# Patient Record
Sex: MALE | Race: WHITE | NOT HISPANIC OR LATINO | URBAN - METROPOLITAN AREA
[De-identification: names, ages, dates, MRNs, and addresses within clinical notes are randomized per-mention and may not be internally consistent; named-entity substitution may affect disease eponyms.]

---

## 2017-10-15 ENCOUNTER — INPATIENT (INPATIENT)
Facility: HOSPITAL | Age: 77
LOS: 0 days | Discharge: ANOTHER HOSPITAL | DRG: 872 | End: 2017-10-16
Attending: INTERNAL MEDICINE | Admitting: INTERNAL MEDICINE
Payer: MEDICARE

## 2017-10-15 VITALS
HEART RATE: 122 BPM | RESPIRATION RATE: 22 BRPM | SYSTOLIC BLOOD PRESSURE: 179 MMHG | WEIGHT: 189.6 LBS | TEMPERATURE: 98 F | OXYGEN SATURATION: 97 % | HEIGHT: 68 IN | DIASTOLIC BLOOD PRESSURE: 80 MMHG

## 2017-10-15 DIAGNOSIS — Z29.9 ENCOUNTER FOR PROPHYLACTIC MEASURES, UNSPECIFIED: ICD-10-CM

## 2017-10-15 DIAGNOSIS — I82.409 ACUTE EMBOLISM AND THROMBOSIS OF UNSPECIFIED DEEP VEINS OF UNSPECIFIED LOWER EXTREMITY: ICD-10-CM

## 2017-10-15 DIAGNOSIS — A41.9 SEPSIS, UNSPECIFIED ORGANISM: ICD-10-CM

## 2017-10-15 DIAGNOSIS — E11.9 TYPE 2 DIABETES MELLITUS WITHOUT COMPLICATIONS: ICD-10-CM

## 2017-10-15 DIAGNOSIS — E83.42 HYPOMAGNESEMIA: ICD-10-CM

## 2017-10-15 DIAGNOSIS — I10 ESSENTIAL (PRIMARY) HYPERTENSION: ICD-10-CM

## 2017-10-15 DIAGNOSIS — T79.6XXA TRAUMATIC ISCHEMIA OF MUSCLE, INITIAL ENCOUNTER: ICD-10-CM

## 2017-10-15 DIAGNOSIS — E83.51 HYPOCALCEMIA: ICD-10-CM

## 2017-10-15 DIAGNOSIS — N17.9 ACUTE KIDNEY FAILURE, UNSPECIFIED: ICD-10-CM

## 2017-10-15 LAB
ALBUMIN SERPL ELPH-MCNC: 3.6 G/DL — SIGNIFICANT CHANGE UP (ref 3.5–5)
ALP SERPL-CCNC: 45 U/L — SIGNIFICANT CHANGE UP (ref 40–120)
ALT FLD-CCNC: 23 U/L DA — SIGNIFICANT CHANGE UP (ref 10–60)
ANION GAP SERPL CALC-SCNC: 21 MMOL/L — HIGH (ref 5–17)
APPEARANCE UR: CLEAR — SIGNIFICANT CHANGE UP
AST SERPL-CCNC: 38 U/L — SIGNIFICANT CHANGE UP (ref 10–40)
BASOPHILS # BLD AUTO: 0 K/UL — SIGNIFICANT CHANGE UP (ref 0–0.2)
BASOPHILS NFR BLD AUTO: 0.2 % — SIGNIFICANT CHANGE UP (ref 0–2)
BILIRUB SERPL-MCNC: 0.7 MG/DL — SIGNIFICANT CHANGE UP (ref 0.2–1.2)
BILIRUB UR-MCNC: NEGATIVE — SIGNIFICANT CHANGE UP
BUN SERPL-MCNC: 22 MG/DL — HIGH (ref 7–18)
CALCIUM SERPL-MCNC: 5.9 MG/DL — CRITICAL LOW (ref 8.4–10.5)
CHLORIDE SERPL-SCNC: 105 MMOL/L — SIGNIFICANT CHANGE UP (ref 96–108)
CK MB BLD-MCNC: 0.3 % — SIGNIFICANT CHANGE UP (ref 0–3.5)
CK MB CFR SERPL CALC: 8.9 NG/ML — HIGH (ref 0–3.6)
CK SERPL-CCNC: 2846 U/L — HIGH (ref 35–232)
CO2 SERPL-SCNC: 13 MMOL/L — LOW (ref 22–31)
COLOR SPEC: YELLOW — SIGNIFICANT CHANGE UP
CREAT SERPL-MCNC: 1.79 MG/DL — HIGH (ref 0.5–1.3)
DIFF PNL FLD: ABNORMAL
EOSINOPHIL # BLD AUTO: 0 K/UL — SIGNIFICANT CHANGE UP (ref 0–0.5)
EOSINOPHIL NFR BLD AUTO: 0.2 % — SIGNIFICANT CHANGE UP (ref 0–6)
GLUCOSE SERPL-MCNC: 162 MG/DL — HIGH (ref 70–99)
GLUCOSE UR QL: NEGATIVE — SIGNIFICANT CHANGE UP
HCT VFR BLD CALC: 35.8 % — LOW (ref 39–50)
HGB BLD-MCNC: 12.1 G/DL — LOW (ref 13–17)
KETONES UR-MCNC: ABNORMAL
LACTATE SERPL-SCNC: 2.5 MMOL/L — HIGH (ref 0.7–2)
LACTATE SERPL-SCNC: 7.5 MMOL/L — CRITICAL HIGH (ref 0.7–2)
LEUKOCYTE ESTERASE UR-ACNC: NEGATIVE — SIGNIFICANT CHANGE UP
LYMPHOCYTES # BLD AUTO: 1.3 K/UL — SIGNIFICANT CHANGE UP (ref 1–3.3)
LYMPHOCYTES # BLD AUTO: 12.4 % — LOW (ref 13–44)
MAGNESIUM SERPL-MCNC: 0.4 MG/DL — CRITICAL LOW (ref 1.6–2.6)
MCHC RBC-ENTMCNC: 31 PG — SIGNIFICANT CHANGE UP (ref 27–34)
MCHC RBC-ENTMCNC: 33.8 GM/DL — SIGNIFICANT CHANGE UP (ref 32–36)
MCV RBC AUTO: 91.7 FL — SIGNIFICANT CHANGE UP (ref 80–100)
MONOCYTES # BLD AUTO: 0.5 K/UL — SIGNIFICANT CHANGE UP (ref 0–0.9)
MONOCYTES NFR BLD AUTO: 4.5 % — SIGNIFICANT CHANGE UP (ref 2–14)
NEUTROPHILS # BLD AUTO: 8.7 K/UL — HIGH (ref 1.8–7.4)
NEUTROPHILS NFR BLD AUTO: 82.7 % — HIGH (ref 43–77)
NITRITE UR-MCNC: NEGATIVE — SIGNIFICANT CHANGE UP
PH UR: 5 — SIGNIFICANT CHANGE UP (ref 5–8)
PHOSPHATE SERPL-MCNC: 4.6 MG/DL — HIGH (ref 2.5–4.5)
PLATELET # BLD AUTO: 159 K/UL — SIGNIFICANT CHANGE UP (ref 150–400)
POTASSIUM SERPL-MCNC: 3.7 MMOL/L — SIGNIFICANT CHANGE UP (ref 3.5–5.3)
POTASSIUM SERPL-SCNC: 3.7 MMOL/L — SIGNIFICANT CHANGE UP (ref 3.5–5.3)
PROT SERPL-MCNC: 7.2 G/DL — SIGNIFICANT CHANGE UP (ref 6–8.3)
PROT UR-MCNC: 100
RBC # BLD: 3.9 M/UL — LOW (ref 4.2–5.8)
RBC # FLD: 13.2 % — SIGNIFICANT CHANGE UP (ref 10.3–14.5)
SODIUM SERPL-SCNC: 139 MMOL/L — SIGNIFICANT CHANGE UP (ref 135–145)
SP GR SPEC: 1.02 — SIGNIFICANT CHANGE UP (ref 1.01–1.02)
TROPONIN I SERPL-MCNC: 0.04 NG/ML — SIGNIFICANT CHANGE UP (ref 0–0.04)
UROBILINOGEN FLD QL: NEGATIVE — SIGNIFICANT CHANGE UP
WBC # BLD: 10.6 K/UL — HIGH (ref 3.8–10.5)
WBC # FLD AUTO: 10.6 K/UL — HIGH (ref 3.8–10.5)

## 2017-10-15 PROCEDURE — 76770 US EXAM ABDO BACK WALL COMP: CPT | Mod: 26

## 2017-10-15 PROCEDURE — 71010: CPT | Mod: 26

## 2017-10-15 PROCEDURE — 99291 CRITICAL CARE FIRST HOUR: CPT

## 2017-10-15 RX ORDER — ACETAMINOPHEN 500 MG
650 TABLET ORAL EVERY 6 HOURS
Qty: 0 | Refills: 0 | Status: DISCONTINUED | OUTPATIENT
Start: 2017-10-15 | End: 2017-10-16

## 2017-10-15 RX ORDER — APIXABAN 2.5 MG/1
2.5 TABLET, FILM COATED ORAL EVERY 12 HOURS
Qty: 0 | Refills: 0 | Status: DISCONTINUED | OUTPATIENT
Start: 2017-10-15 | End: 2017-10-16

## 2017-10-15 RX ORDER — LATANOPROST 0.05 MG/ML
1 SOLUTION/ DROPS OPHTHALMIC; TOPICAL AT BEDTIME
Qty: 0 | Refills: 0 | Status: DISCONTINUED | OUTPATIENT
Start: 2017-10-15 | End: 2017-10-16

## 2017-10-15 RX ORDER — PANTOPRAZOLE SODIUM 20 MG/1
40 TABLET, DELAYED RELEASE ORAL
Qty: 0 | Refills: 0 | Status: DISCONTINUED | OUTPATIENT
Start: 2017-10-15 | End: 2017-10-16

## 2017-10-15 RX ORDER — SODIUM CHLORIDE 9 MG/ML
1000 INJECTION, SOLUTION INTRAVENOUS
Qty: 0 | Refills: 0 | Status: DISCONTINUED | OUTPATIENT
Start: 2017-10-15 | End: 2017-10-16

## 2017-10-15 RX ORDER — LABETALOL HCL 100 MG
200 TABLET ORAL
Qty: 0 | Refills: 0 | Status: DISCONTINUED | OUTPATIENT
Start: 2017-10-15 | End: 2017-10-16

## 2017-10-15 RX ORDER — SODIUM CHLORIDE 9 MG/ML
1000 INJECTION INTRAMUSCULAR; INTRAVENOUS; SUBCUTANEOUS
Qty: 0 | Refills: 0 | Status: DISCONTINUED | OUTPATIENT
Start: 2017-10-15 | End: 2017-10-16

## 2017-10-15 RX ORDER — DEXTROSE 50 % IN WATER 50 %
25 SYRINGE (ML) INTRAVENOUS ONCE
Qty: 0 | Refills: 0 | Status: DISCONTINUED | OUTPATIENT
Start: 2017-10-15 | End: 2017-10-16

## 2017-10-15 RX ORDER — METFORMIN HYDROCHLORIDE 850 MG/1
0 TABLET ORAL
Qty: 180 | Refills: 0 | COMMUNITY

## 2017-10-15 RX ORDER — CALCITRIOL 0.5 UG/1
0.25 CAPSULE ORAL DAILY
Qty: 0 | Refills: 0 | Status: DISCONTINUED | OUTPATIENT
Start: 2017-10-15 | End: 2017-10-16

## 2017-10-15 RX ORDER — MAGNESIUM SULFATE 500 MG/ML
2 VIAL (ML) INJECTION EVERY 4 HOURS
Qty: 0 | Refills: 0 | Status: COMPLETED | OUTPATIENT
Start: 2017-10-15 | End: 2017-10-16

## 2017-10-15 RX ORDER — CALCIUM GLUCONATE 100 MG/ML
2 VIAL (ML) INTRAVENOUS ONCE
Qty: 0 | Refills: 0 | Status: COMPLETED | OUTPATIENT
Start: 2017-10-15 | End: 2017-10-15

## 2017-10-15 RX ORDER — GLUCAGON INJECTION, SOLUTION 0.5 MG/.1ML
1 INJECTION, SOLUTION SUBCUTANEOUS ONCE
Qty: 0 | Refills: 0 | Status: DISCONTINUED | OUTPATIENT
Start: 2017-10-15 | End: 2017-10-16

## 2017-10-15 RX ORDER — AZITHROMYCIN 500 MG/1
500 TABLET, FILM COATED ORAL EVERY 24 HOURS
Qty: 0 | Refills: 0 | Status: DISCONTINUED | OUTPATIENT
Start: 2017-10-16 | End: 2017-10-16

## 2017-10-15 RX ORDER — MAGNESIUM OXIDE 400 MG ORAL TABLET 241.3 MG
400 TABLET ORAL
Qty: 0 | Refills: 0 | Status: DISCONTINUED | OUTPATIENT
Start: 2017-10-15 | End: 2017-10-16

## 2017-10-15 RX ORDER — METRONIDAZOLE 500 MG
500 TABLET ORAL EVERY 8 HOURS
Qty: 0 | Refills: 0 | Status: DISCONTINUED | OUTPATIENT
Start: 2017-10-16 | End: 2017-10-16

## 2017-10-15 RX ORDER — AZITHROMYCIN 500 MG/1
TABLET, FILM COATED ORAL
Qty: 0 | Refills: 0 | Status: DISCONTINUED | OUTPATIENT
Start: 2017-10-15 | End: 2017-10-16

## 2017-10-15 RX ORDER — LABETALOL HCL 100 MG
0 TABLET ORAL
Qty: 180 | Refills: 0 | COMMUNITY

## 2017-10-15 RX ORDER — AZITHROMYCIN 500 MG/1
500 TABLET, FILM COATED ORAL ONCE
Qty: 0 | Refills: 0 | Status: COMPLETED | OUTPATIENT
Start: 2017-10-15 | End: 2017-10-15

## 2017-10-15 RX ORDER — PIPERACILLIN AND TAZOBACTAM 4; .5 G/20ML; G/20ML
3.38 INJECTION, POWDER, LYOPHILIZED, FOR SOLUTION INTRAVENOUS ONCE
Qty: 0 | Refills: 0 | Status: COMPLETED | OUTPATIENT
Start: 2017-10-15 | End: 2017-10-15

## 2017-10-15 RX ORDER — CALCIUM CARBONATE 500(1250)
1 TABLET ORAL DAILY
Qty: 0 | Refills: 0 | Status: DISCONTINUED | OUTPATIENT
Start: 2017-10-15 | End: 2017-10-16

## 2017-10-15 RX ORDER — CEFTRIAXONE 500 MG/1
1 INJECTION, POWDER, FOR SOLUTION INTRAMUSCULAR; INTRAVENOUS EVERY 24 HOURS
Qty: 0 | Refills: 0 | Status: DISCONTINUED | OUTPATIENT
Start: 2017-10-16 | End: 2017-10-16

## 2017-10-15 RX ORDER — DEXTROSE 50 % IN WATER 50 %
1 SYRINGE (ML) INTRAVENOUS ONCE
Qty: 0 | Refills: 0 | Status: DISCONTINUED | OUTPATIENT
Start: 2017-10-15 | End: 2017-10-16

## 2017-10-15 RX ORDER — METRONIDAZOLE 500 MG
TABLET ORAL
Qty: 0 | Refills: 0 | Status: DISCONTINUED | OUTPATIENT
Start: 2017-10-16 | End: 2017-10-16

## 2017-10-15 RX ORDER — CALCITRIOL 0.5 UG/1
0 CAPSULE ORAL
Qty: 90 | Refills: 0 | COMMUNITY

## 2017-10-15 RX ORDER — ATORVASTATIN CALCIUM 80 MG/1
40 TABLET, FILM COATED ORAL AT BEDTIME
Qty: 0 | Refills: 0 | Status: DISCONTINUED | OUTPATIENT
Start: 2017-10-15 | End: 2017-10-15

## 2017-10-15 RX ORDER — INSULIN LISPRO 100/ML
VIAL (ML) SUBCUTANEOUS
Qty: 0 | Refills: 0 | Status: DISCONTINUED | OUTPATIENT
Start: 2017-10-15 | End: 2017-10-16

## 2017-10-15 RX ORDER — GLIMEPIRIDE 1 MG
0 TABLET ORAL
Qty: 90 | Refills: 0 | COMMUNITY

## 2017-10-15 RX ORDER — DEXTROSE 50 % IN WATER 50 %
12.5 SYRINGE (ML) INTRAVENOUS ONCE
Qty: 0 | Refills: 0 | Status: DISCONTINUED | OUTPATIENT
Start: 2017-10-15 | End: 2017-10-16

## 2017-10-15 RX ORDER — METRONIDAZOLE 500 MG
500 TABLET ORAL ONCE
Qty: 0 | Refills: 0 | Status: COMPLETED | OUTPATIENT
Start: 2017-10-15 | End: 2017-10-16

## 2017-10-15 RX ORDER — OMEPRAZOLE 10 MG/1
0 CAPSULE, DELAYED RELEASE ORAL
Qty: 90 | Refills: 0 | COMMUNITY

## 2017-10-15 RX ORDER — SODIUM CHLORIDE 9 MG/ML
2600 INJECTION INTRAMUSCULAR; INTRAVENOUS; SUBCUTANEOUS ONCE
Qty: 0 | Refills: 0 | Status: COMPLETED | OUTPATIENT
Start: 2017-10-15 | End: 2017-10-15

## 2017-10-15 RX ORDER — FINASTERIDE 5 MG/1
5 TABLET, FILM COATED ORAL DAILY
Qty: 0 | Refills: 0 | Status: DISCONTINUED | OUTPATIENT
Start: 2017-10-15 | End: 2017-10-16

## 2017-10-15 RX ADMIN — Medication 400 GRAM(S): at 23:01

## 2017-10-15 RX ADMIN — SODIUM CHLORIDE 2600 MILLILITER(S): 9 INJECTION INTRAMUSCULAR; INTRAVENOUS; SUBCUTANEOUS at 19:10

## 2017-10-15 RX ADMIN — SODIUM CHLORIDE 100 MILLILITER(S): 9 INJECTION INTRAMUSCULAR; INTRAVENOUS; SUBCUTANEOUS at 23:55

## 2017-10-15 RX ADMIN — PIPERACILLIN AND TAZOBACTAM 200 GRAM(S): 4; .5 INJECTION, POWDER, LYOPHILIZED, FOR SOLUTION INTRAVENOUS at 19:11

## 2017-10-15 NOTE — H&P ADULT - PROBLEM SELECTOR PLAN 4
-QTc 473 in EKG, Patient was taking calcitriol, from previous record. Continue calcitriol, check intact PTH level  -Will replace calcium and recheck the level  -Telemetry monitoring -CK 2846, patient reports multiple fall while in [place](but no LOC) and may be taking(he does not know current list of the medications) statin, as per previous record and outside medication list. Normal troponin.  -Will give IV hydration and recheck CK level in AM.

## 2017-10-15 NOTE — ED ADULT TRIAGE NOTE - CHIEF COMPLAINT QUOTE
[place] dizziness as [place] EMS [place] and [place]o dizziness. patient stated he just returned from [place]. patient noted diaphoretic and shaking in triage.

## 2017-10-15 NOTE — H&P ADULT - PROBLEM SELECTOR PLAN 1
-qSOFA 1, patient has elevated lactate with leukocytosis + left shift. Multiple possible source of infection(PNA vs UTI vs Diarrhea).  -Will start Rocephin and zithro for possible PNA; LLL infiltrate  -Also concern for UTI, given h/o BPH and urinary retention, UA in ED looks contaminated; f/u urine culture  -Given travel history and recent diarrheal events, traveler's diarrhea and community acquired C.diff need to be ruled out. Contact isolation and send stool specimen. Starting Metronidazole empirically.  -IV hydration  -Trend lactate, f/u blood cultures -qSOFA 1, patient has elevated lactate with leukocytosis + left shift. Multiple possible source of infection(PNA vs UTI vs Diarrhea).  -Will start Rocephin and zithro for possible PNA; LLL infiltrate  -Also concern for UTI, given h/o BPH and urinary retention, UA in ED looks contaminated; f/u urine culture  -Given travel history and recent diarrheal events, traveler's diarrhea and community acquired C.diff need to be ruled out. Contact isolation and send stool specimen. Starting Metronidazole empirically.  -IV hydration  -Trend lactate, f/u blood cultures  -ID Dr. Francois

## 2017-10-15 NOTE — H&P ADULT - PROBLEM SELECTOR PLAN 6
-Patient is on Elquis 5mg BID for B/L DVT since 2015; continue Eliquis for now.  -Calf is not tender, will check doppler US given risk factors(travel + past history) -Past h/o chronic hypomagnesemia  -Will replace Magnesium and recheck the levels  -Monitor telemetry  -Starting PO magnesium

## 2017-10-15 NOTE — ED PROVIDER NOTE - MEDICAL DECISION MAKING DETAILS
fever to 101.8, hr to 120  no obvious sourse.  discussed with med. admitiing team, PE also on differential, however with creat of 1.79 will likely need vq scan.  pt is on anticoagultion.  repeat lactate is 2.5.  will admit for presumed sepsis with unidentified source

## 2017-10-15 NOTE — H&P ADULT - PROBLEM SELECTOR PLAN 9
-IMPROVE score 5, patient is fully anticoagulated with Eliquis, will continue -Check Hba1c, monitor accucheck  -Holding home med metformin as patient had diarrhea and MANUEL  -Humalog sliding scale and diabetic diet

## 2017-10-15 NOTE — H&P ADULT - PROBLEM SELECTOR PLAN 7
-Pt is hypertensive and taking Labetalol 300mg q12 hrs  -Will reduce dose to 200mg q 12hrs as patient is in septic condition, per Dr. Telles  -Monitor BP -Patient is on Elquis 5mg BID for B/L DVT since 2015; continue Eliquis for now.  -Calf is not tender, will check doppler US given risk factors(travel + past history)

## 2017-10-15 NOTE — H&P ADULT - NSHPPHYSICALEXAM_GEN_ALL_CORE
PHYSICAL EXAM:  GENERAL: NAD, anxious, having tremor worsening with purposeful movement  HEAD:  Atraumatic, Normocephalic  EYES: EOMI, PERRLA, conjunctiva and sclera clear  ENMT: Dry mucous membranes  NECK: Supple, No JVD, Normal thyroid  NERVOUS SYSTEM:  Alert & Oriented X3, Motor Strength 5/5 B/L upper and lower extremities. Having tremor worsening with purposeful movement  CHEST/LUNG: Clear to percussion bilaterally; No rales, rhonchi, wheezing, or rubs  HEART: Regular rate and rhythm; No murmurs, rubs, or gallops  ABDOMEN: Soft, Nondistended; Bowel sounds present. Suprapubic tenderness(+)  EXTREMITIES:  2+ Peripheral Pulses bilaterally, No clubbing, cyanosis. Mild pitting edema B/L ankles  LYMPH: No lymphadenopathy noted    T(C): 38.8 (10-15-17 @ 18:47), Max: 38.8 (10-15-17 @ 18:47)  HR: 122 (10-15-17 @ 18:37) (122 - 122)  BP: 179/80 (10-15-17 @ 18:37) (179/80 - 179/80)  RR: 22 (10-15-17 @ 18:37) (22 - 22)  SpO2: 97% (10-15-17 @ 18:37) (97% - 97%)  Wt(kg): --  I&O's Summary  SKIN: right lower leg abrasion, inferior to patella bone PHYSICAL EXAM:  GENERAL: NAD, anxious, having tremor worsening with purposeful movement  HEAD:  Atraumatic, Normocephalic  EYES: EOMI, PERRLA, conjunctiva and sclera clear  ENMT: Dry mucous membranes  NECK: Supple, No JVD, Normal thyroid  NERVOUS SYSTEM:  Alert & Oriented X3, Motor Strength 5/5 B/L upper and lower extremities. Having tremor worsening with purposeful movement  CHEST/LUNG: Clear to percussion bilaterally; No rales, rhonchi, wheezing, or rubs  HEART: Regular rate and rhythm; No murmurs, rubs, or gallops  ABDOMEN: Soft, Nondistended; Bowel sounds present. Suprapubic tenderness(+)  EXTREMITIES:  2+ Peripheral Pulses bilaterally, No clubbing, cyanosis. Mild pitting edema B/L ankles  LYMPH: No lymphadenopathy noted  SKIN: right lower leg abrasion, inferior to patella bone    T(C): 38.8 (10-15-17 @ 18:47), Max: 38.8 (10-15-17 @ 18:47)  HR: 122 (10-15-17 @ 18:37) (122 - 122)  BP: 179/80 (10-15-17 @ 18:37) (179/80 - 179/80)  RR: 22 (10-15-17 @ 18:37) (22 - 22)  SpO2: 97% (10-15-17 @ 18:37) (97% - 97%)  Wt(kg): --  I&O's Summary

## 2017-10-15 NOTE — H&P ADULT - HISTORY OF PRESENT ILLNESS
77 years old male from home, withPMHx of anemia, DM. DVT (on Xarelto and Eliquis), GERD, HLD, HTN, hypomagnesemia, and PNA 77 years old male from home lives alone, with PMH of HTN, DM, DVT (Dx in 6/2015 on Eliquis), GERD, HLD, HTN, hypomagnesemia, and PNA 77 years old male from home lives alone, with PMH of HTN, DM, DVT (Dx in 6/2015, on Eliquis), GERD, HLD, HTN, hypomagnesemia, and PNA came in to  ED c/o shaking hands and feeling ill started about 2 days ago. Patient is a relatively poor historian. He traveled [place]. He was vomiting and feeling nauseous for over the past 2 days, and had watery diarrhea about 3 times a day for the same period of time. Last bowel movement was yesterday, however that time the stool was normal. Also c/o 1-2 days of black to yellow intermittent thick mucus for the past couple of days without cough. He states that he has been nervous and has shaken since 2 days when he was in [place]-he believes his tremor is from anxiety-. When seen by me, he was also having difficulty urination which he believed from supine position, however denied having problem with urination in the past. Also reports frequent fall recently- 3 episodes over last month-, which he explains from "weak legs". Denies chest pain, SOB, fever, chills, abdominal pain, LOC/head trauma, dizziness, sick contact, or any other symptoms.     In ED, code sepsis was called since he had fever of 101.8 with tachycardia of 122. /50, RR 22, sat 97% RA. Labs significant for lactate of 7.5 with leukocytosis of 10.6K with left shift. BUN/Cr 22/1.79, calcium 5.9 with normal albumin. 2.6L of IV bolus and 1 dose of zosyn was given in ED, lactate trended down to 2.5. EKG sinus tachycardia at 111, with LAFB. Patient is admitted to the telemetry floor for sepsis from unknown cause, likely from UTI vs diarrhea(traveler's vs community-acquired C. diff), MANUEL, hypocalcemia, and frequent fall.        * Primary team please obtain full medication record from his pharmacy-[facility]-. 77 years old male from home lives alone, with PMH of HTN, DM, syncope, DVT (Dx in 6/2015, on Eliquis), BPH, GERD, HLD, HTN, hypomagnesemia, and PNA(2014) came in to  ED c/o shaking hands and feeling ill started about 2 days ago. Patient is a relatively poor historian. He traveled [place] to [place] today. He was vomiting and feeling nauseous for over the past 2 days, and had watery diarrhea about 3 times a day for the same period of time. Last bowel movement was yesterday, however that time the stool was normal. Also c/o 1-2 days of black to yellow intermittent thick mucus for the past couple of days without cough. He states that he has been nervous and has shaken since 2 days when he was in [place]-he believes his tremor is from anxiety-. When seen by me, he was also having difficulty urination which he believed from supine position, however denied having problem with urination in the past. Also reports frequent fall recently- 3 episodes over last month-, which he explains from "weak legs". Denies chest pain, SOB, fever, chills, abdominal pain, LOC/head trauma, dizziness, sick contact, or any other symptoms.     In ED, code sepsis was called since he had fever of 101.8 with tachycardia of 122. /50, RR 22, sat 97% RA. Labs significant for lactate of 7.5 with leukocytosis of 10.6K with left shift. BUN/Cr 22/1.79, calcium 5.9 with normal albumin. 2.6L of IV bolus and 1 dose of zosyn was given in ED, lactate trended down to 2.5. EKG sinus tachycardia at 111, with LAFB. CXR possible LLL infiltration, official report pending. Patient is admitted to the telemetry floor for sepsis from unknown cause, likely from PNA vs UTI vs diarrhea(traveler's vs community-acquired C. diff), MANUEL, hypocalcemia, and frequent fall.        * Primary team please obtain full medication record from his pharmacy-[facility]-. 77 years old male from home lives alone, with PMH of HTN, DM, syncope, DVT (Dx in 6/2015, on Eliquis), BPH, GERD, HLD, HTN, hypomagnesemia, and PNA(2014) came in to  ED c/o shaking hands and feeling ill started about 2 days ago. Patient is a relatively poor historian. He traveled [place]. He was vomiting and feeling nauseous for over the past 2 days, and had watery diarrhea about 3 times a day for the same period of time. Last bowel movement today while in ED, diarrhea. Also c/o 1-2 days of black to yellow intermittent thick mucus for the past couple of days without cough. He states that he has been nervous and has shaken since 2 days when he was in [place]-he believes his tremor is from anxiety-. When seen by me, he was also having difficulty urination which he believed from supine position, however denied having problem with urination in the past. Also reports frequent fall recently- 3 episodes over last month-, which he explains from "weak legs". Denies chest pain, SOB, fever, chills, abdominal pain, LOC/head trauma, dizziness, sick contact, or any other symptoms.     In ED, code sepsis was called since he had fever of 101.8 with tachycardia of 122. /50, RR 22, sat 97% RA. Labs significant for lactate of 7.5 with leukocytosis of 10.6K with left shift. BUN/Cr 22/1.79, calcium 5.9 with normal albumin. 2.6L of IV bolus and 1 dose of zosyn was given in ED, lactate trended down to 2.5. EKG sinus tachycardia at 111, with LAFB. CXR possible LLL infiltration, official report pending. Patient is admitted to the telemetry floor for sepsis from unknown cause, likely from PNA vs UTI vs diarrhea(traveler's vs community-acquired C. diff), MANUEL, hypocalcemia, and frequent fall.        * Primary team please obtain full medication record from his pharmacy-[facility]-. 77 years old male from home lives alone, with PMH of HTN, DM, syncope, DVT (Dx in 6/2015, on Eliquis), BPH, GERD, HLD, HTN, hypomagnesemia, and PNA(2014) came in to  ED c/o shaking hands and feeling ill started about 2 days ago. Patient is a relatively poor historian. He traveled [place]. He was vomiting and feeling nauseous for over the past 2 days, and had watery diarrhea about 3 times a day for the same period of time. Last bowel movement today while in ED, diarrhea. Also c/o 1-2 days of black to yellow intermittent thick mucus for the past couple of days without cough. He states that he has been nervous and has shaken since 2 days when he was in [place]-he believes his tremor is from anxiety-. When seen by me, he was also having difficulty urination which he believed from supine position, however denied having problem with urination in the past. Also reports frequent fall recently- more than 3 episodes over last month-, which he explains from "weak legs". Denies chest pain, SOB, fever, chills, abdominal pain, LOC/head trauma, dizziness, sick contact, or any other symptoms.     In ED, code sepsis was called since he had fever of 101.8 with tachycardia of 122. /50, RR 22, sat 97% RA. Labs significant for lactate of 7.5 with leukocytosis of 10.6K with left shift. BUN/Cr 22/1.79, calcium 5.9 with normal albumin. 2.6L of IV bolus and 1 dose of zosyn was given in ED, lactate trended down to 2.5. EKG sinus tachycardia at 111, with LAFB. CXR possible LLL infiltration, official report pending. Patient is admitted to the telemetry floor for sepsis from unknown cause, likely from PNA vs UTI vs diarrhea(traveler's vs community-acquired C. diff), MANUEL, hypocalcemia, and frequent fall.        * Primary team please obtain full medication record from his pharmacy-[facility]-. Med rec was done based on past medicine list and outside pharmacy record. 77 years old male from home lives alone, with PMH of HTN, DM, syncope, DVT (Dx in 6/2015, on Eliquis), BPH, GERD, HLD, HTN, hypomagnesemia, and PNA(2014) came in to  ED c/o shaking hands and feeling ill started about 2 days ago. Patient is a relatively poor historian. He traveled [place]. He was vomiting and feeling nauseous for over the past 2 days, and had watery diarrhea about 3 times a day for the same period of time. Last bowel movement today while in ED, diarrhea. Also c/o 1-2 days of black to yellow intermittent thick mucus for the past couple of days without cough. He states that he has been nervous and has shaken since 2 days when he was in [place]-he believes his tremor is from anxiety-. When seen by me, he was also having difficulty urination which he believed from supine position, however denied having problem with urination in the past. Also reports frequent fall recently- more than 3 episodes over last month-, which he explains from "weak legs". Denies chest pain, SOB, fever, chills, abdominal pain, LOC/head trauma, dizziness, sick contact, or any other symptoms.     In ED, code sepsis was called since he had fever of 101.8 with tachycardia of 122. /50, RR 22, sat 97% RA. Labs significant for lactate of 7.5 with leukocytosis of 10.6K with left shift. BUN/Cr 22/1.79, calcium 5.9 with normal albumin. 2.6L of IV bolus and 1 dose of zosyn was given in ED, lactate trended down to 2.5. EKG sinus tachycardia at 111, with LAFB. CXR possible LLL infiltration, official report pending. Patient is admitted to the telemetry floor for sepsis from unknown cause, likely from PNA vs UTI vs diarrhea(traveler's vs community-acquired C. diff), MANUEL, hypocalcemia, and frequent fall.        * Primary team please obtain full medication record from his pharmacy-[facility]- or Dr Groves's office. Med rec was done based on past medicine list and outside pharmacy record. 77 years old male from home lives alone, with PMH of HTN, DM, syncope, DVT (Dx in 6/2015, on Eliquis), BPH, GERD, HLD, HTN, hypomagnesemia, and PNA(2014) came in to  ED c/o shaking hands and feeling ill started about 2 days ago. Patient is a relatively poor historian. He traveled [place]. He was vomiting and feeling nauseous for over the past 2 days, and had watery diarrhea about 3 times a day for the same period of time. Last bowel movement today while in ED, diarrhea. Also c/o 1-2 days of black to yellow intermittent thick mucus for the past couple of days without cough. He states that he has been nervous and has shaken since 2 days when he was in [place]-he believes his tremor is from anxiety-. When seen by me, he was also having difficulty urination which he believed from supine position, however denied having problem with urination in the past. Also reports frequent fall recently- more than 3 episodes over last month-, which he explains from "weak legs". Denies chest pain, SOB, fever, chills, abdominal pain, LOC/head trauma, dizziness, sick contact, or any other symptoms.     In ED, code sepsis was called since he had fever of 101.8 with tachycardia of 122. /50, RR 22, sat 97% RA. Labs significant for lactate of 7.5 with leukocytosis of 10.6K with left shift. BUN/Cr 22/1.79, calcium 5.9 with normal albumin. 2.6L of IV bolus and 1 dose of zosyn was given in ED, lactate trended down to 2.5. EKG sinus tachycardia at 111, with LAFB. CXR possible LLL infiltration, official report pending. Patient is admitted to the telemetry floor for sepsis from unknown cause, likely from PNA vs UTI vs diarrhea(traveler's vs community-acquired C. diff vs other infectious diarrhea), MANUEL, hypocalcemia, and frequent fall.        * Primary team please obtain full medication record from his pharmacy-[facility]- or Dr Groves's office. Med rec was done based on past medicine list and outside pharmacy record. 77 years old male from home lives alone, with PMH of HTN, DM, syncope, DVT (Dx in 6/2015, on Eliquis), BPH, GERD, HLD, HTN, hypomagnesemia, and PNA(2014) came in to  ED c/o shaking hands and feeling ill started about 2 days ago. Patient is a relatively poor historian. He traveled [place]. He was vomiting and feeling nauseous for over the past 2 days, and had watery diarrhea about 3 times a day for the same period of time. Last bowel movement today while in ED, diarrhea. Also c/o 1-2 days of black to yellow intermittent thick mucus for the past couple of days without cough. He states that he has been nervous and has shaken since 2 days when he was in [place]-he believes his tremor is from anxiety-. When seen by me, he was also having difficulty urination which he believed from supine position, however denied having problem with urination in the past. Also reports frequent fall recently- more than 3 episodes over last month-, which he explains from "weak legs". Denies chest pain, SOB, fever, chills, abdominal pain, LOC/head trauma, dizziness, sick contact, or any other symptoms.     In ED, code sepsis was called since he had fever of 101.8 with tachycardia of 122. /50, RR 22, sat 97% RA. Labs significant for lactate of 7.5 with leukocytosis of 10.6K with left shift. BUN/Cr 22/1.79, calcium 5.9 with normal albumin. 2.6L of IV bolus and 1 dose of zosyn was given in ED, lactate trended down to 2.5. EKG sinus tachycardia at 111, with LAFB, no significant change from previous EKG from 2014. CXR possible LLL infiltration, official report pending. Patient is admitted to the telemetry floor for sepsis from unknown cause, likely from PNA vs UTI vs diarrhea(traveler's vs community-acquired C. diff vs other infectious diarrhea), MANUEL, hypocalcemia, and frequent fall.        * Primary team please obtain full medication record from his pharmacy-[facility]- or Dr Groves's office. Med rec was done based on past medicine list and outside pharmacy record.

## 2017-10-15 NOTE — H&P ADULT - NSHPLABSRESULTS_GEN_ALL_CORE
LABS:                        12.1   10.6  )-----------( 159      ( 15 Oct 2017 19:07 )             35.8     10-15    139  |  105  |  22<H>  ----------------------------<  162<H>  3.7   |  13<L>  |  1.79<H>    Ca    5.9<LL>      15 Oct 2017 19:07    TPro  7.2  /  Alb  3.6  /  TBili  0.7  /  DBili  x   /  AST  38  /  ALT  23  /  AlkPhos  45  10-15      Urinalysis Basic - ( 15 Oct 2017 19:07 )    Color: Yellow / Appearance: Clear / S.020 / pH: x  Gluc: x / Ketone: Moderate  / Bili: Negative / Urobili: Negative   Blood: x / Protein: 100 / Nitrite: Negative   Leuk Esterase: Negative / RBC: 5-10 /HPF / WBC 0-2 /HPF   Sq Epi: x / Non Sq Epi: Moderate / Bacteria: Moderate /HPF      CAPILLARY BLOOD GLUCOSE      POCT Blood Glucose.: 159 mg/dL (15 Oct 2017 18:43)    LIVER FUNCTIONS - ( 15 Oct 2017 19:07 )  Alb: 3.6 g/dL / Pro: 7.2 g/dL / ALK PHOS: 45 U/L / ALT: 23 U/L DA / AST: 38 U/L / GGT: x LABS:                        12.1   10.6  )-----------( 159      ( 15 Oct 2017 19:07 )             35.8     10-15    139  |  105  |  22<H>  ----------------------------<  162<H>  3.7   |  13<L>  |  1.79<H>    Ca    5.9<LL>      15 Oct 2017 19:07    TPro  7.2  /  Alb  3.6  /  TBili  0.7  /  DBili  x   /  AST  38  /  ALT  23  /  AlkPhos  45  10-15      Urinalysis Basic - ( 15 Oct 2017 19:07 )    Color: Yellow / Appearance: Clear / S.020 / pH: x  Gluc: x / Ketone: Moderate  / Bili: Negative / Urobili: Negative   Blood: x / Protein: 100 / Nitrite: Negative   Leuk Esterase: Negative / RBC: 5-10 /HPF / WBC 0-2 /HPF   Sq Epi: x / Non Sq Epi: Moderate / Bacteria: Moderate /HPF      CAPILLARY BLOOD GLUCOSE      POCT Blood Glucose.: 159 mg/dL (15 Oct 2017 18:43)    LIVER FUNCTIONS - ( 15 Oct 2017 19:07 )  Alb: 3.6 g/dL / Pro: 7.2 g/dL / ALK PHOS: 45 U/L / ALT: 23 U/L DA / AST: 38 U/L / GGT: x    < from: US Urinary Bladder (10.15.17 @ 23:42) >/Sono renal    IMPRESSION:  Bilateral cortical renal atrophy. No hydronephrosis or nephrolithiasis.  Sonographic evidence of urinary retention.  Intravesical extension of the prostate gland, suspicious for an enlarged   prostate although the prostate was not well visualized on the current   exam.    < end of copied text >

## 2017-10-15 NOTE — H&P ADULT - PROBLEM SELECTOR PLAN 3
-Pre-read from technician for renal sono; unremarkable  -BUN/Cr <20, likely post -Pre-read from technician for renal sono; unremarkable. Bladder sono; pre-void 290cc, post void urine 220cc. Pt refused catheterization.  -BUN/Cr <20, likely post renal +/- intrinsic renal from DM/HTN  -Will send urine lytes and follow up renal function  -Will check bladder and renal sonogram  -Avoid nephrotoxins -Pre-read from technician for renal sono; unremarkable. Bladder sono; pre-void 290cc, post void urine 220cc. Pt refused catheterization.  -BUN/Cr <20, FeNa 0.9%, likely mixed picture from prerenal + post renal +/- intrinsic renal from DM/HTN  -follow up renal function  -bladder and renal sonogram; no hydronephrosis, sonographic evidence of urinary retention  -Avoid nephrotoxins

## 2017-10-15 NOTE — ED ADULT NURSE NOTE - CHIEF COMPLAINT QUOTE
[place]. patient stated he just returned from [place]. patient noted diaphoretic and shaking in triage.

## 2017-10-15 NOTE — ED PROVIDER NOTE - PMH
Anemia    Diabetes    DVT (deep venous thrombosis)    GERD (gastroesophageal reflux disease)    Hypercholesterolemia    Hypertension    Hypomagnesemia    PNA (pneumonia)

## 2017-10-15 NOTE — H&P ADULT - ASSESSMENT
Patient is a 77y old  Male who presents with a chief complaint of Tremor (15 Oct 2017 22:07). Patient is admitted to the telemetry floor for sepsis from unknown cause, likely from UTI vs diarrhea(traveler's vs community-acquired C. diff), MANUEL, hypocalcemia, and frequent fall.         : Patient is a 77y old  Male who presents with a chief complaint of Tremor (15 Oct 2017 22:07). Patient is admitted to the telemetry floor for sepsis from unknown cause, likely from PNA vs UTI vs diarrhea(traveler's vs community-acquired C. diff), MANUEL, hypocalcemia, and frequent fall.         : Patient is a 77y old  Male who presents with a chief complaint of Tremor (15 Oct 2017 22:07). Patient is admitted to the telemetry floor for sepsis from unknown cause, likely from PNA vs UTI vs diarrhea(traveler's vs community-acquired C. diff vs other infectious diarrhea), MANUEL, hypocalcemia, and frequent fall.         :

## 2017-10-15 NOTE — H&P ADULT - NSHPSOCIALHISTORY_GEN_ALL_CORE
Ex-heavy smoker( >2PPD x 30 yrs), quit 25 years ago  No EtOH use, no illicit drug use reported  Not sexually active  Reports having no family

## 2017-10-15 NOTE — H&P ADULT - PROBLEM SELECTOR PLAN 2
-Sinus tachycardia, past h/o DVT, Recent long distance travel history; Wells score 3.  -However patient is   -Patient is already fully anticoagulated; will continue Eliquis but renally dosed -Sinus tachycardia, past h/o DVT, Recent long distance travel history; Wells score 3.  -However patient is not hemodynamically compromised, not having chest pain or SOB.  -Patient is already fully anticoagulated; will continue Eliquis but renally dosed  -May need VQ scan for further diagnostic evaluation; CT angio could not be done due to MANUEL. -Sinus tachycardia, past h/o DVT, Recent long distance travel history; Wells score 3.  -However patient is not hemodynamically compromised, not having chest pain or SOB.  -Patient is already fully anticoagulated; will continue Eliquis but renally dosed  -May need VQ scan for further diagnostic evaluation; CT angio could not be done due to MANUEL.  -Pulmonary Dr. Banks was consulted.

## 2017-10-15 NOTE — H&P ADULT - PROBLEM SELECTOR PLAN 8
-Check Hba1c, monitor accucheck  -Holding home med metformin as patient had diarrhea and MANUEL  -Humalog sliding scale and diabetic diet -Pt is hypertensive and taking Labetalol 300mg q12 hrs  -Will reduce dose to 200mg q 12hrs as patient is in septic condition, per Dr. Tellse  -Monitor BP

## 2017-10-15 NOTE — H&P ADULT - PROBLEM SELECTOR PLAN 5
-Past h/o chronic hypomagnesemia  -Will replace Magnesium and recheck the levels  -Monitor telemetry  -Starting PO magnesium -QTc 473 in EKG, Patient was taking calcitriol, from previous record. Continue calcitriol, check intact PTH level, and ionized calcium in AM, r/o hypoparathyroidism given elevated Phosphorous.  -Will replace calcium and recheck the level  -Telemetry monitoring

## 2017-10-15 NOTE — ED PROVIDER NOTE - PROGRESS NOTE DETAILS
got a call from lab regarding MG. level..  pt is admitted and in ultrasound.  spoke to the MAR who will inform team to replete the MG 21:00 pt had a slip and fall in bathroom.  no Loc, + head trauma.  head and C-spine CT ordered.  medicine team made aware.

## 2017-10-15 NOTE — ED PROVIDER NOTE - OBJECTIVE STATEMENT
78 y/o M pt with PMHx of anemia, DM. DVT (on Xarelto and Eliquis), GERD, HLD, HTN, hypomagnesemia, and PNA presents to ED c/o weakness, fever, and "not feeling well" x today. Pt reports sudden onset of symptoms after he returned from trip to [place] today. Per pt, he felt well while in [place] and on the airplane and [place] subway [place]. Pt denies dysuria, cough, nausea, vomiting, diarrhea, or any other complaints. 76 y/o M pt with PMHx of anemia, DM. DVT (on Xarelto and Eliquis), GERD, HLD, HTN, hypomagnesemia, and PNA presents to ED c/o "not feeling well" x today; pt afebrile and shaking in ED. Pt reports sudden onset of symptoms after he returned from trip to [place] today. Per pt, he felt well while in [place] and on the airplane and [place] subway [place]. Pt denies dysuria, cough, nausea, vomiting, diarrhea, or any other complaints.

## 2017-10-16 ENCOUNTER — INPATIENT (INPATIENT)
Facility: HOSPITAL | Age: 77
LOS: 9 days | Discharge: NURSING, REHAB OR LONG-TERM CARE | DRG: 86 | End: 2017-10-26
Attending: HOSPITALIST | Admitting: NEUROLOGICAL SURGERY
Payer: MEDICARE

## 2017-10-16 VITALS
TEMPERATURE: 99 F | HEART RATE: 105 BPM | DIASTOLIC BLOOD PRESSURE: 87 MMHG | RESPIRATION RATE: 18 BRPM | OXYGEN SATURATION: 96 % | SYSTOLIC BLOOD PRESSURE: 145 MMHG

## 2017-10-16 VITALS
SYSTOLIC BLOOD PRESSURE: 170 MMHG | HEART RATE: 77 BPM | OXYGEN SATURATION: 98 % | RESPIRATION RATE: 17 BRPM | DIASTOLIC BLOOD PRESSURE: 70 MMHG | TEMPERATURE: 99 F

## 2017-10-16 DIAGNOSIS — M62.82 RHABDOMYOLYSIS: ICD-10-CM

## 2017-10-16 DIAGNOSIS — E87.2 ACIDOSIS: ICD-10-CM

## 2017-10-16 DIAGNOSIS — N17.9 ACUTE KIDNEY FAILURE, UNSPECIFIED: ICD-10-CM

## 2017-10-16 LAB
24R-OH-CALCIDIOL SERPL-MCNC: 14 NG/ML — LOW (ref 30–80)
ALBUMIN SERPL ELPH-MCNC: 3 G/DL — LOW (ref 3.5–5)
ALP SERPL-CCNC: 36 U/L — LOW (ref 40–120)
ALT FLD-CCNC: 23 U/L DA — SIGNIFICANT CHANGE UP (ref 10–60)
ANION GAP SERPL CALC-SCNC: 10 MMOL/L — SIGNIFICANT CHANGE UP (ref 5–17)
ANION GAP SERPL CALC-SCNC: 12 MMOL/L — SIGNIFICANT CHANGE UP (ref 5–17)
ANION GAP SERPL CALC-SCNC: 9 MMOL/L — SIGNIFICANT CHANGE UP (ref 5–17)
APTT BLD: 32.2 SEC — SIGNIFICANT CHANGE UP (ref 27.5–37.4)
AST SERPL-CCNC: 47 U/L — HIGH (ref 10–40)
BASOPHILS # BLD AUTO: 0 K/UL — SIGNIFICANT CHANGE UP (ref 0–0.2)
BASOPHILS NFR BLD AUTO: 0.7 % — SIGNIFICANT CHANGE UP (ref 0–2)
BILIRUB DIRECT SERPL-MCNC: 0.1 MG/DL — SIGNIFICANT CHANGE UP (ref 0–0.2)
BILIRUB INDIRECT FLD-MCNC: 0.5 MG/DL — SIGNIFICANT CHANGE UP (ref 0.2–1)
BILIRUB SERPL-MCNC: 0.6 MG/DL — SIGNIFICANT CHANGE UP (ref 0.2–1.2)
BUN SERPL-MCNC: 17 MG/DL — SIGNIFICANT CHANGE UP (ref 7–18)
BUN SERPL-MCNC: 18 MG/DL — SIGNIFICANT CHANGE UP (ref 7–18)
BUN SERPL-MCNC: 19 MG/DL — HIGH (ref 7–18)
CA-I BLD-SCNC: 0.83 MMOL/L — LOW (ref 1.12–1.3)
CALCIUM SERPL-MCNC: 5.5 MG/DL — CRITICAL LOW (ref 8.4–10.5)
CALCIUM SERPL-MCNC: 5.9 MG/DL — CRITICAL LOW (ref 8.4–10.5)
CALCIUM SERPL-MCNC: 6.1 MG/DL — CRITICAL LOW (ref 8.4–10.5)
CALCIUM SERPL-MCNC: 6.1 MG/DL — CRITICAL LOW (ref 8.4–10.5)
CHLORIDE SERPL-SCNC: 107 MMOL/L — SIGNIFICANT CHANGE UP (ref 96–108)
CHLORIDE SERPL-SCNC: 108 MMOL/L — SIGNIFICANT CHANGE UP (ref 96–108)
CHLORIDE SERPL-SCNC: 108 MMOL/L — SIGNIFICANT CHANGE UP (ref 96–108)
CHOLEST SERPL-MCNC: 126 MG/DL — SIGNIFICANT CHANGE UP (ref 10–199)
CK MB BLD-MCNC: 0.4 % — SIGNIFICANT CHANGE UP (ref 0–3.5)
CK MB BLD-MCNC: 0.4 % — SIGNIFICANT CHANGE UP (ref 0–3.5)
CK MB CFR SERPL CALC: 14 NG/ML — HIGH (ref 0–3.6)
CK MB CFR SERPL CALC: 16.5 NG/ML — HIGH (ref 0–3.6)
CK SERPL-CCNC: 3259 U/L — HIGH (ref 35–232)
CK SERPL-CCNC: 3680 U/L — HIGH (ref 35–232)
CO2 SERPL-SCNC: 21 MMOL/L — LOW (ref 22–31)
CO2 SERPL-SCNC: 21 MMOL/L — LOW (ref 22–31)
CO2 SERPL-SCNC: 23 MMOL/L — SIGNIFICANT CHANGE UP (ref 22–31)
CREAT ?TM UR-MCNC: 145 MG/DL — SIGNIFICANT CHANGE UP
CREAT SERPL-MCNC: 1.23 MG/DL — SIGNIFICANT CHANGE UP (ref 0.5–1.3)
CREAT SERPL-MCNC: 1.25 MG/DL — SIGNIFICANT CHANGE UP (ref 0.5–1.3)
CREAT SERPL-MCNC: 1.35 MG/DL — HIGH (ref 0.5–1.3)
CULTURE RESULTS: NO GROWTH — SIGNIFICANT CHANGE UP
EOSINOPHIL # BLD AUTO: 0.1 K/UL — SIGNIFICANT CHANGE UP (ref 0–0.5)
EOSINOPHIL NFR BLD AUTO: 1.2 % — SIGNIFICANT CHANGE UP (ref 0–6)
GLUCOSE BLDC GLUCOMTR-MCNC: 125 MG/DL — HIGH (ref 70–99)
GLUCOSE BLDC GLUCOMTR-MCNC: 139 MG/DL — HIGH (ref 70–99)
GLUCOSE BLDC GLUCOMTR-MCNC: 177 MG/DL — HIGH (ref 70–99)
GLUCOSE BLDC GLUCOMTR-MCNC: 89 MG/DL — SIGNIFICANT CHANGE UP (ref 70–99)
GLUCOSE SERPL-MCNC: 153 MG/DL — HIGH (ref 70–99)
GLUCOSE SERPL-MCNC: 178 MG/DL — HIGH (ref 70–99)
GLUCOSE SERPL-MCNC: 93 MG/DL — SIGNIFICANT CHANGE UP (ref 70–99)
HBA1C BLD-MCNC: 5.9 % — HIGH (ref 4–5.6)
HCT VFR BLD CALC: 28.5 % — LOW (ref 39–50)
HCT VFR BLD CALC: 29.3 % — LOW (ref 39–50)
HDLC SERPL-MCNC: 42 MG/DL — SIGNIFICANT CHANGE UP (ref 40–125)
HGB BLD-MCNC: 10.1 G/DL — LOW (ref 13–17)
HGB BLD-MCNC: 9.7 G/DL — LOW (ref 13–17)
INR BLD: 1.45 RATIO — HIGH (ref 0.88–1.16)
LACTATE SERPL-SCNC: 0.9 MMOL/L — SIGNIFICANT CHANGE UP (ref 0.7–2)
LEGIONELLA AG UR QL: NEGATIVE — SIGNIFICANT CHANGE UP
LIPID PNL WITH DIRECT LDL SERPL: 62 MG/DL — SIGNIFICANT CHANGE UP
LYMPHOCYTES # BLD AUTO: 1.2 K/UL — SIGNIFICANT CHANGE UP (ref 1–3.3)
LYMPHOCYTES # BLD AUTO: 18.2 % — SIGNIFICANT CHANGE UP (ref 13–44)
MAGNESIUM SERPL-MCNC: 1.5 MG/DL — LOW (ref 1.6–2.6)
MAGNESIUM SERPL-MCNC: 1.7 MG/DL — SIGNIFICANT CHANGE UP (ref 1.6–2.6)
MAGNESIUM SERPL-MCNC: 1.8 MG/DL — SIGNIFICANT CHANGE UP (ref 1.6–2.6)
MCHC RBC-ENTMCNC: 32 PG — SIGNIFICANT CHANGE UP (ref 27–34)
MCHC RBC-ENTMCNC: 32.3 PG — SIGNIFICANT CHANGE UP (ref 27–34)
MCHC RBC-ENTMCNC: 34 GM/DL — SIGNIFICANT CHANGE UP (ref 32–36)
MCHC RBC-ENTMCNC: 34.4 GM/DL — SIGNIFICANT CHANGE UP (ref 32–36)
MCV RBC AUTO: 93.8 FL — SIGNIFICANT CHANGE UP (ref 80–100)
MCV RBC AUTO: 94.1 FL — SIGNIFICANT CHANGE UP (ref 80–100)
MONOCYTES # BLD AUTO: 0.3 K/UL — SIGNIFICANT CHANGE UP (ref 0–0.9)
MONOCYTES NFR BLD AUTO: 5.3 % — SIGNIFICANT CHANGE UP (ref 2–14)
NEUTROPHILS # BLD AUTO: 4.8 K/UL — SIGNIFICANT CHANGE UP (ref 1.8–7.4)
NEUTROPHILS NFR BLD AUTO: 74.5 % — SIGNIFICANT CHANGE UP (ref 43–77)
OSMOLALITY UR: 656 MOS/KG — SIGNIFICANT CHANGE UP (ref 50–1200)
PHOSPHATE SERPL-MCNC: 2.4 MG/DL — LOW (ref 2.5–4.5)
PHOSPHATE SERPL-MCNC: 2.7 MG/DL — SIGNIFICANT CHANGE UP (ref 2.5–4.5)
PHOSPHATE SERPL-MCNC: 3.5 MG/DL — SIGNIFICANT CHANGE UP (ref 2.5–4.5)
PLATELET # BLD AUTO: 116 K/UL — LOW (ref 150–400)
PLATELET # BLD AUTO: 123 K/UL — LOW (ref 150–400)
POTASSIUM SERPL-MCNC: 3.4 MMOL/L — LOW (ref 3.5–5.3)
POTASSIUM SERPL-MCNC: 3.6 MMOL/L — SIGNIFICANT CHANGE UP (ref 3.5–5.3)
POTASSIUM SERPL-MCNC: 3.7 MMOL/L — SIGNIFICANT CHANGE UP (ref 3.5–5.3)
POTASSIUM SERPL-SCNC: 3.4 MMOL/L — LOW (ref 3.5–5.3)
POTASSIUM SERPL-SCNC: 3.6 MMOL/L — SIGNIFICANT CHANGE UP (ref 3.5–5.3)
POTASSIUM SERPL-SCNC: 3.7 MMOL/L — SIGNIFICANT CHANGE UP (ref 3.5–5.3)
PROT ?TM UR-MCNC: 54 MG/DL — HIGH (ref 0–12)
PROT SERPL-MCNC: 6 G/DL — SIGNIFICANT CHANGE UP (ref 6–8.3)
PROTHROM AB SERPL-ACNC: 15.9 SEC — HIGH (ref 9.8–12.7)
PTH-INTACT FLD-MCNC: 47 PG/ML — SIGNIFICANT CHANGE UP (ref 15–65)
RAPID RVP RESULT: SIGNIFICANT CHANGE UP
RBC # BLD: 3.03 M/UL — LOW (ref 4.2–5.8)
RBC # BLD: 3.13 M/UL — LOW (ref 4.2–5.8)
RBC # FLD: 13.4 % — SIGNIFICANT CHANGE UP (ref 10.3–14.5)
RBC # FLD: 13.6 % — SIGNIFICANT CHANGE UP (ref 10.3–14.5)
SODIUM SERPL-SCNC: 138 MMOL/L — SIGNIFICANT CHANGE UP (ref 135–145)
SODIUM SERPL-SCNC: 140 MMOL/L — SIGNIFICANT CHANGE UP (ref 135–145)
SODIUM SERPL-SCNC: 141 MMOL/L — SIGNIFICANT CHANGE UP (ref 135–145)
SODIUM UR-SCNC: 97 MMOL/L — SIGNIFICANT CHANGE UP (ref 40–220)
SPECIMEN SOURCE: SIGNIFICANT CHANGE UP
TOTAL CHOLESTEROL/HDL RATIO MEASUREMENT: 3 RATIO — LOW (ref 3.4–9.6)
TRIGL SERPL-MCNC: 110 MG/DL — SIGNIFICANT CHANGE UP (ref 10–149)
TROPONIN I SERPL-MCNC: 0.08 NG/ML — HIGH (ref 0–0.04)
TROPONIN I SERPL-MCNC: 0.1 NG/ML — HIGH (ref 0–0.04)
TSH SERPL-MCNC: 0.64 UU/ML — SIGNIFICANT CHANGE UP (ref 0.34–4.82)
VIT B12 SERPL-MCNC: 121 PG/ML — LOW (ref 243–894)
WBC # BLD: 6.4 K/UL — SIGNIFICANT CHANGE UP (ref 3.8–10.5)
WBC # BLD: 7.5 K/UL — SIGNIFICANT CHANGE UP (ref 3.8–10.5)
WBC # FLD AUTO: 6.4 K/UL — SIGNIFICANT CHANGE UP (ref 3.8–10.5)
WBC # FLD AUTO: 7.5 K/UL — SIGNIFICANT CHANGE UP (ref 3.8–10.5)

## 2017-10-16 PROCEDURE — 81001 URINALYSIS AUTO W/SCOPE: CPT

## 2017-10-16 PROCEDURE — 83970 ASSAY OF PARATHORMONE: CPT

## 2017-10-16 PROCEDURE — 84156 ASSAY OF PROTEIN URINE: CPT

## 2017-10-16 PROCEDURE — 84484 ASSAY OF TROPONIN QUANT: CPT

## 2017-10-16 PROCEDURE — 96375 TX/PRO/DX INJ NEW DRUG ADDON: CPT

## 2017-10-16 PROCEDURE — 80076 HEPATIC FUNCTION PANEL: CPT

## 2017-10-16 PROCEDURE — 99291 CRITICAL CARE FIRST HOUR: CPT | Mod: 25

## 2017-10-16 PROCEDURE — 82330 ASSAY OF CALCIUM: CPT

## 2017-10-16 PROCEDURE — 83935 ASSAY OF URINE OSMOLALITY: CPT

## 2017-10-16 PROCEDURE — 84100 ASSAY OF PHOSPHORUS: CPT

## 2017-10-16 PROCEDURE — 82306 VITAMIN D 25 HYDROXY: CPT

## 2017-10-16 PROCEDURE — 80048 BASIC METABOLIC PNL TOTAL CA: CPT

## 2017-10-16 PROCEDURE — 93970 EXTREMITY STUDY: CPT

## 2017-10-16 PROCEDURE — 82550 ASSAY OF CK (CPK): CPT

## 2017-10-16 PROCEDURE — 70450 CT HEAD/BRAIN W/O DYE: CPT

## 2017-10-16 PROCEDURE — 99223 1ST HOSP IP/OBS HIGH 75: CPT | Mod: GC

## 2017-10-16 PROCEDURE — 87086 URINE CULTURE/COLONY COUNT: CPT

## 2017-10-16 PROCEDURE — 80053 COMPREHEN METABOLIC PANEL: CPT

## 2017-10-16 PROCEDURE — 85610 PROTHROMBIN TIME: CPT

## 2017-10-16 PROCEDURE — 82310 ASSAY OF CALCIUM: CPT

## 2017-10-16 PROCEDURE — 87581 M.PNEUMON DNA AMP PROBE: CPT

## 2017-10-16 PROCEDURE — 87486 CHLMYD PNEUM DNA AMP PROBE: CPT

## 2017-10-16 PROCEDURE — 83605 ASSAY OF LACTIC ACID: CPT

## 2017-10-16 PROCEDURE — 87798 DETECT AGENT NOS DNA AMP: CPT

## 2017-10-16 PROCEDURE — 87633 RESP VIRUS 12-25 TARGETS: CPT

## 2017-10-16 PROCEDURE — 82570 ASSAY OF URINE CREATININE: CPT

## 2017-10-16 PROCEDURE — 80061 LIPID PANEL: CPT

## 2017-10-16 PROCEDURE — 70450 CT HEAD/BRAIN W/O DYE: CPT | Mod: 26

## 2017-10-16 PROCEDURE — 84443 ASSAY THYROID STIM HORMONE: CPT

## 2017-10-16 PROCEDURE — 85027 COMPLETE CBC AUTOMATED: CPT

## 2017-10-16 PROCEDURE — 87040 BLOOD CULTURE FOR BACTERIA: CPT

## 2017-10-16 PROCEDURE — 82962 GLUCOSE BLOOD TEST: CPT

## 2017-10-16 PROCEDURE — 83036 HEMOGLOBIN GLYCOSYLATED A1C: CPT

## 2017-10-16 PROCEDURE — 71045 X-RAY EXAM CHEST 1 VIEW: CPT

## 2017-10-16 PROCEDURE — 83735 ASSAY OF MAGNESIUM: CPT

## 2017-10-16 PROCEDURE — 87899 AGENT NOS ASSAY W/OPTIC: CPT

## 2017-10-16 PROCEDURE — 76857 US EXAM PELVIC LIMITED: CPT

## 2017-10-16 PROCEDURE — 85730 THROMBOPLASTIN TIME PARTIAL: CPT

## 2017-10-16 PROCEDURE — 36415 COLL VENOUS BLD VENIPUNCTURE: CPT

## 2017-10-16 PROCEDURE — 93010 ELECTROCARDIOGRAM REPORT: CPT

## 2017-10-16 PROCEDURE — 83874 ASSAY OF MYOGLOBIN: CPT

## 2017-10-16 PROCEDURE — 93306 TTE W/DOPPLER COMPLETE: CPT

## 2017-10-16 PROCEDURE — 86900 BLOOD TYPING SEROLOGIC ABO: CPT

## 2017-10-16 PROCEDURE — 86901 BLOOD TYPING SEROLOGIC RH(D): CPT

## 2017-10-16 PROCEDURE — 82607 VITAMIN B-12: CPT

## 2017-10-16 PROCEDURE — 87449 NOS EACH ORGANISM AG IA: CPT

## 2017-10-16 PROCEDURE — 96374 THER/PROPH/DIAG INJ IV PUSH: CPT

## 2017-10-16 PROCEDURE — 93005 ELECTROCARDIOGRAM TRACING: CPT

## 2017-10-16 PROCEDURE — 76775 US EXAM ABDO BACK WALL LIM: CPT

## 2017-10-16 PROCEDURE — 93970 EXTREMITY STUDY: CPT | Mod: 26

## 2017-10-16 PROCEDURE — 82652 VIT D 1 25-DIHYDROXY: CPT

## 2017-10-16 PROCEDURE — 82553 CREATINE MB FRACTION: CPT

## 2017-10-16 PROCEDURE — 86850 RBC ANTIBODY SCREEN: CPT

## 2017-10-16 PROCEDURE — 84300 ASSAY OF URINE SODIUM: CPT

## 2017-10-16 RX ORDER — INSULIN LISPRO 100/ML
5 VIAL (ML) SUBCUTANEOUS
Qty: 0 | Refills: 0 | COMMUNITY
Start: 2017-10-16

## 2017-10-16 RX ORDER — INSULIN LISPRO 100/ML
5 VIAL (ML) SUBCUTANEOUS
Qty: 0 | Refills: 0 | DISCHARGE
Start: 2017-10-16

## 2017-10-16 RX ORDER — CALCIUM CARBONATE 500(1250)
1 TABLET ORAL THREE TIMES A DAY
Qty: 0 | Refills: 0 | Status: DISCONTINUED | OUTPATIENT
Start: 2017-10-16 | End: 2017-10-16

## 2017-10-16 RX ORDER — CALCIUM GLUCONATE 100 MG/ML
1 VIAL (ML) INTRAVENOUS ONCE
Qty: 0 | Refills: 0 | Status: DISCONTINUED | OUTPATIENT
Start: 2017-10-16 | End: 2017-10-16

## 2017-10-16 RX ORDER — INSULIN LISPRO 100/ML
0 VIAL (ML) SUBCUTANEOUS
Qty: 0 | Refills: 0 | COMMUNITY
Start: 2017-10-16

## 2017-10-16 RX ORDER — CEFTRIAXONE 500 MG/1
0 INJECTION, POWDER, FOR SOLUTION INTRAMUSCULAR; INTRAVENOUS
Qty: 0 | Refills: 0 | COMMUNITY
Start: 2017-10-16

## 2017-10-16 RX ORDER — CALCITRIOL 0.5 UG/1
1 CAPSULE ORAL
Qty: 0 | Refills: 0 | COMMUNITY
Start: 2017-10-16

## 2017-10-16 RX ORDER — CALCIUM GLUCONATE 100 MG/ML
1 VIAL (ML) INTRAVENOUS ONCE
Qty: 0 | Refills: 0 | Status: COMPLETED | OUTPATIENT
Start: 2017-10-16 | End: 2017-10-16

## 2017-10-16 RX ORDER — MAGNESIUM SULFATE 500 MG/ML
2 VIAL (ML) INJECTION ONCE
Qty: 0 | Refills: 0 | Status: DISCONTINUED | OUTPATIENT
Start: 2017-10-16 | End: 2017-10-16

## 2017-10-16 RX ORDER — INFLUENZA VIRUS VACCINE 15; 15; 15; 15 UG/.5ML; UG/.5ML; UG/.5ML; UG/.5ML
0.5 SUSPENSION INTRAMUSCULAR ONCE
Qty: 0 | Refills: 0 | Status: DISCONTINUED | OUTPATIENT
Start: 2017-10-16 | End: 2017-10-16

## 2017-10-16 RX ORDER — ERGOCALCIFEROL 1.25 MG/1
50000 CAPSULE ORAL
Qty: 0 | Refills: 0 | Status: DISCONTINUED | OUTPATIENT
Start: 2017-10-16 | End: 2017-10-16

## 2017-10-16 RX ORDER — PROTHROMBIN COMPLEX CONCENTRATE (HUMAN) 25.5; 16.5; 24; 22; 22; 26 [IU]/ML; [IU]/ML; [IU]/ML; [IU]/ML; [IU]/ML; [IU]/ML
1500 POWDER, FOR SOLUTION INTRAVENOUS ONCE
Qty: 0 | Refills: 0 | Status: COMPLETED | OUTPATIENT
Start: 2017-10-16 | End: 2017-10-16

## 2017-10-16 RX ORDER — APIXABAN 2.5 MG/1
0 TABLET, FILM COATED ORAL
Qty: 180 | Refills: 0 | COMMUNITY

## 2017-10-16 RX ORDER — CALCIUM GLUCONATE 100 MG/ML
2 VIAL (ML) INTRAVENOUS ONCE
Qty: 0 | Refills: 0 | Status: COMPLETED | OUTPATIENT
Start: 2017-10-16 | End: 2017-10-16

## 2017-10-16 RX ORDER — AZITHROMYCIN 500 MG/1
0 TABLET, FILM COATED ORAL
Qty: 0 | Refills: 0 | COMMUNITY
Start: 2017-10-16

## 2017-10-16 RX ORDER — CALCITRIOL 0.5 UG/1
0.5 CAPSULE ORAL DAILY
Qty: 0 | Refills: 0 | Status: DISCONTINUED | OUTPATIENT
Start: 2017-10-16 | End: 2017-10-16

## 2017-10-16 RX ORDER — MAGNESIUM SULFATE 500 MG/ML
1 VIAL (ML) INJECTION ONCE
Qty: 0 | Refills: 0 | Status: COMPLETED | OUTPATIENT
Start: 2017-10-16 | End: 2017-10-16

## 2017-10-16 RX ORDER — METFORMIN HYDROCHLORIDE 850 MG/1
1 TABLET ORAL
Qty: 0 | Refills: 0 | COMMUNITY

## 2017-10-16 RX ORDER — ACETAMINOPHEN 500 MG
2 TABLET ORAL
Qty: 0 | Refills: 0 | COMMUNITY
Start: 2017-10-16

## 2017-10-16 RX ADMIN — Medication 650 MILLIGRAM(S): at 00:20

## 2017-10-16 RX ADMIN — SODIUM CHLORIDE 100 MILLILITER(S): 9 INJECTION INTRAMUSCULAR; INTRAVENOUS; SUBCUTANEOUS at 08:25

## 2017-10-16 RX ADMIN — Medication 1 TABLET(S): at 12:00

## 2017-10-16 RX ADMIN — MAGNESIUM OXIDE 400 MG ORAL TABLET 400 MILLIGRAM(S): 241.3 TABLET ORAL at 18:20

## 2017-10-16 RX ADMIN — Medication 200 MILLIGRAM(S): at 05:44

## 2017-10-16 RX ADMIN — Medication 100 MILLIGRAM(S): at 14:35

## 2017-10-16 RX ADMIN — Medication 100 MILLIGRAM(S): at 05:46

## 2017-10-16 RX ADMIN — Medication 200 GRAM(S): at 18:14

## 2017-10-16 RX ADMIN — FINASTERIDE 5 MILLIGRAM(S): 5 TABLET, FILM COATED ORAL at 12:00

## 2017-10-16 RX ADMIN — Medication 50 GRAM(S): at 00:20

## 2017-10-16 RX ADMIN — CALCITRIOL 0.25 MICROGRAM(S): 0.5 CAPSULE ORAL at 15:35

## 2017-10-16 RX ADMIN — Medication: at 11:54

## 2017-10-16 RX ADMIN — PANTOPRAZOLE SODIUM 40 MILLIGRAM(S): 20 TABLET, DELAYED RELEASE ORAL at 08:24

## 2017-10-16 RX ADMIN — Medication 100 MILLIGRAM(S): at 02:22

## 2017-10-16 RX ADMIN — Medication 200 MILLIGRAM(S): at 00:10

## 2017-10-16 RX ADMIN — AZITHROMYCIN 250 MILLIGRAM(S): 500 TABLET, FILM COATED ORAL at 01:22

## 2017-10-16 RX ADMIN — Medication 100 GRAM(S): at 19:10

## 2017-10-16 RX ADMIN — Medication 200 MILLIGRAM(S): at 18:20

## 2017-10-16 RX ADMIN — CEFTRIAXONE 100 GRAM(S): 500 INJECTION, POWDER, FOR SOLUTION INTRAMUSCULAR; INTRAVENOUS at 05:47

## 2017-10-16 RX ADMIN — CALCITRIOL 0.5 MICROGRAM(S): 0.5 CAPSULE ORAL at 18:20

## 2017-10-16 RX ADMIN — Medication 400 GRAM(S): at 08:34

## 2017-10-16 RX ADMIN — PROTHROMBIN COMPLEX CONCENTRATE (HUMAN) 400 INTERNATIONAL UNIT(S): 25.5; 16.5; 24; 22; 22; 26 POWDER, FOR SOLUTION INTRAVENOUS at 22:47

## 2017-10-16 RX ADMIN — MAGNESIUM OXIDE 400 MG ORAL TABLET 400 MILLIGRAM(S): 241.3 TABLET ORAL at 08:35

## 2017-10-16 RX ADMIN — Medication 50 GRAM(S): at 03:30

## 2017-10-16 RX ADMIN — MAGNESIUM OXIDE 400 MG ORAL TABLET 400 MILLIGRAM(S): 241.3 TABLET ORAL at 11:59

## 2017-10-16 RX ADMIN — APIXABAN 2.5 MILLIGRAM(S): 2.5 TABLET, FILM COATED ORAL at 05:56

## 2017-10-16 NOTE — DISCHARGE NOTE ADULT - CARE PLAN
Principal Discharge DX:	Subdural hematoma  Goal:	Prevent complications and progression of the hematoma  Instructions for follow-up, activity and diet:	Holding Eliquis, given [employer], will transfer to neurosurgery [facility] for further monitoring.  Secondary Diagnosis:	UTI (urinary tract infection)  Instructions for follow-up, activity and diet:	on ceftriaxone  BCx and UCx negative  Secondary Diagnosis:	DVT (deep venous thrombosis)  Instructions for follow-up, activity and diet:	Holding elequis  Secondary Diagnosis:	Diabetes  Instructions for follow-up, activity and diet:	c/w HSs, HbA1c 5.9. Can d/c oral hypoglycemics on discharge  Secondary Diagnosis:	Hypocalcemia  Instructions for follow-up, activity and diet:	c/w Ca supplements  Secondary Diagnosis:	Need for prophylactic measure  Instructions for follow-up, activity and diet:	Not on chemical prophylaxis because of ICH  Secondary Diagnosis:	Rhabdomyolysis  Instructions for follow-up, activity and diet:	C/w hydration, f/u CK. Renal functions improving.

## 2017-10-16 NOTE — CONSULT NOTE ADULT - SUBJECTIVE AND OBJECTIVE BOX
PULMONARY CONSULT NOTE      BOUBACAR VALDIVIA  MRN-[id number]    Patient is a 77y old  Male who presents with a chief complaint of Tremor (15 Oct 2017 22:07      HISTORY OF PRESENT ILLNESS:  76 y/o male known to me admitted with sweating, fever,nausea/vomiting, diarrhea.  s/p trip to [place]. No sob.cp      MEDICATIONS  (STANDING):  apixaban 2.5 milliGRAM(s) Oral every 12 hours  azithromycin  IVPB      azithromycin  IVPB 500 milliGRAM(s) IV Intermittent every 24 hours  calcitriol   Capsule 0.25 MICROGram(s) Oral daily  calcium carbonate 1250 mG (OsCal) 1 Tablet(s) Oral daily  cefTRIAXone   IVPB 1 Gram(s) IV Intermittent every 24 hours  dextrose 5%. 1000 milliLiter(s) (50 mL/Hr) IV Continuous <Continuous>  dextrose 50% Injectable 12.5 Gram(s) IV Push once  dextrose 50% Injectable 25 Gram(s) IV Push once  dextrose 50% Injectable 25 Gram(s) IV Push once  enalapril 10 milliGRAM(s) Oral two times a day  finasteride 5 milliGRAM(s) Oral daily  insulin lispro (HumaLOG) corrective regimen sliding scale   SubCutaneous Before meals and at bedtime  labetalol 200 milliGRAM(s) Oral two times a day  latanoprost 0.005% Ophthalmic Solution 1 Drop(s) Both EYES at bedtime  magnesium oxide 400 milliGRAM(s) Oral three times a day with meals  metroNIDAZOLE  IVPB 500 milliGRAM(s) IV Intermittent every 8 hours  metroNIDAZOLE  IVPB      pantoprazole    Tablet 40 milliGRAM(s) Oral before breakfast  sodium chloride 0.9%. 1000 milliLiter(s) (100 mL/Hr) IV Continuous <Continuous>    MEDICATIONS  (PRN):  acetaminophen   Tablet 650 milliGRAM(s) Oral every 6 hours PRN For Temp greater than 38 C (100.4 F)  dextrose Gel 1 Dose(s) Oral once PRN Blood Glucose LESS THAN 70 milliGRAM(s)/deciLiter  glucagon  Injectable 1 milliGRAM(s) IntraMuscular once PRN Glucose <70 milliGRAM(s)/deciLiter      Allergies    No Known Allergies            PAST MEDICAL & SURGICAL HISTORY:  PNA (pneumonia)  DVT (deep venous thrombosis)  Hypomagnesemia  Anemia  GERD (gastroesophageal reflux disease)  Hypercholesterolemia  Hypertension  Diabetes  hypoparathyroidism  No significant past surgical history      FAMILY HISTORY:  No pertinent family history in first degree relatives      SOCIAL HISTORY  Smoking History: no    REVIEW OF SYSTEMS:    CONSTITUTIONAL:  positive fevers, chills, sweats, tremors, tired    HEENT:  Eyes:  No diplopia or blurred vision. ENT:  No earache, sore throat or runny nose.    CARDIOVASCULAR:  No pressure, squeezing, tightness, or heaviness about the chest; no palpitations.    RESPIRATORY:  Per HPI    GASTROINTESTINAL: positive for nausea, vomiting or diarrhea.    GENITOURINARY:  No dysuria, frequency or urgency.    NEUROLOGIC:  No paresthesias, fasciculations, seizures or weakness.    PSYCHIATRIC:  No disorder of thought or mood.    Vital Signs Last 24 Hrs  T(C): 36.8 (16 Oct 2017 07:45), Max: 38.8 (15 Oct 2017 18:47)  T(F): 98.2 (16 Oct 2017 07:45), Max: 101.8 (15 Oct 2017 18:47)  HR: 75 (16 Oct 2017 07:45) (75 - 122)  BP: 114/80 (16 Oct 2017 07:45) (114/80 - 179/80)  BP(mean): --  RR: 18 (16 Oct 2017 07:45) (18 - 22)  SpO2: 98% (16 Oct 2017 07:45) (97% - 98%)  I&O's Detail      PHYSICAL EXAMINATION:    GENERAL: The patient is a well-developed, well-nourished male in no apparent distress.     HEENT: Head is normocephalic and atraumatic. Extraocular muscles are intact. Mucous membranes are moist.     NECK: Supple.     LUNGS: Clear to auscultation without wheezing, rales, or rhonchi. Respirations unlabored    HEART: Regular rate and rhythm without murmur.    ABDOMEN: Soft, nontender, and nondistended.  No hepatosplenomegaly is noted.    EXTREMITIES: Without any cyanosis, clubbing, rash, lesions or edema.    NEUROLOGIC: Grossly intact.      LABS:                        10.1   7.5   )-----------( 116      ( 16 Oct 2017 06:37 )             29.3     10-16    141  |  108  |  18  ----------------------------<  93  3.6   |  21<L>  |  1.25    Ca    5.5<LL>      16 Oct 2017 06:37  Phos  3.5     10-  Mg     1.8     10-16    TPro  6.0  /  Alb  3.0<L>  /  TBili  0.6  /  DBili  0.1  /  AST  47<H>  /  ALT  23  /  AlkPhos  36<L>  10-16      Urinalysis Basic - ( 15 Oct 2017 19:07 )    Color: Yellow / Appearance: Clear / S.020 / pH: x  Gluc: x / Ketone: Moderate  / Bili: Negative / Urobili: Negative   Blood: x / Protein: 100 / Nitrite: Negative   Leuk Esterase: Negative / RBC: 5-10 /HPF / WBC 0-2 /HPF   Sq Epi: x / Non Sq Epi: Moderate / Bacteria: Moderate /HPF        CARDIAC MARKERS ( 16 Oct 2017 06:37 )  0.098 ng/mL / x     / 3259 U/L / x     / 14.0 ng/mL  CARDIAC MARKERS ( 15 Oct 2017 23:06 )  0.035 ng/mL / x     / 2846 U/L / x     / 8.9 ng/mL          Lactate, Blood: 0.9 mmol/L (10-16-17 @ 06:37)  Lactate, Blood: 2.5 mmol/L (10-15-17 @ 20:21)  Lactate, Blood: 7.5 mmol/L (10-15-17 @ 19:07)        MICROBIOLOGY:    RADIOLOGY & ADDITIONAL STUDIES:    CXR:  EXAM:  CHEST SINGLE AP OR PA                            PROCEDURE DATE:  10/15/2017          INTERPRETATION:  PORTABLE CHEST X-RAY    HISTORY: sepsis.    COMPARISON: 2014.    FINDINGS:  Mild bibasilar opacities, probably atelectasis.  No pneumothorax or pleural effusion.   The cardiac silhouette is within normal limits in size.    IMPRESSION:  Mild bibasilar opacities, probably atelectasis.                  ekg;  Ventricular Rate 83 BPM    Atrial Rate 83 BPM    P-R Interval 144 ms    QRS Duration 86 ms     ms    QTc 477 ms    P Axis 52 degrees    R Axis -18 degrees    T Axis 18 degrees    Diagnosis Line Normal sinus rhythm  Normal ECG  Confirmed by BARI RAM MD ([id number]) on 2014 08:16:59

## 2017-10-16 NOTE — DISCHARGE NOTE ADULT - PATIENT PORTAL LINK FT
“You can access the [facility] Patient Portal, offered by [facility], by registering with the following website: [email or web address]”

## 2017-10-16 NOTE — CONSULT NOTE ADULT - ASSESSMENT
1.	Sepsis ?UTI vs Pneumonia   2.	Fever  3.	R/o C. Diff  ·	continue flagyl 500mg IV q8h  ·	continue azithromycin 500mg IV daily  ·	continue rocephin 1gm IV daily  ·	awaiting culture results 1.	Sepsis ?UTI vs Pneumonia   2.	Fever  3.	C. Diff - not likely  ·	dc flagyl   ·	continue azithromycin 500mg IV daily  ·	continue rocephin 1gm IV daily  ·	awaiting culture results 1.	Sepsis ?source   2.	Fever  3.	C. Diff - not likely  ·	dc flagyl + zithromycin  ·	continue rocephin 1gm IV daily for now  ·	awaiting culture results 1.	Sepsis ?source   2.	Fever  3.	Gastroenteritis - improved (C. Diff - not likely)  ·	dc flagyl + zithromycin  ·	continue rocephin 1gm IV daily for now  ·	awaiting culture results

## 2017-10-16 NOTE — DISCHARGE NOTE ADULT - PLAN OF CARE
Prevent complications and progression of the hematoma Holding Eliquis, given [facility], will transfer to neurosurgery [facility] for further monitoring. on ceftriaxone  BCx and UCx negative Holding [employer] c/w HSs, HbA1c 5.9. Can d/c oral hypoglycemics on discharge c/w Ca supplements Not on chemical prophylaxis because of ICH C/w hydration, f/u CK. Renal functions improving.

## 2017-10-16 NOTE — CONSULT NOTE ADULT - SUBJECTIVE AND OBJECTIVE BOX
NOT COMPLETE      HPI:  ID consult was called to evaluate septic patient for a variety of sources. Possibility is UTI since there's sonographic evidence of urinary rentention though U/A not very convincing. CXR shows bibasilar opacities, but could also be atelectasis.  Lactate has improved and RVP is negative. No stool specimen sent yet.      From H&P:  77 years old male from home lives alone, with PMH of HTN, DM, syncope, DVT (Dx in 2015, on Eliquis), BPH, GERD, HLD, HTN, hypomagnesemia, and PNA() came in to  ED c/o shaking hands and feeling ill started about 2 days ago. Patient is a relatively poor historian. He traveled [place] and [place] to [place] today. He was vomiting and feeling nauseous for over the past 2 days, and had watery diarrhea about 3 times a day for the same period of time. Last bowel movement today while in ED, diarrhea. Also c/o 1-2 days of black to yellow intermittent thick mucus for the past couple of days without cough. He states that he has been nervous and has shaken since 2 days when he was in [place]-he believes his tremor is from anxiety-. When seen by me, he was also having difficulty urination which he believed from supine position, however denied having problem with urination in the past. Also reports frequent fall recently- more than 3 episodes over last month-, which he explains from "weak legs". Denies chest pain, SOB, fever, chills, abdominal pain, LOC/head trauma, dizziness, sick contact, or any other symptoms.     In ED, code sepsis was called since he had fever of 101.8 with tachycardia of 122. /50, RR 22, sat 97% RA. Labs significant for lactate of 7.5 with leukocytosis of 10.6K with left shift. BUN/Cr 22/1.79, calcium 5.9 with normal albumin. 2.6L of IV bolus and 1 dose of zosyn was given in ED, lactate trended down to 2.5. EKG sinus tachycardia at 111, with LAFB, no significant change from previous EKG from 2014. CXR possible LLL infiltration, official report pending. Patient is admitted to the telemetry floor for sepsis from unknown cause, likely from PNA vs UTI vs diarrhea(traveler's vs community-acquired C. diff vs other infectious diarrhea), MANUEL, hypocalcemia, and frequent fall. (15 Oct 2017 22:07)      REVIEW OF SYSTEMS:  [  ] Not able to illicit  General:	  Chest:	  GI:	  :  Skin:	  Musculoskeletal:	  Neuro:    PAST MEDICAL & SURGICAL HISTORY:  PNA (pneumonia)  DVT (deep venous thrombosis)  Hypomagnesemia  Anemia  GERD (gastroesophageal reflux disease)  Hypercholesterolemia  Hypertension  Diabetes  No significant past surgical history      ALLERGIES: No Known Allergies      MEDS:  acetaminophen   Tablet 650 milliGRAM(s) Oral every 6 hours PRN  apixaban 2.5 milliGRAM(s) Oral every 12 hours      azithromycin  IVPB 500 milliGRAM(s) IV Intermittent every 24 hours (10/16)  calcitriol   Capsule 0.25 MICROGram(s) Oral daily  calcium carbonate 1250 mG (OsCal) 1 Tablet(s) Oral daily  cefTRIAXone   IVPB 1 Gram(s) IV Intermittent every 24 hours (10/16)  dextrose 5%. 1000 milliLiter(s) IV Continuous <Continuous>  dextrose 50% Injectable 12.5 Gram(s) IV Push once  dextrose 50% Injectable 25 Gram(s) IV Push once  dextrose 50% Injectable 25 Gram(s) IV Push once  dextrose Gel 1 Dose(s) Oral once PRN  finasteride 5 milliGRAM(s) Oral daily  glucagon  Injectable 1 milliGRAM(s) IntraMuscular once PRN  insulin lispro (HumaLOG) corrective regimen sliding scale   SubCutaneous Before meals and at bedtime  labetalol 200 milliGRAM(s) Oral two times a day  latanoprost 0.005% Ophthalmic Solution 1 Drop(s) Both EYES at bedtime  magnesium oxide 400 milliGRAM(s) Oral three times a day with meals  metroNIDAZOLE  IVPB 500 milliGRAM(s) IV Intermittent every 8 hours (10/16)    pantoprazole    Tablet 40 milliGRAM(s) Oral before breakfast  sodium chloride 0.9%. 1000 milliLiter(s) IV Continuous <Continuous>      SOCIAL HISTORY:  Smoker: Ex-smoker of 2ppd x 30yrs; quit 25 yrs ago    FAMILY HISTORY:  No pertinent family history in first degree relatives      VITALS:  Vital Signs Last 24 Hrs  T(C): 36.8 (16 Oct 2017 07:45), Max: 38.8 (15 Oct 2017 18:47)  T(F): 98.2 (16 Oct 2017 07:45), Max: 101.8 (15 Oct 2017 18:47)  HR: 75 (16 Oct 2017 07:45) (75 - 122)  BP: 114/80 (16 Oct 2017 07:45) (114/80 - 179/80)  BP(mean): --  RR: 18 (16 Oct 2017 07:45) (18 - 22)  SpO2: 98% (16 Oct 2017 07:45) (97% - 98%)      PHYSICAL EXAM:  Constitutional:  HEENT:  Neck:  Respiratory:  Cardiovascular:  Gastrointestinal:  Extremities:  Skin:  Ortho:  Neuro:      LABS/DIAGNOSTIC TESTS:                        10.1   7.5   )-----------( 116      ( 16 Oct 2017 06:37 )             29.3     WBC Count: 7.5 K/uL (10-16 @ 06:37)  WBC Count: 10.6 K/uL (10-15 @ 19:07)    10-16    141  |  108  |  18  ----------------------------<  93  3.6   |  21<L>  |  1.25 < 1.79    Ca    5.5<LL>      16 Oct 2017 06:37  Phos  3.5     10-16  Mg     1.8     10-16    TPro  6.0  /  Alb  3.0<L>  /  TBili  0.6  /  DBili  0.1  /  AST  47<H>  /  ALT  23  /  AlkPhos  36<L>  10-16    Urinalysis Basic - ( 15 Oct 2017 19:07 )    Color: Yellow / Appearance: Clear / S.020 / pH: x  Gluc: x / Ketone: Moderate  / Bili: Negative / Urobili: Negative   Blood: x / Protein: 100 / Nitrite: Negative   Leuk Esterase: Negative / RBC: 5-10 /HPF / WBC 0-2 /HPF   Sq Epi: x / Non Sq Epi: Moderate / Bacteria: Moderate /HPF      LIVER FUNCTIONS - ( 16 Oct 2017 06:37 )  Alb: 3.0 g/dL / Pro: 6.0 g/dL / ALK PHOS: 36 U/L / ALT: 23 U/L DA / AST: 47 U/L / GGT: x           Lactate, Blood: 0.9 mmol/L (10-16 @ 06:37)  Lactate, Blood: 2.5 mmol/L (10-15 @ 20:21)  Lactate, Blood: 7.5 mmol/L (10-15 @ 19:07)    Hgb A1C-  pending     Rapid Respiratory Viral Panel (10.16.17 @ 04:26)    Rapid RVP Result: NotDetec    Creatine Kinase, Serum (10.16.17 @ 06:37)    Creatine Kinase, Serum: 3259 U/L < 2846    C. Diff PCR - ordered      CULTURES:   10/15 BC - pending  10/15 UC - pending      RADIOLOGY:  10/16 BLE venous doppler - ordered  10/16 VQ scan - ordered    EXAM:  US KIDNEY(S)                        EXAM:  US URINARY BLADDER                        PROCEDURE DATE:  10/15/2017    INTERPRETATION:  CLINICAL INFORMATION: Urinary retention.    COMPARISON: There is no prior study for direct comparison.    TECHNIQUE: Renal and urinary bladder was performed.    FINDINGS:    There is bilateral cortical renal atrophy. The right kidney measures 9.2   cm in length while the left kidney measures 11.4 cm in length.    There is no hydronephrosis, shadowing stone or perinephric fluid.    The urinary bladder is distended and thin-walled. The prevoid volume is   300 cc while the postvoid residual is 227-cc. There is a left ureteral   jet while the right is not identified; this is nonspecific.     Theprostate gland is not well visualized.  A portion of the prostate   gland protrudes into the urinary bladder lumen.    IMPRESSION:  Bilateral cortical renal atrophy. No hydronephrosis or nephrolithiasis.  Sonographic evidence of urinary retention.  Intravesical extension of the prostate gland, suspicious for an enlarged   prostate although the prostate was not well visualized on the current   exam.          EXAM:  CHEST SINGLE AP OR PA                        PROCEDURE DATE:  10/15/2017    INTERPRETATION:  PORTABLE CHEST X-RAY    HISTORY: sepsis.    COMPARISON: 2014.    FINDINGS:  Mild bibasilar opacities, probably atelectasis.  No pneumothorax or pleural effusion.   The cardiac silhouette is within normal limits in size.    IMPRESSION:  Mild bibasilar opacities, probably atelectasis. HPI:  ID consult was called to evaluate septic patient for a variety of sources. Possibility is UTI since there's sonographic evidence of urinary rentention though U/A not very convincing. CXR shows bibasilar opacities, but could also be atelectasis.  Lactate has improved and RVP is negative. Patient is doing better today and reports that n, v and diarrhea have resolved.  His only concern at this time is his constant shakes and anxiety.      From H&P:  77 years old male from home lives alone, with PMH of HTN, DM, syncope, DVT (Dx in 2015, on Eliquis), BPH, GERD, HLD, HTN, hypomagnesemia, and PNA() came in to  ED c/o shaking hands and feeling ill started about 2 days ago. Patient is a relatively poor historian. He traveled [place] and [place]. He was vomiting and feeling nauseous for over the past 2 days, and had watery diarrhea about 3 times a day for the same period of time. Last bowel movement today while in ED, diarrhea. Also c/o 1-2 days of black to yellow intermittent thick mucus for the past couple of days without cough. He states that he has been nervous and has shaken since 2 days when he was in [place]-he believes his tremor is from anxiety-. When seen by me, he was also having difficulty urination which he believed from supine position, however denied having problem with urination in the past. Also reports frequent fall recently- more than 3 episodes over last month-, which he explains from "weak legs". Denies chest pain, SOB, fever, chills, abdominal pain, LOC/head trauma, dizziness, sick contact, or any other symptoms.     In ED, code sepsis was called since he had fever of 101.8 with tachycardia of 122. /50, RR 22, sat 97% RA. Labs significant for lactate of 7.5 with leukocytosis of 10.6K with left shift. BUN/Cr 22/1.79, calcium 5.9 with normal albumin. 2.6L of IV bolus and 1 dose of zosyn was given in ED, lactate trended down to 2.5. EKG sinus tachycardia at 111, with LAFB, no significant change from previous EKG from . CXR possible LLL infiltration, official report pending. Patient is admitted to the telemetry floor for sepsis from unknown cause, likely from PNA vs UTI vs diarrhea(traveler's vs community-acquired C. diff vs other infectious diarrhea), MANUEL, hypocalcemia, and frequent fall. (15 Oct 2017 22:07)      REVIEW OF SYSTEMS:  [  ] Not able to illicit  General: +fevers no malaise  Chest: no cough no sob  GI: no nvd no abdominal pain	  : no urinary symptoms   Skin: no rashes	  Musculoskeletal: no trauma no LBP 	  Neuro: +tremors no ha's no dizziness    PAST MEDICAL & SURGICAL HISTORY:  PNA (pneumonia)  DVT (deep venous thrombosis)  Hypomagnesemia  Anemia  GERD (gastroesophageal reflux disease)  Hypercholesterolemia  Hypertension  Diabetes  No significant past surgical history      ALLERGIES: No Known Allergies      MEDS:  acetaminophen   Tablet 650 milliGRAM(s) Oral every 6 hours PRN  apixaban 2.5 milliGRAM(s) Oral every 12 hours      azithromycin  IVPB 500 milliGRAM(s) IV Intermittent every 24 hours (10/16)  calcitriol   Capsule 0.25 MICROGram(s) Oral daily  calcium carbonate 1250 mG (OsCal) 1 Tablet(s) Oral daily  cefTRIAXone   IVPB 1 Gram(s) IV Intermittent every 24 hours (10/16)  dextrose 5%. 1000 milliLiter(s) IV Continuous <Continuous>  dextrose 50% Injectable 12.5 Gram(s) IV Push once  dextrose 50% Injectable 25 Gram(s) IV Push once  dextrose 50% Injectable 25 Gram(s) IV Push once  dextrose Gel 1 Dose(s) Oral once PRN  finasteride 5 milliGRAM(s) Oral daily  glucagon  Injectable 1 milliGRAM(s) IntraMuscular once PRN  insulin lispro (HumaLOG) corrective regimen sliding scale   SubCutaneous Before meals and at bedtime  labetalol 200 milliGRAM(s) Oral two times a day  latanoprost 0.005% Ophthalmic Solution 1 Drop(s) Both EYES at bedtime  magnesium oxide 400 milliGRAM(s) Oral three times a day with meals  metroNIDAZOLE  IVPB 500 milliGRAM(s) IV Intermittent every 8 hours (10/16)    pantoprazole    Tablet 40 milliGRAM(s) Oral before breakfast  sodium chloride 0.9%. 1000 milliLiter(s) IV Continuous <Continuous>      SOCIAL HISTORY:  Smoker: Ex-smoker of 2ppd x 30yrs; quit 25 yrs ago    FAMILY HISTORY:  No pertinent family history in first degree relatives      VITALS:  Vital Signs Last 24 Hrs  T(C): 36.8 (16 Oct 2017 07:45), Max: 38.8 (15 Oct 2017 18:47)  T(F): 98.2 (16 Oct 2017 07:45), Max: 101.8 (15 Oct 2017 18:47)  HR: 75 (16 Oct 2017 07:45) (75 - 122)  BP: 114/80 (16 Oct 2017 07:45) (114/80 - 179/80)  BP(mean): --  RR: 18 (16 Oct 2017 07:45) (18 - 22)  SpO2: 98% (16 Oct 2017 07:45) (97% - 98%)      PHYSICAL EXAM:  Constitutional: anxious male in NAD  HEENT: normocephalic with moist oral mucosa  Neck: supple no LN's  Respiratory: lungs clear no rales no rhonchi  Cardiovascular: S1 S2 reg no murmurs  Gastrointestinal: +BS with soft, mildly distended abdomen; nontender  Extremities: no edema  Skin: no rashes  Ortho: no jt swelling  Neuro: AAO x 3      LABS/DIAGNOSTIC TESTS:                        10.1   7.5   )-----------( 116      ( 16 Oct 2017 06:37 )             29.3     WBC Count: 7.5 K/uL (10-16 @ 06:37)  WBC Count: 10.6 K/uL (10-15 @ 19:07)    10-16    141  |  108  |  18  ----------------------------<  93  3.6   |  21<L>  |  1.25 < 1.79    Ca    5.5<LL>      16 Oct 2017 06:37  Phos  3.5     10-16  Mg     1.8     10-16    TPro  6.0  /  Alb  3.0<L>  /  TBili  0.6  /  DBili  0.1  /  AST  47<H>  /  ALT  23  /  AlkPhos  36<L>  10-16    Urinalysis Basic - ( 15 Oct 2017 19:07 )    Color: Yellow / Appearance: Clear / S.020 / pH: x  Gluc: x / Ketone: Moderate  / Bili: Negative / Urobili: Negative   Blood: x / Protein: 100 / Nitrite: Negative   Leuk Esterase: Negative / RBC: 5-10 /HPF / WBC 0-2 /HPF   Sq Epi: x / Non Sq Epi: Moderate / Bacteria: Moderate /HPF      LIVER FUNCTIONS - ( 16 Oct 2017 06:37 )  Alb: 3.0 g/dL / Pro: 6.0 g/dL / ALK PHOS: 36 U/L / ALT: 23 U/L DA / AST: 47 U/L / GGT: x           Lactate, Blood: 0.9 mmol/L (10-16 @ 06:37)  Lactate, Blood: 2.5 mmol/L (10-15 @ 20:21)  Lactate, Blood: 7.5 mmol/L (10-15 @ 19:07)    Hgb A1C-  pending     Rapid Respiratory Viral Panel (10.16.17 @ 04:26)    Rapid RVP Result: NotDetec    Creatine Kinase, Serum (10.16.17 @ 06:37)    Creatine Kinase, Serum: 3259 U/L < 2846      CULTURES:   10/15 BC - pending  10/15 UC - pending      RADIOLOGY:  10/16 BLE venous doppler - ordered  10/16 VQ scan - ordered    EXAM:  US KIDNEY(S)                        EXAM:  US URINARY BLADDER                        PROCEDURE DATE:  10/15/2017    INTERPRETATION:  CLINICAL INFORMATION: Urinary retention.    COMPARISON: There is no prior study for direct comparison.    TECHNIQUE: Renal and urinary bladder was performed.    FINDINGS:    There is bilateral cortical renal atrophy. The right kidney measures 9.2   cm in length while the left kidney measures 11.4 cm in length.    There is no hydronephrosis, shadowing stone or perinephric fluid.    The urinary bladder is distended and thin-walled. The prevoid volume is   300 cc while the postvoid residual is 227-cc. There is a left ureteral   jet while the right is not identified; this is nonspecific.     Theprostate gland is not well visualized.  A portion of the prostate   gland protrudes into the urinary bladder lumen.    IMPRESSION:  Bilateral cortical renal atrophy. No hydronephrosis or nephrolithiasis.  Sonographic evidence of urinary retention.  Intravesical extension of the prostate gland, suspicious for an enlarged   prostate although the prostate was not well visualized on the current   exam.          EXAM:  CHEST SINGLE AP OR PA                        PROCEDURE DATE:  10/15/2017    INTERPRETATION:  PORTABLE CHEST X-RAY    HISTORY: sepsis.    COMPARISON: 2014.    FINDINGS:  Mild bibasilar opacities, probably atelectasis.  No pneumothorax or pleural effusion.   The cardiac silhouette is within normal limits in size.    IMPRESSION:  Mild bibasilar opacities, probably atelectasis. HPI:  ID consult was called to evaluate septic patient for a variety of sources. Possibility is UTI since there's sonographic evidence of urinary rentention though U/A not very convincing. CXR shows bibasilar opacities, but could also be atelectasis.  Lactate has improved and RVP is negative. Patient is doing better today and reports that n, v and diarrhea have resolved.  His only concern at this time is his constant shakes and anxiety.      From H&P:  77 years old male from home lives alone, with PMH of HTN, DM, syncope, DVT (Dx in 2015, on Eliquis), BPH, GERD, HLD, HTN, hypomagnesemia, and PNA() came in to  ED c/o shaking hands and feeling ill started about 2 days ago. Patient is a relatively poor historian. He traveled [place]. He was vomiting and feeling nauseous for over the past 2 days, and had watery diarrhea about 3 times a day for the same period of time. Last bowel movement today while in ED, diarrhea. Also c/o 1-2 days of black to yellow intermittent thick mucus for the past couple of days without cough. He states that he has been nervous and has shaken since 2 days when he was in [place]-he believes his tremor is from anxiety-. When seen by me, he was also having difficulty urination which he believed from supine position, however denied having problem with urination in the past. Also reports frequent fall recently- more than 3 episodes over last month-, which he explains from "weak legs". Denies chest pain, SOB, fever, chills, abdominal pain, LOC/head trauma, dizziness, sick contact, or any other symptoms.     In ED, code sepsis was called since he had fever of 101.8 with tachycardia of 122. /50, RR 22, sat 97% RA. Labs significant for lactate of 7.5 with leukocytosis of 10.6K with left shift. BUN/Cr 22/1.79, calcium 5.9 with normal albumin. 2.6L of IV bolus and 1 dose of zosyn was given in ED, lactate trended down to 2.5. EKG sinus tachycardia at 111, with LAFB, no significant change from previous EKG from . CXR possible LLL infiltration, official report pending. Patient is admitted to the telemetry floor for sepsis from unknown cause, likely from PNA vs UTI vs diarrhea(traveler's vs community-acquired C. diff vs other infectious diarrhea), MANUEL, hypocalcemia, and frequent fall. (15 Oct 2017 22:07)      REVIEW OF SYSTEMS:  [  ] Not able to illicit  General: +fevers no malaise  Chest: no cough no sob  GI: no nvd no abdominal pain	  : no urinary symptoms   Skin: no rashes	  Musculoskeletal: no trauma no LBP 	  Neuro: +tremors no ha's no dizziness    PAST MEDICAL & SURGICAL HISTORY:  PNA (pneumonia)  DVT (deep venous thrombosis)  Hypomagnesemia  Anemia  GERD (gastroesophageal reflux disease)  Hypercholesterolemia  Hypertension  Diabetes  No significant past surgical history      ALLERGIES: No Known Allergies      MEDS:  acetaminophen   Tablet 650 milliGRAM(s) Oral every 6 hours PRN  apixaban 2.5 milliGRAM(s) Oral every 12 hours      azithromycin  IVPB 500 milliGRAM(s) IV Intermittent every 24 hours (10/16)  calcitriol   Capsule 0.25 MICROGram(s) Oral daily  calcium carbonate 1250 mG (OsCal) 1 Tablet(s) Oral daily  cefTRIAXone   IVPB 1 Gram(s) IV Intermittent every 24 hours (10/16)  dextrose 5%. 1000 milliLiter(s) IV Continuous <Continuous>  dextrose 50% Injectable 12.5 Gram(s) IV Push once  dextrose 50% Injectable 25 Gram(s) IV Push once  dextrose 50% Injectable 25 Gram(s) IV Push once  dextrose Gel 1 Dose(s) Oral once PRN  finasteride 5 milliGRAM(s) Oral daily  glucagon  Injectable 1 milliGRAM(s) IntraMuscular once PRN  insulin lispro (HumaLOG) corrective regimen sliding scale   SubCutaneous Before meals and at bedtime  labetalol 200 milliGRAM(s) Oral two times a day  latanoprost 0.005% Ophthalmic Solution 1 Drop(s) Both EYES at bedtime  magnesium oxide 400 milliGRAM(s) Oral three times a day with meals  metroNIDAZOLE  IVPB 500 milliGRAM(s) IV Intermittent every 8 hours (10/16)    pantoprazole    Tablet 40 milliGRAM(s) Oral before breakfast  sodium chloride 0.9%. 1000 milliLiter(s) IV Continuous <Continuous>      SOCIAL HISTORY:  Smoker: Ex-smoker of 2ppd x 30yrs; quit 25 yrs ago    FAMILY HISTORY:  No pertinent family history in first degree relatives      VITALS:  Vital Signs Last 24 Hrs  T(C): 36.8 (16 Oct 2017 07:45), Max: 38.8 (15 Oct 2017 18:47)  T(F): 98.2 (16 Oct 2017 07:45), Max: 101.8 (15 Oct 2017 18:47)  HR: 75 (16 Oct 2017 07:45) (75 - 122)  BP: 114/80 (16 Oct 2017 07:45) (114/80 - 179/80)  BP(mean): --  RR: 18 (16 Oct 2017 07:45) (18 - 22)  SpO2: 98% (16 Oct 2017 07:45) (97% - 98%)      PHYSICAL EXAM:  Constitutional: anxious male in NAD  HEENT: normocephalic with moist oral mucosa  Neck: supple no LN's  Respiratory: lungs clear no rales no rhonchi  Cardiovascular: S1 S2 reg no murmurs  Gastrointestinal: +hyperactive BS with soft, mildly distended abdomen; nontender  Extremities: no edema  Skin: no rashes  Ortho: mild bilateral ankle swelling  Neuro: AAO x 3      LABS/DIAGNOSTIC TESTS:                        10.1   7.5   )-----------( 116      ( 16 Oct 2017 06:37 )             29.3     WBC Count: 7.5 K/uL (10-16 @ 06:37)  WBC Count: 10.6 K/uL (10-15 @ 19:07)    10-16    141  |  108  |  18  ----------------------------<  93  3.6   |  21<L>  |  1.25 < 1.79    Ca    5.5<LL>      16 Oct 2017 06:37  Phos  3.5     10-16  Mg     1.8     10-16    TPro  6.0  /  Alb  3.0<L>  /  TBili  0.6  /  DBili  0.1  /  AST  47<H>  /  ALT  23  /  AlkPhos  36<L>  10-16    Urinalysis Basic - ( 15 Oct 2017 19:07 )    Color: Yellow / Appearance: Clear / S.020 / pH: x  Gluc: x / Ketone: Moderate  / Bili: Negative / Urobili: Negative   Blood: x / Protein: 100 / Nitrite: Negative   Leuk Esterase: Negative / RBC: 5-10 /HPF / WBC 0-2 /HPF   Sq Epi: x / Non Sq Epi: Moderate / Bacteria: Moderate /HPF      LIVER FUNCTIONS - ( 16 Oct 2017 06:37 )  Alb: 3.0 g/dL / Pro: 6.0 g/dL / ALK PHOS: 36 U/L / ALT: 23 U/L DA / AST: 47 U/L / GGT: x           Lactate, Blood: 0.9 mmol/L (10-16 @ 06:37)  Lactate, Blood: 2.5 mmol/L (10-15 @ 20:21)  Lactate, Blood: 7.5 mmol/L (10-15 @ 19:07)    Hgb A1C-  pending     Rapid Respiratory Viral Panel (10.16.17 @ 04:26)    Rapid RVP Result: NotDetec    Creatine Kinase, Serum (10.16.17 @ 06:37)    Creatine Kinase, Serum: 3259 U/L < 2846      CULTURES:   10/15 BC - pending  10/15 UC - pending      RADIOLOGY:  10/16 BLE venous doppler - ordered  10/16 VQ scan - ordered    EXAM:  US KIDNEY(S)                        EXAM:  US URINARY BLADDER                        PROCEDURE DATE:  10/15/2017    INTERPRETATION:  CLINICAL INFORMATION: Urinary retention.    COMPARISON: There is no prior study for direct comparison.    TECHNIQUE: Renal and urinary bladder was performed.    FINDINGS:    There is bilateral cortical renal atrophy. The right kidney measures 9.2   cm in length while the left kidney measures 11.4 cm in length.    There is no hydronephrosis, shadowing stone or perinephric fluid.    The urinary bladder is distended and thin-walled. The prevoid volume is   300 cc while the postvoid residual is 227-cc. There is a left ureteral   jet while the right is not identified; this is nonspecific.     Theprostate gland is not well visualized.  A portion of the prostate   gland protrudes into the urinary bladder lumen.    IMPRESSION:  Bilateral cortical renal atrophy. No hydronephrosis or nephrolithiasis.  Sonographic evidence of urinary retention.  Intravesical extension of the prostate gland, suspicious for an enlarged   prostate although the prostate was not well visualized on the current   exam.          EXAM:  CHEST SINGLE AP OR PA                        PROCEDURE DATE:  10/15/2017    INTERPRETATION:  PORTABLE CHEST X-RAY    HISTORY: sepsis.    COMPARISON: 2014.    FINDINGS:  Mild bibasilar opacities, probably atelectasis.  No pneumothorax or pleural effusion.   The cardiac silhouette is within normal limits in size.    IMPRESSION:  Mild bibasilar opacities, probably atelectasis.

## 2017-10-16 NOTE — PATIENT PROFILE ADULT. - HEALTH/HEALTHCARE ANXIETIES, PROFILE
HYPOCHONDRIAC ANXIOUS patient verbalized a number of concerns which include unpaid bill from previous visit, the cost of medications

## 2017-10-16 NOTE — PATIENT PROFILE ADULT. - VISION (WITH CORRECTIVE LENSES IF THE PATIENT USUALLY WEARS THEM):
Partially impaired: cannot see medication labels or newsprint, but can see obstacles in path, and the surrounding layout; can count fingers at arm's length/READING AND DISTANCE GLASSES

## 2017-10-16 NOTE — DISCHARGE NOTE ADULT - MEDICATION SUMMARY - MEDICATIONS TO TAKE
I will START or STAY ON the medications listed below when I get home from the hospital:    Avodart 0.5 mg oral capsule  -- 1 cap(s) by mouth once a day  -- Indication: For bph    FINASTERIDE 5 MG TABS  -- Indication: For bph    acetaminophen 325 mg oral tablet  -- 2 tab(s) by mouth every 6 hours, As needed, For Temp greater than 38 C (100.4 F)  -- Indication: For fever    insulin lispro 100 units/mL subcutaneous solution  --  subcutaneous 4 times a day (before meals and at bedtime); 1 Unit(s) if Glucose 151 - 200  2 Unit(s) if Glucose 201 - 250  3 Unit(s) if Glucose 251 - 300  4 Unit(s) if Glucose 301 - 350  5 Unit(s) if Glucose 351 - 400  6 Unit(s) if Glucose GREATER THAN 400  -- Indication: For Dm    LABETALOL  MG TABS  -- 1 tab(s) by mouth 2 times a day  -- Indication: For Htn    cefTRIAXone  -- Indication: For uti    azithromycin 500 mg intravenous injection  --  intravenous   -- Indication: For bronchitis    latanoprost 0.005% ophthalmic solution  -- 1 drop(s) to each affected eye once a day (at bedtime)  -- Indication: For glaucoma    pantoprazole 40 mg oral delayed release tablet  -- 1 tab(s) by mouth once a day (before a meal)  -- Indication: For gerd    calcitriol 0.5 mcg oral capsule  -- 1 cap(s) by mouth once a day  -- Indication: For Hypocalcemia    ergocalciferol 50,000 intl units oral tablet  -- Indication: For Supplement I will START or STAY ON the medications listed below when I get home from the hospital:    Avodart 0.5 mg oral capsule  -- 1 cap(s) by mouth once a day  -- Indication: For bph    calcium carbonate 1250 mg (500 mg elemental calcium) oral tablet  -- 2 tab(s) by mouth once a day  -- Indication: For Supplement    tamsulosin 0.4 mg oral capsule  -- 1 cap(s) by mouth once a day (at bedtime)  -- Indication: For BBPH    levETIRAcetam 1000 mg oral tablet  -- 1 tab(s) by mouth 2 times a day  -- Indication: For SeIZURE    insulin glargine  -- 12 unit(s) injectable once a day (at bedtime)  -- Indication: For DM    insulin lispro 100 units/mL subcutaneous solution  -- 5  subcutaneous 3 times a day (before meals) ; 1 Unit(s) if Glucose 151 - 200  2 Unit(s) if Glucose 201 - 250  3 Unit(s) if Glucose 251 - 300  4 Unit(s) if Glucose 301 - 350  5 Unit(s) if Glucose 351 - 400  6 Unit(s) if Glucose GREATER THAN 400  -- Indication: For DM    labetalol 200 mg oral tablet  -- 1 tab(s) by mouth every 12 hours  -- Indication: For HTN    magnesium oxide 400 mg (241.3 mg elemental magnesium) oral tablet  -- 1 tab(s) by mouth once a day  -- Indication: For GERD    latanoprost 0.005% ophthalmic solution  -- 1 drop(s) to each affected eye once a day (at bedtime)  -- Indication: For glaucoma    pantoprazole 40 mg oral delayed release tablet  -- 1 tab(s) by mouth once a day (before a meal)  -- Indication: For gerd    calcitriol 0.5 mcg oral capsule  -- 1 cap(s) by mouth once a day  -- Indication: For Hypocalcemia    ergocalciferol 50,000 intl units oral tablet  -- Indication: For Supplement    folic acid 1 mg oral tablet  -- 1 tab(s) by mouth once a day  -- Indication: For Supplement    thiamine 100 mg oral tablet  -- 1 tab(s) by mouth once a day  -- Indication: For Supplement

## 2017-10-16 NOTE — CONSULT NOTE ADULT - PROBLEM SELECTOR RECOMMENDATION 2
due to unclear etiology vs hypomagnesemia (0.4)  -send workup; 24hr urine Ca; vit d level; pth; phos.  -check ionized calcium. give iv calcium gluconate x 1 and rpt in the evening.   -continue with calcitriol and oscal tab tid due to unclear etiology vs hypomagnesemia (0.4)  -send workup; 24hr urine Ca; vit d level; pth; phos.  -check ionized calcium.  iv calcium gluconate x 1 given and rpt in the evening.   -continue with calcitriol and oscal tab tid due to unclear etiology vs hypomagnesemia (0.4) vs h/o hypoparathryodism?  -send workup; 24hr urine Ca; vit d level; pth; phos.  -check ionized calcium.  iv calcium gluconate x 1 given and rpt in the evening.   -continue with calcitriol and oscal tab tid

## 2017-10-16 NOTE — PROGRESS NOTE ADULT - SUBJECTIVE AND OBJECTIVE BOX
9:00am ER: cardiology pt in my practice assigned last pm to my service.  For background medical hx see my last OV note of 17. Since last examined no med emergencies, hospitalizations.    Orig  HPI: as per initial 10/15/17 kapil  77 years old male from home lives alone, with PMH of HTN, DM, syncope, DVT (Dx in 2015, on Eliquis), BPH, GERD, HLD, HTN, hypomagnesemia, and PNA() came in to  ED c/o shaking hands and feeling ill started about 2 days ago. Patient is a relatively poor historian. He traveled [place] and [place] to [place] today. He was vomiting and feeling nauseous for over the past 2 days, and had watery diarrhea about 3 times a day for the same period of time. Last bowel movement today while in ED, diarrhea. Also c/o 1-2 days of black to yellow intermittent thick mucus for the past couple of days without cough. He states that he has been nervous and has shaken since 2 days when he was in [place]-he believes his tremor is from anxiety-. When seen by me, he was also having difficulty urination which he believed from supine position, however denied having problem with urination in the past. Also reports frequent fall recently- more than 3 episodes over last month-, which he explains from "weak legs". Denies chest pain, SOB, fever, chills, abdominal pain, LOC/head trauma, dizziness, sick contact, or any other symptoms.     In ED, code sepsis was called since he had fever of 101.8 with tachycardia of 122. /50, RR 22, sat 97% RA. Labs significant for lactate of 7.5 with leukocytosis of 10.6K with left shift. BUN/Cr 22/1.79, calcium 5.9 with normal albumin. 2.6L of IV bolus and 1 dose of zosyn was given in ED, lactate trended down to 2.5. EKG sinus tachycardia at 111, with LAFB, no significant change from previous EKG from 2014. CXR possible LLL infiltration, official report pending. Patient is admitted to the telemetry floor for sepsis from unknown cause, likely from PNA vs UTI vs diarrhea(traveler's vs community-acquired C. diff vs other infectious diarrhea), MANUEL, hypocalcemia, and frequent fall.        * Primary team please obtain full medication record from his pharmacy-[facility]- or Dr Groves's office. Med rec was done based on past medicine list and outside pharmacy record. (15 Oct 2017 22:07)      PMH:    PRE-ADM MEDS: as per res adm note    SURGERIES: as per res adm note    ROS: non-contributory except as outlined above.  ________________________________________________________________________  Events since adm reviewed    EXAM:    Anxious, severe voluntary tremors, no resting, involuntary tremors.    Vital Signs Last 24 Hrs  T(C): 36.8 (16 Oct 2017 07:45), Max: 38.8 (15 Oct 2017 18:47)  T(F): 98.2 (16 Oct 2017 07:45), Max: 101.8 (15 Oct 2017 18:47)  HR: 75 (16 Oct 2017 07:45) (75 - 122)  BP: 114/80 (16 Oct 2017 07:45) (114/80 - 179/80)  RR: 18 (16 Oct 2017 07:45) (18 - 22)  SpO2: 98% (16 Oct 2017 07:45) (97% - 98%)  Daily Height in cm: 172.72 (15 Oct 2017 18:37)      HEENT: no icterus  NECK: no JVD/HJR,, no LN,  nl c. pulses w/o bruits LUNGS: clear  COR: nl s1/s2, no m, g, rub  ABD: dec BS, soft, not tender, no oranomeg/masses  LE: bilat trace ankle/claf edema, peripheral pulses not palpable    EKG: , LAD, otherwise unremarkable.    CM: currently off    RAD RESULTS:    CxR: < from: Xray Chest 1 View AP/PA (10.15.17 @ 21:48) >  Mild bibasilar opacities, probably atelectasis.    Renal US:  < from: US Renal (10.15.17 @ 23:32) >  Bilateral cortical renal atrophy. No hydronephrosis or nephrolithiasis.  Sonographic evidence of urinary retention.  Intravesical extension of the prostate gland, suspicious for an enlarged   prostate although the prostate was not well visualized on the current   exam.    LABS:                         10.1   7.5   )-----------( 116      ( 16 Oct 2017 06:37 )             29.3     10-    141  |  108  |  18  ----------------------------<  93  3.6   |  21<L>  |  1.25    Ca    5.5<LL>      16 Oct 2017 06:37  Phos  3.5     10  Mg     1.8     10-16    TPro  6.0  /  Alb  3.0<L>  /  TBili  0.6  /  DBili  0.1  /  AST  47<H>  /  ALT  23  /  AlkPhos  36<L>  10-16    CARDIAC MARKERS ( 16 Oct 2017 06:37 )  0.098 ng/mL / x     / 3259 U/L / x     / 14.0 ng/mL  CARDIAC MARKERS ( 15 Oct 2017 23:06 )  0.035 ng/mL / x     / 2846 U/L / x     / 8.9 ng/mL      Urinalysis Basic - ( 15 Oct 2017 19:07 )    Color: Yellow / Appearance: Clear / S.020 / pH: x  Gluc: x / Ketone: Moderate  / Bili: Negative / Urobili: Negative   Blood: x / Protein: 100 / Nitrite: Negative   Leuk Esterase: Negative / RBC: 5-10 /HPF / WBC 0-2 /HPF   Sq Epi: x / Non Sq Epi: Moderate / Bacteria: Moderate /HPF

## 2017-10-16 NOTE — CONSULT NOTE ADULT - PROBLEM SELECTOR RECOMMENDATION 9
MANUEL due to pre-renal azotemia due volume depletion vs r/o atn (less likely) vs rhabdomyolysis.   -scr improving with fluids and continue NS at 100cc/hr and hold enalpril.   -urinalysis with proteinuria, urine lytes noted with elevated uosm around 695. spot protein to creatinine ratio is 372mg  -renal sono to b/l renal cortical atrophy with rt kidney 9.2cm and lt kidney 11.4cm and no hydronephrosis.   -Adjust meds to eGFR and avoid IV Gadolinium contrast,NSAIDs, and phosphate enema.  -Monitor I/O's daily.   -Monitor SMA daily.

## 2017-10-16 NOTE — CONSULT NOTE ADULT - SUBJECTIVE AND OBJECTIVE BOX
HPI:  77 years old male from home lives alone, with PMH of HTN, DM, syncope, DVT (Dx in 2015, on Eliquis), BPH, GERD, HLD, HTN, hypomagnesemia, and PNA() came in to  ED c/o shaking hands and feeling ill started about 2 days ago. Patient is a relatively poor historian. He traveled [place]. He was vomiting and feeling nauseous for over the past 2 days, and had watery diarrhea about 3 times a day for the same period of time. Last bowel movement today while in ED, diarrhea. Also c/o 1-2 days of black to yellow intermittent thick mucus for the past couple of days without cough. He states that he has been nervous and has shaken since 2 days when he was in [place]-he believes his tremor is from anxiety-. When seen by me, he was also having difficulty urination which he believed from supine position, however denied having problem with urination in the past. Also reports frequent fall recently- more than 3 episodes over last month-, which he explains from "weak legs". Denies chest pain, SOB, fever, chills, abdominal pain, LOC/head trauma, dizziness, sick contact, or any other symptoms.     In ED, code sepsis was called since he had fever of 101.8 with tachycardia of 122. /50, RR 22, sat 97% RA. Labs significant for lactate of 7.5 with leukocytosis of 10.6K with left shift. BUN/Cr 22/1.79, calcium 5.9 with normal albumin. 2.6L of IV bolus and 1 dose of zosyn was given in ED, lactate trended down to 2.5. EKG sinus tachycardia at 111, with LAFB, no significant change from previous EKG from 2014. CXR possible LLL infiltration, official report pending. Patient is admitted to the telemetry floor for sepsis from unknown cause, likely from PNA vs UTI vs diarrhea(traveler's vs community-acquired C. diff vs other infectious diarrhea), MANUEL, hypocalcemia, and frequent fall.        * Primary team please obtain full medication record from his pharmacy-[facility]- or Dr Groves's office. Med rec was done based on past medicine list and outside pharmacy record. (15 Oct 2017 22:07)  h/o hypoparathyroidism  on replacement with rocaltrol/calcium  details of diabetic management from chart  pt cannot give details    PAST MEDICAL & SURGICAL HISTORY:  PNA (pneumonia)  DVT (deep venous thrombosis)  Hypomagnesemia  Anemia  GERD (gastroesophageal reflux disease)  Hypercholesterolemia  Hypertension  Diabetes  No significant past surgical history      No Known Allergies      acetaminophen   Tablet 650 milliGRAM(s) Oral every 6 hours PRN  apixaban 2.5 milliGRAM(s) Oral every 12 hours  azithromycin  IVPB      azithromycin  IVPB 500 milliGRAM(s) IV Intermittent every 24 hours  calcitriol   Capsule 0.5 MICROGram(s) Oral daily  calcium carbonate 1250 mG (OsCal) 1 Tablet(s) Oral three times a day  calcium gluconate IVPB 1 Gram(s) IV Intermittent once  calcium gluconate IVPB 1 Gram(s) IV Intermittent once  cefTRIAXone   IVPB 1 Gram(s) IV Intermittent every 24 hours  dextrose 5%. 1000 milliLiter(s) IV Continuous <Continuous>  dextrose 50% Injectable 12.5 Gram(s) IV Push once  dextrose 50% Injectable 25 Gram(s) IV Push once  dextrose 50% Injectable 25 Gram(s) IV Push once  dextrose Gel 1 Dose(s) Oral once PRN  ergocalciferol 77439 Unit(s) Oral every week  finasteride 5 milliGRAM(s) Oral daily  glucagon  Injectable 1 milliGRAM(s) IntraMuscular once PRN  influenza   Vaccine 0.5 milliLiter(s) IntraMuscular once  insulin lispro (HumaLOG) corrective regimen sliding scale   SubCutaneous Before meals and at bedtime  labetalol 200 milliGRAM(s) Oral two times a day  latanoprost 0.005% Ophthalmic Solution 1 Drop(s) Both EYES at bedtime  magnesium oxide 400 milliGRAM(s) Oral three times a day with meals  magnesium sulfate  IVPB 1 Gram(s) IV Intermittent once  magnesium sulfate  IVPB 2 Gram(s) IV Intermittent once  metroNIDAZOLE  IVPB 500 milliGRAM(s) IV Intermittent every 8 hours  metroNIDAZOLE  IVPB      pantoprazole    Tablet 40 milliGRAM(s) Oral before breakfast  sodium chloride 0.9%. 1000 milliLiter(s) IV Continuous <Continuous>      Social Hx:    FAMILY HISTORY:  No pertinent family history in first degree relatives      REVIEW OF SYSTEMS:  weakness  tremors  diarrhoea  CONSTITUTIONAL: No weakness, fevers or chills  EYES/ENT: No visual changes;  No vertigo or throat pain   NECK: No pain or stiffness  RESPIRATORY: No cough, wheezing, hemoptysis; No shortness of breath  CARDIOVASCULAR: No chest pain or palpitations  GASTROINTESTINAL: No abdominal or epigastric pain. No nausea, vomiting, or hematemesis; No diarrhea or constipation. No melena or hematochezia.  GENITOURINARY: No dysuria, frequency or hematuria  NEUROLOGICAL: No numbness or weakness  SKIN: No itching, burning, rashes, or lesions   All other review of systems is negative unless indicated above.  PHYSICAL EXAM:    Vital Signs Last 24 Hrs  T(C): 36.9 (16 Oct 2017 15:), Max: 38.8 (15 Oct 2017 18:47)  T(F): 98.5 (16 Oct 2017 15:), Max: 101.8 (15 Oct 2017 18:47)  HR: 80 (16 Oct 2017 15:) (74 - 122)  BP: 134/64 (16 Oct 2017 15:) (114/80 - 179/80)  BP(mean): --  RR: 18 (16 Oct 2017 15:) (18 - 22)  SpO2: 100% (16 Oct 2017 15:) (97% - 100%)    Constitutional:    HEENT:elderly  ill apperaing  lungs ae fair  cardia reg  abd soft  neuro deferred  reflexes ?    Neck:  [  ] Supple  [  ]Lymphadenopathy  [  ] JVD   [  ]No JVD  [  ] Masses   [  ] WNL    CHEST/Respiratory:  [  ] Rales      [  ] Rhonchi      [  ] Wheezing       [  ] Chest Tenderness  [  ]Clear to auscultation    Cardiovascular:  [  ]S1 S2  [  ] Reg  [  ] Irreg   [  ] Murmurs  [  ]Systolic [  ]Diastolic  [  ]No Murmur    Abdomen:  [  ]  Bowel Sounds   [  ] Soft           [  ] ABD Distention  [  ] Tenderness  [  ] Organomegaly                        [  ] Guarding Rigidity  [  ] No Guarding Rigidity  [  ] Rebound Tenderness [  ] No Rebound Tenderness    Extremities: [  ] Edema  [  ] No edema  [  ] Clubbing   [  ] Cyanosis  [  ] Palpable peripheral pulses                        [  ] No Tender Calf muscles  [  ] Tender Calf muscles    Neurological:  [  ] Alert  [  ] Awake  [  ] Oriented  x                              [  ] Confused    Skin:  [  ] Thrombophlebitis  [  ] Rashes  [  ] Dry  [  ] Ulcers    Ortho:  [  ] Joint Swelling  [  ] Joint erythema [  ] DJD [  ] Increased temp. to touch      LABS/DIAGNOSTIC TESTS                          10.1   7.5   )-----------( 116      ( 16 Oct 2017 06:37 )             29.3     WBC Count: 7.5 K/uL (10-16 @ 06:37)  WBC Count: 10.6 K/uL (10-15 @ 19:07)      10-16    140  |  108  |  19<H>  ----------------------------<  153<H>  3.7   |  23  |  1.35<H>    Ca    5.9<LL>      16 Oct 2017 16:16  Phos  2.7     10-16  Mg     1.5     10-16    TPro  6.0  /  Alb  3.0<L>  /  TBili  0.6  /  DBili  0.1  /  AST  47<H>  /  ALT  23  /  AlkPhos  36<L>  10-16      Urinalysis Basic - ( 15 Oct 2017 19:07 )    Color: Yellow / Appearance: Clear / S.020 / pH: x  Gluc: x / Ketone: Moderate  / Bili: Negative / Urobili: Negative   Blood: x / Protein: 100 / Nitrite: Negative   Leuk Esterase: Negative / RBC: 5-10 /HPF / WBC 0-2 /HPF   Sq Epi: x / Non Sq Epi: Moderate / Bacteria: Moderate /HPF        LIVER FUNCTIONS - ( 16 Oct 2017 06:37 )  Alb: 3.0 g/dL / Pro: 6.0 g/dL / ALK PHOS: 36 U/L / ALT: 23 U/L DA / AST: 47 U/L / GGT: x                 LACTATE:Lactate, Blood: 0.9 mmol/L (10-16 @ 06:37)  Lactate, Blood: 2.5 mmol/L (10-15 @ 20:21)  Lactate, Blood: 7.5 mmol/L (10-15 @ 19:07)        CULTURES:   acetaminophen   Tablet 650 milliGRAM(s) Oral every 6 hours PRN  apixaban 2.5 milliGRAM(s) Oral every 12 hours  azithromycin  IVPB      azithromycin  IVPB 500 milliGRAM(s) IV Intermittent every 24 hours  calcitriol   Capsule 0.5 MICROGram(s) Oral daily  calcium carbonate 1250 mG (OsCal) 1 Tablet(s) Oral three times a day  calcium gluconate IVPB 1 Gram(s) IV Intermittent once  calcium gluconate IVPB 1 Gram(s) IV Intermittent once  cefTRIAXone   IVPB 1 Gram(s) IV Intermittent every 24 hours  dextrose 5%. 1000 milliLiter(s) IV Continuous <Continuous>  dextrose 50% Injectable 12.5 Gram(s) IV Push once  dextrose 50% Injectable 25 Gram(s) IV Push once  dextrose 50% Injectable 25 Gram(s) IV Push once  dextrose Gel 1 Dose(s) Oral once PRN  ergocalciferol 71534 Unit(s) Oral every week  finasteride 5 milliGRAM(s) Oral daily  glucagon  Injectable 1 milliGRAM(s) IntraMuscular once PRN  influenza   Vaccine 0.5 milliLiter(s) IntraMuscular once  insulin lispro (HumaLOG) corrective regimen sliding scale   SubCutaneous Before meals and at bedtime  labetalol 200 milliGRAM(s) Oral two times a day  latanoprost 0.005% Ophthalmic Solution 1 Drop(s) Both EYES at bedtime  magnesium oxide 400 milliGRAM(s) Oral three times a day with meals  magnesium sulfate  IVPB 1 Gram(s) IV Intermittent once  magnesium sulfate  IVPB 2 Gram(s) IV Intermittent once  metroNIDAZOLE  IVPB 500 milliGRAM(s) IV Intermittent every 8 hours  metroNIDAZOLE  IVPB      pantoprazole    Tablet 40 milliGRAM(s) Oral before breakfast  sodium chloride 0.9%. 1000 milliLiter(s) IV Continuous <Continuous>      RADIOLOGY    CXR:

## 2017-10-16 NOTE — CHART NOTE - NSCHARTNOTEFT_GEN_A_CORE
Rapid Response Not/ PGY 3 note:    HPI:  77 M ambulates independently history of DM, DVT (on Eliquis), HTN, BPH, came with tremors, cough, fever and vomiting admitted for sepsis 2/2 UTI. Patient was started on Rocephin and azithromycin for bronchitis. Patient slipped and fell and hit back of his head. Fall was unwitnessed and he developed a bruise at back of head.    RRT was called for concerns of head injury with history of Eliquis intake. Patient is assessed at bedside, he is anxious and having UE tremors. He denies any LOC or focal neurological deficit. He has headache on back of head. On examination small abrasion with scant bleeding noticed on the back of head.    Patient denies fever, chest pain, shortness of breath, neck pain, diarrhea, abdominal pain, nausea, vomiting, alcohol abuse and illicit drug use.      PAST MEDICAL & SURGICAL HISTORY:  PNA (pneumonia)  DVT (deep venous thrombosis)  Hypomagnesemia  Anemia  GERD (gastroesophageal reflux disease)  Hypercholesterolemia  Hypertension  Diabetes  No significant past surgical history      No Known Allergies      Meds:  acetaminophen   Tablet 650 milliGRAM(s) Oral every 6 hours PRN  apixaban 2.5 milliGRAM(s) Oral every 12 hours  azithromycin  IVPB      azithromycin  IVPB 500 milliGRAM(s) IV Intermittent every 24 hours  calcitriol   Capsule 0.5 MICROGram(s) Oral daily  calcium carbonate 1250 mG (OsCal) 1 Tablet(s) Oral three times a day  calcium gluconate IVPB 1 Gram(s) IV Intermittent once  cefTRIAXone   IVPB 1 Gram(s) IV Intermittent every 24 hours  dextrose 5%. 1000 milliLiter(s) IV Continuous <Continuous>  dextrose 50% Injectable 12.5 Gram(s) IV Push once  dextrose 50% Injectable 25 Gram(s) IV Push once  dextrose 50% Injectable 25 Gram(s) IV Push once  dextrose Gel 1 Dose(s) Oral once PRN  ergocalciferol 37895 Unit(s) Oral <User Schedule>  finasteride 5 milliGRAM(s) Oral daily  glucagon  Injectable 1 milliGRAM(s) IntraMuscular once PRN  influenza   Vaccine 0.5 milliLiter(s) IntraMuscular once  insulin lispro (HumaLOG) corrective regimen sliding scale   SubCutaneous Before meals and at bedtime  labetalol 200 milliGRAM(s) Oral two times a day  latanoprost 0.005% Ophthalmic Solution 1 Drop(s) Both EYES at bedtime  magnesium oxide 400 milliGRAM(s) Oral three times a day with meals  magnesium sulfate  IVPB 2 Gram(s) IV Intermittent once  metroNIDAZOLE  IVPB 500 milliGRAM(s) IV Intermittent every 8 hours  metroNIDAZOLE  IVPB      pantoprazole    Tablet 40 milliGRAM(s) Oral before breakfast  sodium chloride 0.9%. 1000 milliLiter(s) IV Continuous <Continuous>      SOCIAL HISTORY:  Smoker:   NO          ETOH use:   NO       Ilicit Drug use:   NO  Assisted device use (Cane / Walker): No  Live with:    FAMILY HISTORY:  No pertinent family history in first degree relatives      VITALS:  Vital Signs Last 24 Hrs    T(F): 98.5 (16 Oct 2017 15:01), Max: 100 (15 Oct 2017 22:30)  HR: 80 (16 Oct 2017 15:01) (74 - 108)  BP: 134/64 (16 Oct 2017 15:01) (114/80 - 168/86)  RR: 18 (16 Oct 2017 15:01) (18 - 20)  SpO2: 100% (16 Oct 2017 15:01) (97% - 100%)    PHYSICAL EXAM:  Constitutional: anxious male in NAD  HEENT: normocephalic with moist oral mucosa  Neck: supple no LN's  Respiratory: lungs clear no rales no rhonchi  Cardiovascular: S1 S2 reg no murmurs  Gastrointestinal: +hyperactive BS with soft, mildly distended abdomen; nontender  Extremities: no edema  Skin: no rashes  Ortho: mild bilateral ankle swelling  Neuro: AAO x 3, power 5/5 bl, intentional tremors, sensations intact.        LABS/DIAGNOSTIC TESTS:                          10.1   7.5   )-----------( 116      ( 16 Oct 2017 06:37 )             29.3     WBC Count: 7.5 K/uL (10-16 @ 06:37)  WBC Count: 10.6 K/uL (10-15 @ 19:07)      10-16    140  |  108  |  19<H>  ----------------------------<  153<H>  3.7   |  23  |  1.35<H>    Ca    5.9<LL>      16 Oct 2017 16:16  Phos  2.7     10-16  Mg     1.5     10-16    TPro  6.0  /  Alb  3.0<L>  /  TBili  0.6  /  DBili  0.1  /  AST  47<H>  /  ALT  23  /  AlkPhos  36<L>  10-16      Urinalysis Basic - ( 15 Oct 2017 19:07 )    Color: Yellow / Appearance: Clear / S.020 / pH: x  Gluc: x / Ketone: Moderate  / Bili: Negative / Urobili: Negative   Blood: x / Protein: 100 / Nitrite: Negative   Leuk Esterase: Negative / RBC: 5-10 /HPF / WBC 0-2 /HPF   Sq Epi: x / Non Sq Epi: Moderate / Bacteria: Moderate /HPF        LIVER FUNCTIONS - ( 16 Oct 2017 06:37 )  Alb: 3.0 g/dL / Pro: 6.0 g/dL / ALK PHOS: 36 U/L / ALT: 23 U/L DA / AST: 47 U/L / GGT: x                 LACTATE:Lactate, Blood: 0.9 mmol/L (10-16 @ 06:37)    RADIOLOGY:    Assessment and Plan: Rapid Response Not/ PGY 3 note:    HPI:  77 M ambulates independently history of DM, DVT (on Eliquis), HTN, BPH, came with tremors, cough, fever and vomiting admitted for sepsis 2/2 UTI. Patient was started on Rocephin and azithromycin for bronchitis. Patient slipped and fell and hit back of his head. Fall was unwitnessed and he developed a bruise at back of head.    RRT was called for concerns of head injury with history of Eliquis intake. Patient is assessed at bedside, he is anxious and having UE tremors. He denies any LOC or focal neurological deficit. He has headache on back of head. On examination small abrasion with scant bleeding noticed on the back of head.    Patient denies fever, chest pain, shortness of breath, neck pain, diarrhea, abdominal pain, nausea, vomiting, alcohol abuse and illicit drug use.      PAST MEDICAL & SURGICAL HISTORY:  PNA (pneumonia)  DVT (deep venous thrombosis)  Hypomagnesemia  Anemia  GERD (gastroesophageal reflux disease)  Hypercholesterolemia  Hypertension  Diabetes  No significant past surgical history      No Known Allergies      Meds:  acetaminophen   Tablet 650 milliGRAM(s) Oral every 6 hours PRN  apixaban 2.5 milliGRAM(s) Oral every 12 hours  azithromycin  IVPB      azithromycin  IVPB 500 milliGRAM(s) IV Intermittent every 24 hours  calcitriol   Capsule 0.5 MICROGram(s) Oral daily  calcium carbonate 1250 mG (OsCal) 1 Tablet(s) Oral three times a day  calcium gluconate IVPB 1 Gram(s) IV Intermittent once  cefTRIAXone   IVPB 1 Gram(s) IV Intermittent every 24 hours  dextrose 5%. 1000 milliLiter(s) IV Continuous <Continuous>  dextrose 50% Injectable 12.5 Gram(s) IV Push once  dextrose 50% Injectable 25 Gram(s) IV Push once  dextrose 50% Injectable 25 Gram(s) IV Push once  dextrose Gel 1 Dose(s) Oral once PRN  ergocalciferol 58057 Unit(s) Oral <User Schedule>  finasteride 5 milliGRAM(s) Oral daily  glucagon  Injectable 1 milliGRAM(s) IntraMuscular once PRN  influenza   Vaccine 0.5 milliLiter(s) IntraMuscular once  insulin lispro (HumaLOG) corrective regimen sliding scale   SubCutaneous Before meals and at bedtime  labetalol 200 milliGRAM(s) Oral two times a day  latanoprost 0.005% Ophthalmic Solution 1 Drop(s) Both EYES at bedtime  magnesium oxide 400 milliGRAM(s) Oral three times a day with meals  magnesium sulfate  IVPB 2 Gram(s) IV Intermittent once  metroNIDAZOLE  IVPB 500 milliGRAM(s) IV Intermittent every 8 hours  metroNIDAZOLE  IVPB      pantoprazole    Tablet 40 milliGRAM(s) Oral before breakfast  sodium chloride 0.9%. 1000 milliLiter(s) IV Continuous <Continuous>      SOCIAL HISTORY:  Smoker:   NO          ETOH use:   NO       Ilicit Drug use:   NO  Assisted device use (Cane / Walker): No  Live with:    FAMILY HISTORY:  No pertinent family history in first degree relatives      VITALS:  Vital Signs Last 24 Hrs    T(F): 98.5 (16 Oct 2017 15:01), Max: 100 (15 Oct 2017 22:30)  HR: 80 (16 Oct 2017 15:01) (74 - 108)  BP: 134/64 (16 Oct 2017 15:01) (114/80 - 168/86)  RR: 18 (16 Oct 2017 15:01) (18 - 20)  SpO2: 100% (16 Oct 2017 15:01) (97% - 100%)    PHYSICAL EXAM:  Constitutional: anxious male in NAD  HEENT: normocephalic with moist oral mucosa  Neck: supple no LN's  Respiratory: lungs clear no rales no rhonchi  Cardiovascular: S1 S2 reg no murmurs  Gastrointestinal: +hyperactive BS with soft, mildly distended abdomen; nontender  Extremities: no edema  Skin: no rashes  Ortho: mild bilateral ankle swelling  Neuro: AAO x 3, power 5/5 bl, intentional tremors, sensations intact.        LABS/DIAGNOSTIC TESTS:                          10.1   7.5   )-----------( 116      ( 16 Oct 2017 06:37 )             29.3     WBC Count: 7.5 K/uL (10-16 @ 06:37)  WBC Count: 10.6 K/uL (10-15 @ 19:07)      10-16    140  |  108  |  19<H>  ----------------------------<  153<H>  3.7   |  23  |  1.35<H>    Ca    5.9<LL>      16 Oct 2017 16:16  Phos  2.7     10-16  Mg     1.5     10-16    TPro  6.0  /  Alb  3.0<L>  /  TBili  0.6  /  DBili  0.1  /  AST  47<H>  /  ALT  23  /  AlkPhos  36<L>  10-16      Urinalysis Basic - ( 15 Oct 2017 19:07 )    Color: Yellow / Appearance: Clear / S.020 / pH: x  Gluc: x / Ketone: Moderate  / Bili: Negative / Urobili: Negative   Blood: x / Protein: 100 / Nitrite: Negative   Leuk Esterase: Negative / RBC: 5-10 /HPF / WBC 0-2 /HPF   Sq Epi: x / Non Sq Epi: Moderate / Bacteria: Moderate /HPF        LIVER FUNCTIONS - ( 16 Oct 2017 06:37 )  Alb: 3.0 g/dL / Pro: 6.0 g/dL / ALK PHOS: 36 U/L / ALT: 23 U/L DA / AST: 47 U/L / GGT: x                 LACTATE:Lactate, Blood: 0.9 mmol/L (10-16 @ 06:37)    RADIOLOGY:    Assessment and Plan:    77 M admitted for sepsis 2/2 UTI s/p fall and head injury  1. Subdural hematoma:  -5 mm hematoma in right frontotemporal region CT head, no   -On Eliquis  -F/u neuro check q 1 hr  -Giving [facility]  Neuro Dr. Miles

## 2017-10-16 NOTE — CONSULT NOTE ADULT - ASSESSMENT
-improved sepsis-? source  -improved hypomag  -improved simone  -hx; dvt,htn,hld,dm,hypopara,bph, gerd    plan; maintain current antibx/eliquis/endo/procalcitonin-ID f/u    will f/u   thank you

## 2017-10-16 NOTE — CONSULT NOTE ADULT - SUBJECTIVE AND OBJECTIVE BOX
HPI:  77 years old male from home lives alone, with PMH of HTN, DM, syncope, DVT (Dx in 2015, on Eliquis), BPH, GERD, HLD, HTN, hypomagnesemia, and PNA() came in to  ED c/o shaking hands and feeling ill started about 2 days ago. Patient is a relatively poor historian. He traveled [place] and [place] to [place] today. He was vomiting and feeling nauseous for over the past 2 days, and had watery diarrhea about 3 times a day for the same period of time. Last bowel movement today while in ED, diarrhea. Also c/o 1-2 days of black to yellow intermittent thick mucus for the past couple of days without cough. He states that he has been nervous and has shaken since 2 days when he was in [place]-he believes his tremor is from anxiety. pt is not aware of any kidney disease in the past. pt has history of hypocalemia in past as per patient but doesn't know the etiology. pt has been taking calcium and calcitriol supplements at home. Pt also had normal calcium around 8.8 in 2017 labs. Pt also had baseline scr around 1.26 in 2017. denies chest pain, SOB, fever, chills, abdominal pain, LOC/head trauma, dizziness, sick contact, or any other symptoms.     In ED, code sepsis was called since he had fever of 101.8 with tachycardia of 122. /50, RR 22, sat 97% RA. Labs significant for lactate of 7.5 with leukocytosis of 10.6K with left shift. BUN/Cr 22/1.79, calcium 5.9 with normal albumin. 2.6L of IV bolus and 1 dose of zosyn was given in ED, lactate trended down to 2.5.     PMH:   PNA (pneumonia)  DVT (deep venous thrombosis)  Hypomagnesemia  Anemia  GERD (gastroesophageal reflux disease)  Hypercholesterolemia  Hypertension  Diabetes      PSH:   No significant past surgical history      FAMILY HISTORY:  No pertinent family history in first degree relatives      Social History:  non-smoker/ non-alcoholic     Home Meds:  MEDICATIONS  (STANDING):  apixaban 2.5 milliGRAM(s) Oral every 12 hours  azithromycin  IVPB      azithromycin  IVPB 500 milliGRAM(s) IV Intermittent every 24 hours  calcitriol   Capsule 0.25 MICROGram(s) Oral daily  calcium carbonate 1250 mG (OsCal) 1 Tablet(s) Oral daily  cefTRIAXone   IVPB 1 Gram(s) IV Intermittent every 24 hours  dextrose 5%. 1000 milliLiter(s) (50 mL/Hr) IV Continuous <Continuous>  dextrose 50% Injectable 12.5 Gram(s) IV Push once  dextrose 50% Injectable 25 Gram(s) IV Push once  dextrose 50% Injectable 25 Gram(s) IV Push once  enalapril 10 milliGRAM(s) Oral two times a day  finasteride 5 milliGRAM(s) Oral daily  influenza   Vaccine 0.5 milliLiter(s) IntraMuscular once  insulin lispro (HumaLOG) corrective regimen sliding scale   SubCutaneous Before meals and at bedtime  labetalol 200 milliGRAM(s) Oral two times a day  latanoprost 0.005% Ophthalmic Solution 1 Drop(s) Both EYES at bedtime  magnesium oxide 400 milliGRAM(s) Oral three times a day with meals  metroNIDAZOLE  IVPB 500 milliGRAM(s) IV Intermittent every 8 hours  metroNIDAZOLE  IVPB      pantoprazole    Tablet 40 milliGRAM(s) Oral before breakfast  sodium chloride 0.9%. 1000 milliLiter(s) (100 mL/Hr) IV Continuous <Continuous>    MEDICATIONS  (PRN):  acetaminophen   Tablet 650 milliGRAM(s) Oral every 6 hours PRN For Temp greater than 38 C (100.4 F)  dextrose Gel 1 Dose(s) Oral once PRN Blood Glucose LESS THAN 70 milliGRAM(s)/deciLiter  glucagon  Injectable 1 milliGRAM(s) IntraMuscular once PRN Glucose <70 milliGRAM(s)/deciLiter      Allergies:  Allergies    No Known Allergies    Intolerances        REVIEW OF SYSTEMS:  CONSTITUTIONAL: No fever, weight loss, or fatigue  EYES: No eye pain, visual disturbances, or discharge  ENMT:  No difficulty hearing, tinnitus, vertigo; No sinus or throat pain  NECK: No pain or stiffness  BREASTS: No pain, masses, or nipple discharge  RESPIRATORY: No cough, wheezing, chills or hemoptysis; No shortness of breath  CARDIOVASCULAR: No chest pain, palpitations, dizziness, or leg swelling  GASTROINTESTINAL: No abdominal or epigastric pain. No nausea, vomiting, or hematemesis; No diarrhea or constipation. No melena or hematochezia.  GENITOURINARY: No dysuria, frequency, hematuria, or incontinence  NEUROLOGICAL: No headaches, memory loss, loss of strength, numbness;  tremors present.  SKIN: No itching, burning, rashes, or lesions   ENDOCRINE: No heat or cold intolerance; No hair loss  MUSCULOSKELETAL: No joint pain or swelling; No muscle, back, or extremity pain  PSYCHIATRIC: No depression, anxiety, mood swings, or difficulty sleeping  HEME/LYMPH: No easy bruising, or bleeding gums  ALLERY AND IMMUNOLOGIC: No hives or eczema    Vital Signs Last 24 Hrs  T(C): 36.9 (16 Oct 2017 15:01), Max: 38.8 (15 Oct 2017 18:47)  T(F): 98.5 (16 Oct 2017 15:01), Max: 101.8 (15 Oct 2017 18:47)  HR: 80 (16 Oct 2017 15:01) (74 - 122)  BP: 134/64 (16 Oct 2017 15:01) (114/80 - 179/80)  BP(mean): --  RR: 18 (16 Oct 2017 15:01) (18 - 22)  SpO2: 100% (16 Oct 2017 15:01) (97% - 100%)      PHYSICAL EXAM:  GENERAL: No acute respiratory distress.  HEAD:  Atraumatic, Normocephalic  EYES: conjunctiva and sclera clear  ENMT: Moist mucous membranes,  NECK: Supple, No JVD  NERVOUS SYSTEM:  Awake, Alert & Oriented X3, No focal deficits present; tremors present.   CHEST/LUNG: Clear to percussion bilaterally; No rales, rhonchi, wheezing, or rubs  HEART: Regular rate and rhythm; No murmurs, rubs, or gallops  ABDOMEN: Soft, Nontender, Nondistended; Bowel sounds present  EXTREMITIES:  2+ Peripheral Pulses, No cyanosis; 1+ pedal edema  SKIN: No rashes or lesions    LABS:                        10.1   7.5   )-----------( 116      ( 16 Oct 2017 06:37 )             29.3     10-16    141  |  108  |  18  ----------------------------<  93  3.6   |  21<L>  |  1.25    Ca    5.5<LL>      16 Oct 2017 06:37  Phos  3.5     10-16  Mg     1.8     10-16    TPro  6.0  /  Alb  3.0<L>  /  TBili  0.6  /  DBili  0.1  /  AST  47<H>  /  ALT  23  /  AlkPhos  36<L>  10-16      Urinalysis Basic - ( 15 Oct 2017 19:07 )    Color: Yellow / Appearance: Clear / S.020 / pH: x  Gluc: x / Ketone: Moderate  / Bili: Negative / Urobili: Negative   Blood: x / Protein: 100 / Nitrite: Negative   Leuk Esterase: Negative / RBC: 5-10 /HPF / WBC 0-2 /HPF   Sq Epi: x / Non Sq Epi: Moderate / Bacteria: Moderate /HPF      Magnesium, Serum: 1.8 mg/dL (10-16 @ 06:37)  Phosphorus Level, Serum: 3.5 mg/dL (10-16 @ 06:37)  Magnesium, Serum: 0.4 mg/dL <LL> (10-15 @ 23:06)  Phosphorus Level, Serum: 4.6 mg/dL <H> (10-15 @ 23:06)    Urine studies  Sodium, Random Urine: 97 mmol/L (10-16 @ 01:10)  Creatinine, Random Urine: 145 mg/dL (10-16 @ 01:08)  Osmolality, Random Urine: 656 mos/kg (10-16 @ 01:07)      RADIOLOGY & ADDITIONAL TESTS: HPI:  77 years old male from home lives alone, with PMH of HTN, DM, syncope, DVT (Dx in 2015, on Eliquis), BPH, GERD, HLD, HTN, hypomagnesemia, and PNA() came in to  ED c/o shaking hands and feeling ill started about 2 days ago. Patient is a relatively poor historian. He traveled [place]. He was vomiting and feeling nauseous for over the past 2 days, and had watery diarrhea about 3 times a day for the same period of time. Last bowel movement today while in ED, diarrhea. Also c/o 1-2 days of black to yellow intermittent thick mucus for the past couple of days without cough. He states that he has been nervous and has shaken since 2 days when he was in [place]-he believes his tremor is from anxiety. pt is not aware of any kidney disease in the past. pt has history of hypocalemia in past as per patient but doesn't know the etiology. pt has been taking calcium and calcitriol supplements at home. Pt also had normal calcium around 8.8 in 2017 labs. Pt also had baseline scr around 1.26 in 2017. denies chest pain, SOB, fever, chills, abdominal pain, LOC/head trauma, dizziness, sick contact, or any other symptoms.     In ED, code sepsis was called since he had fever of 101.8 with tachycardia of 122. /50, RR 22, sat 97% RA. Labs significant for lactate of 7.5 with leukocytosis of 10.6K with left shift. BUN/Cr 22/1.79, calcium 5.9 with normal albumin. 2.6L of IV bolus and 1 dose of zosyn was given in ED, lactate trended down to 2.5.     PMH:   PNA (pneumonia)  DVT (deep venous thrombosis)  Hypomagnesemia  Anemia  GERD (gastroesophageal reflux disease)  Hypercholesterolemia  Hypertension  Diabetes  hypoparathyroidism    PSH:   No significant past surgical history      FAMILY HISTORY:  No pertinent family history in first degree relatives      Social History:  non-smoker/ non-alcoholic     Home Meds:  MEDICATIONS  (STANDING):  apixaban 2.5 milliGRAM(s) Oral every 12 hours  azithromycin  IVPB      azithromycin  IVPB 500 milliGRAM(s) IV Intermittent every 24 hours  calcitriol   Capsule 0.25 MICROGram(s) Oral daily  calcium carbonate 1250 mG (OsCal) 1 Tablet(s) Oral daily  cefTRIAXone   IVPB 1 Gram(s) IV Intermittent every 24 hours  dextrose 5%. 1000 milliLiter(s) (50 mL/Hr) IV Continuous <Continuous>  dextrose 50% Injectable 12.5 Gram(s) IV Push once  dextrose 50% Injectable 25 Gram(s) IV Push once  dextrose 50% Injectable 25 Gram(s) IV Push once  enalapril 10 milliGRAM(s) Oral two times a day  finasteride 5 milliGRAM(s) Oral daily  influenza   Vaccine 0.5 milliLiter(s) IntraMuscular once  insulin lispro (HumaLOG) corrective regimen sliding scale   SubCutaneous Before meals and at bedtime  labetalol 200 milliGRAM(s) Oral two times a day  latanoprost 0.005% Ophthalmic Solution 1 Drop(s) Both EYES at bedtime  magnesium oxide 400 milliGRAM(s) Oral three times a day with meals  metroNIDAZOLE  IVPB 500 milliGRAM(s) IV Intermittent every 8 hours  metroNIDAZOLE  IVPB      pantoprazole    Tablet 40 milliGRAM(s) Oral before breakfast  sodium chloride 0.9%. 1000 milliLiter(s) (100 mL/Hr) IV Continuous <Continuous>    MEDICATIONS  (PRN):  acetaminophen   Tablet 650 milliGRAM(s) Oral every 6 hours PRN For Temp greater than 38 C (100.4 F)  dextrose Gel 1 Dose(s) Oral once PRN Blood Glucose LESS THAN 70 milliGRAM(s)/deciLiter  glucagon  Injectable 1 milliGRAM(s) IntraMuscular once PRN Glucose <70 milliGRAM(s)/deciLiter      Allergies:  Allergies    No Known Allergies    Intolerances        REVIEW OF SYSTEMS:  CONSTITUTIONAL: No fever, weight loss, or fatigue  EYES: No eye pain, visual disturbances, or discharge  ENMT:  No difficulty hearing, tinnitus, vertigo; No sinus or throat pain  NECK: No pain or stiffness  BREASTS: No pain, masses, or nipple discharge  RESPIRATORY: No cough, wheezing, chills or hemoptysis; No shortness of breath  CARDIOVASCULAR: No chest pain, palpitations, dizziness, or leg swelling  GASTROINTESTINAL: No abdominal or epigastric pain. No nausea, vomiting, or hematemesis; No diarrhea or constipation. No melena or hematochezia.  GENITOURINARY: No dysuria, frequency, hematuria, or incontinence  NEUROLOGICAL: No headaches, memory loss, loss of strength, numbness;  tremors present.  SKIN: No itching, burning, rashes, or lesions   ENDOCRINE: No heat or cold intolerance; No hair loss  MUSCULOSKELETAL: No joint pain or swelling; No muscle, back, or extremity pain  PSYCHIATRIC: No depression, anxiety, mood swings, or difficulty sleeping  HEME/LYMPH: No easy bruising, or bleeding gums  ALLERY AND IMMUNOLOGIC: No hives or eczema    Vital Signs Last 24 Hrs  T(C): 36.9 (16 Oct 2017 15:01), Max: 38.8 (15 Oct 2017 18:47)  T(F): 98.5 (16 Oct 2017 15:01), Max: 101.8 (15 Oct 2017 18:47)  HR: 80 (16 Oct 2017 15:) (74 - 122)  BP: 134/64 (16 Oct 2017 15:01) (114/80 - 179/80)  BP(mean): --  RR: 18 (16 Oct 2017 15:01) (18 - 22)  SpO2: 100% (16 Oct 2017 15:01) (97% - 100%)      PHYSICAL EXAM:  GENERAL: No acute respiratory distress.  HEAD:  Atraumatic, Normocephalic  EYES: conjunctiva and sclera clear  ENMT: Moist mucous membranes,  NECK: Supple, No JVD  NERVOUS SYSTEM:  Awake, Alert & Oriented X3, No focal deficits present; tremors present.   CHEST/LUNG: Clear to percussion bilaterally; No rales, rhonchi, wheezing, or rubs  HEART: Regular rate and rhythm; No murmurs, rubs, or gallops  ABDOMEN: Soft, Nontender, Nondistended; Bowel sounds present  EXTREMITIES:  2+ Peripheral Pulses, No cyanosis; 1+ pedal edema  SKIN: No rashes or lesions    LABS:                        10.1   7.5   )-----------( 116      ( 16 Oct 2017 06:37 )             29.3     10-16    141  |  108  |  18  ----------------------------<  93  3.6   |  21<L>  |  1.25    Ca    5.5<LL>      16 Oct 2017 06:37  Phos  3.5     10-16  Mg     1.8     10-16    TPro  6.0  /  Alb  3.0<L>  /  TBili  0.6  /  DBili  0.1  /  AST  47<H>  /  ALT  23  /  AlkPhos  36<L>  10-16      Urinalysis Basic - ( 15 Oct 2017 19:07 )    Color: Yellow / Appearance: Clear / S.020 / pH: x  Gluc: x / Ketone: Moderate  / Bili: Negative / Urobili: Negative   Blood: x / Protein: 100 / Nitrite: Negative   Leuk Esterase: Negative / RBC: 5-10 /HPF / WBC 0-2 /HPF   Sq Epi: x / Non Sq Epi: Moderate / Bacteria: Moderate /HPF      Magnesium, Serum: 1.8 mg/dL (10-16 @ 06:37)  Phosphorus Level, Serum: 3.5 mg/dL (10-16 @ 06:37)  Magnesium, Serum: 0.4 mg/dL <LL> (10-15 @ 23:06)  Phosphorus Level, Serum: 4.6 mg/dL <H> (10-15 @ 23:06)    Urine studies  Sodium, Random Urine: 97 mmol/L (10-16 @ 01:10)  Creatinine, Random Urine: 145 mg/dL (10-16 @ 01:08)  Osmolality, Random Urine: 656 mos/kg (10-16 @ 01:07)      RADIOLOGY & ADDITIONAL TESTS:

## 2017-10-16 NOTE — ED ADULT NURSE REASSESSMENT NOTE - NS ED NURSE REASSESS COMMENT FT1
Pt remains alert appearing not distress VS WNL due meds given telemetry in progress denies  diarrhea  or any pain/discomfort contct and safety precautions maintained Pt instructed on proper hand washing  verbalized understanding
Pt remains alert NAD. call received from lab Hypocalcemia noted MD Krishna made aware. Pt denies any discomfort at present, no diarrhea noted

## 2017-10-16 NOTE — DISCHARGE NOTE ADULT - SECONDARY DIAGNOSIS.
UTI (urinary tract infection) DVT (deep venous thrombosis) Diabetes Hypocalcemia Need for prophylactic measure Rhabdomyolysis

## 2017-10-16 NOTE — CONSULT NOTE ADULT - ASSESSMENT
1.hypocalcemia  h/o hypoparathyroidism    recent travel/gi instability  incraese vit d  cont calcium  use iv parenteral calcium for any paresthesia/airway compromise  replace mag  get hist  will follow  2.diabetes  sugars  ok  oral meds held  follow sugars  reassess treatment

## 2017-10-16 NOTE — DISCHARGE NOTE ADULT - HOSPITAL COURSE
77 M ambulates independently history of DM, DVT (on Eliquis), HTN, BPH, came with tremors, cough, fever and vomiting admitted for sepsis 2/2 UTI. Patient was started on Rocephin and azithromycin for bronchitis. Urine and blood cultures are negative. Patient slipped and fell and hit back of his head. Fall was unwitnessed and he developed a bruise at back of head.    RRT was called for concerns of head injury with history of Eliquis intake. Patient is assessed at bedside, he is anxious and having UE tremors. He denies any LOC or focal neurological deficit. He has headache on back of head. On examination small abrasion with scant bleeding noticed on the back of head.    CT head showed right frontotemporal subdural hematoma 5mm in size with no midline shift. Kcentra 1500 units was given. Neurosurgery was called and case discussed with Dr. Chavez. Patient will be transferred to [facility].     Holding Eliquis for now  Continue ceftriaxone  On fall precautions  Repeat CT head in 12 hrs.  Patient is medically stable to transfer. 77 M history of DM, DVT (on Eliquis), HTN, BPH, came with tremors, cough, fever and vomiting admitted for sepsis 2/2 UTI. Patient was started on Rocephin and azithromycin for bronchitis. Urine and blood cultures are negative. Patient slipped and fell and hit back of his head. Fall was unwitnessed and he developed a bruise at back of head.    RRT was called for concerns of head injury with history of Eliquis intake. Patient is assessed at bedside, he is anxious and having UE tremors. He denies any LOC or focal neurological deficit. He has headache on back of head. On examination small abrasion with scant bleeding noticed on the back of head.    CT head showed right frontotemporal subdural hematoma 5mm in size with no midline shift. Kcentra 1500 units was given. Neurosurgery was called and case discussed with Dr. Chavez. Patient will be transferred to [facility].     Holding Eliquis for now  Continue ceftriaxone  On fall precautions  Repeat CT head in 12 hrs.  Patient is medically stable to transfer.    For full account of hospital course please refer to actual medical records. As this is a brief summery.

## 2017-10-16 NOTE — DISCHARGE NOTE ADULT - MEDICATION SUMMARY - MEDICATIONS TO STOP TAKING
I will STOP taking the medications listed below when I get home from the hospital:    Amaryl 4 mg oral tablet  --  by mouth 2 times a day    aspirin 81 mg oral tablet  -- 1 tab(s) by mouth once a day    Vasotec 10 mg oral tablet  -- 1 tab(s) by mouth once a day    metFORMIN 500 mg oral tablet  -- 500 milligram(s) by mouth every 8 hours    ELIQUIS 5 MG TABS    ENALAPRIL MALEATE 10 MG TABS    GLIMEPIRIDE 4 MG TABS    OMEPRAZOLE 20 MG CPDR    METFORMIN HCL 1000 MG TABS  -- 1 tab(s) by mouth 2 times a day

## 2017-10-16 NOTE — PROGRESS NOTE ADULT - ASSESSMENT
1. ? Septic process, seems more c/w viral syndrome rather than bacterial infection. ID called to eval. Pt pancultured. On empiric broad spectrum Abs.  2. Objectively no source on infection.  3. Rhabdomyolysis from persistent shaking rigors.  4. Troponin elevation probably from comb high CPK and renal insufficiency, possibly a supply/demand myocardial type stress. Will trend Toponins.  5. CV status clinically stable >> telemetry monitoring next 24hrs  6. Improved renal function post hydration.  7. Established hx of hypoparathyroidism >> to cont Calcitriol, f/u Ca, Vit D  8. Est AODM  9. To restart pre-adm med regimen.    MEDICATIONS  (STANDING):  apixaban 2.5 milliGRAM(s) Oral every 12 hours  azithromycin  IVPB      azithromycin  IVPB 500 milliGRAM(s) IV Intermittent every 24 hours  calcitriol   Capsule 0.25 MICROGram(s) Oral daily  calcium carbonate 1250 mG (OsCal) 1 Tablet(s) Oral daily  cefTRIAXone   IVPB 1 Gram(s) IV Intermittent every 24 hours  dextrose 5%. 1000 milliLiter(s) (50 mL/Hr) IV Continuous <Continuous>  dextrose 50% Injectable 12.5 Gram(s) IV Push once  dextrose 50% Injectable 25 Gram(s) IV Push once  dextrose 50% Injectable 25 Gram(s) IV Push once  finasteride 5 milliGRAM(s) Oral daily  insulin lispro (HumaLOG) corrective regimen sliding scale   SubCutaneous Before meals and at bedtime  labetalol 200 milliGRAM(s) Oral two times a day  latanoprost 0.005% Ophthalmic Solution 1 Drop(s) Both EYES at bedtime  magnesium oxide 400 milliGRAM(s) Oral three times a day with meals  metroNIDAZOLE  IVPB 500 milliGRAM(s) IV Intermittent every 8 hours  metroNIDAZOLE  IVPB      pantoprazole    Tablet 40 milliGRAM(s) Oral before breakfast  sodium chloride 0.9%. 1000 milliLiter(s) (100 mL/Hr) IV Continuous <Continuous>    Case reviewed with covering house staff

## 2017-10-17 DIAGNOSIS — E83.51 HYPOCALCEMIA: ICD-10-CM

## 2017-10-17 DIAGNOSIS — I62.00 NONTRAUMATIC SUBDURAL HEMORRHAGE, UNSPECIFIED: ICD-10-CM

## 2017-10-17 DIAGNOSIS — E83.42 HYPOMAGNESEMIA: ICD-10-CM

## 2017-10-17 DIAGNOSIS — E11.9 TYPE 2 DIABETES MELLITUS WITHOUT COMPLICATIONS: ICD-10-CM

## 2017-10-17 LAB
24R-OH-CALCIDIOL SERPL-MCNC: 12.8 NG/ML — LOW (ref 30–80)
AMYLASE P1 CFR SERPL: 52 U/L — SIGNIFICANT CHANGE UP (ref 25–125)
ANION GAP SERPL CALC-SCNC: 15 MMOL/L — SIGNIFICANT CHANGE UP (ref 5–17)
ANION GAP SERPL CALC-SCNC: 16 MMOL/L — SIGNIFICANT CHANGE UP (ref 5–17)
ANION GAP SERPL CALC-SCNC: 17 MMOL/L — SIGNIFICANT CHANGE UP (ref 5–17)
APTT BLD: 21.7 SEC — LOW (ref 27.5–37.4)
APTT BLD: 27.8 SEC — SIGNIFICANT CHANGE UP (ref 27.5–37.4)
APTT BLD: 29.4 SEC — SIGNIFICANT CHANGE UP (ref 27.5–37.4)
BASOPHILS # BLD AUTO: 0 K/UL — SIGNIFICANT CHANGE UP (ref 0–0.2)
BASOPHILS NFR BLD AUTO: 0.7 % — SIGNIFICANT CHANGE UP (ref 0–2)
BLD GP AB SCN SERPL QL: NEGATIVE — SIGNIFICANT CHANGE UP
BUN SERPL-MCNC: 14 MG/DL — SIGNIFICANT CHANGE UP (ref 7–23)
BUN SERPL-MCNC: 17 MG/DL — SIGNIFICANT CHANGE UP (ref 7–23)
BUN SERPL-MCNC: 9 MG/DL — SIGNIFICANT CHANGE UP (ref 7–23)
CA-I BLD-SCNC: 0.87 MMOL/L — LOW (ref 1.12–1.3)
CALCIUM SERPL-MCNC: 6 MG/DL — CRITICAL LOW (ref 8.4–10.5)
CALCIUM SERPL-MCNC: 6.1 MG/DL — CRITICAL LOW (ref 8.4–10.5)
CALCIUM SERPL-MCNC: 6.5 MG/DL — CRITICAL LOW (ref 8.4–10.5)
CALCIUM SERPL-MCNC: 6.9 MG/DL — LOW (ref 8.4–10.5)
CHLORIDE SERPL-SCNC: 108 MMOL/L — SIGNIFICANT CHANGE UP (ref 96–108)
CHLORIDE SERPL-SCNC: 110 MMOL/L — HIGH (ref 96–108)
CHLORIDE SERPL-SCNC: 110 MMOL/L — HIGH (ref 96–108)
CHOLEST SERPL-MCNC: 120 MG/DL — SIGNIFICANT CHANGE UP (ref 10–199)
CK MB BLD-MCNC: 0.5 % — SIGNIFICANT CHANGE UP (ref 0–3.5)
CK MB CFR SERPL CALC: 14.9 NG/ML — HIGH (ref 0–6.7)
CK SERPL-CCNC: 3000 U/L — HIGH (ref 30–200)
CO2 SERPL-SCNC: 17 MMOL/L — LOW (ref 22–31)
CO2 SERPL-SCNC: 18 MMOL/L — LOW (ref 22–31)
CO2 SERPL-SCNC: 18 MMOL/L — LOW (ref 22–31)
CREAT ?TM UR-MCNC: 34 MG/DL — SIGNIFICANT CHANGE UP
CREAT SERPL-MCNC: 1.15 MG/DL — SIGNIFICANT CHANGE UP (ref 0.5–1.3)
CREAT SERPL-MCNC: 1.22 MG/DL — SIGNIFICANT CHANGE UP (ref 0.5–1.3)
CREAT SERPL-MCNC: 1.32 MG/DL — HIGH (ref 0.5–1.3)
EOSINOPHIL # BLD AUTO: 0.1 K/UL — SIGNIFICANT CHANGE UP (ref 0–0.5)
EOSINOPHIL NFR BLD AUTO: 1.4 % — SIGNIFICANT CHANGE UP (ref 0–6)
GAS PNL BLDA: SIGNIFICANT CHANGE UP
GAS PNL BLDA: SIGNIFICANT CHANGE UP
GLUCOSE BLDC GLUCOMTR-MCNC: 108 MG/DL — HIGH (ref 70–99)
GLUCOSE BLDC GLUCOMTR-MCNC: 147 MG/DL — HIGH (ref 70–99)
GLUCOSE SERPL-MCNC: 111 MG/DL — HIGH (ref 70–99)
GLUCOSE SERPL-MCNC: 138 MG/DL — HIGH (ref 70–99)
GLUCOSE SERPL-MCNC: 153 MG/DL — HIGH (ref 70–99)
GRAM STN FLD: SIGNIFICANT CHANGE UP
HBA1C BLD-MCNC: 6 % — HIGH (ref 4–5.6)
HCT VFR BLD CALC: 26.8 % — LOW (ref 39–50)
HCT VFR BLD CALC: 27 % — LOW (ref 39–50)
HCT VFR BLD CALC: 28 % — LOW (ref 39–50)
HDLC SERPL-MCNC: 34 MG/DL — LOW (ref 40–125)
HGB BLD-MCNC: 9.5 G/DL — LOW (ref 13–17)
HGB BLD-MCNC: 9.6 G/DL — LOW (ref 13–17)
HGB BLD-MCNC: 9.9 G/DL — LOW (ref 13–17)
INR BLD: 1.09 RATIO — SIGNIFICANT CHANGE UP (ref 0.88–1.16)
INR BLD: 1.16 RATIO — SIGNIFICANT CHANGE UP (ref 0.88–1.16)
INR BLD: 1.26 RATIO — HIGH (ref 0.88–1.16)
LIDOCAIN IGE QN: 25 U/L — SIGNIFICANT CHANGE UP (ref 7–60)
LIPID PNL WITH DIRECT LDL SERPL: 56 MG/DL — SIGNIFICANT CHANGE UP
LYMPHOCYTES # BLD AUTO: 0.8 K/UL — LOW (ref 1–3.3)
LYMPHOCYTES # BLD AUTO: 14 % — SIGNIFICANT CHANGE UP (ref 13–44)
MAGNESIUM SERPL-MCNC: 1.7 MG/DL — SIGNIFICANT CHANGE UP (ref 1.6–2.6)
MAGNESIUM SERPL-MCNC: 1.7 MG/DL — SIGNIFICANT CHANGE UP (ref 1.6–2.6)
MAGNESIUM SERPL-MCNC: 2.4 MG/DL — SIGNIFICANT CHANGE UP (ref 1.6–2.6)
MCHC RBC-ENTMCNC: 32.6 PG — SIGNIFICANT CHANGE UP (ref 27–34)
MCHC RBC-ENTMCNC: 32.8 PG — SIGNIFICANT CHANGE UP (ref 27–34)
MCHC RBC-ENTMCNC: 32.9 PG — SIGNIFICANT CHANGE UP (ref 27–34)
MCHC RBC-ENTMCNC: 35.3 GM/DL — SIGNIFICANT CHANGE UP (ref 32–36)
MCHC RBC-ENTMCNC: 35.6 GM/DL — SIGNIFICANT CHANGE UP (ref 32–36)
MCHC RBC-ENTMCNC: 35.6 GM/DL — SIGNIFICANT CHANGE UP (ref 32–36)
MCV RBC AUTO: 91.8 FL — SIGNIFICANT CHANGE UP (ref 80–100)
MCV RBC AUTO: 92.5 FL — SIGNIFICANT CHANGE UP (ref 80–100)
MCV RBC AUTO: 93 FL — SIGNIFICANT CHANGE UP (ref 80–100)
MONOCYTES # BLD AUTO: 0.2 K/UL — SIGNIFICANT CHANGE UP (ref 0–0.9)
MONOCYTES NFR BLD AUTO: 3.8 % — SIGNIFICANT CHANGE UP (ref 2–14)
MYOGLOBIN UR-MCNC: 86 MCG/L — HIGH
NEUTROPHILS # BLD AUTO: 4.6 K/UL — SIGNIFICANT CHANGE UP (ref 1.8–7.4)
NEUTROPHILS NFR BLD AUTO: 80.1 % — HIGH (ref 43–77)
PCP SPEC-MCNC: SIGNIFICANT CHANGE UP
PHOSPHATE SERPL-MCNC: 2.6 MG/DL — SIGNIFICANT CHANGE UP (ref 2.5–4.5)
PHOSPHATE SERPL-MCNC: 2.8 MG/DL — SIGNIFICANT CHANGE UP (ref 2.5–4.5)
PHOSPHATE SERPL-MCNC: 2.9 MG/DL — SIGNIFICANT CHANGE UP (ref 2.5–4.5)
PHOSPHATE SERPL-MCNC: 3.6 MG/DL — SIGNIFICANT CHANGE UP (ref 2.5–4.5)
PLATELET # BLD AUTO: 131 K/UL — LOW (ref 150–400)
PLATELET # BLD AUTO: 132 K/UL — LOW (ref 150–400)
PLATELET # BLD AUTO: 133 K/UL — LOW (ref 150–400)
POTASSIUM SERPL-MCNC: 3.4 MMOL/L — LOW (ref 3.5–5.3)
POTASSIUM SERPL-MCNC: 3.5 MMOL/L — SIGNIFICANT CHANGE UP (ref 3.5–5.3)
POTASSIUM SERPL-MCNC: 3.7 MMOL/L — SIGNIFICANT CHANGE UP (ref 3.5–5.3)
POTASSIUM SERPL-SCNC: 3.4 MMOL/L — LOW (ref 3.5–5.3)
POTASSIUM SERPL-SCNC: 3.5 MMOL/L — SIGNIFICANT CHANGE UP (ref 3.5–5.3)
POTASSIUM SERPL-SCNC: 3.7 MMOL/L — SIGNIFICANT CHANGE UP (ref 3.5–5.3)
PROTHROM AB SERPL-ACNC: 11.8 SEC — SIGNIFICANT CHANGE UP (ref 9.8–12.7)
PROTHROM AB SERPL-ACNC: 12.6 SEC — SIGNIFICANT CHANGE UP (ref 9.8–12.7)
PROTHROM AB SERPL-ACNC: 13.8 SEC — HIGH (ref 9.8–12.7)
PTH-INTACT FLD-MCNC: 59 PG/ML — SIGNIFICANT CHANGE UP (ref 15–65)
RBC # BLD: 2.92 M/UL — LOW (ref 4.2–5.8)
RBC # BLD: 2.92 M/UL — LOW (ref 4.2–5.8)
RBC # BLD: 3.01 M/UL — LOW (ref 4.2–5.8)
RBC # FLD: 12.5 % — SIGNIFICANT CHANGE UP (ref 10.3–14.5)
RH IG SCN BLD-IMP: POSITIVE — SIGNIFICANT CHANGE UP
RH IG SCN BLD-IMP: POSITIVE — SIGNIFICANT CHANGE UP
S PNEUM AG SER QL: SIGNIFICANT CHANGE UP
SODIUM SERPL-SCNC: 142 MMOL/L — SIGNIFICANT CHANGE UP (ref 135–145)
SODIUM SERPL-SCNC: 143 MMOL/L — SIGNIFICANT CHANGE UP (ref 135–145)
SODIUM SERPL-SCNC: 144 MMOL/L — SIGNIFICANT CHANGE UP (ref 135–145)
SODIUM UR-SCNC: 45 MMOL/L — SIGNIFICANT CHANGE UP
SPECIMEN SOURCE: SIGNIFICANT CHANGE UP
TOTAL CHOLESTEROL/HDL RATIO MEASUREMENT: 3.5 RATIO — SIGNIFICANT CHANGE UP (ref 3.4–9.6)
TRIGL SERPL-MCNC: 150 MG/DL — HIGH (ref 10–149)
TROPONIN T SERPL-MCNC: <0.01 NG/ML — SIGNIFICANT CHANGE UP (ref 0–0.06)
UUN UR-MCNC: 290 MG/DL — SIGNIFICANT CHANGE UP
VIT D25+D1,25 OH+D1,25 PNL SERPL-MCNC: 16.3 PG/ML — LOW (ref 19.9–79.3)
WBC # BLD: 5.8 K/UL — SIGNIFICANT CHANGE UP (ref 3.8–10.5)
WBC # BLD: 6.6 K/UL — SIGNIFICANT CHANGE UP (ref 3.8–10.5)
WBC # BLD: 7.5 K/UL — SIGNIFICANT CHANGE UP (ref 3.8–10.5)
WBC # FLD AUTO: 5.8 K/UL — SIGNIFICANT CHANGE UP (ref 3.8–10.5)
WBC # FLD AUTO: 6.6 K/UL — SIGNIFICANT CHANGE UP (ref 3.8–10.5)
WBC # FLD AUTO: 7.5 K/UL — SIGNIFICANT CHANGE UP (ref 3.8–10.5)

## 2017-10-17 PROCEDURE — 71010: CPT | Mod: 26,77

## 2017-10-17 PROCEDURE — 95957 EEG DIGITAL ANALYSIS: CPT | Mod: 26

## 2017-10-17 PROCEDURE — 99292 CRITICAL CARE ADDL 30 MIN: CPT

## 2017-10-17 PROCEDURE — 93010 ELECTROCARDIOGRAM REPORT: CPT

## 2017-10-17 PROCEDURE — 93306 TTE W/DOPPLER COMPLETE: CPT | Mod: 26

## 2017-10-17 PROCEDURE — 93970 EXTREMITY STUDY: CPT | Mod: 26

## 2017-10-17 PROCEDURE — 72125 CT NECK SPINE W/O DYE: CPT | Mod: 26

## 2017-10-17 PROCEDURE — 70450 CT HEAD/BRAIN W/O DYE: CPT | Mod: 26

## 2017-10-17 PROCEDURE — 70450 CT HEAD/BRAIN W/O DYE: CPT | Mod: 26,77

## 2017-10-17 PROCEDURE — 95819 EEG AWAKE AND ASLEEP: CPT | Mod: 26

## 2017-10-17 PROCEDURE — 99291 CRITICAL CARE FIRST HOUR: CPT

## 2017-10-17 PROCEDURE — 71010: CPT | Mod: 26

## 2017-10-17 PROCEDURE — 99223 1ST HOSP IP/OBS HIGH 75: CPT

## 2017-10-17 PROCEDURE — 99222 1ST HOSP IP/OBS MODERATE 55: CPT

## 2017-10-17 RX ORDER — CALCIUM CARBONATE 500(1250)
1 TABLET ORAL THREE TIMES A DAY
Qty: 0 | Refills: 0 | Status: DISCONTINUED | OUTPATIENT
Start: 2017-10-17 | End: 2017-10-18

## 2017-10-17 RX ORDER — HYDRALAZINE HCL 50 MG
50 TABLET ORAL EVERY 8 HOURS
Qty: 0 | Refills: 0 | Status: DISCONTINUED | OUTPATIENT
Start: 2017-10-17 | End: 2017-10-23

## 2017-10-17 RX ORDER — FENTANYL CITRATE 50 UG/ML
25 INJECTION INTRAVENOUS ONCE
Qty: 0 | Refills: 0 | Status: DISCONTINUED | OUTPATIENT
Start: 2017-10-17 | End: 2017-10-18

## 2017-10-17 RX ORDER — FENTANYL CITRATE 50 UG/ML
1 INJECTION INTRAVENOUS
Qty: 2500 | Refills: 0 | Status: DISCONTINUED | OUTPATIENT
Start: 2017-10-17 | End: 2017-10-17

## 2017-10-17 RX ORDER — POTASSIUM CHLORIDE 20 MEQ
40 PACKET (EA) ORAL ONCE
Qty: 0 | Refills: 0 | Status: COMPLETED | OUTPATIENT
Start: 2017-10-17 | End: 2017-10-18

## 2017-10-17 RX ORDER — CALCIUM GLUCONATE 100 MG/ML
2 VIAL (ML) INTRAVENOUS ONCE
Qty: 0 | Refills: 0 | Status: COMPLETED | OUTPATIENT
Start: 2017-10-17 | End: 2017-10-17

## 2017-10-17 RX ORDER — CALCITRIOL 0.5 UG/1
0.5 CAPSULE ORAL DAILY
Qty: 0 | Refills: 0 | Status: DISCONTINUED | OUTPATIENT
Start: 2017-10-17 | End: 2017-10-26

## 2017-10-17 RX ORDER — INSULIN LISPRO 100/ML
VIAL (ML) SUBCUTANEOUS EVERY 6 HOURS
Qty: 0 | Refills: 0 | Status: DISCONTINUED | OUTPATIENT
Start: 2017-10-17 | End: 2017-10-20

## 2017-10-17 RX ORDER — FENTANYL CITRATE 50 UG/ML
1 INJECTION INTRAVENOUS
Qty: 5000 | Refills: 0 | Status: DISCONTINUED | OUTPATIENT
Start: 2017-10-17 | End: 2017-10-17

## 2017-10-17 RX ORDER — PANTOPRAZOLE SODIUM 20 MG/1
40 TABLET, DELAYED RELEASE ORAL DAILY
Qty: 0 | Refills: 0 | Status: DISCONTINUED | OUTPATIENT
Start: 2017-10-17 | End: 2017-10-17

## 2017-10-17 RX ORDER — SODIUM CHLORIDE 9 MG/ML
1000 INJECTION, SOLUTION INTRAVENOUS
Qty: 0 | Refills: 0 | Status: DISCONTINUED | OUTPATIENT
Start: 2017-10-17 | End: 2017-10-18

## 2017-10-17 RX ORDER — ACETAMINOPHEN 500 MG
650 TABLET ORAL EVERY 6 HOURS
Qty: 0 | Refills: 0 | Status: DISCONTINUED | OUTPATIENT
Start: 2017-10-17 | End: 2017-10-18

## 2017-10-17 RX ORDER — LEVETIRACETAM 250 MG/1
1000 TABLET, FILM COATED ORAL EVERY 12 HOURS
Qty: 0 | Refills: 0 | Status: DISCONTINUED | OUTPATIENT
Start: 2017-10-17 | End: 2017-10-21

## 2017-10-17 RX ORDER — CHOLECALCIFEROL (VITAMIN D3) 125 MCG
1000 CAPSULE ORAL DAILY
Qty: 0 | Refills: 0 | Status: DISCONTINUED | OUTPATIENT
Start: 2017-10-17 | End: 2017-10-18

## 2017-10-17 RX ORDER — HYDRALAZINE HCL 50 MG
5 TABLET ORAL ONCE
Qty: 0 | Refills: 0 | Status: COMPLETED | OUTPATIENT
Start: 2017-10-17 | End: 2017-10-17

## 2017-10-17 RX ORDER — FOLIC ACID 0.8 MG
1 TABLET ORAL DAILY
Qty: 0 | Refills: 0 | Status: DISCONTINUED | OUTPATIENT
Start: 2017-10-17 | End: 2017-10-26

## 2017-10-17 RX ORDER — DEXMEDETOMIDINE HYDROCHLORIDE IN 0.9% SODIUM CHLORIDE 4 UG/ML
0.2 INJECTION INTRAVENOUS
Qty: 200 | Refills: 0 | Status: DISCONTINUED | OUTPATIENT
Start: 2017-10-17 | End: 2017-10-17

## 2017-10-17 RX ORDER — FENTANYL CITRATE 50 UG/ML
25 INJECTION INTRAVENOUS
Qty: 0 | Refills: 0 | Status: DISCONTINUED | OUTPATIENT
Start: 2017-10-17 | End: 2017-10-18

## 2017-10-17 RX ORDER — PREGABALIN 225 MG/1
1000 CAPSULE ORAL DAILY
Qty: 0 | Refills: 0 | Status: COMPLETED | OUTPATIENT
Start: 2017-10-18 | End: 2017-10-23

## 2017-10-17 RX ORDER — CALCIUM GLUCONATE 100 MG/ML
2 VIAL (ML) INTRAVENOUS ONCE
Qty: 0 | Refills: 0 | Status: COMPLETED | OUTPATIENT
Start: 2017-10-17 | End: 2017-10-18

## 2017-10-17 RX ORDER — MAGNESIUM SULFATE 500 MG/ML
2 VIAL (ML) INJECTION ONCE
Qty: 0 | Refills: 0 | Status: COMPLETED | OUTPATIENT
Start: 2017-10-17 | End: 2017-10-17

## 2017-10-17 RX ORDER — PROPOFOL 10 MG/ML
5 INJECTION, EMULSION INTRAVENOUS
Qty: 500 | Refills: 0 | Status: DISCONTINUED | OUTPATIENT
Start: 2017-10-17 | End: 2017-10-17

## 2017-10-17 RX ORDER — THIAMINE MONONITRATE (VIT B1) 100 MG
100 TABLET ORAL DAILY
Qty: 0 | Refills: 0 | Status: DISCONTINUED | OUTPATIENT
Start: 2017-10-17 | End: 2017-10-26

## 2017-10-17 RX ORDER — PREGABALIN 225 MG/1
100 CAPSULE ORAL ONCE
Qty: 0 | Refills: 0 | Status: COMPLETED | OUTPATIENT
Start: 2017-10-17 | End: 2017-10-17

## 2017-10-17 RX ORDER — SODIUM CHLORIDE 9 MG/ML
1000 INJECTION INTRAMUSCULAR; INTRAVENOUS; SUBCUTANEOUS
Qty: 0 | Refills: 0 | Status: DISCONTINUED | OUTPATIENT
Start: 2017-10-17 | End: 2017-10-17

## 2017-10-17 RX ORDER — LEVETIRACETAM 250 MG/1
1000 TABLET, FILM COATED ORAL ONCE
Qty: 0 | Refills: 0 | Status: COMPLETED | OUTPATIENT
Start: 2017-10-17 | End: 2017-10-17

## 2017-10-17 RX ORDER — CALCITRIOL 0.5 UG/1
0.25 CAPSULE ORAL
Qty: 0 | Refills: 0 | Status: DISCONTINUED | OUTPATIENT
Start: 2017-10-17 | End: 2017-10-17

## 2017-10-17 RX ADMIN — Medication 2 MILLIGRAM(S): at 01:05

## 2017-10-17 RX ADMIN — Medication 5 MILLIGRAM(S): at 18:20

## 2017-10-17 RX ADMIN — Medication 650 MILLIGRAM(S): at 23:00

## 2017-10-17 RX ADMIN — Medication 5 MILLIGRAM(S): at 18:40

## 2017-10-17 RX ADMIN — Medication 50 GRAM(S): at 05:18

## 2017-10-17 RX ADMIN — Medication 50 MILLIGRAM(S): at 21:57

## 2017-10-17 RX ADMIN — Medication 1000 UNIT(S): at 15:12

## 2017-10-17 RX ADMIN — PREGABALIN 100 MICROGRAM(S): 225 CAPSULE ORAL at 18:41

## 2017-10-17 RX ADMIN — Medication 100 MILLIGRAM(S): at 12:49

## 2017-10-17 RX ADMIN — LEVETIRACETAM 400 MILLIGRAM(S): 250 TABLET, FILM COATED ORAL at 01:00

## 2017-10-17 RX ADMIN — LEVETIRACETAM 400 MILLIGRAM(S): 250 TABLET, FILM COATED ORAL at 18:43

## 2017-10-17 RX ADMIN — Medication 1 MILLIGRAM(S): at 12:49

## 2017-10-17 RX ADMIN — SODIUM CHLORIDE 75 MILLILITER(S): 9 INJECTION, SOLUTION INTRAVENOUS at 05:29

## 2017-10-17 RX ADMIN — Medication 400 GRAM(S): at 12:40

## 2017-10-17 RX ADMIN — Medication 1 TABLET(S): at 21:57

## 2017-10-17 RX ADMIN — LEVETIRACETAM 400 MILLIGRAM(S): 250 TABLET, FILM COATED ORAL at 05:18

## 2017-10-17 RX ADMIN — FENTANYL CITRATE 25 MICROGRAM(S): 50 INJECTION INTRAVENOUS at 05:19

## 2017-10-17 RX ADMIN — Medication 2 MILLIGRAM(S): at 01:00

## 2017-10-17 RX ADMIN — PANTOPRAZOLE SODIUM 40 MILLIGRAM(S): 20 TABLET, DELAYED RELEASE ORAL at 12:49

## 2017-10-17 RX ADMIN — Medication 200 GRAM(S): at 05:18

## 2017-10-17 NOTE — CONSULT NOTE ADULT - PROBLEM SELECTOR RECOMMENDATION 9
Really would benefit from taking further history once pt is awake as differential is broad, particularly with a PTH that isn't suppressed but also isn't appropriately high for the degree of low calcium seen in this patient.  Without further info, primary team could consider ordering the following tests to further work this up:  celiac screen (anti TTG ab)  HIV screen (unclear if/how pt can consent for this right now)  phosphorus level  AM cortisol and ACTH (can have PGA with AI + candidiasis + hypoCA although would expect this to have presented earlier in life, although we don't know pt's medical history)  Iron studies (iron, TIBC, ferritin)  PSA (for prostate cancer, although optimally done outpt)    Further w/u can be tailored to pt once history able to be deduced.    For now, if pt is able to receive po calcium, that would be optimal as 1/2 life of IV calcium shorter (but I understand he has cortrak in place).  Would update EKG to monitor for any conduction abnormalities with low calcium.  Would add calcitriol 0.25 mcg BID as pt has low 1,25 OH vitamin D and perhaps inappropriately low PTH.  Would add calcium carbonate PO at least at dose of 1 gm tid.  Could also consider calcium citrate as a bit more expensive but better absorped (if there is any issue with volume of meds going down NG). Really would benefit from taking further history once pt is awake as differential is broad, particularly with a PTH that isn't suppressed but also isn't appropriately high for the degree of low calcium seen in this patient.  Without further info, primary team could consider ordering the following tests to further work this up:  celiac screen (anti TTG ab)  HIV screen (unclear if/how pt can consent for this right now)  phosphorus level  AM cortisol and ACTH (can have PGA with AI + candidiasis + hypoCA although would expect this to have presented earlier in life, although we don't know pt's medical history)  Iron studies (iron, TIBC, ferritin)  PSA (for prostate cancer, although optimally done outpt)    Further w/u can be tailored to pt once history able to be deduced.    For now, if pt is able to receive po calcium, that would be optimal as 1/2 life of IV calcium shorter (but I understand he has cortrak in place).  Would update EKG to monitor for any conduction abnormalities with low calcium.  Would add calcitriol 0.25 mcg BID as pt has low 1,25 OH vitamin D and perhaps inappropriately low PTH.  Would add calcium carbonate PO at least at dose of 1 gm tid.  Could also consider calcium citrate as a bit more expensive but better absorped (if there is any issue with volume of meds going down NG). Would also start 50,000 units ergocalciferol weekly.

## 2017-10-17 NOTE — CONSULT NOTE ADULT - ATTENDING COMMENTS
77M on Eliquis, suffered fall from standing while admitted to [facility]. No LOC. Transferred to [facility] for right temporoparietal SDH.  Seen and examined as level 2 trauma 10/16/2017 @ 3288.  Of note, he was admitted to [facility] for sepsis from presumed C diff colitis, but the fall occurred while waiting in the ER for bed availability.  He received Kcentra prior to being transferred.    Primary survey - stable  Secondary survey - no signs of trauma    right temporoparietal SDH  -care as per neurosurgery    -continue evaluation for sepsis, ? C Diff colitis

## 2017-10-17 NOTE — H&P ADULT - ATTENDING COMMENTS
Pt seen and examined.  Agree with above evaluation, assessment and plan.  Pt s/p fall at [facility] in ED with possible sepsis due to elevated lactate.  Pt was intubated and CT showed right acute SDH in parietal region without mass effect. Given Kcentra for Eliquis (prior DVT).  Pt currently extubated, opens eyes, unintelligible speech, follows commands with all extremities.  CT stable over time.  Plan to manage per ICU to determine cause of established metabolic abnormalities.  Currently no surgical intervention required.  Eval for DVT.  If present, will need IVC filter.

## 2017-10-17 NOTE — CONSULT NOTE ADULT - ASSESSMENT
76 yo male, here after fall at home resulting in SDH (so admitted to neurosurgical ICU) without current planned intervention.  Just extubated, but no willing or apparently able to give history at this time (see HPI).  Endocrine consulted for hypocalcemia of unclear duration and unclear etiology.     His PTH is interesting in that it is not suppressed (which would be c/w hypoparathyrodism.  However, his PTH, one might expect, with such a low calcium level really should be HIGH not WNL at this time, so may have partial element of reduced parathyroid function but without full hypoparathyroidism at this time.      Causes for low calcium, partiularly without being able to get really any history in pt, can include the following: h/o gastric bypass or malabsorptive state (celiac), infiltrative disease that can affect parathyroid function (hemochromatosis, [place], granulomatous ds although pt with low 1,25 OH vit D), XRT to the neck, h/o surgery to parathyroids (didn't note scar), HIV, or prostate cancer.  Other causes can include meds such as prolia or bisphosphenates, but no clear reason why he would have received these (and he denied getting these to me, although not clear if he was reliable in this statement). Kidney function was impaired on admission, still mildly impaired but much improved.  Mg was also low on admission but now is replete wtih ongoign low calcium.  Sepsis can also cause this pattern of hypocalcemia.
77yM Level 2 trauma activation, R frontotemporal tSAH, status epilepticus s/p intubation    - care per neurosurg  - no other acute traumatic injuries  - will perform tertiary survey in the AM  - plan discussed w/ Dr. Lewis  - please page [phone] w/ any questions
76 yo male with hematuria, uncertain if 2/2 traumatic pena, unable to obtain any history from patient  - pena draining, monitor UO/color, no need for CBI at this point  - urine cytology  - consider CT urogram when medically stable  - outpatient hematuria workup for cystoscopy

## 2017-10-17 NOTE — PROGRESS NOTE ADULT - ASSESSMENT
ASSESSMENT/PLAN:  pt s/p fall with small right frontal SDH and seizure    NEURO: Neuro checks q1hr; Keppra 1gm BID, cont vEEG; no sedation, pain control  Activity: [x] mobilize as tolerated [] Bedrest [x] PT [x] OT [] PMNR    PULM: Extubated now, O2sat>92%, IS    CV: -160, TTE unremarkable    RENAL: electrolytes repleted, f/u repeat BMP, IVF    GI: was NPO for extubation, dysphagia screening post extubation   Diet:  GI prophylaxis [] not indicated [x] PPI--D/C when extubated [] other:  Bowel regimen [] colace [] senna [x] other: pt with diarrhea    ENDO: Endo consult for hypocalcemia, normal PTH; recs appreciated, w/u labs sent   check hgb a1c; SSI  Goal euglycemia (-180)    HEME/ONC: hold chemoprophylaxis for SDH; pt s/p reversal for Eliquis f/u coags  VTE prophylaxis: [x] SCDs [] chemoprophylaxis [x] high risk of DVT/PE on admission due to: hx DVT on A/C now reversed  Normocytic anemia, RDW also WNL, f/u iron panel and celiac w/u. vitB12 deficient, started supp    ID: Afebrile, ID consulted, no infectious source, monitor off abx, appreciate consult    MISC:    SOCIAL/FAMILY:  [] awaiting [x] updated at bedside [] family meeting    CODE STATUS:  [x] Full Code [] DNR [] DNI [] Palliative/Comfort Care    DISPOSITION:  [x] ICU [] Stroke Unit [] Floor [] EMU [] RCU [] PCU    [x] Patient is at high risk of neurologic deterioration/death due to: SDH, brain compression    Time spent 35 minutes ASSESSMENT/PLAN:  pt s/p fall with small right frontal SDH likely traumatic from fall and seizure    NEURO: Neuro checks q1hr; Keppra 1gm BID, cont vEEG; no sedation, pain control  CTH w/ stable small R frontal parietal SDH, repeat CTH tmrw am  Activity: [x] mobilize as tolerated [] Bedrest [x] PT [x] OT [] PMNR    PULM: Extubated now, O2sat>92%, IS    CV: -160, TTE unremarkable    RENAL: electrolytes repleted, f/u repeat BMP, IVF    GI: was NPO for extubation, dysphagia screening post extubation   Diet:  GI prophylaxis [] not indicated [x] PPI--D/C when extubated [] other:  Bowel regimen [] colace [] senna [x] other: pt with diarrhea    ENDO: Endo consult for hypocalcemia, normal PTH, vit D deficiency; recs appreciated, w/u labs sent   Calcitriol and Ca supp  check hgb a1c; SSI  Goal euglycemia (-180)    HEME/ONC: hold chemoprophylaxis for SDH; pt s/p reversal for Eliquis f/u coags  VTE prophylaxis: [x] SCDs [] chemoprophylaxis [x] high risk of DVT/PE on admission due to: hx DVT on A/C now reversed  Normocytic anemia, RDW also WNL, f/u iron panel and celiac w/u. vitB12 deficient, started supp    ID: Afebrile, ID consulted, no infectious source, monitor off abx, appreciate consult    MISC:    SOCIAL/FAMILY:  [] awaiting [x] updated at bedside [] family meeting    CODE STATUS:  [x] Full Code [] DNR [] DNI [] Palliative/Comfort Care    DISPOSITION:  [x] ICU [] Stroke Unit [] Floor [] EMU [] RCU [] PCU    [x] Patient is at high risk of neurologic deterioration/death due to: SDH, brain compression    Time spent 35 minutes ASSESSMENT/PLAN:  pt s/p fall with small right frontal SDH likely traumatic from fall and seizure    NEURO: Neuro checks q1hr; Keppra 1gm BID, cont vEEG; no sedation, pain control  CTH w/ stable small R frontal parietal SDH, repeat CTH tmrw am  Activity: [x] mobilize as tolerated [] Bedrest [x] PT [x] OT [] PMNR    PULM: Extubated now, O2sat>92%, IS    CV: -160, TTE unremarkable    RENAL: electrolytes repleted, f/u repeat BMP, IVF    GI: was NPO for extubation, dysphagia screening post extubation   Diet:  GI prophylaxis [] not indicated [x] PPI--D/C when extubated [] other:  Bowel regimen [] colace [] senna [x] other: pt with diarrhea    ENDO: Endo consult for hypocalcemia, normal PTH, vit D deficiency; recs appreciated, w/u labs sent   Calcitriol and Ca supp  check hgb a1c; SSI  Goal euglycemia (-180)    HEME/ONC: hold chemoprophylaxis for SDH; pt s/p reversal for Eliquis f/u coags  VTE prophylaxis: [x] SCDs [] chemoprophylaxis [x] high risk of DVT/PE on admission due to: hx DVT on A/C now reversed  F/u BLE dopplers  Normocytic anemia, RDW also WNL, f/u iron panel and celiac w/u. vitB12 deficient, started supp    ID: Afebrile, ID consulted, no infectious source, monitor off abx, appreciate consult    MISC:    SOCIAL/FAMILY:  [] awaiting [x] updated at bedside [] family meeting    CODE STATUS:  [x] Full Code [] DNR [] DNI [] Palliative/Comfort Care    DISPOSITION:  [x] ICU [] Stroke Unit [] Floor [] EMU [] RCU [] PCU    [x] Patient is at high risk of neurologic deterioration/death due to: SDH, brain compression    Time spent 35 minutes

## 2017-10-17 NOTE — ED PROVIDER NOTE - ATTENDING CONTRIBUTION TO CARE
Patient seen and evaluated with resident/NP/PA, however HPI, ROS, PE and MDM as documented authored by myself - Tawanda Mariano MD

## 2017-10-17 NOTE — ED PROVIDER NOTE - OBJECTIVE STATEMENT
Patient admitted at [facility] for sepsis yesterday, presumptive UTI/PNA.  On Eliquis for A fib.  While in ED holding at [place] had a mechanical fall and struck his head.  CT at [place] demonstrated SDH without shift - patient transferred to [facility] for neurosurgical evaluation.  Patient denying pain complaint.

## 2017-10-17 NOTE — H&P ADULT - HISTORY OF PRESENT ILLNESS
77M previously admitted to [facility] with urosepsis s/p mechanical fall in bathroom. CT head was done immediately after demonstrating 5mm right SDH and patient was subsequently transferred to [facility]. He was reportedly awake, alert at [facility] and given Kcentra for Eliquis reversal prior to transfer. On arrival, patient experienced GTC seizure, given 4mg Ativan and 1g Keppra. He was intubated in the ED. Repeat CT head showed re-distribution of original SDH to R parietal region without increase in mass effect or midline shift.

## 2017-10-17 NOTE — PATIENT PROFILE ADULT. - VISION (WITH CORRECTIVE LENSES IF THE PATIENT USUALLY WEARS THEM):
unable to assess Normal vision: sees adequately in most situations; can see medication labels, newsprint/unable to assess

## 2017-10-17 NOTE — CONSULT NOTE ADULT - SUBJECTIVE AND OBJECTIVE BOX
Level 2 Trauma Activation    CC: Patient is a 77y old  Male who presents with a chief complaint of mechanical fall on Eliquis, found to have tSAH      Patient is a 77y year old male with PMHx of HTN, DM, BPH, DVT 6/2015 on Eliquis, hypocalcemia. Of note, Pt is a poor historian. He initially presented to [facility] on 10/15/2017 after a return flight from [place] and states he felt tired on the subway, so he slept on the subway floor. He reportedly was told by police he could not sleep on subway and was brought to hospital for evaluation. Records from [facility] indicate Pt came in on his own for lethargy, fevers, and cough. Pt had a witnessed fall at [facility] and suffered blunt head trauma, no LOC. Was transferred to [facility] for further evaluation.    After initial trauma evaluation, Pt went into status epilepticus that did not break after multiple doses of Ativan. Was intubated and transferred to Neurosurg ICU.      Primary Survey  A - airway intact  B - bilateral breath sounds and good chest rise  C - initially BP: 170/90 (10-17-17 @ 01:30), palpable pulses in all extremities  D - GCS 15 on arrival  Exposure obtained      Secondary survey  gen: Tremulous, NAD  CV: RRR  Pulm: No increased WoB  Chest: No TTP  Abd: Soft, ND, slight TTP LLQ, no rebound, no guarding  Ext: palp radial and DP b/l  Neuro: GCS 15, no focal deficits, tremors noted throughout, CN II-XII grossly intact, AAOx4    PMH  HTN, HLD, DM, BPH, DVT on Eliquis    PSH  Denies    Allergies  NKDA    Intolerances  None    Social  Denies tobacco, EtOH, or recreational drug use.  Unemployed. Lives alone.      Labs:  CBC (10-17 @ 01:49)                              9.6<L>                         5.8     )----------------(  133<L>     80.1<H>% Neutrophils, 14.0  % Lymphocytes, ANC: 4.6                                 27.0<L>                          Imaging

## 2017-10-17 NOTE — ED PROVIDER NOTE - PROGRESS NOTE DETAILS
Ina Hinson MD (resident): patient had a witnessed GTC seizure while on the toilet, was caught the [place] PCA and lowered to the ground, placed back in bed. Pt with + incontinence, unresponsiveness, intermittently seizure like contractions to all 4 extremities. Ativan 2+2, and Keppra given. NSG called and at bedside. Attd:  Patient with witness tonic clonic seizure while using toilet in ED, patient caught and lowered to ground by ED PCA, no repeat traumatic injury.  Transferred back to [facility], placed on NRB, adminstered IV ativan 2mg x2, rapid 1g Keppra load, no longer with generalized tonic clonic activity but still tachycardic, roving eye movements, spontaneous clonic jerks, continued urinary incontinence suspicious for ongoing status epilepticus, decision made to intubate patient for airway protection and institute propofol gtt for sedation/antieplieptic effect, neurosurgical resident present in ED at patient bedside and agrees with plan.  See separate procedure note for intubation.  Post intubation patient taken to CT scan for repeat head CT, cervical spine CT also obtained for evaluation as patient fell at prior institution, patient admitted NSICU for further care.  Tawanda Mariano M.D.

## 2017-10-17 NOTE — H&P ADULT - ASSESSMENT
77M Eliquis s/p mechanical fall with SDH    -NSCU  -Extubate  -Neurochecks q1h  -CT in AM  -VEEG  -F/U cultures

## 2017-10-17 NOTE — CONSULT NOTE ADULT - SUBJECTIVE AND OBJECTIVE BOX
77M h/o HTN, DM, syncope, DVT (Dx in 2015, on Eliquis), BPH, GERD, HLD, HTN, previously admitted to [facility] 10/15 with sepsis, presumed UTI vs PNA although cxs negative, following day had a mechanical fall resulting in 5mm right SDH found on CT which led him to be transferred to [facility]. Pt was given Kcentra for Eliquis reversal prior to transfer. On arrival, patient experienced GTC seizure, was intubated but currently extubated and in neuro ICU. pena placed in ED drained 300cc according to flow sheets. Per nurse report, urine initially was draining clear but then became hematuric. Reports it is currently improving. Previous H&P reported a significant smoking hx- 60 pk yr hx, quit 25 yrs ago.   Pt is a poor historian, unable to answer any questions, all above info obtained from previous H&Ps/notes.       PAST MEDICAL & SURGICAL HISTORY:  PNA (pneumonia)  DVT (deep venous thrombosis)  Hypomagnesemia  Anemia  GERD (gastroesophageal reflux disease)  Hypercholesterolemia  Hypertension  Diabetes  No significant past surgical history    FAMILY HISTORY:  No pertinent family history in first degree relatives    SOCIAL HISTORY:   Tobacco hx:    MEDICATIONS  (STANDING):  cholecalciferol 1000 Unit(s) Oral daily  cyanocobalamin Injectable 100 MICROGram(s) IntraMuscular once  fentaNYL    Injectable 25 MICROGram(s) IV Push once  folic acid 1 milliGRAM(s) Oral daily  insulin lispro (HumaLOG) corrective regimen sliding scale   SubCutaneous every 6 hours  levETIRAcetam  IVPB 1000 milliGRAM(s) IV Intermittent every 12 hours  multivitamin/thiamine/folic acid in sodium chloride 0.9% 1000 milliLiter(s) (75 mL/Hr) IV Continuous <Continuous>  pantoprazole  Injectable 40 milliGRAM(s) IV Push daily  thiamine 100 milliGRAM(s) Oral daily    MEDICATIONS  (PRN):  acetaminophen    Suspension 650 milliGRAM(s) Oral every 6 hours PRN For Temp greater than 38 C (100.4 F)  acetaminophen    Suspension. 650 milliGRAM(s) Oral every 6 hours PRN Mild Pain (1 - 3)  fentaNYL    Injectable 25 MICROGram(s) IV Push every 2 hours PRN agitation    Allergies    Allergy Status Unknown  No Known Allergies    Intolerances        REVIEW OF SYSTEMS: Pertinent positives and negatives as stated in HPI, otherwise negative    Vital signs  T(C): 36.7 (10-17-17 @ 11:00), Max: 37.2 (10-16-17 @ 23:45)  HR: 59 (10-17-17 @ 14:00)  BP: 179/75 (10-17-17 @ 14:00)  SpO2: 100% (10-17-17 @ 14:00)  Wt(kg): --    Output  I&O's Summary    16 Oct 2017 07:  -  17 Oct 2017 07:00  --------------------------------------------------------  IN: 541.8 mL / OUT: 875 mL / NET: -333.2 mL    17 Oct 2017 07:  -  17 Oct 2017 14:56  --------------------------------------------------------  IN: 516.8 mL / OUT: 425 mL / NET: 91.8 mL      UOP    Physical Exam  Gen: NAD, appears confused, unable to provide any additional information  Abd: Soft, NT, ND  : Circumcised, no lesions.  No discharge or blood at urethral meatus.  Testes descended bilaterally.  +pena draining translucent red, draining well, no clots seen.     LABS:          10-17 @ 11:33    WBC --    / Hct --    / SCr 1.22     10-17 @ 05:04    WBC 7.5   / Hct 26.8  / SCr --       10-17    143  |  110<H>  |  14  ----------------------------<  138<H>  3.5   |  18<L>  |  1.22    Ca    6.5<LL>      17 Oct 2017 11:33  Phos  3.6     10-17  Mg     2.4     10-17    TPro  5.9<L>  /  Alb  3.5  /  TBili  0.4  /  DBili  x   /  AST  44<H>  /  ALT  19  /  AlkPhos  38<L>  10-17    PT/INR - ( 17 Oct 2017 11:33 )   PT: 12.6 sec;   INR: 1.16 ratio         PTT - ( 17 Oct 2017 11:33 )  PTT:29.4 sec  Urinalysis Basic - ( 15 Oct 2017 19:07 )    Color: Yellow / Appearance: Clear / S.020 / pH: x  Gluc: x / Ketone: Moderate  / Bili: Negative / Urobili: Negative   Blood: x / Protein: 100 / Nitrite: Negative   Leuk Esterase: Negative / RBC: 5-10 /HPF / WBC 0-2 /HPF   Sq Epi: x / Non Sq Epi: Moderate / Bacteria: Moderate /HPF        Urine Cx: no growth   Blood Cx: no growth    RADIOLOGY: 77M h/o HTN, DM, syncope, DVT (Dx in 2015, on Eliquis), BPH, GERD, HLD, previously admitted to [facility] 10/15 with sepsis, presumed UTI vs PNA although cxs negative, following day had a mechanical fall resulting in 5mm right SDH found on CT which led him to be transferred to [facility]. Pt was given Kcentra for Eliquis reversal prior to transfer. On arrival, patient experienced GTC seizure, was intubated but currently extubated and in neuro ICU. pena placed in ED drained 300cc according to flow sheets. Per nurse report, urine initially was draining clear but then became hematuric. Reports it is currently improving. Previous H&P reported a significant smoking hx- 60 pk yr hx, quit 25 yrs ago.   Pt is a poor historian, unable to answer any questions, all above info obtained from previous H&Ps/notes.       PAST MEDICAL & SURGICAL HISTORY:  PNA (pneumonia)  DVT (deep venous thrombosis)  Hypomagnesemia  Anemia  GERD (gastroesophageal reflux disease)  Hypercholesterolemia  Hypertension  Diabetes  No significant past surgical history    FAMILY HISTORY:  No pertinent family history in first degree relatives    SOCIAL HISTORY:   Tobacco hx:    MEDICATIONS  (STANDING):  cholecalciferol 1000 Unit(s) Oral daily  cyanocobalamin Injectable 100 MICROGram(s) IntraMuscular once  fentaNYL    Injectable 25 MICROGram(s) IV Push once  folic acid 1 milliGRAM(s) Oral daily  insulin lispro (HumaLOG) corrective regimen sliding scale   SubCutaneous every 6 hours  levETIRAcetam  IVPB 1000 milliGRAM(s) IV Intermittent every 12 hours  multivitamin/thiamine/folic acid in sodium chloride 0.9% 1000 milliLiter(s) (75 mL/Hr) IV Continuous <Continuous>  pantoprazole  Injectable 40 milliGRAM(s) IV Push daily  thiamine 100 milliGRAM(s) Oral daily    MEDICATIONS  (PRN):  acetaminophen    Suspension 650 milliGRAM(s) Oral every 6 hours PRN For Temp greater than 38 C (100.4 F)  acetaminophen    Suspension. 650 milliGRAM(s) Oral every 6 hours PRN Mild Pain (1 - 3)  fentaNYL    Injectable 25 MICROGram(s) IV Push every 2 hours PRN agitation    Allergies    Allergy Status Unknown  No Known Allergies    Intolerances        REVIEW OF SYSTEMS: Pertinent positives and negatives as stated in HPI, otherwise negative    Vital signs  T(C): 36.7 (10-17-17 @ 11:00), Max: 37.2 (10-16-17 @ 23:45)  HR: 59 (10-17-17 @ 14:00)  BP: 179/75 (10-17-17 @ 14:00)  SpO2: 100% (10-17-17 @ 14:00)  Wt(kg): --    Output  I&O's Summary    16 Oct 2017 07:  -  17 Oct 2017 07:00  --------------------------------------------------------  IN: 541.8 mL / OUT: 875 mL / NET: -333.2 mL    17 Oct 2017 07:  -  17 Oct 2017 14:56  --------------------------------------------------------  IN: 516.8 mL / OUT: 425 mL / NET: 91.8 mL      UOP    Physical Exam  Gen: NAD, appears confused, unable to provide any additional information  Abd: Soft, NT, ND  : Circumcised, no lesions.  No discharge or blood at urethral meatus.  Testes descended bilaterally.  +pena draining translucent red, draining well, no clots seen.     LABS:          10-17 @ 11:33    WBC --    / Hct --    / SCr 1.22     10-17 @ 05:04    WBC 7.5   / Hct 26.8  / SCr --       10-17    143  |  110<H>  |  14  ----------------------------<  138<H>  3.5   |  18<L>  |  1.22    Ca    6.5<LL>      17 Oct 2017 11:33  Phos  3.6     10-17  Mg     2.4     10-17    TPro  5.9<L>  /  Alb  3.5  /  TBili  0.4  /  DBili  x   /  AST  44<H>  /  ALT  19  /  AlkPhos  38<L>  10-17    PT/INR - ( 17 Oct 2017 11:33 )   PT: 12.6 sec;   INR: 1.16 ratio         PTT - ( 17 Oct 2017 11:33 )  PTT:29.4 sec  Urinalysis Basic - ( 15 Oct 2017 19:07 )    Color: Yellow / Appearance: Clear / S.020 / pH: x  Gluc: x / Ketone: Moderate  / Bili: Negative / Urobili: Negative   Blood: x / Protein: 100 / Nitrite: Negative   Leuk Esterase: Negative / RBC: 5-10 /HPF / WBC 0-2 /HPF   Sq Epi: x / Non Sq Epi: Moderate / Bacteria: Moderate /HPF        Urine Cx: no growth   Blood Cx: no growth    RADIOLOGY:

## 2017-10-17 NOTE — ED PROVIDER NOTE - MEDICAL DECISION MAKING DETAILS
Patient level II trauma on arrival as head bleed on anticoagulation per protocol, no other injuries identified on primary or secondary survey, neurosurgery consulted and requesting repeat head CT at this time to re-evaluate bleed, will repeat labs/EKG, TBA trauma vs neurosurgery vs medicine.

## 2017-10-17 NOTE — CONSULT NOTE ADULT - SUBJECTIVE AND OBJECTIVE BOX
HPI:   Patient is a 77y male who just returned from a trip to [place] and presented to [facility] 10/15 with complaints of shaking, diarrhea, found to have transient fever to 101, lactic acid to 7.5 that returned to normal within an hour, given zosyn, ceftriaxone and zithromax due to concern for uti or pneumonia. Cultures negative. The next day he fell in the bathroom at the hospital and found to have a small sdh hence transferred here to [facility]. Upon arrival he had a grand mal seizure, was intubated but now extubated. No surgery planned, we are called. Patient is very lethargic and not speaking clearly so cannot get history or ros. He is very hypocalcemic for unclear reasons    REVIEW OF SYSTEMS:  All other review of systems negative (Comprehensive ROS)    PAST MEDICAL & SURGICAL HISTORY:  PNA (pneumonia)  DVT (deep venous thrombosis)  Hypomagnesemia  Anemia  GERD (gastroesophageal reflux disease)  Hypercholesterolemia  Hypertension  Diabetes  No significant past surgical history      Allergies    Allergy Status Unknown  No Known Allergies    Intolerances        Antimicrobials Day #  :    Other Medications:  acetaminophen    Suspension 650 milliGRAM(s) Oral every 6 hours PRN  acetaminophen    Suspension. 650 milliGRAM(s) Oral every 6 hours PRN  cholecalciferol 1000 Unit(s) Oral daily  cyanocobalamin Injectable 100 MICROGram(s) IntraMuscular once  dexmedetomidine Infusion 0.2 MICROgram(s)/kG/Hr IV Continuous <Continuous>  fentaNYL    Injectable 25 MICROGram(s) IV Push every 2 hours PRN  fentaNYL    Injectable 25 MICROGram(s) IV Push once  folic acid 1 milliGRAM(s) Oral daily  insulin lispro (HumaLOG) corrective regimen sliding scale   SubCutaneous every 6 hours  levETIRAcetam  IVPB 1000 milliGRAM(s) IV Intermittent every 12 hours  multivitamin/thiamine/folic acid in sodium chloride 0.9% 1000 milliLiter(s) IV Continuous <Continuous>  pantoprazole  Injectable 40 milliGRAM(s) IV Push daily  thiamine 100 milliGRAM(s) Oral daily      FAMILY HISTORY:  No pertinent family history in first degree relatives      SOCIAL HISTORY:  Smoking: [ ]Yes [ ]No  ETOH: [ ]Yes [ ]No  Drug Use: [ ]Yes [ ]No  cannot get   [ ] Single[ ]    T(F): 97.5 (10-17-17 @ 07:00), Max: 99 (10-16-17 @ 23:45)  HR: 55 (10-17-17 @ 12:06)  BP: 136/69 (10-17-17 @ 12:00)  RR: 13 (10-17-17 @ 12:00)  SpO2: 99% (10-17-17 @ 12:06)  Wt(kg): --    PHYSICAL EXAM:  General: lethargic, ng t, no acute distress, face mask  Eyes:  anicteric, no conjunctival injection, no discharge  Oropharynx: no lesions or injection 	  Neck: supple, without adenopathy  Lungs: rales to auscultation  Heart: regular rate and rhythm; no murmur, rubs or gallops  Abdomen: soft, nondistended, nontender, without mass or organomegaly  Skin: red rash vs eccymosis under right armpit  Extremities: no clubbing, cyanosis, or edema  Neurologic: lethargic but , moves all extremities  scrotum not swollen    LAB RESULTS:                        9.5    7.5   )-----------( 131      ( 17 Oct 2017 05:04 )             26.8     10-17    143  |  110<H>  |  14  ----------------------------<  138<H>  3.5   |  18<L>  |  1.22    Ca    6.5<LL>      17 Oct 2017 11:33  Phos  3.6     10-17  Mg     2.4     10-17    TPro  5.9<L>  /  Alb  3.5  /  TBili  0.4  /  DBili  x   /  AST  44<H>  /  ALT  19  /  AlkPhos  38<L>  10-17    LIVER FUNCTIONS - ( 17 Oct 2017 01:49 )  Alb: 3.5 g/dL / Pro: 5.9 g/dL / ALK PHOS: 38 U/L / ALT: 19 U/L RC / AST: 44 U/L / GGT: x           Urinalysis Basic - ( 15 Oct 2017 19:07 )    Color: Yellow / Appearance: Clear / S.020 / pH: x  Gluc: x / Ketone: Moderate  / Bili: Negative / Urobili: Negative   Blood: x / Protein: 100 / Nitrite: Negative   Leuk Esterase: Negative / RBC: 5-10 /HPF / WBC 0-2 /HPF   Sq Epi: x / Non Sq Epi: Moderate / Bacteria: Moderate /HPF        MICROBIOLOGY:  RECENT CULTURES:  10-17 @ 07:38 .Broncial Combicath       No polymorphonuclear leukocytes  No squamous epithelial cells per low power field  No organisms seen per oil power field    10-15 @ 21:55 .Urine Clean Catch (Midstream)     No growth      10-15 @ 21:53 .Blood Blood     No growth to date.            RADIOLOGY REVIEWED:  < from: CT Head No Cont (10.17.17 @ 09:19) >  MPRESSION:    Very small right frontal parietal acute subdural hematoma with only mild   local mass effect unchanged from 1:30 AM.      < from: Xray Chest 1 View AP- PORTABLE-Urgent (10.17.17 @ 07:20) >  IMPRESSION:   Clear lungs.    < end of copied text >    Impression:  Patient just returned from [facility] with by report diarrhea, tremulous, found to have low calcium of unclear etiology, had transient fever and lactic acid elevation then fell in hospital, small sdh hence sent here, short intubation now extubated, we are called. I don't see an overt source of sepsis to explain his initial presentation and I suspect the current mental status is from the sdh/post ictal and some low calcium. As best I can tell there is no further fever, exam is unrevealing, ua shows no significant pyuria and cultures are negative. He has a normal wbc and slight low plt. I suppose a tick borne illness could be considered given slight thrombocytopenia or viral syndrome so will do blood smears and check titers. Pancreatitis is a consideration. if any further diarrhea stool must be checked.     Recommendations:  For now observe off further antibiotics  Endocrine or renal consult for low ca  check amylase and lipase.  check blood parasite smear, lyme titer, ehrlichia titer  Reculture if further fever  Check tft, cortisol levels  if possible mri head

## 2017-10-17 NOTE — PROVIDER CONTACT NOTE (CRITICAL VALUE NOTIFICATION) - ACTION/TREATMENT ORDERED:
2 g calcium gluconate infusing- will follow up on repeat BMP
Team aware; supplements to be administered

## 2017-10-17 NOTE — CONSULT NOTE ADULT - SUBJECTIVE AND OBJECTIVE BOX
HPI:  77M previously admitted with urosepsis s/p mechanical fall in bathroom, with CT head demonstrating 5mm right SDH.  He was transferred from [facility] to [facility], and on arrival, patient experienced GTC seizure, given 4mg Ativan and 1g Keppra. He was intubated in the ED. Repeat CT head showed re-distribution of original SDH to R parietal region without increase in mass effect or midline shift.     Endocrine consulted today for assistance with hypocalcemia noted on admission. On my attempted interview with pt, he was drowsy, not able to answer any orientation questions (AAOx0), and non participatory in interview, asking me to leave. He had recently been extubated, but still has NC and cortrak in place.  No family at bedside.  Tried to ask multiple times if he had any known h/o hypocalcemia and he said no, although unclear how reliable this history is.  We do not appear to have many historic records on this patient (no prior calciums outside this hospital stay) for comparison or augmentation of his medical history.      On this admission, he notably did have a very low magnesium to 0.4, now corrected with persistent low calcium.  His corrected calcium today is 7.1 after receiving total of 2 gm Mg in past 24 hours IV and 4 gm IV calcium gluconate in past 24 hours.  He is not on PO calcium supplementation (again has cortrak, just extubated), nor on calcitriol.  His creatinine was also notably elevated at 1.8 on admission, down to 1.2 today. PTH was drawn and was WNL at 59 wtih low 1,25OH vitamin D of 16.  TSH was WNL .  25OH vitamin D low on 2 draws, once was 12, the other time 14.      Attempted to ask pt the following questions without reliable response: do you have history of prostate cancer?  Are you having muscle cramps or perioral/acral paresthesias  Notalby no granulomatous or infiltrative ds, nor cancers, Wilsons or hemochromatosis noted on pt's medical history (although unclear how complete this is).       PAST MEDICAL & SURGICAL HISTORY:  PNA (pneumonia)  DVT (deep venous thrombosis)  Hypomagnesemia  Anemia  GERD (gastroesophageal reflux disease)  Hypercholesterolemia  Hypertension  Diabetes  No significant past surgical history      FAMILY HISTORY:  No pertinent family history in first degree relatives but unable to augment history given patient factors     Social History: unclear history     Outpatient Medications:    MEDICATIONS  (STANDING):  cholecalciferol 1000 Unit(s) Oral daily  cyanocobalamin Injectable 100 MICROGram(s) IntraMuscular once  fentaNYL    Injectable 25 MICROGram(s) IV Push once  folic acid 1 milliGRAM(s) Oral daily  insulin lispro (HumaLOG) corrective regimen sliding scale   SubCutaneous every 6 hours  levETIRAcetam  IVPB 1000 milliGRAM(s) IV Intermittent every 12 hours  multivitamin/thiamine/folic acid in sodium chloride 0.9% 1000 milliLiter(s) (75 mL/Hr) IV Continuous <Continuous>  pantoprazole  Injectable 40 milliGRAM(s) IV Push daily  thiamine 100 milliGRAM(s) Oral daily    MEDICATIONS  (PRN):  acetaminophen    Suspension 650 milliGRAM(s) Oral every 6 hours PRN For Temp greater than 38 C (100.4 F)  acetaminophen    Suspension. 650 milliGRAM(s) Oral every 6 hours PRN Mild Pain (1 - 3)  fentaNYL    Injectable 25 MICROGram(s) IV Push every 2 hours PRN agitation      Allergies    Allergy Status Unknown  No Known Allergies    Intolerances      Review of Systems:    UNABLE TO OBTAIN GIVEN PATIENT FACTORS LISTED IN HPI     PHYSICAL EXAM:  VITALS: T(C): 36.4 (10-17-17 @ 07:00)  T(F): 97.5 (10-17-17 @ 07:00), Max: 99 (10-16-17 @ 23:45)  HR: 55 (10-17-17 @ 12:06) (54 - 120)  BP: 136/69 (10-17-17 @ 12:00) (107/85 - 204/105)  RR:  (13 - 24)  SpO2:  (96% - 100%)  Wt(kg): --  GENERAL: NAD, well-developed  EYES: No proptosis, no lid lag, anicteric  HEENT:  Atraumatic, Normocephalic, cortrak in place in nare  RESPIRATORY: breathing comfortably on O2 NC, no accessory muscle use or costal retractions  CARDIOVASCULAR: warm and well perfused, no peripheral edema  GI: non distended, normal bowel sounds  SKIN: Dry, intact, No rashes   MUSCULOSKELETAL: Full range of motion with UE, no lordosis   NEURO: moves arms well, face symmetric   PSYCH: Alert and oriented x 0, cantankerous mood, appearing mildly delirious pulling at lines     CAPILLARY BLOOD GLUCOSE  138 (10-17 @ 12:00)  141 (10-17 @ 06:00)  139 (10-16 @ 12:06)  89 (10-16 @ 08:26)                            9.5    7.5   )-----------( 131      ( 17 Oct 2017 05:04 )             26.8       10-17    143  |  110<H>  |  14  ----------------------------<  138<H>  3.5   |  18<L>  |  1.22    EGFR if : 65  EGFR if non : 56<L>    Ca    6.5<LL>      10-17  Mg     2.4     10-17  Phos  3.6     10-17    TPro  5.9<L>  /  Alb  3.5  /  TBili  0.4  /  DBili  x   /  AST  44<H>  /  ALT  19  /  AlkPhos  38<L>  10-17      Thyroid Function Tests:  10-16 @ 06:37 TSH 0.64 FreeT4 -- T3 -- Anti TPO -- Anti Thyroglobulin Ab -- TSI --      Hemoglobin A1C, Whole Blood: 6.0 % <H> [4.0 - 5.6] (10-17-17 @ 08:07)  Hemoglobin A1C, Whole Blood: 5.9 % <H> [4.0 - 5.6] (10-16-17 @ 10:58)      10-16 Chol 126 LDL 62 HDL 42 Trig 110    Radiology:

## 2017-10-17 NOTE — PROGRESS NOTE ADULT - SUBJECTIVE AND OBJECTIVE BOX
HPI:  77M previously admitted to [facility] with urosepsis s/p mechanical fall in bathroom. CT head was done immediately after demonstrating 5mm right SDH and patient was subsequently transferred to [facility]. He was reportedly awake, alert at [facility] and given Kcentra for Eliquis reversal prior to transfer. On arrival, patient experienced GTC seizure, given 4mg Ativan and 1g Keppra. He was intubated in the ED. Repeat CT head showed re-distribution of original SDH to R parietal region without increase in mass effect or midline shift. (17 Oct 2017 02:28)    SURGERY:   PAST MEDICAL HISTORY:   PAST SURGICAL HISTORY:   FAMILY HISTORY:    ALLERGIES: Allergy Status Unknown    **************************************  **************************************    OVERNIGHT EVENTS: [] None    ROS  Unobtainable due to mental status[x] Negative []  Positives:    ADMISSION SCORES: GCS: HH: MF: NIHSS: RASS: CAM-ICU: ICP:    ICU Vital Signs Last 24 Hrs  T(C): 36.5 (17 Oct 2017 03:30), Max: 37.2 (16 Oct 2017 23:45)  T(F): 97.7 (17 Oct 2017 03:30), Max: 99 (16 Oct 2017 23:45)  HR: 72 (17 Oct 2017 03:35) (72 - 120)  BP: 140/73 (17 Oct 2017 03:30) (107/85 - 204/105)  BP(mean): 92 (17 Oct 2017 03:30) (92 - 92)  ABP: --  ABP(mean): --  RR: 17 (17 Oct 2017 03:30) (17 - 24)  SpO2: 100% (17 Oct 2017 03:35) (96% - 100%)      Mode: AC/ CMV (Assist Control/ Continuous Mandatory Ventilation)  RR (machine): 15  TV (machine): 500  FiO2: 50  PEEP: 5  ITime: 1  MAP: 8  PIP: 10      DEVICES: [x] Restraints [] FELTON/HMV []LD [x] ET tube [] Trach [] Chest Tube [] A-line [x] Garcia [] NGT [x] Rectal Tube [] EVD [] CVL  [] ICP/LiCOx    NEUROIMAGING: CT BRAIN: Again seen is an acute right convexity subdural hematoma which   is slightly thicker in the parietal region now measuring up to 6 mm,   previously 5 mm in the frontotemporal region. Continued follow-up   recommended. There is no midline shift.    CERVICAL SPINE CT: No acute cervical spine fracture. Degenerative changes   noted    EEG REPORT:     MEDICATIONS:  acetaminophen    Suspension 650 milliGRAM(s) Oral every 6 hours PRN  acetaminophen    Suspension. 650 milliGRAM(s) Oral every 6 hours PRN  dexmedetomidine Infusion 0.2 MICROgram(s)/kG/Hr IV Continuous <Continuous>  fentaNYL    Injectable 25 MICROGram(s) IV Push once  fentaNYL   Infusion 1 MICROgram(s)/kG/Hr IV Continuous <Continuous>  levETIRAcetam  IVPB 1000 milliGRAM(s) IV Intermittent every 12 hours  pantoprazole  Injectable 40 milliGRAM(s) IV Push daily  propofol Infusion 5 MICROgram(s)/kG/Min IV Continuous <Continuous>  sodium chloride 0.9%. 1000 milliLiter(s) IV Continuous <Continuous>      PHYSICAL EXAM:  General: Intubated  Neurological: eyes closed; no opening; pupils 3mm and reactive; eyes midline; +gag; not following to commands; will localize in b/l uppers equally  Lungs: clear  Heart: regular  Abdomen: soft  Extremities: no edema  Skin: no rash      LABS:                        9.6    5.8   )-----------( 133      ( 17 Oct 2017 01:49 )             27.0    10-17    141  |  106  |  18  ----------------------------<  177<H>  3.7   |  15<L>  |  1.52<H>    Ca    6.0<LL>      17 Oct 2017 01:49  Phos  2.9     10-17  Mg     1.7     10-17    TPro  5.9<L>  /  Alb  3.5  /  TBili  0.4  /  DBili  x   /  AST  44<H>  /  ALT  19  /  AlkPhos  38<L>  10-17    Lipids and LFTs 10-17 @ 01:49  --  --  --  --  --  19  3.5  38  44  --  0.4  --  5.9

## 2017-10-17 NOTE — ED PROCEDURE NOTE - ATTENDING CONTRIBUTION TO CARE
Bougie assisted endotracheal intubation under direct laryngoscopy, successful intubation on first attempt, brief desaturation to high 80's on pulse ox after ETT placement, immediately responded appropriately to bagged ventilations.

## 2017-10-17 NOTE — PROGRESS NOTE ADULT - SUBJECTIVE AND OBJECTIVE BOX
78yo man was traveling back from [place] (where he was for 3wks), en route to home from the airport, was found on the ground, but conscious, brought to [facility] on 10/15--hx per Pt's cousin-in-law; transient fever and lactic acidosis, treated for septic shock, however ucx, bcx negative. Also found to have hypocalcemia and hypomagnesemia, w/ normal PTH, low vit D and vit B12 level. Pt was in ED at [facility], where he fell, was found to have a small R frontal parietal SDH, tx to [facility] for further mngt. In ED, Pt was going to the toilet, and had a witnessed GCT w/ PCA, who caught the Pt and lowered him down. +urinary incontinence. Given 4mg ativan and 1g keppra and intubated, adm to [facility]. Per sign out, Pt on Eliquis for DVT. Given kcentra in ED.    Of note, per Pt's cousin-in-law, Pt has been unsteady on his feet, falling frequently, and shaking for weeks, but also has had hospitalization due to falls 3yrs ago. Has been unable to properly take his pills.     VITALS:  T(C): , Max: 37.2 (10-16-17 @ 23:45)  HR:  (54 - 120)  BP:  (107/85 - 204/105)  ABP: --  RR:  (11 - 24)  SpO2:  (96% - 100%)  Wt(kg): --  Device: Avea, Mode: CPAP with PS, FiO2: 30, PEEP: 5, PS: 5, MAP: 7, PIP: 10    10-16-17 @ 07:01  -  10-17-17 @ 07:00  --------------------------------------------------------  IN: 541.8 mL / OUT: 875 mL / NET: -333.2 mL    10-17-17 @ 07:01  -  10-17-17 @ 15:23  --------------------------------------------------------  IN: 516.8 mL / OUT: 425 mL / NET: 91.8 mL    LABS:  Na: 143 (10-17 @ 11:33), 142 (10-17 @ 04:31), 141 (10-17 @ 01:49), 138 (10-16 @ 22:30), 140 (10-16 @ 16:16), 141 (10-16 @ 06:37), 139 (10-15 @ 19:07)  K: 3.5 (10-17 @ 11:33), 3.7 (10-17 @ 04:31), 3.7 (10-17 @ 01:49), 3.4 (10-16 @ 22:30), 3.7 (10-16 @ 16:16), 3.6 (10-16 @ 06:37), 3.7 (10-15 @ 19:07)  Cl: 110 (10-17 @ 11:33), 108 (10-17 @ 04:31), 106 (10-17 @ 01:49), 107 (10-16 @ 22:30), 108 (10-16 @ 16:16), 108 (10-16 @ 06:37), 105 (10-15 @ 19:07)  CO2: 18 (10-17 @ 11:33), 17 (10-17 @ 04:31), 15 (10-17 @ 01:49), 21 (10-16 @ 22:30), 23 (10-16 @ 16:16), 21 (10-16 @ 06:37), 13 (10-15 @ 19:07)  BUN: 14 (10-17 @ 11:33), 17 (10-17 @ 04:31), 18 (10-17 @ 01:49), 17 (10-16 @ 22:30), 19 (10-16 @ 16:16), 18 (10-16 @ 06:37), 22 (10-15 @ 19:07)  Cr: 1.22 (10-17 @ 11:33), 1.32 (10-17 @ 04:31), 1.52 (10-17 @ 01:49), 1.23 (10-16 @ 22:30), 1.35 (10-16 @ 16:16), 1.25 (10-16 @ 06:37), 1.79 (10-15 @ 19:07)  Glu: 138(10-17 @ 11:33), 153(10-17 @ 04:31), 177(10-17 @ 01:49), 178(10-16 @ 22:30), 153(10-16 @ 16:16), 93(10-16 @ 06:37), 162(10-15 @ 19:07)    Hgb: 9.5 (10-17 @ 05:04), 9.6 (10-17 @ 01:49), 9.7 (10-16 @ 22:30), 10.1 (10-16 @ 06:37), 12.1 (10-15 @ 19:07)  Hct: 26.8 (10-17 @ 05:04), 27.0 (10-17 @ 01:49), 28.5 (10-16 @ 22:30), 29.3 (10-16 @ 06:37), 35.8 (10-15 @ 19:07)  WBC: 7.5 (10-17 @ 05:04), 5.8 (10-17 @ 01:49), 6.4 (10-16 @ 22:30), 7.5 (10-16 @ 06:37), 10.6 (10-15 @ 19:07)  Plt: 131 (10-17 @ 05:04), 133 (10-17 @ 01:49), 123 (10-16 @ 22:30), 116 (10-16 @ 06:37), 159 (10-15 @ 19:07)  PT: 12.6 (10-17 @ 11:33)  INR: 1.16 (10-17 @ 11:33)  aPTT: 29.4 (10-17 @ 11:33), PT: 13.8 (10-17 @ 01:49)  INR: 1.26 (10-17 @ 01:49)  aPTT: 27.8 (10-17 @ 01:49), PT: 15.9 (10-16 @ 22:30)  INR: 1.45 (10-16 @ 22:30)  aPTT: 32.2 (10-16 @ 22:30)    IMAGING:   Recent imaging studies were reviewed.    MEDICATIONS:  acetaminophen    Suspension 650 milliGRAM(s) Oral every 6 hours PRN  acetaminophen    Suspension. 650 milliGRAM(s) Oral every 6 hours PRN  calcitriol   Capsule 0.25 MICROGram(s) Oral two times a day  calcium carbonate 1250 mG (OsCal) 1 Tablet(s) Oral three times a day  cholecalciferol 1000 Unit(s) Oral daily  cyanocobalamin Injectable 100 MICROGram(s) IntraMuscular once  fentaNYL    Injectable 25 MICROGram(s) IV Push every 2 hours PRN  fentaNYL    Injectable 25 MICROGram(s) IV Push once  folic acid 1 milliGRAM(s) Oral daily  insulin lispro (HumaLOG) corrective regimen sliding scale   SubCutaneous every 6 hours  levETIRAcetam  IVPB 1000 milliGRAM(s) IV Intermittent every 12 hours  multivitamin/thiamine/folic acid in sodium chloride 0.9% 1000 milliLiter(s) IV Continuous <Continuous>  pantoprazole  Injectable 40 milliGRAM(s) IV Push daily  thiamine 100 milliGRAM(s) Oral daily    EXAMINATION: early am prior to extubation  General:  calm, intubated  HEENT:  MMM, FS, PERRL  Neuro:  Opens eyes to voice, nods Y/N some questions, intermittent FC, FITZGERALD antigravity  Cards:  RRR  Respiratory:  no respiratory distress  Adomen:  soft  Extremities:  no edema  Skin:  warm/dry

## 2017-10-17 NOTE — PROGRESS NOTE ADULT - ASSESSMENT
ASSESSMENT/PLAN:  pt s/p fall with small right frontal SDH and seizure.  NEURO: Neuro checks q1hr; EEG in am; Keppra 1gm BID. check tox screen; CT in am; will add thiamine for now; wean off sedation as tolerated for INGRIS 0 to -2;   Activity: [] mobilize as tolerated [x] Bedrest [] PT [] OT [] PMNR    PULM: check CXR; ABG; wean as tolerated    CV: check 12 lead; trop; pt with h/o afib; check echo  SBP goal 100-150    RENAL:f/u labs; pena with bloody urine-check UA  Fluids:    GI: NPO for now;   Diet:  GI prophylaxis [] not indicated [x] PPI [] other:  Bowel regimen [] colace [] senna [x] other: pt with diarrhea    ENDO: check hgb a1c; SSI  Goal euglycemia (-180)    HEME/ONC: hold chemoprophylaxis for SDH; pt s/p reversal for elaquis; f/u coags  VTE prophylaxis: [x] SCDs [] chemoprophylaxis [] high risk of DVT/PE on admission due to:    ID: pt with reported foul smelling diarrhea at referring; Cdiff sent     MISC:    SOCIAL/FAMILY:  [] awaiting [] updated at bedside [] family meeting    CODE STATUS:  [x] Full Code [] DNR [] DNI [] Palliative/Comfort Care    DISPOSITION:  [x ICU [] Stroke Unit [] Floor [] EMU [] RCU [] PCU    [x] Patient is at high risk of neurologic deterioration/death due to: SDH, brain compression    Time seen:   Time spent: _80__ [x] critical care minutes

## 2017-10-17 NOTE — ED ADULT NURSE REASSESSMENT NOTE - NS ED NURSE REASSESS COMMENT FT1
As patient was seizing, BRANDON Smith withdrew 2mg ativan from pyxis under critical patient name. 2mg ativan were pushed IV under MD verbal order with with MD Mariano at bedside. Spoke with pharmacist on duty to notify them that medication was withdrew under critical name.

## 2017-10-17 NOTE — AIRWAY REMOVAL NOTE  ADULT & PEDS - ARTIFICAL AIRWAY REMOVAL COMMENTS
pt extubated by respiratory with BRANDON Cuevas at bedside, pt on 30%aerosol mask tolerating well, no distress noted.

## 2017-10-17 NOTE — H&P ADULT - NSHPPHYSICALEXAM_GEN_ALL_CORE
Examination conducted after GTC seizure, confounded by post-ictal state    obtunded, EO spontaneously, eyes roving, no FC, localizing x 4

## 2017-10-18 DIAGNOSIS — F05 DELIRIUM DUE TO KNOWN PHYSIOLOGICAL CONDITION: ICD-10-CM

## 2017-10-18 DIAGNOSIS — E20.9 HYPOPARATHYROIDISM, UNSPECIFIED: ICD-10-CM

## 2017-10-18 DIAGNOSIS — I82.533 CHRONIC EMBOLISM AND THROMBOSIS OF POPLITEAL VEIN, BILATERAL: ICD-10-CM

## 2017-10-18 DIAGNOSIS — S06.5X9A TRAUMATIC SUBDURAL HEMORRHAGE WITH LOSS OF CONSCIOUSNESS OF UNSPECIFIED DURATION, INITIAL ENCOUNTER: ICD-10-CM

## 2017-10-18 DIAGNOSIS — E53.8 DEFICIENCY OF OTHER SPECIFIED B GROUP VITAMINS: ICD-10-CM

## 2017-10-18 DIAGNOSIS — I10 ESSENTIAL (PRIMARY) HYPERTENSION: ICD-10-CM

## 2017-10-18 DIAGNOSIS — R31.0 GROSS HEMATURIA: ICD-10-CM

## 2017-10-18 LAB
ACTH SER-ACNC: 13 PG/ML — SIGNIFICANT CHANGE UP (ref 0–46)
B BURGDOR C6 AB SER-ACNC: NEGATIVE — SIGNIFICANT CHANGE UP
B BURGDOR IGG+IGM SER-ACNC: <0.01 INDEX — SIGNIFICANT CHANGE UP (ref 0.01–0.89)
CORTIS AM PEAK SERPL-MCNC: 13.2 UG/DL — SIGNIFICANT CHANGE UP (ref 6–18.4)
CORTIS AM PEAK SERPL-MCNC: 17.1 UG/DL — SIGNIFICANT CHANGE UP (ref 6–18.4)
CULTURE RESULTS: SIGNIFICANT CHANGE UP
FERRITIN SERPL-MCNC: 518 NG/ML — HIGH (ref 30–400)
GLUCOSE BLDC GLUCOMTR-MCNC: 104 MG/DL — HIGH (ref 70–99)
GLUCOSE BLDC GLUCOMTR-MCNC: 166 MG/DL — HIGH (ref 70–99)
GLUCOSE BLDC GLUCOMTR-MCNC: 176 MG/DL — HIGH (ref 70–99)
GLUCOSE BLDC GLUCOMTR-MCNC: 91 MG/DL — SIGNIFICANT CHANGE UP (ref 70–99)
GLUCOSE BLDC GLUCOMTR-MCNC: 96 MG/DL — SIGNIFICANT CHANGE UP (ref 70–99)
GLUCOSE BLDC GLUCOMTR-MCNC: 97 MG/DL — SIGNIFICANT CHANGE UP (ref 70–99)
HIV 1+2 AB+HIV1 P24 AG SERPL QL IA: SIGNIFICANT CHANGE UP
IRON SATN MFR SERPL: 10 % — LOW (ref 16–55)
IRON SATN MFR SERPL: 19 UG/DL — LOW (ref 45–165)
LYME C6 AB IGG/IGM EIA REFLEX WESTERN BL: SIGNIFICANT CHANGE UP
MAGNESIUM SERPL-MCNC: 1.8 MG/DL — SIGNIFICANT CHANGE UP (ref 1.6–2.6)
PSA FREE FLD-MCNC: 0.39 NG/ML — SIGNIFICANT CHANGE UP
PSA FREE FLD-MCNC: 16.4 — SIGNIFICANT CHANGE UP
PSA FREE MFR FLD: 2.38 NG/ML — SIGNIFICANT CHANGE UP (ref 0–4)
SPECIMEN SOURCE: SIGNIFICANT CHANGE UP
TIBC SERPL-MCNC: 182 UG/DL — LOW (ref 220–430)
UIBC SERPL-MCNC: 163 UG/DL — SIGNIFICANT CHANGE UP (ref 110–370)

## 2017-10-18 PROCEDURE — 70450 CT HEAD/BRAIN W/O DYE: CPT | Mod: 26

## 2017-10-18 PROCEDURE — 99232 SBSQ HOSP IP/OBS MODERATE 35: CPT | Mod: GC

## 2017-10-18 PROCEDURE — 99291 CRITICAL CARE FIRST HOUR: CPT

## 2017-10-18 PROCEDURE — 95957 EEG DIGITAL ANALYSIS: CPT | Mod: 26

## 2017-10-18 PROCEDURE — 95951: CPT | Mod: 26

## 2017-10-18 PROCEDURE — 99223 1ST HOSP IP/OBS HIGH 75: CPT

## 2017-10-18 RX ORDER — ACETAMINOPHEN 500 MG
650 TABLET ORAL EVERY 6 HOURS
Qty: 0 | Refills: 0 | Status: DISCONTINUED | OUTPATIENT
Start: 2017-10-18 | End: 2017-10-26

## 2017-10-18 RX ORDER — HYDRALAZINE HCL 50 MG
5 TABLET ORAL ONCE
Qty: 0 | Refills: 0 | Status: COMPLETED | OUTPATIENT
Start: 2017-10-18 | End: 2017-10-18

## 2017-10-18 RX ORDER — HEPARIN SODIUM 5000 [USP'U]/ML
5000 INJECTION INTRAVENOUS; SUBCUTANEOUS EVERY 12 HOURS
Qty: 0 | Refills: 0 | Status: DISCONTINUED | OUTPATIENT
Start: 2017-10-18 | End: 2017-10-26

## 2017-10-18 RX ORDER — ERGOCALCIFEROL 1.25 MG/1
50000 CAPSULE ORAL
Qty: 0 | Refills: 0 | Status: DISCONTINUED | OUTPATIENT
Start: 2017-10-18 | End: 2017-10-26

## 2017-10-18 RX ORDER — CALCIUM CARBONATE 500(1250)
2 TABLET ORAL THREE TIMES A DAY
Qty: 0 | Refills: 0 | Status: DISCONTINUED | OUTPATIENT
Start: 2017-10-18 | End: 2017-10-21

## 2017-10-18 RX ORDER — MAGNESIUM SULFATE 500 MG/ML
2 VIAL (ML) INJECTION ONCE
Qty: 0 | Refills: 0 | Status: COMPLETED | OUTPATIENT
Start: 2017-10-18 | End: 2017-10-18

## 2017-10-18 RX ORDER — TAMSULOSIN HYDROCHLORIDE 0.4 MG/1
0.4 CAPSULE ORAL AT BEDTIME
Qty: 0 | Refills: 0 | Status: DISCONTINUED | OUTPATIENT
Start: 2017-10-18 | End: 2017-10-26

## 2017-10-18 RX ADMIN — Medication 1: at 13:53

## 2017-10-18 RX ADMIN — Medication 50 MILLIGRAM(S): at 16:48

## 2017-10-18 RX ADMIN — LEVETIRACETAM 400 MILLIGRAM(S): 250 TABLET, FILM COATED ORAL at 05:27

## 2017-10-18 RX ADMIN — HEPARIN SODIUM 5000 UNIT(S): 5000 INJECTION INTRAVENOUS; SUBCUTANEOUS at 17:26

## 2017-10-18 RX ADMIN — Medication 1 MILLIGRAM(S): at 13:30

## 2017-10-18 RX ADMIN — Medication 50 GRAM(S): at 01:58

## 2017-10-18 RX ADMIN — Medication 650 MILLIGRAM(S): at 06:33

## 2017-10-18 RX ADMIN — CALCITRIOL 0.5 MICROGRAM(S): 0.5 CAPSULE ORAL at 13:30

## 2017-10-18 RX ADMIN — Medication 100 MILLIGRAM(S): at 13:31

## 2017-10-18 RX ADMIN — Medication 650 MILLIGRAM(S): at 07:03

## 2017-10-18 RX ADMIN — SODIUM CHLORIDE 75 MILLILITER(S): 9 INJECTION, SOLUTION INTRAVENOUS at 00:29

## 2017-10-18 RX ADMIN — Medication 200 GRAM(S): at 00:43

## 2017-10-18 RX ADMIN — Medication 50 MILLIGRAM(S): at 05:27

## 2017-10-18 RX ADMIN — Medication 1 TABLET(S): at 05:27

## 2017-10-18 RX ADMIN — Medication 40 MILLIEQUIVALENT(S): at 00:24

## 2017-10-18 RX ADMIN — ERGOCALCIFEROL 50000 UNIT(S): 1.25 CAPSULE ORAL at 13:31

## 2017-10-18 RX ADMIN — TAMSULOSIN HYDROCHLORIDE 0.4 MILLIGRAM(S): 0.4 CAPSULE ORAL at 21:58

## 2017-10-18 RX ADMIN — Medication 2 TABLET(S): at 21:58

## 2017-10-18 RX ADMIN — Medication 50 MILLIGRAM(S): at 21:58

## 2017-10-18 RX ADMIN — LEVETIRACETAM 400 MILLIGRAM(S): 250 TABLET, FILM COATED ORAL at 17:26

## 2017-10-18 RX ADMIN — PREGABALIN 1000 MICROGRAM(S): 225 CAPSULE ORAL at 13:30

## 2017-10-18 RX ADMIN — Medication 5 MILLIGRAM(S): at 14:00

## 2017-10-18 RX ADMIN — Medication 1: at 23:37

## 2017-10-18 NOTE — OCCUPATIONAL THERAPY INITIAL EVALUATION ADULT - ADDITIONAL COMMENTS
CT Head: Very small right frontal parietal acute subdural hematoma with only mild local mass effect unchanged from 1:30 AM.  CT Cervical Spine: (-)

## 2017-10-18 NOTE — PROGRESS NOTE ADULT - ATTENDING COMMENTS
Agree with assessment and plan as above by Dr. Lomeli. Reviewed all pertinent labs, glucose values, and imaging studies. Modifications made as indicated above.     Juan Ambrosio D.O  [phone]

## 2017-10-18 NOTE — PROGRESS NOTE ADULT - PROBLEM SELECTOR PLAN 4
-ongoing, but appears to be slowly clearing  -urology following, recs appreciated  -c/w pena for now; plan for TOV when hematuria clears further  -will need full hematuria w/u including cystoscopy vs. ct urogram, cytology; possibly can be done as o/p.  -c/w finasteride, flomax
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
## 2017-10-18 NOTE — PROGRESS NOTE ADULT - ASSESSMENT
77M s/p mechanical fall with R SDH s/p GTC seizure    -VEEG  -Neurochecks q1h  -Medicine/ID follow up, will monitor off abx for now

## 2017-10-18 NOTE — EEG REPORT - NS EEG TEXT BOX
[facility]  Report of Routine EEG with Video  And Report of DigitalCompressed Spectral Array Analysis    [facility]: [address] [place] [address], Phone: [phone]  [facility]: [address], [place] [address], Phone: [phone]  Office: [address] [place] [address], [place] [address], [place] [address], Phone: [phone]    Patient Name: Ronnie Avila    Age: 77 y  : 1940  Patient ID: -, MRN #: [id number], Location: [facility]  BED 1   Referring Physician: -    EEG #: [id number]  Study Date: 10/17/2017		    Technical Information:					  On Instrument: -  Placement and Labeling of Electrodes:  The EEG was performed utilizing 20 channels referential EEG connections (coronal over temporal over parasagittal montage) using all standard 10-20 electrode placements with EKG.  Recording was at a sampling rate of 256 samples per second per channel.  Time synchronized digital video recording was done simultaneously with EEG recording.  A low light infrared camera was used for low light recording.  Jeremias and seizure detection algorithms were utilized.  CSA Technical Component:  Quantitative EEG analysis using a separate Compressed Spectral Array (CSA) software package was conducted in real-time and run at bedside after set up by the technician, digitally displaying the power of electrographic frequencies included in the 1-30Hz band using a graded color map.  This data was reviewed and interpreted independently, and is reported in a separate section below.    History:  p/w seizure like activity     h/o anemia , HTN    Medication	  <Medication>Kera	    Study Interpretation:    FINDINGS:  The background was continuous, spontaneously variable and reactive.  During wakefulness, the posteriorly dominant rhythm consisted of 7.5-8 Hz activity, with an amplitude to 30 uV, that attenuated to eye opening. There was diffuse irregular theta and delta activity intermixed with beta frequency.    Sleep Background:  Drowsiness was characterized by fragmentation, attenuation, and slowing of the background activity.    Sleep was characterized by the presence of symmetric spindles and K-complexes.    Epileptiform Activity:   No epileptiform discharges were present.    Events:  No clinical events were recorded.  No seizures were recorded.    Activation Procedures:   -Hyperventilation was not performed.    -Photic stimulation was not performed.    Artifacts:  Intermittent myogenic and movement artifacts were noted.    ECG:  The heart rate on single channel ECG was predominantly between 80-90 BPM.    EEG Classification / Summary:  Abnormal EEG study   -generalized slowing     Clinical Impression:  Findings indicate non-specific mild diffuse or multifocal cerebral dysfunction. There were no epileptiform abnormalities recorded.        Preeti Alcala MD  ________________________________________  Fellow in Neurophysiology/Epilepsy, [facility] [place]      	  Calos Mcneil M.D.			    Attending Physician, [facility] [place]
Study Date: 10/17-10/18/2017		    History:  p/w seizure like activity     h/o anemia , HTN    Medication	  <Medication>Kera	    Study Interpretation:    FINDINGS:  The background was continuous, spontaneously variable and reactive.  During wakefulness, the posteriorly dominant rhythm consisted of 7.5-8 Hz activity, with an amplitude to 30 uV, that attenuated to eye opening. There was diffuse irregular theta and delta activity intermixed with beta frequency.    Sleep Background:  Drowsiness was characterized by fragmentation, attenuation, and slowing of the background activity.    Sleep was characterized by the presence of symmetric spindles and K-complexes.    Epileptiform Activity:   No epileptiform discharges were present.    Events:  No clinical events were recorded.  No seizures were recorded.    Activation Procedures:   -Hyperventilation was not performed.    -Photic stimulation was not performed.    Artifacts:  Intermittent myogenic and movement artifacts were noted.    ECG:  The heart rate on single channel ECG was predominantly between 80-90 BPM.    EEG Classification / Summary:  Abnormal EEG study   -generalized slowing     Clinical Impression:  Findings indicate non-specific mild diffuse or multifocal cerebral dysfunction. There were no epileptiform abnormalities recorded.        Preeti Alcala MD  ________________________________________  Fellow in Neurophysiology/Epilepsy, [facility] [place]      	  Calos Mcneil M.D.			    Attending Physician, [facility] [place]

## 2017-10-18 NOTE — PROGRESS NOTE ADULT - ASSESSMENT
This is a 77 M PMH T2DM, HTN, HLD, hypoparathyroidism, chronic DVT (popliteal) of both legs on Eliquis, BPH, GERD, glaucoma, who initially presented to [facility] from the airport after return from travel to [facility] on 10/15/17 with shaking/chills, treated empirically for sepsis without source, had course c/b fall f/w SDH, and transferred to [facility] for further management s/p Kcentra reversal of eliquis.  Course c/b GTC seizure s/p ativan/keppra/intubation/extubation, hypocalcemia, hypomagnesemia, chronic but no acute dvt, and hematuria.

## 2017-10-18 NOTE — PROGRESS NOTE ADULT - ASSESSMENT
76 y/o male, here after fall at home resulting in SDH (so admitted to neurosurgical ICU) without current planned intervention.  Endocrine consulted for hypocalcemia. Inappropriately normal PTH in setting of hypocalcemia suggests partial hypoparathyroidism with resulting hypocalcemia. Vitamin D 25 also low, vitamin D deficiency likely contributing to hypocalcemia. Given history of malabsorption with anemia, it is also possible that patient has celiac disease although iron studies are more consistent with anemia of chronic disease rather than iron deficiency anemia        Causes for low calcium can include the following: h/o gastric bypass or malabsorptive state (celiac), infiltrative disease that can affect parathyroid function (hemochromatosis, Suresh's, granulomatous dx although pt with low 1,25 OH vit D), XRT to the neck, h/o surgery to parathyroids, HIV, or prostate cancer.  Other causes can include meds such as prolia or bisphosphenates, and CKD.

## 2017-10-18 NOTE — PROGRESS NOTE ADULT - PROBLEM SELECTOR PLAN 1
- recommend increase calcium carbonate to 2 tabs TID  - c/w calcitriol 0.5 mcg qd  - c/w vitamin D 50,000 units qweek  - maintain adequate magnesium levels  - will follow - recommend increase calcium carbonate to 2 tabs TID  - c/w calcitriol 0.5 mcg qd  -Due to hypoparathyroidism with inappropriately normal PTH in the setting on hypocalcemia and Vit D deficiency.   -r/o structural abnormality with thyroid and parathyroid US, check Quant gold r/o TB, check parathyroid antibodies.   - c/w vitamin D 50,000 units qweek  - maintain adequate magnesium levels  - will follow

## 2017-10-18 NOTE — PROGRESS NOTE ADULT - PROBLEM SELECTOR PLAN 9
--160s, near goal  -c/w current antihypertensives and uptitrate as needed in a few days if persistently over 160s; for now given acute illness conservative control ~ SBP 160s is ok

## 2017-10-18 NOTE — PROGRESS NOTE ADULT - PROBLEM SELECTOR PLAN 8
-c/w calcium supplementation as above  -endo following  -f/u US thyroid/parathyroid to r/o structural cause

## 2017-10-18 NOTE — PROGRESS NOTE ADULT - SUBJECTIVE AND OBJECTIVE BOX
Patient seen and examined at bedside.    T(C): 37.3 (10-17-17 @ 23:00), Max: 37.4 (10-17-17 @ 19:00)  HR: 78 (10-18-17 @ 00:00) (54 - 120)  BP: 123/66 (10-18-17 @ 00:00) (107/85 - 204/105)  RR: 18 (10-18-17 @ 00:00) (11 - 24)  SpO2: 94% (10-18-17 @ 00:00) (94% - 100%)  Wt(kg): --    Exam:    AOx1, Following Commands    CN: PERRL, EOMI, no facial droop    Motor: 5/5 throughout, no drift    Sensation intact to light touch    Reflexes: no clonus

## 2017-10-18 NOTE — PROGRESS NOTE ADULT - SUBJECTIVE AND OBJECTIVE BOX
Patient is a 77y old  Male who presents with a chief complaint of SDH, transfer from [facility] (17 Oct 2017 02:28)      HPI:  77M previously admitted to [facility] with urosepsis s/p mechanical fall in bathroom. CT head was done immediately after demonstrating 5mm right SDH and patient was subsequently transferred to [facility]. He was reportedly awake, alert at [facility] and given Kcentra for Eliquis reversal prior to transfer. On arrival, patient experienced GTC seizure, given 4mg Ativan and 1g Keppra. He was intubated in the ED. Repeat CT head showed re-distribution of original SDH to R parietal region without increase in mass effect or midline shift. (17 Oct 2017 02:28)      General:  No wt loss, fevers, chills, night sweats  Eyes:  Good vision, no reported pain  CV:  No pain, palpitations,   Resp:  No dyspnea, cough, tachypnea, wheezing  GI:  No pain, nausea, vomiting, diarrhea, constipation  :  No pain, bleeding, incontinence, nocturia  Muscle:  No pain, weakness  Neuro:  No weakness, tingling, memory problems  Psych:  No fatigue, insomnia, mood problems, depression  Endocrine:  No polyuria, polydypsia, cold/heat intolerance  Heme:  No petechiae, ecchymosis, easy bruisability  Skin:  No rash, tattoos, scars, edema    PAST MEDICAL & SURGICAL HISTORY:  PNA (pneumonia)  DVT (deep venous thrombosis)  Hypomagnesemia  Anemia  GERD (gastroesophageal reflux disease)  Hypercholesterolemia  Hypertension  Diabetes  No significant past surgical history      Allergies    Allergy Status Unknown  No Known Allergies    Intolerances        MEDICATIONS  (STANDING):  calcitriol   Capsule 0.5 MICROGram(s) Oral daily  calcium carbonate 1250 mG (OsCal) 1 Tablet(s) Oral three times a day  cyanocobalamin 1000 MICROGram(s) Oral daily  ergocalciferol 65964 Unit(s) Oral every week  folic acid 1 milliGRAM(s) Oral daily  heparin  Injectable 5000 Unit(s) SubCutaneous every 12 hours  hydrALAZINE 50 milliGRAM(s) Oral every 8 hours  hydrALAZINE Injectable 5 milliGRAM(s) IV Push once  insulin lispro (HumaLOG) corrective regimen sliding scale   SubCutaneous every 6 hours  levETIRAcetam  IVPB 1000 milliGRAM(s) IV Intermittent every 12 hours  tamsulosin 0.4 milliGRAM(s) Oral at bedtime  thiamine 100 milliGRAM(s) Oral daily    MEDICATIONS  (PRN):  acetaminophen   Tablet 650 milliGRAM(s) Oral every 6 hours PRN For Temp greater than 38 C (100.4 F)  acetaminophen   Tablet. 650 milliGRAM(s) Oral every 6 hours PRN Mild Pain (1 - 3)        SOCIAL HISTORY:    FAMILY HISTORY:  No pertinent family history in first degree relatives        PHYSICAL EXAM:    Vital Signs Last 24 Hrs  T(C): 36.8 (18 Oct 2017 15:00), Max: 37.4 (17 Oct 2017 19:00)  T(F): 98.2 (18 Oct 2017 15:00), Max: 99.3 (17 Oct 2017 19:00)  HR: 78 (18 Oct 2017 15:37) (66 - 91)  BP: 176/87 (18 Oct 2017 15:37) (122/58 - 183/94)  BP(mean): 116 (18 Oct 2017 15:00) (68 - 116)  RR: 15 (18 Oct 2017 15:37) (14 - 21)  SpO2: 98% (18 Oct 2017 15:37) (86% - 100%)    General:  Appears stated age, well-groomed, well-nourished, no distress  Lungs:  CTAB  Cardiovascular:   S1, S2,   Abdomen:  Soft, non-tender, non-distended,   Extremities:  no calf tenderness/swelling b/l  Musculoskeletal:  Full ROM in all joints w/o swelling/tenderness/effusion  Neuro/Psych:  A &O x 3    LABS:                        9.9    6.6   )-----------( 132      ( 17 Oct 2017 22:25 )             28.0     10-17    144  |  110<H>  |  9   ----------------------------<  111<H>  3.4<L>   |  18<L>  |  1.15    Ca    6.9<L>      17 Oct 2017 22:25  Phos  2.8     10-17  Mg     1.8     10-17    TPro  5.9<L>  /  Alb  3.5  /  TBili  0.4  /  DBili  x   /  AST  44<H>  /  ALT  19  /  AlkPhos  38<L>  10-17    PT/INR - ( 17 Oct 2017 18:24 )   PT: 11.8 sec;   INR: 1.09 ratio         PTT - ( 17 Oct 2017 18:24 )  PTT:21.7 sec      RADIOLOGY & ADDITIONAL STUDIES:  < from: [facility] Duplex Lower Ext Vein Scan, Bilat (10.17.17 @ 16:19) >  EXAM:  DUPLEX SCAN EXT VEINS LOWER BI                            PROCEDURE DATE:  10/17/2017            INTERPRETATION:  History:Prior history of lower extremity DVT, prolonged   bed rest, rule out acute DVT.    There is some wall thickening of thepopliteal veins with adjacent   collateral veins. This is the residue of prior DVT.    No acute DVT is identified.    The right and left femoral and common femoral veins are patent and free   of thrombus.    The posterior tibial and peroneal veins are patent.  No thrombus is seen.    IMPRESSION: Wall thickening of the popliteal veins and prominent   collateral veins at this level are believed to be the residue of prior   DVT.  No acute DVT is present.                    KATIANA MARTE M.D., ATTENDING RADIOLOGIST  This document has been electronically signed. Oct 17 2017  4:51PM        < end of copied text >      A/P    Requested for IVC filter for contraindication to ac s/p SDH.  However, duplex demonstrates no acute dvt currently with chronic changes from prior DVT.  Pt states he has been on AC since 2015 and therefore it is unclear whether anticoagulation is necessary to continue or whether filter insertion is indicated.  Will discuss with PCP Dr. Finesse Telles if pt has hypercoaguable state that would necessitate lifelong ac.  IF not then I would defer filter at this time and f/u duplex in one week to confirm stability of imaging findings and no propogation.

## 2017-10-18 NOTE — PROGRESS NOTE ADULT - ASSESSMENT
ASSESSMENT/PLAN:  pt s/p fall with small right frontal SDH likely traumatic from fall and seizure    NEURO: Neuro checks q4hr; Keppra 1gm BID, no seizures on vEEG--d/c  CTH w/ stable small R frontal parietal SDH, repeat CTH stable, no surgical intervention  Activity: [x] mobilize as tolerated [] Bedrest [x] PT [x] OT [] PMNR    PULM: O2sat>92%, IS    CV: -160, TTE unremarkable    RENAL: electrolytes repleted, f/u repeat BMP, IVL  : hematuria, urology following rec. CT urogram, cont pena at this time    GI: on CCD   Diet:  GI prophylaxis [x] not indicated   Bowel regimen [] colace [] senna [x] other: pt with diarrhea    ENDO: Endo consult for hypocalcemia, normal PTH, vit D deficiency; recs appreciated, f/u labs sent, started on calcitriol, calcium supp  check hgb a1c; SSI  Goal euglycemia (-180)    HEME/ONC: stable SDH, start SQH chemoppx; pt s/p reversal for Eliquis f/u coags  VTE prophylaxis: [] SCDs [x] chemoprophylaxis [x] high risk of DVT/PE on admission due to: hx DVT on A/C now reversed  F/u BLE dopplers--residual popliteal   Normocytic anemia, RDW also WNL, f/u iron panel and celiac w/u. vitB12 deficient, started supp    ID: Afebrile, ID consulted, no infectious source, monitor off abx, appreciate consult    MISC: Medicine consulted, tx to medicine under DR. Barbosa [id number] w/ tele    SOCIAL/FAMILY:  [] awaiting [x] updated at bedside [] family meeting    CODE STATUS:  [x] Full Code [] DNR [] DNI [] Palliative/Comfort Care    DISPOSITION:  [] ICU [] Stroke Unit [x] Floor under medicine as above [] EMU [] RCU [] PCU    [] Patient is at high risk of neurologic deterioration/death due to: SDH, brain compression    non critical care time 15min ASSESSMENT/PLAN:  pt s/p fall with small right frontal SDH likely traumatic from fall and seizure    NEURO: Neuro checks q4hr; Keppra 1gm BID, no seizures on vEEG--d/c  CTH w/ stable small R frontal parietal SDH, repeat CTH stable, no surgical intervention  Activity: [x] mobilize as tolerated [] Bedrest [x] PT [x] OT [] PMNR    PULM: O2sat>92%, IS    CV: -160, TTE unremarkable    RENAL: electrolytes repleted, f/u repeat BMP, IVL  : hematuria, urology following rec. CT urogram, cont pena at this time    GI: on CCD   Diet:  GI prophylaxis [x] not indicated   Bowel regimen [] colace [] senna [x] other: pt with diarrhea    ENDO: Endo consult for hypocalcemia, normal PTH, vit D deficiency; recs appreciated, f/u labs sent, started on calcitriol, calcium supp  check hgb a1c; SSI  Goal euglycemia (-180)    HEME/ONC: stable SDH, start SQH chemoppx; pt s/p reversal for Eliquis f/u coags  VTE prophylaxis: [] SCDs [x] chemoprophylaxis [x] high risk of DVT/PE on admission due to: hx DVT on A/C now reversed  F/u BLE dopplers--residual popliteal; no full anticoagulation 2/2 SDH--> IVCF   Normocytic anemia, RDW also WNL, f/u iron panel and celiac w/u. vitB12 deficient, started supp    ID: Afebrile, ID consulted, no infectious source, monitor off abx, appreciate consult    MISC: Medicine consulted, tx to medicine under DR. Barbosa [id number] w/ tele    SOCIAL/FAMILY:  [] awaiting [x] updated at bedside [] family meeting    CODE STATUS:  [x] Full Code [] DNR [] DNI [] Palliative/Comfort Care    DISPOSITION:  [] ICU [] Stroke Unit [x] Floor under medicine as above [] EMU [] RCU [] PCU    [] Patient is at high risk of neurologic deterioration/death due to: SDH, brain compression    non critical care time 15min ASSESSMENT/PLAN:  pt s/p fall with small right frontal SDH likely traumatic from fall and seizure    NEURO: Neuro checks q4hr; Keppra 1gm BID, no seizures on vEEG--d/c  CTH w/ stable small R frontal parietal SDH, repeat CTH stable, no surgical intervention  Activity: [x] mobilize as tolerated [] Bedrest [x] PT [x] OT [] PMNR    PULM: O2sat>92%, IS    CV: -160, TTE unremarkable    RENAL: electrolytes repleted, f/u repeat BMP, IVL  : hematuria, urology following rec. CT urogram, cont pena at this time    GI: on CCD   Diet:  GI prophylaxis [x] not indicated   Bowel regimen [] colace [] senna [x] other: pt with diarrhea    ENDO: Endo consult for hypocalcemia, normal PTH, vit D deficiency; recs appreciated, f/u labs sent, started on calcitriol, calcium supp  check hgb a1c; SSI  Goal euglycemia (-180)    HEME/ONC: stable SDH, start SQH chemoppx; pt s/p reversal for Eliquis f/u coags  VTE prophylaxis: [] SCDs [x] chemoprophylaxis [x] high risk of DVT/PE on admission due to: hx DVT on A/C now reversed  F/u BLE dopplers--residual popliteal; no full anticoagulation 2/2 SDH--> IVCF   Normocytic anemia, RDW also WNL, f/u iron panel and celiac w/u. vitB12 deficient, started supp    ID: Afebrile, ID consulted, no infectious source, monitor off abx, appreciate consult    MISC: Medicine consulted, tx to medicine under DR. Barbosa [id number] w/ tele    SOCIAL/FAMILY:  [] awaiting [x] updated at bedside [] family meeting    CODE STATUS:  [x] Full Code [] DNR [] DNI [] Palliative/Comfort Care    DISPOSITION:  [] ICU [] Stroke Unit [x] Floor under medicine as above [] EMU [] RCU [] PCU    [x] Patient is at high risk of neurologic deterioration/death due to: SDH, brain compression   critical care time 35min

## 2017-10-18 NOTE — PROGRESS NOTE ADULT - PROBLEM SELECTOR PLAN 6
-Pt has delirium with intermittent paranoia  -Will place on 1:1 constant obs  -AMS possibly 2/2 SDH vs hospital delirium vs. vitamin deficiency  -Pt initially treated at OSH for presumed sepsis without source; currently afebrile and hemodynamically stable without localizing source on exam or hx; continue monitoring OFF abx with ID following.  Lyme serologies neg. -Pt has delirium with intermittent paranoia  -Will place on 1:1 constant obs  -AMS possibly 2/2 SDH vs hospital delirium vs. vitamin deficiency  -Pt initially treated at OSH for presumed sepsis without source; currently afebrile and hemodynamically stable without localizing source on exam or hx; continue monitoring OFF abx with ID following.  Lyme serologies neg, HIV negative, BCX, UCX, blood parasites, bronch cx, legionella all nondiagnostic to date.

## 2017-10-18 NOTE — PROGRESS NOTE ADULT - SUBJECTIVE AND OBJECTIVE BOX
Chief Complaint: f/u hypocalcemia    History:  Patient still with persistent hypocalcemia, on calcitriol 0.5 mcg and calcium carbonate 1 tab TID. Patient is now AAO x 2-3 at bedside.    He states that one year ago, he was told by his physician that he has low calcium levels, but was not told how long. He states he was told to take calcium supplements, but has not been adherent to therapy. Furthermore, he denies previous history of neck surgery (thyroidectomy, parathyroidectomy) or kidney disease. States his diet is not the best. Has had intermittent diarrhea but denies history of celiac disease. Denies history of treatment for osteoporosis with Prolia or bisphosphonates.    He reports intermittent perioral numbness and tingling in the last few days, but none currently.    MEDICATIONS  (STANDING):  calcitriol   Capsule 0.5 MICROGram(s) Oral daily  calcium carbonate 1250 mG (OsCal) 1 Tablet(s) Oral three times a day  cyanocobalamin 1000 MICROGram(s) Oral daily  ergocalciferol 46632 Unit(s) Oral every week  folic acid 1 milliGRAM(s) Oral daily  heparin  Injectable 5000 Unit(s) SubCutaneous every 12 hours  hydrALAZINE 50 milliGRAM(s) Oral every 8 hours  hydrALAZINE Injectable 5 milliGRAM(s) IV Push once  insulin lispro (HumaLOG) corrective regimen sliding scale   SubCutaneous every 6 hours  levETIRAcetam  IVPB 1000 milliGRAM(s) IV Intermittent every 12 hours  tamsulosin 0.4 milliGRAM(s) Oral at bedtime  thiamine 100 milliGRAM(s) Oral daily    MEDICATIONS  (PRN):  acetaminophen   Tablet 650 milliGRAM(s) Oral every 6 hours PRN For Temp greater than 38 C (100.4 F)  acetaminophen   Tablet. 650 milliGRAM(s) Oral every 6 hours PRN Mild Pain (1 - 3)      Allergies    Allergy Status Unknown  No Known Allergies      Review of Systems:  Constitutional: No fever  HEENT: No pain  Cardiovascular: No chest pain, palpitations  Respiratory: No SOB, no cough  GI: No nausea, vomiting, abdominal pain      ALL OTHER SYSTEMS REVIEWED AND NEGATIVE      PHYSICAL EXAM:  VITALS: T(C): 36.9 (10-18-17 @ 11:00)  T(F): 98.5 (10-18-17 @ 11:00), Max: 99.3 (10-17-17 @ 19:00)  HR: 78 (10-18-17 @ 15:37) (66 - 91)  BP: 176/87 (10-18-17 @ 15:37) (122/58 - 181/80)  RR:  (14 - 21)  SpO2:  (86% - 100%)  Wt(kg): --  GENERAL: NAD, well-developed  EYES: No proptosis, anicteric  HEENT:  Atraumatic, Normocephalic  RESPIRATORY: Clear to auscultation bilaterally; No rales, rhonchi, wheezing, or rubs  CARDIOVASCULAR: Regular rate and rhythm; No murmurs; no peripheral edema  GI: Soft, nontender, non distended        CAPILLARY BLOOD GLUCOSE  166 (10-18 @ 13:00)  91 (10-18 @ 05:30)  104 (10-18 @ 00:00)  108 (10-17 @ 18:00)  138 (10-17 @ 12:00)  141 (10-17 @ 06:00)  139 (10-16 @ 12:06)  89 (10-16 @ 08:26)      10-17    144  |  110<H>  |  9   ----------------------------<  111<H>  3.4<L>   |  18<L>  |  1.15    EGFR if : 70  EGFR if non : 60    Ca    6.9<L>      10-17  Mg     1.8     10-17  Phos  2.8     10-17    TPro  5.9<L>  /  Alb  3.5  /  TBili  0.4  /  DBili  x   /  AST  44<H>  /  ALT  19  /  AlkPhos  38<L>  10-17          Thyroid Function Tests:  10-16 @ 06:37 TSH 0.64 FreeT4 -- T3 -- Anti TPO -- Anti Thyroglobulin Ab -- TSI --      Hemoglobin A1C, Whole Blood: 6.0 % <H> [4.0 - 5.6] (10-17-17 @ 08:07)  Hemoglobin A1C, Whole Blood: 5.9 % <H> [4.0 - 5.6] (10-16-17 @ 10:58)

## 2017-10-18 NOTE — PROGRESS NOTE ADULT - SUBJECTIVE AND OBJECTIVE BOX
78yo man was traveling back from [place] (where he was for 3wks), en route to home from the airport, was found on the ground, but conscious, brought to [facility] on 10/15--hx per Pt's cousin-in-law; transient fever and lactic acidosis, treated for septic shock, however ucx, bcx negative. Also found to have hypocalcemia and hypomagnesemia, w/ normal PTH, low vit D and vit B12 level. Pt was in ED at [facility], where he fell, was found to have a small R frontal parietal SDH, tx to [facility] for further mngt. In ED, Pt was going to the toilet, and had a witnessed GCT w/ PCA, who caught the Pt and lowered him down. +urinary incontinence. Given 4mg ativan and 1g keppra and intubated, adm to [facility]. Per sign out, Pt on Eliquis for DVT. Given kcentra in ED.    Of note, per Pt's cousin-in-law, Pt has been unsteady on his feet, falling frequently, and shaking for weeks, but also has had hospitalization due to falls 3yrs ago. Has been unable to properly take his pills.     Overnight Events:     ROS: negative [] unable to obtain as patient is comatose/intubated/aphasic []   VITALS:   T(C): 37.1 (10-18-17 @ 07:00), Max: 37.4 (10-17-17 @ 19:00)  HR: 74 (10-18-17 @ 09:00) (54 - 91)  BP: 155/74 (10-18-17 @ 09:00) (122/58 - 181/80)  RR: 14 (10-18-17 @ 09:00) (11 - 21)  SpO2: 98% (10-18-17 @ 09:00) (86% - 100%)    10-17-17 @ 07:01  -  10-18-17 @ 07:00  --------------------------------------------------------  IN: 2519.3 mL / OUT: 2085 mL / NET: 434.3 mL    10-18-17 @ 07:01  -  10-18-17 @ 09:32  --------------------------------------------------------  IN: 275 mL / OUT: 75 mL / NET: 200 mL      LABS:  ABG - ( 17 Oct 2017 11:30 )  pH: 7.36  /  pCO2: 33    /  pO2: 140   / HCO3: 18    / Base Excess: -6.4  /  SaO2: 100                                     9.9    6.6   )-----------( 132      ( 17 Oct 2017 22:25 )             28.0     10-17    144  |  110<H>  |  9   ----------------------------<  111<H>  3.4<L>   |  18<L>  |  1.15    Ca    6.9<L>      17 Oct 2017 22:25  Phos  2.8     10-17  Mg     1.8     10-17    TPro  5.9<L>  /  Alb  3.5  /  TBili  0.4  /  DBili  x   /  AST  44<H>  /  ALT  19  /  AlkPhos  38<L>  10-17    PT/INR - ( 17 Oct 2017 18:24 )   PT: 11.8 sec;   INR: 1.09 ratio      PTT - ( 17 Oct 2017 18:24 )  PTT:21.7 sec    MEDICATIONS  (STANDING):  calcitriol   Capsule 0.5 MICROGram(s) Oral daily  calcium carbonate 1250 mG (OsCal) 1 Tablet(s) Oral three times a day  cyanocobalamin 1000 MICROGram(s) Oral daily  ergocalciferol 16729 Unit(s) Oral every week  folic acid 1 milliGRAM(s) Oral daily  heparin  Injectable 5000 Unit(s) SubCutaneous every 12 hours  hydrALAZINE 50 milliGRAM(s) Oral every 8 hours  insulin lispro (HumaLOG) corrective regimen sliding scale   SubCutaneous every 6 hours  levETIRAcetam  IVPB 1000 milliGRAM(s) IV Intermittent every 12 hours  tamsulosin 0.4 milliGRAM(s) Oral at bedtime  thiamine 100 milliGRAM(s) Oral daily      [All pertinent recent Imaging/Reports reviewed]    EXAMINATION:   General:  calm, alert  HEENT:  MMM, FS, PERRL, EOMI  Neuro:  alert, oriented to self, healthcare setting--but not name of the hospital, and year/month--not date; BUE 5/5, no drift, slight dysmetria on RUE; BLE 5/5   Cards:  RRR  Respiratory:  no respiratory distress  Adomen:  soft  Extremities:  no edema  Skin:  warm/dry

## 2017-10-18 NOTE — PROGRESS NOTE ADULT - ASSESSMENT
76 yo male s/p fall with SDH, transfer from OSH. Noted to have gross hematuria after catheter placement. Now improving.    Patient with significant former smoking history. Also with known BPH, with urologist. Differential includes BPH, traumatic pena, stones, bladder/renal tumor.    --continue pena, monitor color  --continue flomax, finasteride  --would obtain urine cytology to assess for malignant cells  --plan for TOV once urine color improves, possibly tomorrow  --will eventually need full hematuria workup (cystoscopy, CT urogram, cytology). Likely may be done as outpatient if hematuria resolves. If persistent, would consider CT urogram while in house.

## 2017-10-18 NOTE — PROGRESS NOTE ADULT - SUBJECTIVE AND OBJECTIVE BOX
TERTIARY TRAUMA SURVEY  ------------------------------------------------------------------------------------------    Date of TTS: 10/18/17  Time: 7am  Admit Date: 10/17/17  Trauma Activation: 2  Admit GCS:  15    HPI:  77M previously admitted to [facility] with urosepsis s/p mechanical fall in bathroom. CT head was done immediately after demonstrating 5mm right SDH and patient was subsequently transferred to [facility]. He was reportedly awake, alert at [facility] and given Kcentra for Eliquis reversal prior to transfer. On arrival, patient experienced GTC seizure, given 4mg Ativan and 1g Keppra. He was intubated in the ED. Repeat CT head showed re-distribution of original SDH to R parietal region without increase in mass effect or midline shift. (17 Oct 2017 02:28)      INTERVAL EVENTS: Patient extubated, more alert, recovering in NSCU    PAST MEDICAL & SURGICAL HISTORY:  PNA (pneumonia)  DVT (deep venous thrombosis)  Hypomagnesemia  Anemia  GERD (gastroesophageal reflux disease)  Hypercholesterolemia  Hypertension  Diabetes  No significant past surgical history      FAMILY HISTORY:  No pertinent family history in first degree relatives    SOCIAL HISTORY:   Denies etoh, tobacco, other drug use    ALLERGIES:  No Known Allergies      CURRENT MEDICATIONS:   MEDICATIONS (STANDING): calcitriol   Capsule 0.5 MICROGram(s) Oral daily  calcium carbonate 1250 mG (OsCal) 1 Tablet(s) Oral three times a day  cholecalciferol 1000 Unit(s) Oral daily  cyanocobalamin 1000 MICROGram(s) Oral daily  fentaNYL    Injectable 25 MICROGram(s) IV Push once  folic acid 1 milliGRAM(s) Oral daily  hydrALAZINE 50 milliGRAM(s) Oral every 8 hours  insulin lispro (HumaLOG) corrective regimen sliding scale   SubCutaneous every 6 hours  levETIRAcetam  IVPB 1000 milliGRAM(s) IV Intermittent every 12 hours  multivitamin/thiamine/folic acid in sodium chloride 0.9% 1000 milliLiter(s) IV Continuous <Continuous>  thiamine 100 milliGRAM(s) Oral daily    MEDICATIONS (PRN):acetaminophen   Tablet 650 milliGRAM(s) Oral every 6 hours PRN For Temp greater than 38 C (100.4 F)  acetaminophen   Tablet. 650 milliGRAM(s) Oral every 6 hours PRN Mild Pain (1 - 3)  fentaNYL    Injectable 25 MICROGram(s) IV Push every 2 hours PRN agitation    ------------------------------------------------------------------------------------------    VITAL SIGNS  T(C): 37 (10-18-17 @ 03:00), Max: 37.4 (10-17-17 @ 19:00)  HR: 74 (10-18-17 @ 06:00) (54 - 91)  BP: 144/72 (10-18-17 @ 06:00) (115/62 - 181/80)  BP(mean): 88 (10-18-17 @ 03:00) (68 - 108)  RR: 16 (10-18-17 @ 06:00) (11 - 21)  SpO2: 96% (10-18-17 @ 06:00) (86% - 100%)  Wt(kg): --  CAPILLARY BLOOD GLUCOSE  91 (18 Oct 2017 05:30)  104 (18 Oct 2017 00:00)  108 (17 Oct 2017 18:00)  138 (17 Oct 2017 12:00)      POCT Blood Glucose.: 91 mg/dL (18 Oct 2017 05:31)  POCT Blood Glucose.: 104 mg/dL (18 Oct 2017 00:26)  POCT Blood Glucose.: 108 mg/dL (17 Oct 2017 18:14)      INS/OUTS:    10-17 @ 07:01  -  10-18 @ 07:00  --------------------------------------------------------  IN:    dexmedetomidine Infusion: 66.8 mL    IV PiggyBack: 350 mL    multivitamin/thiamine/folic acid in sodium chloride 0.9%: 1800 mL    Oral Fluid: 240 mL  Total IN: 2456.8 mL    OUT:    Indwelling Catheter - Urethral: 2085 mL  Total OUT: 2085 mL    Total NET: 371.8 mL        Drug Dosing Weight  Height (cm): 170.18 (17 Oct 2017 03:30)  Weight (kg): 89.1 (17 Oct 2017 03:30)  BMI (kg/m2): 30.8 (17 Oct 2017 03:30)  BSA (m2): 2.01 (17 Oct 2017 03:30)    PHYSICAL EXAM:   General: NAD, Sitting in bed comfortably  HEENT: NC/AT, EEG leads in place, EOMI  Neck: Soft, supple, no C collar  Cardio: Regular pulse  Resp: Good effort, no respiratory distress  Thorax: No chest wall tenderness  GI/Abd: Soft, NT/ND, no rebound/guarding, no masses palpated  Vascular: Extremities warm, brisk cap refill, B/l radial pulses palpable, no palpable abdominal pulsatile mass  Skin: Intact, no breakdown  Musculoskeletal: All 4 extremities moving spontaneously, no limitations  ------------------------------------------------------------------------------------------    LABS  CBC (10-17 @ 22:25)                              9.9<L>                         6.6     )----------------(  132<L>     --    % Neutrophils, --    % Lymphocytes, ANC: --                                  28.0<L>  CBC (10-17 @ 05:04)                              9.5<L>                         7.5     )----------------(  131<L>     --    % Neutrophils, --    % Lymphocytes, ANC: --                                  26.8<L>    BMP (10-17 @ 22:25)             144     |  110<H>  |  9     		Ca++ --      Ca 6.9<L>             ---------------------------------( 111<H>		Mg 1.8                3.4<L>  |  18<L>   |  1.15  			Ph 2.8     BMP (10-17 @ 11:33)             143     |  110<H>  |  14    		Ca++ --      Ca 6.5<LL>             ---------------------------------( 138<H>		Mg 2.4                3.5     |  18<L>   |  1.22  			Ph 3.6       LFTs (10-17 @ 01:49)      TPro 5.9<L> / Alb 3.5 / TBili 0.4 / DBili -- / AST 44<H> / ALT 19 / AlkPhos 38<L>    Coags (10-17 @ 18:24)  aPTT 21.7<L> / INR 1.09 / PT 11.8  Coags (10-17 @ 11:33)  aPTT 29.4 / INR 1.16 / PT 12.6    Cardiac Markers (10-17 @ 04:31)     Trop: <0.01 -- / CKMB: -- / CK: 3000    ABG (10-17 @ 11:30)     7.36 / 33 / 140<H> / 18<L> / -6.4<L> / 100<H>%     Lactate:    ABG (10-17 @ 04:22)     7.35 / 34 / 203<H> / 18<L> / -6.3<L> / 100<H>%     Lactate:          MICROBIOLOGY  Urinalysis (10-15 @ 19:07):     Color: Yellow / Appearance: Clear / S.020 / pH: 5.0 / Gluc: Negative / Ketones: Moderate<!> / Bili: Negative / Urobili: Negative / Protein :100<!> / Nitrites: Negative / Leuk.Est: Negative / RBC: 5-10<!> / WBC: 0-2 / Sq Epi:  / Non Sq Epi: Moderate<!> / Bacteria Moderate<!>       -> .Broncial Combicath Culture (10-17 @ 07:38)       No polymorphonuclear leukocytes  No squamous epithelial cells per low power field  No organisms seen per oil power field    NG  NG    -> .Urine Clean Catch (Midstream) Culture (10-15 @ 21:55)     NG    NG    No growth    -> .Blood Blood Culture (10-15 @ 21:53)     NG    NG    No growth to date.      ------------------------------------------------------------------------------------------    RADIOLOGICAL FINDINGS REVIEW:   CXR: Mild atelectasis  Pelvis Films: X  C-Spine Films: X  T/L/S Spine Films: X  Extremity Films: X  Head CT: 6mm R SDH, unchanged over 24 hours  C-Spine CT: No acute fracture  Neck CT: X  Chest CT: X  ABD/Pelvis CT: X  Other: X    List Injuries Identified to Date:    Subdural hematoma      List Operative and Interventional Radiological Procedures:  None      Consults (Date):   [X] Neurosurgery   [] Orthopedic Surgery  [] Spine Surgery  [] Plastic Surgery  [] ENT  [X] Urology  [] PM&R  [] Social Work  [X] ID

## 2017-10-18 NOTE — PROGRESS NOTE ADULT - PROBLEM SELECTOR PLAN 1
-Interval CTH stable without mass effect/shift  -Neursx recs appreciated, no plan for acute surgical intervention at this time  -c/w Keppra, neuro checks q4  -VEEG without seizures.  -Pt has cautiously been resumed on ppx hsq for dvt ppx.

## 2017-10-18 NOTE — PROGRESS NOTE ADULT - ATTENDING COMMENTS
Pt seen this PM, alert, and awake, conversant.  He does not know what has happened and has poor memory of the last two days.  He is O to date and self, but not hospital.  He is FITZGERALD well with mild left drift.  CT today showed stable right parietoocipital subdural bleed.  Pt had duplex with residual old DVT.  Pt for transfer to medicine to work up endocrine abnormalities.  Cannot be anticoagulated at this time and will ask for vascular to weigh in on need for AC.  Possible IVC filter.

## 2017-10-18 NOTE — PROGRESS NOTE ADULT - SUBJECTIVE AND OBJECTIVE BOX
Subjective    Patient seen and examined. Remains in NSICU, now extubated. Awake and conversive. Denies ever having gross hematuria. With significant pack year history ~60 years. Denies  family malignancy history.     Notes he has BPH. Sees Dr. Longoria in [place]. On finasteride and avodart. Notes good urinary symptoms on this regimen    Hematuria improving.     Objective    Vital signs  T(F): , Max: 99.3 (10-17-17 @ 19:00)  HR: 80 (10-18-17 @ 11:00)  BP: 147/94 (10-18-17 @ 11:00)  SpO2: 100% (10-18-17 @ 11:00)  Wt(kg): --    Output     10-17 @ 07:01  -  10-18 @ 07:00  --------------------------------------------------------  IN: 2519.3 mL / OUT: 2085 mL / NET: 434.3 mL    10-18 @ 07:01  -  10-18 @ 15:10  --------------------------------------------------------  IN: 337.5 mL / OUT: 750 mL / NET: -412.5 mL        General: NAD  Abdomen: soft/non-tender/non-distended  : pena in place, light red, no clots, draining well    Labs      10-17 @ 22:25    WBC 6.6   / Hct 28.0  / SCr 1.15     10-17 @ 11:33    WBC --    / Hct --    / SCr 1.22

## 2017-10-18 NOTE — OCCUPATIONAL THERAPY INITIAL EVALUATION ADULT - PLANNED THERAPY INTERVENTIONS, OT EVAL
bed mobility training/strengthening/balance training/cognitive, visual perceptual/ADL retraining/transfer training

## 2017-10-18 NOTE — OCCUPATIONAL THERAPY INITIAL EVALUATION ADULT - LIVES WITH, PROFILE
As per pt, pt lives in private home with no steps to enter, 1st floor set up available. Pt has tub in bathroom. Pt states he was I in ambulation and ADLs prior to admission/alone

## 2017-10-18 NOTE — CHART NOTE - NSCHARTNOTEFT_GEN_A_CORE
[place] note    76yo man was traveling back from [place] (where he was for 3wks), en route to home from the airport, was found on the ground, but conscious, brought to [facility] on 10/15--transient fever and lactic acidosis, treated for septic shock, however ucx, bcx negative. Also found to have hypocalcemia and hypomagnesemia, w/ normal PTH, low vit D and vit B12 level. \    In ED, pt fell, was found to have a small R frontal parietal SDH c/b GCT seizure witnessed by PCA with urinary incontinence. Given 4mg ativan and 1g keppra and intubated, adm to NSCU. Repeat CT head showed re-distribution of original SDH to R parietal region without increase in mass effect or midline shift.       Neuro; Stable SDH-no surgical intervention. Neuro checks q4h, keppta 1g BID  Heme: Hx of DVT but on doppler imaging per IR-pt with no acute DVT. Hold off on IVC filter for now.  ID: monitor off abx.   Urology: hematuria-urology following. TOV once hematuria improves with outpatient follow up.   Endo: persistent hypocalcemia-endo following likely 2/2 hypoparathyroidism on ca supplements.

## 2017-10-18 NOTE — PROGRESS NOTE ADULT - ASSESSMENT
A/P 77M transferred from OSH for SDH, developed status epilepticus in ED, intubated in ED for airway protection, now extubated and recovering in NSCU   - Tertiary reveals no additional injuries  - Continued care per Neurosurgery  Please reconsult ACS if needed    Gabriella Molina PGY2

## 2017-10-18 NOTE — PROGRESS NOTE ADULT - SUBJECTIVE AND OBJECTIVE BOX
Hospitalist Medicine Accept Note    CC: ams, shaking, Subdural hematoma    HPI: This is a 77 M PMH T2DM, HTN, HLD, hypoparathyroidism, chronic DVT (popliteal) of both legs on Eliquis, BPH, GERD, glaucoma, who initially presented to [facility] from the airport after return from travel to [facility] on 10/15/17.  [place].  Tm ~102 with tachycardia; tx for presumed sepsis with unidentified source. Course c/b mechanical fall in bathroom at OSH; CTH revealed SDH, and he was transferred over to [facility] for further management; pt had eliquis reversal with Kcentra prior to transfer and  interval CTH revealed redistribution of hematoma without mass effect/shift; no surgical intervention was recommended by neurosx.  On arrival pt had GTC seizure s/p ativan, Keppra, intubated for airway protection. Pt has had multiple medical issues and course complications with multiple specialists following.  Pt was extubated Hospitalist Medicine Accept Note    CC: ams, shaking, Subdural hematoma    HPI: This is a 77 M PMH T2DM, HTN, HLD, hypoparathyroidism, chronic DVT (popliteal) of both legs on Eliquis, BPH, GERD, glaucoma, who initially presented to [facility] from the airport after return from travel to [facility] on 10/15/17.  [place].  Tm ~102 with tachycardia; tx for presumed sepsis with unidentified source. Course c/b mechanical fall in bathroom at OSH; CTH revealed SDH, and he was transferred over to [facility] for further management; pt had eliquis reversal with Kcentra prior to transfer and  interval CTH revealed redistribution of hematoma without mass effect/shift; no surgical intervention was recommended by neurosx.  On arrival pt had GTC seizure s/p ativan, Keppra, intubated for airway protection. Pt has had multiple medical issues and course complications with multiple specialists following.  Pt was extubated 10/18.  Regarding sepsis, ID was following and did not identify a source; they have been monitoring off abx and pt has been hemodynamically stable and afebrile >24 hours. Endocrine has been following for hypocalcemia; thought 2/2 hypoPTH/vit D def, managed with calcitriol/calcium carbonate/vit d supplementation. Urology has followed for hematuria; plan is to do TOV once hematuria resolves and then pursue hematuria w/u including cystoscopy/cytology as outpatient; pt has been on flomax/finasteride. Regarding DVT, pt was initially arranged for IR placement of IVC filter; however, interval dopplers of LE performed without acute DVT (only chronic); filter is on hold until indication for lifelong a/c is further clarified.  Currently, pt is resting comfortably in bed.  He expresses his wish to leave the hospital, feels nothing is wrong with him.  Denies CP/SOB/f/c, denies n/v/d, cough/dysuria/rash, denies HA/dizziness/palps/back pain.      REVIEW OF SYSTEMS:  CONSTITUTIONAL: No weakness. No fevers. No chills. No weight loss. Good appetite.  EYES: No visual changes. No eye pain.  ENT: No hearing difficulty. No vertigo. No dysphagia. No Sinusitis/rhinorrhea.  NECK: No pain. No stiffness/rigidity.  CARDIAC: No chest pain. No palpitations.  RESPIRATORY: No cough. No SOB. No hemoptysis.  GASTROINTESTINAL: No abdominal pain. No nausea. No vomiting. No hematemesis. No diarrhea. No constipation. No melena. No hematochezia.  GENITOURINARY: No dysuria. No frequency. No hesitancy. No hematuria.  NEUROLOGICAL: No numbness. No focal weakness. No incontinence. No headache.  BACK: No back pain.  EXTREMITIES: No lower extremity edema. Full ROM.  SKIN: No rashes. No itching. No other lesions.  PSYCHIATRIC: No depression. No anxiety. No SI/HI.  ALLERGIC: No lip swelling. No hives.  All other review of systems is negative unless indicated above.    Home Medications:  acetaminophen 325 mg oral tablet: 2 tab(s) orally every 6 hours, As needed, For Temp greater than 38 C (100.4 F) (16 Oct 2017 22:23)  Avodart 0.5 mg oral capsule: 1 cap(s) orally once a day (11 Jul 2014 16:21)  azithromycin 500 mg intravenous injection:  intravenous  (16 Oct 2017 22:23)  calcitriol 0.5 mcg oral capsule: 1 cap(s) orally once a day (16 Oct 2017 22:23)  cefTRIAXone:  (16 Oct 2017 22:23)  ergocalciferol 50,000 intl units oral tablet:  (16 Oct 2017 22:23)  FINASTERIDE 5 MG TABS:  (15 Oct 2017 22:03)  insulin lispro 100 units/mL subcutaneous solution:  subcutaneous 4 times a day (before meals and at bedtime); 1 Unit(s) if Glucose 151 - 200  2 Unit(s) if Glucose 201 - 250  3 Unit(s) if Glucose 251 - 300  4 Unit(s) if Glucose 301 - 350  5 Unit(s) if Glucose 351 - 400  6 Unit(s) if Glucose GREATER THAN 400 (16 Oct 2017 22:23)  LABETALOL  MG TABS: 1 tab(s) orally 2 times a day (15 Oct 2017 22:27)  latanoprost 0.005% ophthalmic solution: 1 drop(s) to each affected eye once a day (at bedtime) (15 Jul 2014 10:24)  pantoprazole 40 mg oral delayed release tablet: 1 tab(s) orally once a day (before a meal) (15 Jul 2014 10:24)      MEDICATIONS  (STANDING):  calcitriol   Capsule 0.5 MICROGram(s) Oral daily  calcium carbonate 1250 mG (OsCal) 2 Tablet(s) Oral three times a day  cyanocobalamin 1000 MICROGram(s) Oral daily  ergocalciferol 64011 Unit(s) Oral every week  folic acid 1 milliGRAM(s) Oral daily  heparin  Injectable 5000 Unit(s) SubCutaneous every 12 hours  hydrALAZINE 50 milliGRAM(s) Oral every 8 hours  insulin lispro (HumaLOG) corrective regimen sliding scale   SubCutaneous every 6 hours  levETIRAcetam  IVPB 1000 milliGRAM(s) IV Intermittent every 12 hours  tamsulosin 0.4 milliGRAM(s) Oral at bedtime  thiamine 100 milliGRAM(s) Oral daily    MEDICATIONS  (PRN):  acetaminophen   Tablet 650 milliGRAM(s) Oral every 6 hours PRN For Temp greater than 38 C (100.4 F)  acetaminophen   Tablet. 650 milliGRAM(s) Oral every 6 hours PRN Mild Pain (1 - 3)    Allergies    Allergy Status Unknown  No Known Allergies    Intolerances    PAST MEDICAL & SURGICAL HISTORY:  PNA (pneumonia)  DVT (deep venous thrombosis)  Hypomagnesemia  Anemia  GERD (gastroesophageal reflux disease)  Hypercholesterolemia  Hypertension  Diabetes  No significant past surgical history    Social History:    Marital Status:  (   )    (x   ) Single    (   )    (  )   Occupation: works for [employer], [employer] further  Lives with: (x  ) alone  (  ) children   (  ) spouse   (  ) parents  (  ) other  Substance Use (street drugs): ( x ) never used  (  ) other:  Tobacco Usage:  (   ) never smoked   ( x  ) former smoker   (   ) current smoker  ( ~30    ) pack year  (        ) last cigarette date  Alcohol Usage: denies    FAMILY HISTORY:  No pertinent family history in first degree relatives    Vital Signs Last 24 Hrs  T(C): 37.2 (18 Oct 2017 18:57), Max: 37.3 (17 Oct 2017 23:00)  T(F): 98.9 (18 Oct 2017 18:57), Max: 99.2 (17 Oct 2017 23:00)  HR: 81 (18 Oct 2017 18:57) (67 - 91)  BP: 170/84 (18 Oct 2017 18:57) (122/58 - 183/94)  BP(mean): 116 (18 Oct 2017 15:00) (68 - 116)  RR: 19 (18 Oct 2017 18:57) (14 - 21)  SpO2: 96% (18 Oct 2017 18:57) (86% - 100%)    PHYSICAL EXAM:  GENERAL: NAD, well-groomed, well-developed  HEAD:  Atraumatic, Normocephalic  EYES: EOMI, PERRLA, conjunctiva and sclera clear  ENMT: No tonsillar erythema, exudates, or enlargement; Moist mucous membranes, Good dentition, No lesions  NECK: Supple, No JVD, Normal thyroid  CHEST/LUNG: Clear to percussion bilaterally but some dec BS at bases; No rales, rhonchi, wheezing, or rubs  HEART: Regular rate and rhythm; No murmurs, rubs, or gallops, trace edema at ankles  ABDOMEN: Soft, Nontender, Nondistended; Bowel sounds present  EXTREMITIES:  2+ Peripheral Pulses, No clubbing, cyanosis.    LYMPH: No lymphadenopathy noted  SKIN: No rashes or lesions +excoriations at R armpit/leg  NERVOUS SYSTEM:  Alert & Oriented X3, Good concentration but does demonstrate some paranoid thinking (thinks we stole his passport, feels everything is ok and wonders why we are keeping him in the hospital; Motor Strength 5/5 B/L upper and lower extremities.     Labs: personally reviewed   10-17    144  |  110<H>  |  9   ----------------------------<  111<H>  3.4<L>   |  18<L>  |  1.15  10-17    143  |  110<H>  |  14  ----------------------------<  138<H>  3.5   |  18<L>  |  1.22  10-17    142  |  108  |  17  ----------------------------<  153<H>  3.7   |  17<L>  |  1.32<H>    Ca    6.9<L>      17 Oct 2017 22:25  Ca    6.5<LL>      17 Oct 2017 11:33  Ca    6.0<LL>      17 Oct 2017 04:31  Phos  2.8     10-17  Mg     1.8     10-17    TPro  5.9<L>  /  Alb  3.5  /  TBili  0.4  /  DBili  x   /  AST  44<H>  /  ALT  19  /  AlkPhos  38<L>  10-17  TPro  6.0  /  Alb  3.0<L>  /  TBili  0.6  /  DBili  0.1  /  AST  47<H>  /  ALT  23  /  AlkPhos  36<L>  10-16      PT/INR - ( 17 Oct 2017 18:24 )   PT: 11.8 sec;   INR: 1.09 ratio         PTT - ( 17 Oct 2017 18:24 )  PTT:21.7 sec                ABG - ( 17 Oct 2017 11:30 )  pH: 7.36  /  pCO2: 33    /  pO2: 140   / HCO3: 18    / Base Excess: -6.4  /  SaO2: 100                                     9.9    6.6   )-----------( 132      ( 17 Oct 2017 22:25 )             28.0                         9.5    7.5   )-----------( 131      ( 17 Oct 2017 05:04 )             26.8                         9.6    5.8   )-----------( 133      ( 17 Oct 2017 01:49 )             27.0     CAPILLARY BLOOD GLUCOSE  97 (18 Oct 2017 18:00)  166 (18 Oct 2017 13:00)  91 (18 Oct 2017 05:30)  104 (18 Oct 2017 00:00)      POCT Blood Glucose.: 96 mg/dL (18 Oct 2017 17:23)  POCT Blood Glucose.: 97 mg/dL (18 Oct 2017 17:23)  POCT Blood Glucose.: 166 mg/dL (18 Oct 2017 13:40)  POCT Blood Glucose.: 91 mg/dL (18 Oct 2017 05:31)  POCT Blood Glucose.: 104 mg/dL (18 Oct 2017 00:26)    Imaging personally reviewed     CTH 10/18: IMPRESSION: Unchanged thin right-sided parieto-occipital and temporal   convexity subdural hematoma with minimal localized mass effect.    Duplex LE 10/17: IMPRESSION: Wall thickening of the popliteal veins and prominent   collateral veins at this level are believed to be the residue of prior   DVT.  No acute DVT is present.      EKG: personally reviewed, SWAPNA SHAW Hospitalist Medicine Accept Note    CC: ams, shaking, Subdural hematoma    HPI: This is a 77 M PMH T2DM, HTN, HLD, hypoparathyroidism, chronic DVT (popliteal) of both legs on Eliquis, BPH, GERD, glaucoma, who initially presented to [facility] from the airport after return from travel to [facility] on 10/15/17.  [place].  Tm ~102 with tachycardia; tx for presumed sepsis with unidentified source. Course c/b mechanical fall in bathroom at OSH; CTH revealed SDH, and he was transferred over to [facility] for further management; pt had eliquis reversal with Kcentra prior to transfer and  interval CTH revealed redistribution of hematoma without mass effect/shift; no surgical intervention was recommended by neurosx.  On arrival pt had GTC seizure s/p ativan, Keppra, intubated for airway protection. Pt has had multiple medical issues and course complications with multiple specialists following.  Pt was extubated 10/18.  Regarding sepsis, ID was following and did not identify a source; they have been monitoring off abx and pt has been hemodynamically stable and afebrile >24 hours. Endocrine has been following for hypocalcemia; thought 2/2 hypoPTH/vit D def, managed with calcitriol/calcium carbonate/vit d supplementation. Urology has followed for hematuria; plan is to do TOV once hematuria resolves and then pursue hematuria w/u including cystoscopy/cytology as outpatient; pt has been on flomax/finasteride. Regarding DVT, pt was initially arranged for IR placement of IVC filter; however, interval dopplers of LE performed without acute DVT (only chronic); filter is on hold until indication for lifelong a/c is further clarified.  Currently, pt is resting comfortably in bed.  He expresses his wish to leave the hospital, feels nothing is wrong with him.  Denies CP/SOB/f/c, denies n/v/d, cough/dysuria/rash, denies HA/dizziness/palps/back pain.      REVIEW OF SYSTEMS:  CONSTITUTIONAL: No weakness. No fevers. No chills. No weight loss. Good appetite.  EYES: No visual changes. No eye pain.  ENT: No hearing difficulty. No vertigo. No dysphagia. No Sinusitis/rhinorrhea.  NECK: No pain. No stiffness/rigidity.  CARDIAC: No chest pain. No palpitations.  RESPIRATORY: No cough. No SOB. No hemoptysis.  GASTROINTESTINAL: No abdominal pain. No nausea. No vomiting. No hematemesis. No diarrhea. No constipation. No melena. No hematochezia.  GENITOURINARY: No dysuria. No frequency. No hesitancy. No hematuria.  NEUROLOGICAL: No numbness. No focal weakness. No incontinence. No headache.  BACK: No back pain.  EXTREMITIES: No lower extremity edema. Full ROM.  SKIN: No rashes. No itching. No other lesions.  PSYCHIATRIC: No depression. No anxiety. No SI/HI.  ALLERGIC: No lip swelling. No hives.  All other review of systems is negative unless indicated above.    Home Medications:  acetaminophen 325 mg oral tablet: 2 tab(s) orally every 6 hours, As needed, For Temp greater than 38 C (100.4 F) (16 Oct 2017 22:23)  Avodart 0.5 mg oral capsule: 1 cap(s) orally once a day (11 Jul 2014 16:21)  azithromycin 500 mg intravenous injection:  intravenous  (16 Oct 2017 22:23)  calcitriol 0.5 mcg oral capsule: 1 cap(s) orally once a day (16 Oct 2017 22:23)  cefTRIAXone:  (16 Oct 2017 22:23)  ergocalciferol 50,000 intl units oral tablet:  (16 Oct 2017 22:23)  FINASTERIDE 5 MG TABS:  (15 Oct 2017 22:03)  insulin lispro 100 units/mL subcutaneous solution:  subcutaneous 4 times a day (before meals and at bedtime); 1 Unit(s) if Glucose 151 - 200  2 Unit(s) if Glucose 201 - 250  3 Unit(s) if Glucose 251 - 300  4 Unit(s) if Glucose 301 - 350  5 Unit(s) if Glucose 351 - 400  6 Unit(s) if Glucose GREATER THAN 400 (16 Oct 2017 22:23)  LABETALOL  MG TABS: 1 tab(s) orally 2 times a day (15 Oct 2017 22:27)  latanoprost 0.005% ophthalmic solution: 1 drop(s) to each affected eye once a day (at bedtime) (15 Jul 2014 10:24)  pantoprazole 40 mg oral delayed release tablet: 1 tab(s) orally once a day (before a meal) (15 Jul 2014 10:24)      MEDICATIONS  (STANDING):  calcitriol   Capsule 0.5 MICROGram(s) Oral daily  calcium carbonate 1250 mG (OsCal) 2 Tablet(s) Oral three times a day  cyanocobalamin 1000 MICROGram(s) Oral daily  ergocalciferol 07210 Unit(s) Oral every week  folic acid 1 milliGRAM(s) Oral daily  heparin  Injectable 5000 Unit(s) SubCutaneous every 12 hours  hydrALAZINE 50 milliGRAM(s) Oral every 8 hours  insulin lispro (HumaLOG) corrective regimen sliding scale   SubCutaneous every 6 hours  levETIRAcetam  IVPB 1000 milliGRAM(s) IV Intermittent every 12 hours  tamsulosin 0.4 milliGRAM(s) Oral at bedtime  thiamine 100 milliGRAM(s) Oral daily    MEDICATIONS  (PRN):  acetaminophen   Tablet 650 milliGRAM(s) Oral every 6 hours PRN For Temp greater than 38 C (100.4 F)  acetaminophen   Tablet. 650 milliGRAM(s) Oral every 6 hours PRN Mild Pain (1 - 3)    Allergies    Allergy Status Unknown  No Known Allergies    Intolerances    PAST MEDICAL & SURGICAL HISTORY:  PNA (pneumonia)  DVT (deep venous thrombosis)  Hypomagnesemia  Anemia  GERD (gastroesophageal reflux disease)  Hypercholesterolemia  Hypertension  Diabetes  No significant past surgical history    Social History:    Marital Status:  (   )    (x   ) Single    (   )    (  )   Occupation: works for [employer], [employer] further  Lives with: (x  ) alone  (  ) children   (  ) spouse   (  ) parents  (  ) other  Substance Use (street drugs): ( x ) never used  (  ) other:  Tobacco Usage:  (   ) never smoked   ( x  ) former smoker   (   ) current smoker  ( ~30    ) pack year  (        ) last cigarette date  Alcohol Usage: denies    FAMILY HISTORY:  No pertinent family history in first degree relatives    Vital Signs Last 24 Hrs  T(C): 37.2 (18 Oct 2017 18:57), Max: 37.3 (17 Oct 2017 23:00)  T(F): 98.9 (18 Oct 2017 18:57), Max: 99.2 (17 Oct 2017 23:00)  HR: 81 (18 Oct 2017 18:57) (67 - 91)  BP: 170/84 (18 Oct 2017 18:57) (122/58 - 183/94)  BP(mean): 116 (18 Oct 2017 15:00) (68 - 116)  RR: 19 (18 Oct 2017 18:57) (14 - 21)  SpO2: 96% (18 Oct 2017 18:57) (86% - 100%)    Telemetry: personally reviewed pvcs, apcs, otherwise NSR    PHYSICAL EXAM:  GENERAL: NAD, well-groomed, well-developed  HEAD:  Atraumatic, Normocephalic  EYES: EOMI, PERRLA, conjunctiva and sclera clear  ENMT: No tonsillar erythema, exudates, or enlargement; Moist mucous membranes, Good dentition, No lesions  NECK: Supple, No JVD, Normal thyroid  CHEST/LUNG: Clear to percussion bilaterally but some dec BS at bases; No rales, rhonchi, wheezing, or rubs  HEART: Regular rate and rhythm; No murmurs, rubs, or gallops, trace edema at ankles  ABDOMEN: Soft, Nontender, Nondistended; Bowel sounds present  EXTREMITIES:  2+ Peripheral Pulses, No clubbing, cyanosis.    LYMPH: No lymphadenopathy noted  SKIN: No rashes or lesions +excoriations at R armpit/leg  NERVOUS SYSTEM:  Alert & Oriented X3, Good concentration but does demonstrate some paranoid thinking (thinks we stole his passport, feels everything is ok and wonders why we are keeping him in the hospital; Motor Strength 5/5 B/L upper and lower extremities.     Labs: personally reviewed   10-17    144  |  110<H>  |  9   ----------------------------<  111<H>  3.4<L>   |  18<L>  |  1.15  10-17    143  |  110<H>  |  14  ----------------------------<  138<H>  3.5   |  18<L>  |  1.22  10-17    142  |  108  |  17  ----------------------------<  153<H>  3.7   |  17<L>  |  1.32<H>    Ca    6.9<L>      17 Oct 2017 22:25  Ca    6.5<LL>      17 Oct 2017 11:33  Ca    6.0<LL>      17 Oct 2017 04:31  Phos  2.8     10-17  Mg     1.8     10-17    TPro  5.9<L>  /  Alb  3.5  /  TBili  0.4  /  DBili  x   /  AST  44<H>  /  ALT  19  /  AlkPhos  38<L>  10-17  TPro  6.0  /  Alb  3.0<L>  /  TBili  0.6  /  DBili  0.1  /  AST  47<H>  /  ALT  23  /  AlkPhos  36<L>  10-16      PT/INR - ( 17 Oct 2017 18:24 )   PT: 11.8 sec;   INR: 1.09 ratio         PTT - ( 17 Oct 2017 18:24 )  PTT:21.7 sec                ABG - ( 17 Oct 2017 11:30 )  pH: 7.36  /  pCO2: 33    /  pO2: 140   / HCO3: 18    / Base Excess: -6.4  /  SaO2: 100                                     9.9    6.6   )-----------( 132      ( 17 Oct 2017 22:25 )             28.0                         9.5    7.5   )-----------( 131      ( 17 Oct 2017 05:04 )             26.8                         9.6    5.8   )-----------( 133      ( 17 Oct 2017 01:49 )             27.0     CAPILLARY BLOOD GLUCOSE  97 (18 Oct 2017 18:00)  166 (18 Oct 2017 13:00)  91 (18 Oct 2017 05:30)  104 (18 Oct 2017 00:00)      POCT Blood Glucose.: 96 mg/dL (18 Oct 2017 17:23)  POCT Blood Glucose.: 97 mg/dL (18 Oct 2017 17:23)  POCT Blood Glucose.: 166 mg/dL (18 Oct 2017 13:40)  POCT Blood Glucose.: 91 mg/dL (18 Oct 2017 05:31)  POCT Blood Glucose.: 104 mg/dL (18 Oct 2017 00:26)    Imaging personally reviewed     CTH 10/18: IMPRESSION: Unchanged thin right-sided parieto-occipital and temporal   convexity subdural hematoma with minimal localized mass effect.    Duplex LE 10/17: IMPRESSION: Wall thickening of the popliteal veins and prominent   collateral veins at this level are believed to be the residue of prior   DVT.  No acute DVT is present.      EKG: personally reviewed, NSR LAD Hospitalist Medicine Accept Note    CC: ams, shaking, Subdural hematoma    HPI: This is a 77 M PMH T2DM, HTN, HLD, hypoparathyroidism, chronic DVT (popliteal) of both legs on Eliquis, BPH, GERD, glaucoma, who initially presented to [facility] from the airport after return from travel to [facility] on 10/15/17.  [place].  Tm ~102 with tachycardia; tx for presumed sepsis with unidentified source. Course c/b mechanical fall in bathroom at OSH; CTH revealed SDH, and he was transferred over to [facility] for further management; pt had eliquis reversal with Kcentra prior to transfer and  interval CTH revealed redistribution of hematoma without mass effect/shift; no surgical intervention was recommended by neurosx.  On arrival pt had GTC seizure s/p ativan, Keppra, intubated for airway protection. Pt has had multiple medical issues and course complications with multiple specialists following.  Pt was extubated 10/18.  Regarding sepsis, ID was following and did not identify a source; they have been monitoring off abx and pt has been hemodynamically stable and afebrile >24 hours. Endocrine has been following for hypocalcemia; thought 2/2 hypoPTH/vit D def, managed with calcitriol/calcium carbonate/vit d supplementation. Urology has followed for hematuria; plan is to do TOV once hematuria resolves and then pursue hematuria w/u including cystoscopy/cytology as outpatient; pt has been on flomax/finasteride. Regarding DVT, pt was initially arranged for IR placement of IVC filter; however, interval dopplers of LE performed without acute DVT (only chronic); filter is on hold until indication for lifelong a/c is further clarified.  Currently, pt is resting comfortably in bed.  He expresses his wish to leave the hospital, feels nothing is wrong with him.  Denies CP/SOB/f/c, denies n/v/d, cough/dysuria/rash, denies HA/dizziness/palps/back pain.      REVIEW OF SYSTEMS:  CONSTITUTIONAL: No weakness. No fevers. No chills. No weight loss. Good appetite.  EYES: No visual changes. No eye pain.  ENT: No hearing difficulty. No vertigo. No dysphagia. No Sinusitis/rhinorrhea.  NECK: No pain. No stiffness/rigidity.  CARDIAC: No chest pain. No palpitations.  RESPIRATORY: No cough. No SOB. No hemoptysis.  GASTROINTESTINAL: No abdominal pain. No nausea. No vomiting. No hematemesis. No diarrhea. No constipation. No melena. No hematochezia.  GENITOURINARY: No dysuria. No frequency. No hesitancy. No hematuria.  NEUROLOGICAL: No numbness. No focal weakness. No incontinence. No headache.  BACK: No back pain.  EXTREMITIES: No lower extremity edema. Full ROM.  SKIN: No rashes. No itching. No other lesions.  PSYCHIATRIC: No depression. No anxiety. No SI/HI.  ALLERGIC: No lip swelling. No hives.  All other review of systems is negative unless indicated above.    Home Medications:  acetaminophen 325 mg oral tablet: 2 tab(s) orally every 6 hours, As needed, For Temp greater than 38 C (100.4 F) (16 Oct 2017 22:23)  Avodart 0.5 mg oral capsule: 1 cap(s) orally once a day (11 Jul 2014 16:21)  azithromycin 500 mg intravenous injection:  intravenous  (16 Oct 2017 22:23)  calcitriol 0.5 mcg oral capsule: 1 cap(s) orally once a day (16 Oct 2017 22:23)  cefTRIAXone:  (16 Oct 2017 22:23)  ergocalciferol 50,000 intl units oral tablet:  (16 Oct 2017 22:23)  FINASTERIDE 5 MG TABS:  (15 Oct 2017 22:03)  insulin lispro 100 units/mL subcutaneous solution:  subcutaneous 4 times a day (before meals and at bedtime); 1 Unit(s) if Glucose 151 - 200  2 Unit(s) if Glucose 201 - 250  3 Unit(s) if Glucose 251 - 300  4 Unit(s) if Glucose 301 - 350  5 Unit(s) if Glucose 351 - 400  6 Unit(s) if Glucose GREATER THAN 400 (16 Oct 2017 22:23)  LABETALOL  MG TABS: 1 tab(s) orally 2 times a day (15 Oct 2017 22:27)  latanoprost 0.005% ophthalmic solution: 1 drop(s) to each affected eye once a day (at bedtime) (15 Jul 2014 10:24)  pantoprazole 40 mg oral delayed release tablet: 1 tab(s) orally once a day (before a meal) (15 Jul 2014 10:24)      MEDICATIONS  (STANDING):  calcitriol   Capsule 0.5 MICROGram(s) Oral daily  calcium carbonate 1250 mG (OsCal) 2 Tablet(s) Oral three times a day  cyanocobalamin 1000 MICROGram(s) Oral daily  ergocalciferol 04585 Unit(s) Oral every week  folic acid 1 milliGRAM(s) Oral daily  heparin  Injectable 5000 Unit(s) SubCutaneous every 12 hours  hydrALAZINE 50 milliGRAM(s) Oral every 8 hours  insulin lispro (HumaLOG) corrective regimen sliding scale   SubCutaneous every 6 hours  levETIRAcetam  IVPB 1000 milliGRAM(s) IV Intermittent every 12 hours  tamsulosin 0.4 milliGRAM(s) Oral at bedtime  thiamine 100 milliGRAM(s) Oral daily    MEDICATIONS  (PRN):  acetaminophen   Tablet 650 milliGRAM(s) Oral every 6 hours PRN For Temp greater than 38 C (100.4 F)  acetaminophen   Tablet. 650 milliGRAM(s) Oral every 6 hours PRN Mild Pain (1 - 3)    Allergies    Allergy Status Unknown  No Known Allergies    Intolerances    PAST MEDICAL & SURGICAL HISTORY:  PNA (pneumonia)  DVT (deep venous thrombosis)  Hypomagnesemia  Anemia  GERD (gastroesophageal reflux disease)  Hypercholesterolemia  Hypertension  Diabetes  No significant past surgical history    Social History:    Marital Status:  (   )    (x   ) Single    (   )    (  )   Occupation: works for [employer], [employer] further  Lives with: (x  ) alone  (  ) children   (  ) spouse   (  ) parents  (  ) other  Substance Use (street drugs): ( x ) never used  (  ) other:  Tobacco Usage:  (   ) never smoked   ( x  ) former smoker   (   ) current smoker  ( ~30    ) pack year  (        ) last cigarette date  Alcohol Usage: denies    FAMILY HISTORY:  No pertinent family history in first degree relatives    Vital Signs Last 24 Hrs  T(C): 37.2 (18 Oct 2017 18:57), Max: 37.3 (17 Oct 2017 23:00)  T(F): 98.9 (18 Oct 2017 18:57), Max: 99.2 (17 Oct 2017 23:00)  HR: 81 (18 Oct 2017 18:57) (67 - 91)  BP: 170/84 (18 Oct 2017 18:57) (122/58 - 183/94)  BP(mean): 116 (18 Oct 2017 15:00) (68 - 116)  RR: 19 (18 Oct 2017 18:57) (14 - 21)  SpO2: 96% (18 Oct 2017 18:57) (86% - 100%)    Telemetry: personally reviewed pvcs, apcs, otherwise NSR    PHYSICAL EXAM:  GENERAL: NAD, well-groomed, well-developed  HEAD:  Atraumatic, Normocephalic  EYES: EOMI, PERRLA, conjunctiva and sclera clear  ENMT: No tonsillar erythema, exudates, or enlargement; Moist mucous membranes, Good dentition, No lesions  NECK: Supple, No JVD, Normal thyroid  CHEST/LUNG: Clear to percussion bilaterally but some dec BS at bases; No rales, rhonchi, wheezing, or rubs  HEART: Regular rate and rhythm; No murmurs, rubs, or gallops, trace edema at ankles  ABDOMEN: Soft, Nontender, Nondistended; Bowel sounds present  EXTREMITIES:  2+ Peripheral Pulses, No clubbing, cyanosis.    LYMPH: No lymphadenopathy noted  SKIN: No rashes or lesions +excoriations at R armpit/leg  NERVOUS SYSTEM:  Alert & Oriented X3, Good concentration but does demonstrate some paranoid thinking (thinks we stole his passport, feels everything is ok and wonders why we are keeping him in the hospital; Motor Strength 5/5 B/L upper and lower extremities.     Labs: personally reviewed   10-17    144  |  110<H>  |  9   ----------------------------<  111<H>  3.4<L>   |  18<L>  |  1.15  10-17    143  |  110<H>  |  14  ----------------------------<  138<H>  3.5   |  18<L>  |  1.22  10-17    142  |  108  |  17  ----------------------------<  153<H>  3.7   |  17<L>  |  1.32<H>    Ca    6.9<L>      17 Oct 2017 22:25  Ca    6.5<LL>      17 Oct 2017 11:33  Ca    6.0<LL>      17 Oct 2017 04:31  Phos  2.8     10-17  Mg     1.8     10-17    TPro  5.9<L>  /  Alb  3.5  /  TBili  0.4  /  DBili  x   /  AST  44<H>  /  ALT  19  /  AlkPhos  38<L>  10-17  TPro  6.0  /  Alb  3.0<L>  /  TBili  0.6  /  DBili  0.1  /  AST  47<H>  /  ALT  23  /  AlkPhos  36<L>  10-16      PT/INR - ( 17 Oct 2017 18:24 )   PT: 11.8 sec;   INR: 1.09 ratio         PTT - ( 17 Oct 2017 18:24 )  PTT:21.7 sec                ABG - ( 17 Oct 2017 11:30 )  pH: 7.36  /  pCO2: 33    /  pO2: 140   / HCO3: 18    / Base Excess: -6.4  /  SaO2: 100                                     9.9    6.6   )-----------( 132      ( 17 Oct 2017 22:25 )             28.0                         9.5    7.5   )-----------( 131      ( 17 Oct 2017 05:04 )             26.8                         9.6    5.8   )-----------( 133      ( 17 Oct 2017 01:49 )             27.0     CAPILLARY BLOOD GLUCOSE  97 (18 Oct 2017 18:00)  166 (18 Oct 2017 13:00)  91 (18 Oct 2017 05:30)  104 (18 Oct 2017 00:00)      POCT Blood Glucose.: 96 mg/dL (18 Oct 2017 17:23)  POCT Blood Glucose.: 97 mg/dL (18 Oct 2017 17:23)  POCT Blood Glucose.: 166 mg/dL (18 Oct 2017 13:40)  POCT Blood Glucose.: 91 mg/dL (18 Oct 2017 05:31)  POCT Blood Glucose.: 104 mg/dL (18 Oct 2017 00:26)      Culture - Bronchial (collected 17 Oct 2017 07:38)  Source: .Broncial Combicath  Gram Stain (17 Oct 2017 11:22):    No polymorphonuclear leukocytes    No squamous epithelial cells per low power field    No organisms seen per oil power field  Preliminary Report (18 Oct 2017 08:51):    No growth    Culture - Blood (collected 17 Oct 2017 07:32)  Source: .Blood Blood  Preliminary Report (18 Oct 2017 08:01):    No growth to date.    Culture - Blood (collected 17 Oct 2017 07:32)  Source: .Blood Blood  Preliminary Report (18 Oct 2017 08:01):    No growth to date.    Culture - Urine (collected 15 Oct 2017 21:55)  Source: .Urine Clean Catch (Midstream)  Final Report (16 Oct 2017 21:57):    No growth    Culture - Blood (collected 15 Oct 2017 21:53)  Source: .Blood Blood  Preliminary Report (16 Oct 2017 22:01):    No growth to date.    Culture - Blood (collected 15 Oct 2017 21:53)  Source: .Blood Blood  Preliminary Report (16 Oct 2017 22:01):    No growth to date.          Imaging personally reviewed     CTH 10/18: IMPRESSION: Unchanged thin right-sided parieto-occipital and temporal   convexity subdural hematoma with minimal localized mass effect.    Duplex LE 10/17: IMPRESSION: Wall thickening of the popliteal veins and prominent   collateral veins at this level are believed to be the residue of prior   DVT.  No acute DVT is present.      EKG: personally reviewed, NSR LAD

## 2017-10-18 NOTE — PROGRESS NOTE ADULT - PROBLEM SELECTOR PLAN 5
-previous plan for IR IVC filter on hold pending conversation with o/p PCP (Elfego) regarding risk factors for hypercoaguability/lifelong a/c.  -interval LE dopplers negative for acute DVT only showed old clot with collateralization  -IF pt does not have risk factor for recurrent clot, then IVC likely not indicated as per IR  -Hold full dose a/c given SDH

## 2017-10-18 NOTE — OCCUPATIONAL THERAPY INITIAL EVALUATION ADULT - PERTINENT HX OF CURRENT PROBLEM, REHAB EVAL
78 yo M s/p mechanical fall in bathroom. CT head was done immediately after demonstrating 5mm R SDH and pt was subsequently transferred to [facility]. He was reportedly awake, alert at [facility] and given Kcentra for Eliquis reversal prior to transfer. On arrival, pt experienced GTC seizure, given 4mg Ativan and 1g Keppra. He was intubated in the ED. Repeat CT head showed re-distribution of original SDH to R parietal region without increase in mass effect or midline shift.  See below
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
## 2017-10-19 DIAGNOSIS — T45.515A ADVERSE EFFECT OF ANTICOAGULANTS, INITIAL ENCOUNTER: ICD-10-CM

## 2017-10-19 DIAGNOSIS — Z86.718 PERSONAL HISTORY OF OTHER VENOUS THROMBOSIS AND EMBOLISM: ICD-10-CM

## 2017-10-19 DIAGNOSIS — R25.1 TREMOR, UNSPECIFIED: ICD-10-CM

## 2017-10-19 DIAGNOSIS — Z79.01 LONG TERM (CURRENT) USE OF ANTICOAGULANTS: ICD-10-CM
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
## 2017-10-19 DIAGNOSIS — Z87.891 PERSONAL HISTORY OF NICOTINE DEPENDENCE: ICD-10-CM

## 2017-10-19 DIAGNOSIS — I10 ESSENTIAL (PRIMARY) HYPERTENSION: ICD-10-CM

## 2017-10-19 DIAGNOSIS — E11.9 TYPE 2 DIABETES MELLITUS WITHOUT COMPLICATIONS: ICD-10-CM

## 2017-10-19 DIAGNOSIS — T79.6XXA TRAUMATIC ISCHEMIA OF MUSCLE, INITIAL ENCOUNTER: ICD-10-CM

## 2017-10-19 DIAGNOSIS — F41.9 ANXIETY DISORDER, UNSPECIFIED: ICD-10-CM

## 2017-10-19 DIAGNOSIS — D68.32 HEMORRHAGIC DISORDER DUE TO EXTRINSIC CIRCULATING ANTICOAGULANTS: ICD-10-CM

## 2017-10-19 DIAGNOSIS — J40 BRONCHITIS, NOT SPECIFIED AS ACUTE OR CHRONIC: ICD-10-CM

## 2017-10-19 DIAGNOSIS — E83.51 HYPOCALCEMIA: ICD-10-CM

## 2017-10-19 DIAGNOSIS — Z79.84 LONG TERM (CURRENT) USE OF ORAL HYPOGLYCEMIC DRUGS: ICD-10-CM

## 2017-10-19 DIAGNOSIS — E83.42 HYPOMAGNESEMIA: ICD-10-CM

## 2017-10-19 DIAGNOSIS — N40.0 BENIGN PROSTATIC HYPERPLASIA WITHOUT LOWER URINARY TRACT SYMPTOMS: ICD-10-CM

## 2017-10-19 DIAGNOSIS — S06.5X0A TRAUMATIC SUBDURAL HEMORRHAGE WITHOUT LOSS OF CONSCIOUSNESS, INITIAL ENCOUNTER: ICD-10-CM

## 2017-10-19 DIAGNOSIS — K52.9 NONINFECTIVE GASTROENTERITIS AND COLITIS, UNSPECIFIED: ICD-10-CM

## 2017-10-19 DIAGNOSIS — N17.9 ACUTE KIDNEY FAILURE, UNSPECIFIED: ICD-10-CM

## 2017-10-19 DIAGNOSIS — E87.2 ACIDOSIS: ICD-10-CM

## 2017-10-19 DIAGNOSIS — A41.9 SEPSIS, UNSPECIFIED ORGANISM: ICD-10-CM

## 2017-10-19 DIAGNOSIS — N39.0 URINARY TRACT INFECTION, SITE NOT SPECIFIED: ICD-10-CM

## 2017-10-19 DIAGNOSIS — K21.9 GASTRO-ESOPHAGEAL REFLUX DISEASE WITHOUT ESOPHAGITIS: ICD-10-CM

## 2017-10-19 DIAGNOSIS — E78.5 HYPERLIPIDEMIA, UNSPECIFIED: ICD-10-CM

## 2017-10-19 DIAGNOSIS — Z79.82 LONG TERM (CURRENT) USE OF ASPIRIN: ICD-10-CM

## 2017-10-19 LAB
ALBUMIN SERPL ELPH-MCNC: 3.8 G/DL — SIGNIFICANT CHANGE UP (ref 3.3–5)
ALP SERPL-CCNC: 46 U/L — SIGNIFICANT CHANGE UP (ref 40–120)
ALT FLD-CCNC: 15 U/L RC — SIGNIFICANT CHANGE UP (ref 10–45)
ANION GAP SERPL CALC-SCNC: 17 MMOL/L — SIGNIFICANT CHANGE UP (ref 5–17)
AST SERPL-CCNC: 21 U/L — SIGNIFICANT CHANGE UP (ref 10–40)
BASOPHILS # BLD AUTO: 0 K/UL — SIGNIFICANT CHANGE UP (ref 0–0.2)
BASOPHILS NFR BLD AUTO: 0.4 % — SIGNIFICANT CHANGE UP (ref 0–2)
BILIRUB SERPL-MCNC: 0.5 MG/DL — SIGNIFICANT CHANGE UP (ref 0.2–1.2)
BUN SERPL-MCNC: 7 MG/DL — SIGNIFICANT CHANGE UP (ref 7–23)
CA-I BLD-SCNC: 1.11 MMOL/L — LOW (ref 1.12–1.3)
CALCIUM SERPL-MCNC: 8.1 MG/DL — LOW (ref 8.4–10.5)
CALCIUM SERPL-MCNC: 8.2 MG/DL — LOW (ref 8.4–10.5)
CHLORIDE SERPL-SCNC: 104 MMOL/L — SIGNIFICANT CHANGE UP (ref 96–108)
CO2 SERPL-SCNC: 20 MMOL/L — LOW (ref 22–31)
CREAT SERPL-MCNC: 1.01 MG/DL — SIGNIFICANT CHANGE UP (ref 0.5–1.3)
CULTURE RESULTS: SIGNIFICANT CHANGE UP
EOSINOPHIL # BLD AUTO: 0.1 K/UL — SIGNIFICANT CHANGE UP (ref 0–0.5)
EOSINOPHIL NFR BLD AUTO: 1.1 % — SIGNIFICANT CHANGE UP (ref 0–6)
GLUCOSE BLDC GLUCOMTR-MCNC: 134 MG/DL — HIGH (ref 70–99)
GLUCOSE BLDC GLUCOMTR-MCNC: 159 MG/DL — HIGH (ref 70–99)
GLUCOSE BLDC GLUCOMTR-MCNC: 227 MG/DL — HIGH (ref 70–99)
GLUCOSE BLDC GLUCOMTR-MCNC: 312 MG/DL — HIGH (ref 70–99)
GLUCOSE SERPL-MCNC: 224 MG/DL — HIGH (ref 70–99)
HCT VFR BLD CALC: 31.3 % — LOW (ref 39–50)
HGB BLD-MCNC: 11.2 G/DL — LOW (ref 13–17)
LYMPHOCYTES # BLD AUTO: 1.2 K/UL — SIGNIFICANT CHANGE UP (ref 1–3.3)
LYMPHOCYTES # BLD AUTO: 19.6 % — SIGNIFICANT CHANGE UP (ref 13–44)
MAGNESIUM SERPL-MCNC: 1.6 MG/DL — SIGNIFICANT CHANGE UP (ref 1.6–2.6)
MCHC RBC-ENTMCNC: 32.6 PG — SIGNIFICANT CHANGE UP (ref 27–34)
MCHC RBC-ENTMCNC: 35.8 GM/DL — SIGNIFICANT CHANGE UP (ref 32–36)
MCV RBC AUTO: 91.1 FL — SIGNIFICANT CHANGE UP (ref 80–100)
MONOCYTES # BLD AUTO: 0.4 K/UL — SIGNIFICANT CHANGE UP (ref 0–0.9)
MONOCYTES NFR BLD AUTO: 6.9 % — SIGNIFICANT CHANGE UP (ref 2–14)
NEUTROPHILS # BLD AUTO: 4.6 K/UL — SIGNIFICANT CHANGE UP (ref 1.8–7.4)
NEUTROPHILS NFR BLD AUTO: 71.9 % — SIGNIFICANT CHANGE UP (ref 43–77)
PHOSPHATE SERPL-MCNC: 1.9 MG/DL — LOW (ref 2.5–4.5)
PLATELET # BLD AUTO: 153 K/UL — SIGNIFICANT CHANGE UP (ref 150–400)
POTASSIUM SERPL-MCNC: 3.2 MMOL/L — LOW (ref 3.5–5.3)
POTASSIUM SERPL-SCNC: 3.2 MMOL/L — LOW (ref 3.5–5.3)
PROT SERPL-MCNC: 6.4 G/DL — SIGNIFICANT CHANGE UP (ref 6–8.3)
RBC # BLD: 3.43 M/UL — LOW (ref 4.2–5.8)
RBC # FLD: 12.3 % — SIGNIFICANT CHANGE UP (ref 10.3–14.5)
SODIUM SERPL-SCNC: 141 MMOL/L — SIGNIFICANT CHANGE UP (ref 135–145)
SPECIMEN SOURCE: SIGNIFICANT CHANGE UP
WBC # BLD: 6.4 K/UL — SIGNIFICANT CHANGE UP (ref 3.8–10.5)
WBC # FLD AUTO: 6.4 K/UL — SIGNIFICANT CHANGE UP (ref 3.8–10.5)

## 2017-10-19 PROCEDURE — 99232 SBSQ HOSP IP/OBS MODERATE 35: CPT | Mod: GC

## 2017-10-19 PROCEDURE — 70450 CT HEAD/BRAIN W/O DYE: CPT | Mod: 26

## 2017-10-19 PROCEDURE — 99233 SBSQ HOSP IP/OBS HIGH 50: CPT | Mod: GC

## 2017-10-19 RX ORDER — MAGNESIUM SULFATE 500 MG/ML
2 VIAL (ML) INJECTION ONCE
Qty: 0 | Refills: 0 | Status: COMPLETED | OUTPATIENT
Start: 2017-10-19 | End: 2017-10-19

## 2017-10-19 RX ORDER — SODIUM,POTASSIUM PHOSPHATES 278-250MG
2 POWDER IN PACKET (EA) ORAL ONCE
Qty: 0 | Refills: 0 | Status: COMPLETED | OUTPATIENT
Start: 2017-10-19 | End: 2017-10-19

## 2017-10-19 RX ORDER — POTASSIUM CHLORIDE 20 MEQ
40 PACKET (EA) ORAL ONCE
Qty: 0 | Refills: 0 | Status: COMPLETED | OUTPATIENT
Start: 2017-10-19 | End: 2017-10-19

## 2017-10-19 RX ORDER — MAGNESIUM OXIDE 400 MG ORAL TABLET 241.3 MG
400 TABLET ORAL DAILY
Qty: 0 | Refills: 0 | Status: DISCONTINUED | OUTPATIENT
Start: 2017-10-19 | End: 2017-10-26

## 2017-10-19 RX ORDER — LABETALOL HCL 100 MG
100 TABLET ORAL
Qty: 0 | Refills: 0 | Status: DISCONTINUED | OUTPATIENT
Start: 2017-10-19 | End: 2017-10-22

## 2017-10-19 RX ADMIN — LEVETIRACETAM 400 MILLIGRAM(S): 250 TABLET, FILM COATED ORAL at 17:32

## 2017-10-19 RX ADMIN — Medication 100 MILLIGRAM(S): at 17:32

## 2017-10-19 RX ADMIN — Medication 2 TABLET(S): at 13:19

## 2017-10-19 RX ADMIN — PREGABALIN 1000 MICROGRAM(S): 225 CAPSULE ORAL at 13:19

## 2017-10-19 RX ADMIN — Medication 2: at 13:19

## 2017-10-19 RX ADMIN — Medication 2 TABLET(S): at 19:38

## 2017-10-19 RX ADMIN — Medication 50 GRAM(S): at 14:52

## 2017-10-19 RX ADMIN — Medication 50 MILLIGRAM(S): at 21:38

## 2017-10-19 RX ADMIN — TAMSULOSIN HYDROCHLORIDE 0.4 MILLIGRAM(S): 0.4 CAPSULE ORAL at 21:39

## 2017-10-19 RX ADMIN — Medication 2 TABLET(S): at 06:29

## 2017-10-19 RX ADMIN — Medication 100 MILLIGRAM(S): at 10:12

## 2017-10-19 RX ADMIN — Medication 2 TABLET(S): at 21:38

## 2017-10-19 RX ADMIN — CALCITRIOL 0.5 MICROGRAM(S): 0.5 CAPSULE ORAL at 13:18

## 2017-10-19 RX ADMIN — Medication 40 MILLIEQUIVALENT(S): at 14:51

## 2017-10-19 RX ADMIN — Medication 50 MILLIGRAM(S): at 06:29

## 2017-10-19 RX ADMIN — Medication 50 MILLIGRAM(S): at 13:19

## 2017-10-19 RX ADMIN — Medication 1: at 18:33

## 2017-10-19 RX ADMIN — HEPARIN SODIUM 5000 UNIT(S): 5000 INJECTION INTRAVENOUS; SUBCUTANEOUS at 06:29

## 2017-10-19 RX ADMIN — Medication 100 MILLIGRAM(S): at 13:24

## 2017-10-19 RX ADMIN — Medication 1 MILLIGRAM(S): at 13:18

## 2017-10-19 RX ADMIN — LEVETIRACETAM 400 MILLIGRAM(S): 250 TABLET, FILM COATED ORAL at 06:29

## 2017-10-19 RX ADMIN — HEPARIN SODIUM 5000 UNIT(S): 5000 INJECTION INTRAVENOUS; SUBCUTANEOUS at 17:31

## 2017-10-19 NOTE — PROGRESS NOTE ADULT - ATTENDING COMMENTS
Agree with assessment and plan as above by Dr. Lomeli. Reviewed all pertinent labs, glucose values, and imaging studies. Modifications made as indicated above.     Juan Ambrosio D.O  [phone]

## 2017-10-19 NOTE — PROGRESS NOTE ADULT - PROBLEM SELECTOR PLAN 4
-ongoing, but appears to be slowly clearing  -urology following, recs appreciated  -c/w pena for now; plan for TOV when hematuria clears further  -will need full hematuria w/u including cystoscopy vs. ct urogram, cytology; possibly can be done as o/p.  -c/w finasteride, flomax -ongoing, but appears to be slowly clearing  -urology following, recs appreciated  -c/w pena for now; plan for TOV when hematuria clears further  -will need full hematuria w/u including cystoscopy vs. ct urogram, cytology; possibly can be done as o/p.  -c/w finasteride, flomax  -Send urine cytology.

## 2017-10-19 NOTE — PROGRESS NOTE ADULT - PROBLEM SELECTOR PLAN 9
- hydralazine 50 tid and now 100 mg labetalol added b/c systolic > 180 this morning and goal systolic 160 - hydralazine 50mg tid and now 100 mg labetalol twice daily added b/c systolic > 180 this morning and goal systolic <160

## 2017-10-19 NOTE — PHYSICAL THERAPY INITIAL EVALUATION ADULT - ACTIVE RANGE OF MOTION EXAMINATION, REHAB EVAL
bilateral upper extremity Active ROM was WFL (within functional limits)/bilateral  lower extremity Active ROM was WFL (within functional limits)/mild dysmetria in UE

## 2017-10-19 NOTE — PROGRESS NOTE ADULT - PROBLEM SELECTOR PLAN 5
-f/u with PCP (Elfego) regarding risk factors for hypercoaguability/lifelong a/c.  -interval LE dopplers negative for acute DVT only showed old clot with collateralization  -IF pt does not have risk factor for recurrent clot, then IVC likely not indicated as per IR  -Hold full dose a/c given SDH -f/u with PCP (Elfego) regarding risk factors for hypercoagulability/lifelong a/c.  -interval LE dopplers negative for acute DVT only showed old clot with collateralization  -IF pt does not have risk factor for recurrent clot, then IVC likely not indicated as per IR  -Hold full dose a/c given SDH

## 2017-10-19 NOTE — PHYSICAL THERAPY INITIAL EVALUATION ADULT - IMPAIRED TRANSFERS: SIT/STAND, REHAB EVAL
impaired sensory feedback/cognition/impaired coordination/decreased flexibility/decreased strength/impaired balance

## 2017-10-19 NOTE — PHYSICAL THERAPY INITIAL EVALUATION ADULT - TRANSFER SAFETY CONCERNS NOTED: SIT/STAND, REHAB EVAL
decreased weight-shifting ability/decreased balance during turns/decreased proprioception/decreased safety awareness/losing balance

## 2017-10-19 NOTE — PROGRESS NOTE ADULT - SUBJECTIVE AND OBJECTIVE BOX
Chief Complaint: f/u hypocalcemia    History:  Patient reports history of hypoparathyroidism. States he takes calcitriol at home, but sometimes misses doses. Denies perioral numbness, tingling, paresthesias. Calcium today 8.1.    MEDICATIONS  (STANDING):  calcitriol   Capsule 0.5 MICROGram(s) Oral daily  calcium carbonate 1250 mG (OsCal) 2 Tablet(s) Oral three times a day  cyanocobalamin 1000 MICROGram(s) Oral daily  ergocalciferol 71212 Unit(s) Oral every week  folic acid 1 milliGRAM(s) Oral daily  heparin  Injectable 5000 Unit(s) SubCutaneous every 12 hours  hydrALAZINE 50 milliGRAM(s) Oral every 8 hours  insulin lispro (HumaLOG) corrective regimen sliding scale   SubCutaneous every 6 hours  labetalol 100 milliGRAM(s) Oral two times a day  levETIRAcetam  IVPB 1000 milliGRAM(s) IV Intermittent every 12 hours  magnesium sulfate  IVPB 2 Gram(s) IV Intermittent once  potassium chloride    Tablet ER 40 milliEquivalent(s) Oral once  tamsulosin 0.4 milliGRAM(s) Oral at bedtime  thiamine 100 milliGRAM(s) Oral daily    MEDICATIONS  (PRN):  acetaminophen   Tablet 650 milliGRAM(s) Oral every 6 hours PRN For Temp greater than 38 C (100.4 F)  acetaminophen   Tablet. 650 milliGRAM(s) Oral every 6 hours PRN Mild Pain (1 - 3)      Allergies    Allergy Status Unknown  No Known Allergies        Review of Systems:  Constitutional: No fever  Cardiovascular: No chest pain, palpitations  Respiratory: No SOB, no cough  GI: No nausea, vomiting, abdominal pain  Endocrine: no polyuria, polydipsia    ALL OTHER SYSTEMS REVIEWED AND NEGATIVE      PHYSICAL EXAM:  VITALS: T(C): 37.2 (10-19-17 @ 12:01)  T(F): 98.9 (10-19-17 @ 12:01), Max: 98.9 (10-18-17 @ 18:57)  HR: 90 (10-19-17 @ 12:01) (78 - 102)  BP: 161/87 (10-19-17 @ 12:01) (161/87 - 185/73)  RR:  (15 - 19)  SpO2:  (95% - 100%)  Wt(kg): --  GENERAL: NAD, well-developed  EYES: No proptosis, anicteric  HEENT:  Atraumatic, Normocephalic  RESPIRATORY: Clear to auscultation bilaterally; No rales, rhonchi, wheezing, or rubs  CARDIOVASCULAR: Regular rate and rhythm; No murmurs; no peripheral edema  GI: Soft, nontender, non distended, normal bowel sounds    POCT Blood Glucose.: 227 mg/dL (10-19-17 @ 12:49)  POCT Blood Glucose.: 134 mg/dL (10-19-17 @ 06:50)  POCT Blood Glucose.: 176 mg/dL (10-18-17 @ 23:28)  POCT Blood Glucose.: 96 mg/dL (10-18-17 @ 17:23)  POCT Blood Glucose.: 97 mg/dL (10-18-17 @ 17:23)  POCT Blood Glucose.: 166 mg/dL (10-18-17 @ 13:40)  POCT Blood Glucose.: 91 mg/dL (10-18-17 @ 05:31)  POCT Blood Glucose.: 104 mg/dL (10-18-17 @ 00:26)  POCT Blood Glucose.: 108 mg/dL (10-17-17 @ 18:14)  POCT Blood Glucose.: 147 mg/dL (10-17-17 @ 06:10)  POCT Blood Glucose.: 125 mg/dL (10-16-17 @ 20:06)  POCT Blood Glucose.: 139 mg/dL (10-16-17 @ 16:49)      10-19    141  |  104  |  7   ----------------------------<  224<H>  3.2<L>   |  20<L>  |  1.01    EGFR if : 82  EGFR if non : 70    Ca    8.1<L>      10-19  Mg     1.6     10-19  Phos  1.9     10-19    TPro  6.4  /  Alb  3.8  /  TBili  0.5  /  DBili  x   /  AST  21  /  ALT  15  /  AlkPhos  46  10-19          Thyroid Function Tests:  10-16 @ 06:37 TSH 0.64 FreeT4 -- T3 -- Anti TPO -- Anti Thyroglobulin Ab -- TSI --      Hemoglobin A1C, Whole Blood: 6.0 % <H> [4.0 - 5.6] (10-17-17 @ 08:07)  Hemoglobin A1C, Whole Blood: 5.9 % <H> [4.0 - 5.6] (10-16-17 @ 10:58)

## 2017-10-19 NOTE — PHYSICAL THERAPY INITIAL EVALUATION ADULT - ADDITIONAL COMMENTS
(continuation of H&P) On arrival, patient experienced GTC seizure, given 4mg Ativan and 1g Keppra. He was intubated in the ED. Repeat CT head showed re-distribution of original SDH to R parietal region without increase in mass effect or midline shift. (continuation of H&P) On arrival, patient experienced GTC seizure, given 4mg Ativan and 1g Keppra. He was intubated in the ED. Repeat CT head showed re-distribution of original SDH to R parietal region without increase in mass effect or midline shift.   Pt lives alone in an apt with no steps to enter.

## 2017-10-19 NOTE — PROGRESS NOTE ADULT - SUBJECTIVE AND OBJECTIVE BOX
Visit Summary: Patient seen and evaluated at bedside.    Overnight Events: none    Exam:  T(C): 37.1 (10-19-17 @ 07:01), Max: 37.2 (10-18-17 @ 18:57)  HR: 78 (10-19-17 @ 07:01) (69 - 102)  BP: 185/73 (10-19-17 @ 07:01) (147/94 - 185/73)  RR: 18 (10-19-17 @ 07:01) (14 - 21)  SpO2: 95% (10-19-17 @ 07:01) (95% - 100%)  Wt(kg): --    AOx3, FC, PERRL, EOMI, V1-3 intact, no facial, palate jv symmetric, tongue midline, shrug 5/5  5/5 throughout, no drift  SILT  No clonus or babinski                            9.9    6.6   )-----------( 132      ( 17 Oct 2017 22:25 )             28.0     10-17    144  |  110<H>  |  9   ----------------------------<  111<H>  3.4<L>   |  18<L>  |  1.15    Ca    6.9<L>      17 Oct 2017 22:25  Phos  2.8     10-17  Mg     1.8     10-17    PT/INR - ( 17 Oct 2017 18:24 )   PT: 11.8 sec;   INR: 1.09 ratio         PTT - ( 17 Oct 2017 18:24 )  PTT:21.7 sec

## 2017-10-19 NOTE — PROGRESS NOTE ADULT - PROBLEM SELECTOR PLAN 1
- hypocalcemia is 2/2 known hypoparathyroidism, however this history could not be obtained on admission due to patient's mental status  - c/w calcium carbonate to 2 tabs TID  - recommend check serum calcium BID  - c/w calcitriol 0.5 mcg qd  - c/w vitamin D 50,000 units qweek  - maintain adequate magnesium levels  - will follow

## 2017-10-19 NOTE — PHYSICAL THERAPY INITIAL EVALUATION ADULT - PERTINENT HX OF CURRENT PROBLEM, REHAB EVAL
77M previously admitted to [facility] with urosepsis s/p mechanical fall in bathroom. CT head was done immediately after demonstrating 5mm right SDH and patient was subsequently transferred to [facility]. 77M previously admitted to [facility] with urosepsis s/p mechanical fall in bathroom. CT head was done immediately after demonstrating 5mm right SDH and patient was subsequently transferred to [facility] for management..

## 2017-10-19 NOTE — PROGRESS NOTE ADULT - PROBLEM SELECTOR PLAN 1
-Interval CTH stable without mass effect/shift  -Neursx recs appreciated, no plan for acute surgical intervention at this time  -c/w Keppra, neuro checks q4  -Pt has cautiously been resumed on ppx hsq for dvt ppx.

## 2017-10-19 NOTE — PROGRESS NOTE ADULT - PROBLEM SELECTOR PLAN 3
-repleted, trend, replete prn as this is possibly contributing to hypocal -repleted, trend, replete prn as this is possibly contributing to hypocalcemia.   -Will start magnesium 400mg daily.

## 2017-10-19 NOTE — PROGRESS NOTE ADULT - SUBJECTIVE AND OBJECTIVE BOX
Patient is a 77y old  Male who presents with a chief complaint of SDH, transfer from [facility] (17 Oct 2017 02:28)      SUBJECTIVE / OVERNIGHT EVENTS:    The patient is a 76 yo man with a PMH of T2DM, HTN, HLD, hypoparathyroidism, chronic DVT(popliteal) of both legs on Eliquis, BPH, GERD, glaucoma transferred to medicine service from NSICU after suffering from SDH from mechanical fall at [facility] c/b by GTC seizure. The patient returned from [facility] 10/8 and was [place] to [facility] where he was treated for sepsis with no infection source, febrile 102, lactic acidosis, cutlures negative. The patient reports after several days there he had a mechanical fall in the bathroom. CT head showed 5 mm right SDH and he was transferred to [facility] for neurosurgical evaluation. Prior to arrival he was given Kcentra for reversal of Eliquis. In the [facility] ED he had a GTC seizure, given 4 mg ativan, and 1 g keppra and was intubated. Repeat head CT showed redistribution of original SDH to R parietal region without increase in mass effect or midline shift. The patient was extubated on 10/18. While inpatient the patient has been hypocalcemic and had hypomagnesium. The hypocalcemia is from hypoparathyroidism although pt has normal PTH. Being treated with calcium carbonate, calcitriol, vitamin D. He was noted to have gross hematuria after cathether placement in the NSICU with urology following. Full hematuria workup to be done regarding CT urogram, on flomax, finesteride, with continued indwelling pena. Eliquis held given SDH, pt on HSQ now. Unclear etiology to chronic DVT, will f/u with outpatient PCP. The patient denies headache, CP, SoB, dizziness, back pain. He is stable, afebrile, HR 90, /87, RR 97 on RA. He went for CT today for f/u scan of SDH. Currently the patient is on a 1:1 observation for agitation and threatening to leave the hospital.     HPI:  77M previously admitted to [facility] with urosepsis s/p mechanical fall in bathroom. CT head was done immediately after demonstrating 5mm right SDH and patient was subsequently transferred to [facility]. He was reportedly awake, alert at [facility] and given Kcentra for Eliquis reversal prior to transfer. On arrival, patient experienced GTC seizure, given 4mg Ativan and 1g Keppra. He was intubated in the ED. Repeat CT head showed re-distribution of original SDH to R parietal region without increase in mass effect or midline shift. (17 Oct 2017 02:28)      MEDICATIONS  (STANDING):  calcitriol   Capsule 0.5 MICROGram(s) Oral daily  calcium carbonate 1250 mG (OsCal) 2 Tablet(s) Oral three times a day  cyanocobalamin 1000 MICROGram(s) Oral daily  ergocalciferol 75771 Unit(s) Oral every week  folic acid 1 milliGRAM(s) Oral daily  heparin  Injectable 5000 Unit(s) SubCutaneous every 12 hours  hydrALAZINE 50 milliGRAM(s) Oral every 8 hours  insulin lispro (HumaLOG) corrective regimen sliding scale   SubCutaneous every 6 hours  labetalol 100 milliGRAM(s) Oral two times a day  levETIRAcetam  IVPB 1000 milliGRAM(s) IV Intermittent every 12 hours  magnesium sulfate  IVPB 2 Gram(s) IV Intermittent once  potassium chloride    Tablet ER 40 milliEquivalent(s) Oral once  tamsulosin 0.4 milliGRAM(s) Oral at bedtime  thiamine 100 milliGRAM(s) Oral daily    MEDICATIONS  (PRN):  acetaminophen   Tablet 650 milliGRAM(s) Oral every 6 hours PRN For Temp greater than 38 C (100.4 F)  acetaminophen   Tablet. 650 milliGRAM(s) Oral every 6 hours PRN Mild Pain (1 - 3)      Vital Signs Last 24 Hrs  T(C): 37.2 (19 Oct 2017 12:01), Max: 37.2 (18 Oct 2017 18:57)  T(F): 98.9 (19 Oct 2017 12:01), Max: 98.9 (18 Oct 2017 18:57)  HR: 90 (19 Oct 2017 12:01) (78 - 102)  BP: 161/87 (19 Oct 2017 12:01) (161/87 - 185/73)  BP(mean): 116 (18 Oct 2017 15:00) (116 - 116)  RR: 18 (19 Oct 2017 12:01) (15 - 19)  SpO2: 97% (19 Oct 2017 12:01) (95% - 100%)  CAPILLARY BLOOD GLUCOSE  97 (18 Oct 2017 18:00)      POCT Blood Glucose.: 227 mg/dL (19 Oct 2017 12:49)  POCT Blood Glucose.: 134 mg/dL (19 Oct 2017 06:50)  POCT Blood Glucose.: 176 mg/dL (18 Oct 2017 23:28)  POCT Blood Glucose.: 96 mg/dL (18 Oct 2017 17:23)  POCT Blood Glucose.: 97 mg/dL (18 Oct 2017 17:23)    I&O's Summary    18 Oct 2017 07:01  -  19 Oct 2017 07:00  --------------------------------------------------------  IN: 977.5 mL / OUT: 2380 mL / NET: -1402.5 mL    19 Oct 2017 07:01  -  19 Oct 2017 14:36  --------------------------------------------------------  IN: 0 mL / OUT: 401 mL / NET: -401 mL        PHYSICAL EXAM:  GENERAL: NAD, well-developed  HEAD:  Atraumatic, Normocephalic  EYES: EOMI, PERRLA, conjunctiva and sclera clear  NECK: Supple, No JVD  CHEST/LUNG: Clear to auscultation bilaterally; No wheeze  HEART: Regular rate and rhythm; No murmurs, rubs, or gallops  ABDOMEN: Soft, Nontender, Nondistended; Bowel sounds present  EXTREMITIES:  2+ Peripheral Pulses, No clubbing, cyanosis, or edema  PSYCH: AAOx3  NEUROLOGY: non-focal  SKIN: No rashes or lesions    LABS:                        11.2   6.4   )-----------( 153      ( 19 Oct 2017 10:35 )             31.3     10-19    141  |  104  |  7   ----------------------------<  224<H>  3.2<L>   |  20<L>  |  1.01    Ca    8.1<L>      19 Oct 2017 10:35  Phos  1.9     10-19  Mg     1.6     10-19    TPro  6.4  /  Alb  3.8  /  TBili  0.5  /  DBili  x   /  AST  21  /  ALT  15  /  AlkPhos  46  10-19    PT/INR - ( 17 Oct 2017 18:24 )   PT: 11.8 sec;   INR: 1.09 ratio         PTT - ( 17 Oct 2017 18:24 )  PTT:21.7 sec          RADIOLOGY & ADDITIONAL TESTS:    Imaging Personally Reviewed:    Consultant(s) Notes Reviewed:      Care Discussed with Consultants/Other Providers: Patient is a 77y old  Male who presents with a chief complaint of SDH, transfer from [facility] (17 Oct 2017 02:28)      SUBJECTIVE / OVERNIGHT EVENTS:    The patient is a 78 yo man with a PMH of T2DM, HTN, HLD, hypoparathyroidism, chronic DVT(popliteal) of both legs on Eliquis, BPH, GERD, glaucoma transferred to medicine service from NSICU after suffering from SDH from mechanical fall at [facility] c/b by GTC seizure. The patient returned from [facility] 10/8 and was [place] to [facility] where he was treated for sepsis with no infection source, febrile 102, lactic acidosis, cutlures negative. The patient reports after several days there he had a mechanical fall in the bathroom. CT head showed 5 mm right SDH and he was transferred to [facility] for neurosurgical evaluation. Prior to arrival he was given Kcentra for reversal of Eliquis. In the [facility] ED he had a GTC seizure, given 4 mg ativan, and 1 g keppra and was intubated. Repeat head CT showed redistribution of original SDH to R parietal region without increase in mass effect or midline shift. The patient was extubated on 10/18. While inpatient the patient has been hypocalcemic and had hypomagnesium. The hypocalcemia is from hypoparathyroidism although pt has normal PTH. Being treated with calcium carbonate, calcitriol, vitamin D. He was noted to have gross hematuria after cathether placement in the NSICU with urology following. Full hematuria workup to be done regarding CT urogram, on flomax, finesteride, with continued indwelling pena. Eliquis held given SDH, pt on HSQ now. Unclear etiology to chronic DVT, will f/u with outpatient PCP. The patient denies headache, CP, SoB, dizziness, back pain. He is stable, afebrile, HR 90, /87, RR 97 on RA. He went for CT today for f/u scan of SDH. Currently the patient is on a 1:1 observation for agitation and threatening to leave the hospital.     HPI:  77M previously admitted to [facility] with urosepsis s/p mechanical fall in bathroom. CT head was done immediately after demonstrating 5mm right SDH and patient was subsequently transferred to [facility]. He was reportedly awake, alert at [facility] and given Kcentra for Eliquis reversal prior to transfer. On arrival, patient experienced GTC seizure, given 4mg Ativan and 1g Keppra. He was intubated in the ED. Repeat CT head showed re-distribution of original SDH to R parietal region without increase in mass effect or midline shift. (17 Oct 2017 02:28)      MEDICATIONS  (STANDING):  calcitriol   Capsule 0.5 MICROGram(s) Oral daily  calcium carbonate 1250 mG (OsCal) 2 Tablet(s) Oral three times a day  cyanocobalamin 1000 MICROGram(s) Oral daily  ergocalciferol 23746 Unit(s) Oral every week  folic acid 1 milliGRAM(s) Oral daily  heparin  Injectable 5000 Unit(s) SubCutaneous every 12 hours  hydrALAZINE 50 milliGRAM(s) Oral every 8 hours  insulin lispro (HumaLOG) corrective regimen sliding scale   SubCutaneous every 6 hours  labetalol 100 milliGRAM(s) Oral two times a day  levETIRAcetam  IVPB 1000 milliGRAM(s) IV Intermittent every 12 hours  magnesium sulfate  IVPB 2 Gram(s) IV Intermittent once  potassium chloride    Tablet ER 40 milliEquivalent(s) Oral once  tamsulosin 0.4 milliGRAM(s) Oral at bedtime  thiamine 100 milliGRAM(s) Oral daily    MEDICATIONS  (PRN):  acetaminophen   Tablet 650 milliGRAM(s) Oral every 6 hours PRN For Temp greater than 38 C (100.4 F)  acetaminophen   Tablet. 650 milliGRAM(s) Oral every 6 hours PRN Mild Pain (1 - 3)      Vital Signs Last 24 Hrs  T(C): 37.2 (19 Oct 2017 12:01), Max: 37.2 (18 Oct 2017 18:57)  T(F): 98.9 (19 Oct 2017 12:01), Max: 98.9 (18 Oct 2017 18:57)  HR: 90 (19 Oct 2017 12:01) (78 - 102)  BP: 161/87 (19 Oct 2017 12:01) (161/87 - 185/73)  BP(mean): 116 (18 Oct 2017 15:00) (116 - 116)  RR: 18 (19 Oct 2017 12:01) (15 - 19)  SpO2: 97% (19 Oct 2017 12:01) (95% - 100%)  CAPILLARY BLOOD GLUCOSE  97 (18 Oct 2017 18:00)      POCT Blood Glucose.: 227 mg/dL (19 Oct 2017 12:49)  POCT Blood Glucose.: 134 mg/dL (19 Oct 2017 06:50)  POCT Blood Glucose.: 176 mg/dL (18 Oct 2017 23:28)  POCT Blood Glucose.: 96 mg/dL (18 Oct 2017 17:23)  POCT Blood Glucose.: 97 mg/dL (18 Oct 2017 17:23)    I&O's Summary    18 Oct 2017 07:01  -  19 Oct 2017 07:00  --------------------------------------------------------  IN: 977.5 mL / OUT: 2380 mL / NET: -1402.5 mL    19 Oct 2017 07:01  -  19 Oct 2017 14:36  --------------------------------------------------------  IN: 0 mL / OUT: 401 mL / NET: -401 mL        PHYSICAL EXAM:  GENERAL: NAD, well-developed  HEAD:  Atraumatic, Normocephalic  EYES: EOMI, PERRLA, conjunctiva and sclera clear  NECK: Supple, No JVD  CHEST/LUNG: Clear to auscultation bilaterally; No wheeze  HEART: Regular rate and rhythm; No murmurs, rubs, or gallops  ABDOMEN: Soft, Nontender, Nondistended; Bowel sounds present  EXTREMITIES:  2+ Peripheral Pulses, No clubbing, cyanosis, or edema  PSYCH: AAOx3  NEUROLOGY: non-focal  SKIN: No rashes or lesions    LABS:                        11.2   6.4   )-----------( 153      ( 19 Oct 2017 10:35 )             31.3     10-19    141  |  104  |  7   ----------------------------<  224<H>  3.2<L>   |  20<L>  |  1.01    Ca    8.1<L>      19 Oct 2017 10:35  Phos  1.9     10-19  Mg     1.6     10-19    TPro  6.4  /  Alb  3.8  /  TBili  0.5  /  DBili  x   /  AST  21  /  ALT  15  /  AlkPhos  46  10-19    PT/INR - ( 17 Oct 2017 18:24 )   PT: 11.8 sec;   INR: 1.09 ratio         PTT - ( 17 Oct 2017 18:24 )  PTT:21.7 sec          RADIOLOGY & ADDITIONAL TESTS:    Imaging Personally Reviewed:    Consultant(s) Notes Reviewed:  Endocrinology, ELVIN.     Care Discussed with Consultants/Other Providers:

## 2017-10-19 NOTE — PROGRESS NOTE ADULT - ASSESSMENT
76 y/o male, here after fall at home resulting in SDH (so admitted to neurosurgical ICU) without current planned intervention.  Endocrine consulted for hypocalcemia. Additional history has been obtained and patient with known hypoparathyroidism.

## 2017-10-19 NOTE — PROGRESS NOTE ADULT - PROBLEM SELECTOR PLAN 6
-Pt has delirium with intermittent paranoia  -Will place on 1:1 constant obs  -AMS possibly 2/2 SDH vs hospital delirium vs. vitamin deficiency  -Pt initially treated at OSH for presumed sepsis without source; currently afebrile and hemodynamically stable without localizing source on exam or hx; continue monitoring OFF abx with ID following.  Lyme serologies neg, HIV negative, BCX, UCX, blood parasites, bronch cx, legionella all nondiagnostic to date.

## 2017-10-19 NOTE — PROVIDER CONTACT NOTE (OTHER) - ASSESSMENT
Pt AOx2. Pt tried to climb out of the bed twice without fall. pt kept trying to pull out pena catheter. Pena catheter intact. Intervention such as bed alarm, frequent checks on pt, hourly rounding made without success.

## 2017-10-19 NOTE — PROGRESS NOTE ADULT - ATTENDING COMMENTS
-Had long discussion with patient today regarding gravity of his condition (possibility of PE while not on AC, possibility of worsening SDH, seizure risk, etc) and the need for him to stay in the hospital. He initially wanted to leave today. He was able to understand and repeat back to me some of what I explained intermittently, but at times he was unable to remember or state what I had just told him. Unclear if he has full capacity at this time. He denies alcohol use. He is a little cynical, but trusts his PMD. Will reach out to his PMD to gather support and the patient said he would listen to his advice. Will also look into previous hypercoagulable work up for DVT in the past and also chronicity of DVT in order to guide decision for IVC filter placement.   -US of thyroid/parathyroid to evaluate for structural causes of hypoparathyroidism. -Will start magnesium 400mg daily standing in case hypomagnesemia contributing to hypocalcemia. -C/w calcium, calcitriol, and vitamin D supplementation. -Endo recs appreciated.   -PT kapil says rehab.   -Will send urine cytology for hematuria.   -Repeat CT head today stable.   -Will add labetolol 100mg twice daily for better BP control.

## 2017-10-19 NOTE — PROGRESS NOTE ADULT - PROBLEM SELECTOR PLAN 2
-Endo following, recs appreciated  -c/w calcitriol, calcium carbonate, and vit d supplementation -Endo following, recs appreciated  -c/w calcitriol, calcium carbonate, and vit d supplementation  -Will start magnesium 400mg daily.

## 2017-10-19 NOTE — PROGRESS NOTE ADULT - ASSESSMENT
The patient is a 78 yo man with a PMH of T2DM, HTN, HLD, hypoparathyroidism, chronic DVT of unknown etiology on Eliquis at home, BPH, GERD, glaucoma transferred from [facility] for SDH after mechanical fall during treatment for sepsis with unidentified source complicated by GTC seizure in [facility] ED intubated and admitted to NSICU, now afebrile, normotensive on medicine service with stable non-evolving SDH off eliquis requiring no neurosurgical intervention. Hypocalcemia and hypomagnesium 2/2 hypoparathyroidism of unknown origin on calicitriol/calcium carbonate/vit D followed by endocrine. Known history of DVT of unknown etiology, evaluated by IR, no placement of IVC filter as duplex shows chronic, not acute DVT and indication for lifelong a/c not yet clarified through PCP.

## 2017-10-19 NOTE — PROGRESS NOTE ADULT - SUBJECTIVE AND OBJECTIVE BOX
CC: f/u for transient fever    Patient reports he wants to leave    REVIEW OF SYSTEMS:  All other review of systems negative (Comprehensive ROS)    Antimicrobials Day #  :    Other Medications Reviewed    T(F): 98.9 (10-19-17 @ 12:01), Max: 98.9 (10-19-17 @ 12:01)  HR: 95 (10-19-17 @ 15:33)  BP: 155/70 (10-19-17 @ 15:33)  RR: 18 (10-19-17 @ 12:01)  SpO2: 97% (10-19-17 @ 15:33)  Wt(kg): --    PHYSICAL EXAM:  General: alert, no acute distress  Eyes:  anicteric, no conjunctival injection, no discharge  Oropharynx: no lesions or injection 	  Neck: supple, without adenopathy  Lungs: clear to auscultation  Heart: regular rate and rhythm; no murmur, rubs or gallops  Abdomen: soft, nondistended, nontender, without mass or organomegaly  Skin: excoriations on arms  Extremities: no clubbing, cyanosis, or edema  Neurologic: alert, a bit confused moves all extremities    LAB RESULTS:                        11.2   6.4   )-----------( 153      ( 19 Oct 2017 10:35 )             31.3     10-19    141  |  104  |  7   ----------------------------<  224<H>  3.2<L>   |  20<L>  |  1.01    Ca    8.1<L>      19 Oct 2017 10:35  Phos  1.9     10-19  Mg     1.6     10-19    TPro  6.4  /  Alb  3.8  /  TBili  0.5  /  DBili  x   /  AST  21  /  ALT  15  /  AlkPhos  46  10-19    LIVER FUNCTIONS - ( 19 Oct 2017 10:35 )  Alb: 3.8 g/dL / Pro: 6.4 g/dL / ALK PHOS: 46 U/L / ALT: 15 U/L RC / AST: 21 U/L / GGT: x             MICROBIOLOGY:  RECENT CULTURES:  10-18 @ 07:15 .Blood blood     No Blood Parasites observed by giemsa stain  One negative set of blood smears does not rule out  the possibility of a parasitic infection.  A minimum of 3  specimens should be collected, at least 12-24 hours apart,  over a 36 hour time period.      10-17 @ 07:38 .Broncial Combicath     Normal Respiratory Belkis present    No polymorphonuclear leukocytes  No squamous epithelial cells per low power field  No organisms seen per oil power field    10-17 @ 07:32 .Blood Blood     No growth to date.      10-15 @ 21:55 .Urine Clean Catch (Midstream)     No growth      10-15 @ 21:53 .Blood Blood     No growth to date.          RADIOLOGY REVIEWED:    < from: CT Head No Cont (10.19.17 @ 11:29) >  Impression: No significant change in small right temporoparietal   occipital subdural hematoma compared with 10/18/2017    < end of copied text >    < from: Xray Chest 1 View AP- PORTABLE-Urgent (10.17.17 @ 07:20) >  IMPRESSION:   Clear lungs.    < end of copied text >  Assessment:  Patient s/p recent trip to [place] and came from airport to [facility] for feeling poorly, reported shaking, hypocalcemic , transient fever and lactic acid that quickly normalized. He fell the next day and hit his head with a small sdh found so sent here, He had a seizure so was intubated briefly now extubated and remains a bit confused. No infection has been found and he has no further fever. He had urinary retention and some hematuria but urine culture is negative  Plan:  Monitor off antibiotics  await babesia pcr

## 2017-10-20 LAB
ALBUMIN SERPL ELPH-MCNC: 3.2 G/DL — LOW (ref 3.3–5)
ALP SERPL-CCNC: 41 U/L — SIGNIFICANT CHANGE UP (ref 40–120)
ALT FLD-CCNC: 13 U/L RC — SIGNIFICANT CHANGE UP (ref 10–45)
ANION GAP SERPL CALC-SCNC: 14 MMOL/L — SIGNIFICANT CHANGE UP (ref 5–17)
AST SERPL-CCNC: 13 U/L — SIGNIFICANT CHANGE UP (ref 10–40)
B MICROTI DNA BLD QL NAA+PROBE: NEGATIVE — SIGNIFICANT CHANGE UP
BABESIA DNA SPEC QL NAA+PROBE: NEGATIVE — SIGNIFICANT CHANGE UP
BABESIA DNA SPEC QL NAA+PROBE: NEGATIVE — SIGNIFICANT CHANGE UP
BILIRUB SERPL-MCNC: 0.3 MG/DL — SIGNIFICANT CHANGE UP (ref 0.2–1.2)
BUN SERPL-MCNC: 11 MG/DL — SIGNIFICANT CHANGE UP (ref 7–23)
CALCIUM SERPL-MCNC: 8.5 MG/DL — SIGNIFICANT CHANGE UP (ref 8.4–10.5)
CALCIUM UR-MCNC: 16 MG/DL — SIGNIFICANT CHANGE UP
CHLORIDE SERPL-SCNC: 106 MMOL/L — SIGNIFICANT CHANGE UP (ref 96–108)
CO2 SERPL-SCNC: 21 MMOL/L — LOW (ref 22–31)
CREAT SERPL-MCNC: 1.03 MG/DL — SIGNIFICANT CHANGE UP (ref 0.5–1.3)
CULTURE RESULTS: SIGNIFICANT CHANGE UP
CULTURE RESULTS: SIGNIFICANT CHANGE UP
GLUCOSE BLDC GLUCOMTR-MCNC: 143 MG/DL — HIGH (ref 70–99)
GLUCOSE BLDC GLUCOMTR-MCNC: 211 MG/DL — HIGH (ref 70–99)
GLUCOSE BLDC GLUCOMTR-MCNC: 211 MG/DL — HIGH (ref 70–99)
GLUCOSE BLDC GLUCOMTR-MCNC: 235 MG/DL — HIGH (ref 70–99)
GLUCOSE BLDC GLUCOMTR-MCNC: 262 MG/DL — HIGH (ref 70–99)
GLUCOSE BLDC GLUCOMTR-MCNC: 296 MG/DL — HIGH (ref 70–99)
GLUCOSE SERPL-MCNC: 136 MG/DL — HIGH (ref 70–99)
HCT VFR BLD CALC: 28.5 % — LOW (ref 39–50)
HGB BLD-MCNC: 10 G/DL — LOW (ref 13–17)
M TB TUBERC IFN-G BLD QL: 0.03 IU/ML — SIGNIFICANT CHANGE UP
M TB TUBERC IFN-G BLD QL: 0.45 IU/ML — SIGNIFICANT CHANGE UP
M TB TUBERC IFN-G BLD QL: POSITIVE
MAGNESIUM SERPL-MCNC: 1.6 MG/DL — SIGNIFICANT CHANGE UP (ref 1.6–2.6)
MAGNESIUM UR-MCNC: 16.5 MG/DL — SIGNIFICANT CHANGE UP
MCHC RBC-ENTMCNC: 32 PG — SIGNIFICANT CHANGE UP (ref 27–34)
MCHC RBC-ENTMCNC: 35 GM/DL — SIGNIFICANT CHANGE UP (ref 32–36)
MCV RBC AUTO: 91.4 FL — SIGNIFICANT CHANGE UP (ref 80–100)
MITOGEN IGNF BCKGRD COR BLD-ACNC: 1.31 IU/ML — SIGNIFICANT CHANGE UP
PLATELET # BLD AUTO: 151 K/UL — SIGNIFICANT CHANGE UP (ref 150–400)
POTASSIUM SERPL-MCNC: 3.5 MMOL/L — SIGNIFICANT CHANGE UP (ref 3.5–5.3)
POTASSIUM SERPL-SCNC: 3.5 MMOL/L — SIGNIFICANT CHANGE UP (ref 3.5–5.3)
PROT SERPL-MCNC: 6 G/DL — SIGNIFICANT CHANGE UP (ref 6–8.3)
RBC # BLD: 3.12 M/UL — LOW (ref 4.2–5.8)
RBC # FLD: 12.3 % — SIGNIFICANT CHANGE UP (ref 10.3–14.5)
SODIUM SERPL-SCNC: 141 MMOL/L — SIGNIFICANT CHANGE UP (ref 135–145)
SPECIMEN SOURCE: SIGNIFICANT CHANGE UP
SPECIMEN SOURCE: SIGNIFICANT CHANGE UP
WBC # BLD: 4.8 K/UL — SIGNIFICANT CHANGE UP (ref 3.8–10.5)
WBC # FLD AUTO: 4.8 K/UL — SIGNIFICANT CHANGE UP (ref 3.8–10.5)

## 2017-10-20 PROCEDURE — 99232 SBSQ HOSP IP/OBS MODERATE 35: CPT | Mod: GC

## 2017-10-20 PROCEDURE — 76536 US EXAM OF HEAD AND NECK: CPT | Mod: 26

## 2017-10-20 PROCEDURE — 99233 SBSQ HOSP IP/OBS HIGH 50: CPT | Mod: GC

## 2017-10-20 RX ORDER — INSULIN LISPRO 100/ML
2 VIAL (ML) SUBCUTANEOUS
Qty: 0 | Refills: 0 | Status: DISCONTINUED | OUTPATIENT
Start: 2017-10-20 | End: 2017-10-23

## 2017-10-20 RX ORDER — MAGNESIUM SULFATE 500 MG/ML
2 VIAL (ML) INJECTION ONCE
Qty: 0 | Refills: 0 | Status: COMPLETED | OUTPATIENT
Start: 2017-10-20 | End: 2017-10-20

## 2017-10-20 RX ORDER — POTASSIUM CHLORIDE 20 MEQ
20 PACKET (EA) ORAL
Qty: 0 | Refills: 0 | Status: COMPLETED | OUTPATIENT
Start: 2017-10-20 | End: 2017-10-20

## 2017-10-20 RX ADMIN — Medication 2 TABLET(S): at 13:57

## 2017-10-20 RX ADMIN — CALCITRIOL 0.5 MICROGRAM(S): 0.5 CAPSULE ORAL at 13:56

## 2017-10-20 RX ADMIN — PREGABALIN 1000 MICROGRAM(S): 225 CAPSULE ORAL at 13:59

## 2017-10-20 RX ADMIN — Medication 100 MILLIGRAM(S): at 06:22

## 2017-10-20 RX ADMIN — Medication 100 MILLIGRAM(S): at 18:01

## 2017-10-20 RX ADMIN — Medication 3: at 14:02

## 2017-10-20 RX ADMIN — LEVETIRACETAM 400 MILLIGRAM(S): 250 TABLET, FILM COATED ORAL at 06:23

## 2017-10-20 RX ADMIN — TAMSULOSIN HYDROCHLORIDE 0.4 MILLIGRAM(S): 0.4 CAPSULE ORAL at 21:23

## 2017-10-20 RX ADMIN — HEPARIN SODIUM 5000 UNIT(S): 5000 INJECTION INTRAVENOUS; SUBCUTANEOUS at 18:00

## 2017-10-20 RX ADMIN — Medication 20 MILLIEQUIVALENT(S): at 12:07

## 2017-10-20 RX ADMIN — Medication 50 GRAM(S): at 12:07

## 2017-10-20 RX ADMIN — Medication 2 TABLET(S): at 06:19

## 2017-10-20 RX ADMIN — Medication 50 MILLIGRAM(S): at 06:19

## 2017-10-20 RX ADMIN — Medication 2: at 17:59

## 2017-10-20 RX ADMIN — Medication 1 MILLIGRAM(S): at 13:57

## 2017-10-20 RX ADMIN — Medication 2 TABLET(S): at 21:23

## 2017-10-20 RX ADMIN — Medication 50 MILLIGRAM(S): at 21:23

## 2017-10-20 RX ADMIN — MAGNESIUM OXIDE 400 MG ORAL TABLET 400 MILLIGRAM(S): 241.3 TABLET ORAL at 12:06

## 2017-10-20 RX ADMIN — LEVETIRACETAM 400 MILLIGRAM(S): 250 TABLET, FILM COATED ORAL at 17:58

## 2017-10-20 RX ADMIN — Medication 50 MILLIGRAM(S): at 13:58

## 2017-10-20 RX ADMIN — Medication 100 MILLIGRAM(S): at 13:58

## 2017-10-20 RX ADMIN — Medication 2: at 00:51

## 2017-10-20 RX ADMIN — HEPARIN SODIUM 5000 UNIT(S): 5000 INJECTION INTRAVENOUS; SUBCUTANEOUS at 06:22

## 2017-10-20 RX ADMIN — Medication 20 MILLIEQUIVALENT(S): at 09:40

## 2017-10-20 NOTE — PROGRESS NOTE ADULT - ATTENDING COMMENTS
Agree with assessment and plan as above by Dr. Lomeli. Reviewed all pertinent labs, glucose values, and imaging studies. Modifications made as indicated above.     Juan Ambrosio D.O  [phone]

## 2017-10-20 NOTE — PROGRESS NOTE ADULT - ATTENDING COMMENTS
-Patient's mental status more clear today. Still with some blood in his urine. Will discuss with urology trial of void. -Urine cytology ordered for hematuria work up.   -PT recs rehab, will pursue.   -Discussed with patient's cardiologist (Dr. Telles) and his PMD (Dr. Banks) who explained that the patient had a sudden extensive bilateral unprovoked DVT in around [id number] and he has been on apixaban since then. They were planning lifelong AC due to the severity of unprovoked DVT and thought that it may recur. Dr. Banks agreed that an IVC filter should be placed. They didn't send any thrombophilia work up.   -Discussed with IR and they said that since there is no acute clot or old clot seen on US of LE's, there is no indication for an IVC filter.   -Will discuss with patient's hematologist (Dr. Blake Henson in [place]) to hear his opinion and see if patient had any positive thrombophilia work up.   -US of parathyroid shows some very small parathyroid nodules of unclear significance. Patient with known history of hypoparathyroidism per PMD, which hasn't been worked up extensively.

## 2017-10-20 NOTE — PROGRESS NOTE ADULT - PROBLEM SELECTOR PLAN 1
- hypocalcemia is 2/2 known hypoparathyroidism, however this history could not be obtained on admission due to patient's mental status  - c/w calcium carbonate to 2 tabs TID  - can check calcium qd - if calcium stable tomorrow, can decrease calcium carbonate to 2 tabs BID  - c/w calcitriol 0.5 mcg qd - check phosphorous with AM labs  - c/w vitamin D 50,000 units qweek  - maintain adequate magnesium levels  - will follow

## 2017-10-20 NOTE — PROGRESS NOTE ADULT - ASSESSMENT
78 yo male s/p fall with SDH, transfer from OSH. Noted to have gross hematuria after catheter placement. Now improving.    Patient with significant former smoking history. Also with known BPH, with urologist. Differential includes BPH, traumatic pena, stones, bladder/renal tumor.    Urine color significantly improved, hematuria resolving.    --trial of void, check post void residual. If patient unable to urinate, would replace catheter.   --Save urine, monitor color  --continue flomax, finasteride  --f/up urine cytology   --stable for outpatient follow up with his urologist as outpatient, should see in 2-4 weeks with plans for cystoscopy, CT urogram.  --alternatively may see Dr. Flaherty, [facility], [phone]  --will sign off, please call if hematuria acutely worsens or unable to urinate

## 2017-10-20 NOTE — PROGRESS NOTE ADULT - PROBLEM SELECTOR PLAN 6
- No episodes of agitation overnight, on enhanced supervision.  -Pt has had delirium with episodes of paranoia  -AMS possibly 2/2 SDH vs hospital delirium vs. vitamin deficiency  -Pt initially treated at OSH for presumed sepsis without source; currently afebrile and hemodynamically stable without localizing source on exam or hx; continue monitoring OFF abx with ID following.  Lyme serologies neg, HIV negative, BCX, UCX, blood parasites, bronch cx, legionella all nondiagnostic to date.

## 2017-10-20 NOTE — PROGRESS NOTE ADULT - PROBLEM SELECTOR PLAN 3
-repleted, trend, replete prn as this is possibly contributing to hypocalcemia.   -Will start magnesium 400mg daily. -repleted, trend, replete prn as this is possibly contributing to hypocalcemia.   -C/w magnesium 400mg daily.

## 2017-10-20 NOTE — PROGRESS NOTE ADULT - SUBJECTIVE AND OBJECTIVE BOX
CC: f/u for transient fever    Patient reports no complaints     REVIEW OF SYSTEMS:  All other review of systems negative (Comprehensive ROS)    Other Medications Reviewed    Vital Signs Last 24 Hrs  T(C): 36.5 (20 Oct 2017 04:50), Max: 36.9 (19 Oct 2017 20:38)  T(F): 97.7 (20 Oct 2017 04:50), Max: 98.4 (19 Oct 2017 20:38)  HR: 80 (20 Oct 2017 12:58) (72 - 83)  BP: 147/78 (20 Oct 2017 12:58) (147/78 - 169/74)  BP(mean): --  RR: 18 (20 Oct 2017 12:58) (18 - 19)  SpO2: 96% (20 Oct 2017 12:58) (95% - 96%)    PHYSICAL EXAM:  General: alert, no acute distress  Eyes:  anicteric, no conjunctival injection, no discharge  Oropharynx: no lesions or injection 	  Neck: supple, without adenopathy  Lungs: clear to auscultation  Heart: regular rate and rhythm; no murmur, rubs or gallops  Abdomen: soft, nondistended, nontender, without mass or organomegaly  Skin: excoriations on arms  Extremities: no clubbing, cyanosis, or edema  Neurologic: alert, a bit confused moves all extremities    LAB RESULTS:                        10.0   4.8   )-----------( 151      ( 20 Oct 2017 06:31 )             28.5                           11.2   6.4   )-----------( 153      ( 19 Oct 2017 10:35 )             31.3     10-19    141  |  104  |  7   ----------------------------<  224<H>  3.2<L>   |  20<L>  |  1.01    Ca    8.1<L>      19 Oct 2017 10:35  Phos  1.9     10-19  Mg     1.6     10-19    TPro  6.4  /  Alb  3.8  /  TBili  0.5  /  DBili  x   /  AST  21  /  ALT  15  /  AlkPhos  46  10-19    LIVER FUNCTIONS - ( 19 Oct 2017 10:35 )  Alb: 3.8 g/dL / Pro: 6.4 g/dL / ALK PHOS: 46 U/L / ALT: 15 U/L RC / AST: 21 U/L / GGT: x             MICROBIOLOGY:  RECENT CULTURES:  10-18 @ 07:15 .Blood blood     No Blood Parasites observed by giemsa stain  One negative set of blood smears does not rule out  the possibility of a parasitic infection.  A minimum of 3  specimens should be collected, at least 12-24 hours apart,  over a 36 hour time period.      10-17 @ 07:38 .Broncial Combicath     Normal Respiratory Belkis present    No polymorphonuclear leukocytes  No squamous epithelial cells per low power field  No organisms seen per oil power field    10-17 @ 07:32 .Blood Blood     No growth to date.      10-15 @ 21:55 .Urine Clean Catch (Midstream)     No growth      10-15 @ 21:53 .Blood Blood     No growth to date.          RADIOLOGY REVIEWED:    < from: CT Head No Cont (10.19.17 @ 11:29) >  Impression: No significant change in small right temporoparietal   occipital subdural hematoma compared with 10/18/2017    < end of copied text >    < from: Xray Chest 1 View AP- PORTABLE-Urgent (10.17.17 @ 07:20) >  IMPRESSION:   Clear lungs.    < end of copied text >  Assessment:  Patient s[place] recent trip to [place] and came from airport to [facility] for feeling poorly, reported shaking, hypocalcemic , transient fever and lactic acid that quickly normalized. He fell the next day and hit his head with a small sdh found so sent here, He had a seizure so was intubated briefly now extubated and remains a bit confused.   No infection has been found and he has no further fever.   He had urinary retention and some hematuria but urine culture is negative  Reviewed his temps no fevers  Reviewed bebsia PCR is negative   Reviewed blood cx is no growth   Lyme C6 is negative     Plan:  supportive care per medical service   monitor off antibiotics

## 2017-10-20 NOTE — PROGRESS NOTE ADULT - PROBLEM SELECTOR PLAN 10
-FSG mostly <200  -hold oral hypoglycemics, c/w ISS for now  -endo following, follow recs. -hold oral hypoglycemics, c/w ISS for now  -endo following, follow recs.

## 2017-10-20 NOTE — PROGRESS NOTE ADULT - SUBJECTIVE AND OBJECTIVE BOX
Patient is a 77y old  Male who presents with a chief complaint of SDH, transfer from [facility] (17 Oct 2017 02:28)      SUBJECTIVE / OVERNIGHT EVENTS:    The patient has no concerns overnight, resting comfortably, no headaches, no changes in vision, no back pain.    MEDICATIONS  (STANDING):  calcitriol   Capsule 0.5 MICROGram(s) Oral daily  calcium carbonate 1250 mG (OsCal) 2 Tablet(s) Oral three times a day  cyanocobalamin 1000 MICROGram(s) Oral daily  ergocalciferol 70966 Unit(s) Oral every week  folic acid 1 milliGRAM(s) Oral daily  heparin  Injectable 5000 Unit(s) SubCutaneous every 12 hours  hydrALAZINE 50 milliGRAM(s) Oral every 8 hours  insulin lispro (HumaLOG) corrective regimen sliding scale   SubCutaneous every 6 hours  labetalol 100 milliGRAM(s) Oral two times a day  levETIRAcetam  IVPB 1000 milliGRAM(s) IV Intermittent every 12 hours  magnesium oxide 400 milliGRAM(s) Oral daily  magnesium sulfate  IVPB 2 Gram(s) IV Intermittent once  potassium chloride    Tablet ER 20 milliEquivalent(s) Oral every 2 hours  tamsulosin 0.4 milliGRAM(s) Oral at bedtime  thiamine 100 milliGRAM(s) Oral daily    MEDICATIONS  (PRN):  acetaminophen   Tablet 650 milliGRAM(s) Oral every 6 hours PRN For Temp greater than 38 C (100.4 F)  acetaminophen   Tablet. 650 milliGRAM(s) Oral every 6 hours PRN Mild Pain (1 - 3)      Vital Signs Last 24 Hrs  T(C): 36.5 (20 Oct 2017 04:50), Max: 37.2 (19 Oct 2017 12:01)  T(F): 97.7 (20 Oct 2017 04:50), Max: 98.9 (19 Oct 2017 12:01)  HR: 74 (20 Oct 2017 04:50) (72 - 95)  BP: 169/74 (20 Oct 2017 04:50) (155/70 - 169/74)  BP(mean): --  RR: 18 (20 Oct 2017 04:50) (18 - 19)  SpO2: 95% (20 Oct 2017 04:50) (95% - 97%)  CAPILLARY BLOOD GLUCOSE      POCT Blood Glucose.: 143 mg/dL (20 Oct 2017 06:17)  POCT Blood Glucose.: 211 mg/dL (20 Oct 2017 00:22)  POCT Blood Glucose.: 312 mg/dL (19 Oct 2017 21:01)  POCT Blood Glucose.: 159 mg/dL (19 Oct 2017 17:50)  POCT Blood Glucose.: 227 mg/dL (19 Oct 2017 12:49)    I&O's Summary    19 Oct 2017 07:01  -  20 Oct 2017 07:00  --------------------------------------------------------  IN: 580 mL / OUT: 1476 mL / NET: -896 mL        PHYSICAL EXAM:  GENERAL: NAD, well-developed  HEAD:  Atraumatic, Normocephalic  EYES: EOMI, PERRLA, conjunctiva and sclera clear  NECK: Supple, No JVD  CHEST/LUNG: Clear to auscultation bilaterally; No wheeze  HEART: Regular rate and rhythm; No murmurs, rubs, or gallops  ABDOMEN: Soft, Nontender, Nondistended; Bowel sounds present  EXTREMITIES:  2+ Peripheral Pulses, No clubbing, cyanosis, or edema  PSYCH: AAOx3  NEUROLOGY: non-focal  SKIN: No rashes or lesions    LABS:                        10.0   4.8   )-----------( 151      ( 20 Oct 2017 06:31 )             28.5     10-20    141  |  106  |  11  ----------------------------<  136<H>  3.5   |  21<L>  |  1.03    Ca    8.5      20 Oct 2017 06:31  Phos  1.9     10-19  Mg     1.6     10-20    TPro  6.0  /  Alb  3.2<L>  /  TBili  0.3  /  DBili  x   /  AST  13  /  ALT  13  /  AlkPhos  41  10-20              RADIOLOGY & ADDITIONAL TESTS:        Imaging Personally Reviewed:    Consultant(s) Notes Reviewed:      Care Discussed with Consultants/Other Providers: Patient is a 77y old  Male who presents with a chief complaint of SDH, transfer from [facility] (17 Oct 2017 02:28)      SUBJECTIVE / OVERNIGHT EVENTS:    The patient has no concerns overnight, resting comfortably, no headaches, no changes in vision, no back pain.    MEDICATIONS  (STANDING):  calcitriol   Capsule 0.5 MICROGram(s) Oral daily  calcium carbonate 1250 mG (OsCal) 2 Tablet(s) Oral three times a day  cyanocobalamin 1000 MICROGram(s) Oral daily  ergocalciferol 71492 Unit(s) Oral every week  folic acid 1 milliGRAM(s) Oral daily  heparin  Injectable 5000 Unit(s) SubCutaneous every 12 hours  hydrALAZINE 50 milliGRAM(s) Oral every 8 hours  insulin lispro (HumaLOG) corrective regimen sliding scale   SubCutaneous every 6 hours  labetalol 100 milliGRAM(s) Oral two times a day  levETIRAcetam  IVPB 1000 milliGRAM(s) IV Intermittent every 12 hours  magnesium oxide 400 milliGRAM(s) Oral daily  magnesium sulfate  IVPB 2 Gram(s) IV Intermittent once  potassium chloride    Tablet ER 20 milliEquivalent(s) Oral every 2 hours  tamsulosin 0.4 milliGRAM(s) Oral at bedtime  thiamine 100 milliGRAM(s) Oral daily    MEDICATIONS  (PRN):  acetaminophen   Tablet 650 milliGRAM(s) Oral every 6 hours PRN For Temp greater than 38 C (100.4 F)  acetaminophen   Tablet. 650 milliGRAM(s) Oral every 6 hours PRN Mild Pain (1 - 3)      Vital Signs Last 24 Hrs  T(C): 36.5 (20 Oct 2017 04:50), Max: 37.2 (19 Oct 2017 12:01)  T(F): 97.7 (20 Oct 2017 04:50), Max: 98.9 (19 Oct 2017 12:01)  HR: 74 (20 Oct 2017 04:50) (72 - 95)  BP: 169/74 (20 Oct 2017 04:50) (155/70 - 169/74)  BP(mean): --  RR: 18 (20 Oct 2017 04:50) (18 - 19)  SpO2: 95% (20 Oct 2017 04:50) (95% - 97%)  CAPILLARY BLOOD GLUCOSE      POCT Blood Glucose.: 143 mg/dL (20 Oct 2017 06:17)  POCT Blood Glucose.: 211 mg/dL (20 Oct 2017 00:22)  POCT Blood Glucose.: 312 mg/dL (19 Oct 2017 21:01)  POCT Blood Glucose.: 159 mg/dL (19 Oct 2017 17:50)  POCT Blood Glucose.: 227 mg/dL (19 Oct 2017 12:49)    I&O's Summary    19 Oct 2017 07:01  -  20 Oct 2017 07:00  --------------------------------------------------------  IN: 580 mL / OUT: 1476 mL / NET: -896 mL        PHYSICAL EXAM:  GENERAL: NAD, well-developed  HEAD:  Atraumatic, Normocephalic  EYES: EOMI, PERRLA, conjunctiva and sclera clear  NECK: Supple, No JVD  CHEST/LUNG: Clear to auscultation bilaterally; No wheeze  HEART: Regular rate and rhythm; No murmurs, rubs, or gallops  ABDOMEN: Soft, Nontender, Nondistended; Bowel sounds present  EXTREMITIES:  2+ Peripheral Pulses, No clubbing, cyanosis, or edema  PSYCH: AAOx3  NEUROLOGY: non-focal  SKIN: No rashes or lesions    LABS:                        10.0   4.8   )-----------( 151      ( 20 Oct 2017 06:31 )             28.5     10-20    141  |  106  |  11  ----------------------------<  136<H>  3.5   |  21<L>  |  1.03    Ca    8.5      20 Oct 2017 06:31  Phos  1.9     10-19  Mg     1.6     10-20    TPro  6.0  /  Alb  3.2<L>  /  TBili  0.3  /  DBili  x   /  AST  13  /  ALT  13  /  AlkPhos  41  10-20              RADIOLOGY & ADDITIONAL TESTS:        Imaging Personally Reviewed:    Consultant(s) Notes Reviewed:  Endocrinology.     Care Discussed with Consultants/Other Providers:

## 2017-10-20 NOTE — PROGRESS NOTE ADULT - SUBJECTIVE AND OBJECTIVE BOX
Chief Complaint: f/u hypoparathyroidism, DM2    History:  Patient reports eating well. No abdominal pain, nausea, vomiting, diarrhea. Patient is concerned that he is hyperglycemic. He does not remember what does of calcitriol he takes at home.    MEDICATIONS  (STANDING):  calcitriol   Capsule 0.5 MICROGram(s) Oral daily  calcium carbonate 1250 mG (OsCal) 2 Tablet(s) Oral three times a day  cyanocobalamin 1000 MICROGram(s) Oral daily  ergocalciferol 21251 Unit(s) Oral every week  folic acid 1 milliGRAM(s) Oral daily  heparin  Injectable 5000 Unit(s) SubCutaneous every 12 hours  hydrALAZINE 50 milliGRAM(s) Oral every 8 hours  insulin lispro (HumaLOG) corrective regimen sliding scale   SubCutaneous every 6 hours  labetalol 100 milliGRAM(s) Oral two times a day  levETIRAcetam  IVPB 1000 milliGRAM(s) IV Intermittent every 12 hours  magnesium oxide 400 milliGRAM(s) Oral daily  tamsulosin 0.4 milliGRAM(s) Oral at bedtime  thiamine 100 milliGRAM(s) Oral daily    MEDICATIONS  (PRN):  acetaminophen   Tablet 650 milliGRAM(s) Oral every 6 hours PRN For Temp greater than 38 C (100.4 F)  acetaminophen   Tablet. 650 milliGRAM(s) Oral every 6 hours PRN Mild Pain (1 - 3)      Allergies    Allergy Status Unknown  No Known Allergies        Review of Systems:  Constitutional: No fever  HEENT: No pain  Cardiovascular: No chest pain, palpitations  Respiratory: No SOB, no cough  GI: No nausea, vomiting, abdominal pain      ALL OTHER SYSTEMS REVIEWED AND NEGATIVE      PHYSICAL EXAM:  VITALS: T(C): 36.5 (10-20-17 @ 04:50)  T(F): 97.7 (10-20-17 @ 04:50), Max: 98.4 (10-19-17 @ 20:38)  HR: 80 (10-20-17 @ 12:58) (72 - 83)  BP: 147/78 (10-20-17 @ 12:58) (147/78 - 169/74)  RR:  (18 - 19)  SpO2:  (95% - 96%)  Wt(kg): --  GENERAL: NAD, well-developed  EYES: No proptosis, anicteric  HEENT:  Atraumatic, Normocephalic  RESPIRATORY: Clear to auscultation bilaterally; No rales, rhonchi, wheezing, or rubs  CARDIOVASCULAR: Regular rate and rhythm; No murmurs; no peripheral edema  GI: Soft, nontender, non distended, normal bowel sounds    POCT Blood Glucose.: 296 mg/dL (10-20-17 @ 13:58)   POCT Blood Glucose.: 262 mg/dL (10-20-17 @ 12:13) H 3  POCT Blood Glucose.: 143 mg/dL (10-20-17 @ 06:17)  POCT Blood Glucose.: 211 mg/dL (10-20-17 @ 00:22) H 2  POCT Blood Glucose.: 312 mg/dL (10-19-17 @ 21:01)   POCT Blood Glucose.: 159 mg/dL (10-19-17 @ 17:50) H 1  POCT Blood Glucose.: 227 mg/dL (10-19-17 @ 12:49) H 2  POCT Blood Glucose.: 134 mg/dL (10-19-17 @ 06:50)  POCT Blood Glucose.: 176 mg/dL (10-18-17 @ 23:28)  POCT Blood Glucose.: 96 mg/dL (10-18-17 @ 17:23)  POCT Blood Glucose.: 97 mg/dL (10-18-17 @ 17:23)  POCT Blood Glucose.: 166 mg/dL (10-18-17 @ 13:40)  POCT Blood Glucose.: 91 mg/dL (10-18-17 @ 05:31)  POCT Blood Glucose.: 104 mg/dL (10-18-17 @ 00:26)  POCT Blood Glucose.: 108 mg/dL (10-17-17 @ 18:14)      10-20    141  |  106  |  11  ----------------------------<  136<H>  3.5   |  21<L>  |  1.03    EGFR if : 80  EGFR if non : 69    Ca    8.5      10-20  Mg     1.6     10-20  Phos  1.9     10-19    TPro  6.0  /  Alb  3.2<L>  /  TBili  0.3  /  DBili  x   /  AST  13  /  ALT  13  /  AlkPhos  41  10-20          Thyroid Function Tests:  10-16 @ 06:37 TSH 0.64 FreeT4 -- T3 -- Anti TPO -- Anti Thyroglobulin Ab -- TSI --      Hemoglobin A1C, Whole Blood: 6.0 % <H> [4.0 - 5.6] (10-17-17 @ 08:07)  Hemoglobin A1C, Whole Blood: 5.9 % <H> [4.0 - 5.6] (10-16-17 @ 10:58)

## 2017-10-20 NOTE — PROGRESS NOTE ADULT - ASSESSMENT
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 SDH stable on CT head, pt stable with no focal neural deficits, cleared by Neuro sx with no surgical intervention. No reported seizures overnight, VEEG after first showed no epilieptic activity. Pt also found to by hypocalcemic most likely 2/2 hypoparathyroidism now on calcium carbonate, calcitriol, vitamin D, level 8.5 this AM. Pt stable, afebrile, normotensive, A+Ox3 with no episodes of agitation on HSQ 5000 with eliquis held in setting of SDH.

## 2017-10-20 NOTE — PROGRESS NOTE ADULT - PROBLEM SELECTOR PLAN 2
- patient with post-prandial hyperglycemia, AM FS at goal  - recommend start humalog 3 units qac  - c/w low correction scale qac and qhs - patient with post-prandial hyperglycemia, AM FS at goal  - recommend start humalog 2 units qac  - c/w low correction scale qac and qhs

## 2017-10-20 NOTE — PROGRESS NOTE ADULT - PROBLEM SELECTOR PLAN 4
-ongoing, but appears to be slowly clearing  -urology following, recs appreciated  -c/w pena for now; plan for TOV when hematuria clears further  -will need full hematuria w/u including cystoscopy vs. ct urogram, cytology; possibly can be done as o/p.  -c/w finasteride, flomax  -Send urine cytology. -ongoing  -urology following, recs appreciated  -c/w Garcia for now; plan for TOV when hematuria clears further  -will need full hematuria w/u including cystoscopy vs. ct urogram, cytology; possibly can be done as o/p.  -c/w finasteride, flomax  -Send urine cytology.

## 2017-10-20 NOTE — PROVIDER CONTACT NOTE (OTHER) - ASSESSMENT
A&O x3, forgetful.  /85.  HR 74.  Temp 98.2.  RR 18.  98% O2 saturation on room air.  Patient was asleep and asymptomatic during time of event

## 2017-10-20 NOTE — PROVIDER CONTACT NOTE (OTHER) - ASSESSMENT
A&O x3, forgetful.  Patient voided in urinal: 125 mL.  Bladder scan post-void showed 58 mL.  Patient with no current complaints of discomfort/pain

## 2017-10-20 NOTE — PROGRESS NOTE ADULT - PROBLEM SELECTOR PLAN 5
-f/u with PCP (Elfego) regarding risk factors for hypercoagulability/lifelong a/c.  -interval LE dopplers negative for acute DVT only showed old clot with collateralization  -IF pt does not have risk factor for recurrent clot, then IVC likely not indicated as per IR  -Hold full dose a/c given SDH

## 2017-10-20 NOTE — PROGRESS NOTE ADULT - SUBJECTIVE AND OBJECTIVE BOX
Subjective    Patient seen and examined. Overall doing well. Hematuria significantly improved, now pink. Pena removed.     Objective    Vital signs  T(F): , Max: 98.4 (10-19-17 @ 20:38)  HR: 80 (10-20-17 @ 12:58)  BP: 147/78 (10-20-17 @ 12:58)  SpO2: 96% (10-20-17 @ 12:58)  Wt(kg): --    Output     10-19 @ 07:01  -  10-20 @ 07:00  --------------------------------------------------------  IN: 580 mL / OUT: 1476 mL / NET: -896 mL    10-20 @ 07:01  -  10-20 @ 16:42  --------------------------------------------------------  IN: 660 mL / OUT: 600 mL / NET: 60 mL        General: NAD  Abdomen: soft/non-tender/non-distended  : pena light pink     Labs      10-20 @ 06:31    WBC 4.8   / Hct 28.5  / SCr 1.03     10-19 @ 10:35    WBC 6.4   / Hct 31.3  / SCr 1.01

## 2017-10-20 NOTE — PROGRESS NOTE ADULT - PROBLEM SELECTOR PLAN 2
-Endo following, recs appreciated  -c/w calcitriol, calcium carbonate, and vit d supplementation, level 8.5 this AM  -Will start magnesium 400mg daily. -Endo following, recs appreciated  -c/w calcitriol, calcium carbonate, and vit d supplementation, level 8.5 this AM  -C/w magnesium 400mg daily.

## 2017-10-20 NOTE — PROGRESS NOTE ADULT - PROBLEM SELECTOR PLAN 9
- hydralazine 50mg tid and now 100 mg labetalol twice daily added b/c systolic > 180 this morning and goal systolic <160 - C/w hydralazine 50mg tid and 100 mg labetalol twice daily. goal systolic BP <160

## 2017-10-21 DIAGNOSIS — Z29.9 ENCOUNTER FOR PROPHYLACTIC MEASURES, UNSPECIFIED: ICD-10-CM

## 2017-10-21 LAB
ANION GAP SERPL CALC-SCNC: 12 MMOL/L — SIGNIFICANT CHANGE UP (ref 5–17)
BUN SERPL-MCNC: 16 MG/DL — SIGNIFICANT CHANGE UP (ref 7–23)
CALCIUM SERPL-MCNC: 8.7 MG/DL — SIGNIFICANT CHANGE UP (ref 8.4–10.5)
CHLORIDE SERPL-SCNC: 104 MMOL/L — SIGNIFICANT CHANGE UP (ref 96–108)
CO2 SERPL-SCNC: 22 MMOL/L — SIGNIFICANT CHANGE UP (ref 22–31)
CREAT SERPL-MCNC: 0.99 MG/DL — SIGNIFICANT CHANGE UP (ref 0.5–1.3)
GLUCOSE BLDC GLUCOMTR-MCNC: 188 MG/DL — HIGH (ref 70–99)
GLUCOSE BLDC GLUCOMTR-MCNC: 200 MG/DL — HIGH (ref 70–99)
GLUCOSE BLDC GLUCOMTR-MCNC: 212 MG/DL — HIGH (ref 70–99)
GLUCOSE BLDC GLUCOMTR-MCNC: 213 MG/DL — HIGH (ref 70–99)
GLUCOSE SERPL-MCNC: 195 MG/DL — HIGH (ref 70–99)
HCT VFR BLD CALC: 29.4 % — LOW (ref 39–50)
HGB BLD-MCNC: 10.6 G/DL — LOW (ref 13–17)
MCHC RBC-ENTMCNC: 32.9 PG — SIGNIFICANT CHANGE UP (ref 27–34)
MCHC RBC-ENTMCNC: 35.9 GM/DL — SIGNIFICANT CHANGE UP (ref 32–36)
MCV RBC AUTO: 91.6 FL — SIGNIFICANT CHANGE UP (ref 80–100)
PHOSPHATE SERPL-MCNC: 2 MG/DL — LOW (ref 2.5–4.5)
PLATELET # BLD AUTO: 164 K/UL — SIGNIFICANT CHANGE UP (ref 150–400)
POTASSIUM SERPL-MCNC: 4.7 MMOL/L — SIGNIFICANT CHANGE UP (ref 3.5–5.3)
POTASSIUM SERPL-SCNC: 4.7 MMOL/L — SIGNIFICANT CHANGE UP (ref 3.5–5.3)
RBC # BLD: 3.21 M/UL — LOW (ref 4.2–5.8)
RBC # FLD: 12.4 % — SIGNIFICANT CHANGE UP (ref 10.3–14.5)
SODIUM SERPL-SCNC: 138 MMOL/L — SIGNIFICANT CHANGE UP (ref 135–145)
WBC # BLD: 5.1 K/UL — SIGNIFICANT CHANGE UP (ref 3.8–10.5)
WBC # FLD AUTO: 5.1 K/UL — SIGNIFICANT CHANGE UP (ref 3.8–10.5)

## 2017-10-21 PROCEDURE — 99233 SBSQ HOSP IP/OBS HIGH 50: CPT | Mod: GC

## 2017-10-21 RX ORDER — GLUCAGON INJECTION, SOLUTION 0.5 MG/.1ML
1 INJECTION, SOLUTION SUBCUTANEOUS ONCE
Qty: 0 | Refills: 0 | Status: DISCONTINUED | OUTPATIENT
Start: 2017-10-21 | End: 2017-10-26

## 2017-10-21 RX ORDER — DEXTROSE 50 % IN WATER 50 %
1 SYRINGE (ML) INTRAVENOUS ONCE
Qty: 0 | Refills: 0 | Status: DISCONTINUED | OUTPATIENT
Start: 2017-10-21 | End: 2017-10-26

## 2017-10-21 RX ORDER — DEXTROSE 50 % IN WATER 50 %
25 SYRINGE (ML) INTRAVENOUS ONCE
Qty: 0 | Refills: 0 | Status: DISCONTINUED | OUTPATIENT
Start: 2017-10-21 | End: 2017-10-26

## 2017-10-21 RX ORDER — CALCIUM CARBONATE 500(1250)
2 TABLET ORAL
Qty: 0 | Refills: 0 | Status: DISCONTINUED | OUTPATIENT
Start: 2017-10-21 | End: 2017-10-25

## 2017-10-21 RX ORDER — LEVETIRACETAM 250 MG/1
1000 TABLET, FILM COATED ORAL
Qty: 0 | Refills: 0 | Status: DISCONTINUED | OUTPATIENT
Start: 2017-10-21 | End: 2017-10-26

## 2017-10-21 RX ORDER — INSULIN LISPRO 100/ML
VIAL (ML) SUBCUTANEOUS
Qty: 0 | Refills: 0 | Status: DISCONTINUED | OUTPATIENT
Start: 2017-10-21 | End: 2017-10-26

## 2017-10-21 RX ORDER — DEXTROSE 50 % IN WATER 50 %
12.5 SYRINGE (ML) INTRAVENOUS ONCE
Qty: 0 | Refills: 0 | Status: DISCONTINUED | OUTPATIENT
Start: 2017-10-21 | End: 2017-10-26

## 2017-10-21 RX ORDER — SODIUM CHLORIDE 9 MG/ML
1000 INJECTION, SOLUTION INTRAVENOUS
Qty: 0 | Refills: 0 | Status: DISCONTINUED | OUTPATIENT
Start: 2017-10-21 | End: 2017-10-26

## 2017-10-21 RX ADMIN — Medication 2 TABLET(S): at 05:34

## 2017-10-21 RX ADMIN — PREGABALIN 1000 MICROGRAM(S): 225 CAPSULE ORAL at 12:20

## 2017-10-21 RX ADMIN — LEVETIRACETAM 1000 MILLIGRAM(S): 250 TABLET, FILM COATED ORAL at 17:25

## 2017-10-21 RX ADMIN — HEPARIN SODIUM 5000 UNIT(S): 5000 INJECTION INTRAVENOUS; SUBCUTANEOUS at 05:34

## 2017-10-21 RX ADMIN — LEVETIRACETAM 400 MILLIGRAM(S): 250 TABLET, FILM COATED ORAL at 05:34

## 2017-10-21 RX ADMIN — Medication 100 MILLIGRAM(S): at 05:34

## 2017-10-21 RX ADMIN — Medication 2 UNIT(S): at 09:30

## 2017-10-21 RX ADMIN — Medication 100 MILLIGRAM(S): at 12:20

## 2017-10-21 RX ADMIN — CALCITRIOL 0.5 MICROGRAM(S): 0.5 CAPSULE ORAL at 12:20

## 2017-10-21 RX ADMIN — Medication 2 UNIT(S): at 13:08

## 2017-10-21 RX ADMIN — Medication 1 MILLIGRAM(S): at 12:20

## 2017-10-21 RX ADMIN — Medication 1: at 09:29

## 2017-10-21 RX ADMIN — Medication 50 MILLIGRAM(S): at 21:00

## 2017-10-21 RX ADMIN — Medication 63.75 MILLIMOLE(S): at 17:24

## 2017-10-21 RX ADMIN — HEPARIN SODIUM 5000 UNIT(S): 5000 INJECTION INTRAVENOUS; SUBCUTANEOUS at 17:25

## 2017-10-21 RX ADMIN — Medication 50 MILLIGRAM(S): at 13:09

## 2017-10-21 RX ADMIN — Medication 100 MILLIGRAM(S): at 17:25

## 2017-10-21 RX ADMIN — Medication 2 TABLET(S): at 17:26

## 2017-10-21 RX ADMIN — Medication 1: at 18:33

## 2017-10-21 RX ADMIN — MAGNESIUM OXIDE 400 MG ORAL TABLET 400 MILLIGRAM(S): 241.3 TABLET ORAL at 12:20

## 2017-10-21 RX ADMIN — Medication 50 MILLIGRAM(S): at 05:34

## 2017-10-21 RX ADMIN — Medication 2 UNIT(S): at 18:33

## 2017-10-21 RX ADMIN — TAMSULOSIN HYDROCHLORIDE 0.4 MILLIGRAM(S): 0.4 CAPSULE ORAL at 21:00

## 2017-10-21 RX ADMIN — Medication 2: at 13:08

## 2017-10-21 NOTE — PROGRESS NOTE ADULT - ASSESSMENT
The patient is a 78 yo M with PMH of T2DM, HTN, HLD, hypoparathyroidism, chronic bilateral DVT (popliteal, on Eliquis at home), BPH, GERD, transferred from NSICU after suffering SDH from mechanical fall at [facility] c/b GTC seizure in [facility] ED. SDH stable on CT head, pt stable with no focal neural deficits, evaluated by Neuro sx with no surgical intervention. No reported seizures overnight, Video EEG after first showed no epileptic activity. Pt also found to by hypocalcemic most likely 2/2 hypoparathyroidism now on calcium carbonate, calcitriol, vitamin D, level improving. Pt stable, afebrile, normotensive, A+Ox3 with no episodes of agitation.  On HSQ 5000 with eliquis held in setting of SDH.

## 2017-10-21 NOTE — PROGRESS NOTE ADULT - PROBLEM SELECTOR PLAN 5
- No episodes of agitation overnight  -Pt has had delirium with episodes of paranoia  -AMS possibly 2/2 SDH vs hospital delirium vs. vitamin deficiency  -Pt initially treated at OSH for presumed sepsis without source; currently afebrile and hemodynamically stable without localizing source on exam or hx; continue monitoring OFF abx with ID following.  Lyme serologies neg, HIV negative, BCX, UCX, blood parasites, bronch cx, legionella all nondiagnostic to date.

## 2017-10-21 NOTE — PROGRESS NOTE ADULT - PROBLEM SELECTOR PLAN 7
-c/w calcium supplementation as above  -endo following  -nonspecific 7 mm hypoechoic nodule (possible parathyroid nodule) on US

## 2017-10-21 NOTE — PROGRESS NOTE ADULT - PROBLEM SELECTOR PLAN 10
-HSQ        Abhishek Haider MD  [phone] / [phone]  Attending:  -HSQ        Abhishek Haider MD  [phone] / [phone]  Attending: Dr. Stiles

## 2017-10-21 NOTE — PROGRESS NOTE ADULT - PROBLEM SELECTOR PLAN 1
- CTH stable, no midline shift/mass effect, appreciate neuro sx recs  - off Eliquis, on HSX for DVT ppx  - continue keppra

## 2017-10-21 NOTE — PROGRESS NOTE ADULT - SUBJECTIVE AND OBJECTIVE BOX
Patient is a 77y old  Male who presents with a chief complaint of SDH, transfer from [facility] (17 Oct 2017 02:28)      SUBJECTIVE / OVERNIGHT EVENTS:  No acute events overnight, resting comfortably, no headaches, no changes in vision, no back pain.    MEDICATIONS  (STANDING):  calcitriol   Capsule 0.5 MICROGram(s) Oral daily  calcium carbonate 1250 mG (OsCal) 2 Tablet(s) Oral two times a day  cyanocobalamin 1000 MICROGram(s) Oral daily  dextrose 5%. 1000 milliLiter(s) (50 mL/Hr) IV Continuous <Continuous>  dextrose 50% Injectable 12.5 Gram(s) IV Push once  dextrose 50% Injectable 25 Gram(s) IV Push once  dextrose 50% Injectable 25 Gram(s) IV Push once  ergocalciferol 14792 Unit(s) Oral every week  folic acid 1 milliGRAM(s) Oral daily  heparin  Injectable 5000 Unit(s) SubCutaneous every 12 hours  hydrALAZINE 50 milliGRAM(s) Oral every 8 hours  insulin lispro (HumaLOG) corrective regimen sliding scale   SubCutaneous three times a day before meals  insulin lispro Injectable (HumaLOG) 2 Unit(s) SubCutaneous three times a day before meals  labetalol 100 milliGRAM(s) Oral two times a day  levETIRAcetam  IVPB 1000 milliGRAM(s) IV Intermittent every 12 hours  magnesium oxide 400 milliGRAM(s) Oral daily  tamsulosin 0.4 milliGRAM(s) Oral at bedtime  thiamine 100 milliGRAM(s) Oral daily    MEDICATIONS  (PRN):  acetaminophen   Tablet 650 milliGRAM(s) Oral every 6 hours PRN For Temp greater than 38 C (100.4 F)  acetaminophen   Tablet. 650 milliGRAM(s) Oral every 6 hours PRN Mild Pain (1 - 3)  dextrose Gel 1 Dose(s) Oral once PRN Blood Glucose LESS THAN 70 milliGRAM(s)/deciliter  glucagon  Injectable 1 milliGRAM(s) IntraMuscular once PRN Glucose LESS THAN 70 milligrams/deciliter        CAPILLARY BLOOD GLUCOSE  212 (21 Oct 2017 12:50)  188 (21 Oct 2017 08:59)      POCT Blood Glucose.: 212 mg/dL (21 Oct 2017 12:45)  POCT Blood Glucose.: 188 mg/dL (21 Oct 2017 08:58)  POCT Blood Glucose.: 235 mg/dL (20 Oct 2017 21:03)  POCT Blood Glucose.: 211 mg/dL (20 Oct 2017 17:47)    I&O's Summary    20 Oct 2017 07:01  -  21 Oct 2017 07:00  --------------------------------------------------------  IN: 1400 mL / OUT: 1500 mL / NET: -100 mL    21 Oct 2017 07:01  -  21 Oct 2017 14:28  --------------------------------------------------------  IN: 960 mL / OUT: 1350 mL / NET: -390 mL        PHYSICAL EXAM:  GENERAL: NAD, well-developed  HEAD:  Atraumatic, Normocephalic  EYES: EOMI, PERRLA, conjunctiva and sclera clear  NECK: Supple, No JVD  CHEST/LUNG: Clear to auscultation bilaterally; No wheeze  HEART: Regular rate and rhythm; No murmurs, rubs, or gallops  ABDOMEN: Soft, Nontender, Nondistended; Bowel sounds present  EXTREMITIES:  2+ Peripheral Pulses, No clubbing, cyanosis, or edema  PSYCH: AAOx3  NEUROLOGY: non-focal  SKIN: No rashes or lesions    LABS:                        10.6   5.1   )-----------( 164      ( 21 Oct 2017 05:10 )             29.4     10-21    138  |  104  |  16  ----------------------------<  195<H>  4.7   |  22  |  0.99    Ca    8.7      21 Oct 2017 05:10  Phos  2.0     10-21  Mg     1.6     10-20    TPro  6.0  /  Alb  3.2<L>  /  TBili  0.3  /  DBili  x   /  AST  13  /  ALT  13  /  AlkPhos  41  10-20              RADIOLOGY & ADDITIONAL TESTS:    Imaging Personally Reviewed:    10/20  US THYROID PARATHYROID                        Nonspecific 7 mm hypoechoic nodule noted inferior to the lower pole of   the right thyroid, possibly a parathyroid nodule. This is of unclear   clinical significance in the evaluation of hypoparathyroidism.      Consultant(s) Notes Reviewed:  Endocrinology    Care Discussed with Consultants/Other Providers:

## 2017-10-21 NOTE — PROGRESS NOTE ADULT - PROBLEM SELECTOR PLAN 4
-f/u with PCP (Elfego) regarding risk factors for hypercoagulability/lifelong a/c.  -interval LE dopplers negative for acute DVT only showed old clot with collateralization  -If pt does not have risk factor for recurrent clot, then IVC likely not indicated as per IR per prior notes  -Hold full dose a/c given SDH

## 2017-10-21 NOTE — PROGRESS NOTE ADULT - PROBLEM SELECTOR PLAN 2
-improved, Endo following, recs appreciated  -c/w calcitriol, calcium carbonate, and vit d supplementation  -C/w magnesium 400mg daily.

## 2017-10-22 ENCOUNTER — TRANSCRIPTION ENCOUNTER (OUTPATIENT)
Age: 77
End: 2017-10-22

## 2017-10-22 LAB
ANION GAP SERPL CALC-SCNC: 12 MMOL/L — SIGNIFICANT CHANGE UP (ref 5–17)
BUN SERPL-MCNC: 18 MG/DL — SIGNIFICANT CHANGE UP (ref 7–23)
CALCIUM SERPL-MCNC: 9.3 MG/DL — SIGNIFICANT CHANGE UP (ref 8.4–10.5)
CHLORIDE SERPL-SCNC: 101 MMOL/L — SIGNIFICANT CHANGE UP (ref 96–108)
CO2 SERPL-SCNC: 24 MMOL/L — SIGNIFICANT CHANGE UP (ref 22–31)
CREAT SERPL-MCNC: 1.06 MG/DL — SIGNIFICANT CHANGE UP (ref 0.5–1.3)
CULTURE RESULTS: SIGNIFICANT CHANGE UP
CULTURE RESULTS: SIGNIFICANT CHANGE UP
GLUCOSE BLDC GLUCOMTR-MCNC: 152 MG/DL — HIGH (ref 70–99)
GLUCOSE BLDC GLUCOMTR-MCNC: 163 MG/DL — HIGH (ref 70–99)
GLUCOSE BLDC GLUCOMTR-MCNC: 200 MG/DL — HIGH (ref 70–99)
GLUCOSE BLDC GLUCOMTR-MCNC: 232 MG/DL — HIGH (ref 70–99)
GLUCOSE BLDC GLUCOMTR-MCNC: 272 MG/DL — HIGH (ref 70–99)
GLUCOSE SERPL-MCNC: 201 MG/DL — HIGH (ref 70–99)
HCT VFR BLD CALC: 31.6 % — LOW (ref 39–50)
HGB BLD-MCNC: 10.7 G/DL — LOW (ref 13–17)
MAGNESIUM SERPL-MCNC: 1.3 MG/DL — LOW (ref 1.6–2.6)
MCHC RBC-ENTMCNC: 31.3 PG — SIGNIFICANT CHANGE UP (ref 27–34)
MCHC RBC-ENTMCNC: 33.9 GM/DL — SIGNIFICANT CHANGE UP (ref 32–36)
MCV RBC AUTO: 92.3 FL — SIGNIFICANT CHANGE UP (ref 80–100)
PHOSPHATE SERPL-MCNC: 3.2 MG/DL — SIGNIFICANT CHANGE UP (ref 2.5–4.5)
PLATELET # BLD AUTO: 186 K/UL — SIGNIFICANT CHANGE UP (ref 150–400)
POTASSIUM SERPL-MCNC: 4.5 MMOL/L — SIGNIFICANT CHANGE UP (ref 3.5–5.3)
POTASSIUM SERPL-SCNC: 4.5 MMOL/L — SIGNIFICANT CHANGE UP (ref 3.5–5.3)
RBC # BLD: 3.42 M/UL — LOW (ref 4.2–5.8)
RBC # FLD: 12.5 % — SIGNIFICANT CHANGE UP (ref 10.3–14.5)
SODIUM SERPL-SCNC: 137 MMOL/L — SIGNIFICANT CHANGE UP (ref 135–145)
SPECIMEN SOURCE: SIGNIFICANT CHANGE UP
SPECIMEN SOURCE: SIGNIFICANT CHANGE UP
WBC # BLD: 6.7 K/UL — SIGNIFICANT CHANGE UP (ref 3.8–10.5)
WBC # FLD AUTO: 6.7 K/UL — SIGNIFICANT CHANGE UP (ref 3.8–10.5)

## 2017-10-22 PROCEDURE — 99233 SBSQ HOSP IP/OBS HIGH 50: CPT | Mod: GC

## 2017-10-22 RX ORDER — MAGNESIUM SULFATE 500 MG/ML
2 VIAL (ML) INJECTION ONCE
Qty: 0 | Refills: 0 | Status: COMPLETED | OUTPATIENT
Start: 2017-10-22 | End: 2017-10-22

## 2017-10-22 RX ORDER — LABETALOL HCL 100 MG
200 TABLET ORAL EVERY 12 HOURS
Qty: 0 | Refills: 0 | Status: DISCONTINUED | OUTPATIENT
Start: 2017-10-22 | End: 2017-10-26

## 2017-10-22 RX ADMIN — Medication 2 UNIT(S): at 13:19

## 2017-10-22 RX ADMIN — HEPARIN SODIUM 5000 UNIT(S): 5000 INJECTION INTRAVENOUS; SUBCUTANEOUS at 05:02

## 2017-10-22 RX ADMIN — CALCITRIOL 0.5 MICROGRAM(S): 0.5 CAPSULE ORAL at 13:18

## 2017-10-22 RX ADMIN — Medication 2 UNIT(S): at 18:32

## 2017-10-22 RX ADMIN — Medication 2: at 13:19

## 2017-10-22 RX ADMIN — Medication 1: at 09:48

## 2017-10-22 RX ADMIN — HEPARIN SODIUM 5000 UNIT(S): 5000 INJECTION INTRAVENOUS; SUBCUTANEOUS at 18:30

## 2017-10-22 RX ADMIN — TAMSULOSIN HYDROCHLORIDE 0.4 MILLIGRAM(S): 0.4 CAPSULE ORAL at 21:35

## 2017-10-22 RX ADMIN — Medication 1: at 18:32

## 2017-10-22 RX ADMIN — Medication 2 TABLET(S): at 18:31

## 2017-10-22 RX ADMIN — Medication 200 MILLIGRAM(S): at 18:31

## 2017-10-22 RX ADMIN — Medication 100 MILLIGRAM(S): at 13:18

## 2017-10-22 RX ADMIN — Medication 2 UNIT(S): at 09:48

## 2017-10-22 RX ADMIN — Medication 50 MILLIGRAM(S): at 13:19

## 2017-10-22 RX ADMIN — PREGABALIN 1000 MICROGRAM(S): 225 CAPSULE ORAL at 13:18

## 2017-10-22 RX ADMIN — MAGNESIUM OXIDE 400 MG ORAL TABLET 400 MILLIGRAM(S): 241.3 TABLET ORAL at 13:18

## 2017-10-22 RX ADMIN — Medication 2 TABLET(S): at 05:02

## 2017-10-22 RX ADMIN — Medication 50 GRAM(S): at 09:48

## 2017-10-22 RX ADMIN — LEVETIRACETAM 1000 MILLIGRAM(S): 250 TABLET, FILM COATED ORAL at 05:02

## 2017-10-22 RX ADMIN — Medication 200 MILLIGRAM(S): at 05:02

## 2017-10-22 RX ADMIN — Medication 50 MILLIGRAM(S): at 05:02

## 2017-10-22 RX ADMIN — Medication 1 MILLIGRAM(S): at 13:18

## 2017-10-22 RX ADMIN — LEVETIRACETAM 1000 MILLIGRAM(S): 250 TABLET, FILM COATED ORAL at 18:30

## 2017-10-22 RX ADMIN — Medication 50 MILLIGRAM(S): at 21:35

## 2017-10-22 NOTE — PROGRESS NOTE ADULT - SUBJECTIVE AND OBJECTIVE BOX
Patient is a 77y old  Male who presents with a chief complaint of SDH, transfer from [facility] (17 Oct 2017 02:28)      SUBJECTIVE / OVERNIGHT EVENTS:    Patient reports no concerns overnight. Reported to be unsteady on his feet per nursing staff.     MEDICATIONS  (STANDING):  calcitriol   Capsule 0.5 MICROGram(s) Oral daily  calcium carbonate 1250 mG (OsCal) 2 Tablet(s) Oral two times a day  cyanocobalamin 1000 MICROGram(s) Oral daily  dextrose 5%. 1000 milliLiter(s) (50 mL/Hr) IV Continuous <Continuous>  dextrose 50% Injectable 12.5 Gram(s) IV Push once  dextrose 50% Injectable 25 Gram(s) IV Push once  dextrose 50% Injectable 25 Gram(s) IV Push once  ergocalciferol [id number] Unit(s) Oral every week  folic acid 1 milliGRAM(s) Oral daily  heparin  Injectable 5000 Unit(s) SubCutaneous every 12 hours  hydrALAZINE 50 milliGRAM(s) Oral every 8 hours  insulin lispro (HumaLOG) corrective regimen sliding scale   SubCutaneous three times a day before meals  insulin lispro Injectable (HumaLOG) 2 Unit(s) SubCutaneous three times a day before meals  labetalol 200 milliGRAM(s) Oral every 12 hours  levETIRAcetam 1000 milliGRAM(s) Oral two times a day  magnesium oxide 400 milliGRAM(s) Oral daily  tamsulosin 0.4 milliGRAM(s) Oral at bedtime  thiamine 100 milliGRAM(s) Oral daily    MEDICATIONS  (PRN):  acetaminophen   Tablet 650 milliGRAM(s) Oral every 6 hours PRN For Temp greater than 38 C (100.4 F)  acetaminophen   Tablet. 650 milliGRAM(s) Oral every 6 hours PRN Mild Pain (1 - 3)  dextrose Gel 1 Dose(s) Oral once PRN Blood Glucose LESS THAN 70 milliGRAM(s)/deciliter  glucagon  Injectable 1 milliGRAM(s) IntraMuscular once PRN Glucose LESS THAN 70 milligrams/deciliter      Vital Signs Last 24 Hrs  T(C): 36.6 (22 Oct 2017 04:31), Max: 36.6 (21 Oct 2017 12:50)  T(F): 97.8 (22 Oct 2017 04:31), Max: 97.8 (21 Oct 2017 12:50)  HR: 79 (22 Oct 2017 04:31) (79 - 86)  BP: 158/87 (22 Oct 2017 04:31) (158/87 - 179/88)  BP(mean): --  RR: 18 (22 Oct 2017 04:31) (18 - 18)  SpO2: 92% (22 Oct 2017 04:31) (92% - 98%)  CAPILLARY BLOOD GLUCOSE  212 (21 Oct 2017 12:50)      POCT Blood Glucose.: 200 mg/dL (22 Oct 2017 08:55)  POCT Blood Glucose.: 213 mg/dL (21 Oct 2017 21:58)  POCT Blood Glucose.: 200 mg/dL (21 Oct 2017 18:02)  POCT Blood Glucose.: 212 mg/dL (21 Oct 2017 12:45)    I&O's Summary    21 Oct 2017 07:01  -  22 Oct 2017 07:00  --------------------------------------------------------  IN: 1440 mL / OUT: 2950 mL / NET: -1510 mL    22 Oct 2017 07:01  -  22 Oct 2017 11:19  --------------------------------------------------------  IN: 290 mL / OUT: 500 mL / NET: -210 mL        PHYSICAL EXAM:  GENERAL: NAD, well-developed  HEAD:  Atraumatic, Normocephalic  EYES: EOMI, PERRLA, conjunctiva and sclera clear  NECK: Supple, No JVD  CHEST/LUNG: Clear to auscultation bilaterally; No wheeze  HEART: Regular rate and rhythm; No murmurs, rubs, or gallops  ABDOMEN: Soft, Nontender, Nondistended; Bowel sounds present  EXTREMITIES:  2+ Peripheral Pulses, No clubbing, cyanosis, or edema  PSYCH: AAOx3  NEUROLOGY: non-focal  SKIN: No rashes or lesions    LABS:                        10.7   6.7   )-----------( 186      ( 22 Oct 2017 06:29 )             31.6     10-22    137  |  101  |  18  ----------------------------<  201<H>  4.5   |  24  |  1.06    Ca    9.3      22 Oct 2017 06:29  Phos  3.2     10-22  Mg     1.3     10-22                RADIOLOGY & ADDITIONAL TESTS:    Imaging Personally Reviewed:    Consultant(s) Notes Reviewed:      Care Discussed with Consultants/Other Providers:

## 2017-10-22 NOTE — DISCHARGE NOTE ADULT - SECONDARY DIAGNOSIS.
Seizure Chronic deep vein thrombosis (DVT) of popliteal vein of both lower extremities Gross hematuria Diabetes Hypocalcemia Hypertension Hypotension

## 2017-10-22 NOTE — DISCHARGE NOTE ADULT - OTHER SIGNIFICANT FINDINGS
< from: CT Head No Cont (10.16.17 @ 21:27) >  IMPRESSION:    Thin isodense subdural collection along the right frontotemporal   convexity measuring 5 mm compatible with a small subdural hematoma.   Follow-up recommended    < end of copied text >    < from: CT Head No Cont (10.18.17 @ 07:30) >  FINDINGS: There is redemonstration of a thin right parieto-occipital and   temporal convexity subdural hematoma which appears grossly unchanged when   compared to the mostrecent prior CT examination. The greatest transverse   depth measures 6 mm at the parietal convexity. There is minimal mass   effect without midline shift or herniation. No new areas of intracranial   hemorrhage are noted. There is no hydrocephalus.    There is diffuse cerebral volume loss with prominence of the sulci,   fissures, and cisternal spaces which is normal for the patient's age.   There is mild periventricular white matter hypoattenuation statistically   compatible with microvascular changes given calcific atherosclerotic   disease of the intracranial arteries.    There is minimal scattered mucosal thickening throughout the paranasal   sinuses. The mastoid air cells are clear. The calvarium is intact. The   orbits appear unremarkable.    IMPRESSION: Unchanged thin right-sided parieto-occipital and temporal   convexity subdural hematoma with minimal localized mass effect.    < end of copied text >    < from: CT Head No Cont (10.19.17 @ 11:29) >  Comparison is made with the prior CT of 10/18/2017 demonstrates no   significant interval change.     A small right temporal- parietaloccipital subdural hematoma is   identified with only mild mass effect. Mild atrophy is identified. No   parenchymal hemorrhage is seen.      < end of copied text >    < from: CT Head No Cont (10.24.17 @ 17:08) >  Comparison is made with the prior CT of 10/19/2017.       No significant interval change is identified. There mali very small right   frontal parietal high density extra-axial collection with only local mass   effect consistent with a small subdural hematoma.      The ventricles and sulci are prominent consistent age appropriate   involutional changes. Small vessel white matter ischemic changes are   noted.     IMPRESSION: Age-appropriate involutional and ischemic gliotic changes.   Small right frontal parietal subdural hematoma without significant change   since 10/19/2017.      < end of copied text >  Clinical Impression:  Findings indicate non-specific mild diffuse or multifocal cerebral dysfunction. There were no epileptiform abnormalities recorded.

## 2017-10-22 NOTE — DISCHARGE NOTE ADULT - CARE PROVIDER_API CALL
Finesse Telles), Cardiovascular Disease; Internal Medicine  [address] [id number]  Phone: [phone]  Fax: [phone] Finesse Telles), Cardiovascular Disease; Internal Medicine  [address] [place] [address]  Phone: [phone]  Fax: [phone]    Lucho Chavez), Neurological Surgery  [address] [address]  Phone: [phone]  Fax: [phone]

## 2017-10-22 NOTE — DISCHARGE NOTE ADULT - VISION (WITH CORRECTIVE LENSES IF THE PATIENT USUALLY WEARS THEM):
unable to assess/Normal vision: sees adequately in most situations; can see medication labels, newsprint

## 2017-10-22 NOTE — DISCHARGE NOTE ADULT - ADDITIONAL INSTRUCTIONS
You will be going to rehabilitation before you are able to go home. When you are discharged from rehabilitation please follow up with Dr. Telles and Dr. Mariano within 1 week of discharge from rehabilitation. You will be going to rehabilitation before you are able to go home. When you are discharged from rehabilitation please follow up with Dr. Telles and Dr. Banks within 1 week of discharge from rehabilitation.

## 2017-10-22 NOTE — PROGRESS NOTE ADULT - ASSESSMENT
The patient is a 76 yo M with PMH of T2DM, HTN, HLD, hypoparathyroidism, chronic bilateral DVT (popliteal, on Eliquis at home), BPH, GERD, transferred from NSICU after suffering SDH from mechanical fall at [facility] c/b GTC seizure in [facility] ED. SDH stable on CT head, pt stable with no focal neural deficits, evaluated by Neuro sx with no surgical intervention. No reported seizures overnight, Video EEG after first showed no epileptic activity. Pt also found to by hypocalcemic most likely 2/2 hypoparathyroidism now on calcium carbonate, calcitriol, vitamin D, level improving. He passed TOV on Friday for hematuria, now reports no dysuria, clear urine with hematuria workup outpatient. Pt stable, afebrile, normotensive, A+Ox3 with no episodes of agitation.  On HSQ 5000 with eliquis held in setting of SDH, will f/u with Neuro sx. PT recommendation acute rehab.

## 2017-10-22 NOTE — DISCHARGE NOTE ADULT - HOSPITAL COURSE
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 The patient returned from [facility] 10/8 and [place] to [facility] where he was treated for sepsis with no infection source, febrile 102, lactic acidosis, cutlures negative. The patient reports after several days there he had a mechanical fall in the bathroom. CT head showed 5 mm right SDH and he was transferred to [facility] for neurosurgical evaluation. Prior to arrival he was given Kcentra for reversal of Eliquis. In the [facility] ED he had a GTC seizure, given 4 mg ativan, and 1 g keppra and was intubated. Repeat head CT showed redistribution of original SDH to R parietal region without increase in mass effect or midline shift. The patient was extubated on 10/18. While inpatient the patient has been hypocalcemic and had hypomagnesium. The hypocalcemia is from hypoparathyroidism although pt has normal PTH. Being treated with calcium carbonate, calcitriol, vitamin D. He was noted to have gross hematuria after cathether placement in the NSICU with urology following. Full hematuria workup to be done regarding CT urogram, on flomax, finesteride, with continued indwelling pena. Eliquis held given SDH, pt on HSQ now. The patient denies headache, CP, SoB, dizziness, back pain. While on the medicine service the patient was hemodynamically stable with no focal neuro deficits. He had repeat CT head scan 10/20 which showed no interval change in the SDH. He had no seizure events with his VEEG on 10/17 showing no epileptic wave activity. The ID service followed the patient and agreed there was no need for antibiotic coverage in the setting of no active infection. He was followed by the endocrine team which continued management with calcium carbonate, calcitriol, and vitamin D. The patient's calcium levels resolved to > 9.0 prior to discharge. He successfully passed a TOV and the urinary catheter was removed. His urine continued to be clear for the rest of his admission. After consulting with Dr. Telles that the patient had an unprovoked DVT and with neurosurgery the decision was made to *. 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 The patient returned from [facility] 10/8 and was [place] to [facility] where he was treated for sepsis with no infection source, febrile 102, lactic acidosis, cutlures negative. The patient reports after several days there he had a mechanical fall in the bathroom. CT head showed 5 mm right SDH and he was transferred to [facility] for neurosurgical evaluation. Prior to arrival he was given Kcentra for reversal of Eliquis. In the [facility] ED he had a GTC seizure, given 4 mg ativan, and 1 g keppra and was intubated. Repeat head CT showed redistribution of original SDH to R parietal region without increase in mass effect or midline shift. The patient was extubated on 10/18. While inpatient the patient has been hypocalcemic and had hypomagnesium. The hypocalcemia is from hypoparathyroidism although pt has normal PTH. Being treated with calcium carbonate, calcitriol, vitamin D. He was noted to have gross hematuria after cathether placement in the NSICU with urology following. Full hematuria workup to be done regarding CT urogram, on flomax, finesteride, with continued indwelling pena. Eliquis held given SDH, pt on HSQ now. The patient denies headache, CP, SoB, dizziness, back pain. While on the medicine service the patient was hemodynamically stable with no focal neuro deficits. He had repeat CT head scan 10/20 which showed no interval change in the SDH. He had no seizure events with his VEEG on 10/17 showing no epileptic wave activity. The ID service followed the patient and agreed there was no need for antibiotic coverage in the setting of no active infection. He was followed by the endocrine team which continued management with calcium carbonate, calcitriol, and vitamin D. The patient underwent a thyroid ultrasound showing a nonspecific 7 mm hypoechoic nodule inferior to the lower pole of the right thyroid, possibly a parathyroid nodule. The patient's calcium levels resolved to > 9.0 prior to discharge. He successfully passed a TOV and the urinary catheter was removed. His urine continued to be clear for the rest of his admission. After consulting with Dr. Telles that the patient had an unprovoked DVT and with neurosurgery the decision was made to *. 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 The patient returned from [facility] 10/8 and was [place] admission to [facility] where he was treated for sepsis with no infection source, febrile 102, lactic acidosis, cutlures negative. The patient reports after several days there he had a mechanical fall in the bathroom. CT head showed 5 mm right SDH and he was transferred to [facility] for neurosurgical evaluation. Prior to arrival he was given Kcentra for reversal of Eliquis. In the [facility] ED he had a GTC seizure, given 4 mg ativan, and 1 g keppra and was intubated. Repeat head CT showed redistribution of original SDH to R parietal region without increase in mass effect or midline shift. The patient was extubated on 10/18. While inpatient the patient has been hypocalcemic and had hypomagnesium. The hypocalcemia is from hypoparathyroidism although pt has normal PTH. Being treated with calcium carbonate, calcitriol, vitamin D. He was noted to have gross hematuria after cathether placement in the NSICU with urology following. Full hematuria workup to be done regarding CT urogram, on flomax, finesteride, with continued indwelling pena. Eliquis held given SDH, pt on HSQ now. The patient denies headache, CP, SoB, dizziness, back pain. While on the medicine service the patient was hemodynamically stable with no focal neuro deficits. He had repeat CT head scan 10/20 which showed no interval change in the SDH. He had no seizure events with his VEEG on 10/17 showing no epileptic wave activity. The ID service followed the patient and agreed there was no need for antibiotic coverage in the setting of no active infection. He was followed by the endocrine team which continued management with calcium carbonate, calcitriol, and vitamin D. The patient underwent a thyroid ultrasound showing a nonspecific 7 mm hypoechoic nodule inferior to the lower pole of the right thyroid, possibly a parathyroid nodule. The patient's calcium levels resolved to > 9.0 prior to discharge. He successfully passed a TOV and the urinary catheter was removed. His urine continued to be clear for the rest of his admission. After consulting with Dr. Telles that the patient had an unprovoked DVT and with neurosurgery the decision was made to hold eliquis until he was followed up neurosurgery 2 weeks after discharge. 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 The patient returned from [facility] 10/8 and was [place] admission to [facility] where he was treated for sepsis with no infection source, febrile 102, lactic acidosis, cutlures negative. The patient reports after several days there he had a mechanical fall in the bathroom. CT head showed 5 mm right SDH and he was transferred to [facility] for neurosurgical evaluation. Prior to arrival he was given Kcentra for reversal of Eliquis. In the [facility] ED he had a GTC seizure, given 4 mg ativan, and 1 g keppra and was intubated. Repeat head CT showed redistribution of original SDH to R parietal region without increase in mass effect or midline shift. The patient was extubated on 10/18. While inpatient the patient has been hypocalcemic and had hypomagnesium. The hypocalcemia is from hypoparathyroidism although pt has normal PTH. Being treated with calcium carbonate, calcitriol, vitamin D. He was noted to have gross hematuria after cathether placement in the NSICU with urology following. Full hematuria workup to be done regarding CT urogram, on flomax, finesteride, with continued indwelling pena. Eliquis held given SDH, pt on HSQ now. The patient denies headache, CP, SoB, dizziness, back pain. While on the medicine service the patient was hemodynamically stable with no focal neuro deficits. He had repeat CT head scan 10/20 which showed no interval change in the SDH. He had no seizure events with his VEEG on 10/17 showing no epileptic wave activity. The ID service followed the patient and agreed there was no need for antibiotic coverage in the setting of no active infection. He was followed by the endocrine team which continued management with calcium carbonate, calcitriol, and vitamin D. The patient underwent a thyroid ultrasound showing a nonspecific 7 mm hypoechoic nodule inferior to the lower pole of the right thyroid, possibly a parathyroid nodule. The patient's calcium levels resolved to > 9.0 prior to discharge. He successfully passed a TOV and the urinary catheter was removed. His urine continued to be clear for the rest of his admission. After consulting with Dr. Telles that the patient had an unprovoked DVT and with neurosurgery the decision was made to hold eliquis until he was followed up neurosurgery 2 weeks after discharge. He was cleared for discharge to acute rehab to be followed up with Dr. Lucho Chavez 2 weeks after discharge from the hospital here. 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 The patient returned from [facility] 10/8 and was [place] admission to [facility] where he was treated for sepsis with no infection source, febrile 102, lactic acidosis, cutlures negative. The patient reports after several days there he had a mechanical fall in the bathroom. CT head showed 5 mm right SDH and he was transferred to [facility] for neurosurgical evaluation. Prior to arrival he was given Kcentra for reversal of Eliquis. In the [facility] ED he had a GTC seizure, given 4 mg ativan, and 1 g keppra and was intubated. Repeat head CT showed redistribution of original SDH to R parietal region without increase in mass effect or midline shift. The patient was extubated on 10/18. While inpatient the patient has been hypocalcemic and had hypomagnesium. The hypocalcemia is from hypoparathyroidism although pt has normal PTH. Being treated with calcium carbonate, calcitriol, vitamin D. He was noted to have gross hematuria after cathether placement in the NSICU with urology following. Full hematuria workup to be done regarding CT urogram, on flomax, finesteride, with continued indwelling pena. Eliquis held given SDH, pt on HSQ now. The patient denies headache, CP, SoB, dizziness, back pain. While on the medicine service the patient was hemodynamically stable with no focal neuro deficits. He had repeat CT head scan 10/20 which showed no interval change in the SDH. He had no seizure events with his VEEG on 10/17 showing no epileptic wave activity. The ID service followed the patient and agreed there was no need for antibiotic coverage in the setting of no active infection. He was followed by the endocrine team which continued management with calcium carbonate, calcitriol, and vitamin D. The patient underwent a thyroid ultrasound showing a nonspecific 7 mm hypoechoic nodule inferior to the lower pole of the right thyroid, possibly a parathyroid nodule. The patient's calcium levels resolved to > 9.0 prior to discharge. He successfully passed a TOV and the urinary catheter was removed. His urine continued to be clear for the rest of his admission. After consulting with Dr. Telles that the patient had an unprovoked DVT and with neurosurgery the decision was made to hold eliquis until he was followed up neurosurgery 2 weeks after discharge. He was cleared for discharge to acute rehab to be followed up with Dr. Lucho Chavez 2 weeks after discharge from the hospital here.     Prior to discharge, it was also noted that the patient's BP was low and he had an MANUEL. The patient was discontinued on his hydralazine and bolused 1 L of Normal Saline. The patient will be transferred to an acute rehab facility where they will need to check his creatinine function. 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 The patient returned from [facility] 10/8 and was [place] admission to [facility] where he was treated for sepsis with no infection source, febrile 102, lactic acidosis, cutlures negative. The patient reports after several days there he had a mechanical fall in the bathroom. CT head showed 5 mm right SDH and he was transferred to [facility] for neurosurgical evaluation. Prior to arrival he was given Kcentra for reversal of Eliquis. In the [facility] ED he had a GTC seizure, given 4 mg ativan, and 1 g keppra and was intubated. Repeat head CT showed redistribution of original SDH to R parietal region without increase in mass effect or midline shift. The patient was extubated on 10/18. While inpatient the patient has been hypocalcemic and had hypomagnesium. The hypocalcemia is from hypoparathyroidism although pt has normal PTH. Being treated with calcium carbonate, calcitriol, vitamin D. He was noted to have gross hematuria after cathether placement in the NSICU with urology following. Full hematuria workup to be done regarding CT urogram, on flomax, finesteride, with continued indwelling pena. Eliquis held given SDH, pt on HSQ now. The patient denies headache, CP, SoB, dizziness, back pain. While on the medicine service the patient was hemodynamically stable with no focal neuro deficits. He had repeat CT head scan 10/20 which showed no interval change in the SDH. He had no seizure events with his VEEG on 10/17 showing no epileptic wave activity. The ID service followed the patient and agreed there was no need for antibiotic coverage in the setting of no active infection. He was followed by the endocrine team which continued management with calcium carbonate, calcitriol, and vitamin D. The patient underwent a thyroid ultrasound showing a nonspecific 7 mm hypoechoic nodule inferior to the lower pole of the right thyroid, possibly a parathyroid nodule. The patient's calcium levels resolved to > 9.0 prior to discharge. He successfully passed a TOV and the urinary catheter was removed. His urine continued to be clear for the rest of his admission. After consulting with Dr. Telles (Cardiologist) that the patient had an unprovoked DVT and with neurosurgery the decision was made to hold eliquis until he was followed up neurosurgery 2 weeks after discharge. IR also didn't agree to place an IVC filter because no active clots were found on US of LE's. He was cleared for discharge to acute rehab to be followed up with Dr. Lucho Chavez ([facility]) 2 weeks after discharge from the hospital here.     Prior to discharge, it was also noted that the patient's BP was low and he had an MANUEL. The patient was discontinued on his hydralazine and bolused 1 L of Normal Saline. The patient will be transferred to an acute rehab facility where they will need to check his creatinine function.

## 2017-10-22 NOTE — PROGRESS NOTE ADULT - PROBLEM SELECTOR PLAN 4
- per PCP, Dr. Telles had extensive bilteral DVT was treated as unprovoked and started of eliquis 5, latest imaging shows resolution of previous clot, no active clot.   -If pt does not have risk factor for recurrent clot, then IVC likely not indicated as per IR per prior notes  -Hold full dose a/c given SDH - per PCP, Dr. Telles had extensive bilteral DVT was treated as unprovoked and started of eliquis 5, latest imaging shows resolution of previous clot, no active clot.   -If pt does not have risk factor for recurrent clot, then IVC likely not indicated as per IR per prior notes  -Hold full dose a/c given SDH, f/u neurosurgery

## 2017-10-22 NOTE — DISCHARGE NOTE ADULT - MEDICATION SUMMARY - MEDICATIONS TO STOP TAKING
I will STOP taking the medications listed below when I get home from the hospital:    Eliquis 5 mg oral tablet  -- 1 tab(s) by mouth 2 times a day I will STOP taking the medications listed below when I get home from the hospital:    hydrALAZINE 25 mg oral tablet  -- 1 tab(s) by mouth every 8 hours    Eliquis 5 mg oral tablet  -- 1 tab(s) by mouth 2 times a day I will STOP taking the medications listed below when I get home from the hospital:    FINASTERIDE 5 MG TABS    hydrALAZINE 25 mg oral tablet  -- 1 tab(s) by mouth every 8 hours    Eliquis 5 mg oral tablet  -- 1 tab(s) by mouth 2 times a day

## 2017-10-22 NOTE — DISCHARGE NOTE ADULT - PLAN OF CARE
stable You suffered from a fall at [facility] which caused you to get a bleed around your brain called a subdural hematoma. You were brought to [facility] to be managed by the neurosurgery team. There was no neurosurgery intervention. Your eliquis was stopped because of the bleed. You had multiple CT scans while here in the hospital which showed no increase in your bleed. You had a si You had a seizure in the emergency room of [facility]. You were intubated to protect your airway and transferred to the NSICU. You were treated with keppra which you should continue to take when you are discharged from the hospital until follow up with neurology. You have a history of blood clots in your leg. Please stop taking Eliquis until you are able to see Dr. Peace Chavez the neurosurgeon in 2 weeks after you are discharged from rehab at which point you will have a repeat CT head scan and the decision to restart you on anticoagulation will be made. Please also follow up with Dr. Telles within 1 week of being discharged from the hospital You suffered from blood in your urine while in the hospital. It has cleared but it is still necessary that you follow up with the urology department when you are discharged from the rehabilitation facility within 2 weeks. When you were in the hospital your blood sugar was controlled between 100-250 with lantus 10 units at night and 4 units of humalog before meals. It is important to check your sugars before each meal and at bedtime. Please follow up with Dr. Telles within 1 week after you are discharged from rehabilitation. While you were in the hospital your calcium level was low because of your hypoparathyroidism. You were treated with calcium carbonate, calcitriol, and magnesium. Please continue to take these medications when you are discharged from rehabilitation. Please follow up with Dr. Telles within 1 week of discharge from rehabilitation to have your calcium level checked. Your blood pressure was elevated here in the hospital. We controlled it with 2 medications, hydralazine and labetalol. Please continue taking these medications when you are discharged from rehabilitation. Your blood pressure was elevated here in the hospital. Please continue taking labetalol when you are discharged from rehabilitation and follow up with Dr. Telles when you leave the rehabilitation hospital Your blood pressure was low while you were here in the hospital. We discontinued your hydralazine. Please continue taking labetalol when you are discharged from rehabilitation and follow up with Dr. Telles when you leave the rehabilitation hospital    Additionally, you had an elevated creatinine while you were admitted in the hospital. This is a sign of acute kidney injury. This can happen in the setting of hypotension. You were given 1 L of normal saline as a response. The physicians at the acute rehab facility will be checking your kidney function. Your blood pressure was low while you were here in the hospital. We discontinued your hydralazine. Please continue taking labetalol when you are discharged from rehabilitation and follow up with Dr. Telles when you leave the rehabilitation hospital    Additionally, you had an elevated creatinine while you were admitted in the hospital. This is a sign of acute kidney injury. This can happen in the setting of hypotension. You were given 1 L of normal saline as a response. The physicians at the acute rehab facility will be checking your kidney function. You were also advised to increase your fluid intake. When you were in the hospital your blood sugar was controlled between 100-250 with lantus 12 units at night and 5 units of humalog before meals. It is important to check your sugars before each meal and at bedtime. Please follow up with Dr. Telles within 1 week after you are discharged from rehabilitation.

## 2017-10-22 NOTE — PROGRESS NOTE ADULT - ATTENDING COMMENTS
Pt seen and examined by me, case discussed with resident team. Agree with above A/P. Patient to be re-evaluated by PT for dispo planning. Will f/u with neurosurgery re: AC.

## 2017-10-22 NOTE — DISCHARGE NOTE ADULT - CARE PLAN
Principal Discharge DX:	Subdural hematoma  Goal:	stable  Secondary Diagnosis:	Seizure  Secondary Diagnosis:	Chronic deep vein thrombosis (DVT) of popliteal vein of both lower extremities  Secondary Diagnosis:	Gross hematuria  Secondary Diagnosis:	Diabetes  Secondary Diagnosis:	Hypocalcemia  Secondary Diagnosis:	Hypertension Principal Discharge DX:	Subdural hematoma  Goal:	stable  Instructions for follow-up, activity and diet:	You suffered from a fall at [facility] which caused you to get a bleed around your brain called a subdural hematoma. You were brought to [facility] to be managed by the neurosurgery team. There was no neurosurgery intervention. Your eliquis was stopped because of the bleed. You had multiple CT scans while here in the hospital which showed no increase in your bleed.  Secondary Diagnosis:	Seizure  Instructions for follow-up, activity and diet:	You had a si  Secondary Diagnosis:	Chronic deep vein thrombosis (DVT) of popliteal vein of both lower extremities  Secondary Diagnosis:	Gross hematuria  Secondary Diagnosis:	Diabetes  Secondary Diagnosis:	Hypocalcemia  Secondary Diagnosis:	Hypertension Principal Discharge DX:	Subdural hematoma  Goal:	stable  Instructions for follow-up, activity and diet:	You suffered from a fall at [facility] which caused you to get a bleed around your brain called a subdural hematoma. You were brought to [facility] to be managed by the neurosurgery team. There was no neurosurgery intervention. Your eliquis was stopped because of the bleed. You had multiple CT scans while here in the hospital which showed no increase in your bleed.  Secondary Diagnosis:	Seizure  Instructions for follow-up, activity and diet:	You had a seizure in the emergency room of [facility]. You were intubated to protect your airway and transferred to the NSICU. You were treated with keppra which you should continue to take when you are discharged from the hospital until follow up with neurology.  Secondary Diagnosis:	Chronic deep vein thrombosis (DVT) of popliteal vein of both lower extremities  Instructions for follow-up, activity and diet:	You have a history of blood clots in your leg. Please stop taking Eliquis until you are able to see Dr. Peace Chavez the neurosurgeon in 2 weeks after you are discharged from rehab at which point you will have a repeat CT head scan and the decision to restart you on anticoagulation will be made. Please also follow up with Dr. Telles within 1 week of being discharged from the hospital  Secondary Diagnosis:	Gross hematuria  Instructions for follow-up, activity and diet:	You suffered from blood in your urine while in the hospital. It has cleared but it is still necessary that you follow up with the urology department when you are discharged from the rehabilitation facility within 2 weeks.  Secondary Diagnosis:	Diabetes  Instructions for follow-up, activity and diet:	When you were in the hospital your blood sugar was controlled between 100-250 with lantus 10 units at night and 4 units of humalog before meals. It is important to check your sugars before each meal and at bedtime. Please follow up with Dr. Telles within 1 week after you are discharged from rehabilitation.  Secondary Diagnosis:	Hypocalcemia  Instructions for follow-up, activity and diet:	While you were in the hospital your calcium level was low because of your hypoparathyroidism. You were treated with calcium carbonate, calcitriol, and magnesium. Please continue to take these medications when you are discharged from rehabilitation. Please follow up with Dr. Telles within 1 week of discharge from rehabilitation to have your calcium level checked.  Secondary Diagnosis:	Hypertension  Instructions for follow-up, activity and diet:	Your blood pressure was elevated here in the hospital. We controlled it with 2 medications, hydralazine and labetalol. Please continue taking these medications when you are discharged from rehabilitation. Principal Discharge DX:	Subdural hematoma  Goal:	stable  Instructions for follow-up, activity and diet:	You suffered from a fall at [facility] which caused you to get a bleed around your brain called a subdural hematoma. You were brought to [facility] to be managed by the neurosurgery team. There was no neurosurgery intervention. Your eliquis was stopped because of the bleed. You had multiple CT scans while here in the hospital which showed no increase in your bleed.  Secondary Diagnosis:	Seizure  Instructions for follow-up, activity and diet:	You had a seizure in the emergency room of [facility]. You were intubated to protect your airway and transferred to the NSICU. You were treated with keppra which you should continue to take when you are discharged from the hospital until follow up with neurology.  Secondary Diagnosis:	Chronic deep vein thrombosis (DVT) of popliteal vein of both lower extremities  Instructions for follow-up, activity and diet:	You have a history of blood clots in your leg. Please stop taking Eliquis until you are able to see Dr. Peace Chavez the neurosurgeon in 2 weeks after you are discharged from rehab at which point you will have a repeat CT head scan and the decision to restart you on anticoagulation will be made. Please also follow up with Dr. Telles within 1 week of being discharged from the hospital  Secondary Diagnosis:	Gross hematuria  Instructions for follow-up, activity and diet:	You suffered from blood in your urine while in the hospital. It has cleared but it is still necessary that you follow up with the urology department when you are discharged from the rehabilitation facility within 2 weeks.  Secondary Diagnosis:	Diabetes  Instructions for follow-up, activity and diet:	When you were in the hospital your blood sugar was controlled between 100-250 with lantus 10 units at night and 4 units of humalog before meals. It is important to check your sugars before each meal and at bedtime. Please follow up with Dr. Telles within 1 week after you are discharged from rehabilitation.  Secondary Diagnosis:	Hypocalcemia  Instructions for follow-up, activity and diet:	While you were in the hospital your calcium level was low because of your hypoparathyroidism. You were treated with calcium carbonate, calcitriol, and magnesium. Please continue to take these medications when you are discharged from rehabilitation. Please follow up with Dr. Telles within 1 week of discharge from rehabilitation to have your calcium level checked.  Secondary Diagnosis:	Hypertension  Instructions for follow-up, activity and diet:	Your blood pressure was elevated here in the hospital. Please continue taking labetalol when you are discharged from rehabilitation and follow up with Dr. Telles when you leave the rehabilitation hospital Principal Discharge DX:	Subdural hematoma  Goal:	stable  Instructions for follow-up, activity and diet:	You suffered from a fall at [facility] which caused you to get a bleed around your brain called a subdural hematoma. You were brought to [facility] to be managed by the neurosurgery team. There was no neurosurgery intervention. Your eliquis was stopped because of the bleed. You had multiple CT scans while here in the hospital which showed no increase in your bleed.  Secondary Diagnosis:	Seizure  Instructions for follow-up, activity and diet:	You had a seizure in the emergency room of [facility]. You were intubated to protect your airway and transferred to the NSICU. You were treated with keppra which you should continue to take when you are discharged from the hospital until follow up with neurology.  Secondary Diagnosis:	Chronic deep vein thrombosis (DVT) of popliteal vein of both lower extremities  Instructions for follow-up, activity and diet:	You have a history of blood clots in your leg. Please stop taking Eliquis until you are able to see Dr. Peace Chavez the neurosurgeon in 2 weeks after you are discharged from rehab at which point you will have a repeat CT head scan and the decision to restart you on anticoagulation will be made. Please also follow up with Dr. Telles within 1 week of being discharged from the hospital  Secondary Diagnosis:	Gross hematuria  Instructions for follow-up, activity and diet:	You suffered from blood in your urine while in the hospital. It has cleared but it is still necessary that you follow up with the urology department when you are discharged from the rehabilitation facility within 2 weeks.  Secondary Diagnosis:	Diabetes  Instructions for follow-up, activity and diet:	When you were in the hospital your blood sugar was controlled between 100-250 with lantus 10 units at night and 4 units of humalog before meals. It is important to check your sugars before each meal and at bedtime. Please follow up with Dr. Telles within 1 week after you are discharged from rehabilitation.  Secondary Diagnosis:	Hypocalcemia  Instructions for follow-up, activity and diet:	While you were in the hospital your calcium level was low because of your hypoparathyroidism. You were treated with calcium carbonate, calcitriol, and magnesium. Please continue to take these medications when you are discharged from rehabilitation. Please follow up with Dr. Telles within 1 week of discharge from rehabilitation to have your calcium level checked.  Secondary Diagnosis:	Hypotension  Instructions for follow-up, activity and diet:	Your blood pressure was low while you were here in the hospital. We discontinued your hydralazine. Please continue taking labetalol when you are discharged from rehabilitation and follow up with Dr. Telles when you leave the rehabilitation hospital    Additionally, you had an elevated creatinine while you were admitted in the hospital. This is a sign of acute kidney injury. This can happen in the setting of hypotension. You were given 1 L of normal saline as a response. The physicians at the acute rehab facility will be checking your kidney function. Principal Discharge DX:	Subdural hematoma  Goal:	stable  Instructions for follow-up, activity and diet:	You suffered from a fall at [facility] which caused you to get a bleed around your brain called a subdural hematoma. You were brought to [facility] to be managed by the neurosurgery team. There was no neurosurgery intervention. Your eliquis was stopped because of the bleed. You had multiple CT scans while here in the hospital which showed no increase in your bleed.  Secondary Diagnosis:	Seizure  Instructions for follow-up, activity and diet:	You had a seizure in the emergency room of [facility]. You were intubated to protect your airway and transferred to the NSICU. You were treated with keppra which you should continue to take when you are discharged from the hospital until follow up with neurology.  Secondary Diagnosis:	Chronic deep vein thrombosis (DVT) of popliteal vein of both lower extremities  Instructions for follow-up, activity and diet:	You have a history of blood clots in your leg. Please stop taking Eliquis until you are able to see Dr. Peace Chavez the neurosurgeon in 2 weeks after you are discharged from rehab at which point you will have a repeat CT head scan and the decision to restart you on anticoagulation will be made. Please also follow up with Dr. Telles within 1 week of being discharged from the hospital  Secondary Diagnosis:	Gross hematuria  Instructions for follow-up, activity and diet:	You suffered from blood in your urine while in the hospital. It has cleared but it is still necessary that you follow up with the urology department when you are discharged from the rehabilitation facility within 2 weeks.  Secondary Diagnosis:	Diabetes  Instructions for follow-up, activity and diet:	When you were in the hospital your blood sugar was controlled between 100-250 with lantus 12 units at night and 5 units of humalog before meals. It is important to check your sugars before each meal and at bedtime. Please follow up with Dr. Telles within 1 week after you are discharged from rehabilitation.  Secondary Diagnosis:	Hypocalcemia  Instructions for follow-up, activity and diet:	While you were in the hospital your calcium level was low because of your hypoparathyroidism. You were treated with calcium carbonate, calcitriol, and magnesium. Please continue to take these medications when you are discharged from rehabilitation. Please follow up with Dr. Telles within 1 week of discharge from rehabilitation to have your calcium level checked.  Secondary Diagnosis:	Hypotension  Instructions for follow-up, activity and diet:	Your blood pressure was low while you were here in the hospital. We discontinued your hydralazine. Please continue taking labetalol when you are discharged from rehabilitation and follow up with Dr. Telles when you leave the rehabilitation hospital    Additionally, you had an elevated creatinine while you were admitted in the hospital. This is a sign of acute kidney injury. This can happen in the setting of hypotension. You were given 1 L of normal saline as a response. The physicians at the acute rehab facility will be checking your kidney function. You were also advised to increase your fluid intake.

## 2017-10-22 NOTE — DISCHARGE NOTE ADULT - PATIENT PORTAL LINK FT
“You can access the Follow[employer] Patient Portal, offered by [facility], by registering with the following website: [email or web address]”

## 2017-10-22 NOTE — PROGRESS NOTE ADULT - PROBLEM SELECTOR PLAN 1
- CTH stable, no midline shift/mass effect, appreciate neuro sx recs  - off Eliquis, on HSX for DVT ppx, will f/u Neuro sx reccs  - continue keppra

## 2017-10-22 NOTE — DISCHARGE NOTE ADULT - MEDICATION SUMMARY - MEDICATIONS TO TAKE
I will START or STAY ON the medications listed below when I get home from the hospital:    Avodart 0.5 mg oral capsule  -- 1 cap(s) by mouth once a day  -- Indication: For Hypertension    FINASTERIDE 5 MG TABS  -- Indication: For BPH    calcium carbonate 1250 mg (500 mg elemental calcium) oral tablet  -- 2 tab(s) by mouth once a day  -- Indication: For Hypocalcemia    tamsulosin 0.4 mg oral capsule  -- 1 cap(s) by mouth once a day (at bedtime)  -- Indication: For BPH    levETIRAcetam 1000 mg oral tablet  -- 1 tab(s) by mouth 2 times a day  -- Indication: For Seizure    insulin glargine  -- 12 unit(s) injectable once a day (at bedtime)  -- Indication: For Diabetes    insulin lispro 100 units/mL subcutaneous solution  -- 5  subcutaneous 3 times a day (before meals) ; 1 Unit(s) if Glucose 151 - 200  2 Unit(s) if Glucose 201 - 250  3 Unit(s) if Glucose 251 - 300  4 Unit(s) if Glucose 301 - 350  5 Unit(s) if Glucose 351 - 400  6 Unit(s) if Glucose GREATER THAN 400  -- Indication: For Diabetes    labetalol 200 mg oral tablet  -- 1 tab(s) by mouth every 12 hours  -- Indication: For Hypertension    magnesium oxide 400 mg (241.3 mg elemental magnesium) oral tablet  -- 1 tab(s) by mouth once a day  -- Indication: For Hypocalcemia    latanoprost 0.005% ophthalmic solution  -- 1 drop(s) to each affected eye once a day (at bedtime)  -- Indication: For Glaucoma    pantoprazole 40 mg oral delayed release tablet  -- 1 tab(s) by mouth once a day (before a meal)  -- Indication: For Acid reflux    hydrALAZINE 25 mg oral tablet  -- 1 tab(s) by mouth every 8 hours  -- Indication: For Hypertension    folic acid 1 mg oral tablet  -- 1 tab(s) by mouth once a day  -- Indication: For Supplement    thiamine 100 mg oral tablet  -- 1 tab(s) by mouth once a day  -- Indication: For Supplement    calcitriol 0.5 mcg oral capsule  -- 1 cap(s) by mouth once a day  -- Indication: For Hypocalcemia    ergocalciferol 50,000 intl units oral tablet  -- Indication: For Hypocalcemia I will START or STAY ON the medications listed below when I get home from the hospital:    Avodart 0.5 mg oral capsule  -- 1 cap(s) by mouth once a day  -- Indication: For Hypertension    FINASTERIDE 5 MG TABS  -- Indication: For BPH    calcium carbonate 1250 mg (500 mg elemental calcium) oral tablet  -- 2 tab(s) by mouth once a day  -- Indication: For Hypocalcemia    tamsulosin 0.4 mg oral capsule  -- 1 cap(s) by mouth once a day (at bedtime)  -- Indication: For BPH    levETIRAcetam 1000 mg oral tablet  -- 1 tab(s) by mouth 2 times a day  -- Indication: For Seizure    insulin glargine  -- 12 unit(s) injectable once a day (at bedtime)  -- Indication: For Diabetes    insulin lispro 100 units/mL subcutaneous solution  -- 5  subcutaneous 3 times a day (before meals) ; 1 Unit(s) if Glucose 151 - 200  2 Unit(s) if Glucose 201 - 250  3 Unit(s) if Glucose 251 - 300  4 Unit(s) if Glucose 301 - 350  5 Unit(s) if Glucose 351 - 400  6 Unit(s) if Glucose GREATER THAN 400  -- Indication: For Diabetes    labetalol 200 mg oral tablet  -- 1 tab(s) by mouth every 12 hours  -- Indication: For Hypertension    magnesium oxide 400 mg (241.3 mg elemental magnesium) oral tablet  -- 1 tab(s) by mouth once a day  -- Indication: For Hypocalcemia    latanoprost 0.005% ophthalmic solution  -- 1 drop(s) to each affected eye once a day (at bedtime)  -- Indication: For Glaucoma    pantoprazole 40 mg oral delayed release tablet  -- 1 tab(s) by mouth once a day (before a meal)  -- Indication: For Acid reflux    calcitriol 0.5 mcg oral capsule  -- 1 cap(s) by mouth once a day  -- Indication: For Hypocalcemia    ergocalciferol 50,000 intl units oral tablet  -- Indication: For Hypocalcemia    folic acid 1 mg oral tablet  -- 1 tab(s) by mouth once a day  -- Indication: For Supplement    thiamine 100 mg oral tablet  -- 1 tab(s) by mouth once a day  -- Indication: For Supplement I will START or STAY ON the medications listed below when I get home from the hospital:    Avodart 0.5 mg oral capsule  -- 1 cap(s) by mouth once a day  -- Indication: For Hypertension    calcium carbonate 1250 mg (500 mg elemental calcium) oral tablet  -- 2 tab(s) by mouth once a day  -- Indication: For Hypocalcemia    tamsulosin 0.4 mg oral capsule  -- 1 cap(s) by mouth once a day (at bedtime)  -- Indication: For BPH    levETIRAcetam 1000 mg oral tablet  -- 1 tab(s) by mouth 2 times a day  -- Indication: For Seizure    insulin glargine  -- 12 unit(s) injectable once a day (at bedtime)  -- Indication: For Diabetes    insulin lispro 100 units/mL subcutaneous solution  -- 5  subcutaneous 3 times a day (before meals) ; 1 Unit(s) if Glucose 151 - 200  2 Unit(s) if Glucose 201 - 250  3 Unit(s) if Glucose 251 - 300  4 Unit(s) if Glucose 301 - 350  5 Unit(s) if Glucose 351 - 400  6 Unit(s) if Glucose GREATER THAN 400  -- Indication: For Diabetes    labetalol 200 mg oral tablet  -- 1 tab(s) by mouth every 12 hours  -- Indication: For Hypertension    magnesium oxide 400 mg (241.3 mg elemental magnesium) oral tablet  -- 1 tab(s) by mouth once a day  -- Indication: For Hypocalcemia    latanoprost 0.005% ophthalmic solution  -- 1 drop(s) to each affected eye once a day (at bedtime)  -- Indication: For Glaucoma    pantoprazole 40 mg oral delayed release tablet  -- 1 tab(s) by mouth once a day (before a meal)  -- Indication: For Acid reflux    calcitriol 0.5 mcg oral capsule  -- 1 cap(s) by mouth once a day  -- Indication: For Hypocalcemia    ergocalciferol 50,000 intl units oral tablet  -- Indication: For Hypocalcemia    folic acid 1 mg oral tablet  -- 1 tab(s) by mouth once a day  -- Indication: For Supplement    thiamine 100 mg oral tablet  -- 1 tab(s) by mouth once a day  -- Indication: For Supplement

## 2017-10-22 NOTE — PROGRESS NOTE ADULT - PROBLEM SELECTOR PLAN 3
-ongoing  -resolved, urology following, recs appreciated  -passed TOV, full w/u to be done in outpatient

## 2017-10-22 NOTE — PROGRESS NOTE ADULT - PROBLEM SELECTOR PROBLEM 10
Type 2 diabetes mellitus without complication, without long-term current use of insulin
Type 2 diabetes mellitus without complication, without long-term current use of insulin
Prophylactic measure
Prophylactic measure
Type 2 diabetes mellitus without complication, without long-term current use of insulin

## 2017-10-22 NOTE — DISCHARGE NOTE ADULT - CONDITIONS AT DISCHARGE
Patient PIVL removed. no  evidence of infiltration noted at discharge. Patient skin intact, vital signs stable, Patient with no complaints of SOB, or chest pain at discharge. Pt discharged as per MD orders in stable condition. Pt verbalized understanding discharge instructions. Pt provided with printed discharge instructions as well as medication list .

## 2017-10-23 LAB
ALBUMIN SERPL ELPH-MCNC: 3.7 G/DL — SIGNIFICANT CHANGE UP (ref 3.3–5)
ALP SERPL-CCNC: 50 U/L — SIGNIFICANT CHANGE UP (ref 40–120)
ALT FLD-CCNC: 61 U/L RC — HIGH (ref 10–45)
ANION GAP SERPL CALC-SCNC: 14 MMOL/L — SIGNIFICANT CHANGE UP (ref 5–17)
AST SERPL-CCNC: 58 U/L — HIGH (ref 10–40)
BILIRUB SERPL-MCNC: 0.2 MG/DL — SIGNIFICANT CHANGE UP (ref 0.2–1.2)
BUN SERPL-MCNC: 19 MG/DL — SIGNIFICANT CHANGE UP (ref 7–23)
CALCIUM SERPL-MCNC: 9.5 MG/DL — SIGNIFICANT CHANGE UP (ref 8.4–10.5)
CHLORIDE SERPL-SCNC: 98 MMOL/L — SIGNIFICANT CHANGE UP (ref 96–108)
CO2 SERPL-SCNC: 23 MMOL/L — SIGNIFICANT CHANGE UP (ref 22–31)
CREAT SERPL-MCNC: 1.21 MG/DL — SIGNIFICANT CHANGE UP (ref 0.5–1.3)
GLUCOSE BLDC GLUCOMTR-MCNC: 146 MG/DL — HIGH (ref 70–99)
GLUCOSE BLDC GLUCOMTR-MCNC: 208 MG/DL — HIGH (ref 70–99)
GLUCOSE BLDC GLUCOMTR-MCNC: 211 MG/DL — HIGH (ref 70–99)
GLUCOSE BLDC GLUCOMTR-MCNC: 240 MG/DL — HIGH (ref 70–99)
GLUCOSE SERPL-MCNC: 219 MG/DL — HIGH (ref 70–99)
HCT VFR BLD CALC: 31.3 % — LOW (ref 39–50)
HGB BLD-MCNC: 11 G/DL — LOW (ref 13–17)
HLA FOR CELIAC DISEASE PNL BLD/T: SIGNIFICANT CHANGE UP
HLA FOR CELIAC DISEASE PNL BLD/T: SIGNIFICANT CHANGE UP
MAGNESIUM SERPL-MCNC: 1.6 MG/DL — SIGNIFICANT CHANGE UP (ref 1.6–2.6)
MCHC RBC-ENTMCNC: 32.1 PG — SIGNIFICANT CHANGE UP (ref 27–34)
MCHC RBC-ENTMCNC: 35.1 GM/DL — SIGNIFICANT CHANGE UP (ref 32–36)
MCV RBC AUTO: 91.4 FL — SIGNIFICANT CHANGE UP (ref 80–100)
PLATELET # BLD AUTO: 190 K/UL — SIGNIFICANT CHANGE UP (ref 150–400)
POTASSIUM SERPL-MCNC: 4.8 MMOL/L — SIGNIFICANT CHANGE UP (ref 3.5–5.3)
POTASSIUM SERPL-SCNC: 4.8 MMOL/L — SIGNIFICANT CHANGE UP (ref 3.5–5.3)
PROT SERPL-MCNC: 6.5 G/DL — SIGNIFICANT CHANGE UP (ref 6–8.3)
RBC # BLD: 3.43 M/UL — LOW (ref 4.2–5.8)
RBC # FLD: 12.4 % — SIGNIFICANT CHANGE UP (ref 10.3–14.5)
SODIUM SERPL-SCNC: 135 MMOL/L — SIGNIFICANT CHANGE UP (ref 135–145)
T PALLIDUM AB TITR SER: NEGATIVE — SIGNIFICANT CHANGE UP
WBC # BLD: 5.7 K/UL — SIGNIFICANT CHANGE UP (ref 3.8–10.5)
WBC # FLD AUTO: 5.7 K/UL — SIGNIFICANT CHANGE UP (ref 3.8–10.5)

## 2017-10-23 PROCEDURE — 99233 SBSQ HOSP IP/OBS HIGH 50: CPT | Mod: GC

## 2017-10-23 PROCEDURE — 99232 SBSQ HOSP IP/OBS MODERATE 35: CPT | Mod: GC

## 2017-10-23 RX ORDER — INSULIN LISPRO 100/ML
VIAL (ML) SUBCUTANEOUS AT BEDTIME
Qty: 0 | Refills: 0 | Status: DISCONTINUED | OUTPATIENT
Start: 2017-10-23 | End: 2017-10-26

## 2017-10-23 RX ORDER — HYDRALAZINE HCL 50 MG
75 TABLET ORAL EVERY 8 HOURS
Qty: 0 | Refills: 0 | Status: DISCONTINUED | OUTPATIENT
Start: 2017-10-23 | End: 2017-10-24

## 2017-10-23 RX ORDER — INSULIN LISPRO 100/ML
3 VIAL (ML) SUBCUTANEOUS
Qty: 0 | Refills: 0 | Status: DISCONTINUED | OUTPATIENT
Start: 2017-10-23 | End: 2017-10-25

## 2017-10-23 RX ORDER — INSULIN GLARGINE 100 [IU]/ML
10 INJECTION, SOLUTION SUBCUTANEOUS AT BEDTIME
Qty: 0 | Refills: 0 | Status: DISCONTINUED | OUTPATIENT
Start: 2017-10-23 | End: 2017-10-25

## 2017-10-23 RX ORDER — MAGNESIUM SULFATE 500 MG/ML
2 VIAL (ML) INJECTION ONCE
Qty: 0 | Refills: 0 | Status: COMPLETED | OUTPATIENT
Start: 2017-10-23 | End: 2017-10-23

## 2017-10-23 RX ADMIN — PREGABALIN 1000 MICROGRAM(S): 225 CAPSULE ORAL at 13:36

## 2017-10-23 RX ADMIN — Medication 50 MILLIGRAM(S): at 05:17

## 2017-10-23 RX ADMIN — Medication 3 UNIT(S): at 18:21

## 2017-10-23 RX ADMIN — Medication 200 MILLIGRAM(S): at 17:20

## 2017-10-23 RX ADMIN — LEVETIRACETAM 1000 MILLIGRAM(S): 250 TABLET, FILM COATED ORAL at 05:17

## 2017-10-23 RX ADMIN — CALCITRIOL 0.5 MICROGRAM(S): 0.5 CAPSULE ORAL at 13:35

## 2017-10-23 RX ADMIN — INSULIN GLARGINE 10 UNIT(S): 100 INJECTION, SOLUTION SUBCUTANEOUS at 21:51

## 2017-10-23 RX ADMIN — Medication 2 TABLET(S): at 05:17

## 2017-10-23 RX ADMIN — Medication 1 MILLIGRAM(S): at 13:35

## 2017-10-23 RX ADMIN — Medication 75 MILLIGRAM(S): at 21:24

## 2017-10-23 RX ADMIN — Medication 50 MILLIGRAM(S): at 13:35

## 2017-10-23 RX ADMIN — Medication 2: at 09:52

## 2017-10-23 RX ADMIN — Medication 2 UNIT(S): at 13:36

## 2017-10-23 RX ADMIN — MAGNESIUM OXIDE 400 MG ORAL TABLET 400 MILLIGRAM(S): 241.3 TABLET ORAL at 13:35

## 2017-10-23 RX ADMIN — LEVETIRACETAM 1000 MILLIGRAM(S): 250 TABLET, FILM COATED ORAL at 17:20

## 2017-10-23 RX ADMIN — HEPARIN SODIUM 5000 UNIT(S): 5000 INJECTION INTRAVENOUS; SUBCUTANEOUS at 05:17

## 2017-10-23 RX ADMIN — HEPARIN SODIUM 5000 UNIT(S): 5000 INJECTION INTRAVENOUS; SUBCUTANEOUS at 17:21

## 2017-10-23 RX ADMIN — Medication 100 MILLIGRAM(S): at 13:35

## 2017-10-23 RX ADMIN — TAMSULOSIN HYDROCHLORIDE 0.4 MILLIGRAM(S): 0.4 CAPSULE ORAL at 21:17

## 2017-10-23 RX ADMIN — Medication 200 MILLIGRAM(S): at 05:17

## 2017-10-23 RX ADMIN — Medication 50 GRAM(S): at 09:48

## 2017-10-23 RX ADMIN — Medication 2 TABLET(S): at 17:20

## 2017-10-23 RX ADMIN — Medication 2 UNIT(S): at 09:52

## 2017-10-23 RX ADMIN — Medication 2: at 18:21

## 2017-10-23 RX ADMIN — Medication 2: at 13:36

## 2017-10-23 NOTE — PROGRESS NOTE ADULT - PROBLEM SELECTOR PLAN 6
- C/w hydralazine 50mg tid and 200 mg labetalol twice daily - Increase hydralazine to 75mg tid and c/w 200 mg labetalol twice daily

## 2017-10-23 NOTE — PROGRESS NOTE ADULT - SUBJECTIVE AND OBJECTIVE BOX
CC: f/u for concern of sepsis    Patient reports feels perfectly well    REVIEW OF SYSTEMS:  All other review of systems negative (Comprehensive ROS)    Antimicrobials Day #  :off    Other Medications Reviewed    T(F): 98.1 (10-23-17 @ 04:41), Max: 98.4 (10-22-17 @ 18:37)  HR: 80 (10-23-17 @ 04:51)  BP: 175/84 (10-23-17 @ 04:51)  RR: 18 (10-23-17 @ 04:41)  SpO2: 96% (10-23-17 @ 04:41)  Wt(kg): --    PHYSICAL EXAM:  General: alert, no acute distress  Eyes:  anicteric, no conjunctival injection, no discharge  Oropharynx: no lesions or injection 	  Neck: supple, without adenopathy  Lungs: clear to auscultation  Heart: regular rate and rhythm; no murmur, rubs or gallops  Abdomen: soft, nondistended, nontender, without mass or organomegaly  Skin: no lesions  Extremities: no clubbing, cyanosis, or edema  Neurologic: alert, moves all extremities    LAB RESULTS:                        11.0   5.7   )-----------( 190      ( 23 Oct 2017 06:10 )             31.3     10-23    135  |  98  |  19  ----------------------------<  219<H>  4.8   |  23  |  1.21    Ca    9.5      23 Oct 2017 06:11  Phos  3.2     10-22  Mg     1.6     10-23    TPro  6.5  /  Alb  3.7  /  TBili  0.2  /  DBili  x   /  AST  58<H>  /  ALT  61<H>  /  AlkPhos  50  10-23    LIVER FUNCTIONS - ( 23 Oct 2017 06:11 )  Alb: 3.7 g/dL / Pro: 6.5 g/dL / ALK PHOS: 50 U/L / ALT: 61 U/L RC / AST: 58 U/L / GGT: x             MICROBIOLOGY:  RECENT CULTURES:    Blood Parasites (10.18.17 @ 07:15)    Specimen Source: .Blood blood    Culture Results:   No Blood Parasites observed by giemsa stain  One negative set of blood smears does not rule out  the possibility of a parasitic infection.  A minimum of 3  specimens should be collected, at least 12-24 hours apart,  over a 36 hour time period.            Culture - Bronchial (10.17.17 @ 07:38)    Gram Stain:   No polymorphonuclear leukocytes  No squamous epithelial cells per low power field  No organisms seen per oil power field    Specimen Source: .Broncial Combicath    Culture Results:   Normal Respiratory Belkis present    < from: CT Head No Cont (10.18.17 @ 07:30) >  MPRESSION: Unchanged thin right-sided parieto-occipital and temporal   convexity subdural hematoma with minimal localiz    < end of copied text >      Assessment:  Patient admitted with s  Plan: CC: f/u for concern of sepsis    Patient reports feels perfectly well    REVIEW OF SYSTEMS:  All other review of systems negative (Comprehensive ROS)    Antimicrobials Day #  :off    Other Medications Reviewed    T(F): 98.1 (10-23-17 @ 04:41), Max: 98.4 (10-22-17 @ 18:37)  HR: 80 (10-23-17 @ 04:51)  BP: 175/84 (10-23-17 @ 04:51)  RR: 18 (10-23-17 @ 04:41)  SpO2: 96% (10-23-17 @ 04:41)  Wt(kg): --    PHYSICAL EXAM:  General: alert, no acute distress  Eyes:  anicteric, no conjunctival injection, no discharge  Oropharynx: no lesions or injection 	  Neck: supple, without adenopathy  Lungs: clear to auscultation  Heart: regular rate and rhythm; no murmur, rubs or gallops  Abdomen: soft, nondistended, nontender, without mass or organomegaly  Skin: no lesions  Extremities: no clubbing, cyanosis, or edema  Neurologic: alert, moves all extremities    LAB RESULTS:                        11.0   5.7   )-----------( 190      ( 23 Oct 2017 06:10 )             31.3     10-23    135  |  98  |  19  ----------------------------<  219<H>  4.8   |  23  |  1.21    Ca    9.5      23 Oct 2017 06:11  Phos  3.2     10-22  Mg     1.6     10-23    TPro  6.5  /  Alb  3.7  /  TBili  0.2  /  DBili  x   /  AST  58<H>  /  ALT  61<H>  /  AlkPhos  50  10-23    LIVER FUNCTIONS - ( 23 Oct 2017 06:11 )  Alb: 3.7 g/dL / Pro: 6.5 g/dL / ALK PHOS: 50 U/L / ALT: 61 U/L RC / AST: 58 U/L / GGT: x             MICROBIOLOGY:  RECENT CULTURES:    Blood Parasites (10.18.17 @ 07:15)    Specimen Source: .Blood blood    Culture Results:   No Blood Parasites observed by giemsa stain  One negative set of blood smears does not rule out  the possibility of a parasitic infection.  A minimum of 3  specimens should be collected, at least 12-24 hours apart,  over a 36 hour time period.    Treponema Pallidum Antibody Interpretation: Negative: A negative treponemal  antibody screen indicates no serologic evidence of  syphilis (early infection cannot be excluded) and no additional testing  is required.   A positive screen is followed by testing for RPR.  Positive RPR indicates serologic evidence of new, inadequately treated,  or persistent syphilis infection and titer will be performed.  A negative  RPR following a positive treponemal screen may indicate adequately  treated or resolved past syphilis infection.  A negative RPR will trigger  a specific treponemal  antibody test, TPPA (treponema pallidum passive  particle agglutination).   A positive TPPA confirms previous or current  syphilis infection.   A negative TPPA suggests that the original  treponemal antibody screen was a false positive, but clinical assessment  of the patient is recommended. (10.22.17 @ 08:38)            Culture - Bronchial (10.17.17 @ 07:38)    Gram Stain:   No polymorphonuclear leukocytes  No squamous epithelial cells per low power field  No organisms seen per oil power field    Specimen Source: .[facility]    Culture Results:   Normal Respiratory Belkis present    < from: CT Head No Cont (10.18.17 @ 07:30) >  MPRESSION: Unchanged thin right-sided parieto-occipital and temporal   convexity subdural hematoma with minimal localiz    < end of copied text >      Assessment:  Patient admitted with sdh after a fall at [facility] where he presented because he felt like he was shaking and his calcium was very low. No infection was found. I suspect patient had been noncompliant with meds for known hypocalcemia and he has an underlying primary neurologic disorder giving him unsteady gait and some dementia.   Plan:Monitor off antibiotics  call us if further input is needed

## 2017-10-23 NOTE — PROGRESS NOTE ADULT - ASSESSMENT
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 SDH stable on CT head, pt stable with no focal neural deficits, evaluated by Neuro sx with no surgical intervention. No reported seizures since admission, Video EEG after first showed no epileptic activity. Pt also found to by hypocalcemic most likely 2/2 hypoparathyroidism now on calcium carbonate, calcitriol, vitamin D, level now 9.5. He passed TOV on Friday for hematuria, now reports no dysuria, clear urine with hematuria workup outpatient. Pt stable, afebrile, normotensive, A+Ox3 with no episodes of agitation.  On HSQ 5000 with eliquis held in setting of SDH, per Neuro Sx should only resume AC until outpatient follow up for SDH 2 weeks after discharge.

## 2017-10-23 NOTE — PROGRESS NOTE ADULT - SUBJECTIVE AND OBJECTIVE BOX
Visit Summary: Patient seen and evaluated at bedside.    Overnight Events: none    Exam:  T(C): 36.7 (10-23-17 @ 04:41), Max: 36.9 (10-22-17 @ 18:37)  HR: 80 (10-23-17 @ 04:51) (79 - 87)  BP: 175/84 (10-23-17 @ 04:51) (157/85 - 175/84)  RR: 18 (10-23-17 @ 04:41) (18 - 18)  SpO2: 96% (10-23-17 @ 04:41) (95% - 97%)  Wt(kg): --    AOx1-2, FC, PERRL, EOMI, V1-3 intact, no facial, palate jv symmetric, tongue midline, shrug 5/5  5/5 throughout, no drift  SILT  No clonus or babinski                          11.0   5.7   )-----------( 190      ( 23 Oct 2017 06:10 )             31.3     10-23    135  |  98  |  19  ----------------------------<  219<H>  4.8   |  23  |  1.21    Ca    9.5      23 Oct 2017 06:11  Phos  3.2     10-22  Mg     1.6     10-23    TPro  6.5  /  Alb  3.7  /  TBili  0.2  /  DBili  x   /  AST  58<H>  /  ALT  61<H>  /  AlkPhos  50  10-23

## 2017-10-23 NOTE — PROGRESS NOTE ADULT - PROBLEM SELECTOR PLAN 2
- patient with AM and post prandial hyperglycemia  - recommend start lantus 10, increase humalog to 3 units qac  - c/w low correction scale qac, add bedtime sliding scale

## 2017-10-23 NOTE — PROGRESS NOTE ADULT - PROBLEM SELECTOR PLAN 4
- per PCP, Dr. Telles had extensive bilteral DVT was treated as unprovoked and started of eliquis 5, latest imaging shows resolution of previous clot, no active clot.   -If pt does not have risk factor for recurrent clot, then IVC likely not indicated as per IR per prior notes  -Hold full dose a/c given SDH per neurosurgery - per PCP, Dr. Telles had extensive bilteral DVT was treated as unprovoked and started of eliquis 5mg Q12H, latest imaging shows resolution of previous clot, no active clot.   -If pt does not have risk factor for recurrent clot, then IVC likely not indicated as per IR per prior notes  -IR unwilling to do IVC filter unless active clot identified.   -Hold full dose a/c given SDH per neurosurgery

## 2017-10-23 NOTE — PROVIDER CONTACT NOTE (OTHER) - ASSESSMENT
A&O x3, forgetful.  No s/s of hyperglycemia.  Would be getting 1 unit humalog according to bedtime sliding scale.  None ordered

## 2017-10-23 NOTE — PROGRESS NOTE ADULT - ATTENDING COMMENTS
Pt seen and examined. Agree with note as above, with addendum patient is not the best historian so unclear as to the underlying cause of his hypocalcemia.

## 2017-10-23 NOTE — PROGRESS NOTE ADULT - ATTENDING COMMENTS
-Pending OT eval for acute rehab placement. Patient in agreement.   -Still trying to get in touch with hematologist of patient -Dr. Faust to see any previous hypercoagulable work up. For now, IR declined doing IVC filter as no active clot in LE's to prevent from embolizing.   -Increased hydralazine to 75mg three times daily for still not well controlled HTN.   -Per ELVIN, repeat CT head prior to DC. Continue holding eliquis until outpatient follow up.   -RUE with minimal thrombophlebitis/infiltrate near IV site; will apply warm compresses and observe.

## 2017-10-23 NOTE — PROVIDER CONTACT NOTE (OTHER) - ASSESSMENT
A&O x4, forgetful. at times.  Patient had previous IVL in right lower forearm area removed on 10/21 d/t IVL leaking from sodium phosphate IVPB.  Area noted to be hard & red and patient c/o pain at site

## 2017-10-23 NOTE — PROGRESS NOTE ADULT - PROBLEM SELECTOR PLAN 1
- hypocalcemia is 2/2 known hypoparathyroidism, however this history could not be obtained on admission due to patient's mental status  - calcium stable - c/w calcium carbonate 2 tabs BID  - can check calcium qd  - c/w calcitriol 0.5 mcg qd  - c/w vitamin D 50,000 units qweek x 3 months  - maintain adequate magnesium levels  - will follow

## 2017-10-23 NOTE — PROGRESS NOTE ADULT - PROBLEM SELECTOR PLAN 1
- CTH stable, no midline shift/mass effect, will hold off eliquis until f/u with outpatient neuro sx  - off Eliquis, on HSX for DVT ppx,  -continue keppra

## 2017-10-23 NOTE — PROGRESS NOTE ADULT - PROBLEM SELECTOR PLAN 7
-hold oral hypoglycemics, c/w ISS for now  -endo following, follow recs. -hold oral hypoglycemics, c/w ISS for now  -Lantus added per endo and on lispro with meals.   -endo following, follow recs.

## 2017-10-23 NOTE — PROGRESS NOTE ADULT - ASSESSMENT
77M s/p mechanical fall with R SDH s/p GTC seizure  - Repeat CTH prior to discharge to ensure stability of SDH. Recommend holding off on anticoagulation until follow up with Dr. Chavez as an outpatient 2 weeks after discharge. Cont neurochecks q4hrs.

## 2017-10-23 NOTE — PROGRESS NOTE ADULT - SUBJECTIVE AND OBJECTIVE BOX
Patient is a 77y old  Male who presents with a chief complaint of SDH, transfer from [facility] (22 Oct 2017 19:46)      SUBJECTIVE / OVERNIGHT EVENTS:    No concerns overnight    MEDICATIONS  (STANDING):  calcitriol   Capsule 0.5 MICROGram(s) Oral daily  calcium carbonate 1250 mG (OsCal) 2 Tablet(s) Oral two times a day  dextrose 5%. 1000 milliLiter(s) (50 mL/Hr) IV Continuous <Continuous>  dextrose 50% Injectable 12.5 Gram(s) IV Push once  dextrose 50% Injectable 25 Gram(s) IV Push once  dextrose 50% Injectable 25 Gram(s) IV Push once  ergocalciferol 97251 Unit(s) Oral every week  folic acid 1 milliGRAM(s) Oral daily  heparin  Injectable 5000 Unit(s) SubCutaneous every 12 hours  hydrALAZINE 50 milliGRAM(s) Oral every 8 hours  insulin lispro (HumaLOG) corrective regimen sliding scale   SubCutaneous three times a day before meals  insulin lispro Injectable (HumaLOG) 2 Unit(s) SubCutaneous three times a day before meals  labetalol 200 milliGRAM(s) Oral every 12 hours  levETIRAcetam 1000 milliGRAM(s) Oral two times a day  magnesium oxide 400 milliGRAM(s) Oral daily  tamsulosin 0.4 milliGRAM(s) Oral at bedtime  thiamine 100 milliGRAM(s) Oral daily    MEDICATIONS  (PRN):  acetaminophen   Tablet 650 milliGRAM(s) Oral every 6 hours PRN For Temp greater than 38 C (100.4 F)  acetaminophen   Tablet. 650 milliGRAM(s) Oral every 6 hours PRN Mild Pain (1 - 3)  dextrose Gel 1 Dose(s) Oral once PRN Blood Glucose LESS THAN 70 milliGRAM(s)/deciliter  glucagon  Injectable 1 milliGRAM(s) IntraMuscular once PRN Glucose LESS THAN 70 milligrams/deciliter      Vital Signs Last 24 Hrs  T(C): 36.7 (23 Oct 2017 04:41), Max: 36.9 (22 Oct 2017 18:37)  T(F): 98.1 (23 Oct 2017 04:41), Max: 98.4 (22 Oct 2017 18:37)  HR: 80 (23 Oct 2017 04:51) (79 - 85)  BP: 175/84 (23 Oct 2017 04:51) (160/81 - 175/84)  BP(mean): --  RR: 18 (23 Oct 2017 04:41) (18 - 18)  SpO2: 96% (23 Oct 2017 04:41) (95% - 97%)  CAPILLARY BLOOD GLUCOSE      POCT Blood Glucose.: 208 mg/dL (23 Oct 2017 13:04)  POCT Blood Glucose.: 211 mg/dL (23 Oct 2017 09:46)  POCT Blood Glucose.: 272 mg/dL (22 Oct 2017 22:34)  POCT Blood Glucose.: 152 mg/dL (22 Oct 2017 21:00)  POCT Blood Glucose.: 163 mg/dL (22 Oct 2017 18:17)    I&O's Summary    22 Oct 2017 07:01  -  23 Oct 2017 07:00  --------------------------------------------------------  IN: 890 mL / OUT: 1750 mL / NET: -860 mL    23 Oct 2017 07:01  -  23 Oct 2017 13:56  --------------------------------------------------------  IN: 0 mL / OUT: 300 mL / NET: -300 mL        PHYSICAL EXAM:  GENERAL: NAD, well-developed  HEAD:  Atraumatic, Normocephalic  EYES: EOMI, PERRLA, conjunctiva and sclera clear  NECK: Supple, No JVD  CHEST/LUNG: Clear to auscultation bilaterally; No wheeze  HEART: Regular rate and rhythm; No murmurs, rubs, or gallops  ABDOMEN: Soft, Nontender, Nondistended; Bowel sounds present  EXTREMITIES:  2+ Peripheral Pulses, No clubbing, cyanosis, or edema  PSYCH: AAOx3  NEUROLOGY: non-focal  SKIN: No rashes or lesions    LABS:                        11.0   5.7   )-----------( 190      ( 23 Oct 2017 06:10 )             31.3     10-23    135  |  98  |  19  ----------------------------<  219<H>  4.8   |  23  |  1.21    Ca    9.5      23 Oct 2017 06:11  Phos  3.2     10-22  Mg     1.6     10-23    TPro  6.5  /  Alb  3.7  /  TBili  0.2  /  DBili  x   /  AST  58<H>  /  ALT  61<H>  /  AlkPhos  50  10-23              RADIOLOGY & ADDITIONAL TESTS:    Imaging Personally Reviewed:    Consultant(s) Notes Reviewed:      Care Discussed with Consultants/Other Providers: Patient is a 77y old  Male who presents with a chief complaint of SDH, transfer from [facility] (22 Oct 2017 19:46)      SUBJECTIVE / OVERNIGHT EVENTS:    No concerns overnight. Pt states had pain in right wrist to along mid forearm. Slept well, no headaches, changes in vision.     MEDICATIONS  (STANDING):  calcitriol   Capsule 0.5 MICROGram(s) Oral daily  calcium carbonate 1250 mG (OsCal) 2 Tablet(s) Oral two times a day  dextrose 5%. 1000 milliLiter(s) (50 mL/Hr) IV Continuous <Continuous>  dextrose 50% Injectable 12.5 Gram(s) IV Push once  dextrose 50% Injectable 25 Gram(s) IV Push once  dextrose 50% Injectable 25 Gram(s) IV Push once  ergocalciferol 24163 Unit(s) Oral every week  folic acid 1 milliGRAM(s) Oral daily  heparin  Injectable 5000 Unit(s) SubCutaneous every 12 hours  hydrALAZINE 50 milliGRAM(s) Oral every 8 hours  insulin lispro (HumaLOG) corrective regimen sliding scale   SubCutaneous three times a day before meals  insulin lispro Injectable (HumaLOG) 2 Unit(s) SubCutaneous three times a day before meals  labetalol 200 milliGRAM(s) Oral every 12 hours  levETIRAcetam 1000 milliGRAM(s) Oral two times a day  magnesium oxide 400 milliGRAM(s) Oral daily  tamsulosin 0.4 milliGRAM(s) Oral at bedtime  thiamine 100 milliGRAM(s) Oral daily    MEDICATIONS  (PRN):  acetaminophen   Tablet 650 milliGRAM(s) Oral every 6 hours PRN For Temp greater than 38 C (100.4 F)  acetaminophen   Tablet. 650 milliGRAM(s) Oral every 6 hours PRN Mild Pain (1 - 3)  dextrose Gel 1 Dose(s) Oral once PRN Blood Glucose LESS THAN 70 milliGRAM(s)/deciliter  glucagon  Injectable 1 milliGRAM(s) IntraMuscular once PRN Glucose LESS THAN 70 milligrams/deciliter      Vital Signs Last 24 Hrs  T(C): 36.7 (23 Oct 2017 04:41), Max: 36.9 (22 Oct 2017 18:37)  T(F): 98.1 (23 Oct 2017 04:41), Max: 98.4 (22 Oct 2017 18:37)  HR: 80 (23 Oct 2017 04:51) (79 - 85)  BP: 175/84 (23 Oct 2017 04:51) (160/81 - 175/84)  BP(mean): --  RR: 18 (23 Oct 2017 04:41) (18 - 18)  SpO2: 96% (23 Oct 2017 04:41) (95% - 97%)  CAPILLARY BLOOD GLUCOSE      POCT Blood Glucose.: 208 mg/dL (23 Oct 2017 13:04)  POCT Blood Glucose.: 211 mg/dL (23 Oct 2017 09:46)  POCT Blood Glucose.: 272 mg/dL (22 Oct 2017 22:34)  POCT Blood Glucose.: 152 mg/dL (22 Oct 2017 21:00)  POCT Blood Glucose.: 163 mg/dL (22 Oct 2017 18:17)    I&O's Summary    22 Oct 2017 07:01  -  23 Oct 2017 07:00  --------------------------------------------------------  IN: 890 mL / OUT: 1750 mL / NET: -860 mL    23 Oct 2017 07:01  -  23 Oct 2017 13:56  --------------------------------------------------------  IN: 0 mL / OUT: 300 mL / NET: -300 mL        PHYSICAL EXAM:  GENERAL: NAD, well-developed  HEAD:  Atraumatic, Normocephalic  EYES: EOMI, PERRLA, conjunctiva and sclera clear  NECK: Supple, No JVD  CHEST/LUNG: Clear to auscultation bilaterally; No wheeze  HEART: Regular rate and rhythm; No murmurs, rubs, or gallops  ABDOMEN: Soft, Nontender, Nondistended; Bowel sounds present  EXTREMITIES:  2+ Peripheral Pulses, No clubbing, cyanosis, or edema. No warm or swollen lower extremities.   PSYCH: AAOx3  NEUROLOGY: non-focal  SKIN: No rashes or lesions    LABS:                        11.0   5.7   )-----------( 190      ( 23 Oct 2017 06:10 )             31.3     10-23    135  |  98  |  19  ----------------------------<  219<H>  4.8   |  23  |  1.21    Ca    9.5      23 Oct 2017 06:11  Phos  3.2     10-22  Mg     1.6     10-23    TPro  6.5  /  Alb  3.7  /  TBili  0.2  /  DBili  x   /  AST  58<H>  /  ALT  61<H>  /  AlkPhos  50  10-23              RADIOLOGY & ADDITIONAL TESTS:    Imaging Personally Reviewed:    Consultant(s) Notes Reviewed:      Care Discussed with Consultants/Other Providers: Patient is a 77y old  Male who presents with a chief complaint of SDH, transfer from [facility] (22 Oct 2017 19:46)      SUBJECTIVE / OVERNIGHT EVENTS:    No concerns overnight. Pt states had pain in right wrist to along mid forearm. Slept well, no headaches, no changes in vision.     MEDICATIONS  (STANDING):  calcitriol   Capsule 0.5 MICROGram(s) Oral daily  calcium carbonate 1250 mG (OsCal) 2 Tablet(s) Oral two times a day  dextrose 5%. 1000 milliLiter(s) (50 mL/Hr) IV Continuous <Continuous>  dextrose 50% Injectable 12.5 Gram(s) IV Push once  dextrose 50% Injectable 25 Gram(s) IV Push once  dextrose 50% Injectable 25 Gram(s) IV Push once  ergocalciferol 18224 Unit(s) Oral every week  folic acid 1 milliGRAM(s) Oral daily  heparin  Injectable 5000 Unit(s) SubCutaneous every 12 hours  hydrALAZINE 50 milliGRAM(s) Oral every 8 hours  insulin lispro (HumaLOG) corrective regimen sliding scale   SubCutaneous three times a day before meals  insulin lispro Injectable (HumaLOG) 2 Unit(s) SubCutaneous three times a day before meals  labetalol 200 milliGRAM(s) Oral every 12 hours  levETIRAcetam 1000 milliGRAM(s) Oral two times a day  magnesium oxide 400 milliGRAM(s) Oral daily  tamsulosin 0.4 milliGRAM(s) Oral at bedtime  thiamine 100 milliGRAM(s) Oral daily    MEDICATIONS  (PRN):  acetaminophen   Tablet 650 milliGRAM(s) Oral every 6 hours PRN For Temp greater than 38 C (100.4 F)  acetaminophen   Tablet. 650 milliGRAM(s) Oral every 6 hours PRN Mild Pain (1 - 3)  dextrose Gel 1 Dose(s) Oral once PRN Blood Glucose LESS THAN 70 milliGRAM(s)/deciliter  glucagon  Injectable 1 milliGRAM(s) IntraMuscular once PRN Glucose LESS THAN 70 milligrams/deciliter      Vital Signs Last 24 Hrs  T(C): 36.7 (23 Oct 2017 04:41), Max: 36.9 (22 Oct 2017 18:37)  T(F): 98.1 (23 Oct 2017 04:41), Max: 98.4 (22 Oct 2017 18:37)  HR: 80 (23 Oct 2017 04:51) (79 - 85)  BP: 175/84 (23 Oct 2017 04:51) (160/81 - 175/84)  BP(mean): --  RR: 18 (23 Oct 2017 04:41) (18 - 18)  SpO2: 96% (23 Oct 2017 04:41) (95% - 97%)  CAPILLARY BLOOD GLUCOSE      POCT Blood Glucose.: 208 mg/dL (23 Oct 2017 13:04)  POCT Blood Glucose.: 211 mg/dL (23 Oct 2017 09:46)  POCT Blood Glucose.: 272 mg/dL (22 Oct 2017 22:34)  POCT Blood Glucose.: 152 mg/dL (22 Oct 2017 21:00)  POCT Blood Glucose.: 163 mg/dL (22 Oct 2017 18:17)    I&O's Summary    22 Oct 2017 07:01  -  23 Oct 2017 07:00  --------------------------------------------------------  IN: 890 mL / OUT: 1750 mL / NET: -860 mL    23 Oct 2017 07:01  -  23 Oct 2017 13:56  --------------------------------------------------------  IN: 0 mL / OUT: 300 mL / NET: -300 mL        PHYSICAL EXAM:  GENERAL: NAD, well-developed  HEAD:  Atraumatic, Normocephalic  EYES: EOMI, PERRLA, conjunctiva and sclera clear  NECK: Supple, No JVD  CHEST/LUNG: Clear to auscultation bilaterally; No wheeze  HEART: Regular rate and rhythm; No murmurs, rubs, or gallops  ABDOMEN: Soft, Nontender, Nondistended; Bowel sounds present  EXTREMITIES:  2+ Peripheral Pulses, No clubbing, cyanosis, or edema. No warm or swollen lower extremities.   PSYCH: AAOx3  NEUROLOGY: non-focal  SKIN: No rashes or lesions    LABS:                        11.0   5.7   )-----------( 190      ( 23 Oct 2017 06:10 )             31.3     10-23    135  |  98  |  19  ----------------------------<  219<H>  4.8   |  23  |  1.21    Ca    9.5      23 Oct 2017 06:11  Phos  3.2     10-22  Mg     1.6     10-23    TPro  6.5  /  Alb  3.7  /  TBili  0.2  /  DBili  x   /  AST  58<H>  /  ALT  61<H>  /  AlkPhos  50  10-23              RADIOLOGY & ADDITIONAL TESTS:    Imaging Personally Reviewed:    Consultant(s) Notes Reviewed:  ELVIN, endocrinology, ID    Care Discussed with Consultants/Other Providers:    Telemetry: Sinus 70-90's.

## 2017-10-23 NOTE — PROGRESS NOTE ADULT - SUBJECTIVE AND OBJECTIVE BOX
Chief Complaint: f/u hypoparathyroidism, DM2    History:  Patient reports eating well. No abdominal pain, nausea, vomiting, diarrhea. Calcium today 9.5. FS today > 200.    MEDICATIONS  (STANDING):  calcitriol   Capsule 0.5 MICROGram(s) Oral daily  calcium carbonate 1250 mG (OsCal) 2 Tablet(s) Oral two times a day  dextrose 5%. 1000 milliLiter(s) (50 mL/Hr) IV Continuous <Continuous>  dextrose 50% Injectable 12.5 Gram(s) IV Push once  dextrose 50% Injectable 25 Gram(s) IV Push once  dextrose 50% Injectable 25 Gram(s) IV Push once  ergocalciferol 95619 Unit(s) Oral every week  folic acid 1 milliGRAM(s) Oral daily  heparin  Injectable 5000 Unit(s) SubCutaneous every 12 hours  hydrALAZINE 50 milliGRAM(s) Oral every 8 hours  insulin lispro (HumaLOG) corrective regimen sliding scale   SubCutaneous three times a day before meals  insulin lispro Injectable (HumaLOG) 2 Unit(s) SubCutaneous three times a day before meals  labetalol 200 milliGRAM(s) Oral every 12 hours  levETIRAcetam 1000 milliGRAM(s) Oral two times a day  magnesium oxide 400 milliGRAM(s) Oral daily  tamsulosin 0.4 milliGRAM(s) Oral at bedtime  thiamine 100 milliGRAM(s) Oral daily    MEDICATIONS  (PRN):  acetaminophen   Tablet 650 milliGRAM(s) Oral every 6 hours PRN For Temp greater than 38 C (100.4 F)  acetaminophen   Tablet. 650 milliGRAM(s) Oral every 6 hours PRN Mild Pain (1 - 3)  dextrose Gel 1 Dose(s) Oral once PRN Blood Glucose LESS THAN 70 milliGRAM(s)/deciliter  glucagon  Injectable 1 milliGRAM(s) IntraMuscular once PRN Glucose LESS THAN 70 milligrams/deciliter      Allergies    Allergy Status Unknown  No Known Allergies        ALL OTHER SYSTEMS REVIEWED AND NEGATIVE      PHYSICAL EXAM:  VITALS: T(C): 36.7 (10-23-17 @ 04:41)  T(F): 98.1 (10-23-17 @ 04:41), Max: 98.4 (10-22-17 @ 18:37)  HR: 80 (10-23-17 @ 04:51) (79 - 85)  BP: 175/84 (10-23-17 @ 04:51) (160/81 - 175/84)  RR:  (18 - 18)  SpO2:  (95% - 97%)  Wt(kg): --  GENERAL: NAD, well-developed  EYES: No proptosis, anicteric  HEENT:  Atraumatic, moist mucous membranes  RESPIRATORY: Clear to auscultation bilaterally; No rales, rhonchi, wheezing, or rubs  CARDIOVASCULAR: Regular rate and rhythm; No murmurs; no peripheral edema  GI: Soft, nontender, non distended  MUSCULOSKELETAL: Full range of motion, normal strength      POCT Blood Glucose.: 208 mg/dL (10-23-17 @ 13:04) H 2, 2  POCT Blood Glucose.: 211 mg/dL (10-23-17 @ 09:46) H 2 , 2  POCT Blood Glucose.: 272 mg/dL (10-22-17 @ 22:34) H   POCT Blood Glucose.: 152 mg/dL (10-22-17 @ 21:00) H 2,1   POCT Blood Glucose.: 163 mg/dL (10-22-17 @ 18:17) H 2, 1  POCT Blood Glucose.: 232 mg/dL (10-22-17 @ 13:05) H 2, 1  POCT Blood Glucose.: 200 mg/dL (10-22-17 @ 08:55) H 2, 1  POCT Blood Glucose.: 213 mg/dL (10-21-17 @ 21:58)  POCT Blood Glucose.: 200 mg/dL (10-21-17 @ 18:02)  POCT Blood Glucose.: 212 mg/dL (10-21-17 @ 12:45)  POCT Blood Glucose.: 188 mg/dL (10-21-17 @ 08:58)  POCT Blood Glucose.: 235 mg/dL (10-20-17 @ 21:03)  POCT Blood Glucose.: 211 mg/dL (10-20-17 @ 17:47)  POCT Blood Glucose.: 296 mg/dL (10-20-17 @ 13:58)      10-23    135  |  98  |  19  ----------------------------<  219<H>  4.8   |  23  |  1.21    EGFR if : 66  EGFR if non : 57<L>    Ca    9.5      10-23  Mg     1.6     10-23  Phos  3.2     10-22    TPro  6.5  /  Alb  3.7  /  TBili  0.2  /  DBili  x   /  AST  58<H>  /  ALT  61<H>  /  AlkPhos  50  10-23          Thyroid Function Tests:  10-16 @ 06:37 TSH 0.64 FreeT4 -- T3 -- Anti TPO -- Anti Thyroglobulin Ab -- TSI --      Hemoglobin A1C, Whole Blood: 6.0 % <H> [4.0 - 5.6] (10-17-17 @ 08:07)  Hemoglobin A1C, Whole Blood: 5.9 % <H> [4.0 - 5.6] (10-16-17 @ 10:58) Chief Complaint: f/u hypoparathyroidism, DM2    History:  Patient reports eating well. No abdominal pain, nausea, vomiting, diarrhea. Calcium today 9.5. FS today > 200.    MEDICATIONS  (STANDING):  calcitriol   Capsule 0.5 MICROGram(s) Oral daily  calcium carbonate 1250 mG (OsCal) 2 Tablet(s) Oral two times a day  dextrose 5%. 1000 milliLiter(s) (50 mL/Hr) IV Continuous <Continuous>  dextrose 50% Injectable 12.5 Gram(s) IV Push once  dextrose 50% Injectable 25 Gram(s) IV Push once  dextrose 50% Injectable 25 Gram(s) IV Push once  ergocalciferol 18202 Unit(s) Oral every week  folic acid 1 milliGRAM(s) Oral daily  heparin  Injectable 5000 Unit(s) SubCutaneous every 12 hours  hydrALAZINE 50 milliGRAM(s) Oral every 8 hours  insulin lispro (HumaLOG) corrective regimen sliding scale   SubCutaneous three times a day before meals  insulin lispro Injectable (HumaLOG) 2 Unit(s) SubCutaneous three times a day before meals  labetalol 200 milliGRAM(s) Oral every 12 hours  levETIRAcetam 1000 milliGRAM(s) Oral two times a day  magnesium oxide 400 milliGRAM(s) Oral daily  tamsulosin 0.4 milliGRAM(s) Oral at bedtime  thiamine 100 milliGRAM(s) Oral daily    MEDICATIONS  (PRN):  acetaminophen   Tablet 650 milliGRAM(s) Oral every 6 hours PRN For Temp greater than 38 C (100.4 F)  acetaminophen   Tablet. 650 milliGRAM(s) Oral every 6 hours PRN Mild Pain (1 - 3)  dextrose Gel 1 Dose(s) Oral once PRN Blood Glucose LESS THAN 70 milliGRAM(s)/deciliter  glucagon  Injectable 1 milliGRAM(s) IntraMuscular once PRN Glucose LESS THAN 70 milligrams/deciliter      Allergies    Allergy Status Unknown  No Known Allergies        ALL OTHER SYSTEMS REVIEWED AND NEGATIVE      PHYSICAL EXAM:  VITALS: T(C): 36.7 (10-23-17 @ 04:41)  T(F): 98.1 (10-23-17 @ 04:41), Max: 98.4 (10-22-17 @ 18:37)  HR: 80 (10-23-17 @ 04:51) (79 - 85)  BP: 175/84 (10-23-17 @ 04:51) (160/81 - 175/84)  RR:  (18 - 18)  SpO2:  (95% - 97%)  Wt(kg): --  GENERAL: NAD, well-developed  EYES: No proptosis, anicteric  HEENT:  Atraumatic, moist mucous membranes  RESPIRATORY: Clear to auscultation bilaterally; No rales, rhonchi, wheezing, or rubs  CARDIOVASCULAR: Regular rate and rhythm; No murmurs; no peripheral edema  GI: Soft, nontender, non distended        POCT Blood Glucose.: 208 mg/dL (10-23-17 @ 13:04) H 2, 2  POCT Blood Glucose.: 211 mg/dL (10-23-17 @ 09:46) H 2 , 2  POCT Blood Glucose.: 272 mg/dL (10-22-17 @ 22:34) H   POCT Blood Glucose.: 152 mg/dL (10-22-17 @ 21:00) H 2,1   POCT Blood Glucose.: 163 mg/dL (10-22-17 @ 18:17) H 2, 1  POCT Blood Glucose.: 232 mg/dL (10-22-17 @ 13:05) H 2, 1  POCT Blood Glucose.: 200 mg/dL (10-22-17 @ 08:55) H 2, 1  POCT Blood Glucose.: 213 mg/dL (10-21-17 @ 21:58)  POCT Blood Glucose.: 200 mg/dL (10-21-17 @ 18:02)  POCT Blood Glucose.: 212 mg/dL (10-21-17 @ 12:45)  POCT Blood Glucose.: 188 mg/dL (10-21-17 @ 08:58)  POCT Blood Glucose.: 235 mg/dL (10-20-17 @ 21:03)  POCT Blood Glucose.: 211 mg/dL (10-20-17 @ 17:47)  POCT Blood Glucose.: 296 mg/dL (10-20-17 @ 13:58)      10-23    135  |  98  |  19  ----------------------------<  219<H>  4.8   |  23  |  1.21    EGFR if : 66  EGFR if non : 57<L>    Ca    9.5      10-23  Mg     1.6     10-23  Phos  3.2     10-22    TPro  6.5  /  Alb  3.7  /  TBili  0.2  /  DBili  x   /  AST  58<H>  /  ALT  61<H>  /  AlkPhos  50  10-23          Thyroid Function Tests:  10-16 @ 06:37 TSH 0.64 FreeT4 -- T3 -- Anti TPO -- Anti Thyroglobulin Ab -- TSI --      Hemoglobin A1C, Whole Blood: 6.0 % <H> [4.0 - 5.6] (10-17-17 @ 08:07)  Hemoglobin A1C, Whole Blood: 5.9 % <H> [4.0 - 5.6] (10-16-17 @ 10:58)

## 2017-10-23 NOTE — PROVIDER CONTACT NOTE (OTHER) - ASSESSMENT
A&O x3, forgetful.  Denies pain/discomfort.  Patient was asleep and asymptomatic during time of event A&O x3, forgetful.  Denies pain/discomfort.  Patient was asleep and asymptomatic during time of event.  175/84.  HR 80

## 2017-10-23 NOTE — PROVIDER CONTACT NOTE (OTHER) - REASON
Possible infiltration from previous IVL on right lower forearm -- area hard & red and painful for patient

## 2017-10-24 LAB
ALBUMIN SERPL ELPH-MCNC: 3.9 G/DL — SIGNIFICANT CHANGE UP (ref 3.3–5)
ALP SERPL-CCNC: 52 U/L — SIGNIFICANT CHANGE UP (ref 40–120)
ALT FLD-CCNC: 71 U/L RC — HIGH (ref 10–45)
ANION GAP SERPL CALC-SCNC: 16 MMOL/L — SIGNIFICANT CHANGE UP (ref 5–17)
AST SERPL-CCNC: 49 U/L — HIGH (ref 10–40)
BILIRUB SERPL-MCNC: 0.3 MG/DL — SIGNIFICANT CHANGE UP (ref 0.2–1.2)
BUN SERPL-MCNC: 20 MG/DL — SIGNIFICANT CHANGE UP (ref 7–23)
CALCIUM SERPL-MCNC: 9.7 MG/DL — SIGNIFICANT CHANGE UP (ref 8.4–10.5)
CHLORIDE SERPL-SCNC: 96 MMOL/L — SIGNIFICANT CHANGE UP (ref 96–108)
CO2 SERPL-SCNC: 22 MMOL/L — SIGNIFICANT CHANGE UP (ref 22–31)
CREAT SERPL-MCNC: 1.25 MG/DL — SIGNIFICANT CHANGE UP (ref 0.5–1.3)
GLUCOSE BLDC GLUCOMTR-MCNC: 167 MG/DL — HIGH (ref 70–99)
GLUCOSE BLDC GLUCOMTR-MCNC: 194 MG/DL — HIGH (ref 70–99)
GLUCOSE BLDC GLUCOMTR-MCNC: 209 MG/DL — HIGH (ref 70–99)
GLUCOSE BLDC GLUCOMTR-MCNC: 223 MG/DL — HIGH (ref 70–99)
GLUCOSE SERPL-MCNC: 196 MG/DL — HIGH (ref 70–99)
HCT VFR BLD CALC: 32.4 % — LOW (ref 39–50)
HGB BLD-MCNC: 11.7 G/DL — LOW (ref 13–17)
MCHC RBC-ENTMCNC: 33.2 PG — SIGNIFICANT CHANGE UP (ref 27–34)
MCHC RBC-ENTMCNC: 36.1 GM/DL — HIGH (ref 32–36)
MCV RBC AUTO: 91.8 FL — SIGNIFICANT CHANGE UP (ref 80–100)
PLATELET # BLD AUTO: 178 K/UL — SIGNIFICANT CHANGE UP (ref 150–400)
POTASSIUM SERPL-MCNC: 4.6 MMOL/L — SIGNIFICANT CHANGE UP (ref 3.5–5.3)
POTASSIUM SERPL-SCNC: 4.6 MMOL/L — SIGNIFICANT CHANGE UP (ref 3.5–5.3)
PROT SERPL-MCNC: 6.8 G/DL — SIGNIFICANT CHANGE UP (ref 6–8.3)
RBC # BLD: 3.52 M/UL — LOW (ref 4.2–5.8)
RBC # FLD: 12.9 % — SIGNIFICANT CHANGE UP (ref 10.3–14.5)
SODIUM SERPL-SCNC: 134 MMOL/L — LOW (ref 135–145)
WBC # BLD: 5.5 K/UL — SIGNIFICANT CHANGE UP (ref 3.8–10.5)
WBC # FLD AUTO: 5.5 K/UL — SIGNIFICANT CHANGE UP (ref 3.8–10.5)

## 2017-10-24 PROCEDURE — 99233 SBSQ HOSP IP/OBS HIGH 50: CPT | Mod: GC

## 2017-10-24 PROCEDURE — 70450 CT HEAD/BRAIN W/O DYE: CPT | Mod: 26

## 2017-10-24 RX ORDER — HYDRALAZINE HCL 50 MG
50 TABLET ORAL EVERY 8 HOURS
Qty: 0 | Refills: 0 | Status: DISCONTINUED | OUTPATIENT
Start: 2017-10-24 | End: 2017-10-25

## 2017-10-24 RX ADMIN — Medication 2 TABLET(S): at 18:28

## 2017-10-24 RX ADMIN — Medication 1: at 18:26

## 2017-10-24 RX ADMIN — Medication 2 TABLET(S): at 07:09

## 2017-10-24 RX ADMIN — MAGNESIUM OXIDE 400 MG ORAL TABLET 400 MILLIGRAM(S): 241.3 TABLET ORAL at 13:35

## 2017-10-24 RX ADMIN — HEPARIN SODIUM 5000 UNIT(S): 5000 INJECTION INTRAVENOUS; SUBCUTANEOUS at 07:09

## 2017-10-24 RX ADMIN — CALCITRIOL 0.5 MICROGRAM(S): 0.5 CAPSULE ORAL at 13:35

## 2017-10-24 RX ADMIN — Medication 3 UNIT(S): at 18:33

## 2017-10-24 RX ADMIN — Medication 100 MILLIGRAM(S): at 13:35

## 2017-10-24 RX ADMIN — Medication 650 MILLIGRAM(S): at 15:45

## 2017-10-24 RX ADMIN — HEPARIN SODIUM 5000 UNIT(S): 5000 INJECTION INTRAVENOUS; SUBCUTANEOUS at 18:27

## 2017-10-24 RX ADMIN — Medication 3 UNIT(S): at 13:36

## 2017-10-24 RX ADMIN — Medication 75 MILLIGRAM(S): at 13:36

## 2017-10-24 RX ADMIN — Medication 1 MILLIGRAM(S): at 13:35

## 2017-10-24 RX ADMIN — INSULIN GLARGINE 10 UNIT(S): 100 INJECTION, SOLUTION SUBCUTANEOUS at 21:38

## 2017-10-24 RX ADMIN — Medication 650 MILLIGRAM(S): at 15:00

## 2017-10-24 RX ADMIN — LEVETIRACETAM 1000 MILLIGRAM(S): 250 TABLET, FILM COATED ORAL at 18:27

## 2017-10-24 RX ADMIN — Medication 200 MILLIGRAM(S): at 07:09

## 2017-10-24 RX ADMIN — Medication 1: at 13:35

## 2017-10-24 RX ADMIN — LEVETIRACETAM 1000 MILLIGRAM(S): 250 TABLET, FILM COATED ORAL at 07:11

## 2017-10-24 RX ADMIN — Medication 200 MILLIGRAM(S): at 18:39

## 2017-10-24 RX ADMIN — Medication 2: at 09:29

## 2017-10-24 RX ADMIN — Medication 75 MILLIGRAM(S): at 07:10

## 2017-10-24 RX ADMIN — TAMSULOSIN HYDROCHLORIDE 0.4 MILLIGRAM(S): 0.4 CAPSULE ORAL at 21:31

## 2017-10-24 NOTE — PROGRESS NOTE ADULT - PROBLEM SELECTOR PLAN 1
- CTH stable 10/20, no midline shift/mass effect, will hold off eliquis until f/u with outpatient neuro sx  - off Eliquis, on HSX for DVT ppx,  -continue keppra

## 2017-10-24 NOTE — STUDENT SIGN OFF DOCUMENT - DOCUMENTS STUDENTS ARE SIGNED OFF ON
Assessment and Intervention/Input and Output/Vital Signs/Plan of Care
Assessment and Intervention/Plan of Care

## 2017-10-24 NOTE — PROGRESS NOTE ADULT - PROBLEM SELECTOR PLAN 5
-will reevaluate calcium supplementation as above  -endo following  -nonspecific 7 mm hypoechoic nodule (possible parathyroid nodule) on US

## 2017-10-24 NOTE — PROGRESS NOTE ADULT - PROBLEM SELECTOR PLAN 2
- on calcitriol, calcium carbonate, and vit d supplementation, level 9.7 may have to downtitrate to avoid overcorrection.  - on magnesium 400mg daily, level 1.6 yesterday, can uptitrate today  -

## 2017-10-24 NOTE — PROGRESS NOTE ADULT - PROBLEM SELECTOR PLAN 4
- per PCP, Dr. Telles had extensive bilteral DVT was treated as unprovoked and started of eliquis 5mg Q12H, latest imaging shows resolution of previous clot, no active clot.   -If pt does not have risk factor for recurrent clot, then IVC likely not indicated as per IR per prior notes  -IR unwilling to do IVC filter unless active clot identified.   -Hold full dose a/c given SDH per neurosurgery

## 2017-10-24 NOTE — PROGRESS NOTE ADULT - PROBLEM SELECTOR PLAN 7
-hold oral hypoglycemics, c/w ISS for now  -Lantus added per endo and on lispro with meals.   -endo following, follow recs.

## 2017-10-24 NOTE — PROGRESS NOTE ADULT - ATTENDING COMMENTS
-Saw and discussed patient on 10/24/17. -Saw and discussed patient on 10/24/17.  -By afternoon patient had headache, possibly due to BP now overly well controlled. Was ranging -160's. But then started 110's around time of headache. Will reduce hydralazine to 50mg Q8H again.   -CT head ordered in view of headaches and have to check SDH stability prior to DC. Tylenol as needed for HA.   -Continue holding AC for old resolved DVT's in view of recent SDH. Needs outpatient ELVIN follow up.   -DC planning to rehab.

## 2017-10-24 NOTE — PROGRESS NOTE ADULT - SUBJECTIVE AND OBJECTIVE BOX
Patient is a 77y old  Male who presents with a chief complaint of SDH, transfer from [facility] (22 Oct 2017 19:46)      SUBJECTIVE / OVERNIGHT EVENTS:    Pt reports no concerns overnight, no longer has right forearm pain, no headaches, changes in vision,  or dysuria.     MEDICATIONS  (STANDING):  calcitriol   Capsule 0.5 MICROGram(s) Oral daily  calcium carbonate 1250 mG (OsCal) 2 Tablet(s) Oral two times a day  dextrose 5%. 1000 milliLiter(s) (50 mL/Hr) IV Continuous <Continuous>  dextrose 50% Injectable 12.5 Gram(s) IV Push once  dextrose 50% Injectable 25 Gram(s) IV Push once  dextrose 50% Injectable 25 Gram(s) IV Push once  ergocalciferol 09347 Unit(s) Oral every week  folic acid 1 milliGRAM(s) Oral daily  heparin  Injectable 5000 Unit(s) SubCutaneous every 12 hours  hydrALAZINE 75 milliGRAM(s) Oral every 8 hours  insulin glargine Injectable (LANTUS) 10 Unit(s) SubCutaneous at bedtime  insulin lispro (HumaLOG) corrective regimen sliding scale   SubCutaneous at bedtime  insulin lispro (HumaLOG) corrective regimen sliding scale   SubCutaneous three times a day before meals  insulin lispro Injectable (HumaLOG) 3 Unit(s) SubCutaneous three times a day before meals  labetalol 200 milliGRAM(s) Oral every 12 hours  levETIRAcetam 1000 milliGRAM(s) Oral two times a day  magnesium oxide 400 milliGRAM(s) Oral daily  tamsulosin 0.4 milliGRAM(s) Oral at bedtime  thiamine 100 milliGRAM(s) Oral daily    MEDICATIONS  (PRN):  acetaminophen   Tablet 650 milliGRAM(s) Oral every 6 hours PRN For Temp greater than 38 C (100.4 F)  acetaminophen   Tablet. 650 milliGRAM(s) Oral every 6 hours PRN Mild Pain (1 - 3)  dextrose Gel 1 Dose(s) Oral once PRN Blood Glucose LESS THAN 70 milliGRAM(s)/deciliter  glucagon  Injectable 1 milliGRAM(s) IntraMuscular once PRN Glucose LESS THAN 70 milligrams/deciliter      Vital Signs Last 24 Hrs  T(C): 36.7 (24 Oct 2017 04:35), Max: 36.7 (23 Oct 2017 12:38)  T(F): 98.1 (24 Oct 2017 04:35), Max: 98.1 (24 Oct 2017 04:35)  HR: 98 (24 Oct 2017 04:35) (78 - 98)  BP: 135/79 (24 Oct 2017 04:35) (135/79 - 168/83)  BP(mean): --  RR: 18 (24 Oct 2017 04:35) (18 - 18)  SpO2: 96% (24 Oct 2017 04:35) (96% - 97%)  CAPILLARY BLOOD GLUCOSE      POCT Blood Glucose.: 146 mg/dL (23 Oct 2017 21:14)  POCT Blood Glucose.: 240 mg/dL (23 Oct 2017 18:07)  POCT Blood Glucose.: 208 mg/dL (23 Oct 2017 13:04)  POCT Blood Glucose.: 211 mg/dL (23 Oct 2017 09:46)    I&O's Summary    23 Oct 2017 07:01  -  24 Oct 2017 07:00  --------------------------------------------------------  IN: 640 mL / OUT: 1450 mL / NET: -810 mL        PHYSICAL EXAM:  GENERAL: NAD, well-developed  HEAD:  Atraumatic, Normocephalic  EYES: EOMI, PERRLA, conjunctiva and sclera clear  NECK: Supple, No JVD  CHEST/LUNG: Clear to auscultation bilaterally; No wheeze  HEART: Regular rate and rhythm; No murmurs, rubs, or gallops  ABDOMEN: Soft, Nontender, Nondistended; Bowel sounds present  EXTREMITIES:  2+ Peripheral Pulses, No clubbing, cyanosis, or edema  PSYCH: AAOx3  NEUROLOGY: non-focal  SKIN: No rashes or lesions    LABS:                        11.7   5.5   )-----------( 178      ( 24 Oct 2017 07:15 )             32.4     10-24    134<L>  |  96  |  20  ----------------------------<  196<H>  4.6   |  22  |  1.25    Ca    9.7      24 Oct 2017 07:15  Mg     1.6     10-23    TPro  6.8  /  Alb  3.9  /  TBili  0.3  /  DBili  x   /  AST  49<H>  /  ALT  71<H>  /  AlkPhos  52  10-24              RADIOLOGY & ADDITIONAL TESTS:    Imaging Personally Reviewed:    Consultant(s) Notes Reviewed:      Care Discussed with Consultants/Other Providers:

## 2017-10-24 NOTE — PROGRESS NOTE ADULT - ASSESSMENT
The patient is a 78 yo man with a PMH of T2DM, HTN, HLD, hypoparathyroidism, chronic bilateral DVT(popliteal, on Eliquis at home), BPH, GERD, transferred from [facility]ICU for SDH management from mechanical fall at [facility] c/b GTC seizure in [facility] ED found to have no epileptic wave form on VEEG, now stable CT head 10/20 with no interval changes with no Neuro sx intervention, corrected hypocalcemia tx with calcium carbonate, calcitriol, vitamin D most likely 2/2 hypoparathyroidism, resolved hematuria s/p passed TOV with no dysuria, clear urine. Pt is stable, afebrile, BP better controlled with uptitration hydralazine yesterday on HSQ 5000, eliquis held in setting of SDH per neuro sx, only resume AC with outpatient follow up for SDH 2 weeks after discharge. 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 Pt is stable, afebrile, BP better controlled with uptitration hydralazine yesterday on HSQ 5000, eliquis held in setting of SDH per neuro sx, only resume AC with outpatient follow up for SDH 2 weeks after discharge. During the patient's hospital course he never had any diarrhea symptoms and never tested positive for C-Diff colitis. After observing the patient be clinically stable with no seizure activity, neuro focal deficits, he was cleared for discharge home.

## 2017-10-24 NOTE — PROGRESS NOTE ADULT - PROBLEM SELECTOR PLAN 6
- BP systolic 138-165  - c/w hydralazine to 75mg tid and c/w 200 mg labetalol twice daily -Monitor BP closely.   - c/w hydralazine to 75mg tid and c/w 200 mg labetalol twice daily

## 2017-10-25 DIAGNOSIS — N17.9 ACUTE KIDNEY FAILURE, UNSPECIFIED: ICD-10-CM

## 2017-10-25 LAB
ANION GAP SERPL CALC-SCNC: 13 MMOL/L — SIGNIFICANT CHANGE UP (ref 5–17)
BUN SERPL-MCNC: 29 MG/DL — HIGH (ref 7–23)
CALCIUM SERPL-MCNC: 9.5 MG/DL — SIGNIFICANT CHANGE UP (ref 8.4–10.5)
CHLORIDE SERPL-SCNC: 96 MMOL/L — SIGNIFICANT CHANGE UP (ref 96–108)
CO2 SERPL-SCNC: 24 MMOL/L — SIGNIFICANT CHANGE UP (ref 22–31)
CREAT SERPL-MCNC: 1.5 MG/DL — HIGH (ref 0.5–1.3)
GLUCOSE BLDC GLUCOMTR-MCNC: 143 MG/DL — HIGH (ref 70–99)
GLUCOSE BLDC GLUCOMTR-MCNC: 198 MG/DL — HIGH (ref 70–99)
GLUCOSE BLDC GLUCOMTR-MCNC: 214 MG/DL — HIGH (ref 70–99)
GLUCOSE BLDC GLUCOMTR-MCNC: 265 MG/DL — HIGH (ref 70–99)
GLUCOSE SERPL-MCNC: 191 MG/DL — HIGH (ref 70–99)
HCT VFR BLD CALC: 32 % — LOW (ref 39–50)
HGB BLD-MCNC: 11.4 G/DL — LOW (ref 13–17)
MAGNESIUM SERPL-MCNC: 1.7 MG/DL — SIGNIFICANT CHANGE UP (ref 1.6–2.6)
MCHC RBC-ENTMCNC: 32.7 PG — SIGNIFICANT CHANGE UP (ref 27–34)
MCHC RBC-ENTMCNC: 35.5 GM/DL — SIGNIFICANT CHANGE UP (ref 32–36)
MCV RBC AUTO: 92.1 FL — SIGNIFICANT CHANGE UP (ref 80–100)
OSMOLALITY UR: 650 MOS/KG — SIGNIFICANT CHANGE UP (ref 300–900)
PHOSPHATE SERPL-MCNC: 3.9 MG/DL — SIGNIFICANT CHANGE UP (ref 2.5–4.5)
PLATELET # BLD AUTO: 172 K/UL — SIGNIFICANT CHANGE UP (ref 150–400)
POTASSIUM SERPL-MCNC: 5.1 MMOL/L — SIGNIFICANT CHANGE UP (ref 3.5–5.3)
POTASSIUM SERPL-SCNC: 5.1 MMOL/L — SIGNIFICANT CHANGE UP (ref 3.5–5.3)
RBC # BLD: 3.47 M/UL — LOW (ref 4.2–5.8)
RBC # FLD: 12.9 % — SIGNIFICANT CHANGE UP (ref 10.3–14.5)
SODIUM SERPL-SCNC: 133 MMOL/L — LOW (ref 135–145)
SODIUM UR-SCNC: 89 MMOL/L — SIGNIFICANT CHANGE UP
WBC # BLD: 5.5 K/UL — SIGNIFICANT CHANGE UP (ref 3.8–10.5)
WBC # FLD AUTO: 5.5 K/UL — SIGNIFICANT CHANGE UP (ref 3.8–10.5)

## 2017-10-25 PROCEDURE — 99232 SBSQ HOSP IP/OBS MODERATE 35: CPT | Mod: GC

## 2017-10-25 PROCEDURE — 99233 SBSQ HOSP IP/OBS HIGH 50: CPT | Mod: GC

## 2017-10-25 RX ORDER — LEVETIRACETAM 250 MG/1
1 TABLET, FILM COATED ORAL
Qty: 0 | Refills: 0 | DISCHARGE
Start: 2017-10-25

## 2017-10-25 RX ORDER — CALCIUM CARBONATE 500(1250)
2 TABLET ORAL DAILY
Qty: 0 | Refills: 0 | Status: DISCONTINUED | OUTPATIENT
Start: 2017-10-25 | End: 2017-10-26

## 2017-10-25 RX ORDER — HYDRALAZINE HCL 50 MG
25 TABLET ORAL EVERY 8 HOURS
Qty: 0 | Refills: 0 | Status: DISCONTINUED | OUTPATIENT
Start: 2017-10-25 | End: 2017-10-26

## 2017-10-25 RX ORDER — LABETALOL HCL 100 MG
1 TABLET ORAL
Qty: 0 | Refills: 0 | COMMUNITY

## 2017-10-25 RX ORDER — TAMSULOSIN HYDROCHLORIDE 0.4 MG/1
1 CAPSULE ORAL
Qty: 0 | Refills: 0 | DISCHARGE
Start: 2017-10-25

## 2017-10-25 RX ORDER — INSULIN GLARGINE 100 [IU]/ML
12 INJECTION, SOLUTION SUBCUTANEOUS
Qty: 0 | Refills: 0 | DISCHARGE
Start: 2017-10-25

## 2017-10-25 RX ORDER — CALCIUM CARBONATE 500(1250)
2 TABLET ORAL
Qty: 0 | Refills: 0 | COMMUNITY
Start: 2017-10-25

## 2017-10-25 RX ORDER — INSULIN LISPRO 100/ML
5 VIAL (ML) SUBCUTANEOUS
Qty: 0 | Refills: 0 | Status: DISCONTINUED | OUTPATIENT
Start: 2017-10-25 | End: 2017-10-26

## 2017-10-25 RX ORDER — MAGNESIUM OXIDE 400 MG ORAL TABLET 241.3 MG
1 TABLET ORAL
Qty: 0 | Refills: 0 | DISCHARGE
Start: 2017-10-25

## 2017-10-25 RX ORDER — INSULIN GLARGINE 100 [IU]/ML
10 INJECTION, SOLUTION SUBCUTANEOUS
Qty: 0 | Refills: 0 | COMMUNITY
Start: 2017-10-25

## 2017-10-25 RX ORDER — HYDRALAZINE HCL 50 MG
1 TABLET ORAL
Qty: 0 | Refills: 0 | COMMUNITY
Start: 2017-10-25

## 2017-10-25 RX ORDER — INSULIN GLARGINE 100 [IU]/ML
12 INJECTION, SOLUTION SUBCUTANEOUS AT BEDTIME
Qty: 0 | Refills: 0 | Status: DISCONTINUED | OUTPATIENT
Start: 2017-10-25 | End: 2017-10-26

## 2017-10-25 RX ORDER — LABETALOL HCL 100 MG
1 TABLET ORAL
Qty: 0 | Refills: 0 | DISCHARGE
Start: 2017-10-25

## 2017-10-25 RX ADMIN — LEVETIRACETAM 1000 MILLIGRAM(S): 250 TABLET, FILM COATED ORAL at 06:02

## 2017-10-25 RX ADMIN — LEVETIRACETAM 1000 MILLIGRAM(S): 250 TABLET, FILM COATED ORAL at 17:58

## 2017-10-25 RX ADMIN — Medication 100 MILLIGRAM(S): at 13:20

## 2017-10-25 RX ADMIN — Medication 25 MILLIGRAM(S): at 21:04

## 2017-10-25 RX ADMIN — Medication 2 TABLET(S): at 17:59

## 2017-10-25 RX ADMIN — Medication 3 UNIT(S): at 18:20

## 2017-10-25 RX ADMIN — Medication 200 MILLIGRAM(S): at 06:02

## 2017-10-25 RX ADMIN — Medication 25 MILLIGRAM(S): at 14:00

## 2017-10-25 RX ADMIN — CALCITRIOL 0.5 MICROGRAM(S): 0.5 CAPSULE ORAL at 13:20

## 2017-10-25 RX ADMIN — Medication 200 MILLIGRAM(S): at 17:58

## 2017-10-25 RX ADMIN — Medication 2 TABLET(S): at 06:02

## 2017-10-25 RX ADMIN — INSULIN GLARGINE 12 UNIT(S): 100 INJECTION, SOLUTION SUBCUTANEOUS at 22:15

## 2017-10-25 RX ADMIN — Medication 3 UNIT(S): at 09:15

## 2017-10-25 RX ADMIN — Medication 3: at 13:18

## 2017-10-25 RX ADMIN — MAGNESIUM OXIDE 400 MG ORAL TABLET 400 MILLIGRAM(S): 241.3 TABLET ORAL at 12:24

## 2017-10-25 RX ADMIN — TAMSULOSIN HYDROCHLORIDE 0.4 MILLIGRAM(S): 0.4 CAPSULE ORAL at 21:04

## 2017-10-25 RX ADMIN — HEPARIN SODIUM 5000 UNIT(S): 5000 INJECTION INTRAVENOUS; SUBCUTANEOUS at 17:58

## 2017-10-25 RX ADMIN — Medication 3 UNIT(S): at 13:18

## 2017-10-25 RX ADMIN — Medication 1 MILLIGRAM(S): at 13:20

## 2017-10-25 RX ADMIN — HEPARIN SODIUM 5000 UNIT(S): 5000 INJECTION INTRAVENOUS; SUBCUTANEOUS at 06:02

## 2017-10-25 RX ADMIN — ERGOCALCIFEROL 50000 UNIT(S): 1.25 CAPSULE ORAL at 12:24

## 2017-10-25 RX ADMIN — Medication 1: at 09:16

## 2017-10-25 NOTE — PROGRESS NOTE ADULT - PROBLEM SELECTOR PLAN 7
-hold oral hypoglycemics, c/w ISS for now  -Lantus added per endo and on lispro with meals.   -endo following, follow recs. -hold oral hypoglycemics, c/w ISS for now  -C/w Lantus and lispro with meals per endocrine.   -endo following, appreciate recs.

## 2017-10-25 NOTE — PROGRESS NOTE ADULT - PROBLEM SELECTOR PLAN 6
- BP systolic low yesterday, downtitrated hydralazine 75 to 50  - c/w hydralazine to 50mg tid and c/w 200 mg labetalol twice daily - BP systolic low yesterday, downtitrated hydralazine 75mg to 50mg Q8H. Will reduce hydralazine further to 25mg Q8H today.   - c/w 200 mg labetalol twice daily

## 2017-10-25 NOTE — PROGRESS NOTE ADULT - PROBLEM SELECTOR PLAN 2
- patient with AM and post prandial hyperglycemia  - recommend increase humalog to 5 units qac  - increase lantus to 12 units qhs  - c/w low correction scale qac, add bedtime sliding scale

## 2017-10-25 NOTE — PROGRESS NOTE ADULT - SUBJECTIVE AND OBJECTIVE BOX
Chief Complaint: f/u DM2 and hypoparathyroidism    History:  Patient reports eating well. No abdominal pain, nausea, vomiting, diarrhea. Calcium today 9.5.    MEDICATIONS  (STANDING):  calcitriol   Capsule 0.5 MICROGram(s) Oral daily  calcium carbonate 1250 mG (OsCal) 2 Tablet(s) Oral two times a day  dextrose 5%. 1000 milliLiter(s) (50 mL/Hr) IV Continuous <Continuous>  dextrose 50% Injectable 12.5 Gram(s) IV Push once  dextrose 50% Injectable 25 Gram(s) IV Push once  dextrose 50% Injectable 25 Gram(s) IV Push once  ergocalciferol 61957 Unit(s) Oral every week  folic acid 1 milliGRAM(s) Oral daily  heparin  Injectable 5000 Unit(s) SubCutaneous every 12 hours  hydrALAZINE 25 milliGRAM(s) Oral every 8 hours  insulin glargine Injectable (LANTUS) 10 Unit(s) SubCutaneous at bedtime  insulin lispro (HumaLOG) corrective regimen sliding scale   SubCutaneous at bedtime  insulin lispro (HumaLOG) corrective regimen sliding scale   SubCutaneous three times a day before meals  insulin lispro Injectable (HumaLOG) 3 Unit(s) SubCutaneous three times a day before meals  labetalol 200 milliGRAM(s) Oral every 12 hours  levETIRAcetam 1000 milliGRAM(s) Oral two times a day  magnesium oxide 400 milliGRAM(s) Oral daily  tamsulosin 0.4 milliGRAM(s) Oral at bedtime  thiamine 100 milliGRAM(s) Oral daily    MEDICATIONS  (PRN):  acetaminophen   Tablet 650 milliGRAM(s) Oral every 6 hours PRN For Temp greater than 38 C (100.4 F)  acetaminophen   Tablet. 650 milliGRAM(s) Oral every 6 hours PRN Mild Pain (1 - 3)  dextrose Gel 1 Dose(s) Oral once PRN Blood Glucose LESS THAN 70 milliGRAM(s)/deciliter  glucagon  Injectable 1 milliGRAM(s) IntraMuscular once PRN Glucose LESS THAN 70 milligrams/deciliter      Allergies    Allergy Status Unknown  No Known Allergies        Review of Systems:  Constitutional: No fever  Cardiovascular: No chest pain, palpitations  Respiratory: No SOB, no cough  GI: No nausea, vomiting, abdominal pain  Endocrine: no polyuria, polydipsia      PHYSICAL EXAM:  VITALS: T(C): 36.7 (10-25-17 @ 12:02)  T(F): 98 (10-25-17 @ 12:02), Max: 98.2 (10-25-17 @ 04:31)  HR: 87 (10-25-17 @ 12:02) (85 - 93)  BP: 106/65 (10-25-17 @ 12:02) (100/60 - 126/85)  RR:  (18 - 18)  SpO2:  (96% - 99%)  Wt(kg): --  GENERAL: NAD, well-developed  EYES: No proptosis, anicteric  HEENT:  Atraumatic, Normocephalic  RESPIRATORY: Clear to auscultation bilaterally; No rales, rhonchi, wheezing, or rubs  CARDIOVASCULAR: Regular rate and rhythm; No murmurs; no peripheral edema  GI: Soft, nontender, non distended, normal bowel sounds      POCT Blood Glucose.: 265 mg/dL (10-25-17 @ 12:46) H 3, 3  POCT Blood Glucose.: 198 mg/dL (10-25-17 @ 09:08) H 3, 1  POCT Blood Glucose.: 223 mg/dL (10-24-17 @ 21:08) L 10  POCT Blood Glucose.: 167 mg/dL (10-24-17 @ 17:38) H 3, 1  POCT Blood Glucose.: 194 mg/dL (10-24-17 @ 13:05) H 3, 1  POCT Blood Glucose.: 209 mg/dL (10-24-17 @ 08:44)  POCT Blood Glucose.: 146 mg/dL (10-23-17 @ 21:14) L 10  POCT Blood Glucose.: 240 mg/dL (10-23-17 @ 18:07)  POCT Blood Glucose.: 208 mg/dL (10-23-17 @ 13:04)  POCT Blood Glucose.: 211 mg/dL (10-23-17 @ 09:46)  POCT Blood Glucose.: 272 mg/dL (10-22-17 @ 22:34)  POCT Blood Glucose.: 152 mg/dL (10-22-17 @ 21:00)  POCT Blood Glucose.: 163 mg/dL (10-22-17 @ 18:17)      10-25    133<L>  |  96  |  29<H>  ----------------------------<  191<H>  5.1   |  24  |  1.50<H>    EGFR if : 51<L>  EGFR if non : 44<L>    Ca    9.5      10-25  Mg     1.7     10-25  Phos  3.9     10-25    TPro  6.8  /  Alb  3.9  /  TBili  0.3  /  DBili  x   /  AST  49<H>  /  ALT  71<H>  /  AlkPhos  52  10-24          Thyroid Function Tests:  10-16 @ 06:37 TSH 0.64 FreeT4 -- T3 -- Anti TPO -- Anti Thyroglobulin Ab -- TSI --      Hemoglobin A1C, Whole Blood: 6.0 % <H> [4.0 - 5.6] (10-17-17 @ 08:07)  Hemoglobin A1C, Whole Blood: 5.9 % <H> [4.0 - 5.6] (10-16-17 @ 10:58)

## 2017-10-25 NOTE — PROGRESS NOTE ADULT - ATTENDING COMMENTS
-Relative hypotension noted, but patient asymptomatic. CT head stable. Will reduce hydralazine further to 25mg Q8H.   -MANUEL noted. Likely pre-renal due to poor PO fluid intake. Advised patient to increase water intake. Will send urine studies.   -DC to acute rehab pending approval.

## 2017-10-25 NOTE — PROGRESS NOTE ADULT - ATTENDING COMMENTS
Agree with assessment and plan as above by Dr. Lomeli. Reviewed all pertinent labs, glucose values, and imaging studies. Modifications made as indicated above.     Juan Ambrosio D.O  [phone]

## 2017-10-25 NOTE — PROGRESS NOTE ADULT - PROBLEM SELECTOR PLAN 5
-will reevaluate calcium supplementation as above  -endo following  -nonspecific 7 mm hypoechoic nodule (possible parathyroid nodule) on US -will c/w calcium supplementation as above  -endo following  -nonspecific 7 mm hypoechoic nodule (possible parathyroid nodule) on US

## 2017-10-25 NOTE — PROGRESS NOTE ADULT - ASSESSMENT
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 Pt is stable, afebrile, BP better controlled with uptitration hydralazine yesterday on HSQ 5000, eliquis held in setting of SDH per neuro sx, only resume AC with outpatient follow up for SDH 2 weeks after discharge. During the patient's hospital course he never had any diarrhea symptoms and never tested positive for C-Diff colitis. After observing the patient be clinically stable with no seizure activity, neuro focal deficits, he was cleared for discharge to acute rehab.

## 2017-10-25 NOTE — PROGRESS NOTE ADULT - SUBJECTIVE AND OBJECTIVE BOX
Patient is a 77y old  Male who presents with a chief complaint of SDH, transfer from [facility] (22 Oct 2017 19:46)      SUBJECTIVE / OVERNIGHT EVENTS:    Pt reports no concerns overnight, no SOB, no CP, no headache.    MEDICATIONS  (STANDING):  calcitriol   Capsule 0.5 MICROGram(s) Oral daily  calcium carbonate 1250 mG (OsCal) 2 Tablet(s) Oral two times a day  dextrose 5%. 1000 milliLiter(s) (50 mL/Hr) IV Continuous <Continuous>  dextrose 50% Injectable 12.5 Gram(s) IV Push once  dextrose 50% Injectable 25 Gram(s) IV Push once  dextrose 50% Injectable 25 Gram(s) IV Push once  ergocalciferol 18013 Unit(s) Oral every week  folic acid 1 milliGRAM(s) Oral daily  heparin  Injectable 5000 Unit(s) SubCutaneous every 12 hours  hydrALAZINE 50 milliGRAM(s) Oral every 8 hours  insulin glargine Injectable (LANTUS) 10 Unit(s) SubCutaneous at bedtime  insulin lispro (HumaLOG) corrective regimen sliding scale   SubCutaneous at bedtime  insulin lispro (HumaLOG) corrective regimen sliding scale   SubCutaneous three times a day before meals  insulin lispro Injectable (HumaLOG) 3 Unit(s) SubCutaneous three times a day before meals  labetalol 200 milliGRAM(s) Oral every 12 hours  levETIRAcetam 1000 milliGRAM(s) Oral two times a day  magnesium oxide 400 milliGRAM(s) Oral daily  tamsulosin 0.4 milliGRAM(s) Oral at bedtime  thiamine 100 milliGRAM(s) Oral daily    MEDICATIONS  (PRN):  acetaminophen   Tablet 650 milliGRAM(s) Oral every 6 hours PRN For Temp greater than 38 C (100.4 F)  acetaminophen   Tablet. 650 milliGRAM(s) Oral every 6 hours PRN Mild Pain (1 - 3)  dextrose Gel 1 Dose(s) Oral once PRN Blood Glucose LESS THAN 70 milliGRAM(s)/deciliter  glucagon  Injectable 1 milliGRAM(s) IntraMuscular once PRN Glucose LESS THAN 70 milligrams/deciliter      Vital Signs Last 24 Hrs  T(C): 36.8 (25 Oct 2017 04:31), Max: 37.1 (24 Oct 2017 12:02)  T(F): 98.2 (25 Oct 2017 04:31), Max: 98.7 (24 Oct 2017 12:02)  HR: 89 (25 Oct 2017 04:31) (80 - 93)  BP: 112/69 (25 Oct 2017 04:31) (100/60 - 126/85)  BP(mean): --  RR: 18 (25 Oct 2017 04:31) (18 - 18)  SpO2: 98% (25 Oct 2017 04:31) (96% - 98%)  CAPILLARY BLOOD GLUCOSE      POCT Blood Glucose.: 223 mg/dL (24 Oct 2017 21:08)  POCT Blood Glucose.: 167 mg/dL (24 Oct 2017 17:38)  POCT Blood Glucose.: 194 mg/dL (24 Oct 2017 13:05)  POCT Blood Glucose.: 209 mg/dL (24 Oct 2017 08:44)    I&O's Summary    24 Oct 2017 07:01  -  25 Oct 2017 07:00  --------------------------------------------------------  IN: 660 mL / OUT: 350 mL / NET: 310 mL        PHYSICAL EXAM:  GENERAL: NAD, well-developed  HEAD:  Atraumatic, Normocephalic  EYES: EOMI, PERRLA, conjunctiva and sclera clear  NECK: Supple, No JVD  CHEST/LUNG: Clear to auscultation bilaterally; No wheeze  HEART: Regular rate and rhythm; No murmurs, rubs, or gallops  ABDOMEN: Soft, Nontender, Nondistended; Bowel sounds present  EXTREMITIES:  2+ Peripheral Pulses, No clubbing, cyanosis, or edema  PSYCH: AAOx3  NEUROLOGY: non-focal  SKIN: No rashes or lesions    LABS:                        11.4   5.5   )-----------( 172      ( 25 Oct 2017 06:14 )             32.0     10-25    133<L>  |  96  |  29<H>  ----------------------------<  191<H>  5.1   |  24  |  1.50<H>    Ca    9.5      25 Oct 2017 06:14  Phos  3.9     10-25  Mg     1.7     10-25    TPro  6.8  /  Alb  3.9  /  TBili  0.3  /  DBili  x   /  AST  49<H>  /  ALT  71<H>  /  AlkPhos  52  10-24              RADIOLOGY & ADDITIONAL TESTS:    Imaging Personally Reviewed:    Consultant(s) Notes Reviewed:      Care Discussed with Consultants/Other Providers: Patient is a 77y old  Male who presents with a chief complaint of SDH, transfer from [facility] (22 Oct 2017 19:46)      SUBJECTIVE / OVERNIGHT EVENTS:    Pt reports no concerns overnight, no SOB, no CP, no headache.    MEDICATIONS  (STANDING):  calcitriol   Capsule 0.5 MICROGram(s) Oral daily  calcium carbonate 1250 mG (OsCal) 2 Tablet(s) Oral two times a day  dextrose 5%. 1000 milliLiter(s) (50 mL/Hr) IV Continuous <Continuous>  dextrose 50% Injectable 12.5 Gram(s) IV Push once  dextrose 50% Injectable 25 Gram(s) IV Push once  dextrose 50% Injectable 25 Gram(s) IV Push once  ergocalciferol 68625 Unit(s) Oral every week  folic acid 1 milliGRAM(s) Oral daily  heparin  Injectable 5000 Unit(s) SubCutaneous every 12 hours  hydrALAZINE 50 milliGRAM(s) Oral every 8 hours  insulin glargine Injectable (LANTUS) 10 Unit(s) SubCutaneous at bedtime  insulin lispro (HumaLOG) corrective regimen sliding scale   SubCutaneous at bedtime  insulin lispro (HumaLOG) corrective regimen sliding scale   SubCutaneous three times a day before meals  insulin lispro Injectable (HumaLOG) 3 Unit(s) SubCutaneous three times a day before meals  labetalol 200 milliGRAM(s) Oral every 12 hours  levETIRAcetam 1000 milliGRAM(s) Oral two times a day  magnesium oxide 400 milliGRAM(s) Oral daily  tamsulosin 0.4 milliGRAM(s) Oral at bedtime  thiamine 100 milliGRAM(s) Oral daily    MEDICATIONS  (PRN):  acetaminophen   Tablet 650 milliGRAM(s) Oral every 6 hours PRN For Temp greater than 38 C (100.4 F)  acetaminophen   Tablet. 650 milliGRAM(s) Oral every 6 hours PRN Mild Pain (1 - 3)  dextrose Gel 1 Dose(s) Oral once PRN Blood Glucose LESS THAN 70 milliGRAM(s)/deciliter  glucagon  Injectable 1 milliGRAM(s) IntraMuscular once PRN Glucose LESS THAN 70 milligrams/deciliter      Vital Signs Last 24 Hrs  T(C): 36.8 (25 Oct 2017 04:31), Max: 37.1 (24 Oct 2017 12:02)  T(F): 98.2 (25 Oct 2017 04:31), Max: 98.7 (24 Oct 2017 12:02)  HR: 89 (25 Oct 2017 04:31) (80 - 93)  BP: 112/69 (25 Oct 2017 04:31) (100/60 - 126/85)  BP(mean): --  RR: 18 (25 Oct 2017 04:31) (18 - 18)  SpO2: 98% (25 Oct 2017 04:31) (96% - 98%)  CAPILLARY BLOOD GLUCOSE      POCT Blood Glucose.: 223 mg/dL (24 Oct 2017 21:08)  POCT Blood Glucose.: 167 mg/dL (24 Oct 2017 17:38)  POCT Blood Glucose.: 194 mg/dL (24 Oct 2017 13:05)  POCT Blood Glucose.: 209 mg/dL (24 Oct 2017 08:44)    I&O's Summary    24 Oct 2017 07:01  -  25 Oct 2017 07:00  --------------------------------------------------------  IN: 660 mL / OUT: 350 mL / NET: 310 mL        PHYSICAL EXAM:  GENERAL: NAD, well-developed  HEAD:  Atraumatic, Normocephalic  EYES: EOMI, PERRLA, conjunctiva and sclera clear  NECK: Supple, No JVD  CHEST/LUNG: Clear to auscultation bilaterally; No wheeze  HEART: Regular rate and rhythm; No murmurs, rubs, or gallops  ABDOMEN: Soft, Nontender, Nondistended; Bowel sounds present  EXTREMITIES:  2+ Peripheral Pulses, No clubbing, cyanosis, or edema  PSYCH: AAOx3  NEUROLOGY: non-focal  SKIN: No rashes or lesions    LABS:                        11.4   5.5   )-----------( 172      ( 25 Oct 2017 06:14 )             32.0     10-25    133<L>  |  96  |  29<H>  ----------------------------<  191<H>  5.1   |  24  |  1.50<H>    Ca    9.5      25 Oct 2017 06:14  Phos  3.9     10-25  Mg     1.7     10-25    TPro  6.8  /  Alb  3.9  /  TBili  0.3  /  DBili  x   /  AST  49<H>  /  ALT  71<H>  /  AlkPhos  52  10-24              RADIOLOGY & ADDITIONAL TESTS:    Imaging Personally Reviewed:    Consultant(s) Notes Reviewed:  Endocrinology.     Care Discussed with Consultants/Other Providers:

## 2017-10-25 NOTE — PROGRESS NOTE ADULT - PROBLEM SELECTOR PLAN 1
- CTH stable 10/20, no midline shift/mass effect, will hold off eliquis until f/u with outpatient neuro sx  - off Eliquis, on HSX for DVT ppx,  -continue keppra - CTH stable 10/24, no midline shift/mass effect, will hold off eliquis until f/u with outpatient neuro sx  - off Eliquis, on HSX for DVT ppx,  -continue keppra

## 2017-10-25 NOTE — PROGRESS NOTE ADULT - PROBLEM SELECTOR PLAN 2
- on calcitriol, calcium carbonate, and vit d supplementation, level 9.3 today  - on magnesium 400mg daily - on calcitriol, calcium carbonate, and vit d supplementation, level 9.5 today  - on magnesium 400mg daily

## 2017-10-25 NOTE — PROGRESS NOTE ADULT - PROBLEM SELECTOR PLAN 8
-HSQ for DVT PPx. -Creatinine up to 1.5 today. Likely pre-renal due to poor fluid intake.   -Advised patient to start with drinking at least 4- 8oz glasses of water per day. He said he only was having about 1-2 per day on average.   -Could also be due to ATN from recent relative hypotension from meds compared to when patient was running hypertensive.   -Send urine studies and monitor BMP closely.

## 2017-10-25 NOTE — PROGRESS NOTE ADULT - PROBLEM SELECTOR PLAN 1
- hypocalcemia is 2/2 known hypoparathyroidism, however this history could not be obtained on admission due to patient's mental status  - goal calcium 8-8.5 - decrease calcium carbonate 2 tabs qd  - can check calcium qd  - c/w calcitriol 0.5 mcg qd  - c/w vitamin D 50,000 units qweek x 3 months  - maintain adequate magnesium levels  - will follow

## 2017-10-25 NOTE — PROGRESS NOTE ADULT - ASSESSMENT
78 y/o male, here after fall at home resulting in SDH (so admitted to neurosurgical ICU). Endocrine consulted for hypocalcemia. Additional history has been obtained and patient with known hypoparathyroidism, now on calcium carbonate and calcitriol. Also on basal bolus insulin for hyperglycemia

## 2017-10-26 VITALS
RESPIRATION RATE: 18 BRPM | SYSTOLIC BLOOD PRESSURE: 118 MMHG | HEART RATE: 72 BPM | DIASTOLIC BLOOD PRESSURE: 76 MMHG | OXYGEN SATURATION: 97 % | TEMPERATURE: 97 F

## 2017-10-26 DIAGNOSIS — E11.65 TYPE 2 DIABETES MELLITUS WITH HYPERGLYCEMIA: ICD-10-CM

## 2017-10-26 LAB
ALBUMIN SERPL ELPH-MCNC: 3.5 G/DL — SIGNIFICANT CHANGE UP (ref 3.3–5)
ALP SERPL-CCNC: 50 U/L — SIGNIFICANT CHANGE UP (ref 40–120)
ALT FLD-CCNC: 45 U/L RC — SIGNIFICANT CHANGE UP (ref 10–45)
ANION GAP SERPL CALC-SCNC: 13 MMOL/L — SIGNIFICANT CHANGE UP (ref 5–17)
AST SERPL-CCNC: 23 U/L — SIGNIFICANT CHANGE UP (ref 10–40)
BILIRUB SERPL-MCNC: 0.2 MG/DL — SIGNIFICANT CHANGE UP (ref 0.2–1.2)
BUN SERPL-MCNC: 31 MG/DL — HIGH (ref 7–23)
CALCIUM SERPL-MCNC: 9.4 MG/DL — SIGNIFICANT CHANGE UP (ref 8.4–10.5)
CHLORIDE SERPL-SCNC: 98 MMOL/L — SIGNIFICANT CHANGE UP (ref 96–108)
CO2 SERPL-SCNC: 22 MMOL/L — SIGNIFICANT CHANGE UP (ref 22–31)
CREAT ?TM UR-MCNC: 79 MG/DL — SIGNIFICANT CHANGE UP
CREAT SERPL-MCNC: 1.56 MG/DL — HIGH (ref 0.5–1.3)
GLUCOSE BLDC GLUCOMTR-MCNC: 184 MG/DL — HIGH (ref 70–99)
GLUCOSE BLDC GLUCOMTR-MCNC: 241 MG/DL — HIGH (ref 70–99)
GLUCOSE SERPL-MCNC: 187 MG/DL — HIGH (ref 70–99)
HCT VFR BLD CALC: 31.6 % — LOW (ref 39–50)
HGB BLD-MCNC: 11.3 G/DL — LOW (ref 13–17)
MCHC RBC-ENTMCNC: 33 PG — SIGNIFICANT CHANGE UP (ref 27–34)
MCHC RBC-ENTMCNC: 35.8 GM/DL — SIGNIFICANT CHANGE UP (ref 32–36)
MCV RBC AUTO: 92.1 FL — SIGNIFICANT CHANGE UP (ref 80–100)
PLATELET # BLD AUTO: 163 K/UL — SIGNIFICANT CHANGE UP (ref 150–400)
POTASSIUM SERPL-MCNC: 4.8 MMOL/L — SIGNIFICANT CHANGE UP (ref 3.5–5.3)
POTASSIUM SERPL-SCNC: 4.8 MMOL/L — SIGNIFICANT CHANGE UP (ref 3.5–5.3)
PROT SERPL-MCNC: 6.6 G/DL — SIGNIFICANT CHANGE UP (ref 6–8.3)
RBC # BLD: 3.43 M/UL — LOW (ref 4.2–5.8)
RBC # FLD: 12.7 % — SIGNIFICANT CHANGE UP (ref 10.3–14.5)
SODIUM SERPL-SCNC: 133 MMOL/L — LOW (ref 135–145)
WBC # BLD: 5.7 K/UL — SIGNIFICANT CHANGE UP (ref 3.8–10.5)
WBC # FLD AUTO: 5.7 K/UL — SIGNIFICANT CHANGE UP (ref 3.8–10.5)

## 2017-10-26 PROCEDURE — 99239 HOSP IP/OBS DSCHRG MGMT >30: CPT

## 2017-10-26 PROCEDURE — 99232 SBSQ HOSP IP/OBS MODERATE 35: CPT

## 2017-10-26 RX ORDER — FOLIC ACID 0.8 MG
1 TABLET ORAL
Qty: 0 | Refills: 0 | COMMUNITY
Start: 2017-10-26

## 2017-10-26 RX ORDER — THIAMINE MONONITRATE (VIT B1) 100 MG
1 TABLET ORAL
Qty: 0 | Refills: 0 | COMMUNITY
Start: 2017-10-26

## 2017-10-26 RX ORDER — FINASTERIDE 5 MG/1
0 TABLET, FILM COATED ORAL
Qty: 90 | Refills: 0 | COMMUNITY

## 2017-10-26 RX ORDER — APIXABAN 2.5 MG/1
1 TABLET, FILM COATED ORAL
Qty: 0 | Refills: 0 | COMMUNITY

## 2017-10-26 RX ORDER — INSULIN LISPRO 100/ML
5 VIAL (ML) SUBCUTANEOUS
Qty: 0 | Refills: 0 | Status: DISCONTINUED | OUTPATIENT
Start: 2017-10-26 | End: 2017-10-26

## 2017-10-26 RX ORDER — INSULIN LISPRO 100/ML
7 VIAL (ML) SUBCUTANEOUS
Qty: 0 | Refills: 0 | Status: DISCONTINUED | OUTPATIENT
Start: 2017-10-26 | End: 2017-10-26

## 2017-10-26 RX ORDER — SODIUM CHLORIDE 9 MG/ML
500 INJECTION INTRAMUSCULAR; INTRAVENOUS; SUBCUTANEOUS ONCE
Qty: 0 | Refills: 0 | Status: COMPLETED | OUTPATIENT
Start: 2017-10-26 | End: 2017-10-26

## 2017-10-26 RX ORDER — CALCIUM CARBONATE 500(1250)
2 TABLET ORAL
Qty: 0 | Refills: 0 | DISCHARGE
Start: 2017-10-26

## 2017-10-26 RX ORDER — HYDRALAZINE HCL 50 MG
1 TABLET ORAL
Qty: 0 | Refills: 0 | COMMUNITY
Start: 2017-10-26

## 2017-10-26 RX ADMIN — SODIUM CHLORIDE 500 MILLILITER(S): 9 INJECTION INTRAMUSCULAR; INTRAVENOUS; SUBCUTANEOUS at 12:03

## 2017-10-26 RX ADMIN — LEVETIRACETAM 1000 MILLIGRAM(S): 250 TABLET, FILM COATED ORAL at 07:04

## 2017-10-26 RX ADMIN — Medication 2 TABLET(S): at 12:13

## 2017-10-26 RX ADMIN — Medication 5 UNIT(S): at 09:55

## 2017-10-26 RX ADMIN — Medication 25 MILLIGRAM(S): at 07:04

## 2017-10-26 RX ADMIN — Medication 1: at 09:58

## 2017-10-26 RX ADMIN — HEPARIN SODIUM 5000 UNIT(S): 5000 INJECTION INTRAVENOUS; SUBCUTANEOUS at 07:04

## 2017-10-26 RX ADMIN — Medication 200 MILLIGRAM(S): at 07:04

## 2017-10-26 RX ADMIN — SODIUM CHLORIDE 1000 MILLILITER(S): 9 INJECTION INTRAMUSCULAR; INTRAVENOUS; SUBCUTANEOUS at 13:15

## 2017-10-26 RX ADMIN — MAGNESIUM OXIDE 400 MG ORAL TABLET 400 MILLIGRAM(S): 241.3 TABLET ORAL at 12:14

## 2017-10-26 NOTE — PROGRESS NOTE ADULT - PROBLEM SELECTOR PROBLEM 5
Hypoparathyroidism, unspecified hypoparathyroidism type
Chronic deep vein thrombosis (DVT) of popliteal vein of both lower extremities
Delirium due to another medical condition
Delirium due to another medical condition
Hypoparathyroidism, unspecified hypoparathyroidism type
Chronic deep vein thrombosis (DVT) of popliteal vein of both lower extremities
Chronic deep vein thrombosis (DVT) of popliteal vein of both lower extremities

## 2017-10-26 NOTE — PROGRESS NOTE ADULT - PROBLEM SELECTOR PROBLEM 3
Hypomagnesemia
Gross hematuria
Type 2 diabetes mellitus without complication, without long-term current use of insulin
Gross hematuria
Hypomagnesemia
Hypomagnesemia
Gross hematuria

## 2017-10-26 NOTE — PROGRESS NOTE ADULT - PROBLEM SELECTOR PLAN 1
- CTH stable 10/24, no midline shift/mass effect, will hold off eliquis until f/u with outpatient neuro sx  - off Eliquis, on HSX for DVT ppx,  -continue keppra

## 2017-10-26 NOTE — PROGRESS NOTE ADULT - PROBLEM SELECTOR PLAN 2
- on calcitriol, calcium carbonate, and vit d supplementation, level 9.5 today  - on magnesium 400mg daily

## 2017-10-26 NOTE — PROGRESS NOTE ADULT - PROBLEM SELECTOR PLAN 1
Goal gluose 100-180  c/w Lantus 12 and Increase Humalog to 7 units at breakfast and dinner. c/w 5 units at lunch.  Plan to d/c to rehab on basal bolus.

## 2017-10-26 NOTE — PROGRESS NOTE ADULT - PROBLEM SELECTOR PROBLEM 7
Type 2 diabetes mellitus without complication, without long-term current use of insulin
B12 deficiency
Hypoparathyroidism, unspecified hypoparathyroidism type
Hypoparathyroidism, unspecified hypoparathyroidism type
Type 2 diabetes mellitus without complication, without long-term current use of insulin
B12 deficiency
B12 deficiency

## 2017-10-26 NOTE — PROGRESS NOTE ADULT - PROBLEM SELECTOR PROBLEM 2
Hypoparathyroidism, unspecified hypoparathyroidism type
Hypocalcemia
Hypomagnesemia
Type 2 diabetes mellitus without complication, without long-term current use of insulin
Hypocalcemia

## 2017-10-26 NOTE — PROGRESS NOTE ADULT - PROBLEM SELECTOR PROBLEM 1
Type 2 diabetes mellitus with hyperglycemia, unspecified long term insulin use status
Hypocalcemia
Hypoparathyroidism, unspecified hypoparathyroidism type
Subdural hematoma, post-traumatic

## 2017-10-26 NOTE — PROGRESS NOTE ADULT - PROVIDER SPECIALTY LIST ADULT
Endocrinology
Infectious Disease
Internal Medicine
Intervent Radiology
NSICU
Neurosurgery
Urology
Urology
Infectious Disease
Surgery
Internal Medicine
Endocrinology

## 2017-10-26 NOTE — PROGRESS NOTE ADULT - ASSESSMENT
The patient is a 78 yo man with a PMH of T2DM, HTN, HLD, hypoparathyroidism, chronic bilateral DVT(popliteal, on Eliquis at home), BPH, GERD, transferred from NSICU for SDH management from mechanical fall at [facility] c/b GTC seizure in [facility] ED found to have no epileptic wave form on VEEG, now stable CT head 10/20 with no interval changes with no Neuro sx intervention, corrected hypocalcemia tx with calcium carbonate, calcitriol, vitamin D most likely 2/2 hypoparathyroidism, resolved hematuria s/p passed TOV with no dysuria, clear urine. Pt is stable, afebrile, BP better controlled with uptitration hydralazine yesterday on HSQ 5000, eliquis held in setting of SDH per neuro sx, only resume AC with outpatient follow up for SDH 2 weeks after discharge. During the patient's hospital course he never had any diarrhea symptoms and never tested positive for C-Diff colitis. After observing the patient be clinically stable with no seizure activity, neuro focal deficits, he was cleared for discharge to acute rehab today

## 2017-10-26 NOTE — PROVIDER CONTACT NOTE (OTHER) - ACTION/TREATMENT ORDERED:
Bolus 1 liter NS.
MD notified and aware.  MD states he will one-time dose of 1 unit Humalog for bedtime and will endorse to primary team to order sliding scale for bedtime.  Continue to monitor patient
MD notified and aware.  No new orders at this time.  Continue to monitor
MD notified and aware.  No new orders at this time.  MD states he will endorse to primary team to possibly order ultrasound doppler for arm.  Continue to monitor patient
MD notified and aware.  No new orders made at this time.  Continue to monitor patient and maintain safety.
MD notified and aware.  No new orders made at this time.  Continue to monitor patient and maintain safety.  Maintain I+O
MD notified and aware.  No new orders made at this time.  MD states he will look into it.  Continue to monitor patient and maintain safety.
MD notified and aware.  No new orders made at this time.  MD states he will pass it on to primary team and will replete.  Continue to monitor patient
NP notified. Constant observation to be ordered. Will continue to monitor pt.
no interventions at this time

## 2017-10-26 NOTE — PROGRESS NOTE ADULT - PROBLEM SELECTOR PLAN 7
-hold oral hypoglycemics, c/w ISS for now  -C/w Lantus and lispro with meals per endocrine.   -endo following, appreciate recs.

## 2017-10-26 NOTE — PROVIDER CONTACT NOTE (OTHER) - BACKGROUND
Pt admitted s/p fall with subdural hematoma.
Patient admitted for s/p fall and SDH.  PMH of DVT, PNA, DM, GERD, HTN, anemia, hypomagnesemia
Pt admitted for subdural hematoma. Hx of fall, chronic DVT, DM2, HTN, and hypoparathyroidism.
pt s/p SDH on eliquis with traumatic pena insertion

## 2017-10-26 NOTE — PROGRESS NOTE ADULT - PROBLEM SELECTOR PROBLEM 9
Essential hypertension
Prophylactic measure
Type 2 diabetes mellitus without complication, without long-term current use of insulin
Type 2 diabetes mellitus without complication, without long-term current use of insulin
Essential hypertension
Essential hypertension
Prophylactic measure

## 2017-10-26 NOTE — PROGRESS NOTE ADULT - PROBLEM SELECTOR PROBLEM 6
Essential hypertension
Delirium due to another medical condition
B12 deficiency
B12 deficiency
Essential hypertension
Delirium due to another medical condition
Delirium due to another medical condition

## 2017-10-26 NOTE — PROGRESS NOTE ADULT - PROBLEM SELECTOR PLAN 6
- BP systolic low yesterday, downtitrated hydralazine 75mg to 50mg Q8H. Will reduce hydralazine further to 25mg Q8H today.   - c/w 200 mg labetalol twice daily

## 2017-10-26 NOTE — PROGRESS NOTE ADULT - PROBLEM SELECTOR PLAN 8
-Creatinine up to 1.56 today. Likely pre-renal due to poor fluid intake.   - received 1 L bolus, will continue to monitor in rehab  -Advised patient to start with drinking at least 4- 8oz glasses of water per day. He said he only was having about 1-2 per day on average.   - urine studies show prerenal/intrinsic picture.   -Could also be due to ATN from recent relative hypotension from meds compared to when patient was running hypertensive.

## 2017-10-26 NOTE — PROGRESS NOTE ADULT - SUBJECTIVE AND OBJECTIVE BOX
Chief Complaint: Evaluating this 78 y/o M for uncontrolled Type 2 DM w/ hyperglycemia and hypoparathyroidism      Interval History: Currently taking caclium and calcitriol, eating more with hyperglycemia. Denies chest pain, shortness of breath, nausea or vomiting.     MEDICATIONS  (STANDING):  calcitriol   Capsule 0.5 MICROGram(s) Oral daily  calcium carbonate 1250 mG (OsCal) 2 Tablet(s) Oral daily  dextrose 5%. 1000 milliLiter(s) (50 mL/Hr) IV Continuous <Continuous>  dextrose 50% Injectable 12.5 Gram(s) IV Push once  dextrose 50% Injectable 25 Gram(s) IV Push once  dextrose 50% Injectable 25 Gram(s) IV Push once  ergocalciferol 02506 Unit(s) Oral every week  folic acid 1 milliGRAM(s) Oral daily  heparin  Injectable 5000 Unit(s) SubCutaneous every 12 hours  insulin glargine Injectable (LANTUS) 12 Unit(s) SubCutaneous at bedtime  insulin lispro (HumaLOG) corrective regimen sliding scale   SubCutaneous at bedtime  insulin lispro (HumaLOG) corrective regimen sliding scale   SubCutaneous three times a day before meals  insulin lispro Injectable (HumaLOG) 5 Unit(s) SubCutaneous three times a day before meals  labetalol 200 milliGRAM(s) Oral every 12 hours  levETIRAcetam 1000 milliGRAM(s) Oral two times a day  magnesium oxide 400 milliGRAM(s) Oral daily  tamsulosin 0.4 milliGRAM(s) Oral at bedtime  thiamine 100 milliGRAM(s) Oral daily    MEDICATIONS  (PRN):  acetaminophen   Tablet 650 milliGRAM(s) Oral every 6 hours PRN For Temp greater than 38 C (100.4 F)  acetaminophen   Tablet. 650 milliGRAM(s) Oral every 6 hours PRN Mild Pain (1 - 3)  dextrose Gel 1 Dose(s) Oral once PRN Blood Glucose LESS THAN 70 milliGRAM(s)/deciliter  glucagon  Injectable 1 milliGRAM(s) IntraMuscular once PRN Glucose LESS THAN 70 milligrams/deciliter      Allergies    Allergy Status Unknown  No Known Allergies    Intolerances      Review of Systems:  Constitutional: No fever  Eyes: No blurry vision  Cardiovascular: No chest pain  Respiratory: No SOB  GI: No abdominal pain, No nausea, No vomiting  Endocrine: as noted in HPI    All other negative      PHYSICAL EXAM:  VITALS: T(C): 36.3 (10-26-17 @ 13:00)  T(F): 97.4 (10-26-17 @ 13:00), Max: 98 (10-26-17 @ 05:22)  HR: 72 (10-26-17 @ 13:00) (72 - 80)  BP: 118/76 (10-26-17 @ 13:00) (94/58 - 121/74)  RR:  (18 - 18)  SpO2:  (94% - 97%)  Wt(kg): --  GENERAL: NAD at this time  EYES: EOMI, No proptosis  HEENT:  Atraumatic, Normocephalic,   RESPIRATORY: Clear to auscultation bilaterally, full excursion, non labored  CARDIOVASCULAR: Regular rhythm; normal S1/S2, no peripheral edema  GI: Soft, nontender, non distended, normal bowel sounds  SKIN: Warm and dry  PSYCH: normal affect, normal mood      POCT Blood Glucose.: 241 mg/dL (10-26-17 @ 13:03)  POCT Blood Glucose.: 184 mg/dL (10-26-17 @ 09:37)  POCT Blood Glucose.: 214 mg/dL (10-25-17 @ 20:44)  POCT Blood Glucose.: 143 mg/dL (10-25-17 @ 17:53)  POCT Blood Glucose.: 265 mg/dL (10-25-17 @ 12:46)  POCT Blood Glucose.: 198 mg/dL (10-25-17 @ 09:08)  POCT Blood Glucose.: 223 mg/dL (10-24-17 @ 21:08)  POCT Blood Glucose.: 167 mg/dL (10-24-17 @ 17:38)  POCT Blood Glucose.: 194 mg/dL (10-24-17 @ 13:05)  POCT Blood Glucose.: 209 mg/dL (10-24-17 @ 08:44)  POCT Blood Glucose.: 146 mg/dL (10-23-17 @ 21:14)  POCT Blood Glucose.: 240 mg/dL (10-23-17 @ 18:07)        10-26    133<L>  |  98  |  31<H>  ----------------------------<  187<H>  4.8   |  22  |  1.56<H>    EGFR if : 48<L>  EGFR if non : 42<L>    Ca    9.4      10-26  Mg     1.7     10-25  Phos  3.9     10-25    TPro  6.6  /  Alb  3.5  /  TBili  0.2  /  DBili  x   /  AST  23  /  ALT  45  /  AlkPhos  50  10-26        Thyroid Function Tests:  10-16 @ 06:37 TSH 0.64 FreeT4 -- T3 -- Anti TPO -- Anti Thyroglobulin Ab -- TSI --      Hemoglobin A1C, Whole Blood: 6.0 % <H> [4.0 - 5.6] (10-17-17 @ 08:07)  Hemoglobin A1C, Whole Blood: 5.9 % <H> [4.0 - 5.6] (10-16-17 @ 10:58)

## 2017-10-26 NOTE — PROGRESS NOTE ADULT - ASSESSMENT
78 y/o M w/ uncontrolled Type 2 DM w/ hyperglycemia and hypocalcemia secondary to hypoparathyroidism a/w SDH

## 2017-10-26 NOTE — PROGRESS NOTE ADULT - PROBLEM SELECTOR PLAN 2
c/w calcitriol daily  Calcium carbonate decreased to 2 tabs daily given corrected calcium in upper limit of normal. Goal calcium with hypopara is 8-8.5. Monitor calcium as outpatient.

## 2017-10-26 NOTE — PROGRESS NOTE ADULT - PROBLEM SELECTOR PLAN 5
-will c/w calcium supplementation as above  -endo following  -nonspecific 7 mm hypoechoic nodule (possible parathyroid nodule) on US

## 2017-10-26 NOTE — PROGRESS NOTE ADULT - PROBLEM SELECTOR PLAN 4
- per PCP, Dr. Telles had extensive bilateral DVT was treated as unprovoked and was started of eliquis 5mg Q12H, latest imaging shows resolution of previous clot, no active clot.   -If pt does not have risk factor for recurrent clot, then IVC likely not indicated as per IR per prior notes  -IR unwilling to do IVC filter unless active clot identified.   -Hold full dose a/c given SDH per neurosurgery

## 2017-10-26 NOTE — PROGRESS NOTE ADULT - PROBLEM SELECTOR PROBLEM 4
Chronic deep vein thrombosis (DVT) of popliteal vein of both lower extremities
Gross hematuria
Chronic deep vein thrombosis (DVT) of popliteal vein of both lower extremities
Gross hematuria
Gross hematuria

## 2017-11-10 DIAGNOSIS — I62.00 NONTRAUMATIC SUBDURAL HEMORRHAGE, UNSPECIFIED: ICD-10-CM

## 2017-11-15 LAB
A PHAGOCYTOPH IGG TITR SER IF: SIGNIFICANT CHANGE UP
E CHAFFEENSIS IGM TITR SER IF: SIGNIFICANT CHANGE UP

## 2017-11-17 LAB — E CHAFFEENSIS IGG TITR SER: SIGNIFICANT CHANGE UP

## 2017-11-27 ENCOUNTER — APPOINTMENT (OUTPATIENT)
Dept: NEUROSURGERY | Facility: CLINIC | Age: 77
End: 2017-11-27

## 2017-12-14 ENCOUNTER — INPATIENT (INPATIENT)
Facility: HOSPITAL | Age: 77
LOS: 0 days | Discharge: HOME | DRG: 301 | End: 2017-12-15
Attending: INTERNAL MEDICINE | Admitting: INTERNAL MEDICINE
Payer: MEDICARE

## 2017-12-14 VITALS
TEMPERATURE: 97 F | DIASTOLIC BLOOD PRESSURE: 88 MMHG | OXYGEN SATURATION: 97 % | HEART RATE: 110 BPM | RESPIRATION RATE: 18 BRPM | SYSTOLIC BLOOD PRESSURE: 163 MMHG

## 2017-12-14 DIAGNOSIS — I82.409 ACUTE EMBOLISM AND THROMBOSIS OF UNSPECIFIED DEEP VEINS OF UNSPECIFIED LOWER EXTREMITY: ICD-10-CM

## 2017-12-14 LAB
ALBUMIN SERPL ELPH-MCNC: 3.1 G/DL — LOW (ref 3.5–5)
ALP SERPL-CCNC: 71 U/L — SIGNIFICANT CHANGE UP (ref 40–120)
ALT FLD-CCNC: 15 U/L DA — SIGNIFICANT CHANGE UP (ref 10–60)
ANION GAP SERPL CALC-SCNC: 10 MMOL/L — SIGNIFICANT CHANGE UP (ref 5–17)
AST SERPL-CCNC: 12 U/L — SIGNIFICANT CHANGE UP (ref 10–40)
BASOPHILS # BLD AUTO: 0 K/UL — SIGNIFICANT CHANGE UP (ref 0–0.2)
BASOPHILS NFR BLD AUTO: 0.5 % — SIGNIFICANT CHANGE UP (ref 0–2)
BILIRUB SERPL-MCNC: 0.2 MG/DL — SIGNIFICANT CHANGE UP (ref 0.2–1.2)
BUN SERPL-MCNC: 15 MG/DL — SIGNIFICANT CHANGE UP (ref 7–18)
CALCIUM SERPL-MCNC: 8.9 MG/DL — SIGNIFICANT CHANGE UP (ref 8.4–10.5)
CHLORIDE SERPL-SCNC: 107 MMOL/L — SIGNIFICANT CHANGE UP (ref 96–108)
CK MB BLD-MCNC: 2.7 % — SIGNIFICANT CHANGE UP (ref 0–3.5)
CK MB CFR SERPL CALC: 2 NG/ML — SIGNIFICANT CHANGE UP (ref 0–3.6)
CK SERPL-CCNC: 75 U/L — SIGNIFICANT CHANGE UP (ref 35–232)
CO2 SERPL-SCNC: 21 MMOL/L — LOW (ref 22–31)
CREAT SERPL-MCNC: 1.32 MG/DL — HIGH (ref 0.5–1.3)
EOSINOPHIL # BLD AUTO: 0 K/UL — SIGNIFICANT CHANGE UP (ref 0–0.5)
EOSINOPHIL NFR BLD AUTO: 0.4 % — SIGNIFICANT CHANGE UP (ref 0–6)
GLUCOSE SERPL-MCNC: 123 MG/DL — HIGH (ref 70–99)
HCT VFR BLD CALC: 33.4 % — LOW (ref 39–50)
HGB BLD-MCNC: 11.2 G/DL — LOW (ref 13–17)
LYMPHOCYTES # BLD AUTO: 1.2 K/UL — SIGNIFICANT CHANGE UP (ref 1–3.3)
LYMPHOCYTES # BLD AUTO: 15 % — SIGNIFICANT CHANGE UP (ref 13–44)
MCHC RBC-ENTMCNC: 30.8 PG — SIGNIFICANT CHANGE UP (ref 27–34)
MCHC RBC-ENTMCNC: 33.6 GM/DL — SIGNIFICANT CHANGE UP (ref 32–36)
MCV RBC AUTO: 91.8 FL — SIGNIFICANT CHANGE UP (ref 80–100)
MONOCYTES # BLD AUTO: 0.4 K/UL — SIGNIFICANT CHANGE UP (ref 0–0.9)
MONOCYTES NFR BLD AUTO: 5.1 % — SIGNIFICANT CHANGE UP (ref 2–14)
NEUTROPHILS # BLD AUTO: 6.6 K/UL — SIGNIFICANT CHANGE UP (ref 1.8–7.4)
NEUTROPHILS NFR BLD AUTO: 79 % — HIGH (ref 43–77)
NT-PROBNP SERPL-SCNC: 794 PG/ML — HIGH (ref 0–450)
PLATELET # BLD AUTO: 140 K/UL — LOW (ref 150–400)
POTASSIUM SERPL-MCNC: 4.1 MMOL/L — SIGNIFICANT CHANGE UP (ref 3.5–5.3)
POTASSIUM SERPL-SCNC: 4.1 MMOL/L — SIGNIFICANT CHANGE UP (ref 3.5–5.3)
PROT SERPL-MCNC: 7.2 G/DL — SIGNIFICANT CHANGE UP (ref 6–8.3)
RBC # BLD: 3.64 M/UL — LOW (ref 4.2–5.8)
RBC # FLD: 12 % — SIGNIFICANT CHANGE UP (ref 10.3–14.5)
SODIUM SERPL-SCNC: 138 MMOL/L — SIGNIFICANT CHANGE UP (ref 135–145)
TROPONIN I SERPL-MCNC: <0.015 NG/ML — SIGNIFICANT CHANGE UP (ref 0–0.04)
WBC # BLD: 8.3 K/UL — SIGNIFICANT CHANGE UP (ref 3.8–10.5)
WBC # FLD AUTO: 8.3 K/UL — SIGNIFICANT CHANGE UP (ref 3.8–10.5)

## 2017-12-14 PROCEDURE — 93970 EXTREMITY STUDY: CPT | Mod: 26

## 2017-12-14 PROCEDURE — 70450 CT HEAD/BRAIN W/O DYE: CPT | Mod: 26

## 2017-12-14 PROCEDURE — 71010: CPT | Mod: 26

## 2017-12-14 PROCEDURE — 99285 EMERGENCY DEPT VISIT HI MDM: CPT

## 2017-12-14 RX ORDER — SODIUM CHLORIDE 9 MG/ML
3 INJECTION INTRAMUSCULAR; INTRAVENOUS; SUBCUTANEOUS ONCE
Qty: 0 | Refills: 0 | Status: COMPLETED | OUTPATIENT
Start: 2017-12-14 | End: 2017-12-14

## 2017-12-14 RX ORDER — APIXABAN 2.5 MG/1
10 TABLET, FILM COATED ORAL EVERY 12 HOURS
Qty: 0 | Refills: 0 | Status: DISCONTINUED | OUTPATIENT
Start: 2017-12-14 | End: 2017-12-15

## 2017-12-14 RX ADMIN — SODIUM CHLORIDE 3 MILLILITER(S): 9 INJECTION INTRAMUSCULAR; INTRAVENOUS; SUBCUTANEOUS at 21:05

## 2017-12-14 RX ADMIN — APIXABAN 10 MILLIGRAM(S): 2.5 TABLET, FILM COATED ORAL at 23:01

## 2017-12-14 NOTE — ED PROVIDER NOTE - OBJECTIVE STATEMENT
78 y/o M pt w/ PMHx of DVT, Anemia, GERD, HLD, HTN, and DM was sent by Dr. Banks to r.o DVT today. Pt has history of chronic DVTs; pt was on Eliquis which was stopped due to subdural hematoma. Now concern for acute DVT that may need anticoagulation versus IVC filter. Pt denies chest pain, SOB, leg pain/swelling, or any other complaints. NKDA.

## 2017-12-14 NOTE — ED PROVIDER NOTE - NOTES
Discussed case, Dr. Miles recommended both Coumadin (due to ease of reversibility) and IVF filter (concern for hypercoagulability). D/w Dr. Banks, pt poorly compliant with meds, will restart Eliquis, pt to be admitted for inpatient consultation and further eval.

## 2017-12-14 NOTE — ED ADULT NURSE NOTE - ED STAT RN HANDOFF DETAILS
Endorsed care to SERA Lott RN. Patient AA&Ox3. Breathing on room air. On tele monitoring. In no distress.

## 2017-12-15 ENCOUNTER — TRANSCRIPTION ENCOUNTER (OUTPATIENT)
Age: 77
End: 2017-12-15

## 2017-12-15 VITALS
HEART RATE: 80 BPM | OXYGEN SATURATION: 98 % | SYSTOLIC BLOOD PRESSURE: 140 MMHG | TEMPERATURE: 98 F | DIASTOLIC BLOOD PRESSURE: 80 MMHG | RESPIRATION RATE: 18 BRPM

## 2017-12-15 DIAGNOSIS — I10 ESSENTIAL (PRIMARY) HYPERTENSION: ICD-10-CM

## 2017-12-15 DIAGNOSIS — N17.9 ACUTE KIDNEY FAILURE, UNSPECIFIED: ICD-10-CM

## 2017-12-15 DIAGNOSIS — I82.412 ACUTE EMBOLISM AND THROMBOSIS OF LEFT FEMORAL VEIN: ICD-10-CM

## 2017-12-15 DIAGNOSIS — I82.409 ACUTE EMBOLISM AND THROMBOSIS OF UNSPECIFIED DEEP VEINS OF UNSPECIFIED LOWER EXTREMITY: ICD-10-CM

## 2017-12-15 DIAGNOSIS — D64.9 ANEMIA, UNSPECIFIED: ICD-10-CM

## 2017-12-15 DIAGNOSIS — K21.9 GASTRO-ESOPHAGEAL REFLUX DISEASE WITHOUT ESOPHAGITIS: ICD-10-CM

## 2017-12-15 DIAGNOSIS — E78.00 PURE HYPERCHOLESTEROLEMIA, UNSPECIFIED: ICD-10-CM

## 2017-12-15 DIAGNOSIS — Z29.9 ENCOUNTER FOR PROPHYLACTIC MEASURES, UNSPECIFIED: ICD-10-CM

## 2017-12-15 DIAGNOSIS — E11.9 TYPE 2 DIABETES MELLITUS WITHOUT COMPLICATIONS: ICD-10-CM

## 2017-12-15 DIAGNOSIS — R56.9 UNSPECIFIED CONVULSIONS: ICD-10-CM

## 2017-12-15 LAB
24R-OH-CALCIDIOL SERPL-MCNC: 19.8 NG/ML — LOW (ref 30–80)
ANION GAP SERPL CALC-SCNC: 11 MMOL/L — SIGNIFICANT CHANGE UP (ref 5–17)
BUN SERPL-MCNC: 14 MG/DL — SIGNIFICANT CHANGE UP (ref 7–18)
CALCIUM SERPL-MCNC: 8.5 MG/DL — SIGNIFICANT CHANGE UP (ref 8.4–10.5)
CHLORIDE SERPL-SCNC: 103 MMOL/L — SIGNIFICANT CHANGE UP (ref 96–108)
CHOLEST SERPL-MCNC: 129 MG/DL — SIGNIFICANT CHANGE UP (ref 10–199)
CO2 SERPL-SCNC: 23 MMOL/L — SIGNIFICANT CHANGE UP (ref 22–31)
CREAT SERPL-MCNC: 1.41 MG/DL — HIGH (ref 0.5–1.3)
FERRITIN SERPL-MCNC: 356 NG/ML — SIGNIFICANT CHANGE UP (ref 30–400)
GLUCOSE SERPL-MCNC: 247 MG/DL — HIGH (ref 70–99)
HAPTOGLOB SERPL-MCNC: 380 MG/DL — HIGH (ref 34–200)
HBA1C BLD-MCNC: 7.6 % — HIGH (ref 4–5.6)
HCT VFR BLD CALC: 30.5 % — LOW (ref 39–50)
HDLC SERPL-MCNC: 34 MG/DL — LOW (ref 40–125)
HGB BLD-MCNC: 10.1 G/DL — LOW (ref 13–17)
INR BLD: 1.39 RATIO — HIGH (ref 0.88–1.16)
IRON SATN MFR SERPL: 13 % — LOW (ref 20–55)
IRON SATN MFR SERPL: 29 UG/DL — LOW (ref 65–170)
LDH SERPL L TO P-CCNC: 122 U/L — SIGNIFICANT CHANGE UP (ref 120–225)
LIPID PNL WITH DIRECT LDL SERPL: 61 MG/DL — SIGNIFICANT CHANGE UP
MAGNESIUM SERPL-MCNC: 0.5 MG/DL — CRITICAL LOW (ref 1.6–2.6)
MAGNESIUM SERPL-MCNC: 1.5 MG/DL — LOW (ref 1.6–2.6)
MCHC RBC-ENTMCNC: 29.6 PG — SIGNIFICANT CHANGE UP (ref 27–34)
MCHC RBC-ENTMCNC: 33 GM/DL — SIGNIFICANT CHANGE UP (ref 32–36)
MCV RBC AUTO: 89.7 FL — SIGNIFICANT CHANGE UP (ref 80–100)
PHOSPHATE SERPL-MCNC: 3.1 MG/DL — SIGNIFICANT CHANGE UP (ref 2.5–4.5)
PLATELET # BLD AUTO: 121 K/UL — LOW (ref 150–400)
POTASSIUM SERPL-MCNC: 3.7 MMOL/L — SIGNIFICANT CHANGE UP (ref 3.5–5.3)
POTASSIUM SERPL-SCNC: 3.7 MMOL/L — SIGNIFICANT CHANGE UP (ref 3.5–5.3)
PROTHROM AB SERPL-ACNC: 15.3 SEC — HIGH (ref 9.8–12.7)
RBC # BLD: 3.36 M/UL — LOW (ref 4.2–5.8)
RBC # BLD: 3.4 M/UL — LOW (ref 4.2–5.8)
RBC # FLD: 11.5 % — SIGNIFICANT CHANGE UP (ref 10.3–14.5)
RETICS #: 59.5 K/UL — SIGNIFICANT CHANGE UP (ref 25–125)
RETICS/RBC NFR: 1.8 % — SIGNIFICANT CHANGE UP (ref 0.5–2.5)
SODIUM SERPL-SCNC: 137 MMOL/L — SIGNIFICANT CHANGE UP (ref 135–145)
TIBC SERPL-MCNC: 225 UG/DL — LOW (ref 250–450)
TOTAL CHOLESTEROL/HDL RATIO MEASUREMENT: 3.8 RATIO — SIGNIFICANT CHANGE UP (ref 3.4–9.6)
TRIGL SERPL-MCNC: 170 MG/DL — HIGH (ref 10–149)
TSH SERPL-MCNC: 1.18 UU/ML — SIGNIFICANT CHANGE UP (ref 0.34–4.82)
UIBC SERPL-MCNC: 196 UG/DL — SIGNIFICANT CHANGE UP (ref 110–370)
VIT B12 SERPL-MCNC: 178 PG/ML — LOW (ref 243–894)
WBC # BLD: 6.4 K/UL — SIGNIFICANT CHANGE UP (ref 3.8–10.5)
WBC # FLD AUTO: 6.4 K/UL — SIGNIFICANT CHANGE UP (ref 3.8–10.5)

## 2017-12-15 PROCEDURE — 99223 1ST HOSP IP/OBS HIGH 75: CPT

## 2017-12-15 RX ORDER — LABETALOL HCL 100 MG
200 TABLET ORAL EVERY 12 HOURS
Qty: 0 | Refills: 0 | Status: DISCONTINUED | OUTPATIENT
Start: 2017-12-15 | End: 2017-12-15

## 2017-12-15 RX ORDER — TAMSULOSIN HYDROCHLORIDE 0.4 MG/1
0.4 CAPSULE ORAL AT BEDTIME
Qty: 0 | Refills: 0 | Status: DISCONTINUED | OUTPATIENT
Start: 2017-12-15 | End: 2017-12-15

## 2017-12-15 RX ORDER — ERGOCALCIFEROL 1.25 MG/1
0 CAPSULE ORAL
Qty: 0 | Refills: 0 | COMMUNITY

## 2017-12-15 RX ORDER — PANTOPRAZOLE SODIUM 20 MG/1
40 TABLET, DELAYED RELEASE ORAL
Qty: 0 | Refills: 0 | Status: DISCONTINUED | OUTPATIENT
Start: 2017-12-15 | End: 2017-12-15

## 2017-12-15 RX ORDER — THIAMINE MONONITRATE (VIT B1) 100 MG
100 TABLET ORAL DAILY
Qty: 0 | Refills: 0 | Status: DISCONTINUED | OUTPATIENT
Start: 2017-12-15 | End: 2017-12-15

## 2017-12-15 RX ORDER — MAGNESIUM SULFATE 500 MG/ML
2 VIAL (ML) INJECTION
Qty: 0 | Refills: 0 | Status: COMPLETED | OUTPATIENT
Start: 2017-12-15 | End: 2017-12-15

## 2017-12-15 RX ORDER — FOLIC ACID 0.8 MG
1 TABLET ORAL DAILY
Qty: 0 | Refills: 0 | Status: DISCONTINUED | OUTPATIENT
Start: 2017-12-15 | End: 2017-12-15

## 2017-12-15 RX ORDER — ERGOCALCIFEROL 1.25 MG/1
50000 CAPSULE ORAL
Qty: 0 | Refills: 0 | Status: DISCONTINUED | OUTPATIENT
Start: 2017-12-15 | End: 2017-12-15

## 2017-12-15 RX ORDER — FINASTERIDE 5 MG/1
5 TABLET, FILM COATED ORAL DAILY
Qty: 0 | Refills: 0 | Status: DISCONTINUED | OUTPATIENT
Start: 2017-12-15 | End: 2017-12-15

## 2017-12-15 RX ORDER — INSULIN LISPRO 100/ML
VIAL (ML) SUBCUTANEOUS
Qty: 0 | Refills: 0 | Status: DISCONTINUED | OUTPATIENT
Start: 2017-12-15 | End: 2017-12-15

## 2017-12-15 RX ORDER — SODIUM CHLORIDE 9 MG/ML
1000 INJECTION INTRAMUSCULAR; INTRAVENOUS; SUBCUTANEOUS
Qty: 0 | Refills: 0 | Status: DISCONTINUED | OUTPATIENT
Start: 2017-12-15 | End: 2017-12-15

## 2017-12-15 RX ORDER — LEVETIRACETAM 250 MG/1
1000 TABLET, FILM COATED ORAL
Qty: 0 | Refills: 0 | Status: DISCONTINUED | OUTPATIENT
Start: 2017-12-15 | End: 2017-12-15

## 2017-12-15 RX ORDER — MAGNESIUM OXIDE 400 MG ORAL TABLET 241.3 MG
1 TABLET ORAL
Qty: 28 | Refills: 0
Start: 2017-12-15 | End: 2017-12-28

## 2017-12-15 RX ORDER — CALCITRIOL 0.5 UG/1
0.5 CAPSULE ORAL DAILY
Qty: 0 | Refills: 0 | Status: DISCONTINUED | OUTPATIENT
Start: 2017-12-15 | End: 2017-12-15

## 2017-12-15 RX ORDER — APIXABAN 2.5 MG/1
2 TABLET, FILM COATED ORAL
Qty: 28 | Refills: 0
Start: 2017-12-15 | End: 2017-12-21

## 2017-12-15 RX ADMIN — Medication 4: at 11:51

## 2017-12-15 RX ADMIN — Medication 1 MILLIGRAM(S): at 11:38

## 2017-12-15 RX ADMIN — FINASTERIDE 5 MILLIGRAM(S): 5 TABLET, FILM COATED ORAL at 11:39

## 2017-12-15 RX ADMIN — LEVETIRACETAM 1000 MILLIGRAM(S): 250 TABLET, FILM COATED ORAL at 06:11

## 2017-12-15 RX ADMIN — ERGOCALCIFEROL 50000 UNIT(S): 1.25 CAPSULE ORAL at 11:39

## 2017-12-15 RX ADMIN — Medication 100 MILLIGRAM(S): at 11:39

## 2017-12-15 RX ADMIN — Medication 50 GRAM(S): at 11:51

## 2017-12-15 RX ADMIN — PANTOPRAZOLE SODIUM 40 MILLIGRAM(S): 20 TABLET, DELAYED RELEASE ORAL at 09:34

## 2017-12-15 RX ADMIN — Medication 50 GRAM(S): at 12:17

## 2017-12-15 RX ADMIN — APIXABAN 10 MILLIGRAM(S): 2.5 TABLET, FILM COATED ORAL at 06:16

## 2017-12-15 RX ADMIN — CALCITRIOL 0.5 MICROGRAM(S): 0.5 CAPSULE ORAL at 11:38

## 2017-12-15 NOTE — H&P ADULT - NSHPLABSRESULTS_GEN_ALL_CORE
11.2   8.3   )-----------( 140      ( 14 Dec 2017 20:57 )             33.4     12-14    138  |  107  |  15  ----------------------------<  123<H>  4.1   |  21<L>  |  1.32<H>    Ca    8.9      14 Dec 2017 20:57    TPro  7.2  /  Alb  3.1<L>  /  TBili  0.2  /  DBili  x   /  AST  12  /  ALT  15  /  AlkPhos  71  12-14    < from: US Duplex Venous Lower Ext Complete, Bilateral (12.14.17 @ 20:32) >      The common femoral vein is patent. The femoral vein is patent although is   small in caliber with thickened walls, likely secondary to prior DVT. The   popliteal vein and calf veins are patent. Color Doppler flow identified.    Left:    There appears to be acute almost complete thrombosis of the common   femoral vein. There is likely chronic occlusive thrombus within the   femoral vein with surrounding patent collateral vessels. The popliteal   and calf veins appear patent.    IMPRESSION:     Acute near occlusive thrombosis of the left common femoral vein. Chronic   appearing occlusive thrombus within the left femoral vein.    Sequela of right femoral vein thrombosis which is small however remains   patent    < end of copied text >    < from: CT Head No Cont (12.14.17 @ 21:57) >    Findings: Previous noted right parietal subdural hemorrhage has resolved.   There is no acute intracranial hemorrhage, large cortical infarct, mass   effect or midline shift. Nonspecific mild periventricular and subcortical   white matter lucencies likely represent chronic microvascular ischemic   changes. Cortical sulci and ventricles are appropriate for the patient's   stated age.     There is no depressed skull fracture. There is increased mucosal   thickening of the sinuses with opacification of the left ethmoid sinus.   The tympanomastoid region is clear.      Impression:     No acute intracranial hemorrhage, large cortical infarct or mass effect.   If clinically indicated, short-term follow-up or MRI may be obtained for   further evaluation.    Paranasal sinus disease. Recommend clinical correlation to assess acute   sinusitis.      < end of copied text >

## 2017-12-15 NOTE — H&P ADULT - PROBLEM SELECTOR PLAN 2
Bun / creat is 15/1.32   Improved from October : creat 1.56   Could be new baseline  Will continue with iv hydration for now NS at 75 cc/hr , as seems pre-renal   Will follow up urine lytes

## 2017-12-15 NOTE — CONSULT NOTE ADULT - PROBLEM SELECTOR RECOMMENDATION 9
On oral Anti-coagulant.  It is neurologically safe to give anti-coagulant now. His Right brain SDH was traumatic and now it has resolved completely.  Strong advice- Prevent from fall/ or head trauma.

## 2017-12-15 NOTE — CONSULT NOTE ADULT - PROBLEM SELECTOR RECOMMENDATION 9
Agree with resumption of Eliquis for unprovoked DVT now unprotected after stopping AC for traumatic subdural hematoma that resolved. Would not recommend an IVC filter as there is no active bleeding. Filters are hypercoagulable in and of themselves. Pt is ambulatory and able to be compliant. Coumadin would place burdens of f/u testing for therapeutic levels. He previously managed well with Eliquis.  May return to my office for f/u.

## 2017-12-15 NOTE — H&P ADULT - ATTENDING COMMENTS
76 y/o male sent to hospital by myself for acute left DVT. The pt has hx of unprovoked chaya dvy for which he was doing well on eliquis until he was admitted here in Oct. for possible sepsis at which time he fell and sustained a rt. sided SDH for which he was transferred to [facility]-no surgical intervention. Sent home off eliquis. He now has left lower extremity dvt-acute.CT head now neg.  PMH; DM,,GERD,HLD,hypoparathyroid, glaucoma,htn.  Plan; resume eliquis-10 bid x 1 week, then 5mg bid.          neuro eval/heme  discharge after above and f/u in office.

## 2017-12-15 NOTE — H&P ADULT - PROBLEM SELECTOR PLAN 3
hb is 11.2 , which seems to be his new baseline   Will order anemia panel for further evaluation   No signs of gi bleed at this point

## 2017-12-15 NOTE — CONSULT NOTE ADULT - ASSESSMENT
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  2) H/o Traumatic Right brain SDH- now resolved completely.

## 2017-12-15 NOTE — H&P ADULT - RS GEN PE MLT RESP DETAILS PC
normal/respirations non-labored/clear to auscultation bilaterally/no chest wall tenderness/airway patent/breath sounds equal

## 2017-12-15 NOTE — DISCHARGE NOTE ADULT - PLAN OF CARE
needs life long anticoagulation Please take Eliguis as prescribed 10 mg 2 times a day for 1 week then 5mg 2 times a day.   Rest, stay well hydrated. Continue all your medications as prescribed. Follow up with primary care doctor Dr Banks and Dr Gimenez for reevaluation within one week. at baseline Continue all your medications as prescribed by your doctor. Follow up with Dr Banks for further management Continue magnesium tablets and follow up with PCP to recheck your magnesium level

## 2017-12-15 NOTE — DISCHARGE NOTE ADULT - PATIENT PORTAL LINK FT
“You can access the [facility] Patient Portal, offered by [facility], by registering with the following website: [email or web address]”

## 2017-12-15 NOTE — CONSULT NOTE ADULT - ASSESSMENT
78 yo male , ambulates independently , from home , AAO*3 , with PMH of Chronic DVT , Anemia , GERD , HTN , HLD , DM , Hypoparathyroidism , Glaucoma , was sent in by PCP for concern of Acute DVT .  Patient had a h/o Chronic DVT , was on Elliquis , had a mechanical fall in October (according to the patient in [facility] due to transfer from stretcher to bed after he was brought in for evaluation [place] after falling asleep from exhaustion returning from [place]). followed by GTC Seizure , found to have subdural hematoma in right pareital lobe ( 5 mm ) , was admitted in [place] , but no neurosurgical intervention.   Patient was discharged without any ac , based on recommendations of cardiologist and Neurosurgery as patient did not have any active clots at that time ( even Ir did not feel it was right to put IVC filter at that time ).  Patient now found to have acute dvt in Left common femoral vein ( occlusive ) along with chronic dvt based on Ultrasound Doppler LE.   Pt was seen in my office (04/17/17 and 04/24/17) for evaluation of unprovoked DVT taking Eliquis and noted to have a low Protein C level. (test taken close to event of DVT and not considered significant).  Patient denies any leg pain , swelling in leg , chest pain , palpitations , sob , difficulty walking , hemoptysis . (15 Dec 2017 05:20)

## 2017-12-15 NOTE — H&P ADULT - PROBLEM SELECTOR PLAN 4
Patient has h/o DM and is on Lantus 12 at home   Will start will hss and accuchecks for now   Diabetic diet   Follow up a1c

## 2017-12-15 NOTE — CONSULT NOTE ADULT - SUBJECTIVE AND OBJECTIVE BOX
History of Present Illness:    HPI:  76 yo male , ambulates independently , from home , AAO*3 , with PMH of Chronic DVT , Anemia , GERD , HTN , HLD , DM , Hypoparathyroidism , Glaucoma , was sent in by PCP for concern of Acute DVT .  Patient had a h/o Chronic DVT , was on Elliquis , had a mechanical fall in October followed by GTC Seizure , found to have subdural hematoma in right pareital lobe ( 5 mm ) , was admitted in [place] , but no neurosurgical intervention.   Patient was discharged without any ac , based on recommendations of cardiologist and Neurosurgery as patient did not have any active clots at that time ( even Ir did not feel it was right to put IVC filter at that time ).  Patient now found to have acute dvt in Left common femoral vein ( occlusive ) along with chronic dvt based on Ultrasound Doppler LE.   Patient denies any leg pain , swelling in leg , chest pain , palpitations , sob , difficulty walking , hemoptysis . (15 Dec 2017 05:20)    Later in ED: pt reported that his bleed in the right side of the brain was traumatic AND NOT spontaneous. His CT-scan of the brain from Oct 2017 shows small right SDH. Now CT-scan of the brain from last night/today DID not show any subdural hematoma. Pt just found out that he developed acute left Femoral vein DVT and now he has to be on oral anti-coagulation. Since he had right SDH in Oct 2017; now it has resolved, and now acute left femoral vein DVT this consultation has been requested. Pt denied any headaches, recent falls or trauma. No focal weakness.     Allergies    Allergy Status Unknown  No Known Allergies    Intolerances    PAST MEDICAL & SURGICAL HISTORY:  DVT (deep venous thrombosis)  Hypomagnesemia  Anemia  GERD (gastroesophageal reflux disease)  Hypercholesterolemia  Hypertension  Diabetes  No significant past surgical history    Social History: [ ] Tobacco use, [ ] Alcohol use.  None      MEDICATIONS  (STANDING):  apixaban 10 milliGRAM(s) Oral every 12 hours  calcitriol   Capsule 0.5 MICROGram(s) Oral daily  ergocalciferol 38835 Unit(s) Oral every week  finasteride 5 milliGRAM(s) Oral daily  folic acid 1 milliGRAM(s) Oral daily  insulin lispro (HumaLOG) corrective regimen sliding scale   SubCutaneous three times a day before meals  labetalol 200 milliGRAM(s) Oral every 12 hours  levETIRAcetam 1000 milliGRAM(s) Oral two times a day  magnesium sulfate  IVPB 2 Gram(s) IV Intermittent every 1 hour  pantoprazole    Tablet 40 milliGRAM(s) Oral before breakfast  sodium chloride 0.9%. 1000 milliLiter(s) (75 mL/Hr) IV Continuous <Continuous>  tamsulosin 0.4 milliGRAM(s) Oral at bedtime  thiamine 100 milliGRAM(s) Oral daily    Family:  FAMILY HISTORY:  No pertinent family history in first degree relatives        Review of Systems:  General: [ ] None, [ ] chills,[ ] fatigue,[ ] fevers  Skin: [ ] None, [ ] rash present  HEENT: [ ] None, [ ] head injury,[ ] blurred vision, [ ] double vision, [ ] eye pain, [ ] visual loss, [ ] hearing loss, [ ] deafness, [ ] ear pain, [ ] ringing in the ears, [ ] vertigo, [ ] sinus pain, [ ] voice changes  Neck: [ ] None, [ ] Neck stiffness  Respiratory: [ ]  None, [ ] cough, [ ] difficulty breathing  Cardiovascular: [ ] None,  [ ] calf cramps, [ ] chest pain, [ ] leg pain, [ ] swelling, [ ] rapid heart rate, [ ] shortness of breath  Gastrointestinal: [ ] None, [ ] abdominal pain, [ ] nausea, [ ] vomiting  Musculoskeletal: [ ] None, [ ] back pain, [ ] joint pain, [ ] joint stiffness, [ ] leg cramps, [ ] muscle atrophy, [ ] muscle cramps, [ ] muscle weakness, [ ] swelling of extremities  Neurological: [ ] None,  [ ] Dizziness, [ ] decreased memory, [ ] fainting, [ ] focal neurological symptoms, [ ] headaches, [ ] incontinence of stool, [ ] incontinence of urine, [ ] loss of consciousness, [ ] numbness, [ ] seizures, [ ] spinning sensation, [ ] stroke, [ ] trouble walking, [ ] unsteadiness, [ ] visual changes,  [ ] weakness, [ ] tremors, [ ] rigidity, [ ] slowness  Psychiatric: [ ] None,  [ ] depression, [ ] anxiety, [ ] hallucinations, [ ] inability to concentrate,[ ] mood changes, [ ] panic attacks  Hematology: [ ] None, [ ] blood clots, [ ] spontaneous bleeding    [ x]  None except marked above      Vital Signs:    T(C): 36.2 (12-15-17 @ 11:42), Max: 37.1 (12-14-17 @ 22:39)  HR: 81 (12-15-17 @ 11:42) (80 - 110)  BP: 148/80 (12-15-17 @ 11:42) (118/52 - 163/88)  RR: 18 (12-15-17 @ 11:42) (16 - 18)  SpO2: 99% (12-15-17 @ 11:42) (97% - 100%)    Physical Exam:  General:  General Appearance - Well groomed, Not sickly   Build and nutrition - Well nourished and Well developed  Posture - Normal posture    Head and Neck:  Head - normocephalic, atraumatic with no lesions or palpable masses    Chest and Lung exam:  Quiet, even and easy respiratory effort with no use of accessory muscles and on ausculation, normal breath sounds, no adventitious sounds and normal vocal resonance    Cardiovascular:  Auscultation: Normal heart sounds  Murmurs & Other heart sounds: Auscultation of the heart reveals- no murmurs  Carotid arteris: No Carotid bruits    Abdomen:  Palpation/Percussion: Non Tender, No Rebound tenderness, No hepatosplenomegaly, and No palpable abdominal masses    Peripheral Vascular:  Lower extremity: Inspection - Bilateral - No varicose veins  Palpation: Tenderness - bilateral - Non tender  Dorsalis pedis pulse - bilateral - normal  Edema - bilateral - no edema    Neurologic Exam:  Mental Status -  Alert, Awake  Fund of Knowledge – Normal  Affect- appropriate  Recent Memory – Normal  Remote Memory – Normal  Attention Span – Normal  Concentration –  Normal  Cognitive function – Normal  Speech – Normal  Thought content/perception – Normal    Cranial Nerves:  II Optic: Visual acuity – Bilateral - Normal ; Visual fields – normal ; Fundi – Bilateral – no optic atrophy or Papilledema  III Oculomotor – Normal bilaterally  IV Trochlear – Bilateral – Normal  V Trigeminal: Ophthalmic – Bilateral – Normal;  Maxillary – Bilateral- Normal; Mandibular – Bilateral - Normal  VI Abducens – Bilateral - Normal  VII Facial: Normal bilaterally  VIII Acoustic - Bilateral – hearing normal and (hearing tested by finger rub)  IX Glossopharyngeal / X Vagus: Uvula – Normal  XI Accessory: Normal Shoulder Shrug  XII Hypoglossal – Bilaterally Normal  Eye Movements: Gaze – Bilateral - Normal    Nystagmus – Bilateral – None  Motor:  Bulk and Contour: Normal  Tone: Normal  Strength:                                                                Delt              Bicep           Tricep                                 Upper Extremities:          Right              5/5 5/5 5/5 5/5                                                          Left                5/5 5/5 5/5 5/5                                                                                 HF                 KE                KF                   DF                     Lower Extremities:           Right             5/5 5/5 5/5 5/5                                                          Left               5/5 5/5 5/5 5/5  General Assessment of Reflexes: Right Wrist - 2+ ;  Left Wrist - 2+ ; Right Elbow - 2+ ;  Left Elbow - 2+ ;  Right Knee - 2+ ;  Left Knee - 2+ ;   Right Ankle – 2+ ; Left Ankle – 2+  Plantar Reflexes(Babinski) (L4-S2) – Bilateral – Flexion  Sensory:  Light Touch: Intact – Globally  Pain: Intact – Globally  Temperature: Intact – Globally  Coordination – No Impairment of heel-to-shin, Impairment of finger-to-nose or Impairment of rapid alternating movement  Gait – Normal tandem walking, Normal heel walking and Normal toes walking  Romberg’s sign: - Normal

## 2017-12-15 NOTE — H&P ADULT - PMH
Anemia    Diabetes    DVT (deep venous thrombosis)    GERD (gastroesophageal reflux disease)    Hypercholesterolemia    Hypertension    Hypomagnesemia

## 2017-12-15 NOTE — DISCHARGE NOTE ADULT - MEDICATION SUMMARY - MEDICATIONS TO TAKE
I will START or STAY ON the medications listed below when I get home from the hospital:    Avodart 0.5 mg oral capsule  -- 1 cap(s) by mouth once a day  -- Indication: For bph    calcium carbonate 1250 mg (500 mg elemental calcium) oral tablet  -- 2 tab(s) by mouth once a day  -- Indication: For AnTACID     tamsulosin 0.4 mg oral capsule  -- 1 cap(s) by mouth once a day (at bedtime)  -- Indication: For bph    Eliquis 5 mg oral tablet  -- 2 tab(s) by mouth 2 times a day   -- Check with your doctor before becoming pregnant.  It is very important that you take or use this exactly as directed.  Do not skip doses or discontinue unless directed by your doctor.  Obtain medical advice before taking any non-prescription drugs as some may affect the action of this medication.    -- Indication: For Acute deep vein thrombosis (DVT) of femoral vein of left lower extremity    Eliquis 5 mg oral tablet  -- 1 tab(s) by mouth 2 times a day   -- Check with your doctor before becoming pregnant.  It is very important that you take or use this exactly as directed.  Do not skip doses or discontinue unless directed by your doctor.  Obtain medical advice before taking any non-prescription drugs as some may affect the action of this medication.    -- Indication: For Acute deep vein thrombosis (DVT) of femoral vein of left lower extremity    levETIRAcetam 1000 mg oral tablet  -- 1 tab(s) by mouth 2 times a day  -- Indication: For Seizure    insulin glargine  -- 12 unit(s) injectable once a day (at bedtime)  -- Indication: For Diabetes    insulin lispro 100 units/mL subcutaneous solution  -- 5  subcutaneous 3 times a day (before meals) ; 1 Unit(s) if Glucose 151 - 200  2 Unit(s) if Glucose 201 - 250  3 Unit(s) if Glucose 251 - 300  4 Unit(s) if Glucose 301 - 350  5 Unit(s) if Glucose 351 - 400  6 Unit(s) if Glucose GREATER THAN 400  -- Indication: For Diabetes    labetalol 200 mg oral tablet  -- 1 tab(s) by mouth every 12 hours  -- Indication: For Hypertension    magnesium oxide 400 mg (241.3 mg elemental magnesium) oral tablet  -- 1 tab(s) by mouth 2 times a day   -- Indication: For low magnesium    magnesium oxide 400 mg (241.3 mg elemental magnesium) oral tablet  -- 1 tab(s) by mouth once a day  -- Indication: For low magnesium    latanoprost 0.005% ophthalmic solution  -- 1 drop(s) to each affected eye once a day (at bedtime)  -- Indication: For eye drops    pantoprazole 40 mg oral delayed release tablet  -- 1 tab(s) by mouth once a day (before a meal)  -- Indication: For GERD (gastroesophageal reflux disease)

## 2017-12-15 NOTE — H&P ADULT - HISTORY OF PRESENT ILLNESS
78 yo male , ambulates independently , from home , AAO*3 , with PMH of Chronic DVT , Anemia , GERD , HTN , HLD , DM , Hypoparathyroidism , Glaucoma , was sent in by PCP for concern of Acute DVT .  Patient had a h/o Chronic DVT , was on Elliquis , had a mechanical fall in October followed by GTC Seizure , found to have subdural hematoma in right pareital lobe ( 5 mm ) , was admitted in [place] , but no neurosurgical intervention.   Patient was discharged without any ac , based on recommendations of cardiologist and Neurosurgery as patient did not have any active clots at that time ( even Ir did not feel it was right to put IVC filter at that time ).  Patient now found to have acute dvt in Left common femoral vein ( occlusive ) along with chronic dvt based on Ultrasound Doppler LE.   Patient denies any leg pain , swelling in leg , chest pain , palpitations , sob , difficulty walking , hemoptysis .

## 2017-12-15 NOTE — CONSULT NOTE ADULT - CONSULT REASON
H/o traumatic SDH right side in Oct 2017 and concerned about oral anti-coagulation for recurrent DVT's.

## 2017-12-15 NOTE — CONSULT NOTE ADULT - ATTENDING COMMENTS
Pt reported that his bleed in the right side of the brain was traumatic AND NOT spontaneous. His CT-scan of the brain from Oct 2017 shows small right SDH. Now CT-scan of the brain from last night/today DID not show any subdural hematoma. Pt just found out that he developed acute left Femoral vein DVT and now he has to be on oral anti-coagulation. Since he had right SDH in Oct 2017; now it has resolved, and now acute left femoral vein DVT this consultation has been requested. Pt denied any headaches, recent falls or trauma. No focal weakness.   So, in summery: Neurologically it is SAFE to give him anti-coagulation for the treatment of DVT. Risk/benefit ratio has been explained in great detail with full satisfaction. Also strong advice to prevent fall or head trauma in future.

## 2017-12-15 NOTE — CONSULT NOTE ADULT - SUBJECTIVE AND OBJECTIVE BOX
Patient is a 77y old  Male who presents with a chief complaint of Was sent in by PCP for DVT (15 Dec 2017 05:20)      HPI:  78 yo male , ambulates independently , from home , AAO*3 , with PMH of Chronic DVT , Anemia , GERD , HTN , HLD , DM , Hypoparathyroidism , Glaucoma , was sent in by PCP for concern of Acute DVT .  Patient had a h/o Chronic DVT , was on Elliquis , had a mechanical fall in October (according to the patient in [facility] due to transfer from stretcher to bed after he was brought [place] from [place] returning from [place] followed by GTC Seizure , found to have subdural hematoma in right pareital lobe ( 5 mm ) , was admitted in [place] , but no neurosurgical intervention.   Patient was discharged without any ac , based on recommendations of cardiologist and Neurosurgery as patient did not have any active clots at that time ( even Ir did not feel it was right to put IVC filter at that time ).  Patient now found to have acute dvt in Left common femoral vein ( occlusive ) along with chronic dvt based on Ultrasound Doppler LE.   Pt was seen in my office (04/17/17 and 04/24/17) for evaluation of unprovoked DVT taking Eliquis and noted to have a low Protein C level. (test taken close to event of DVT and not considered significant).  Patient denies any leg pain , swelling in leg , chest pain , palpitations , sob , difficulty walking , hemoptysis . (15 Dec 2017 05:20)       ROS:  Negative except for:    PAST MEDICAL & SURGICAL HISTORY:  DVT (deep venous thrombosis)  Hypomagnesemia  Anemia  GERD (gastroesophageal reflux disease)  Hypercholesterolemia  Hypertension  Diabetes  No significant past surgical history      SOCIAL HISTORY:    FAMILY HISTORY:  No pertinent family history in first degree relatives      MEDICATIONS  (STANDING):  apixaban 10 milliGRAM(s) Oral every 12 hours  calcitriol   Capsule 0.5 MICROGram(s) Oral daily  ergocalciferol 90178 Unit(s) Oral every week  finasteride 5 milliGRAM(s) Oral daily  folic acid 1 milliGRAM(s) Oral daily  insulin lispro (HumaLOG) corrective regimen sliding scale   SubCutaneous three times a day before meals  labetalol 200 milliGRAM(s) Oral every 12 hours  levETIRAcetam 1000 milliGRAM(s) Oral two times a day  pantoprazole    Tablet 40 milliGRAM(s) Oral before breakfast  sodium chloride 0.9%. 1000 milliLiter(s) (75 mL/Hr) IV Continuous <Continuous>  tamsulosin 0.4 milliGRAM(s) Oral at bedtime  thiamine 100 milliGRAM(s) Oral daily    MEDICATIONS  (PRN):      Allergies    Allergy Status Unknown  No Known Allergies    Intolerances        Vital Signs Last 24 Hrs  T(C): 36.5 (15 Dec 2017 07:13), Max: 37.1 (14 Dec 2017 22:39)  T(F): 97.7 (15 Dec 2017 07:13), Max: 98.8 (14 Dec 2017 22:39)  HR: 85 (15 Dec 2017 07:13) (80 - 110)  BP: 133/80 (15 Dec 2017 07:13) (118/52 - 163/88)  BP(mean): --  RR: 18 (15 Dec 2017 07:13) (16 - 18)  SpO2: 100% (15 Dec 2017 07:13) (97% - 100%)    PHYSICAL EXAM  General: adult in NAD  HEENT: clear oropharynx, anicteric sclera, pink conjunctiva  Neck: supple  CV: normal S1/S2 with no murmur rubs or gallops  Lungs: positive air movement b/l ant lungs,clear to auscultation, no wheezes, no rales  Abdomen: soft non-tender non-distended, no hepatosplenomegaly  Ext: no clubbing cyanosis or edema  Skin: no rashes and no petechiae  Neuro: alert and oriented X 4, no focal deficits      LABS:                          11.2   8.3   )-----------( 140      ( 14 Dec 2017 20:57 )             33.4         Mean Cell Volume : 91.8 fl  Mean Cell Hemoglobin : 30.8 pg  Mean Cell Hemoglobin Concentration : 33.6 gm/dL  Auto Neutrophil # : 6.6 K/uL  Auto Lymphocyte # : 1.2 K/uL  Auto Monocyte # : 0.4 K/uL  Auto Eosinophil # : 0.0 K/uL  Auto Basophil # : 0.0 K/uL  Auto Neutrophil % : 79.0 %  Auto Lymphocyte % : 15.0 %  Auto Monocyte % : 5.1 %  Auto Eosinophil % : 0.4 %  Auto Basophil % : 0.5 %      Serial CBC's  12-14 @ 20:57  Hct-33.4 / Hgb-11.2 / Plat-140 / RBC-3.64 / WBC-8.3      12-14    138  |  107  |  15  ----------------------------<  123<H>  4.1   |  21<L>  |  1.32<H>    Ca    8.9      14 Dec 2017 20:57    TPro  7.2  /  Alb  3.1<L>  /  TBili  0.2  /  DBili  x   /  AST  12  /  ALT  15  /  AlkPhos  71  12-14                      BLOOD SMEAR INTERPRETATION:       RADIOLOGY & ADDITIONAL STUDIES:

## 2017-12-15 NOTE — H&P ADULT - PROBLEM SELECTOR PLAN 9
Improve Score : 4 ( previous vte , age ) : on full dose ac : Elliquis 10 bid  continue with protonix for gi ppx

## 2017-12-15 NOTE — DISCHARGE NOTE ADULT - CARE PLAN
Principal Discharge DX:	Acute DVT (deep venous thrombosis)  Goal:	needs life long anticoagulation  Instructions for follow-up, activity and diet:	Please take Eliguis as prescribed 10 mg 2 times a day for 1 week then 5mg 2 times a day.   Rest, stay well hydrated. Continue all your medications as prescribed. Follow up with primary care doctor Dr Banks and Dr Gimenez for reevaluation within one week.  Secondary Diagnosis:	MANUEL (acute kidney injury)  Goal:	at baseline  Secondary Diagnosis:	Diabetes  Instructions for follow-up, activity and diet:	Continue all your medications as prescribed by your doctor. Follow up with Dr Banks for further management  Secondary Diagnosis:	Hypercholesterolemia  Instructions for follow-up, activity and diet:	Continue all your medications as prescribed by your doctor. Follow up with Dr Banks for further management Principal Discharge DX:	Acute DVT (deep venous thrombosis)  Goal:	needs life long anticoagulation  Instructions for follow-up, activity and diet:	Please take Eliguis as prescribed 10 mg 2 times a day for 1 week then 5mg 2 times a day.   Rest, stay well hydrated. Continue all your medications as prescribed. Follow up with primary care doctor Dr Banks and Dr Gimenez for reevaluation within one week.  Secondary Diagnosis:	MANUEL (acute kidney injury)  Goal:	at baseline  Secondary Diagnosis:	Diabetes  Instructions for follow-up, activity and diet:	Continue all your medications as prescribed by your doctor. Follow up with Dr Banks for further management  Secondary Diagnosis:	Hypercholesterolemia  Instructions for follow-up, activity and diet:	Continue all your medications as prescribed by your doctor. Follow up with Dr Banks for further management  Secondary Diagnosis:	Hypomagnesemia  Instructions for follow-up, activity and diet:	Continue magnesium tablets and follow up with PCP to recheck your magnesium level

## 2017-12-15 NOTE — DISCHARGE NOTE ADULT - HOSPITAL COURSE
76 yo male , ambulates independently , from home , AAOx 3 , with PMH of  DVT , Anemia , GERD , HTN , HLD , DM , Hypoparathyroidism , Glaucoma , was sent in by PCP for concern of Acute DVT .  Patient had a h/o Chronic DVT , was on Elliquis , had a mechanical fall in October followed by GTC Seizure , found to have subdural hematoma in right pareital lobe ( 5 mm ) , was admitted in [place] , but no neurosurgical intervention.   Patient was discharged without any anticoagulation, based on recommendations of cardiologist and Neurosurgery as patient did not have any active clots at that time.  Patient now found to have acute dvt in Left common femoral vein ( occlusive ) along with chronic dvt based on Ultrasound Doppler LE. Echo : EF :55-60 ; with grossly normal LV function . Could not evaluate right heart function ( from Oct)  Patient denies any leg pain , swelling in leg , chest pain , palpitations , sob , difficulty walking , hemoptysis .    CT head : negative for any SDH , previous SDH has resolved   EKG : Sinus Tachycardia   Patient admitted to telemetry  for acute DVT for further monitoring and clearance to restart AC    Dr. Miles is neurologist consulted   Eliquis 10 mg bid restarted as per Dr Miles recommendation   Patient to be discharged home with Eliquis 10mg BID for 1 week and then 5mg BID   Follow up with Dr Banks and Ammy     MANUEL (acute kidney injury).  Bun / creat is 15/1.32   Improved from October : creat 1.56   sp  iv hydration, creatinine improved to 1.37      Patient cleared by attending and neurology to be discharged home with Eliquis 10mg BID for one week and then  5mg BID. Complacence with anticoagulation therapy reinforced. Instructed to follow up with Dr Banks and Dr Gimenez within one week from discharge. Patient verbalizes understanding   Patient alert and oriented x 4, walks with steady gait. Patient lives 2 block from the hospital, declined  assistance with transportation

## 2017-12-15 NOTE — H&P ADULT - ASSESSMENT
78 yo male , ambulates independently , from home , AAO*3 , with PMH of Chronic DVT , Anemia , GERD , HTN , HLD , DM , Hypoparathyroidism , Glaucoma , was sent in by PCP for concern of Acute DVT .  Patient had a h/o Chronic DVT , was on Elliquis , had a mechanical fall in October followed by GTC Seizure , found to have subdural hematoma in right pareital lobe ( 5 mm ) , was admitted in [place] , but no neurosurgical intervention.   Patient was discharged without any ac , based on recommendations of cardiologist and Neurosurgery as patient did not have any active clots at that time ( even Ir did not feel it was right to put IVC filter at that time ).  Patient now found to have acute dvt in Left common femoral vein ( occlusive ) along with chronic dvt based on Ultrasound Doppler LE. Echo : EF :55-60 ; with grossly normal LV function . Could not evaluate right heart function ( from Oct)  Patient denies any leg pain , swelling in leg , chest pain , palpitations , sob , difficulty walking , hemoptysis .    In ED , BP : 163/88 , HR : 110 , stabilized to BP : 137/60 , HR : 90 ; O2 sat : 99 , RR : 18 ; Temp : 98.9 F  CT head : negative for any SDH , previous SDH has resolved   EKG : Sinus Tachycardia   Doppler Le : acute dvt of Left LE     Will admit to telemetry for Acute DVT .

## 2017-12-15 NOTE — H&P ADULT - PROBLEM SELECTOR PLAN 1
Doppler Le : acute dvt of Left LE  No leg pain , leg swelling , difficulty walking   Patient has h/o chronic DVT and Elliquis was stopped due to SDH in October , which can be restarted now as sdh has resolved   Dr. Miles is neurologist on board   Will follow his recommendations   Started on Elliquis 10 mg bid for now   As patient has h/o dvt and acute unprovoked dvt , will need life long ac

## 2017-12-16 LAB — TRANSFERRIN SERPL-MCNC: 150 MG/DL — LOW (ref 200–360)

## 2017-12-19 PROCEDURE — 84466 ASSAY OF TRANSFERRIN: CPT

## 2017-12-19 PROCEDURE — 71045 X-RAY EXAM CHEST 1 VIEW: CPT

## 2017-12-19 PROCEDURE — 85027 COMPLETE CBC AUTOMATED: CPT

## 2017-12-19 PROCEDURE — 86666 EHRLICHIA ANTIBODY: CPT

## 2017-12-19 PROCEDURE — 97116 GAIT TRAINING THERAPY: CPT

## 2017-12-19 PROCEDURE — 83550 IRON BINDING TEST: CPT

## 2017-12-19 PROCEDURE — 82962 GLUCOSE BLOOD TEST: CPT

## 2017-12-19 PROCEDURE — 82150 ASSAY OF AMYLASE: CPT

## 2017-12-19 PROCEDURE — 87070 CULTURE OTHR SPECIMN AEROBIC: CPT

## 2017-12-19 PROCEDURE — 86780 TREPONEMA PALLIDUM: CPT

## 2017-12-19 PROCEDURE — 84154 ASSAY OF PSA FREE: CPT

## 2017-12-19 PROCEDURE — 83735 ASSAY OF MAGNESIUM: CPT

## 2017-12-19 PROCEDURE — 80061 LIPID PANEL: CPT

## 2017-12-19 PROCEDURE — 97530 THERAPEUTIC ACTIVITIES: CPT

## 2017-12-19 PROCEDURE — 83935 ASSAY OF URINE OSMOLALITY: CPT

## 2017-12-19 PROCEDURE — 85045 AUTOMATED RETICULOCYTE COUNT: CPT

## 2017-12-19 PROCEDURE — 82306 VITAMIN D 25 HYDROXY: CPT

## 2017-12-19 PROCEDURE — 99285 EMERGENCY DEPT VISIT HI MDM: CPT | Mod: 25

## 2017-12-19 PROCEDURE — 93306 TTE W/DOPPLER COMPLETE: CPT

## 2017-12-19 PROCEDURE — 93005 ELECTROCARDIOGRAM TRACING: CPT

## 2017-12-19 PROCEDURE — 84100 ASSAY OF PHOSPHORUS: CPT

## 2017-12-19 PROCEDURE — 83615 LACTATE (LD) (LDH) ENZYME: CPT

## 2017-12-19 PROCEDURE — 83036 HEMOGLOBIN GLYCOSYLATED A1C: CPT

## 2017-12-19 PROCEDURE — 82330 ASSAY OF CALCIUM: CPT

## 2017-12-19 PROCEDURE — 82340 ASSAY OF CALCIUM IN URINE: CPT

## 2017-12-19 PROCEDURE — 83010 ASSAY OF HAPTOGLOBIN QUANT: CPT

## 2017-12-19 PROCEDURE — 86618 LYME DISEASE ANTIBODY: CPT

## 2017-12-19 PROCEDURE — 84540 ASSAY OF URINE/UREA-N: CPT

## 2017-12-19 PROCEDURE — G0515: CPT

## 2017-12-19 PROCEDURE — 96374 THER/PROPH/DIAG INJ IV PUSH: CPT

## 2017-12-19 PROCEDURE — 83605 ASSAY OF LACTIC ACID: CPT

## 2017-12-19 PROCEDURE — 80307 DRUG TEST PRSMV CHEM ANLYZR: CPT

## 2017-12-19 PROCEDURE — 82550 ASSAY OF CK (CPK): CPT

## 2017-12-19 PROCEDURE — 99291 CRITICAL CARE FIRST HOUR: CPT | Mod: 25

## 2017-12-19 PROCEDURE — 94002 VENT MGMT INPAT INIT DAY: CPT

## 2017-12-19 PROCEDURE — 82728 ASSAY OF FERRITIN: CPT

## 2017-12-19 PROCEDURE — 82553 CREATINE MB FRACTION: CPT

## 2017-12-19 PROCEDURE — 84443 ASSAY THYROID STIM HORMONE: CPT

## 2017-12-19 PROCEDURE — 87207 SMEAR SPECIAL STAIN: CPT

## 2017-12-19 PROCEDURE — 83690 ASSAY OF LIPASE: CPT

## 2017-12-19 PROCEDURE — 93970 EXTREMITY STUDY: CPT

## 2017-12-19 PROCEDURE — 82803 BLOOD GASES ANY COMBINATION: CPT

## 2017-12-19 PROCEDURE — 84295 ASSAY OF SERUM SODIUM: CPT

## 2017-12-19 PROCEDURE — 87040 BLOOD CULTURE FOR BACTERIA: CPT

## 2017-12-19 PROCEDURE — 76536 US EXAM OF HEAD AND NECK: CPT

## 2017-12-19 PROCEDURE — 84153 ASSAY OF PSA TOTAL: CPT

## 2017-12-19 PROCEDURE — 70450 CT HEAD/BRAIN W/O DYE: CPT

## 2017-12-19 PROCEDURE — 82024 ASSAY OF ACTH: CPT

## 2017-12-19 PROCEDURE — 84132 ASSAY OF SERUM POTASSIUM: CPT

## 2017-12-19 PROCEDURE — 86900 BLOOD TYPING SEROLOGIC ABO: CPT

## 2017-12-19 PROCEDURE — 82607 VITAMIN B-12: CPT

## 2017-12-19 PROCEDURE — 94799 UNLISTED PULMONARY SVC/PX: CPT

## 2017-12-19 PROCEDURE — 85610 PROTHROMBIN TIME: CPT

## 2017-12-19 PROCEDURE — 84484 ASSAY OF TROPONIN QUANT: CPT

## 2017-12-19 PROCEDURE — 80053 COMPREHEN METABOLIC PANEL: CPT

## 2017-12-19 PROCEDURE — 82947 ASSAY GLUCOSE BLOOD QUANT: CPT

## 2017-12-19 PROCEDURE — 84300 ASSAY OF URINE SODIUM: CPT

## 2017-12-19 PROCEDURE — 85730 THROMBOPLASTIN TIME PARTIAL: CPT

## 2017-12-19 PROCEDURE — 85014 HEMATOCRIT: CPT

## 2017-12-19 PROCEDURE — 80048 BASIC METABOLIC PNL TOTAL CA: CPT

## 2017-12-19 PROCEDURE — 86480 TB TEST CELL IMMUN MEASURE: CPT

## 2017-12-19 PROCEDURE — G0390: CPT

## 2017-12-19 PROCEDURE — 72125 CT NECK SPINE W/O DYE: CPT

## 2017-12-19 PROCEDURE — 87389 HIV-1 AG W/HIV-1&-2 AB AG IA: CPT

## 2017-12-19 PROCEDURE — 86364 TISS TRNSGLTMNASE EA IG CLAS: CPT

## 2017-12-19 PROCEDURE — 97162 PT EVAL MOD COMPLEX 30 MIN: CPT

## 2017-12-19 PROCEDURE — 82533 TOTAL CORTISOL: CPT

## 2017-12-19 PROCEDURE — 96375 TX/PRO/DX INJ NEW DRUG ADDON: CPT

## 2017-12-19 PROCEDURE — 86850 RBC ANTIBODY SCREEN: CPT

## 2017-12-19 PROCEDURE — 83880 ASSAY OF NATRIURETIC PEPTIDE: CPT

## 2017-12-19 PROCEDURE — 95819 EEG AWAKE AND ASLEEP: CPT

## 2017-12-19 PROCEDURE — 82435 ASSAY OF BLOOD CHLORIDE: CPT

## 2017-12-19 PROCEDURE — 86901 BLOOD TYPING SEROLOGIC RH(D): CPT

## 2017-12-19 PROCEDURE — 97165 OT EVAL LOW COMPLEX 30 MIN: CPT

## 2017-12-19 PROCEDURE — 82310 ASSAY OF CALCIUM: CPT

## 2017-12-19 PROCEDURE — 95957 EEG DIGITAL ANALYSIS: CPT

## 2017-12-19 PROCEDURE — 82570 ASSAY OF URINE CREATININE: CPT

## 2017-12-19 PROCEDURE — 95951: CPT

## 2017-12-19 PROCEDURE — 87798 DETECT AGENT NOS DNA AMP: CPT

## 2017-12-22 RX ORDER — APIXABAN 2.5 MG/1
1 TABLET, FILM COATED ORAL
Qty: 60 | Refills: 0
Start: 2017-12-22 | End: 2018-01-20

## 2018-05-24 ENCOUNTER — APPOINTMENT (OUTPATIENT)
Dept: ENDOCRINOLOGY | Facility: CLINIC | Age: 78
End: 2018-05-24
Payer: MEDICARE

## 2018-05-24 VITALS
WEIGHT: 176 LBS | HEART RATE: 65 BPM | SYSTOLIC BLOOD PRESSURE: 135 MMHG | RESPIRATION RATE: 16 BRPM | OXYGEN SATURATION: 98 % | HEIGHT: 68 IN | DIASTOLIC BLOOD PRESSURE: 83 MMHG | BODY MASS INDEX: 26.67 KG/M2

## 2018-05-24 LAB — GLUCOSE BLDC GLUCOMTR-MCNC: 139

## 2018-05-24 PROCEDURE — 99214 OFFICE O/P EST MOD 30 MIN: CPT | Mod: 25

## 2018-05-24 PROCEDURE — 82962 GLUCOSE BLOOD TEST: CPT

## 2018-05-24 PROCEDURE — 99204 OFFICE O/P NEW MOD 45 MIN: CPT | Mod: 25

## 2018-05-24 RX ORDER — BLOOD-GLUCOSE METER
70 EACH MISCELLANEOUS
Qty: 6 | Refills: 3 | Status: ACTIVE | COMMUNITY
Start: 2018-05-24 | End: 1900-01-01

## 2018-05-24 RX ORDER — AMMONIUM LACTATE 12 %
12 CREAM (GRAM) TOPICAL TWICE DAILY
Qty: 1 | Refills: 2 | Status: ACTIVE | COMMUNITY
Start: 2018-05-24 | End: 1900-01-01

## 2018-05-24 RX ORDER — ALCOHOL ANTISEPTIC PADS
PADS, MEDICATED (EA) TOPICAL
Qty: 3 | Refills: 0 | Status: ACTIVE | COMMUNITY
Start: 2018-05-24 | End: 1900-01-01

## 2018-05-24 RX ORDER — BLOOD SUGAR DIAGNOSTIC
STRIP MISCELLANEOUS 3 TIMES DAILY
Qty: 270 | Refills: 0 | Status: ACTIVE | COMMUNITY
Start: 2018-05-24 | End: 1900-01-01

## 2018-05-24 RX ORDER — BLOOD-GLUCOSE METER
W/DEVICE KIT MISCELLANEOUS
Qty: 1 | Refills: 0 | Status: ACTIVE | COMMUNITY
Start: 2018-05-24 | End: 1900-01-01

## 2018-05-24 RX ORDER — NYSTATIN AND TRIAMCINOLONE ACETONIDE 100000; 1 MG/G; MG/G
100000-0.1 CREAM TOPICAL TWICE DAILY
Qty: 45 | Refills: 1 | Status: ACTIVE | COMMUNITY
Start: 2018-05-24 | End: 1900-01-01

## 2018-06-12 ENCOUNTER — APPOINTMENT (OUTPATIENT)
Dept: ENDOCRINOLOGY | Facility: CLINIC | Age: 78
End: 2018-06-12
Payer: MEDICARE

## 2018-06-12 VITALS
BODY MASS INDEX: 27.28 KG/M2 | SYSTOLIC BLOOD PRESSURE: 140 MMHG | DIASTOLIC BLOOD PRESSURE: 80 MMHG | TEMPERATURE: 97.7 F | RESPIRATION RATE: 17 BRPM | HEART RATE: 83 BPM | OXYGEN SATURATION: 96 % | WEIGHT: 180 LBS | HEIGHT: 68 IN

## 2018-06-12 LAB
ALBUMIN SERPL ELPH-MCNC: 4 G/DL
ALP BLD-CCNC: 52 U/L
ALT SERPL-CCNC: 10 U/L
ANION GAP SERPL CALC-SCNC: 15 MMOL/L
AST SERPL-CCNC: 8 U/L
BASOPHILS # BLD AUTO: 0.02 K/UL
BASOPHILS NFR BLD AUTO: 0.3 %
BILIRUB SERPL-MCNC: 0.3 MG/DL
BUN SERPL-MCNC: 25 MG/DL
CALCIUM SERPL-MCNC: 8.9 MG/DL
CHLORIDE SERPL-SCNC: 104 MMOL/L
CHOLEST SERPL-MCNC: 179 MG/DL
CHOLEST/HDLC SERPL: 4.4 RATIO
CO2 SERPL-SCNC: 21 MMOL/L
CREAT SERPL-MCNC: 1.37 MG/DL
EOSINOPHIL # BLD AUTO: 0.05 K/UL
EOSINOPHIL NFR BLD AUTO: 0.8 %
GLUCOSE BLDC GLUCOMTR-MCNC: 192
GLUCOSE SERPL-MCNC: 139 MG/DL
HBA1C MFR BLD HPLC: 6.2 %
HCT VFR BLD CALC: 34.1 %
HDLC SERPL-MCNC: 41 MG/DL
HGB BLD-MCNC: 11.3 G/DL
IMM GRANULOCYTES NFR BLD AUTO: 1 %
LDLC SERPL CALC-MCNC: 90 MG/DL
LYMPHOCYTES # BLD AUTO: 1.79 K/UL
LYMPHOCYTES NFR BLD AUTO: 29.6 %
MAN DIFF?: NORMAL
MCHC RBC-ENTMCNC: 29.7 PG
MCHC RBC-ENTMCNC: 33.1 GM/DL
MCV RBC AUTO: 89.5 FL
MONOCYTES # BLD AUTO: 0.47 K/UL
MONOCYTES NFR BLD AUTO: 7.8 %
NEUTROPHILS # BLD AUTO: 3.65 K/UL
NEUTROPHILS NFR BLD AUTO: 60.5 %
PLATELET # BLD AUTO: 183 K/UL
POTASSIUM SERPL-SCNC: 5.4 MMOL/L
PROT SERPL-MCNC: 6.1 G/DL
RBC # BLD: 3.81 M/UL
RBC # FLD: 14.5 %
SODIUM SERPL-SCNC: 140 MMOL/L
T4 FREE SERPL-MCNC: 1.2 NG/DL
TRIGL SERPL-MCNC: 241 MG/DL
TSH SERPL-ACNC: 3.04 UIU/ML
WBC # FLD AUTO: 6.04 K/UL

## 2018-06-12 PROCEDURE — 82962 GLUCOSE BLOOD TEST: CPT

## 2018-06-12 PROCEDURE — 99214 OFFICE O/P EST MOD 30 MIN: CPT | Mod: 25

## 2018-06-14 ENCOUNTER — MEDICATION RENEWAL (OUTPATIENT)
Age: 78
End: 2018-06-14

## 2018-06-14 LAB
ALBUMIN SERPL ELPH-MCNC: 3.8 G/DL
ALP BLD-CCNC: 46 U/L
ALT SERPL-CCNC: 10 U/L
ANION GAP SERPL CALC-SCNC: 14 MMOL/L
AST SERPL-CCNC: 14 U/L
BILIRUB SERPL-MCNC: 0.2 MG/DL
BUN SERPL-MCNC: 23 MG/DL
CALCIUM SERPL-MCNC: 9.1 MG/DL
CHLORIDE SERPL-SCNC: 103 MMOL/L
CO2 SERPL-SCNC: 24 MMOL/L
CREAT SERPL-MCNC: 1.52 MG/DL
GLUCOSE SERPL-MCNC: 155 MG/DL
POTASSIUM SERPL-SCNC: 5.4 MMOL/L
PROT SERPL-MCNC: 6.5 G/DL
SODIUM SERPL-SCNC: 141 MMOL/L

## 2018-06-14 RX ORDER — CHLORHEXIDINE GLUCONATE 4 %
1000 LIQUID (ML) TOPICAL
Qty: 90 | Refills: 0 | Status: ACTIVE | COMMUNITY
Start: 2018-06-14 | End: 1900-01-01

## 2018-06-18 ENCOUNTER — MEDICATION RENEWAL (OUTPATIENT)
Age: 78
End: 2018-06-18

## 2018-06-18 LAB — VIT B12 SERPL-MCNC: 210 PG/ML

## 2018-06-19 ENCOUNTER — MEDICATION RENEWAL (OUTPATIENT)
Age: 78
End: 2018-06-19

## 2018-06-19 LAB
FERRITIN SERPL-MCNC: 190 NG/ML
FOLATE SERPL-MCNC: 4.8 NG/ML

## 2018-06-26 NOTE — ED ADULT NURSE NOTE - PAIN RATING/NUMBER SCALE (0-10): REST
Subjective:       Patient ID: Denis Bunn is a 61 y.o. male.    Chief Complaint: Annual Exam    HPI   Pt is a 61 y.o. Wm who presents for consideration of a fluttering sensation in chest, as well as an annual examination.  He reports that this fluttering sensation began 2 weeks ago and does not cause sob, cp, n/v, lightheadedness or other symptoms.  He took xanax once at bedtime and felt better the next day.  Reports his cardiologist has been involved and has scheduled a holter monitor for this problem.  He has no hx of hypertension.  He has not had an ekg recently.      He reports no other somatic complaints.  He does report poor sleep quality with 6-7 hours of sleep each night.  Reports that playing games on his phone will help him return to sleep.  He has tried melatonin but it is ineffective for his purposes.    Health Maintenance  Pt is due for tetanus and zoster vaccines as well as a Hep C screening.  He is up to date on laboratories, eye exam, colonoscopy and other immunization. Hypokalemia is in his history and last measurement was 5.1 - 2 weeks ago.    Review of Systems   Constitutional: Negative for chills and fever.   HENT: Negative for congestion, ear discharge, ear pain, rhinorrhea, sore throat and voice change.    Eyes: Negative for discharge and itching.   Respiratory: Negative for choking, shortness of breath and wheezing.    Cardiovascular: Negative for chest pain, palpitations and leg swelling.        Fluttering sensation   Gastrointestinal: Negative for abdominal distention, constipation, diarrhea, nausea and vomiting.   Endocrine: Negative for polydipsia, polyphagia and polyuria.   Genitourinary: Negative for dysuria, frequency, hematuria and urgency.   Musculoskeletal: Negative for arthralgias and myalgias.   Skin: Negative for color change, rash and wound.   Neurological: Negative for dizziness, weakness and headaches.   Hematological: Negative for adenopathy. Does not bruise/bleed  easily.       Objective:      Physical Exam   Constitutional: He is oriented to person, place, and time. He appears well-developed and well-nourished. No distress.   HENT:   Head: Normocephalic and atraumatic.   Right Ear: External ear normal.   Left Ear: External ear normal.   Nose: Nose normal.   Eyes: Conjunctivae and EOM are normal. Pupils are equal, round, and reactive to light. Right eye exhibits no discharge. Left eye exhibits no discharge. No scleral icterus.   Neck: Normal range of motion. Neck supple.   Cardiovascular: Normal rate, regular rhythm, normal heart sounds and intact distal pulses.  Exam reveals no gallop and no friction rub.    No murmur heard.  Pulmonary/Chest: Effort normal and breath sounds normal. No respiratory distress. He has no wheezes. He has no rales.   Abdominal: He exhibits no distension.   Musculoskeletal: Normal range of motion. He exhibits no edema, tenderness or deformity.   Neurological: He is alert and oriented to person, place, and time. He displays normal reflexes. No cranial nerve deficit or sensory deficit. He exhibits normal muscle tone. Coordination normal.   Skin: Skin is warm and dry. Capillary refill takes less than 2 seconds. He is not diaphoretic.   Nursing note and vitals reviewed.      Assessment:       1. Palpitations        Plan:       EKG, rhythm strip. Will draw hep c with next labs.  Will receive tetanus and zostavax when he returns from vacation in 2 weeks.  F/U with cardiology as planned.      0

## 2018-07-12 ENCOUNTER — RX RENEWAL (OUTPATIENT)
Age: 78
End: 2018-07-12

## 2018-07-12 RX ORDER — LANCETS 30 GAUGE
EACH MISCELLANEOUS
Qty: 300 | Refills: 0 | Status: ACTIVE | COMMUNITY
Start: 2018-07-12 | End: 1900-01-01

## 2018-07-16 PROBLEM — J18.9 PNEUMONIA, UNSPECIFIED ORGANISM: Chronic | Status: INACTIVE | Noted: 2017-10-15 | Resolved: 2017-12-15

## 2018-07-23 LAB
ANION GAP SERPL CALC-SCNC: 16 MMOL/L
BUN SERPL-MCNC: 19 MG/DL
CALCIUM SERPL-MCNC: 8.3 MG/DL
CHLORIDE SERPL-SCNC: 105 MMOL/L
CO2 SERPL-SCNC: 22 MMOL/L
CREAT SERPL-MCNC: 1.36 MG/DL
GLUCOSE SERPL-MCNC: 81 MG/DL
POTASSIUM SERPL-SCNC: 4.6 MMOL/L
SODIUM SERPL-SCNC: 143 MMOL/L

## 2018-07-24 ENCOUNTER — APPOINTMENT (OUTPATIENT)
Dept: ENDOCRINOLOGY | Facility: CLINIC | Age: 78
End: 2018-07-24
Payer: MEDICARE

## 2018-07-24 VITALS
WEIGHT: 180 LBS | DIASTOLIC BLOOD PRESSURE: 80 MMHG | HEIGHT: 68 IN | BODY MASS INDEX: 27.28 KG/M2 | SYSTOLIC BLOOD PRESSURE: 120 MMHG

## 2018-07-24 PROCEDURE — 99214 OFFICE O/P EST MOD 30 MIN: CPT

## 2018-07-31 ENCOUNTER — RX RENEWAL (OUTPATIENT)
Age: 78
End: 2018-07-31

## 2018-07-31 RX ORDER — BLOOD SUGAR DIAGNOSTIC
STRIP MISCELLANEOUS
Qty: 200 | Refills: 0 | Status: ACTIVE | COMMUNITY
Start: 2018-06-12 | End: 1900-01-01

## 2018-10-29 NOTE — ED ADULT TRIAGE NOTE - ACCOMPANIED BY
10/29/2018       RE: Baldo Landon  [address]  [facility] [address]     Dear Colleague,    Thank you for referring your patient, Baldo Landon, to the [facility] SURGERY at [facility]. Please see a copy of my visit note below.    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 More recently some difficulty with bag. Has not tried binder.  Obstructive symptoms:  no  Urinary difficulties:  no  Chronic cough: no  Constipation:  no  Current level of activity:  Low, actively retired.    Past medical history, medications, allergies, family history, and social history were reviewed with the patient.    Past Medical History:   Diagnosis Date     Antiplatelet or antithrombotic long-term use      Arrhythmia     Afib     Atrial fibrillation (H)      Bladder cancer (H)      Diabetes (H)      Hypertension      Prostate cancer (H) 2005     Unspecified cerebral artery occlusion with cerebral infarction        Past Surgical History:   Procedure Laterality Date     APPENDECTOMY       CYSTOSCOPY, RETROGRADES, INSERT STENT URETER(S), COMBINED Left 5/11/2015    Procedure: COMBINED CYSTOSCOPY, RETROGRADES, INSERT STENT URETER(S);  Surgeon: Ruddy Betancourt MD;  Location: [facility] OR     CYSTOSCOPY, TRANSURETHRAL RESECTION (TUR) TUMOR BLADDER, COMBINED Left 5/11/2015    Procedure: COMBINED CYSTOSCOPY, TRANSURETHRAL RESECTION (TUR) TUMOR BLADDER;  Surgeon: Ruddy Betancourt MD;  Location: [facility] OR     DAVINCI CYSTECTOMY BLADDER RADICAL, ILEAL DIVERSION, COMBINED N/A 9/15/2015    Procedure: COMBINED DAVINCI CYSTECTOMY BLADDER RADICAL, ILEAL DIVERSION;  Surgeon: Ruddy Betancourt MD;  Location: [facility] OR     DAVINCI CYSTOPROSTATECTOMY N/A 9/15/2015    Procedure: DAVINCI CYSTOPROSTATECTOMY;  Surgeon: Ruddy Betancourt MD;  Location: [facility] OR     ENT SURGERY      tonsillectomy     HERNIA  REPAIR       ORTHOPEDIC SURGERY      knee scope     RADIATION THERAPY DATE(S)       ROS: 10 point review of systems negative except noted in HPI  PHYSICAL EXAM  General appearance- healthy, alert, and in no distress.  Skin- Skin color and turgor normal.  No obvious rashes.  Neck- Neck is supple without obvious adenopathy.  Lungs- Respiratory effort unlabored.  Gait- Normal.  Abdomen - soft non distended, non tender with RLQ ileal diversion loop bagged and with surrounding wide based hernia.  CT scan 9/25/18 reviewed. Wide based parastomal hernia.  Impression: parastomal hernia after ileal diversion loop. Would start with conservative measures before recommending surgery.  Today reviewed use of binder as modified for ostomy. Also will see stomal Rn to see if other products to help.  Will see me again should hernia become more symptomatic.   The total time spent with this patient and his wife was 30 minutes.  Of this time, greater than 50% was spent counseling and coordinating care.      Again, thank you for allowing me to participate in the care of your patient.      Sincerely,    Natanael Nowak MD   EMT/paramedic

## 2018-11-21 PROBLEM — I82.409 ACUTE EMBOLISM AND THROMBOSIS OF UNSPECIFIED DEEP VEINS OF UNSPECIFIED LOWER EXTREMITY: Chronic | Status: ACTIVE | Noted: 2017-10-15

## 2019-03-14 ENCOUNTER — APPOINTMENT (OUTPATIENT)
Dept: ENDOCRINOLOGY | Facility: CLINIC | Age: 79
End: 2019-03-14
Payer: MEDICARE

## 2019-03-14 VITALS
BODY MASS INDEX: 28.19 KG/M2 | OXYGEN SATURATION: 98 % | TEMPERATURE: 97.5 F | HEIGHT: 68 IN | HEART RATE: 74 BPM | SYSTOLIC BLOOD PRESSURE: 119 MMHG | DIASTOLIC BLOOD PRESSURE: 69 MMHG | RESPIRATION RATE: 17 BRPM | WEIGHT: 186 LBS

## 2019-03-14 PROCEDURE — 99214 OFFICE O/P EST MOD 30 MIN: CPT

## 2019-03-14 PROCEDURE — 99204 OFFICE O/P NEW MOD 45 MIN: CPT

## 2019-03-14 NOTE — HISTORY OF PRESENT ILLNESS
[FreeTextEntry1] : The patient comes to the office with a history of type 2 diabetes for the past 10 years. Recently his blood sugars have been fine in the lab. They are well controlled in the morning, during the day. He has been following the diet properly or exercising. He has gained/lost weight. No weight change.  He denies/has low blood glucose during the night. The blood glucose improves when he follows the diet and exercises. He is taking the medications regularly. Patient denies polydipsia, polyuria, chest pain, SOB or leg edema. He has/denies history of retinopathy, kidney problems. He denies history of numbness, tingling sensation on his extremities. He has  hyperlipidemia, has hypertension, denies CHD, denies CVA.  He has seen the Ophthalmologist recently, he has not seen the Podiatrist recently. He has seen the Cardiologist recently.\par

## 2019-03-14 NOTE — PHYSICAL EXAM
[Alert] : alert [No Acute Distress] : no acute distress [Normal Sclera/Conjunctiva] : normal sclera/conjunctiva [PERRL] : pupils equal, round and reactive to light [Normal Outer Ear/Nose] : the ears and nose were normal in appearance [Normal Hearing] : hearing was normal [No Neck Mass] : no neck mass was observed [Thyroid Not Enlarged] : the thyroid was not enlarged [No Respiratory Distress] : no respiratory distress [Normal Rate and Effort] : normal respiratory rhythm and effort [Normal PMI] : the apical impulse was normal [Normal Rate] : heart rate was normal

## 2019-03-14 NOTE — ASSESSMENT
[FreeTextEntry1] : Good diabetic control\par Will repeat the thyroid tests\par He will call me back next week\par Advised to see the Nutritionist

## 2019-06-20 ENCOUNTER — APPOINTMENT (OUTPATIENT)
Dept: ENDOCRINOLOGY | Facility: CLINIC | Age: 79
End: 2019-06-20
Payer: MEDICARE

## 2019-06-20 VITALS
HEIGHT: 68 IN | DIASTOLIC BLOOD PRESSURE: 78 MMHG | SYSTOLIC BLOOD PRESSURE: 132 MMHG | WEIGHT: 185 LBS | RESPIRATION RATE: 17 BRPM | BODY MASS INDEX: 28.04 KG/M2 | TEMPERATURE: 97.7 F | OXYGEN SATURATION: 96 % | HEART RATE: 65 BPM

## 2019-06-20 LAB — GLUCOSE BLDC GLUCOMTR-MCNC: 219

## 2019-06-20 PROCEDURE — 82962 GLUCOSE BLOOD TEST: CPT

## 2019-06-20 PROCEDURE — 99214 OFFICE O/P EST MOD 30 MIN: CPT | Mod: 25

## 2019-06-20 NOTE — HISTORY OF PRESENT ILLNESS
[FreeTextEntry1] : Patient feels well, no weight change, he does not check the BS at home, he gets very nervous. He takes his medication regularly. He has been under stress not following the diet properly. The HbA1c has increased. The creatinine is about the same level slightly increased..

## 2019-06-20 NOTE — DATA REVIEWED
[FreeTextEntry1] : The FBS and HbA1c has increases slightly. The renal function is about the same from last year.

## 2019-06-20 NOTE — ASSESSMENT
[FreeTextEntry1] : The diabetic control has deteriorated\par Advised to follow the diet and increase activity\par Patient does not want to add any other medication at present time\par He wants to follow the diet and increase his activity\par Advised to see Nutritionist

## 2019-07-08 ENCOUNTER — APPOINTMENT (OUTPATIENT)
Dept: ENDOCRINOLOGY | Facility: CLINIC | Age: 79
End: 2019-07-08
Payer: MEDICARE

## 2019-07-08 ENCOUNTER — RX RENEWAL (OUTPATIENT)
Age: 79
End: 2019-07-08

## 2019-07-08 PROCEDURE — G0108 DIAB MANAGE TRN  PER INDIV: CPT

## 2019-10-18 ENCOUNTER — APPOINTMENT (OUTPATIENT)
Dept: ENDOCRINOLOGY | Facility: CLINIC | Age: 79
End: 2019-10-18
Payer: MEDICARE

## 2019-10-18 VITALS
TEMPERATURE: 97.4 F | HEIGHT: 68 IN | OXYGEN SATURATION: 96 % | HEART RATE: 70 BPM | DIASTOLIC BLOOD PRESSURE: 81 MMHG | WEIGHT: 189 LBS | BODY MASS INDEX: 28.64 KG/M2 | SYSTOLIC BLOOD PRESSURE: 157 MMHG | RESPIRATION RATE: 16 BRPM

## 2019-10-18 PROCEDURE — 99214 OFFICE O/P EST MOD 30 MIN: CPT | Mod: 25

## 2019-10-18 PROCEDURE — 82962 GLUCOSE BLOOD TEST: CPT

## 2019-10-19 LAB — GLUCOSE BLDC GLUCOMTR-MCNC: 178

## 2019-10-19 NOTE — ASSESSMENT
[FreeTextEntry1] : The diabetes is better controlled\par The HbA1c is still borderline elevated but improved\par We will continue the same medications\par Sporadically he may have low blood glucose\par He has seen the Neurologist for LOC?

## 2019-10-19 NOTE — HISTORY OF PRESENT ILLNESS
[FreeTextEntry1] : The diabetes is better controlled, the HbA1c is 7%. it was 7.1%. He was advised last visit to add Januvia but but the copayment  is too expensive. The BS have improved and sporadically feels weak but he has not check his BS at that time

## 2019-10-19 NOTE — DATA REVIEWED
[FreeTextEntry1] : The FBS was elevated but the HbA1c was improved. The renal function is also improved

## 2020-01-01 NOTE — PROGRESS NOTE ADULT - MINUTES
Subjective:     Chief Complaint/Reason for Admission:  Infant is a 0 days Boy Jessica Bolanos born at 39w4d  Infant male was born on 2022 at 3:49 AM via , Low Transverse.        Maternal History:  The mother is a 23 y.o.   . She  has a past medical history of ADHD, Anemia, Anxiety, Arthritis, Depression, and Lumbar radiculitis (2018).     Prenatal Labs Review:  ABO/Rh:   Lab Results   Component Value Date/Time    GROUPTRH O POS 2020 07:40 PM    GROUPTRH O POS 2019     Group B Beta Strep:   Lab Results   Component Value Date/Time    STREPBCULT negative 2020     HIV: 2019: HIV 1/2 Ag/Ab Negative  RPR:   Lab Results   Component Value Date/Time    RPR Non-reactive 2020 07:40 PM     Hepatitis B Surface Antigen:   Lab Results   Component Value Date/Time    HEPBSAG Negative 2019     Rubella Immune Status:   Lab Results   Component Value Date/Time    RUBELLAIMMUN Immune 2019       Pregnancy/Delivery Course:  The pregnancy was complicated by drug use, THC positive 19, 19, negative on admit. Prenatal ultrasound revealed normal anatomy. Prenatal care was good. Mother received Ancef x 1. Membrane rupture:  Membrane Rupture Date 1: 20   Membrane Rupture Time 1: 0807 .  The delivery was complicated by true knot in cord. Apgar scores: )   Assessment:     1 Minute:   Skin color:     Muscle tone:     Heart rate:     Breathing:     Grimace:     Total:  9          5 Minute:   Skin color:     Muscle tone:     Heart rate:     Breathing:     Grimace:     Total:  9          10 Minute:   Skin color:     Muscle tone:     Heart rate:     Breathing:     Grimace:     Total:           Living Status:       .        Review of Systems   Unable to perform ROS: Age       Objective:     Vital Signs (Most Recent)  Temp: 98.8 °F (37.1 °C) (04/10/20 0736)  Pulse: 116 (04/10/20 0736)  Resp: 46 (04/10/20 0736)  BP: (!) 66/33 (04/10/20 0736)    Most Recent Weight: 
"4139 g (9 lb 2 oz)(Filed from Delivery Summary) (04/10/20 0349)  Admission Weight: 4139 g (9 lb 2 oz)(Filed from Delivery Summary) (04/10/20 0349)  Admission  Head Circumference: 34.3 cm(Filed from Delivery Summary)   Admission Length: Height: 52.1 cm (20.5")(Filed from Delivery Summary)    Physical Exam   Constitutional: He appears well-developed and well-nourished. He is active. No distress.   HENT:   Head: Anterior fontanelle is flat.   Right Ear: External ear normal.   Left Ear: External ear normal.   Nose: Nose normal.   Mouth/Throat: Mucous membranes are moist. Oropharynx is clear.   Eyes: Red reflex is present bilaterally. Conjunctivae are normal.   Neck: Normal range of motion. Neck supple.   Cardiovascular: Normal rate, regular rhythm, S1 normal and S2 normal. Pulses are palpable.   No murmur heard.  Pulmonary/Chest: Effort normal and breath sounds normal.   Abdominal: Soft. Bowel sounds are normal. The umbilical stump is clean.   Genitourinary: Penis normal. Right testis is descended. Left testis is descended.   Musculoskeletal: Normal range of motion.   Negative Ortalani and Tobias maneuver    Neurological: He is alert. He exhibits normal muscle tone. Suck normal. Symmetric Mary Grace.   Skin: Skin is warm. Turgor is normal. No rash noted. No jaundice.   Nursing note and vitals reviewed.      Recent Results (from the past 168 hour(s))   Cord blood evaluation    Collection Time: 04/10/20  3:49 AM   Result Value Ref Range    Cord ABO A     Cord Rh POS     Cord Direct Mariano NEG    POCT glucose    Collection Time: 04/10/20  4:35 AM   Result Value Ref Range    POC Glucose 81 70 - 110   CBC auto differential    Collection Time: 04/10/20  4:45 AM   Result Value Ref Range    WBC 23.13 9.00 - 30.00 K/uL    RBC 4.16 3.90 - 6.30 M/uL    Hemoglobin 14.5 13.5 - 19.5 g/dL    Hematocrit 42.3 42.0 - 63.0 %    Mean Corpuscular Volume 102 88 - 118 fL    Mean Corpuscular Hemoglobin 34.9 31.0 - 37.0 pg    Mean Corpuscular "
Hemoglobin Conc 34.3 28.0 - 38.0 g/dL    RDW 15.9 (H) 11.5 - 14.5 %    Platelets 274 150 - 350 K/uL    MPV 9.9 9.2 - 12.9 fL    Immature Granulocytes CANCELED 0.0 - 0.5 %    Immature Grans (Abs) CANCELED 0.00 - 0.04 K/uL    nRBC 1 (A) 0 /100 WBC    Gran% 61.0 (L) 67.0 - 87.0 %    Lymph% 26.0 22.0 - 37.0 %    Mono% 10.0 0.8 - 16.3 %    Eosinophil% 3.0 (H) 0.0 - 2.9 %    Basophil% 0.0 (L) 0.1 - 0.8 %    Aniso Slight     Poly Occasional     Differential Method Manual    Drug screen panel, emergency    Collection Time: 04/10/20  5:39 AM   Result Value Ref Range    Benzodiazepines Negative     Cocaine (Metab.) Negative     Opiate Scrn, Ur Negative     Barbiturate Screen, Ur Negative     Amphetamine Screen, Ur Negative     THC Negative     Phencyclidine Negative     Creatinine, Random Ur <10.0 (L) 23.0 - 375.0 mg/dL    Toxicology Information SEE COMMENT    POCT glucose    Collection Time: 04/10/20  7:27 AM   Result Value Ref Range    POC Glucose 67 (L) 70 - 110     
35
75

## 2020-01-08 NOTE — H&P ADULT - NSHPSOCIALHISTORY_GEN_ALL_CORE
Terell Davis [id number]   51 year old male 1968     Mindy Mendez MD       Ascites. Patient presents for therapeutic paracentesis.  Informed consent obtained.  Time out performed. Patient tolerated the procedure. No immediate complications.  Please see separate Radiology report.                                                             
Patient is single and lives alone   Former smoker , quit 20 years ago   No alcohol/ drug abuse

## 2020-02-26 LAB
ALBUMIN SERPL ELPH-MCNC: 4.5 G/DL
ALP BLD-CCNC: 64 U/L
ALT SERPL-CCNC: 12 U/L
ANION GAP SERPL CALC-SCNC: 15 MMOL/L
AST SERPL-CCNC: 13 U/L
BILIRUB DIRECT SERPL-MCNC: 0.1 MG/DL
BILIRUB INDIRECT SERPL-MCNC: 0.3 MG/DL
BILIRUB SERPL-MCNC: 0.4 MG/DL
BUN SERPL-MCNC: 23 MG/DL
CALCIUM SERPL-MCNC: 9.1 MG/DL
CHLORIDE SERPL-SCNC: 103 MMOL/L
CHOLEST SERPL-MCNC: 148 MG/DL
CHOLEST/HDLC SERPL: 4.5 RATIO
CO2 SERPL-SCNC: 22 MMOL/L
CREAT SERPL-MCNC: 1.36 MG/DL
CREAT SPEC-SCNC: 143 MG/DL
ESTIMATED AVERAGE GLUCOSE: 160 MG/DL
FRUCTOSAMINE SERPL-MCNC: 292 UMOL/L
GLUCOSE BS SERPL-MCNC: 173 MG/DL
GLUCOSE SERPL-MCNC: 175 MG/DL
HBA1C MFR BLD HPLC: 7.2 %
HDLC SERPL-MCNC: 33 MG/DL
LDLC SERPL CALC-MCNC: 76 MG/DL
MICROALBUMIN 24H UR DL<=1MG/L-MCNC: 5.2 MG/DL
MICROALBUMIN/CREAT 24H UR-RTO: 36 MG/G
POTASSIUM SERPL-SCNC: 4.6 MMOL/L
PROT SERPL-MCNC: 6.8 G/DL
SODIUM SERPL-SCNC: 140 MMOL/L
TRIGL SERPL-MCNC: 195 MG/DL

## 2020-03-02 ENCOUNTER — APPOINTMENT (OUTPATIENT)
Dept: ENDOCRINOLOGY | Facility: CLINIC | Age: 80
End: 2020-03-02
Payer: MEDICARE

## 2020-03-02 VITALS
HEART RATE: 69 BPM | BODY MASS INDEX: 27.58 KG/M2 | TEMPERATURE: 97.4 F | RESPIRATION RATE: 16 BRPM | WEIGHT: 182 LBS | DIASTOLIC BLOOD PRESSURE: 74 MMHG | OXYGEN SATURATION: 94 % | HEIGHT: 68 IN | SYSTOLIC BLOOD PRESSURE: 120 MMHG

## 2020-03-02 LAB — GLUCOSE BLDC GLUCOMTR-MCNC: 167

## 2020-03-02 PROCEDURE — 82962 GLUCOSE BLOOD TEST: CPT

## 2020-03-02 PROCEDURE — 99214 OFFICE O/P EST MOD 30 MIN: CPT | Mod: 25

## 2020-03-02 NOTE — ASSESSMENT
[FreeTextEntry1] : The diabetic control has deteriorated\par Advised to follow the diet and walk regularly\par Advised to see Nutritionist\par he does not want to add another medication\par Told to have antithyroid antibodies\par Will do a thyroid testing\par Will order an US thyroid

## 2020-03-02 NOTE — HISTORY OF PRESENT ILLNESS
[FreeTextEntry1] : Patient feels well , he is asymptomatic. His weight has not changed. He is exercising regularly and following the diet. He does not do blood glucose at home. The FBS in the laboratory was 175 mg/dl and the HbA1c was 7.2 %, increased since last visit. The renal function is within normal limits, the microalbumin in the urine was normal. The lipid panel was within normal limits. He denies low blood glucose during the night. He denies chest pain, or SOB. Denies numbness, tingling or burning sensation on his extremities. Taking his medications regularly. He has not seen the Ophthalmologist recently. He has not seen Podiatrist recently. He has not seen the Cardiologist recently. Patient has a history of antithyroid antibodies.\par

## 2020-03-02 NOTE — PHYSICAL EXAM
[Alert] : alert [Normal Sclera/Conjunctiva] : normal sclera/conjunctiva [PERRL] : pupils equal, round and reactive to light [No Acute Distress] : no acute distress [No Neck Mass] : no neck mass was observed [Normal Outer Ear/Nose] : the ears and nose were normal in appearance [Normal Hearing] : hearing was normal [No Respiratory Distress] : no respiratory distress [Thyroid Not Enlarged] : the thyroid was not enlarged [Normal Rate and Effort] : normal respiratory rhythm and effort [Normal Rate] : heart rate was normal  [Normal PMI] : the apical impulse was normal

## 2020-07-16 ENCOUNTER — LABORATORY RESULT (OUTPATIENT)
Age: 80
End: 2020-07-16

## 2020-07-21 ENCOUNTER — APPOINTMENT (OUTPATIENT)
Dept: ENDOCRINOLOGY | Facility: CLINIC | Age: 80
End: 2020-07-21
Payer: MEDICARE

## 2020-07-21 VITALS
DIASTOLIC BLOOD PRESSURE: 93 MMHG | RESPIRATION RATE: 16 BRPM | WEIGHT: 168 LBS | SYSTOLIC BLOOD PRESSURE: 156 MMHG | TEMPERATURE: 97.2 F | HEART RATE: 81 BPM | BODY MASS INDEX: 25.46 KG/M2 | HEIGHT: 68 IN | OXYGEN SATURATION: 97 %

## 2020-07-21 DIAGNOSIS — E06.9 THYROIDITIS, UNSPECIFIED: ICD-10-CM

## 2020-07-21 DIAGNOSIS — E11.9 TYPE 2 DIABETES MELLITUS W/OUT COMPLICATIONS: ICD-10-CM

## 2020-07-21 DIAGNOSIS — E11.21 TYPE 2 DIABETES MELLITUS WITH DIABETIC NEPHROPATHY: ICD-10-CM

## 2020-07-21 LAB — GLUCOSE BLDC GLUCOMTR-MCNC: 190

## 2020-07-21 PROCEDURE — 99214 OFFICE O/P EST MOD 30 MIN: CPT | Mod: 25

## 2020-07-21 PROCEDURE — 82962 GLUCOSE BLOOD TEST: CPT

## 2020-07-21 NOTE — ASSESSMENT
[FreeTextEntry1] : Patient is doing well, following the diet, losing weight\par The diabetes is better controlled\par The creatinine remains elevated\par Referred to see a Nephrologist\par Will order an US thyroid\par Will continue the same treatment

## 2020-07-21 NOTE — HISTORY OF PRESENT ILLNESS
[FreeTextEntry1] : Patient feels well , he is asymptomatic. His weight has decreased he is following the diet. He is exercising regularly and following the diet. His blood glucose at home are not done. The FBS in the laboratory was 175 mg/dl and the HbA1c was 6.7 %, improved since last visit. The renal function is within normal limits, the microalbumin in the urine is abnormal The lipid panel revealed high Tg. He denies low blood glucose during the night. He denies chest pain, or SOB. Denies numbness, tingling or burning sensation on his extremities. Taking his medications regularly. He has not seen the Ophthalmologist recently. He has not seen Podiatrist recently. He has seen the Cardiologist recently.\par

## 2020-07-21 NOTE — DATA REVIEWED
[FreeTextEntry1] : The FBS is elevated but the HbA1c is improved. The creatinine remains elevated. The thyroid tests were normal.

## 2021-02-13 ENCOUNTER — EMERGENCY (EMERGENCY)
Facility: HOSPITAL | Age: 81
LOS: 1 days | Discharge: HOME | End: 2021-02-13
Attending: EMERGENCY MEDICINE
Payer: MEDICARE

## 2021-02-13 VITALS
SYSTOLIC BLOOD PRESSURE: 193 MMHG | RESPIRATION RATE: 16 BRPM | DIASTOLIC BLOOD PRESSURE: 93 MMHG | TEMPERATURE: 97 F | HEART RATE: 70 BPM | WEIGHT: 179.9 LBS | HEIGHT: 67 IN | OXYGEN SATURATION: 99 %

## 2021-02-13 LAB
ALBUMIN SERPL ELPH-MCNC: 3.4 G/DL — LOW (ref 3.5–5)
ALP SERPL-CCNC: 58 U/L — SIGNIFICANT CHANGE UP (ref 40–120)
ALT FLD-CCNC: 23 U/L DA — SIGNIFICANT CHANGE UP (ref 10–60)
ANION GAP SERPL CALC-SCNC: 9 MMOL/L — SIGNIFICANT CHANGE UP (ref 5–17)
APPEARANCE UR: ABNORMAL
AST SERPL-CCNC: 41 U/L — HIGH (ref 10–40)
BACTERIA # UR AUTO: ABNORMAL /HPF
BASOPHILS # BLD AUTO: 0.02 K/UL — SIGNIFICANT CHANGE UP (ref 0–0.2)
BASOPHILS NFR BLD AUTO: 0.2 % — SIGNIFICANT CHANGE UP (ref 0–2)
BILIRUB SERPL-MCNC: 0.8 MG/DL — SIGNIFICANT CHANGE UP (ref 0.2–1.2)
BILIRUB UR-MCNC: NEGATIVE — SIGNIFICANT CHANGE UP
BUN SERPL-MCNC: 19 MG/DL — HIGH (ref 7–18)
CALCIUM SERPL-MCNC: 7.8 MG/DL — LOW (ref 8.4–10.5)
CHLORIDE SERPL-SCNC: 106 MMOL/L — SIGNIFICANT CHANGE UP (ref 96–108)
CO2 SERPL-SCNC: 21 MMOL/L — LOW (ref 22–31)
COLOR SPEC: YELLOW — SIGNIFICANT CHANGE UP
CREAT SERPL-MCNC: 1.51 MG/DL — HIGH (ref 0.5–1.3)
DIFF PNL FLD: ABNORMAL
EOSINOPHIL # BLD AUTO: 0.06 K/UL — SIGNIFICANT CHANGE UP (ref 0–0.5)
EOSINOPHIL NFR BLD AUTO: 0.6 % — SIGNIFICANT CHANGE UP (ref 0–6)
EPI CELLS # UR: ABNORMAL /HPF
GLUCOSE SERPL-MCNC: 166 MG/DL — HIGH (ref 70–99)
GLUCOSE UR QL: NEGATIVE — SIGNIFICANT CHANGE UP
HCT VFR BLD CALC: 34.9 % — LOW (ref 39–50)
HGB BLD-MCNC: 12 G/DL — LOW (ref 13–17)
IMM GRANULOCYTES NFR BLD AUTO: 0.5 % — SIGNIFICANT CHANGE UP (ref 0–1.5)
KETONES UR-MCNC: NEGATIVE — SIGNIFICANT CHANGE UP
LACTATE SERPL-SCNC: 2.9 MMOL/L — HIGH (ref 0.7–2)
LEUKOCYTE ESTERASE UR-ACNC: ABNORMAL
LYMPHOCYTES # BLD AUTO: 1.18 K/UL — SIGNIFICANT CHANGE UP (ref 1–3.3)
LYMPHOCYTES # BLD AUTO: 12.2 % — LOW (ref 13–44)
MCHC RBC-ENTMCNC: 30.1 PG — SIGNIFICANT CHANGE UP (ref 27–34)
MCHC RBC-ENTMCNC: 34.4 GM/DL — SIGNIFICANT CHANGE UP (ref 32–36)
MCV RBC AUTO: 87.5 FL — SIGNIFICANT CHANGE UP (ref 80–100)
MONOCYTES # BLD AUTO: 0.37 K/UL — SIGNIFICANT CHANGE UP (ref 0–0.9)
MONOCYTES NFR BLD AUTO: 3.8 % — SIGNIFICANT CHANGE UP (ref 2–14)
NEUTROPHILS # BLD AUTO: 7.98 K/UL — HIGH (ref 1.8–7.4)
NEUTROPHILS NFR BLD AUTO: 82.7 % — HIGH (ref 43–77)
NITRITE UR-MCNC: NEGATIVE — SIGNIFICANT CHANGE UP
NRBC # BLD: 0 /100 WBCS — SIGNIFICANT CHANGE UP (ref 0–0)
PH UR: 5 — SIGNIFICANT CHANGE UP (ref 5–8)
PLATELET # BLD AUTO: 165 K/UL — SIGNIFICANT CHANGE UP (ref 150–400)
POTASSIUM SERPL-MCNC: 5.3 MMOL/L — SIGNIFICANT CHANGE UP (ref 3.5–5.3)
POTASSIUM SERPL-SCNC: 5.3 MMOL/L — SIGNIFICANT CHANGE UP (ref 3.5–5.3)
PROT SERPL-MCNC: 7.3 G/DL — SIGNIFICANT CHANGE UP (ref 6–8.3)
PROT UR-MCNC: 15
RBC # BLD: 3.99 M/UL — LOW (ref 4.2–5.8)
RBC # FLD: 13.9 % — SIGNIFICANT CHANGE UP (ref 10.3–14.5)
RBC CASTS # UR COMP ASSIST: ABNORMAL /HPF (ref 0–2)
SODIUM SERPL-SCNC: 136 MMOL/L — SIGNIFICANT CHANGE UP (ref 135–145)
SP GR SPEC: 1.02 — SIGNIFICANT CHANGE UP (ref 1.01–1.02)
TROPONIN I SERPL-MCNC: <0.015 NG/ML — SIGNIFICANT CHANGE UP (ref 0–0.04)
UROBILINOGEN FLD QL: NEGATIVE — SIGNIFICANT CHANGE UP
WBC # BLD: 9.66 K/UL — SIGNIFICANT CHANGE UP (ref 3.8–10.5)
WBC # FLD AUTO: 9.66 K/UL — SIGNIFICANT CHANGE UP (ref 3.8–10.5)
WBC UR QL: >50 /HPF (ref 0–5)

## 2021-02-13 PROCEDURE — 70450 CT HEAD/BRAIN W/O DYE: CPT | Mod: 26

## 2021-02-13 PROCEDURE — 99285 EMERGENCY DEPT VISIT HI MDM: CPT

## 2021-02-13 PROCEDURE — 71045 X-RAY EXAM CHEST 1 VIEW: CPT | Mod: 26

## 2021-02-13 RX ORDER — SODIUM CHLORIDE 9 MG/ML
1000 INJECTION INTRAMUSCULAR; INTRAVENOUS; SUBCUTANEOUS ONCE
Refills: 0 | Status: COMPLETED | OUTPATIENT
Start: 2021-02-13 | End: 2021-02-13

## 2021-02-13 RX ORDER — CEFTRIAXONE 500 MG/1
1000 INJECTION, POWDER, FOR SOLUTION INTRAMUSCULAR; INTRAVENOUS ONCE
Refills: 0 | Status: COMPLETED | OUTPATIENT
Start: 2021-02-13 | End: 2021-02-13

## 2021-02-13 RX ADMIN — SODIUM CHLORIDE 1000 MILLILITER(S): 9 INJECTION INTRAMUSCULAR; INTRAVENOUS; SUBCUTANEOUS at 21:39

## 2021-02-13 NOTE — ED PROVIDER NOTE - OBJECTIVE STATEMENT
80 year old male with PMHx of hypertension, anemia, diabetes, HERD, DVT, hypercholesterolemia, hypomagnesemia and no significant PSHx presents to the ED with complaints of vertigo today. Patient reports that he was returning home from a visit to the store earlier today when he suddenly became vertiginous. Patient reports that he then sat down on a stoop and subsequently had an episode of emesis and urinated on himself. Patient is now complaining of some shakiness. Patient notes that his vertigo has since resolve. Patient otherwise denies any fevers, cough, trouble breathing, phlegm production, chest pain, difficulty speaking or swallowing, chills, body aches, and all other acute complaints. NKDA, 80 year old male with PMHx of hypertension, anemia, diabetes, HERD, DVT, hypercholesterolemia, hypomagnesemia and no significant PSHx presents to the ED with complaints of vertigo today. Patient reports that he was returning home from a visit to the store earlier today when he suddenly became vertiginous. Patient reports that he then sat down on a stoop and subsequently had an episode of emesis and urinated on himself. Patient is now complaining of some shakiness. Patient notes that his vertigo has since resolve. Patient otherwise denies any fevers, cough, trouble breathing, phlegm production, chest pain, difficulty speaking or swallowing, chills, body aches, and all other acute complaints. Patient is noted to live alone and take care of himself. NKDA.

## 2021-02-13 NOTE — ED PROVIDER NOTE - CHPI ED SYMPTOMS NEG
no chills, cough, trouble breathing, difficulty swallowing or speaking, phlegm production, chest pain, body aches/no fever

## 2021-02-13 NOTE — ED PROVIDER NOTE - PATIENT PORTAL LINK FT
You can access the FollowMyHealth Patient Portal offered by [facility] by registering at the following website: [email or web address]. By joining [employer]’s FollowMyHealth portal, you will also be able to view your health information using other applications (apps) compatible with our system.

## 2021-02-13 NOTE — ED PROVIDER NOTE - CARDIAC, MLM
ED HPI GENERAL MEDICAL PROBLEM





- General


Chief Complaint: Gastrointestinal Problem


Stated Complaint: ABDOMINAL PAIN


Time Seen by Provider: 07/03/20 18:25


Source of Information: Reports: Patient


History Limitations: Reports: No Limitations





- History of Present Illness


INITIAL COMMENTS - FREE TEXT/NARRATIVE: 





61-year-old male presents to the emergency department with abdominal discomfort.

 He has a recent history of constipation.  He is tried MiraLAX colonoscopy prep 

as well as a home enema without good results.  He notes no vomiting.  He is able

to eat.


  ** Lower Abdomen


Pain Score (Numeric/FACES): 2





- Related Data


                                    Allergies











Allergy/AdvReac Type Severity Reaction Status Date / Time


 


No Known Allergies Allergy   Verified 07/03/20 18:47











Home Meds: 


                                    Home Meds





Aspirin [Adult Aspirin] 81 mg PO DAILY 02/14/19 [History]


Multivitamin [Multi-Vitamin Daily] 1 tab PO DAILY 02/14/19 [History]


Saw Palmetto 160 mg PO DAILY 06/04/20 [History]


Tamsulosin [Tamsulosin 24 Hr] 0.4 mg PO DAILY 07/03/20 [History]











Past Medical History


HEENT History: Reports: Cataract, Retinal Detachment, Other (See Below)


Other HEENT History: partial detached retina, ringing in ears


Gastrointestinal History: Reports: Chronic Constipation, Colon Polyp, 

Hemorrhoids, Other (See Below)


Other Gastrointestinal History: hx of Barretts


Genitourinary History: Reports: Other (See Below)


Other Genitourinary History: Morales catheter for 1 month


Musculoskeletal History: Reports: Arthritis, Back Pain, Chronic, Fracture, Other

 (See Below)


Other Musculoskeletal History: right foot fractured


Neurological History: Reports: Concussion





- Infectious Disease History


Infectious Disease History: Reports: Chicken Pox





- Past Surgical History


HEENT Surgical History: Reports: Cataract Surgery


GI Surgical History: Reports: Colonoscopy, EGD


Musculoskeletal Surgical History: Reports: Other (See Below)


Other Musculoskeletal Surgeries/Procedures:: right foot surgery





Social & Family History





- Caffeine Use


Caffeine Use: Reports: Coffee





ED ROS GENERAL





- Review of Systems


Review Of Systems: See Below


Constitutional: Reports: No Symptoms.  Denies: Fever, Chills, Weakness


GI/Abdominal: Reports: Abdominal Pain, Constipation





ED EXAM, GI/ABD





- Physical Exam


Exam: See Below


Exam Limited By: No Limitations


GI/Abdominal Exam: Normal Bowel Sounds, Soft, Non-Tender, Other (General rectal 

exam reveals no stool in the rectal vault.)





Course





- Vital Signs


Text/Narrative:: 





This patient has a large urinary bladder and a history of significant retention.

  He has a catheter that has been in place for a month.  He presented here with 

complaints of constipation.  He has a little stool however he has quite a bit of

 colonic gas.  The patient will continue to use stool softeners however he 

should see a urologist in the near future.  The patient will follow-up with his 

primary care provider.  He will return here as needed.


Last Recorded V/S: 


                                Last Vital Signs











Temp  36.0 C L  07/03/20 18:54


 


Pulse  83   07/03/20 18:54


 


Resp  16   07/03/20 18:54


 


BP  158/97 H  07/03/20 18:54


 


Pulse Ox  96   07/03/20 18:54














- Orders/Labs/Meds


Meds: 


Medications














Discontinued Medications














Generic Name Dose Route Start Last Admin





  Trade Name Freq  PRN Reason Stop Dose Admin


 


Sodium Biphosphate/Sodium Phosphate  133 ml  07/03/20 20:08 





  Fleet Enema  RECTAL  07/03/20 20:09 





  ONETIME ONE  














Departure





- Departure


Time of Disposition: 21:51


Disposition: DC/Tfer W/I Hosp To Swing 61


Condition: Good


Clinical Impression: 


 Urinary retention





Constipation


Qualifiers:


 Constipation type: unspecified constipation type Qualified Code(s): K59.00 - 

Constipation, unspecified








- Discharge Information


*PRESCRIPTION DRUG MONITORING PROGRAM REVIEWED*: No


*COPY OF PRESCRIPTION DRUG MONITORING REPORT IN PATIENT KECIA: No


Referrals: 


Enrike Ingram NP [Primary Care Provider] - 


Forms:  ED Department Discharge


Additional Instructions: 


This patient has mild constipation however he has significant urinary retention.

 CT scan of the abdomen reveals a large bladder.  He had a little stool in the 

ascending colon otherwise significant gas the colon.  The patient had an enema 

and had a little watery result.  He is advised to follow-up with his primary 

provider regarding his urinary retention.  A urology consult is recommended.  

Patient return here as needed.





Sepsis Event Note (ED)





- Focused Exam


Vital Signs: 


                                   Vital Signs











  Temp Pulse Resp BP Pulse Ox


 


 07/03/20 18:54  36.0 C L  83  16  158/97 H  96


 


 07/03/20 18:13  36.0 C L  83  16  158/97 H  96 Normal rate, regular rhythm.  Heart sounds S1, S2.  No murmurs, rubs or gallops.

## 2021-02-13 NOTE — ED PROVIDER NOTE - NEUROLOGICAL, MLM
Alert and oriented, no focal deficits, no motor or sensory deficits. No vertigo elicited on exam. Patient mildly tremulous.

## 2021-02-14 VITALS
TEMPERATURE: 98 F | RESPIRATION RATE: 18 BRPM | DIASTOLIC BLOOD PRESSURE: 97 MMHG | OXYGEN SATURATION: 99 % | HEART RATE: 79 BPM | SYSTOLIC BLOOD PRESSURE: 177 MMHG

## 2021-02-14 LAB
ANION GAP SERPL CALC-SCNC: 11 MMOL/L — SIGNIFICANT CHANGE UP (ref 5–17)
BUN SERPL-MCNC: 18 MG/DL — SIGNIFICANT CHANGE UP (ref 7–18)
CALCIUM SERPL-MCNC: 7.8 MG/DL — LOW (ref 8.4–10.5)
CHLORIDE SERPL-SCNC: 105 MMOL/L — SIGNIFICANT CHANGE UP (ref 96–108)
CO2 SERPL-SCNC: 23 MMOL/L — SIGNIFICANT CHANGE UP (ref 22–31)
CREAT SERPL-MCNC: 1.44 MG/DL — HIGH (ref 0.5–1.3)
GLUCOSE SERPL-MCNC: 143 MG/DL — HIGH (ref 70–99)
LACTATE SERPL-SCNC: 1.2 MMOL/L — SIGNIFICANT CHANGE UP (ref 0.7–2)
POTASSIUM SERPL-MCNC: 3.8 MMOL/L — SIGNIFICANT CHANGE UP (ref 3.5–5.3)
POTASSIUM SERPL-SCNC: 3.8 MMOL/L — SIGNIFICANT CHANGE UP (ref 3.5–5.3)
SARS-COV-2 RNA SPEC QL NAA+PROBE: SIGNIFICANT CHANGE UP
SODIUM SERPL-SCNC: 139 MMOL/L — SIGNIFICANT CHANGE UP (ref 135–145)

## 2021-02-14 PROCEDURE — 99284 EMERGENCY DEPT VISIT MOD MDM: CPT | Mod: 25

## 2021-02-14 PROCEDURE — 82962 GLUCOSE BLOOD TEST: CPT

## 2021-02-14 PROCEDURE — 87040 BLOOD CULTURE FOR BACTERIA: CPT

## 2021-02-14 PROCEDURE — 80053 COMPREHEN METABOLIC PANEL: CPT

## 2021-02-14 PROCEDURE — 81001 URINALYSIS AUTO W/SCOPE: CPT

## 2021-02-14 PROCEDURE — 85025 COMPLETE CBC W/AUTO DIFF WBC: CPT

## 2021-02-14 PROCEDURE — 36415 COLL VENOUS BLD VENIPUNCTURE: CPT

## 2021-02-14 PROCEDURE — 96374 THER/PROPH/DIAG INJ IV PUSH: CPT

## 2021-02-14 PROCEDURE — 71045 X-RAY EXAM CHEST 1 VIEW: CPT

## 2021-02-14 PROCEDURE — 70450 CT HEAD/BRAIN W/O DYE: CPT

## 2021-02-14 PROCEDURE — 93005 ELECTROCARDIOGRAM TRACING: CPT

## 2021-02-14 PROCEDURE — 83605 ASSAY OF LACTIC ACID: CPT

## 2021-02-14 PROCEDURE — 87635 SARS-COV-2 COVID-19 AMP PRB: CPT

## 2021-02-14 PROCEDURE — 80048 BASIC METABOLIC PNL TOTAL CA: CPT

## 2021-02-14 PROCEDURE — 84484 ASSAY OF TROPONIN QUANT: CPT

## 2021-02-14 PROCEDURE — U0005: CPT

## 2021-02-14 RX ORDER — CEFPODOXIME PROXETIL 100 MG
1 TABLET ORAL
Qty: 14 | Refills: 0
Start: 2021-02-14 | End: 2021-02-20

## 2021-02-14 RX ORDER — APIXABAN 2.5 MG/1
5 TABLET, FILM COATED ORAL ONCE
Refills: 0 | Status: DISCONTINUED | OUTPATIENT
Start: 2021-02-14 | End: 2021-02-17

## 2021-02-14 RX ADMIN — SODIUM CHLORIDE 1000 MILLILITER(S): 9 INJECTION INTRAMUSCULAR; INTRAVENOUS; SUBCUTANEOUS at 01:16

## 2021-02-14 RX ADMIN — CEFTRIAXONE 100 MILLIGRAM(S): 500 INJECTION, POWDER, FOR SOLUTION INTRAMUSCULAR; INTRAVENOUS at 01:16

## 2021-02-19 LAB
CULTURE RESULTS: SIGNIFICANT CHANGE UP
CULTURE RESULTS: SIGNIFICANT CHANGE UP
SPECIMEN SOURCE: SIGNIFICANT CHANGE UP
SPECIMEN SOURCE: SIGNIFICANT CHANGE UP

## 2021-03-10 ENCOUNTER — INPATIENT (INPATIENT)
Facility: HOSPITAL | Age: 81
LOS: 6 days | Discharge: HOME | DRG: 312 | End: 2021-03-17
Attending: INTERNAL MEDICINE | Admitting: INTERNAL MEDICINE
Payer: MEDICARE

## 2021-03-10 VITALS
HEIGHT: 67 IN | SYSTOLIC BLOOD PRESSURE: 171 MMHG | DIASTOLIC BLOOD PRESSURE: 109 MMHG | TEMPERATURE: 98 F | OXYGEN SATURATION: 100 % | RESPIRATION RATE: 22 BRPM | HEART RATE: 65 BPM

## 2021-03-10 LAB
ALBUMIN SERPL ELPH-MCNC: 3.4 G/DL — LOW (ref 3.5–5)
ALP SERPL-CCNC: 48 U/L — SIGNIFICANT CHANGE UP (ref 40–120)
ALT FLD-CCNC: 16 U/L DA — SIGNIFICANT CHANGE UP (ref 10–60)
ANION GAP SERPL CALC-SCNC: 10 MMOL/L — SIGNIFICANT CHANGE UP (ref 5–17)
APTT BLD: 32 SEC — SIGNIFICANT CHANGE UP (ref 27.5–35.5)
AST SERPL-CCNC: 10 U/L — SIGNIFICANT CHANGE UP (ref 10–40)
BASOPHILS # BLD AUTO: 0.02 K/UL — SIGNIFICANT CHANGE UP (ref 0–0.2)
BASOPHILS NFR BLD AUTO: 0.3 % — SIGNIFICANT CHANGE UP (ref 0–2)
BILIRUB SERPL-MCNC: 0.7 MG/DL — SIGNIFICANT CHANGE UP (ref 0.2–1.2)
BUN SERPL-MCNC: 25 MG/DL — HIGH (ref 7–18)
CALCIUM SERPL-MCNC: 6.9 MG/DL — LOW (ref 8.4–10.5)
CHLORIDE SERPL-SCNC: 108 MMOL/L — SIGNIFICANT CHANGE UP (ref 96–108)
CO2 SERPL-SCNC: 21 MMOL/L — LOW (ref 22–31)
CREAT SERPL-MCNC: 1.55 MG/DL — HIGH (ref 0.5–1.3)
EOSINOPHIL # BLD AUTO: 0.08 K/UL — SIGNIFICANT CHANGE UP (ref 0–0.5)
EOSINOPHIL NFR BLD AUTO: 1.2 % — SIGNIFICANT CHANGE UP (ref 0–6)
GLUCOSE SERPL-MCNC: 194 MG/DL — HIGH (ref 70–99)
HCT VFR BLD CALC: 31.8 % — LOW (ref 39–50)
HGB BLD-MCNC: 11.1 G/DL — LOW (ref 13–17)
IMM GRANULOCYTES NFR BLD AUTO: 0.6 % — SIGNIFICANT CHANGE UP (ref 0–1.5)
INR BLD: 1.75 RATIO — HIGH (ref 0.88–1.16)
LYMPHOCYTES # BLD AUTO: 1.13 K/UL — SIGNIFICANT CHANGE UP (ref 1–3.3)
LYMPHOCYTES # BLD AUTO: 17.1 % — SIGNIFICANT CHANGE UP (ref 13–44)
MCHC RBC-ENTMCNC: 29.9 PG — SIGNIFICANT CHANGE UP (ref 27–34)
MCHC RBC-ENTMCNC: 34.9 GM/DL — SIGNIFICANT CHANGE UP (ref 32–36)
MCV RBC AUTO: 85.7 FL — SIGNIFICANT CHANGE UP (ref 80–100)
MONOCYTES # BLD AUTO: 0.22 K/UL — SIGNIFICANT CHANGE UP (ref 0–0.9)
MONOCYTES NFR BLD AUTO: 3.3 % — SIGNIFICANT CHANGE UP (ref 2–14)
NEUTROPHILS # BLD AUTO: 5.11 K/UL — SIGNIFICANT CHANGE UP (ref 1.8–7.4)
NEUTROPHILS NFR BLD AUTO: 77.5 % — HIGH (ref 43–77)
NRBC # BLD: 0 /100 WBCS — SIGNIFICANT CHANGE UP (ref 0–0)
PLATELET # BLD AUTO: 118 K/UL — LOW (ref 150–400)
POTASSIUM SERPL-MCNC: 3.6 MMOL/L — SIGNIFICANT CHANGE UP (ref 3.5–5.3)
POTASSIUM SERPL-SCNC: 3.6 MMOL/L — SIGNIFICANT CHANGE UP (ref 3.5–5.3)
PROT SERPL-MCNC: 6.8 G/DL — SIGNIFICANT CHANGE UP (ref 6–8.3)
PROTHROM AB SERPL-ACNC: 20.3 SEC — HIGH (ref 10.6–13.6)
RBC # BLD: 3.71 M/UL — LOW (ref 4.2–5.8)
RBC # FLD: 13.8 % — SIGNIFICANT CHANGE UP (ref 10.3–14.5)
SODIUM SERPL-SCNC: 139 MMOL/L — SIGNIFICANT CHANGE UP (ref 135–145)
TROPONIN I SERPL-MCNC: <0.015 NG/ML — SIGNIFICANT CHANGE UP (ref 0–0.04)
WBC # BLD: 6.6 K/UL — SIGNIFICANT CHANGE UP (ref 3.8–10.5)
WBC # FLD AUTO: 6.6 K/UL — SIGNIFICANT CHANGE UP (ref 3.8–10.5)

## 2021-03-10 PROCEDURE — 71045 X-RAY EXAM CHEST 1 VIEW: CPT | Mod: 26

## 2021-03-10 RX ORDER — METOCLOPRAMIDE HCL 10 MG
10 TABLET ORAL ONCE
Refills: 0 | Status: COMPLETED | OUTPATIENT
Start: 2021-03-10 | End: 2021-03-10

## 2021-03-10 RX ORDER — ONDANSETRON 8 MG/1
4 TABLET, FILM COATED ORAL ONCE
Refills: 0 | Status: COMPLETED | OUTPATIENT
Start: 2021-03-10 | End: 2021-03-10

## 2021-03-10 RX ORDER — DIPHENHYDRAMINE HCL 50 MG
25 CAPSULE ORAL ONCE
Refills: 0 | Status: COMPLETED | OUTPATIENT
Start: 2021-03-10 | End: 2021-03-10

## 2021-03-10 RX ORDER — SODIUM CHLORIDE 9 MG/ML
1000 INJECTION INTRAMUSCULAR; INTRAVENOUS; SUBCUTANEOUS ONCE
Refills: 0 | Status: COMPLETED | OUTPATIENT
Start: 2021-03-10 | End: 2021-03-10

## 2021-03-10 RX ADMIN — ONDANSETRON 4 MILLIGRAM(S): 8 TABLET, FILM COATED ORAL at 22:04

## 2021-03-10 RX ADMIN — SODIUM CHLORIDE 1000 MILLILITER(S): 9 INJECTION INTRAMUSCULAR; INTRAVENOUS; SUBCUTANEOUS at 22:03

## 2021-03-10 RX ADMIN — Medication 25 MILLIGRAM(S): at 22:04

## 2021-03-10 RX ADMIN — Medication 104 MILLIGRAM(S): at 22:04

## 2021-03-10 NOTE — ED PROVIDER NOTE - OBJECTIVE STATEMENT
81 y/o M patient with no significant PMHx and no significant PSHx presents to the ED with c/o dizziness that is described as a room spinning with nausea. Patient states this occurred while pt was shopping at the [employer]. Patient denies any syncope, LOC, chest pain, shortness of breath, abd pain, or any other complains.

## 2021-03-10 NOTE — ED ADULT TRIAGE NOTE - INTERNATIONAL TRAVEL
Date & Time: 12/7/2023, 8:17 AM  Patient: Bryan Hammonds  Encounter Provider(s):    Roland Macdonald PA-C       To Whom It May Concern:    Yogesh Romero was seen and treated in our department on 12/7/2023. She can return to work.     If you have any questions or concerns, please do not hesitate to call.        _____________________________  Physician/APC Signature No

## 2021-03-10 NOTE — ED PROVIDER NOTE - CLINICAL SUMMARY MEDICAL DECISION MAKING FREE TEXT BOX
Patient presenting with dizziness, room spinning sensation. neuro grossly intact but unable to ambulate 2/2 dizziness. will obtain lab, ct head. assess acs, lyte abnormality, stroke. ed obs and reassess

## 2021-03-10 NOTE — ED PROVIDER NOTE - PROGRESS NOTE DETAILS
Patient ct negative, endorses feeling better but unable to ambulate after sitting up in bed. lives alone. will admit

## 2021-03-10 NOTE — ED ADULT NURSE NOTE - NSIMPLEMENTINTERV_GEN_ALL_ED
Implemented All Universal Safety Interventions:  [place] to call system. Call bell, personal items and telephone within reach. Instruct patient to call for assistance. Room bathroom lighting operational. Non-slip footwear when patient is off stretcher. Physically safe environment: no spills, clutter or unnecessary equipment. Stretcher in lowest position, wheels locked, appropriate side rails in place.

## 2021-03-11 DIAGNOSIS — R42 DIZZINESS AND GIDDINESS: ICD-10-CM

## 2021-03-11 DIAGNOSIS — E11.9 TYPE 2 DIABETES MELLITUS WITHOUT COMPLICATIONS: ICD-10-CM

## 2021-03-11 DIAGNOSIS — K21.9 GASTRO-ESOPHAGEAL REFLUX DISEASE WITHOUT ESOPHAGITIS: ICD-10-CM

## 2021-03-11 DIAGNOSIS — I10 ESSENTIAL (PRIMARY) HYPERTENSION: ICD-10-CM

## 2021-03-11 DIAGNOSIS — Z29.9 ENCOUNTER FOR PROPHYLACTIC MEASURES, UNSPECIFIED: ICD-10-CM

## 2021-03-11 DIAGNOSIS — N17.9 ACUTE KIDNEY FAILURE, UNSPECIFIED: ICD-10-CM

## 2021-03-11 DIAGNOSIS — I82.409 ACUTE EMBOLISM AND THROMBOSIS OF UNSPECIFIED DEEP VEINS OF UNSPECIFIED LOWER EXTREMITY: ICD-10-CM

## 2021-03-11 LAB
A1C WITH ESTIMATED AVERAGE GLUCOSE RESULT: 6.3 % — HIGH (ref 4–5.6)
ANION GAP SERPL CALC-SCNC: 9 MMOL/L — SIGNIFICANT CHANGE UP (ref 5–17)
BUN SERPL-MCNC: 24 MG/DL — HIGH (ref 7–18)
CALCIUM SERPL-MCNC: 7.4 MG/DL — LOW (ref 8.4–10.5)
CHLORIDE SERPL-SCNC: 108 MMOL/L — SIGNIFICANT CHANGE UP (ref 96–108)
CHLORIDE UR-SCNC: 172 MMOL/L — SIGNIFICANT CHANGE UP
CHOLEST SERPL-MCNC: 135 MG/DL — SIGNIFICANT CHANGE UP
CO2 SERPL-SCNC: 24 MMOL/L — SIGNIFICANT CHANGE UP (ref 22–31)
CREAT ?TM UR-MCNC: 113 MG/DL — SIGNIFICANT CHANGE UP
CREAT SERPL-MCNC: 1.59 MG/DL — HIGH (ref 0.5–1.3)
ESTIMATED AVERAGE GLUCOSE: 134 MG/DL — HIGH (ref 68–114)
FOLATE SERPL-MCNC: 7.9 NG/ML — SIGNIFICANT CHANGE UP
GLUCOSE BLDC GLUCOMTR-MCNC: 128 MG/DL — HIGH (ref 70–99)
GLUCOSE BLDC GLUCOMTR-MCNC: 134 MG/DL — HIGH (ref 70–99)
GLUCOSE BLDC GLUCOMTR-MCNC: 157 MG/DL — HIGH (ref 70–99)
GLUCOSE BLDC GLUCOMTR-MCNC: 208 MG/DL — HIGH (ref 70–99)
GLUCOSE SERPL-MCNC: 184 MG/DL — HIGH (ref 70–99)
HDLC SERPL-MCNC: 37 MG/DL — LOW
INR BLD: 1.54 RATIO — HIGH (ref 0.88–1.16)
LIPID PNL WITH DIRECT LDL SERPL: 61 MG/DL — SIGNIFICANT CHANGE UP
MAGNESIUM SERPL-MCNC: 1.7 MG/DL — SIGNIFICANT CHANGE UP (ref 1.6–2.6)
MAGNESIUM SERPL-MCNC: <0.3 MG/DL — CRITICAL LOW (ref 1.6–2.6)
NON HDL CHOLESTEROL: 98 MG/DL — SIGNIFICANT CHANGE UP
OSMOLALITY UR: 682 MOS/KG — SIGNIFICANT CHANGE UP (ref 50–1200)
PHOSPHATE SERPL-MCNC: 3.1 MG/DL — SIGNIFICANT CHANGE UP (ref 2.5–4.5)
POTASSIUM SERPL-MCNC: 3.3 MMOL/L — LOW (ref 3.5–5.3)
POTASSIUM SERPL-SCNC: 3.3 MMOL/L — LOW (ref 3.5–5.3)
PROT SERPL-MCNC: 6 G/DL — SIGNIFICANT CHANGE UP (ref 6–8.3)
PROT SERPL-MCNC: 6 G/DL — SIGNIFICANT CHANGE UP (ref 6–8.3)
PROTHROM AB SERPL-ACNC: 18 SEC — HIGH (ref 10.6–13.6)
SARS-COV-2 RNA SPEC QL NAA+PROBE: SIGNIFICANT CHANGE UP
SODIUM SERPL-SCNC: 141 MMOL/L — SIGNIFICANT CHANGE UP (ref 135–145)
SODIUM UR-SCNC: 111 MMOL/L — SIGNIFICANT CHANGE UP
TRIGL SERPL-MCNC: 183 MG/DL — HIGH
TROPONIN I SERPL-MCNC: <0.015 NG/ML — SIGNIFICANT CHANGE UP (ref 0–0.04)
TSH SERPL-MCNC: 1.69 UU/ML — SIGNIFICANT CHANGE UP (ref 0.34–4.82)
VIT B12 SERPL-MCNC: 314 PG/ML — SIGNIFICANT CHANGE UP (ref 232–1245)

## 2021-03-11 PROCEDURE — 70450 CT HEAD/BRAIN W/O DYE: CPT | Mod: 26,MA

## 2021-03-11 RX ORDER — ONDANSETRON 8 MG/1
4 TABLET, FILM COATED ORAL EVERY 6 HOURS
Refills: 0 | Status: DISCONTINUED | OUTPATIENT
Start: 2021-03-11 | End: 2021-03-17

## 2021-03-11 RX ORDER — MECLIZINE HCL 12.5 MG
25 TABLET ORAL ONCE
Refills: 0 | Status: COMPLETED | OUTPATIENT
Start: 2021-03-11 | End: 2021-03-11

## 2021-03-11 RX ORDER — PANTOPRAZOLE SODIUM 20 MG/1
40 TABLET, DELAYED RELEASE ORAL
Refills: 0 | Status: DISCONTINUED | OUTPATIENT
Start: 2021-03-11 | End: 2021-03-17

## 2021-03-11 RX ORDER — MECLIZINE HCL 12.5 MG
12.5 TABLET ORAL EVERY 6 HOURS
Refills: 0 | Status: DISCONTINUED | OUTPATIENT
Start: 2021-03-11 | End: 2021-03-17

## 2021-03-11 RX ORDER — INSULIN LISPRO 100/ML
VIAL (ML) SUBCUTANEOUS AT BEDTIME
Refills: 0 | Status: DISCONTINUED | OUTPATIENT
Start: 2021-03-11 | End: 2021-03-17

## 2021-03-11 RX ORDER — FINASTERIDE 5 MG/1
5 TABLET, FILM COATED ORAL DAILY
Refills: 0 | Status: DISCONTINUED | OUTPATIENT
Start: 2021-03-11 | End: 2021-03-17

## 2021-03-11 RX ORDER — MAGNESIUM SULFATE 500 MG/ML
4 VIAL (ML) INJECTION ONCE
Refills: 0 | Status: COMPLETED | OUTPATIENT
Start: 2021-03-11 | End: 2021-03-11

## 2021-03-11 RX ORDER — CALCITRIOL 0.5 UG/1
0.5 CAPSULE ORAL DAILY
Refills: 0 | Status: DISCONTINUED | OUTPATIENT
Start: 2021-03-11 | End: 2021-03-17

## 2021-03-11 RX ORDER — METFORMIN HYDROCHLORIDE 850 MG/1
500 TABLET ORAL ONCE
Refills: 0 | Status: COMPLETED | OUTPATIENT
Start: 2021-03-11 | End: 2021-03-11

## 2021-03-11 RX ORDER — MAGNESIUM OXIDE 400 MG ORAL TABLET 241.3 MG
400 TABLET ORAL
Refills: 0 | Status: DISCONTINUED | OUTPATIENT
Start: 2021-03-11 | End: 2021-03-17

## 2021-03-11 RX ORDER — POTASSIUM CHLORIDE 20 MEQ
40 PACKET (EA) ORAL EVERY 4 HOURS
Refills: 0 | Status: COMPLETED | OUTPATIENT
Start: 2021-03-11 | End: 2021-03-12

## 2021-03-11 RX ORDER — HEPARIN SODIUM 5000 [USP'U]/ML
5000 INJECTION INTRAVENOUS; SUBCUTANEOUS EVERY 8 HOURS
Refills: 0 | Status: DISCONTINUED | OUTPATIENT
Start: 2021-03-11 | End: 2021-03-11

## 2021-03-11 RX ORDER — LABETALOL HCL 100 MG
200 TABLET ORAL EVERY 12 HOURS
Refills: 0 | Status: DISCONTINUED | OUTPATIENT
Start: 2021-03-11 | End: 2021-03-17

## 2021-03-11 RX ORDER — ATORVASTATIN CALCIUM 80 MG/1
40 TABLET, FILM COATED ORAL AT BEDTIME
Refills: 0 | Status: DISCONTINUED | OUTPATIENT
Start: 2021-03-11 | End: 2021-03-17

## 2021-03-11 RX ORDER — MAGNESIUM SULFATE 500 MG/ML
1 VIAL (ML) INJECTION
Refills: 0 | Status: COMPLETED | OUTPATIENT
Start: 2021-03-11 | End: 2021-03-11

## 2021-03-11 RX ORDER — INSULIN LISPRO 100/ML
VIAL (ML) SUBCUTANEOUS
Refills: 0 | Status: DISCONTINUED | OUTPATIENT
Start: 2021-03-11 | End: 2021-03-17

## 2021-03-11 RX ORDER — APIXABAN 2.5 MG/1
2.5 TABLET, FILM COATED ORAL EVERY 12 HOURS
Refills: 0 | Status: DISCONTINUED | OUTPATIENT
Start: 2021-03-11 | End: 2021-03-17

## 2021-03-11 RX ORDER — LABETALOL HCL 100 MG
300 TABLET ORAL ONCE
Refills: 0 | Status: COMPLETED | OUTPATIENT
Start: 2021-03-11 | End: 2021-03-11

## 2021-03-11 RX ADMIN — Medication 100 GRAM(S): at 11:41

## 2021-03-11 RX ADMIN — MAGNESIUM OXIDE 400 MG ORAL TABLET 400 MILLIGRAM(S): 241.3 TABLET ORAL at 22:56

## 2021-03-11 RX ADMIN — ATORVASTATIN CALCIUM 40 MILLIGRAM(S): 80 TABLET, FILM COATED ORAL at 22:56

## 2021-03-11 RX ADMIN — Medication 100 GRAM(S): at 18:00

## 2021-03-11 RX ADMIN — Medication 300 MILLIGRAM(S): at 05:43

## 2021-03-11 RX ADMIN — METFORMIN HYDROCHLORIDE 500 MILLIGRAM(S): 850 TABLET ORAL at 05:43

## 2021-03-11 RX ADMIN — Medication 200 MILLIGRAM(S): at 18:13

## 2021-03-11 RX ADMIN — APIXABAN 2.5 MILLIGRAM(S): 2.5 TABLET, FILM COATED ORAL at 18:18

## 2021-03-11 RX ADMIN — Medication 100 GRAM(S): at 15:46

## 2021-03-11 RX ADMIN — Medication 12.5 MILLIGRAM(S): at 11:41

## 2021-03-11 RX ADMIN — Medication 25 MILLIGRAM(S): at 02:52

## 2021-03-11 RX ADMIN — PANTOPRAZOLE SODIUM 40 MILLIGRAM(S): 20 TABLET, DELAYED RELEASE ORAL at 22:56

## 2021-03-11 RX ADMIN — Medication 12.5 MILLIGRAM(S): at 22:56

## 2021-03-11 RX ADMIN — FINASTERIDE 5 MILLIGRAM(S): 5 TABLET, FILM COATED ORAL at 11:41

## 2021-03-11 RX ADMIN — CALCITRIOL 0.5 MICROGRAM(S): 0.5 CAPSULE ORAL at 12:03

## 2021-03-11 RX ADMIN — Medication 40 MILLIEQUIVALENT(S): at 22:55

## 2021-03-11 NOTE — H&P ADULT - ASSESSMENT
79 yo male with medical history significant for Chronic DVT, Anemia, GERD, HTN, HLD, DM, Hypoparathyroidism, Glaucoma presents from home with complains of dizziness.    ED;  CTH negative    Patient is being admitted to medicine for evaluation of dizziness.

## 2021-03-11 NOTE — H&P ADULT - PROBLEM SELECTOR PLAN 1
Presented with dizziness and nausea  No positional variation  CT head was negative  Could be vertigo vs orthostatic vs post circ stroke  Could not do CTA head and neck as cr is elevated  Will give meclizine and zofran prn for symptomatic improvement  neuro consult Dr neal

## 2021-03-11 NOTE — CHART NOTE - NSCHARTNOTEFT_GEN_A_CORE
EVENT: Hypokalemia    HPI:  81 yo male with medical history significant for Chronic DVT, Anemia, GERD, HTN, HLD, DM, Hypoparathyroidism, Glaucoma presents from home with complains of dizziness.  ED; CTH negative. Patient is being admitted to medicine for evaluation of dizziness.    SUBJECTIVE: No complaints    OBJECTIVE:  Vital Signs Last 24 Hrs  T(C): 36.8 (11 Mar 2021 16:00), Max: 36.8 (11 Mar 2021 16:00)  T(F): 98.2 (11 Mar 2021 16:00), Max: 98.2 (11 Mar 2021 16:00)  HR: 86 (11 Mar 2021 16:00) (85 - 88)  BP: 151/76 (11 Mar 2021 11:15) (151/76 - 157/78)  BP(mean): --  RR: 18 (11 Mar 2021 16:00) (17 - 20)  SpO2: 100% (11 Mar 2021 16:00) (96% - 100%)    PHYSICAL EXAM:  Neuro: Awake and alert, oriented to person, place, and time  Cardiovascular: + S1, S2, no murmurs, rubs, or bruits  Respiratory: clear to auscultation bilaterally with good air entry   GI: Abdomen soft, non-tender, bowel sounds present   : Non distended;   Skin: warm and dry; no rash      LABS:                        11.1   6.60  )-----------( 118      ( 10 Mar 2021 22:22 )             31.8   CARDIAC MARKERS ( 11 Mar 2021 10:25 )  <0.015 ng/mL / x     / x     / x     / x      CARDIAC MARKERS ( 10 Mar 2021 22:22 )  <0.015 ng/mL / x     / x     / x     / x        03-11    141  |  108  |  24<H>  ----------------------------<  184<H>  3.3<L>   |  24  |  1.59<H>    Ca    7.4<L>      11 Mar 2021 20:28  Phos  3.1     03-11  Mg     1.7     03-11    TPro  6.8  /  Alb  3.4<L>  /  TBili  0.7  /  DBili  x   /  AST  10  /  ALT  16  /  AlkPhos  48  03-10        EKG:   IMAGING:    ASSESSMENT: Hypokalemia probably due to inadequate oral intake    PLAN:     -PO Potassium 40 mEQ x 2 doses, ordered    FOLLOW UP / RESULT:     -CMP in AM

## 2021-03-11 NOTE — H&P ADULT - NSICDXPASTMEDICALHX_GEN_ALL_CORE_FT
PAST MEDICAL HISTORY:  Anemia     Diabetes     DVT (deep venous thrombosis)     GERD (gastroesophageal reflux disease)     Hypercholesterolemia     Hypertension     Hypomagnesemia

## 2021-03-11 NOTE — H&P ADULT - HISTORY OF PRESENT ILLNESS
79 yo male with medical history significant for Chronic DVT, Anemia, GERD, HTN, HLD, DM, Hypoparathyroidism, Glaucoma presents from home with complains of dizziness. Patient endorses having sensation of room spinning which started yesterday when patient was shopping at [employer]. It did not get better which made him come to ED. He states that he got medication in ED which made him feel better. Patient had similar episode last month when he visited ED. At that time, Patient reports that he was returning home from a visit to the store earlier today when he suddenly became vertiginous. Patient reports that he then sat down on a stoop and subsequently had an episode of emesis and urinated on himself. Patient is now complaining of some shakiness. Patient notes that his vertigo has since resolve. It is not associated with positional change. He also had associated nausea. Denies focal weakness, syncope, fall or trauma. Denies abdominal pain, diarrhea, vomiting, blurry vision, chest pain , palpitations, fever, sick contacts.

## 2021-03-11 NOTE — H&P ADULT - NSHPPHYSICALEXAM_GEN_ALL_CORE
Vital Signs (24 Hrs):  T(C): 36.6 (03-11-21 @ 07:15), Max: 36.6 (03-11-21 @ 04:49)  HR: 87 (03-11-21 @ 07:15) (65 - 88)  BP: 152/70 (03-11-21 @ 07:15) (152/70 - 176/90)  RR: 17 (03-11-21 @ 07:15) (17 - 22)  SpO2: 99% (03-11-21 @ 07:15) (96% - 100%)

## 2021-03-12 ENCOUNTER — TRANSCRIPTION ENCOUNTER (OUTPATIENT)
Age: 81
End: 2021-03-12

## 2021-03-12 DIAGNOSIS — R42 DIZZINESS AND GIDDINESS: ICD-10-CM

## 2021-03-12 DIAGNOSIS — Z02.9 ENCOUNTER FOR ADMINISTRATIVE EXAMINATIONS, UNSPECIFIED: ICD-10-CM

## 2021-03-12 DIAGNOSIS — R25.9 UNSPECIFIED ABNORMAL INVOLUNTARY MOVEMENTS: ICD-10-CM

## 2021-03-12 LAB
A1C WITH ESTIMATED AVERAGE GLUCOSE RESULT: 6.2 % — HIGH (ref 4–5.6)
ALBUMIN SERPL ELPH-MCNC: 3.3 G/DL — LOW (ref 3.5–5)
ALP SERPL-CCNC: 49 U/L — SIGNIFICANT CHANGE UP (ref 40–120)
ALT FLD-CCNC: 14 U/L DA — SIGNIFICANT CHANGE UP (ref 10–60)
ANION GAP SERPL CALC-SCNC: 8 MMOL/L — SIGNIFICANT CHANGE UP (ref 5–17)
AST SERPL-CCNC: 10 U/L — SIGNIFICANT CHANGE UP (ref 10–40)
BASOPHILS # BLD AUTO: 0.02 K/UL — SIGNIFICANT CHANGE UP (ref 0–0.2)
BASOPHILS NFR BLD AUTO: 0.4 % — SIGNIFICANT CHANGE UP (ref 0–2)
BILIRUB SERPL-MCNC: 0.7 MG/DL — SIGNIFICANT CHANGE UP (ref 0.2–1.2)
BUN SERPL-MCNC: 21 MG/DL — HIGH (ref 7–18)
CALCIUM SERPL-MCNC: 7.6 MG/DL — LOW (ref 8.4–10.5)
CHLORIDE SERPL-SCNC: 109 MMOL/L — HIGH (ref 96–108)
CO2 SERPL-SCNC: 23 MMOL/L — SIGNIFICANT CHANGE UP (ref 22–31)
CREAT SERPL-MCNC: 1.49 MG/DL — HIGH (ref 0.5–1.3)
EOSINOPHIL # BLD AUTO: 0.09 K/UL — SIGNIFICANT CHANGE UP (ref 0–0.5)
EOSINOPHIL NFR BLD AUTO: 1.7 % — SIGNIFICANT CHANGE UP (ref 0–6)
ESTIMATED AVERAGE GLUCOSE: 131 MG/DL — HIGH (ref 68–114)
GLUCOSE BLDC GLUCOMTR-MCNC: 121 MG/DL — HIGH (ref 70–99)
GLUCOSE BLDC GLUCOMTR-MCNC: 131 MG/DL — HIGH (ref 70–99)
GLUCOSE BLDC GLUCOMTR-MCNC: 161 MG/DL — HIGH (ref 70–99)
GLUCOSE BLDC GLUCOMTR-MCNC: 255 MG/DL — HIGH (ref 70–99)
GLUCOSE SERPL-MCNC: 111 MG/DL — HIGH (ref 70–99)
HCT VFR BLD CALC: 29.9 % — LOW (ref 39–50)
HGB BLD-MCNC: 10.5 G/DL — LOW (ref 13–17)
IMM GRANULOCYTES NFR BLD AUTO: 0.4 % — SIGNIFICANT CHANGE UP (ref 0–1.5)
LYMPHOCYTES # BLD AUTO: 1.37 K/UL — SIGNIFICANT CHANGE UP (ref 1–3.3)
LYMPHOCYTES # BLD AUTO: 25.2 % — SIGNIFICANT CHANGE UP (ref 13–44)
MAGNESIUM SERPL-MCNC: 1.7 MG/DL — SIGNIFICANT CHANGE UP (ref 1.6–2.6)
MCHC RBC-ENTMCNC: 30.4 PG — SIGNIFICANT CHANGE UP (ref 27–34)
MCHC RBC-ENTMCNC: 35.1 GM/DL — SIGNIFICANT CHANGE UP (ref 32–36)
MCV RBC AUTO: 86.7 FL — SIGNIFICANT CHANGE UP (ref 80–100)
MONOCYTES # BLD AUTO: 0.23 K/UL — SIGNIFICANT CHANGE UP (ref 0–0.9)
MONOCYTES NFR BLD AUTO: 4.2 % — SIGNIFICANT CHANGE UP (ref 2–14)
NEUTROPHILS # BLD AUTO: 3.71 K/UL — SIGNIFICANT CHANGE UP (ref 1.8–7.4)
NEUTROPHILS NFR BLD AUTO: 68.1 % — SIGNIFICANT CHANGE UP (ref 43–77)
NRBC # BLD: 0 /100 WBCS — SIGNIFICANT CHANGE UP (ref 0–0)
PLATELET # BLD AUTO: 135 K/UL — LOW (ref 150–400)
POTASSIUM SERPL-MCNC: 3.6 MMOL/L — SIGNIFICANT CHANGE UP (ref 3.5–5.3)
POTASSIUM SERPL-SCNC: 3.6 MMOL/L — SIGNIFICANT CHANGE UP (ref 3.5–5.3)
PROT SERPL-MCNC: 6.6 G/DL — SIGNIFICANT CHANGE UP (ref 6–8.3)
RBC # BLD: 3.45 M/UL — LOW (ref 4.2–5.8)
RBC # FLD: 13.8 % — SIGNIFICANT CHANGE UP (ref 10.3–14.5)
SARS-COV-2 IGG SERPL QL IA: POSITIVE
SARS-COV-2 IGM SERPL IA-ACNC: 49.8 AU/ML — HIGH
SODIUM SERPL-SCNC: 140 MMOL/L — SIGNIFICANT CHANGE UP (ref 135–145)
WBC # BLD: 5.44 K/UL — SIGNIFICANT CHANGE UP (ref 3.8–10.5)
WBC # FLD AUTO: 5.44 K/UL — SIGNIFICANT CHANGE UP (ref 3.8–10.5)

## 2021-03-12 PROCEDURE — 99223 1ST HOSP IP/OBS HIGH 75: CPT

## 2021-03-12 RX ORDER — SODIUM CHLORIDE 9 MG/ML
1000 INJECTION INTRAMUSCULAR; INTRAVENOUS; SUBCUTANEOUS
Refills: 0 | Status: COMPLETED | OUTPATIENT
Start: 2021-03-12 | End: 2021-03-12

## 2021-03-12 RX ORDER — FINASTERIDE 5 MG/1
0 TABLET, FILM COATED ORAL
Qty: 0 | Refills: 0 | DISCHARGE

## 2021-03-12 RX ORDER — CALCITRIOL 0.5 UG/1
1 CAPSULE ORAL
Qty: 0 | Refills: 0 | DISCHARGE
Start: 2021-03-12

## 2021-03-12 RX ORDER — FINASTERIDE 5 MG/1
1 TABLET, FILM COATED ORAL
Qty: 0 | Refills: 0 | DISCHARGE
Start: 2021-03-12

## 2021-03-12 RX ORDER — CALCITRIOL 0.5 UG/1
0 CAPSULE ORAL
Qty: 0 | Refills: 0 | DISCHARGE

## 2021-03-12 RX ORDER — APIXABAN 2.5 MG/1
1 TABLET, FILM COATED ORAL
Qty: 0 | Refills: 0 | DISCHARGE
Start: 2021-03-12

## 2021-03-12 RX ORDER — LABETALOL HCL 100 MG
1 TABLET ORAL
Qty: 0 | Refills: 0 | DISCHARGE
Start: 2021-03-12

## 2021-03-12 RX ADMIN — SODIUM CHLORIDE 75 MILLILITER(S): 9 INJECTION INTRAMUSCULAR; INTRAVENOUS; SUBCUTANEOUS at 12:34

## 2021-03-12 RX ADMIN — Medication 40 MILLIEQUIVALENT(S): at 02:22

## 2021-03-12 RX ADMIN — MAGNESIUM OXIDE 400 MG ORAL TABLET 400 MILLIGRAM(S): 241.3 TABLET ORAL at 08:51

## 2021-03-12 RX ADMIN — Medication 200 MILLIGRAM(S): at 17:51

## 2021-03-12 RX ADMIN — MAGNESIUM OXIDE 400 MG ORAL TABLET 400 MILLIGRAM(S): 241.3 TABLET ORAL at 17:51

## 2021-03-12 RX ADMIN — APIXABAN 2.5 MILLIGRAM(S): 2.5 TABLET, FILM COATED ORAL at 17:51

## 2021-03-12 RX ADMIN — FINASTERIDE 5 MILLIGRAM(S): 5 TABLET, FILM COATED ORAL at 12:23

## 2021-03-12 RX ADMIN — Medication 12.5 MILLIGRAM(S): at 12:22

## 2021-03-12 RX ADMIN — Medication 200 MILLIGRAM(S): at 06:05

## 2021-03-12 RX ADMIN — PANTOPRAZOLE SODIUM 40 MILLIGRAM(S): 20 TABLET, DELAYED RELEASE ORAL at 06:05

## 2021-03-12 RX ADMIN — CALCITRIOL 0.5 MICROGRAM(S): 0.5 CAPSULE ORAL at 12:23

## 2021-03-12 RX ADMIN — MAGNESIUM OXIDE 400 MG ORAL TABLET 400 MILLIGRAM(S): 241.3 TABLET ORAL at 12:23

## 2021-03-12 RX ADMIN — APIXABAN 2.5 MILLIGRAM(S): 2.5 TABLET, FILM COATED ORAL at 06:05

## 2021-03-12 NOTE — DISCHARGE NOTE PROVIDER - HOSPITAL COURSE
Patient is 80 year old male with medical history significant for Afib (on Eliquis), chronic DVT, Anemia, GERD, HTN, HLD, DM, Hypoparathyroidism, Glaucoma presents from home with complains of dizziness and unsteady gait. Patient endorses having sensation of room spinning which started yesterday when patient was shopping at [email or web address]. It did not get better which made him come to ED. Patient is now complaining of some shakiness. Patient admitted to telemetry for dizziness found to have positive orthostatic htn. Patient troponin negative x 2 , Head CT w/o abnormal findings. Neurology consulted, for patient symptoms of vertigo and b/l hand tremors with recommendation to encourage plentiful hydration and consider IV fluids.   Patient should follow up with movement specialist as outpatient to evaluate for parkinsonian symptoms, at: [facility]   [address] [place] [address]   Phone:[phone].  Cardiology consulted with recommendation for Echo.   Patient is 80 year old male with medical history significant for Afib (on Eliquis), chronic DVT, Anemia, GERD, HTN, HLD, DM, Hypoparathyroidism, Glaucoma presents from home with complains of dizziness and unsteady gait. Patient endorses having sensation of room spinning which started yesterday when patient was shopping at [employer]. It did not get better which made him come to ED. Patient is now complaining of some shakiness. Patient admitted to telemetry for dizziness found to have positive orthostatic htn. Patient troponin negative x 2 , Head CT w/o abnormal findings. Neurology consulted, for patient symptoms of vertigo and b/l hand tremors with recommendation to encourage plentiful hydration and consider IV fluids.   Patient should follow up with movement specialist as outpatient to evaluate for parkinsonian symptoms, at: [address]   Phone:[phone].  Cardiology consulted with recommendation for Echo.    Patient experience increased confusion and agitation.  Seen over the weekend by Tele-Psych who stated the patient lacked capacity and started on Seroquel.  Pt reexamined by inpatient psych who states patient has capacity, discontinue Seroquel and start Remeron.  Patient not more relaxed and complying with plan of care.    Pt voiding and still refuses catheter.    PT walked patient with walker.  Recommends HPT with walker.    Discussed with attending.    Patient medically stable for discharge.    For full hospital course, please see medical record.

## 2021-03-12 NOTE — DISCHARGE NOTE PROVIDER - CARE PROVIDER_API CALL
Bj Banks  CRITICAL CARE MEDICINE  [address] [facility], [address]  [place] [address]  Phone: [phone]  Fax: [phone]  Follow Up Time: 1-3 days   Bj Banks  CRITICAL CARE MEDICINE  [address] [facility], [address]  [place] [address]  Phone: [phone]  Fax: [phone]  Follow Up Time: 1-3 days    Vinicius Longoria)  Urology  [address] [place] [address]  Phone: [phone]  Fax: [phone]  Follow Up Time: 2 weeks

## 2021-03-12 NOTE — PHYSICAL THERAPY INITIAL EVALUATION ADULT - DIAGNOSIS, PT EVAL
Patient presented with slight impairments in balance during ambulation; patient requires RW for safe ambulation at home

## 2021-03-12 NOTE — DISCHARGE NOTE PROVIDER - NSDCFUADDAPPT_GEN_ALL_CORE_FT
Please follow up with a movement specialist in the [facility] located within the Neurology [facility]

## 2021-03-12 NOTE — PROGRESS NOTE ADULT - SUBJECTIVE AND OBJECTIVE BOX
CHIEF COMPLAINT:Patient is a 80y old  Male who presents with a chief complaint of dizziness.Pt appears comfortable,still dizzy,    	  REVIEW OF SYSTEMS:  CONSTITUTIONAL: No fever, weight loss, or fatigue  EYES: No eye pain, visual disturbances, or discharge  ENT:  No difficulty hearing, tinnitus, vertigo; No sinus or throat pain  NECK: No pain or stiffness  RESPIRATORY: No cough, wheezing, chills or hemoptysis; No Shortness of Breath  CARDIOVASCULAR: No chest pain, palpitations, passing out, dizziness, or leg swelling  GASTROINTESTINAL: No abdominal or epigastric pain. No nausea, vomiting, or hematemesis; No diarrhea or constipation. No melena or hematochezia.  GENITOURINARY: No dysuria, frequency, hematuria, or incontinence  NEUROLOGICAL: No headaches, memory loss, loss of strength, numbness, or tremors  SKIN: No itching, burning, rashes, or lesions   LYMPH Nodes: No enlarged glands  ENDOCRINE: No heat or cold intolerance; No hair loss  MUSCULOSKELETAL: No joint pain or swelling; No muscle, back, or extremity pain  PSYCHIATRIC: No depression, anxiety, mood swings, or difficulty sleeping  HEME/LYMPH: No easy bruising, or bleeding gums  ALLERGY AND IMMUNOLOGIC: No hives or eczema	      PHYSICAL EXAM:  T(C): 36.8 (03-12-21 @ 09:43), Max: 36.8 (03-11-21 @ 16:00)  HR: 74 (03-12-21 @ 09:43) (69 - 86)  BP: 166/67 (03-12-21 @ 09:43) (131/68 - 166/67)  RR: 18 (03-12-21 @ 09:43) (17 - 18)  SpO2: 98% (03-12-21 @ 09:43) (96% - 100%)  Wt(kg): --  I&O's Summary      Appearance: Normal	  HEENT:   Normal oral mucosa, PERRL, EOMI	  Lymphatic: No lymphadenopathy  Cardiovascular: Normal S1 S2, No JVD, No murmurs, No edema  Respiratory: Lungs clear to auscultation	  Psychiatry: A & O x 3, Mood & affect appropriate  Gastrointestinal:  Soft, Non-tender, + BS	  Skin: No rashes, No ecchymoses, No cyanosis	  Neurologic: Non-focal  Extremities: Normal range of motion, No clubbing, cyanosis or edema  Vascular: Peripheral pulses palpable 2+ bilaterally    MEDICATIONS  (STANDING):  apixaban 2.5 milliGRAM(s) Oral every 12 hours  atorvastatin 40 milliGRAM(s) Oral at bedtime  calcitriol   Capsule 0.5 MICROGram(s) Oral daily  finasteride 5 milliGRAM(s) Oral daily  insulin lispro (ADMELOG) corrective regimen sliding scale   SubCutaneous three times a day before meals  insulin lispro (ADMELOG) corrective regimen sliding scale   SubCutaneous at bedtime  labetalol 200 milliGRAM(s) Oral every 12 hours  magnesium oxide 400 milliGRAM(s) Oral three times a day with meals  pantoprazole    Tablet 40 milliGRAM(s) Oral before breakfast      	  LABS:	 	    CARDIAC MARKERS ( 11 Mar 2021 10:25 )  <0.015 ng/mL / x     / x     / x     / x      CARDIAC MARKERS ( 10 Mar 2021 22:22 )  <0.015 ng/mL / x     / x     / x     / x                                    10.5   5.44  )-----------( 135      ( 12 Mar 2021 06:40 )             29.9     03-12    140  |  109<H>  |  21<H>  ----------------------------<  111<H>  3.6   |  23  |  1.49<H>    Ca    7.6<L>      12 Mar 2021 06:40  Phos  3.1     03-11  Mg     1.7     03-12    TPro  6.6  /  Alb  3.3<L>  /  TBili  0.7  /  DBili  x   /  AST  10  /  ALT  14  /  AlkPhos  49  03-12    Lipid Profile: Cholesterol 135  LDL --  HDL 37      HgA1c:   TSH: Thyroid Stimulating Hormone, Serum: 1.69 uU/mL (03-11 @ 10:25)

## 2021-03-12 NOTE — PROGRESS NOTE ADULT - SUBJECTIVE AND OBJECTIVE BOX
NP Note discussed with Primary Attending.      Patient is a 80y old  Male who presents with a chief complaint of dizziness (12 Mar 2021 11:39)      INTERVAL HPI/OVERNIGHT EVENTS: no new complaints    MEDICATIONS  (STANDING):  apixaban 2.5 milliGRAM(s) Oral every 12 hours  atorvastatin 40 milliGRAM(s) Oral at bedtime  calcitriol   Capsule 0.5 MICROGram(s) Oral daily  finasteride 5 milliGRAM(s) Oral daily  insulin lispro (ADMELOG) corrective regimen sliding scale   SubCutaneous three times a day before meals  insulin lispro (ADMELOG) corrective regimen sliding scale   SubCutaneous at bedtime  labetalol 200 milliGRAM(s) Oral every 12 hours  magnesium oxide 400 milliGRAM(s) Oral three times a day with meals  pantoprazole    Tablet 40 milliGRAM(s) Oral before breakfast    MEDICATIONS  (PRN):  meclizine 12.5 milliGRAM(s) Oral every 6 hours PRN Dizziness  ondansetron Injectable 4 milliGRAM(s) IV Push every 6 hours PRN Nausea and/or Vomiting      __________________________________________________  REVIEW OF SYSTEMS:    CONSTITUTIONAL: No fever,   EYES: no acute visual disturbances  NECK: No pain or stiffness  RESPIRATORY: No cough; No shortness of breath  CARDIOVASCULAR: No chest pain, no palpitations  GASTROINTESTINAL: No pain. No nausea or vomiting; No diarrhea   NEUROLOGICAL: No headache or numbness, no tremors  MUSCULOSKELETAL: No joint pain, no muscle pain  GENITOURINARY: no dysuria, no frequency, no hesitancy  PSYCHIATRY: no depression , no anxiety  ALL OTHER  ROS negative        Vital Signs Last 24 Hrs  T(C): 36.4 (12 Mar 2021 13:34), Max: 36.8 (11 Mar 2021 16:00)  T(F): 97.6 (12 Mar 2021 13:34), Max: 98.3 (12 Mar 2021 09:43)  HR: 83 (12 Mar 2021 13:34) (69 - 86)  BP: 116/67 (12 Mar 2021 13:34) (116/67 - 166/67)  BP(mean): --  RR: 18 (12 Mar 2021 13:34) (17 - 18)  SpO2: 97% (12 Mar 2021 13:34) (96% - 100%)    ________________________________________________  PHYSICAL EXAM:  GENERAL: NAD  HEENT: Normocephalic;  conjunctivae and sclerae clear; moist mucous membranes;   NECK : supple  CHEST/LUNG: Clear to auscultation bilaterally with good air entry   HEART: S1 S2  regular; no murmurs, gallops or rubs  ABDOMEN: Soft, Nontender, Nondistended; Bowel sounds present  EXTREMITIES: no cyanosis; no edema; no calf tenderness  SKIN: warm and dry; no rash  NERVOUS SYSTEM:  Awake and alert; Oriented  to place, person and time ; no new deficits    _________________________________________________  LABS:                        10.5   5.44  )-----------( 135      ( 12 Mar 2021 06:40 )             29.9     03-12    140  |  109<H>  |  21<H>  ----------------------------<  111<H>  3.6   |  23  |  1.49<H>    Ca    7.6<L>      12 Mar 2021 06:40  Phos  3.1     03-11  Mg     1.7     03-12    TPro  6.6  /  Alb  3.3<L>  /  TBili  0.7  /  DBili  x   /  AST  10  /  ALT  14  /  AlkPhos  49  03-12    PT/INR - ( 11 Mar 2021 10:25 )   PT: 18.0 sec;   INR: 1.54 ratio         PTT - ( 10 Mar 2021 22:22 )  PTT:32.0 sec    CAPILLARY BLOOD GLUCOSE      POCT Blood Glucose.: 255 mg/dL (12 Mar 2021 11:33)  POCT Blood Glucose.: 121 mg/dL (12 Mar 2021 07:51)  POCT Blood Glucose.: 134 mg/dL (11 Mar 2021 22:05)  POCT Blood Glucose.: 157 mg/dL (11 Mar 2021 17:48)        RADIOLOGY & ADDITIONAL TESTS:    EXAM:  CT BRAIN                            PROCEDURE DATE:  03/11/2021          INTERPRETATION:  CLINICAL INFORMATION: Vertigo    TECHNIQUE: Noncontrast CT examination of the head was performed using contiguous 5 mm CT images.    COMPARISON: 2/13/2021    FINDINGS:    There is no evidence of mass or acute intracranial hemorrhage. Ventricles and sulci are normal in size and configuration for the patient's stated age. No midline shift or other significant mass effect is noted. There is no CT evidence of acute territorial infarct. There are periventricular white matter hypodensities that are nonspecific in nature but may reflect chronic ischemic microvascular disease. There are periventricular white matter hypodensities that are nonspecific in nature but may reflect chronic ischemic microvascular disease.      Mild polypoid mucosal thickening of the maxillary and ethmoid sinuses. Orbits and orbital contents are unremarkable.    There is no depressed calvarial fracture.        IMPRESSION:    No evidence of acute intracranial hemorrhage, midline shift or CT evidence of acute territorial infarct.    If the patient's symptoms persist, consider short interval follow-up head CT or brain MRI if there are no MRI contraindications.          CLAYTON ZAIDI MD; Attending Radiologist  This document has been electronically signed. Mar 11 2021  3:13AM      EXAM:  XR CHEST PORTABLE URGENT 1V                            PROCEDURE DATE:  03/10/2021          INTERPRETATION:  CLINICAL INDICATION: 80 years  Male with vomiting.    COMPARISON: 2/13/2021    Rotated AP view of the chest demonstrates mild bibasilar discoid atelectasis.. There is no pleural effusion. There is no pneumothorax.    The heart, mediastinum and tommie cannot be assessed due to projection.    The pulmonary vasculature is normal.    Mild thoracic degenerative changes and scoliosis are present.    IMPRESSION:    Mild bibasilar discoid atelectasis.        MARTHA YOUNG MD; Attending Radiologist  This document has been electronically signed. Mar 11 2021 10:59AM      Imaging  Reviewed:  YES/NO    Consultant(s) Notes Reviewed:   YES/ No      Plan of care was discussed with patient and /or primary care giver; all questions and concerns were addressed

## 2021-03-12 NOTE — DISCHARGE NOTE PROVIDER - NSFOLLOWUPCLINICS_GEN_ALL_ED_FT
[facility]  [facility]  [address] [place] [address]  Phone: [phone]  Fax: [phone]  Follow Up Time: 1 week

## 2021-03-12 NOTE — DISCHARGE NOTE PROVIDER - CARE PROVIDERS DIRECT ADDRESSES
[email or web address]

## 2021-03-12 NOTE — DISCHARGE NOTE PROVIDER - NSDCCPCAREPLAN_GEN_ALL_CORE_FT
PRINCIPAL DISCHARGE DIAGNOSIS  Diagnosis: Vertigo  Assessment and Plan of Treatment: DizzinessYou were admitted for dizziness secondary to vertigo.  Dizziness can manifest as a feeling of unsteadiness or light-headedness. You may feel like you are about to faint. This condition can be caused by a number of things, including medicines, dehydration, or illness. Drink enough fluid to keep your urine clear or pale yellow. Do not drink alcohol and limit your caffeine intake. Avoid quick or sudden movements.  Rise slowly from chairs and steady yourself until you feel okay. In the morning, first sit up on the side of the bed.  SEEK IMMEDIATE MEDICAL CARE IF YOU HAVE ANY OF THE FOLLOWING SYMPTOMS: vomiting, changes in your vision or speech, weakness in your arms or legs, trouble speaking or swallowing, chest pain, abdominal pain, shortness of breath, sweating, bleeding, headache, neck pain, or fever.        SECONDARY DISCHARGE DIAGNOSES  Diagnosis: Diabetes  Assessment and Plan of Treatment: HgA1C this admission is 6.3  Make sure you get your HgA1c checked every three months.  If you take oral diabetes medications, check your blood glucose two times a day.  If you take insulin, check your blood glucose before meals and at bedtime.  It's important not to skip any meals.  Keep a log of your blood glucose results and always take it with you to your doctor appointments.  Keep a list of your current medications including injectables and over the counter medications and bring this medication list with you to all your doctor appointments.  If you have not seen your ophthalmologist this year call for appointment.  Check your feet daily for redness, sores, or openings. Do not self treat. If no improvement in two days call your primary care physician for an appointment.  Low blood sugar (hypoglycemia) is a blood sugar below 70mg/dl. Check your blood sugar if you feel signs/symptoms of hypoglycemia. If your blood sugar is below 70 take 15 grams of carbohydrates (ex 4 oz of apple juice, 3-4 glucose tablets, or 4-6 oz of regular soda) wait 15 minutes and repeat blood sugar to make sure it comes up above 70.  If your blood sugar is above 70 and you are due for a meal, have a meal.  If you are not due for a meal have a snack.  This snack helps keeps your blood sugar at a safe range.

## 2021-03-12 NOTE — DISCHARGE NOTE PROVIDER - PROVIDER TOKENS
PROVIDER:[TOKEN:[[id number]],FOLLOWUP:[1-3 days]] PROVIDER:[TOKEN:[[id number]],FOLLOWUP:[1-3 days]],PROVIDER:[TOKEN:[[id number]],FOLLOWUP:[2 weeks]]

## 2021-03-12 NOTE — DISCHARGE NOTE PROVIDER - NSDCMRMEDTOKEN_GEN_ALL_CORE_FT
apixaban 2.5 mg oral tablet: 1 tab(s) orally every 12 hours  calcitriol 0.5 mcg oral capsule: 1 cap(s) orally once a day  enalapril maleate 20 mg tablet:   finasteride 5 mg oral tablet: 1 tab(s) orally once a day  glimepiride 4 mg tablet:   labetalol 200 mg oral tablet: 1 tab(s) orally every 12 hours  METFORMIN  MG TABLET: TAKE 1 TABLET TWICE DAILY  omeprazole 20 mg capsule,delayed release:   pravastatin 40 mg tablet:    apixaban 2.5 mg oral tablet: 1 tab(s) orally every 12 hours  atorvastatin 40 mg oral tablet: 1 tab(s) orally once a day (at bedtime)  calcitriol 0.5 mcg oral capsule: 1 cap(s) orally once a day  enalapril maleate 20 mg tablet:   finasteride 5 mg oral tablet: 1 tab(s) orally once a day  glimepiride 4 mg tablet:   hydrALAZINE 25 mg oral tablet: 1 tab(s) orally 3 times a day  labetalol 200 mg oral tablet: 1 tab(s) orally every 12 hours  meclizine 12.5 mg oral tablet: 1 tab(s) orally every 6 hours, As needed, Dizziness  METFORMIN  MG TABLET: TAKE 1 TABLET TWICE DAILY  mirtazapine 7.5 mg oral tablet: 1 tab(s) orally once a day (at bedtime)  omeprazole 20 mg capsule,delayed release:   pravastatin 40 mg tablet:   tamsulosin 0.4 mg oral capsule: 1 cap(s) orally once a day (at bedtime)   apixaban 2.5 mg oral tablet: 1 tab(s) orally every 12 hours  calcitriol 0.5 mcg oral capsule: 1 cap(s) orally once a day  enalapril maleate 20 mg tablet:   finasteride 5 mg oral tablet: 1 tab(s) orally once a day  glimepiride 4 mg tablet:   hydrALAZINE 25 mg oral tablet: 1 tab(s) orally 3 times a day  labetalol 200 mg oral tablet: 1 tab(s) orally every 12 hours  meclizine 12.5 mg oral tablet: 1 tab(s) orally every 6 hours, As needed, Dizziness  METFORMIN  MG TABLET: TAKE 1 TABLET TWICE DAILY  mirtazapine 7.5 mg oral tablet: 1 tab(s) orally once a day (at bedtime)  omeprazole 20 mg capsule,delayed release:   pravastatin 40 mg tablet:   tamsulosin 0.4 mg oral capsule: 1 cap(s) orally once a day (at bedtime)   apixaban 2.5 mg oral tablet: 1 tab(s) orally every 12 hours  calcitriol 0.5 mcg oral capsule: 1 cap(s) orally once a day  enalapril maleate 20 mg tablet:   finasteride 5 mg oral tablet: 1 tab(s) orally once a day  glimepiride 4 mg tablet:   hydrALAZINE 50 mg oral tablet: 1 tab(s) orally 3 times a day  labetalol 200 mg oral tablet: 1 tab(s) orally every 12 hours  meclizine 12.5 mg oral tablet: 1 tab(s) orally every 6 hours, As needed, Dizziness  METFORMIN  MG TABLET: TAKE 1 TABLET TWICE DAILY  mirtazapine 7.5 mg oral tablet: 1 tab(s) orally once a day (at bedtime)  omeprazole 20 mg capsule,delayed release:   pravastatin 40 mg tablet:   tamsulosin 0.4 mg oral capsule: 1 cap(s) orally once a day (at bedtime)

## 2021-03-12 NOTE — CONSULT NOTE ADULT - SUBJECTIVE AND OBJECTIVE BOX
+++++++++++++++++++++++NOTE NOT COMPLETED+++++++++++++++++++++++++++++++++++++++++++   Patient is a 80y old  Male who presents with a chief complaint of dizziness (12 Mar 2021 11:39)      HPI:  79 yo male with medical history significant for Chronic DVT, Anemia, GERD, HTN, HLD, DM, Hypoparathyroidism, Glaucoma presents from home with complains of dizziness. Patient endorses having sensation of room spinning which started yesterday when patient was shopping at [employer]. It did not get better which made him come to ED. He states that he got medication in ED which made him feel better. Patient had similar episode last month when he visited ED. At that time, Patient reports that he was returning home from a visit to the store earlier today when he suddenly became vertiginous. Patient reports that he then sat down on a stoop and subsequently had an episode of emesis and urinated on himself. Patient is now complaining of some shakiness. Patient notes that his vertigo has since resolve. It is not associated with positional change. He also had associated nausea. Denies focal weakness, syncope, fall or trauma. Denies abdominal pain, diarrhea, vomiting, blurry vision, chest pain , palpitations, fever, sick contacts. (11 Mar 2021 10:18)    Pt reports two days ago after shopping he became nauseous and vomited in the store.  EMS was called and brought him to the hospital.  Denies LOC, HA, spinning sensation, or lightheadedness at the time of event.         Neurological Review of Systems:  No difficulty with language.  No vision loss or double vision.  No dizziness, vertigo or new hearing loss.  No difficulty with speech or swallowing.  No focal weakness.  No focal sensory changes.  No numbness or tingling in the bilateral lower extremities.  (+) Difficulty with balance.  (+) Difficulty with ambulation.        MEDICATIONS  (STANDING):  apixaban 2.5 milliGRAM(s) Oral every 12 hours  atorvastatin 40 milliGRAM(s) Oral at bedtime  calcitriol   Capsule 0.5 MICROGram(s) Oral daily  finasteride 5 milliGRAM(s) Oral daily  insulin lispro (ADMELOG) corrective regimen sliding scale   SubCutaneous three times a day before meals  insulin lispro (ADMELOG) corrective regimen sliding scale   SubCutaneous at bedtime  labetalol 200 milliGRAM(s) Oral every 12 hours  magnesium oxide 400 milliGRAM(s) Oral three times a day with meals  pantoprazole    Tablet 40 milliGRAM(s) Oral before breakfast  sodium chloride 0.9%. 1000 milliLiter(s) (75 mL/Hr) IV Continuous <Continuous>    MEDICATIONS  (PRN):  meclizine 12.5 milliGRAM(s) Oral every 6 hours PRN Dizziness  ondansetron Injectable 4 milliGRAM(s) IV Push every 6 hours PRN Nausea and/or Vomiting    Allergies    Allergy Status Unknown  No Known Allergies    Intolerances      PAST MEDICAL & SURGICAL HISTORY:  DVT (deep venous thrombosis)    Hypomagnesemia    Anemia    GERD (gastroesophageal reflux disease)    Hypercholesterolemia    Hypertension    Diabetes    No significant past surgical history      FAMILY HISTORY:  No pertinent family history in first degree relatives      SOCIAL HISTORY: Former smoker    Review of Systems:  Constitutional: No generalized weakness. No fevers or chills                  Eyes, Ears, Mouth, Throat: No vision loss   Respiratory: No shortness of breath or cough                             Cardiovascular: No chest pain or palpitations  Gastrointestinal: No nausea or vomiting                                     Genitourinary: No urinary incontinence or burning on urination  Musculoskeletal: No joint pain                                                        Dermatologic: No rash  Neurological: as per HPI                                                                      Psychiatric: No behavioral problems  Endocrine: No known hypoglycemia              Hematologic/Lymphatic: No easy bleeding    O:  Vital Signs Last 24 Hrs  T(C): 36.8 (12 Mar 2021 09:43), Max: 36.8 (11 Mar 2021 16:00)  T(F): 98.3 (12 Mar 2021 09:43), Max: 98.3 (12 Mar 2021 09:43)  HR: 74 (12 Mar 2021 09:43) (69 - 86)  BP: 166/67 (12 Mar 2021 09:43) (131/68 - 166/67)  RR: 18 (12 Mar 2021 09:43) (17 - 18)  SpO2: 98% (12 Mar 2021 09:43) (96% - 100%)    General Exam:   General appearance: No acute distress                 Cardiovascular: Pedal dorsalis pulses intact bilaterally    Mental Status: Oriented to self, date and place.  Attention intact.  No dysarthria, aphasia or neglect.  Knowledge intact.  Registration intact.  Short and long term memory grossly intact.      Cranial Nerves: CN I - not tested.  PERRL, EOMI, VFF, no nystagmus or diplopia.  No APD.  Fundi not visualized.  CN V1-3 intact to light touch.  No facial asymmetry.  Hearing intact to finger rub bilaterally.  Tongue, uvula and palate midline.  Sternocleidomastoid and Trapezius intact bilaterally.    Motor:   Tone: Rigid                  Strength intact throughout  No pronator drift bilaterally                      No dysmetria on finger-nose-finger or heel-shin-heel  No truncal ataxia.  (+) Resting, postural or action tremor.  No myoclonus.    Sensation: intact to light touch    Deep Tendon Reflexes: 2+ bilateral biceps, triceps, brachioradialis, knee and ankle  Toes flexor bilaterally    Gait: Unsteady, narrow stance w/ retropulsion.  Unable to test romberg d/t unsteadiness.      Other:     LABS:                        10.5   5.44  )-----------( 135      ( 12 Mar 2021 06:40 )             29.9     03-12    140  |  109<H>  |  21<H>  ----------------------------<  111<H>  3.6   |  23  |  1.49<H>    Ca    7.6<L>      12 Mar 2021 06:40  Phos  3.1     03-11  Mg     1.7     03-12    TPro  6.6  /  Alb  3.3<L>  /  TBili  0.7  /  DBili  x   /  AST  10  /  ALT  14  /  AlkPhos  49  03-12    PT/INR - ( 11 Mar 2021 10:25 )   PT: 18.0 sec;   INR: 1.54 ratio         PTT - ( 10 Mar 2021 22:22 )  PTT:32.0 sec        RADIOLOGY & ADDITIONAL STUDIES:  < from: CT Head No Cont (03.11.21 @ 02:48) >    EXAM:  CT BRAIN                            PROCEDURE DATE:  03/11/2021          INTERPRETATION:  CLINICAL INFORMATION: Vertigo    TECHNIQUE: Noncontrast CT examination of the head was performed using contiguous 5 mm CT images.    COMPARISON: 2/13/2021    FINDINGS:    There is no evidence of mass or acute intracranial hemorrhage. Ventricles and sulci are normal in size and configuration for the patient's stated age. No midline shift or other significant mass effect is noted. There is no CT evidence of acute territorial infarct. There are periventricular white matter hypodensities that are nonspecific in nature but may reflect chronic ischemic microvascular disease. There are periventricular white matter hypodensities that are nonspecific in nature but may reflect chronic ischemic microvascular disease.      Mild polypoid mucosal thickening of the maxillary and ethmoid sinuses. Orbits and orbital contents are unremarkable.    There is no depressed calvarial fracture.        IMPRESSION:    No evidence of acute intracranial hemorrhage, midline shift or CT evidence of acute territorial infarct.    If the patient's symptoms persist, consider short interval follow-up head CT or brain MRI if there are no MRI contraindications.            CLAYTON ZAIDI MD; Attending Radiologist  This document has been electronically signed. Mar 11 2021  3:13AM    < end of copied text >     Patient is a 80y old  Male who presents with a chief complaint of dizziness (12 Mar 2021 11:39)      HPI:  79 yo male with medical history significant for Chronic DVT, Anemia, GERD, HTN, HLD, DM, Hypoparathyroidism, Glaucoma presents from home with complains of dizziness. Patient endorses having sensation of room spinning which started yesterday when patient was shopping at [employer]. It did not get better which made him come to ED. He states that he got medication in ED which made him feel better. Patient had similar episode last month when he visited ED. At that time, Patient reports that he was returning home from a visit to the store earlier today when he suddenly became vertiginous. Patient reports that he then sat down on a stoop and subsequently had an episode of emesis and urinated on himself. Patient is now complaining of some shakiness. Patient notes that his vertigo has since resolve. It is not associated with positional change. He also had associated nausea. Denies focal weakness, syncope, fall or trauma. Denies abdominal pain, diarrhea, vomiting, blurry vision, chest pain , palpitations, fever, sick contacts. (11 Mar 2021 10:18)    Pt reports two days ago after shopping he became nauseous and vomited in the store.  EMS was called and brought him to the hospital.  Denies LOC, HA, spinning sensation, or lightheadedness at the time of event.         Neurological Review of Systems:  No difficulty with language.  No vision loss or double vision.  No dizziness, vertigo or new hearing loss.  No difficulty with speech or swallowing.  No focal weakness.  No focal sensory changes.  No numbness or tingling in the bilateral lower extremities.  (+) Difficulty with balance.  (+) Difficulty with ambulation.        MEDICATIONS  (STANDING):  apixaban 2.5 milliGRAM(s) Oral every 12 hours  atorvastatin 40 milliGRAM(s) Oral at bedtime  calcitriol   Capsule 0.5 MICROGram(s) Oral daily  finasteride 5 milliGRAM(s) Oral daily  insulin lispro (ADMELOG) corrective regimen sliding scale   SubCutaneous three times a day before meals  insulin lispro (ADMELOG) corrective regimen sliding scale   SubCutaneous at bedtime  labetalol 200 milliGRAM(s) Oral every 12 hours  magnesium oxide 400 milliGRAM(s) Oral three times a day with meals  pantoprazole    Tablet 40 milliGRAM(s) Oral before breakfast  sodium chloride 0.9%. 1000 milliLiter(s) (75 mL/Hr) IV Continuous <Continuous>    MEDICATIONS  (PRN):  meclizine 12.5 milliGRAM(s) Oral every 6 hours PRN Dizziness  ondansetron Injectable 4 milliGRAM(s) IV Push every 6 hours PRN Nausea and/or Vomiting    Allergies    Allergy Status Unknown  No Known Allergies    Intolerances      PAST MEDICAL & SURGICAL HISTORY:  DVT (deep venous thrombosis)    Hypomagnesemia    Anemia    GERD (gastroesophageal reflux disease)    Hypercholesterolemia    Hypertension    Diabetes    No significant past surgical history      FAMILY HISTORY:  No pertinent family history in first degree relatives      SOCIAL HISTORY: Former smoker    Review of Systems:  Constitutional: No generalized weakness. No fevers or chills                  Eyes, Ears, Mouth, Throat: No vision loss   Respiratory: No shortness of breath or cough                             Cardiovascular: No chest pain or palpitations  Gastrointestinal: No nausea or vomiting                                     Genitourinary: No urinary incontinence or burning on urination  Musculoskeletal: No joint pain                                                        Dermatologic: No rash  Neurological: as per HPI                                                                      Psychiatric: No behavioral problems  Endocrine: No known hypoglycemia              Hematologic/Lymphatic: No easy bleeding    O:  Vital Signs Last 24 Hrs  T(C): 36.8 (12 Mar 2021 09:43), Max: 36.8 (11 Mar 2021 16:00)  T(F): 98.3 (12 Mar 2021 09:43), Max: 98.3 (12 Mar 2021 09:43)  HR: 74 (12 Mar 2021 09:43) (69 - 86)  BP: 166/67 (12 Mar 2021 09:43) (131/68 - 166/67)  RR: 18 (12 Mar 2021 09:43) (17 - 18)  SpO2: 98% (12 Mar 2021 09:43) (96% - 100%)    General Exam:   General appearance: No acute distress                 Cardiovascular: Pedal dorsalis pulses intact bilaterally    Mental Status: Oriented to self, date and place.  Attention intact.  No dysarthria, aphasia or neglect.  Knowledge intact.  Registration intact.  Short and long term memory grossly intact.      Cranial Nerves: CN I - not tested.  PERRL, EOMI, VFF, no nystagmus or diplopia.  No APD.  Fundi not visualized.  CN V1-3 intact to light touch.  No facial asymmetry.  Hearing intact to finger rub bilaterally.  Tongue, uvula and palate midline.  Sternocleidomastoid and Trapezius intact bilaterally.    Motor:   Tone: Rigid                  Strength intact throughout  No pronator drift bilaterally                      No dysmetria on finger-nose-finger or heel-shin-heel  No truncal ataxia.  (+) Resting, postural or action tremor.  No myoclonus.    Sensation: Intact to light touch    Deep Tendon Reflexes: 2+ bilateral biceps, triceps, brachioradialis, knee and ankle  Toes flexor bilaterally    Gait: Unsteady, narrow stance w/ retropulsion.  Unable to test romberg d/t unsteadiness.      Other:     LABS:                        10.5   5.44  )-----------( 135      ( 12 Mar 2021 06:40 )             29.9     03-12    140  |  109<H>  |  21<H>  ----------------------------<  111<H>  3.6   |  23  |  1.49<H>    Ca    7.6<L>      12 Mar 2021 06:40  Phos  3.1     03-11  Mg     1.7     03-12    TPro  6.6  /  Alb  3.3<L>  /  TBili  0.7  /  DBili  x   /  AST  10  /  ALT  14  /  AlkPhos  49  03-12    PT/INR - ( 11 Mar 2021 10:25 )   PT: 18.0 sec;   INR: 1.54 ratio         PTT - ( 10 Mar 2021 22:22 )  PTT:32.0 sec        RADIOLOGY & ADDITIONAL STUDIES:  < from: CT Head No Cont (03.11.21 @ 02:48) >    EXAM:  CT BRAIN                            PROCEDURE DATE:  03/11/2021          INTERPRETATION:  CLINICAL INFORMATION: Vertigo    TECHNIQUE: Noncontrast CT examination of the head was performed using contiguous 5 mm CT images.    COMPARISON: 2/13/2021    FINDINGS:    There is no evidence of mass or acute intracranial hemorrhage. Ventricles and sulci are normal in size and configuration for the patient's stated age. No midline shift or other significant mass effect is noted. There is no CT evidence of acute territorial infarct. There are periventricular white matter hypodensities that are nonspecific in nature but may reflect chronic ischemic microvascular disease. There are periventricular white matter hypodensities that are nonspecific in nature but may reflect chronic ischemic microvascular disease.      Mild polypoid mucosal thickening of the maxillary and ethmoid sinuses. Orbits and orbital contents are unremarkable.    There is no depressed calvarial fracture.        IMPRESSION:    No evidence of acute intracranial hemorrhage, midline shift or CT evidence of acute territorial infarct.    If the patient's symptoms persist, consider short interval follow-up head CT or brain MRI if there are no MRI contraindications.            CLAYTON ZAIDI MD; Attending Radiologist  This document has been electronically signed. Mar 11 2021  3:13AM    < end of copied text >     Patient is a 80y old  Male who presents with a chief complaint of dizziness (12 Mar 2021 11:39)      HPI:  79 yo male with medical history significant for Chronic DVT, Anemia, GERD, HTN, HLD, DM, Hypoparathyroidism, Glaucoma presents from home with complains of dizziness. Patient endorses lighheadiness started yesterday when patient was shopping at [employer]. It did not get better which made him come to ED. He states that he got medication in ED which made him feel better. Patient had similar episode last month when he visited ED. At that time, Patient reports that he was returning home from a visit to the store earlier today when he suddenly became lighheaded. Patient reports that he then sat down on a stoop and subsequently had an episode of emesis. Patient is now complaining of some shakiness. Patient notes that his vertigo has since resolve. It is not associated with positional change. He also had associated nausea. Denies focal weakness, syncope, fall or trauma. Denies abdominal pain, diarrhea, vomiting, blurry vision, chest pain , palpitations, fever, sick contacts. (11 Mar 2021 10:18)    Neurological Review of Systems:  No difficulty with language.  No vision loss or double vision.  No dizziness, vertigo or new hearing loss.  No difficulty with speech or swallowing.  No focal weakness.  No focal sensory changes.   (+) Difficulty with balance.  (+) Difficulty with ambulation.        MEDICATIONS  (STANDING):  apixaban 2.5 milliGRAM(s) Oral every 12 hours  atorvastatin 40 milliGRAM(s) Oral at bedtime  calcitriol   Capsule 0.5 MICROGram(s) Oral daily  finasteride 5 milliGRAM(s) Oral daily  insulin lispro (ADMELOG) corrective regimen sliding scale   SubCutaneous three times a day before meals  insulin lispro (ADMELOG) corrective regimen sliding scale   SubCutaneous at bedtime  labetalol 200 milliGRAM(s) Oral every 12 hours  magnesium oxide 400 milliGRAM(s) Oral three times a day with meals  pantoprazole    Tablet 40 milliGRAM(s) Oral before breakfast  sodium chloride 0.9%. 1000 milliLiter(s) (75 mL/Hr) IV Continuous <Continuous>    MEDICATIONS  (PRN):  meclizine 12.5 milliGRAM(s) Oral every 6 hours PRN Dizziness  ondansetron Injectable 4 milliGRAM(s) IV Push every 6 hours PRN Nausea and/or Vomiting    Allergies    Allergy Status Unknown  No Known Allergies    Intolerances      PAST MEDICAL & SURGICAL HISTORY:  DVT (deep venous thrombosis)    Hypomagnesemia    Anemia    GERD (gastroesophageal reflux disease)    Hypercholesterolemia    Hypertension    Diabetes    No significant past surgical history      FAMILY HISTORY:  No pertinent family history in first degree relatives      SOCIAL HISTORY: Former smoker    Review of Systems:  Constitutional: No fevers   Eyes, Ears, Mouth, Throat: No vision loss   Respiratory: No  cough                             Cardiovascular: No chest pain   Gastrointestinal: No vomiting                                     Genitourinary: No burning on urination  Musculoskeletal: No joint pain                                                        Dermatologic: No rash  Neurological: as per HPI                                                                      Psychiatric: No behavioral problems  Endocrine: No known hypoglycemia              Hematologic/Lymphatic: No easy bleeding    O:  Vital Signs Last 24 Hrs  T(C): 36.8 (12 Mar 2021 09:43), Max: 36.8 (11 Mar 2021 16:00)  T(F): 98.3 (12 Mar 2021 09:43), Max: 98.3 (12 Mar 2021 09:43)  HR: 74 (12 Mar 2021 09:43) (69 - 86)  BP: 166/67 (12 Mar 2021 09:43) (131/68 - 166/67)  RR: 18 (12 Mar 2021 09:43) (17 - 18)  SpO2: 98% (12 Mar 2021 09:43) (96% - 100%)    General Exam:   General appearance: No acute distress                 Cardiovascular: Pedal dorsalis pulses intact bilaterally    Mental Status: Oriented to self, date and place.  Attention intact.  No dysarthria, aphasia or neglect.  Knowledge intact.  Registration intact.  Short and long term memory grossly intact.      Cranial Nerves: CN I - not tested.  PERRL, EOMI, VFF, no nystagmus or diplopia.  No APD.  Fundi not visualized.  CN V1-3 intact to light touch.  No facial asymmetry.  Hearing intact to finger rub bilaterally.  Tongue, uvula and palate midline.  Sternocleidomastoid and Trapezius intact bilaterally.    Motor:   Tone: cogwheeling bl              Strength intact throughout  No pronator drift bilaterally                      No dysmetria on finger-nose-finger or heel-shin-heel  No truncal ataxia.  (+) Resting, postural or action tremor, worse on  the left, left hand appears flapping.  No myoclonus.    Sensation: Intact to light touch    Deep Tendon Reflexes: 2+ bilateral biceps, triceps, brachioradialis, knee and ankle  Toes flexor bilaterally    Gait: Unsteady, narrow stance w/ retropulsion.  Unable to test romberg d/t unsteadiness.      Other:     LABS:                        10.5   5.44  )-----------( 135      ( 12 Mar 2021 06:40 )             29.9     03-12    140  |  109<H>  |  21<H>  ----------------------------<  111<H>  3.6   |  23  |  1.49<H>    Ca    7.6<L>      12 Mar 2021 06:40  Phos  3.1     03-11  Mg     1.7     03-12    TPro  6.6  /  Alb  3.3<L>  /  TBili  0.7  /  DBili  x   /  AST  10  /  ALT  14  /  AlkPhos  49  03-12    PT/INR - ( 11 Mar 2021 10:25 )   PT: 18.0 sec;   INR: 1.54 ratio         PTT - ( 10 Mar 2021 22:22 )  PTT:32.0 sec        RADIOLOGY & ADDITIONAL STUDIES:  e< from: 12 Lead ECG (03.10.21 @ 21:23) >  Diagnosis Line Normal sinus rhythm  Left axis deviation  Abnormal ECG    < end of copied text >      < from: CT Head No Cont (03.11.21 @ 02:48) > (images reviwed)    EXAM:  CT BRAIN                            PROCEDURE DATE:  03/11/2021          INTERPRETATION:  CLINICAL INFORMATION: Vertigo    TECHNIQUE: Noncontrast CT examination of the head was performed using contiguous 5 mm CT images.    COMPARISON: 2/13/2021    FINDINGS:    There is no evidence of mass or acute intracranial hemorrhage. Ventricles and sulci are normal in size and configuration for the patient's stated age. No midline shift or other significant mass effect is noted. There is no CT evidence of acute territorial infarct. There are periventricular white matter hypodensities that are nonspecific in nature but may reflect chronic ischemic microvascular disease. There are periventricular white matter hypodensities that are nonspecific in nature but may reflect chronic ischemic microvascular disease.      Mild polypoid mucosal thickening of the maxillary and ethmoid sinuses. Orbits and orbital contents are unremarkable.    There is no depressed calvarial fracture.        IMPRESSION:    No evidence of acute intracranial hemorrhage, midline shift or CT evidence of acute territorial infarct.    If the patient's symptoms persist, consider short interval follow-up head CT or brain MRI if there are no MRI contraindications.            CLAYTON ZAIDI MD; Attending Radiologist  This document has been electronically signed. Mar 11 2021  3:13AM    < end of copied text >

## 2021-03-13 LAB
ANION GAP SERPL CALC-SCNC: 8 MMOL/L — SIGNIFICANT CHANGE UP (ref 5–17)
BUN SERPL-MCNC: 26 MG/DL — HIGH (ref 7–18)
CALCIUM SERPL-MCNC: 7.6 MG/DL — LOW (ref 8.4–10.5)
CHLORIDE SERPL-SCNC: 108 MMOL/L — SIGNIFICANT CHANGE UP (ref 96–108)
CO2 SERPL-SCNC: 22 MMOL/L — SIGNIFICANT CHANGE UP (ref 22–31)
CREAT SERPL-MCNC: 1.56 MG/DL — HIGH (ref 0.5–1.3)
GLUCOSE BLDC GLUCOMTR-MCNC: 160 MG/DL — HIGH (ref 70–99)
GLUCOSE BLDC GLUCOMTR-MCNC: 161 MG/DL — HIGH (ref 70–99)
GLUCOSE BLDC GLUCOMTR-MCNC: 165 MG/DL — HIGH (ref 70–99)
GLUCOSE BLDC GLUCOMTR-MCNC: 210 MG/DL — HIGH (ref 70–99)
GLUCOSE SERPL-MCNC: 146 MG/DL — HIGH (ref 70–99)
HCT VFR BLD CALC: 27.8 % — LOW (ref 39–50)
HGB BLD-MCNC: 10 G/DL — LOW (ref 13–17)
MCHC RBC-ENTMCNC: 30.9 PG — SIGNIFICANT CHANGE UP (ref 27–34)
MCHC RBC-ENTMCNC: 36 GM/DL — SIGNIFICANT CHANGE UP (ref 32–36)
MCV RBC AUTO: 85.8 FL — SIGNIFICANT CHANGE UP (ref 80–100)
NRBC # BLD: 0 /100 WBCS — SIGNIFICANT CHANGE UP (ref 0–0)
PLATELET # BLD AUTO: 128 K/UL — LOW (ref 150–400)
POTASSIUM SERPL-MCNC: 4 MMOL/L — SIGNIFICANT CHANGE UP (ref 3.5–5.3)
POTASSIUM SERPL-SCNC: 4 MMOL/L — SIGNIFICANT CHANGE UP (ref 3.5–5.3)
RBC # BLD: 3.24 M/UL — LOW (ref 4.2–5.8)
RBC # FLD: 13.7 % — SIGNIFICANT CHANGE UP (ref 10.3–14.5)
SODIUM SERPL-SCNC: 138 MMOL/L — SIGNIFICANT CHANGE UP (ref 135–145)
WBC # BLD: 5.28 K/UL — SIGNIFICANT CHANGE UP (ref 3.8–10.5)
WBC # FLD AUTO: 5.28 K/UL — SIGNIFICANT CHANGE UP (ref 3.8–10.5)

## 2021-03-13 PROCEDURE — 76775 US EXAM ABDO BACK WALL LIM: CPT | Mod: 26

## 2021-03-13 RX ORDER — HALOPERIDOL DECANOATE 100 MG/ML
2 INJECTION INTRAMUSCULAR ONCE
Refills: 0 | Status: COMPLETED | OUTPATIENT
Start: 2021-03-13 | End: 2021-03-13

## 2021-03-13 RX ORDER — RISPERIDONE 4 MG/1
0.25 TABLET ORAL AT BEDTIME
Refills: 0 | Status: DISCONTINUED | OUTPATIENT
Start: 2021-03-13 | End: 2021-03-14

## 2021-03-13 RX ADMIN — Medication 200 MILLIGRAM(S): at 17:02

## 2021-03-13 RX ADMIN — MAGNESIUM OXIDE 400 MG ORAL TABLET 400 MILLIGRAM(S): 241.3 TABLET ORAL at 17:02

## 2021-03-13 RX ADMIN — APIXABAN 2.5 MILLIGRAM(S): 2.5 TABLET, FILM COATED ORAL at 17:02

## 2021-03-13 RX ADMIN — Medication 12.5 MILLIGRAM(S): at 08:27

## 2021-03-13 RX ADMIN — APIXABAN 2.5 MILLIGRAM(S): 2.5 TABLET, FILM COATED ORAL at 06:34

## 2021-03-13 RX ADMIN — CALCITRIOL 0.5 MICROGRAM(S): 0.5 CAPSULE ORAL at 11:57

## 2021-03-13 RX ADMIN — HALOPERIDOL DECANOATE 2 MILLIGRAM(S): 100 INJECTION INTRAMUSCULAR at 17:02

## 2021-03-13 RX ADMIN — PANTOPRAZOLE SODIUM 40 MILLIGRAM(S): 20 TABLET, DELAYED RELEASE ORAL at 06:34

## 2021-03-13 RX ADMIN — MAGNESIUM OXIDE 400 MG ORAL TABLET 400 MILLIGRAM(S): 241.3 TABLET ORAL at 11:58

## 2021-03-13 RX ADMIN — Medication 200 MILLIGRAM(S): at 06:34

## 2021-03-13 RX ADMIN — MAGNESIUM OXIDE 400 MG ORAL TABLET 400 MILLIGRAM(S): 241.3 TABLET ORAL at 08:27

## 2021-03-13 RX ADMIN — FINASTERIDE 5 MILLIGRAM(S): 5 TABLET, FILM COATED ORAL at 11:57

## 2021-03-13 NOTE — CHART NOTE - NSCHARTNOTEFT_GEN_A_CORE
RN called reporting patient wanting to leave the hospital. Patient seen at the bedside. Patient is confused , unable to tell me where he is , or the date and time. He kept saying he wants to go home and getting agitated . Unsteady gait . Discussed with Dr Garcia. She spoke with HCP, who states that they clearly do not want patient back at home . Haldol was ordered, patient refusing to take Haldol. Patient do not have the capacity to make medical decisions at this time. Recommended RN to give Haldol.  Tele psych was consulted as well. Will follow RN called reporting patient wanting to leave the hospital. Patient seen at the bedside. Patient is confused , unable to tell me where he is , or the date and time. He kept saying he wants to go home and getting agitated . Unsteady gait . Discussed with Dr Garcia. She spoke with HCP, who states that they clearly do not want patient back at home . Haldol was ordered, patient refusing to take Haldol. On my exam, Patient do not have the capacity to make medical decisions at this time. Recommended RN to give Haldol.  Tele psych was consulted as well. Spoke again with patient's emergency contact Mishel Avila who identified herself as the wife of patient's first cousin. Since he is not  nor have any other family members she is the one who is making all decisions for the patient. Explained the situation. She states that patient has been progressively getting unsteady and she do not want patient back home and states he lives alone . She states that she and her  supports any medical decisions made by medical team to keep patient safe. Will follow

## 2021-03-13 NOTE — PROGRESS NOTE ADULT - SUBJECTIVE AND OBJECTIVE BOX
CARDIOLOGY/MEDICAL ATTENDING    CHIEF COMPLAINT:Patient is a 80y old  Male who presents with a chief complaint of dizziness.Ot agitated this am and confused.    	  REVIEW OF SYSTEMS:  CONSTITUTIONAL: No fever, weight loss, or fatigue  EYES: No eye pain, visual disturbances, or discharge  ENT:  No difficulty hearing, tinnitus, vertigo; No sinus or throat pain  NECK: No pain or stiffness  RESPIRATORY: No cough, wheezing, chills or hemoptysis; No Shortness of Breath  CARDIOVASCULAR: No chest pain, palpitations, passing out, dizziness, or leg swelling  GASTROINTESTINAL: No abdominal or epigastric pain. No nausea, vomiting, or hematemesis; No diarrhea or constipation. No melena or hematochezia.  GENITOURINARY: No dysuria, frequency, hematuria, or incontinence  NEUROLOGICAL: No headaches, memory loss, loss of strength, numbness, or tremors  SKIN: No itching, burning, rashes, or lesions   LYMPH Nodes: No enlarged glands  ENDOCRINE: No heat or cold intolerance; No hair loss  MUSCULOSKELETAL: No joint pain or swelling; No muscle, back, or extremity pain  PSYCHIATRIC: No depression, anxiety, mood swings, or difficulty sleeping  HEME/LYMPH: No easy bruising, or bleeding gums  ALLERGY AND IMMUNOLOGIC: No hives or eczema	      PHYSICAL EXAM:  T(C): 37.2 (03-13-21 @ 05:27), Max: 37.2 (03-13-21 @ 05:27)  HR: 79 (03-13-21 @ 05:27) (74 - 83)  BP: 136/63 (03-13-21 @ 05:27) (116/67 - 170/80)  RR: 18 (03-13-21 @ 05:27) (18 - 18)  SpO2: 96% (03-13-21 @ 05:27) (96% - 100%)  Wt(kg): --  I&O's Summary      Appearance: Normal	  HEENT:   Normal oral mucosa, PERRL, EOMI	  Lymphatic: No lymphadenopathy  Cardiovascular: Normal S1 S2, No JVD, No murmurs, No edema  Respiratory: Lungs clear to auscultation	  Psychiatry: A & O x 3, Mood & affect appropriate  Gastrointestinal:  Soft, Non-tender, + BS	  Skin: No rashes, No ecchymoses, No cyanosis	  Neurologic: Non-focal  Extremities: Normal range of motion, No clubbing, cyanosis or edema  Vascular: Peripheral pulses palpable 2+ bilaterally    MEDICATIONS  (STANDING):  apixaban 2.5 milliGRAM(s) Oral every 12 hours  atorvastatin 40 milliGRAM(s) Oral at bedtime  calcitriol   Capsule 0.5 MICROGram(s) Oral daily  finasteride 5 milliGRAM(s) Oral daily  haloperidol    Injectable 2 milliGRAM(s) IV Push once  insulin lispro (ADMELOG) corrective regimen sliding scale   SubCutaneous three times a day before meals  insulin lispro (ADMELOG) corrective regimen sliding scale   SubCutaneous at bedtime  labetalol 200 milliGRAM(s) Oral every 12 hours  magnesium oxide 400 milliGRAM(s) Oral three times a day with meals  pantoprazole    Tablet 40 milliGRAM(s) Oral before breakfast  risperiDONE   Tablet 0.25 milliGRAM(s) Oral at bedtime    : 	  	  LABS:	 	                        10.0   5.28  )-----------( 128      ( 13 Mar 2021 07:04 )             27.8     03-13    138  |  108  |  26<H>  ----------------------------<  146<H>  4.0   |  22  |  1.56<H>    Ca    7.6<L>      13 Mar 2021 07:04  Mg     1.7     03-12    TPro  6.6  /  Alb  3.3<L>  /  TBili  0.7  /  DBili  x   /  AST  10  /  ALT  14  /  AlkPhos  49  03-12      Lipid Profile: Cholesterol 135  HDL 37        TSH: Thyroid Stimulating Hormone, Serum: 1.69 uU/mL (03-11 @ 10:25)      	    c

## 2021-03-14 DIAGNOSIS — F03.91 UNSPECIFIED DEMENTIA WITH BEHAVIORAL DISTURBANCE: ICD-10-CM

## 2021-03-14 LAB
% ALBUMIN: 59.3 % — SIGNIFICANT CHANGE UP
% ALPHA 1: 4.3 % — SIGNIFICANT CHANGE UP
% ALPHA 2: 12.9 % — SIGNIFICANT CHANGE UP
% BETA: 11.5 % — SIGNIFICANT CHANGE UP
% GAMMA: 12 % — SIGNIFICANT CHANGE UP
% M SPIKE: 1.6 % — SIGNIFICANT CHANGE UP
ALBUMIN SERPL ELPH-MCNC: 3.6 G/DL — SIGNIFICANT CHANGE UP (ref 3.6–5.5)
ALBUMIN/GLOB SERPL ELPH: 1.5 RATIO — SIGNIFICANT CHANGE UP
ALPHA1 GLOB SERPL ELPH-MCNC: 0.3 G/DL — SIGNIFICANT CHANGE UP (ref 0.1–0.4)
ALPHA2 GLOB SERPL ELPH-MCNC: 0.8 G/DL — SIGNIFICANT CHANGE UP (ref 0.5–1)
B-GLOBULIN SERPL ELPH-MCNC: 0.7 G/DL — SIGNIFICANT CHANGE UP (ref 0.5–1)
GAMMA GLOBULIN: 0.7 G/DL — SIGNIFICANT CHANGE UP (ref 0.6–1.6)
GLUCOSE BLDC GLUCOMTR-MCNC: 142 MG/DL — HIGH (ref 70–99)
GLUCOSE BLDC GLUCOMTR-MCNC: 149 MG/DL — HIGH (ref 70–99)
GLUCOSE BLDC GLUCOMTR-MCNC: 187 MG/DL — HIGH (ref 70–99)
GLUCOSE BLDC GLUCOMTR-MCNC: 193 MG/DL — HIGH (ref 70–99)
INTERPRETATION SERPL IFE-IMP: SIGNIFICANT CHANGE UP
M-SPIKE: 0.1 G/DL — HIGH (ref 0–0)
PROT PATTERN SERPL ELPH-IMP: SIGNIFICANT CHANGE UP

## 2021-03-14 RX ORDER — QUETIAPINE FUMARATE 200 MG/1
50 TABLET, FILM COATED ORAL AT BEDTIME
Refills: 0 | Status: DISCONTINUED | OUTPATIENT
Start: 2021-03-14 | End: 2021-03-15

## 2021-03-14 RX ORDER — REPAGLINIDE 1 MG/1
1 TABLET ORAL THREE TIMES A DAY
Refills: 0 | Status: DISCONTINUED | OUTPATIENT
Start: 2021-03-14 | End: 2021-03-17

## 2021-03-14 RX ORDER — LANOLIN ALCOHOL/MO/W.PET/CERES
3 CREAM (GRAM) TOPICAL ONCE
Refills: 0 | Status: COMPLETED | OUTPATIENT
Start: 2021-03-14 | End: 2021-03-14

## 2021-03-14 RX ORDER — HYDRALAZINE HCL 50 MG
25 TABLET ORAL THREE TIMES A DAY
Refills: 0 | Status: DISCONTINUED | OUTPATIENT
Start: 2021-03-14 | End: 2021-03-17

## 2021-03-14 RX ADMIN — ATORVASTATIN CALCIUM 40 MILLIGRAM(S): 80 TABLET, FILM COATED ORAL at 22:26

## 2021-03-14 RX ADMIN — Medication 200 MILLIGRAM(S): at 06:46

## 2021-03-14 RX ADMIN — MAGNESIUM OXIDE 400 MG ORAL TABLET 400 MILLIGRAM(S): 241.3 TABLET ORAL at 06:47

## 2021-03-14 RX ADMIN — REPAGLINIDE 1 MILLIGRAM(S): 1 TABLET ORAL at 13:58

## 2021-03-14 RX ADMIN — Medication 200 MILLIGRAM(S): at 17:33

## 2021-03-14 RX ADMIN — QUETIAPINE FUMARATE 50 MILLIGRAM(S): 200 TABLET, FILM COATED ORAL at 22:26

## 2021-03-14 RX ADMIN — APIXABAN 2.5 MILLIGRAM(S): 2.5 TABLET, FILM COATED ORAL at 06:46

## 2021-03-14 RX ADMIN — Medication 25 MILLIGRAM(S): at 13:58

## 2021-03-14 RX ADMIN — Medication 25 MILLIGRAM(S): at 22:26

## 2021-03-14 RX ADMIN — FINASTERIDE 5 MILLIGRAM(S): 5 TABLET, FILM COATED ORAL at 11:14

## 2021-03-14 RX ADMIN — CALCITRIOL 0.5 MICROGRAM(S): 0.5 CAPSULE ORAL at 11:14

## 2021-03-14 RX ADMIN — MAGNESIUM OXIDE 400 MG ORAL TABLET 400 MILLIGRAM(S): 241.3 TABLET ORAL at 11:15

## 2021-03-14 RX ADMIN — MAGNESIUM OXIDE 400 MG ORAL TABLET 400 MILLIGRAM(S): 241.3 TABLET ORAL at 17:33

## 2021-03-14 RX ADMIN — APIXABAN 2.5 MILLIGRAM(S): 2.5 TABLET, FILM COATED ORAL at 17:33

## 2021-03-14 RX ADMIN — PANTOPRAZOLE SODIUM 40 MILLIGRAM(S): 20 TABLET, DELAYED RELEASE ORAL at 06:46

## 2021-03-14 RX ADMIN — REPAGLINIDE 1 MILLIGRAM(S): 1 TABLET ORAL at 22:26

## 2021-03-14 NOTE — BEHAVIORAL HEALTH ASSESSMENT NOTE - NSBHCHARTREVIEWIMAGING_PSY_A_CORE FT
CT head 3/11/21: no acute findings; non specific findings of possible chronic microvascular injuries

## 2021-03-14 NOTE — BEHAVIORAL HEALTH ASSESSMENT NOTE - ELEVATED CHRONIC RISK
John Swartz is a 40 year old male who presents today for a complete physical exam.    Overall, doing well.  No major complaints  Weight still elevated    ALLERGIES:  No Known Allergies    Current Outpatient Prescriptions   Medication Sig Dispense Refill   • Valacyclovir HCl 1000 MG Tab Take 2 tablets by mouth 2 times daily. 8 tablet 3   • fluticasone (CUTIVATE) 0.05 % cream Apply to affected area BID 60 g 0     No current facility-administered medications for this visit.        Past Medical History:   Diagnosis Date   • Decreased libido 9/17/2007   • Obesity, unspecified    • Right Plantar Fasciitis 7/12/2005       Past Surgical History:   Procedure Laterality Date   • Foot/toes surgery proc unlisted  1996    fracture, 5th right toe   • Laparoscopy,cholyst w/ cholgpy  3/14/13    Dr. Garrido   • Past surgical history      left 4th finger tendon repair   • Removal gallbladder         Social History     Social History   • Marital status:      Spouse name: Yu   • Number of children: 2   • Years of education: N/A     Occupational History   •  [employer]     Social History Main Topics   • Smoking status: Never Smoker   • Smokeless tobacco: Never Used   • Alcohol use 2.0 oz/week     4 Standard drinks or equivalent per week      Comment: social   • Drug use: No   • Sexual activity: Yes     Partners: Female     Birth control/ protection: None      Comment: , Yu, office     Other Topics Concern   • None     Social History Narrative   • None       Family History   Problem Relation Age of Onset   • Stroke Maternal Grandmother    • Stroke Maternal Grandfather    • Cancer Paternal Grandfather      lung       REVIEW OF SYSTEMS:  see HPI; otherwise all negative    PHYSICAL EXAMINATION:  General appearance: alert and appropriate  Vital Signs:    Visit Vitals  /82 (BP Location: [facility], Patient Position: Sitting, Cuff Size: Large Adult)   Pulse 64   Ht 6' 5\" (1.956 m)   Wt  (!) 155.5 kg   BMI 40.65 kg/m²     Skin: Skin color, texture, turgor are normal. There are no bruises, rashes or lesions.  Head: normocephalic. No masses, lesions, tenderness or abnormalities  Eyes: conjuctiva/sclera nl. No erythema, edema or exudate.  Ears: External ears normal. Canals clear. TM's normal.  Nose/Sinuses: Nares normal. Septum midline. Mucosa normal. No drainage or sinus tenderness.  Oropharynx: Lips, mucosa, and tongue normal. Teeth and gums normal. Oropharynx clear.  Neck: Supple, carotid upstrokes 2+ bilaterally, no bruits, no thyromegaly or nodules, no cervical adenopathy and no supraclavicular adenopathy  Heart: RRR, no murmur, gallop or extra sounds, PMI not displaced  Lungs: respirations even and unlabored, clear with good aeration, no rales, rhonchi or wheezing noted  Abdomen: soft, non-tender, BS normal, no masses or [id number] noted  Extremities: normal monofilament testing per protocol, extremities normal, without edema, deformity,rashes or skin dicoloration  Musculoskeletal: Upper and lower extremities symmetric with equal strength 5/5 bilaterally. Freely moving all extremities.  No joint deformities.  Spine with normal curvature., Spine range of motion normal. Muscular strength intact.  Peripheral pulses: radial=4/4, femoral=4/4, popliteal=4/4, dorsalis pedis=4/4  Neurologic: CN II-XII intact. Sensory and motor grossly intact. Reflexes normal and symmetric  Genitalia: Penis normal. No urethral discharge. Scrotum normal to palpation, testes descended bilateral.  No hernia.  Rectal: Not done    ASSESSMENT:  1. Routine general medical examination at a health care facility        PLAN:  Overall doing well  Discussed labs  No other concerns and will work on weight and follow up annually or sooner if needed     No

## 2021-03-14 NOTE — BEHAVIORAL HEALTH ASSESSMENT NOTE - SUMMARY
81yo domiciled, single, retired M with no known psych hx with multiple med diagnoses who presented after dizziness and asked for capacity to leave AMA. Patient is noted to be oscillating in his decision. Patient is unable to demonstrate awareness of his condition or mobility deficits, or about risk of leaving against AMA. Patient is noted to have some cognitive deficits inc disorientation about time and recall. This might be a contributing factor to his inability to demonstrate capacity.     Pt is without signs of depression, bhavna or psychosis. Pt is without SI/HI/AH/VH.

## 2021-03-14 NOTE — BEHAVIORAL HEALTH ASSESSMENT NOTE - NSBHCONSULTFOLLOWAFTERCARE_PSY_A_CORE FT
-need SW assistance - need to involve family with disposition planning  -need pt's family to help with clinical decision about staying in the hospital

## 2021-03-14 NOTE — PROGRESS NOTE ADULT - SUBJECTIVE AND OBJECTIVE BOX
CARDIOLOGY/MEDICAL ATTENDING    CHIEF COMPLAINT:Patient is a 80y old  Male who presents with a chief complaint of dizziness .Pt lessagitated today,but still confused.    	  REVIEW OF SYSTEMS:  CONSTITUTIONAL: No fever, weight loss, or fatigue  EYES: No eye pain, visual disturbances, or discharge  ENT:  No difficulty hearing, tinnitus, vertigo; No sinus or throat pain  NECK: No pain or stiffness  RESPIRATORY: No cough, wheezing, chills or hemoptysis; No Shortness of Breath  CARDIOVASCULAR: No chest pain, palpitations, passing out, dizziness, or leg swelling  GASTROINTESTINAL: No abdominal or epigastric pain. No nausea, vomiting, or hematemesis; No diarrhea or constipation. No melena or hematochezia.  GENITOURINARY: No dysuria, frequency, hematuria, or incontinence  NEUROLOGICAL: No headaches, memory loss, loss of strength, numbness, or tremors  SKIN: No itching, burning, rashes, or lesions   LYMPH Nodes: No enlarged glands  ENDOCRINE: No heat or cold intolerance; No hair loss  MUSCULOSKELETAL: No joint pain or swelling; No muscle, back, or extremity pain  PSYCHIATRIC: No depression, anxiety, mood swings, or difficulty sleeping  HEME/LYMPH: No easy bruising, or bleeding gums  ALLERGY AND IMMUNOLOGIC: No hives or eczema	        PHYSICAL EXAM:  T(C): 36.3 (03-14-21 @ 06:28), Max: 36.8 (03-13-21 @ 13:53)  HR: 78 (03-14-21 @ 06:28) (78 - 84)  BP: 165/68 (03-14-21 @ 06:28) (138/77 - 169/80)  RR: 18 (03-14-21 @ 06:28) (18 - 18)  SpO2: 92% (03-14-21 @ 06:28) (92% - 97%)  Wt(kg): --  I&O's Summary    13 Mar 2021 06:01  -  14 Mar 2021 07:00  --------------------------------------------------------  IN: 118 mL / OUT: 0 mL / NET: 118 mL        Appearance: Normal	  HEENT:   Normal oral mucosa, PERRL, EOMI	  Lymphatic: No lymphadenopathy  Cardiovascular: Normal S1 S2, No JVD, No murmurs, No edema  Respiratory: Lungs clear to auscultation	  Gastrointestinal:  Soft, Non-tender, + BS	  Skin: No rashes, No ecchymoses, No cyanosis	  Neurologic: Non-focal  Extremities: Normal range of motion, No clubbing, cyanosis or edema  Vascular: Peripheral pulses palpable 2+ bilaterally    MEDICATIONS  (STANDING):  apixaban 2.5 milliGRAM(s) Oral every 12 hours  atorvastatin 40 milliGRAM(s) Oral at bedtime  calcitriol   Capsule 0.5 MICROGram(s) Oral daily  finasteride 5 milliGRAM(s) Oral daily  insulin lispro (ADMELOG) corrective regimen sliding scale   SubCutaneous three times a day before meals  insulin lispro (ADMELOG) corrective regimen sliding scale   SubCutaneous at bedtime  labetalol 200 milliGRAM(s) Oral every 12 hours  magnesium oxide 400 milliGRAM(s) Oral three times a day with meals  pantoprazole    Tablet 40 milliGRAM(s) Oral before breakfast  risperiDONE   Tablet 0.25 milliGRAM(s) Oral at bedtime      	  	  LABS:	 	                        10.0   5.28  )-----------( 128      ( 13 Mar 2021 07:04 )             27.8     03-13    138  |  108  |  26<H>  ----------------------------<  146<H>  4.0   |  22  |  1.56<H>    Ca    7.6<L>      13 Mar 2021 07:04      proBNP:   Lipid Profile: Cholesterol 135  LDL --  HDL 37      HgA1c:   TSH: Thyroid Stimulating Hormone, Serum: 1.69 uU/mL (03-11 @ 10:25)      	    culCulture - Blood (02.14.21 @ 08:31)   Specimen Source: .Blood Blood   Culture Results:   No Growth Final     EXAM:  US KIDNEY(S)                            PROCEDURE DATE:  03/13/2021          INTERPRETATION:  CLINICAL INFORMATION: Renal failure    COMPARISON: 10/15/2017    TECHNIQUE: Sonography of the kidneys and bladder. Study is limited by patient's inability to fully cooperate    FINDINGS:    Right kidney: 10.8 cm. No renal mass, hydronephrosis or calculi.    Left kidney: 10.9 cm. No renal mass or calculi. Mild hydronephrosis is appreciated    Urinary bladder: Debris is seen within the bladder.    IMPRESSION:    Mild left hydronephrosis            JONE DOMINGO MD; Attending Radiologist  This document has been electronically signed. Mar 13 2021  1:07PM        immuImmunofixation, Serum (03.11.21 @ 21:56)   Immunofixation, Serum:   IgG Kappa Band Identified   Reference Range: None Detected

## 2021-03-14 NOTE — BEHAVIORAL HEALTH ASSESSMENT NOTE - HPI (INCLUDE ILLNESS QUALITY, SEVERITY, DURATION, TIMING, CONTEXT, MODIFYING FACTORS, ASSOCIATED SIGNS AND SYMPTOMS)
81yo domiciled, retired, single M w/ unclear psych hx (no official diagnosis, no meds, only engaged in tx for years w/ Maurisio Griffith) with med issues of DVT, anemia, GERD, HTN, HL, DM, hypoparathyroidism, glaucoma who came w/ dizziness and found to have orthostasis hypotension and continued unsteady gait. Patient was asked for capacity to leave AMA and for agitation.      Pt was seen and evaluated via telemonitor. When asked about his desire to leave the hospital against medical advice, he initially replied: "I don't want to do that. I want to stay and do whatever is needed." However, later in the evaluation, patient spoke of "I can't stay here, I still have my grocery here." Patient was asked about his understanding about his current medical condition, he replied: "Well I think it's clear that it's probably parkinson." When asked about his understanding of the med team's recommendation, he replied: "I don't think I've talked to anybody today or yesterday." When asked about risk of leaving the hospital against med advice: "I don't know if they said anything."       Pt denied current depressive mood or symptoms inc SI/intent or plan. Patient denied manic symptoms. Patient denied AH/VH/paranoia    Therapist: Maurisio Griffith [phone]: left message    Cousin's wife: Mishel [phone]: no known psych hx - no hx of SA/SIB, no psych hosp; no acute psych safety concerns, issues w/ ability to care for himself at home, some memory loss/confusion at times. Wanted team to consider help with HHA services or long term care facility

## 2021-03-14 NOTE — BEHAVIORAL HEALTH ASSESSMENT NOTE - NSBHCONSULTOBSREASON_PSY_A_CORE FT
observation as per primary team - consider enhanced or 1:1 if concerned about agitation or confusion

## 2021-03-15 DIAGNOSIS — F34.1 DYSTHYMIC DISORDER: ICD-10-CM

## 2021-03-15 DIAGNOSIS — I95.1 ORTHOSTATIC HYPOTENSION: ICD-10-CM

## 2021-03-15 DIAGNOSIS — Z04.6 ENCOUNTER FOR GENERAL PSYCHIATRIC EXAMINATION, REQUESTED BY AUTHORITY: ICD-10-CM

## 2021-03-15 DIAGNOSIS — G31.84 MILD COGNITIVE IMPAIRMENT OF UNCERTAIN OR UNKNOWN ETIOLOGY: ICD-10-CM

## 2021-03-15 DIAGNOSIS — F43.25 ADJUSTMENT DISORDER WITH MIXED DISTURBANCE OF EMOTIONS AND CONDUCT: ICD-10-CM

## 2021-03-15 DIAGNOSIS — R33.9 RETENTION OF URINE, UNSPECIFIED: ICD-10-CM

## 2021-03-15 DIAGNOSIS — R45.1 RESTLESSNESS AND AGITATION: ICD-10-CM

## 2021-03-15 LAB
ANION GAP SERPL CALC-SCNC: 7 MMOL/L — SIGNIFICANT CHANGE UP (ref 5–17)
APPEARANCE UR: CLEAR — SIGNIFICANT CHANGE UP
BACTERIA # UR AUTO: ABNORMAL /HPF
BILIRUB UR-MCNC: NEGATIVE — SIGNIFICANT CHANGE UP
BUN SERPL-MCNC: 28 MG/DL — HIGH (ref 7–18)
CALCIUM SERPL-MCNC: 7.8 MG/DL — LOW (ref 8.4–10.5)
CHLORIDE SERPL-SCNC: 108 MMOL/L — SIGNIFICANT CHANGE UP (ref 96–108)
CO2 SERPL-SCNC: 24 MMOL/L — SIGNIFICANT CHANGE UP (ref 22–31)
COLOR SPEC: YELLOW — SIGNIFICANT CHANGE UP
CREAT SERPL-MCNC: 1.7 MG/DL — HIGH (ref 0.5–1.3)
DIFF PNL FLD: ABNORMAL
EPI CELLS # UR: ABNORMAL /HPF
GLUCOSE BLDC GLUCOMTR-MCNC: 157 MG/DL — HIGH (ref 70–99)
GLUCOSE BLDC GLUCOMTR-MCNC: 160 MG/DL — HIGH (ref 70–99)
GLUCOSE BLDC GLUCOMTR-MCNC: 195 MG/DL — HIGH (ref 70–99)
GLUCOSE SERPL-MCNC: 149 MG/DL — HIGH (ref 70–99)
GLUCOSE UR QL: 100 MG/DL
HCT VFR BLD CALC: 29.4 % — LOW (ref 39–50)
HGB BLD-MCNC: 10.4 G/DL — LOW (ref 13–17)
KETONES UR-MCNC: NEGATIVE — SIGNIFICANT CHANGE UP
LEUKOCYTE ESTERASE UR-ACNC: ABNORMAL
MCHC RBC-ENTMCNC: 30.2 PG — SIGNIFICANT CHANGE UP (ref 27–34)
MCHC RBC-ENTMCNC: 35.4 GM/DL — SIGNIFICANT CHANGE UP (ref 32–36)
MCV RBC AUTO: 85.5 FL — SIGNIFICANT CHANGE UP (ref 80–100)
NITRITE UR-MCNC: NEGATIVE — SIGNIFICANT CHANGE UP
NRBC # BLD: 0 /100 WBCS — SIGNIFICANT CHANGE UP (ref 0–0)
PH UR: 5 — SIGNIFICANT CHANGE UP (ref 5–8)
PLATELET # BLD AUTO: 144 K/UL — LOW (ref 150–400)
POTASSIUM SERPL-MCNC: 3.7 MMOL/L — SIGNIFICANT CHANGE UP (ref 3.5–5.3)
POTASSIUM SERPL-SCNC: 3.7 MMOL/L — SIGNIFICANT CHANGE UP (ref 3.5–5.3)
PROT UR-MCNC: 15
RBC # BLD: 3.44 M/UL — LOW (ref 4.2–5.8)
RBC # FLD: 13.6 % — SIGNIFICANT CHANGE UP (ref 10.3–14.5)
RBC CASTS # UR COMP ASSIST: SIGNIFICANT CHANGE UP /HPF (ref 0–2)
SODIUM SERPL-SCNC: 139 MMOL/L — SIGNIFICANT CHANGE UP (ref 135–145)
SP GR SPEC: 1.01 — SIGNIFICANT CHANGE UP (ref 1.01–1.02)
TROPONIN I SERPL-MCNC: <0.015 NG/ML — SIGNIFICANT CHANGE UP (ref 0–0.04)
UROBILINOGEN FLD QL: NEGATIVE — SIGNIFICANT CHANGE UP
WBC # BLD: 5.82 K/UL — SIGNIFICANT CHANGE UP (ref 3.8–10.5)
WBC # FLD AUTO: 5.82 K/UL — SIGNIFICANT CHANGE UP (ref 3.8–10.5)
WBC UR QL: >50 /HPF (ref 0–5)

## 2021-03-15 PROCEDURE — 93010 ELECTROCARDIOGRAM REPORT: CPT

## 2021-03-15 PROCEDURE — 99233 SBSQ HOSP IP/OBS HIGH 50: CPT

## 2021-03-15 RX ORDER — LANOLIN ALCOHOL/MO/W.PET/CERES
3 CREAM (GRAM) TOPICAL ONCE
Refills: 0 | Status: COMPLETED | OUTPATIENT
Start: 2021-03-15 | End: 2021-03-15

## 2021-03-15 RX ORDER — SENNA PLUS 8.6 MG/1
2 TABLET ORAL AT BEDTIME
Refills: 0 | Status: DISCONTINUED | OUTPATIENT
Start: 2021-03-15 | End: 2021-03-17

## 2021-03-15 RX ORDER — MIRTAZAPINE 45 MG/1
7.5 TABLET, ORALLY DISINTEGRATING ORAL AT BEDTIME
Refills: 0 | Status: DISCONTINUED | OUTPATIENT
Start: 2021-03-15 | End: 2021-03-17

## 2021-03-15 RX ORDER — SODIUM CHLORIDE 9 MG/ML
1000 INJECTION INTRAMUSCULAR; INTRAVENOUS; SUBCUTANEOUS
Refills: 0 | Status: DISCONTINUED | OUTPATIENT
Start: 2021-03-15 | End: 2021-03-17

## 2021-03-15 RX ORDER — POLYETHYLENE GLYCOL 3350 17 G/17G
17 POWDER, FOR SOLUTION ORAL DAILY
Refills: 0 | Status: DISCONTINUED | OUTPATIENT
Start: 2021-03-15 | End: 2021-03-17

## 2021-03-15 RX ORDER — TAMSULOSIN HYDROCHLORIDE 0.4 MG/1
0.4 CAPSULE ORAL AT BEDTIME
Refills: 0 | Status: DISCONTINUED | OUTPATIENT
Start: 2021-03-15 | End: 2021-03-17

## 2021-03-15 RX ORDER — QUETIAPINE FUMARATE 200 MG/1
50 TABLET, FILM COATED ORAL EVERY 6 HOURS
Refills: 0 | Status: DISCONTINUED | OUTPATIENT
Start: 2021-03-15 | End: 2021-03-15

## 2021-03-15 RX ORDER — ERYTHROPOIETIN 10000 [IU]/ML
4000 INJECTION, SOLUTION INTRAVENOUS; SUBCUTANEOUS
Refills: 0 | Status: DISCONTINUED | OUTPATIENT
Start: 2021-03-15 | End: 2021-03-15

## 2021-03-15 RX ADMIN — Medication 200 MILLIGRAM(S): at 05:18

## 2021-03-15 RX ADMIN — REPAGLINIDE 1 MILLIGRAM(S): 1 TABLET ORAL at 05:18

## 2021-03-15 RX ADMIN — MAGNESIUM OXIDE 400 MG ORAL TABLET 400 MILLIGRAM(S): 241.3 TABLET ORAL at 17:35

## 2021-03-15 RX ADMIN — Medication 25 MILLIGRAM(S): at 22:27

## 2021-03-15 RX ADMIN — FINASTERIDE 5 MILLIGRAM(S): 5 TABLET, FILM COATED ORAL at 13:40

## 2021-03-15 RX ADMIN — MAGNESIUM OXIDE 400 MG ORAL TABLET 400 MILLIGRAM(S): 241.3 TABLET ORAL at 07:24

## 2021-03-15 RX ADMIN — REPAGLINIDE 1 MILLIGRAM(S): 1 TABLET ORAL at 13:40

## 2021-03-15 RX ADMIN — Medication 25 MILLIGRAM(S): at 05:18

## 2021-03-15 RX ADMIN — CALCITRIOL 0.5 MICROGRAM(S): 0.5 CAPSULE ORAL at 13:40

## 2021-03-15 RX ADMIN — Medication 200 MILLIGRAM(S): at 17:35

## 2021-03-15 RX ADMIN — Medication 3 MILLIGRAM(S): at 22:27

## 2021-03-15 RX ADMIN — APIXABAN 2.5 MILLIGRAM(S): 2.5 TABLET, FILM COATED ORAL at 05:18

## 2021-03-15 RX ADMIN — TAMSULOSIN HYDROCHLORIDE 0.4 MILLIGRAM(S): 0.4 CAPSULE ORAL at 22:26

## 2021-03-15 RX ADMIN — REPAGLINIDE 1 MILLIGRAM(S): 1 TABLET ORAL at 22:27

## 2021-03-15 RX ADMIN — ATORVASTATIN CALCIUM 40 MILLIGRAM(S): 80 TABLET, FILM COATED ORAL at 22:26

## 2021-03-15 RX ADMIN — MIRTAZAPINE 7.5 MILLIGRAM(S): 45 TABLET, ORALLY DISINTEGRATING ORAL at 22:26

## 2021-03-15 RX ADMIN — MAGNESIUM OXIDE 400 MG ORAL TABLET 400 MILLIGRAM(S): 241.3 TABLET ORAL at 13:40

## 2021-03-15 RX ADMIN — APIXABAN 2.5 MILLIGRAM(S): 2.5 TABLET, FILM COATED ORAL at 17:35

## 2021-03-15 RX ADMIN — QUETIAPINE FUMARATE 50 MILLIGRAM(S): 200 TABLET, FILM COATED ORAL at 09:05

## 2021-03-15 RX ADMIN — Medication 25 MILLIGRAM(S): at 13:40

## 2021-03-15 NOTE — PROGRESS NOTE ADULT - PROBLEM SELECTOR PLAN 8
Controlled  Continue with Labetalol and Hydralazine  Dr. Garcia, Cardiology, following
Eliquis 2.5mg daily for DVT ppx

## 2021-03-15 NOTE — CONSULT NOTE ADULT - ASSESSMENT
80 year old male was admitted for near syncope.  He has not been eating well lately.  He has nausea.  near syncope happened in the day he did not eat thing.  He was found to have elevated Cr an a small M spike.      1. near syncope, most likely dehydration and hypotension    2. loss of appetite and weight.  will do a CT abd/pelvis  ?GI    3. small M spike, will do free light chain  urine protein 15, unlikely amyloidosis  but orthostatic hypotension is a feature of amyloidosis  check urine B-J protein.

## 2021-03-15 NOTE — PROGRESS NOTE ADULT - SUBJECTIVE AND OBJECTIVE BOX
CHIEF COMPLAINT:Patient is a 80y old  Male who presents with a chief complaint of dizziness (15 Mar 2021 09:59)    	  REVIEW OF SYSTEMS:  CONSTITUTIONAL: No fever, weight loss, or fatigue  EYES: No eye pain, visual disturbances, or discharge  ENT:  No difficulty hearing, tinnitus, vertigo; No sinus or throat pain  NECK: No pain or stiffness  RESPIRATORY: No cough, wheezing, chills or hemoptysis; No Shortness of Breath  CARDIOVASCULAR: No chest pain, palpitations, passing out, dizziness, or leg swelling  GASTROINTESTINAL: No abdominal or epigastric pain. No nausea, vomiting, or hematemesis; No diarrhea or constipation. No melena or hematochezia.  GENITOURINARY: No dysuria, frequency, hematuria, or incontinence  NEUROLOGICAL: No headaches, memory loss, loss of strength, numbness, or tremors  SKIN: No itching, burning, rashes, or lesions   LYMPH Nodes: No enlarged glands  ENDOCRINE: No heat or cold intolerance; No hair loss  MUSCULOSKELETAL: No joint pain or swelling; No muscle, back, or extremity pain  PSYCHIATRIC: No depression, anxiety, mood swings, or difficulty sleeping  HEME/LYMPH: No easy bruising, or bleeding gums  ALLERGY AND IMMUNOLOGIC: No hives or eczema	    [ ] All others negative	  [ ] Unable to obtain    PHYSICAL EXAM:  T(C): 36.5 (03-15-21 @ 05:19), Max: 36.5 (03-15-21 @ 05:19)  HR: 71 (03-15-21 @ 05:19) (68 - 71)  BP: 147/77 (03-15-21 @ 05:19) (127/68 - 180/81)  RR: 18 (03-15-21 @ 05:19) (17 - 18)  SpO2: 95% (03-15-21 @ 05:19) (95% - 97%)  Wt(kg): --  I&O's Summary      Appearance: Normal	  HEENT:   Normal oral mucosa, PERRL, EOMI	  Lymphatic: No lymphadenopathy  Cardiovascular: Normal S1 S2, No JVD, No murmurs, No edema  Respiratory: Lungs clear to auscultation	  Psychiatry: A & O x 3, Mood & affect appropriate  Gastrointestinal:  Soft, Non-tender, + BS	  Skin: No rashes, No ecchymoses, No cyanosis	  Neurologic: Non-focal  Extremities: Normal range of motion, No clubbing, cyanosis or edema  Vascular: Peripheral pulses palpable 2+ bilaterally    MEDICATIONS  (STANDING):  apixaban 2.5 milliGRAM(s) Oral every 12 hours  atorvastatin 40 milliGRAM(s) Oral at bedtime  calcitriol   Capsule 0.5 MICROGram(s) Oral daily  finasteride 5 milliGRAM(s) Oral daily  hydrALAZINE 25 milliGRAM(s) Oral three times a day  insulin lispro (ADMELOG) corrective regimen sliding scale   SubCutaneous three times a day before meals  insulin lispro (ADMELOG) corrective regimen sliding scale   SubCutaneous at bedtime  labetalol 200 milliGRAM(s) Oral every 12 hours  magnesium oxide 400 milliGRAM(s) Oral three times a day with meals  pantoprazole    Tablet 40 milliGRAM(s) Oral before breakfast  repaglinide 1 milliGRAM(s) Oral three times a day  sodium chloride 0.9%. 1000 milliLiter(s) (75 mL/Hr) IV Continuous <Continuous>      TELEMETRY: 	    ECG:  	  RADIOLOGY:  OTHER: 	  	  LABS:	 	    CARDIAC MARKERS:  CARDIAC MARKERS ( 15 Mar 2021 08:21 )  <0.015 ng/mL / x     / x     / x     / x                            10.4   5.82  )-----------( 144      ( 15 Mar 2021 05:23 )             29.4     03-15    139  |  108  |  28<H>  ----------------------------<  149<H>  3.7   |  24  |  1.70<H>    Ca    7.8<L>      15 Mar 2021 05:23      Lipid Profile: Cholesterol 135  LDL --  HDL 37      TSH: Thyroid Stimulating Hormone, Serum: 1.69 uU/mL (03-11 @ 10:25)

## 2021-03-15 NOTE — PROGRESS NOTE ADULT - PROBLEM SELECTOR PLAN 1
Presented with dizziness and nausea  No positional variation  CT head was negative  Could be vertigo vs orthostatic vs post circ stroke  Could not do CTA head and neck as cr is elevated  Will give meclizine and Zofran prn for symptomatic improvement  pt will f/u with neuroscience clinic after discharge as per neuro recommendation  neuro Dr Bartholomew.
CTH noted above  Continue with meclizine  Follow up with PT recommendations  Dr. Bartholomew, Neurology, following

## 2021-03-15 NOTE — PROGRESS NOTE ADULT - PROBLEM SELECTOR PLAN 10
PT recommends HPT  Psych says pt is stable to return home  Pt states he wants to return home upon discharge  Pt will need to be cleared by cardiology and urology for discharge

## 2021-03-15 NOTE — PROGRESS NOTE ADULT - PROBLEM SELECTOR PLAN 9
Pt takes Eliquis at home  Continue with home regimen
d/c home when medically optimized  pending PT and SW recommendations.

## 2021-03-15 NOTE — CONSULT NOTE ADULT - SUBJECTIVE AND OBJECTIVE BOX
Chief complain/HPI  CKD  81 yo male with medical history significant for Chronic DVT, Anemia, GERD, HTN, HLD, DM, Hypoparathyroidism, Glaucoma, came the ER for dizziness.  He says that he was told to have elevated creatinine in the past    < from: US Renal (03.13.21 @ 11:19) >  INTERPRETATION:  CLINICAL INFORMATION: Renal failure    COMPARISON: 10/15/2017    TECHNIQUE: Sonography of the kidneys and bladder. Study is limited by patient's inability to fully cooperate    FINDINGS:    Right kidney: 10.8 cm. No renal mass, hydronephrosis or calculi.    Left kidney: 10.9 cm. No renal mass or calculi. Mild hydronephrosis is appreciated    Urinary bladder: Debris is seen within the bladder.    IMPRESSION:    Mild left hydronephrosis    < end of copied text >            PAST MEDICAL & SURGICAL HISTORY:  DVT (deep venous thrombosis)    Hypomagnesemia    Anemia    GERD (gastroesophageal reflux disease)    Hypercholesterolemia    Hypertension    Diabetes    No significant past surgical history        Home Medications Reviewed    Hospital Medications:   MEDICATIONS  (STANDING):  apixaban 2.5 milliGRAM(s) Oral every 12 hours  atorvastatin 40 milliGRAM(s) Oral at bedtime  calcitriol   Capsule 0.5 MICROGram(s) Oral daily  finasteride 5 milliGRAM(s) Oral daily  hydrALAZINE 25 milliGRAM(s) Oral three times a day  insulin lispro (ADMELOG) corrective regimen sliding scale   SubCutaneous three times a day before meals  insulin lispro (ADMELOG) corrective regimen sliding scale   SubCutaneous at bedtime  labetalol 200 milliGRAM(s) Oral every 12 hours  magnesium oxide 400 milliGRAM(s) Oral three times a day with meals  pantoprazole    Tablet 40 milliGRAM(s) Oral before breakfast  repaglinide 1 milliGRAM(s) Oral three times a day  sodium chloride 0.9%. 1000 milliLiter(s) (75 mL/Hr) IV Continuous <Continuous>    MEDICATIONS  (PRN):  meclizine 12.5 milliGRAM(s) Oral every 6 hours PRN Dizziness  ondansetron Injectable 4 milliGRAM(s) IV Push every 6 hours PRN Nausea and/or Vomiting  QUEtiapine 50 milliGRAM(s) Oral every 6 hours PRN Agitation      Allergies    Allergy Status Unknown  No Known Allergies    Intolerances                              10.4   5.82  )-----------( 144      ( 15 Mar 2021 05:23 )             29.4     03-15    139  |  108  |  28<H>  ----------------------------<  149<H>  3.7   |  24  |  1.70<H>    Ca    7.8<L>      15 Mar 2021 05:23          Chloride, Random Urine: 172 mmol/L (03-11 @ 09:32)  Creatinine, Random Urine: 113 mg/dL (03-11 @ 09:32)  Sodium, Random Urine: 111 mmol/L (03-11 @ 09:32)  Osmolality, Random Urine: 682 mos/kg (03-11 @ 09:32)        RADIOLOGY & ADDITIONAL STUDIES:    SOCIAL HISTORY: Denies ETOh,Smoking,     FAMILY HISTORY:  No pertinent family history in first degree relatives        REVIEW OF SYSTEMS:  CONSTITUTIONAL: No malaise, No fatigue, No fevers or chills, well developed, no diaphoresis  EYES/ENT: No visual changes;  No vertigo or throat pain   NECK: No pain or stiffness  RESPIRATORY: No cough, wheezing, hemoptysis; No shortness of breath  CARDIOVASCULAR: No chest pain or palpitations. No edema  GASTROINTESTINAL: No abdominal or epigastric pain. No nausea, vomiting, or hematemesis; No diarrhea or constipation. No melena or hematochezia.  GENITOURINARY: No dysuria, frequency, foamy urine, urinary urgency, incontinence or hematuria  NEUROLOGICAL: No numbness or weakness, No tremor  SKIN: No itching, burning, rashes, or lesions   VASCULAR: No claudication  Musculoskeletal: no arthralgia, no myalgia  All other review of systems is negative unless indicated above.    VITALS:  Vital Signs Last 24 Hrs  T(C): 36.5 (15 Mar 2021 05:19), Max: 36.5 (15 Mar 2021 05:19)  T(F): 97.7 (15 Mar 2021 05:19), Max: 97.7 (15 Mar 2021 05:19)  HR: 71 (15 Mar 2021 05:19) (68 - 71)  BP: 147/77 (15 Mar 2021 05:19) (127/68 - 180/81)  BP(mean): --  RR: 18 (15 Mar 2021 05:19) (17 - 18)  SpO2: 95% (15 Mar 2021 05:19) (95% - 97%)        PHYSICAL EXAM:  Constitutional: NAD  HEENT: anicteric sclera, oropharynx clear, MMM  Neck: No JVD  Respiratory: good air entrance B/L, no wheezes, rales or rhonchi  Cardiovascular: S1, S2, RRR, no pericardial rub, no murmur  Gastrointestinal: BS+, soft, no tenderness, no distension, no bruit  Pelvis: bladder non-distended, no CVA tenderness  Extremities: No cyanosis or clubbing. No peripheral edema  Neurological: A/O x 3, no focal deficits  Psychiatric: Normal mood, normal affect  : No CVA tenderness. No pena.   Skin: No rashes  Vascular: all pulses present  Access:                     Chief complain/HPI  CKD  79 yo male with medical history significant for Chronic DVT, Anemia, GERD, HTN, HLD, DM, Hypoparathyroidism, Glaucoma, came the ER for dizziness.  He says that he was told to have elevated creatinine in the past  History of BPH followed with urology and on meds  c/o slow stream , no pain with urination      < from: US Renal (03.13.21 @ 11:19) >  INTERPRETATION:  CLINICAL INFORMATION: Renal failure    COMPARISON: 10/15/2017    TECHNIQUE: Sonography of the kidneys and bladder. Study is limited by patient's inability to fully cooperate    FINDINGS:    Right kidney: 10.8 cm. No renal mass, hydronephrosis or calculi.    Left kidney: 10.9 cm. No renal mass or calculi. Mild hydronephrosis is appreciated    Urinary bladder: Debris is seen within the bladder.    IMPRESSION:    Mild left hydronephrosis    < end of copied text >            PAST MEDICAL & SURGICAL HISTORY:  DVT (deep venous thrombosis)    Hypomagnesemia    Anemia    GERD (gastroesophageal reflux disease)    Hypercholesterolemia    Hypertension    Diabetes    No significant past surgical history        Home Medications Reviewed    Hospital Medications:   MEDICATIONS  (STANDING):  apixaban 2.5 milliGRAM(s) Oral every 12 hours  atorvastatin 40 milliGRAM(s) Oral at bedtime  calcitriol   Capsule 0.5 MICROGram(s) Oral daily  finasteride 5 milliGRAM(s) Oral daily  hydrALAZINE 25 milliGRAM(s) Oral three times a day  insulin lispro (ADMELOG) corrective regimen sliding scale   SubCutaneous three times a day before meals  insulin lispro (ADMELOG) corrective regimen sliding scale   SubCutaneous at bedtime  labetalol 200 milliGRAM(s) Oral every 12 hours  magnesium oxide 400 milliGRAM(s) Oral three times a day with meals  pantoprazole    Tablet 40 milliGRAM(s) Oral before breakfast  repaglinide 1 milliGRAM(s) Oral three times a day  sodium chloride 0.9%. 1000 milliLiter(s) (75 mL/Hr) IV Continuous <Continuous>    MEDICATIONS  (PRN):  meclizine 12.5 milliGRAM(s) Oral every 6 hours PRN Dizziness  ondansetron Injectable 4 milliGRAM(s) IV Push every 6 hours PRN Nausea and/or Vomiting  QUEtiapine 50 milliGRAM(s) Oral every 6 hours PRN Agitation      Allergies    Allergy Status Unknown  No Known Allergies    Intolerances                              10.4   5.82  )-----------( 144      ( 15 Mar 2021 05:23 )             29.4     03-15    139  |  108  |  28<H>  ----------------------------<  149<H>  3.7   |  24  |  1.70<H>    Ca    7.8<L>      15 Mar 2021 05:23          Chloride, Random Urine: 172 mmol/L (03-11 @ 09:32)  Creatinine, Random Urine: 113 mg/dL (03-11 @ 09:32)  Sodium, Random Urine: 111 mmol/L (03-11 @ 09:32)  Osmolality, Random Urine: 682 mos/kg (03-11 @ 09:32)        RADIOLOGY & ADDITIONAL STUDIES:    SOCIAL HISTORY: Denies ETOh,Smoking,     FAMILY HISTORY:  No pertinent family history in first degree relatives        REVIEW OF SYSTEMS:  CONSTITUTIONAL: No malaise, No fatigue, No fevers or chills, well developed, no diaphoresis  EYES/ENT: No visual changes;  No vertigo or throat pain   NECK: No pain or stiffness  RESPIRATORY: No cough, wheezing, hemoptysis; No shortness of breath  CARDIOVASCULAR: No chest pain or palpitations. No edema  GASTROINTESTINAL: No abdominal or epigastric pain. No nausea, vomiting.  GENITOURINARY: as above    VITALS:  Vital Signs Last 24 Hrs  T(C): 36.5 (15 Mar 2021 05:19), Max: 36.5 (15 Mar 2021 05:19)  T(F): 97.7 (15 Mar 2021 05:19), Max: 97.7 (15 Mar 2021 05:19)  HR: 71 (15 Mar 2021 05:19) (68 - 71)  BP: 147/77 (15 Mar 2021 05:19) (127/68 - 180/81)  BP(mean): --  RR: 18 (15 Mar 2021 05:19) (17 - 18)  SpO2: 95% (15 Mar 2021 05:19) (95% - 97%)        PHYSICAL EXAM:  Constitutional: NAD    Neck: No JVD  Respiratory: air entrance B/L, no wheezes, rales or rhonchi  Cardiovascular: S1, S2, RRR, no pericardial rub, no murmur  Gastrointestinal: BS+, soft, no tenderness, no distension, no bruit  Pelvis: bladder non-distended, no CVA tenderness  Extremities: No cyanosis or clubbing. No peripheral edema  Neurological: A/O x 3, no focal deficits  Psychiatric: Normal mood, normal affect  : bladder not dsitended

## 2021-03-15 NOTE — CONSULT NOTE ADULT - SUBJECTIVE AND OBJECTIVE BOX
HPI:    Mr. Avila is an 80yom with DM,HTN,Lipid d/o,DVT,CRI,Hyperparathyroidism here with dizziness,unsteady fait, hypomagnesemia,Lt hydro,+SPEP,syncope.  Originally presented with dizziness.  Cr on admission was 1.55, stable from June of this year (1.53, available in [facility]IE) and 2018 (1.3-1.5, available in [facility]).  US was performed which detected mild left hydronephrosis.  Patient bladder scanned at bedside with ~250cc in bladder, voided an additional 80cc afterwards.  Has previously seen Dr. Longoria and was taking flomax and finasteride, but was noted to be poorly compliant.    O: Vital Signs Last 24 Hrs  T(C): 36.5 (15 Mar 2021 05:19), Max: 36.5 (15 Mar 2021 05:19)  T(F): 97.7 (15 Mar 2021 05:19), Max: 97.7 (15 Mar 2021 05:19)  HR: 71 (15 Mar 2021 05:19) (68 - 71)  BP: 147/77 (15 Mar 2021 05:19) (127/68 - 180/81)  BP(mean): --  RR: 18 (15 Mar 2021 05:19) (17 - 18)  SpO2: 95% (15 Mar 2021 05:19) (95% - 97%)        Physical Exam:    Gen: Well-developed, well-nourished in no acute distress  Resp: No additional work of breathing   GI: Soft, non-tender, non-distended, with normoactive bowel sounds.  No masses.  : No suprapubic tenderness, urine in urinal is yellow  MSK: Moves all extremities equally  Skin: No rashes    Labs:                        10.4   5.82  )-----------( 144      ( 15 Mar 2021 05:23 )             29.4     15 Mar 2021 05:23    139    |  108    |  28     ----------------------------<  149    3.7     |  24     |  1.70     Ca    7.8        15 Mar 2021 05:23        CAPILLARY BLOOD GLUCOSE      POCT Blood Glucose.: 157 mg/dL (15 Mar 2021 07:47)  POCT Blood Glucose.: 142 mg/dL (14 Mar 2021 21:47)  POCT Blood Glucose.: 149 mg/dL (14 Mar 2021 17:00)  POCT Blood Glucose.: 187 mg/dL (14 Mar 2021 11:52)    CARDIAC MARKERS ( 15 Mar 2021 08:21 )  <0.015 ng/mL / x     / x     / x     / x                  MEDICATIONS  (STANDING):  apixaban 2.5 milliGRAM(s) Oral every 12 hours  atorvastatin 40 milliGRAM(s) Oral at bedtime  calcitriol   Capsule 0.5 MICROGram(s) Oral daily  finasteride 5 milliGRAM(s) Oral daily  hydrALAZINE 25 milliGRAM(s) Oral three times a day  insulin lispro (ADMELOG) corrective regimen sliding scale   SubCutaneous three times a day before meals  insulin lispro (ADMELOG) corrective regimen sliding scale   SubCutaneous at bedtime  labetalol 200 milliGRAM(s) Oral every 12 hours  magnesium oxide 400 milliGRAM(s) Oral three times a day with meals  pantoprazole    Tablet 40 milliGRAM(s) Oral before breakfast  repaglinide 1 milliGRAM(s) Oral three times a day  sodium chloride 0.9%. 1000 milliLiter(s) (75 mL/Hr) IV Continuous <Continuous>    MEDICATIONS  (PRN):  meclizine 12.5 milliGRAM(s) Oral every 6 hours PRN Dizziness  ondansetron Injectable 4 milliGRAM(s) IV Push every 6 hours PRN Nausea and/or Vomiting  QUEtiapine 50 milliGRAM(s) Oral every 6 hours PRN Agitation

## 2021-03-15 NOTE — CONSULT NOTE ADULT - ASSESSMENT
Patient with history of CKD.  Mild hydronephrosis left.  Syncopal episode and hypotension today, patient was placed ON IV fluids NS     Patient with history of CKD.  BPH   Mild hydronephrosis left , US urine in bladder , unable to tell volume without measurement of bladder.  Patient on Finasteride may need to add Flomax, follow with urology and if need more studies  Urology consulted and follow bladder scan.  Follow UA       Syncopal episode and hypotension today, patient was placed ON IV fluids NS, follow BP so far no low BP documented in his vitals

## 2021-03-15 NOTE — CONSULT NOTE ADULT - SUBJECTIVE AND OBJECTIVE BOX
Patient is a 80y old  Male who presents with a chief complaint of dizziness (15 Mar 2021 12:59)      HPI:  81 yo male with medical history significant for Chronic DVT, Anemia, GERD, HTN, HLD, DM, Hypoparathyroidism, Glaucoma presents from home with complains of dizziness. Patient endorses having sensation of room spinning which started yesterday when patient was shopping at [employer]. It did not get better which made him come to ED. He states that he got medication in ED which made him feel better. Patient had similar episode last month when he visited ED. At that time, Patient reports that he was returning home from a visit to the store earlier today when he suddenly became vertiginous. Patient reports that he then sat down on a stoop and subsequently had an episode of emesis and urinated on himself. Patient is now complaining of some shakiness. Patient notes that his vertigo has since resolve. It is not associated with positional change. He also had associated nausea. Denies focal weakness, syncope, fall or trauma. Denies abdominal pain, diarrhea, vomiting, blurry vision, chest pain , palpitations, fever, sick contacts. (11 Mar 2021 10:18) He was foind to have a small M spike.       ROS:  Negative except for:    PAST MEDICAL & SURGICAL HISTORY:  DVT (deep venous thrombosis)    Hypomagnesemia    Anemia    GERD (gastroesophageal reflux disease)    Hypercholesterolemia    Hypertension    Diabetes    No significant past surgical history        SOCIAL HISTORY:    FAMILY HISTORY:  No pertinent family history in first degree relatives        MEDICATIONS  (STANDING):  apixaban 2.5 milliGRAM(s) Oral every 12 hours  atorvastatin 40 milliGRAM(s) Oral at bedtime  calcitriol   Capsule 0.5 MICROGram(s) Oral daily  finasteride 5 milliGRAM(s) Oral daily  hydrALAZINE 25 milliGRAM(s) Oral three times a day  insulin lispro (ADMELOG) corrective regimen sliding scale   SubCutaneous three times a day before meals  insulin lispro (ADMELOG) corrective regimen sliding scale   SubCutaneous at bedtime  labetalol 200 milliGRAM(s) Oral every 12 hours  magnesium oxide 400 milliGRAM(s) Oral three times a day with meals  pantoprazole    Tablet 40 milliGRAM(s) Oral before breakfast  repaglinide 1 milliGRAM(s) Oral three times a day  senna 2 Tablet(s) Oral at bedtime  sodium chloride 0.9%. 1000 milliLiter(s) (75 mL/Hr) IV Continuous <Continuous>  tamsulosin 0.4 milliGRAM(s) Oral at bedtime    MEDICATIONS  (PRN):  meclizine 12.5 milliGRAM(s) Oral every 6 hours PRN Dizziness  ondansetron Injectable 4 milliGRAM(s) IV Push every 6 hours PRN Nausea and/or Vomiting  polyethylene glycol 3350 17 Gram(s) Oral daily PRN Constipation      Allergies    Allergy Status Unknown  No Known Allergies    Intolerances        Vital Signs Last 24 Hrs  T(C): 36.6 (15 Mar 2021 13:19), Max: 36.6 (15 Mar 2021 13:19)  T(F): 97.8 (15 Mar 2021 13:19), Max: 97.8 (15 Mar 2021 13:19)  HR: 84 (15 Mar 2021 13:19) (68 - 84)  BP: 142/78 (15 Mar 2021 13:19) (142/78 - 180/81)  BP(mean): --  RR: 18 (15 Mar 2021 13:19) (17 - 18)  SpO2: 95% (15 Mar 2021 13:19) (95% - 97%)    PHYSICAL EXAM  General: adult in NAD  HEENT: clear oropharynx, anicteric sclera, pink conjunctiva  Neck: supple  CV: normal S1/S2 with no murmur rubs or gallops  Lungs: positive air movement b/l ant lungs,clear to auscultation, no wheezes, no rales  Abdomen: soft non-tender non-distended, no hepatosplenomegaly  Ext: no clubbing cyanosis or edema  Skin: no rashes and no petechiae  Neuro: alert and oriented X 4, no focal deficits      LABS:                          10.4   5.82  )-----------( 144      ( 15 Mar 2021 05:23 )             29.4         Mean Cell Volume : 85.5 fl  Mean Cell Hemoglobin : 30.2 pg  Mean Cell Hemoglobin Concentration : 35.4 gm/dL  Auto Neutrophil # : x  Auto Lymphocyte # : x  Auto Monocyte # : x  Auto Eosinophil # : x  Auto Basophil # : x  Auto Neutrophil % : x  Auto Lymphocyte % : x  Auto Monocyte % : x  Auto Eosinophil % : x  Auto Basophil % : x      Serial CBC's  03-15 @ 05:23  Hct-29.4 / Hgb-10.4 / Plat-144 / RBC-3.44 / WBC-5.82  Serial CBC's  03-13 @ 07:04  Hct-27.8 / Hgb-10.0 / Plat-128 / RBC-3.24 / WBC-5.28  Serial CBC's  03-12 @ 06:40  Hct-29.9 / Hgb-10.5 / Plat-135 / RBC-3.45 / WBC-5.44      03-15    139  |  108  |  28<H>  ----------------------------<  149<H>  3.7   |  24  |  1.70<H>    Ca    7.8<L>      15 Mar 2021 05:23            Folate, Serum: 7.9 ng/mL (03-11 @ 15:08)  Vitamin B12, Serum: 314 pg/mL (03-11 @ 15:08)      Immunofixation, Serum:    IgG Kappa Band Identified    Reference Range: None Detected (03-11 @ 21:56)  Serum Protein Electrophoresis Interp: Gamma-Migrating Paraprotein Identified (03-11 @ 18:54)          BLOOD SMEAR INTERPRETATION:       RADIOLOGY & ADDITIONAL STUDIES:

## 2021-03-15 NOTE — RAPID RESPONSE TEAM SUMMARY - NSSITUATIONBACKGROUNDRRT_GEN_ALL_CORE
79 yo male with medical history significant for Chronic DVT, Anemia, GERD, HTN, HLD, DM, Hypoparathyroidism, Glaucoma presented from home with complains of dizziness. Admitted to medicine for near syncope, evalauted by neuro: has movement disorder with parkinsoinan features. Dizziness in setting of orthostatic hypotension however has been refusing medical intervention. Was evaluated by psych for dementia with behavioral disturbance, meds being optimized. RRT called for syncopal episode. Patient walked to the restroom to void, upon getting up, got unresponsive and leaned against the door for support. Was helped by staff in to bed and. Upon arrival of RRT team, was lying comfortably in bed, awake and alert however did not known of his LOC, kept repeating he is fine.

## 2021-03-15 NOTE — PROGRESS NOTE ADULT - PROBLEM SELECTOR PLAN 4
pt evaluated by dr. Bartholomew for b/l hand tremors (Parkinson like symptoms)  f/u with movement specialist as outpatient.
Renal US noted above  Urology states PVR of ~200cc likely baseline for patient  Recommends pena, pt refused  Start Flomax 0.4mg QHS   Continue with Finasteride 5mg QD  Start senna and Miralax

## 2021-03-15 NOTE — PROGRESS NOTE ADULT - PROBLEM SELECTOR PLAN 7
Has h/o DVT  on Eliquis 2.5 mg daily.
Controlled  Continue with Prandin  Continue with sliding scale insulin coverage  Continue with glucose monitoring  Continue with low carb diet

## 2021-03-15 NOTE — PROGRESS NOTE BEHAVIORAL HEALTH - NSBHCHARTREVIEWINVESTIGATE_PSY_A_CORE FT
< from: Transthoracic Echocardiogram (03.13.21 @ 14:14) >    ------------------------------------------------------------------------  CONCLUSIONS:  1. Mitral annular calcification.Mild mitral regurgitation.    2. Increased relative wall thickness with normal left  ventricular (LV) mass index, consistent with concentric LV  remodeling.  3. Normal left ventricular systolic function.   Mild  diastolic dysfunction (stage I).  4. Normal right ventricular size and function.    < end of copied text >    < from: 12 Lead ECG (03.10.21 @ 21:23) >    QTC Calculation(Bazett) 457 ms    P Axis 62 degrees    R Axis -54 degrees    T Axis 57 degrees    Diagnosis Line Normal sinus rhythm  Left axis deviation  Abnormal ECG    < end of copied text >

## 2021-03-15 NOTE — CONSULT NOTE ADULT - ASSESSMENT
Mr. Avila is an 80yom with DM,HTN,Lipid d/o,DVT,CRI,Hyperparathyroidism here with dizziness,unsteady fait, hypomagnesemia,Lt hydro,+SPEP,syncope.     - PVR of ~200cc likely baseline for patient  - Slight rise in Cr to 1.7 from 1.55 today, however not significantly different from baseline of ~1.5; would continue to trend  - Discussed catheter placement with patient, patient voiced strong opposition to catheter at this time  -Flomax 0.4mg qHS   -Finasteride 5mg qDay  -Bowel regimen, senna, colace, miralax  -Hydration  - Appreciate nephrology recs  - D/w Dr. Longoria

## 2021-03-15 NOTE — PROGRESS NOTE ADULT - PROBLEM SELECTOR PLAN 6
Has h/o HTN  Holding Enalapril in view of MANUEL  c/w other home meds.
Pt to follow up with movement specialist as outpatient  Dr. Bartholomew, Neurology, following.

## 2021-03-15 NOTE — PROGRESS NOTE ADULT - PROBLEM SELECTOR PLAN 3
Presented with elevated cr  likely due to dehydration  will give IV fluids  Likely pre renal  f/u urine lytes  Avoid nephrotoxins   monitor BMP.
Pt started on IV fluids  Fluids d/c'd per Cardiology  Dr. Garcia, Cardiology, following

## 2021-03-15 NOTE — PROGRESS NOTE ADULT - PROBLEM SELECTOR PLAN 5
Likely secondary to retention  Renal US noted above   SCr 1.7  Pt refusing pena  Avoid nephrotoxins  Follow up BMP in AM  Dr. Orellana, Nephrology, following
Has h/o DM  pt on Metformin 500mg BID and Jenuvia 5mg daily at home  Started on HSS  monitor achs.

## 2021-03-15 NOTE — PROGRESS NOTE ADULT - SUBJECTIVE AND OBJECTIVE BOX
HPI:      OVERNIGHT EVENTS:      REVIEW OF SYSTEMS:      CONSTITUTIONAL: No fever  EYES: no acute visual disturbances  NECK: No pain or stiffness  RESPIRATORY: No cough; No shortness of breath  CARDIOVASCULAR: No chest pain, no palpitations  GASTROINTESTINAL: No pain. No nausea, vomiting or diarrhea   NEUROLOGICAL: No headache or numbness, no tremors  MUSCULOSKELETAL: No joint pain, no muscle pain  GENITOURINARY: no dysuria, no frequency, no hesitancy  PSYCHIATRY: no depression, no anxiety  ALL OTHER  ROS negative        Vital Signs Last 24 Hrs  T(C): 36.5 (15 Mar 2021 05:19), Max: 36.5 (15 Mar 2021 05:19)  T(F): 97.7 (15 Mar 2021 05:19), Max: 97.7 (15 Mar 2021 05:19)  HR: 71 (15 Mar 2021 05:19) (68 - 71)  BP: 147/77 (15 Mar 2021 05:19) (127/68 - 180/81)  BP(mean): --  RR: 18 (15 Mar 2021 05:19) (17 - 18)  SpO2: 95% (15 Mar 2021 05:19) (95% - 97%)    ________________________________________________  PHYSICAL EXAM:    GENERAL: NAD  HEENT: Normocephalic; conjunctivae and sclerae clear;  NECK : supple, no JVD  CHEST/LUNG: Clear to auscultation  HEART: S1 S2  regular  ABDOMEN: Soft, Nontender, Nondistended; Bowel sounds present  EXTREMITIES: no cyanosis; no LE edema; no calf tenderness  SKIN: warm and dry  NERVOUS SYSTEM:  Alert; no new deficits    _________________________________________________  CURRENT MEDICATIONS:    MEDICATIONS  (STANDING):  apixaban 2.5 milliGRAM(s) Oral every 12 hours  atorvastatin 40 milliGRAM(s) Oral at bedtime  calcitriol   Capsule 0.5 MICROGram(s) Oral daily  finasteride 5 milliGRAM(s) Oral daily  hydrALAZINE 25 milliGRAM(s) Oral three times a day  insulin lispro (ADMELOG) corrective regimen sliding scale   SubCutaneous three times a day before meals  insulin lispro (ADMELOG) corrective regimen sliding scale   SubCutaneous at bedtime  labetalol 200 milliGRAM(s) Oral every 12 hours  magnesium oxide 400 milliGRAM(s) Oral three times a day with meals  pantoprazole    Tablet 40 milliGRAM(s) Oral before breakfast  repaglinide 1 milliGRAM(s) Oral three times a day  sodium chloride 0.9%. 1000 milliLiter(s) (75 mL/Hr) IV Continuous <Continuous>    MEDICATIONS  (PRN):  meclizine 12.5 milliGRAM(s) Oral every 6 hours PRN Dizziness  ondansetron Injectable 4 milliGRAM(s) IV Push every 6 hours PRN Nausea and/or Vomiting  QUEtiapine 50 milliGRAM(s) Oral every 6 hours PRN Agitation      __________________________________________________  LABS:                          10.4   5.82  )-----------( 144      ( 15 Mar 2021 05:23 )             29.4     03-15    139  |  108  |  28<H>  ----------------------------<  149<H>  3.7   |  24  |  1.70<H>    Ca    7.8<L>      15 Mar 2021 05:23          CAPILLARY BLOOD GLUCOSE      POCT Blood Glucose.: 157 mg/dL (15 Mar 2021 07:47)  POCT Blood Glucose.: 142 mg/dL (14 Mar 2021 21:47)  POCT Blood Glucose.: 149 mg/dL (14 Mar 2021 17:00)      __________________________________________________  RADIOLOGY & ADDITIONAL TESTS:    Imaging Personally Reviewed:  YES      Consultant(s) Notes Reviewed:   YES     Plan of care was discussed with patient and /or primary care giver; all questions and concerns were addressed and care was aligned with patient's wishes.    Plan discussed with attending and consulting physicians.   HPI:  80 YOM admitted with dizziness.  Pt started on meclizine.  Seen and examined at bedside.  Pt refusing to participate in exam secondary to agitation and confusion.    OVERNIGHT EVENTS:  Pt had a rapid response secondary to near syncope while ambulating to bathroom.     REVIEW OF SYSTEMS:  Limited due to patients mental status     CONSTITUTIONAL: No fever    Vital Signs Last 24 Hrs  T(C): 36.5 (15 Mar 2021 05:19), Max: 36.5 (15 Mar 2021 05:19)  T(F): 97.7 (15 Mar 2021 05:19), Max: 97.7 (15 Mar 2021 05:19)  HR: 71 (15 Mar 2021 05:19) (68 - 71)  BP: 147/77 (15 Mar 2021 05:19) (127/68 - 180/81)  BP(mean): --  RR: 18 (15 Mar 2021 05:19) (17 - 18)  SpO2: 95% (15 Mar 2021 05:19) (95% - 97%)    ________________________________________________  PHYSICAL EXAM:    GENERAL: NAD  HEENT: Normocephalic; conjunctivae and sclerae clear;  NECK : supple, no JVD  CHEST/LUNG: Clear to auscultation  HEART: S1 S2  regular  ABDOMEN: Soft, Nontender, Nondistended; Bowel sounds present  EXTREMITIES: no cyanosis; no LE edema; no calf tenderness  SKIN: warm and dry  NERVOUS SYSTEM:  Alert; no new deficits    _________________________________________________  CURRENT MEDICATIONS:    MEDICATIONS  (STANDING):  apixaban 2.5 milliGRAM(s) Oral every 12 hours  atorvastatin 40 milliGRAM(s) Oral at bedtime  calcitriol   Capsule 0.5 MICROGram(s) Oral daily  finasteride 5 milliGRAM(s) Oral daily  hydrALAZINE 25 milliGRAM(s) Oral three times a day  insulin lispro (ADMELOG) corrective regimen sliding scale   SubCutaneous three times a day before meals  insulin lispro (ADMELOG) corrective regimen sliding scale   SubCutaneous at bedtime  labetalol 200 milliGRAM(s) Oral every 12 hours  magnesium oxide 400 milliGRAM(s) Oral three times a day with meals  pantoprazole    Tablet 40 milliGRAM(s) Oral before breakfast  repaglinide 1 milliGRAM(s) Oral three times a day  sodium chloride 0.9%. 1000 milliLiter(s) (75 mL/Hr) IV Continuous <Continuous>    MEDICATIONS  (PRN):  meclizine 12.5 milliGRAM(s) Oral every 6 hours PRN Dizziness  ondansetron Injectable 4 milliGRAM(s) IV Push every 6 hours PRN Nausea and/or Vomiting  QUEtiapine 50 milliGRAM(s) Oral every 6 hours PRN Agitation      __________________________________________________  LABS:                          10.4   5.82  )-----------( 144      ( 15 Mar 2021 05:23 )             29.4     03-15    139  |  108  |  28<H>  ----------------------------<  149<H>  3.7   |  24  |  1.70<H>    Ca    7.8<L>      15 Mar 2021 05:23          CAPILLARY BLOOD GLUCOSE      POCT Blood Glucose.: 157 mg/dL (15 Mar 2021 07:47)  POCT Blood Glucose.: 142 mg/dL (14 Mar 2021 21:47)  POCT Blood Glucose.: 149 mg/dL (14 Mar 2021 17:00)      __________________________________________________  RADIOLOGY & ADDITIONAL TESTS:    Imaging Personally Reviewed:  YES    < from: CT Head No Cont (03.11.21 @ 02:48) >  FINDINGS:    There is no evidence of mass or acute intracranial hemorrhage. Ventricles and sulci are normal in size and configuration for the patient's stated age. No midline shift or other significant mass effect is noted. There is no CT evidence of acute territorial infarct. There are periventricular white matter hypodensities that are nonspecific in nature but may reflect chronic ischemic microvascular disease. There are periventricular white matter hypodensities that are nonspecific in nature but may reflect chronic ischemic microvascular disease.      Mild polypoid mucosal thickening of the maxillary and ethmoid sinuses. Orbits and orbital contents are unremarkable.    There is no depressed calvarial fracture.        IMPRESSION:    No evidence of acute intracranial hemorrhage, midline shift or CT evidence of acute territorial infarct.    < end of copied text >    < from: US Renal (03.13.21 @ 11:19) >  FINDINGS:    Right kidney: 10.8 cm. No renal mass, hydronephrosis or calculi.    Left kidney: 10.9 cm. No renal mass or calculi. Mild hydronephrosis is appreciated    Urinary bladder: Debris is seen within the bladder.    IMPRESSION:    Mild left hydronephrosis    < end of copied text >    Consultant(s) Notes Reviewed:   YES     Plan of care was discussed with patient and /or primary care giver; all questions and concerns were addressed and care was aligned with patient's wishes.    Plan discussed with attending and consulting physicians.

## 2021-03-15 NOTE — CHART NOTE - NSCHARTNOTEFT_GEN_A_CORE
EVENT: Urinary retention. Reporting straining but denies dysuria/pain. Bladder scan >400Refusing straight cath and requesting a pill. Continue finasteride    OBJECTIVE:  Vital Signs Last 24 Hrs  T(C): 36.5 (15 Mar 2021 05:19), Max: 36.5 (15 Mar 2021 05:19)  T(F): 97.7 (15 Mar 2021 05:19), Max: 97.7 (15 Mar 2021 05:19)  HR: 71 (15 Mar 2021 05:19) (68 - 71)  BP: 147/77 (15 Mar 2021 05:19) (127/68 - 180/81)  BP(mean): --  RR: 18 (15 Mar 2021 05:19) (17 - 18)  SpO2: 95% (15 Mar 2021 05:19) (95% - 97%)    FOCUSED PHYSICAL EXAM:    LABS:                        10.4   5.82  )-----------( 144      ( 15 Mar 2021 05:23 )             29.4     03-15    139  |  108  |  28<H>  ----------------------------<  149<H>  3.7   |  24  |  1.70<H>    Ca    7.8<L>      15 Mar 2021 05:23        ASSESSMENT:  HPI:  81 yo male with medical history significant for Chronic DVT, Anemia, GERD, HTN, HLD, DM, Hypoparathyroidism, Glaucoma presents from home with complains of dizziness. Patient endorses having sensation of room spinning which started yesterday when patient was shopping at [employer]. It did not get better which made him come to ED. He states that he got medication in ED which made him feel better. Patient had similar episode last month when he visited ED. At that time, Patient reports that he was returning home from a visit to the store earlier today when he suddenly became vertiginous. Patient reports that he then sat down on a stoop and subsequently had an episode of emesis and urinated on himself. Patient is now complaining of some shakiness. Patient notes that his vertigo has since resolve. It is not associated with positional change. He also had associated nausea. Denies focal weakness, syncope, fall or trauma. Denies abdominal pain, diarrhea, vomiting, blurry vision, chest pain , palpitations, fever, sick contacts. (11 Mar 2021 10:18)      PLAN: Urinary retention likely 2/2 BPH  -Continue finasteride  -Reassess need for straight cath    FOLLOW UP / RESULT:

## 2021-03-15 NOTE — PROGRESS NOTE BEHAVIORAL HEALTH - NSBHCONSULTRECOMMENDOTHER_PSY_A_CORE FT
1. Recommend starting Remeron PO 7.5 mg qhs tonight to help with mood and sleep  --Pt agrees to take the medication, but he has the right to refuse if he so chooses  2. Would DC Seroquel standing and PRN, as medication does not appear to have been helpful so far  3. No indication for standing or PRN psychotropic medications at this time  4. Medical management as directed by primary team  5. Recommend SW make inquiries as to whether pt, as a registered Holocaust survivor, may be eligible for additional services or benefits through agencies which work with elderly Holocaust survivors ([employer] Help, Blue Card, etc.).  6. Psychiatry will continue to follow  7. Case d/w Dr. Schwartz of primary team    Tammy Mead MD  Director, Consultation-Liaison Psychiatry Service  [id number] 1. Recommend starting Remeron PO 7.5 mg qhs tonight to help with mood and sleep  --Pt agrees to take the medication, but he has the right to refuse if he so chooses  2. Would DC Seroquel standing and PRN, as medication does not appear to have been helpful so far  3. No indication for standing or PRN psychotropic medications at this time  4. Medical management as directed by primary team  5. Recommend SW make inquiries as to whether pt, as a registered Holocaust survivor, may be eligible for additional services or benefits through agencies which work with elderly Holocaust survivors (Self Help, Blue Card, etc.).  6. Psychiatry will continue to follow  7. Case d/w KP Huddleston of primary team    Tammy Mead MD  Director, Consultation-Liaison Psychiatry Service  [id number]

## 2021-03-16 ENCOUNTER — TRANSCRIPTION ENCOUNTER (OUTPATIENT)
Age: 81
End: 2021-03-16

## 2021-03-16 LAB
ANION GAP SERPL CALC-SCNC: 7 MMOL/L — SIGNIFICANT CHANGE UP (ref 5–17)
BUN SERPL-MCNC: 23 MG/DL — HIGH (ref 7–18)
CALCIUM SERPL-MCNC: 8.4 MG/DL — SIGNIFICANT CHANGE UP (ref 8.4–10.5)
CHLORIDE SERPL-SCNC: 106 MMOL/L — SIGNIFICANT CHANGE UP (ref 96–108)
CO2 SERPL-SCNC: 26 MMOL/L — SIGNIFICANT CHANGE UP (ref 22–31)
CREAT SERPL-MCNC: 1.67 MG/DL — HIGH (ref 0.5–1.3)
GLUCOSE BLDC GLUCOMTR-MCNC: 113 MG/DL — HIGH (ref 70–99)
GLUCOSE BLDC GLUCOMTR-MCNC: 162 MG/DL — HIGH (ref 70–99)
GLUCOSE BLDC GLUCOMTR-MCNC: 281 MG/DL — HIGH (ref 70–99)
GLUCOSE BLDC GLUCOMTR-MCNC: 315 MG/DL — HIGH (ref 70–99)
GLUCOSE SERPL-MCNC: 292 MG/DL — HIGH (ref 70–99)
HCT VFR BLD CALC: 32.6 % — LOW (ref 39–50)
HGB BLD-MCNC: 11.2 G/DL — LOW (ref 13–17)
MAGNESIUM SERPL-MCNC: 1.5 MG/DL — LOW (ref 1.6–2.6)
MCHC RBC-ENTMCNC: 29.6 PG — SIGNIFICANT CHANGE UP (ref 27–34)
MCHC RBC-ENTMCNC: 34.4 GM/DL — SIGNIFICANT CHANGE UP (ref 32–36)
MCV RBC AUTO: 86.2 FL — SIGNIFICANT CHANGE UP (ref 80–100)
NRBC # BLD: 0 /100 WBCS — SIGNIFICANT CHANGE UP (ref 0–0)
PHOSPHATE SERPL-MCNC: 2.9 MG/DL — SIGNIFICANT CHANGE UP (ref 2.5–4.5)
PLATELET # BLD AUTO: 152 K/UL — SIGNIFICANT CHANGE UP (ref 150–400)
POTASSIUM SERPL-MCNC: 4.4 MMOL/L — SIGNIFICANT CHANGE UP (ref 3.5–5.3)
POTASSIUM SERPL-SCNC: 4.4 MMOL/L — SIGNIFICANT CHANGE UP (ref 3.5–5.3)
RBC # BLD: 3.78 M/UL — LOW (ref 4.2–5.8)
RBC # FLD: 13.8 % — SIGNIFICANT CHANGE UP (ref 10.3–14.5)
SARS-COV-2 RNA SPEC QL NAA+PROBE: SIGNIFICANT CHANGE UP
SODIUM SERPL-SCNC: 139 MMOL/L — SIGNIFICANT CHANGE UP (ref 135–145)
WBC # BLD: 5.33 K/UL — SIGNIFICANT CHANGE UP (ref 3.8–10.5)
WBC # FLD AUTO: 5.33 K/UL — SIGNIFICANT CHANGE UP (ref 3.8–10.5)

## 2021-03-16 PROCEDURE — 74176 CT ABD & PELVIS W/O CONTRAST: CPT | Mod: 26

## 2021-03-16 PROCEDURE — 99232 SBSQ HOSP IP/OBS MODERATE 35: CPT

## 2021-03-16 RX ORDER — MECLIZINE HCL 12.5 MG
1 TABLET ORAL
Qty: 120 | Refills: 0
Start: 2021-03-16 | End: 2021-04-14

## 2021-03-16 RX ORDER — HYDRALAZINE HCL 50 MG
5 TABLET ORAL ONCE
Refills: 0 | Status: COMPLETED | OUTPATIENT
Start: 2021-03-16 | End: 2021-03-16

## 2021-03-16 RX ORDER — MIRTAZAPINE 45 MG/1
1 TABLET, ORALLY DISINTEGRATING ORAL
Qty: 30 | Refills: 0
Start: 2021-03-16 | End: 2021-04-14

## 2021-03-16 RX ORDER — ATORVASTATIN CALCIUM 80 MG/1
1 TABLET, FILM COATED ORAL
Qty: 30 | Refills: 0
Start: 2021-03-16 | End: 2021-04-14

## 2021-03-16 RX ORDER — HYDRALAZINE HCL 50 MG
1 TABLET ORAL
Qty: 90 | Refills: 0
Start: 2021-03-16 | End: 2021-04-14

## 2021-03-16 RX ORDER — LANOLIN ALCOHOL/MO/W.PET/CERES
5 CREAM (GRAM) TOPICAL AT BEDTIME
Refills: 0 | Status: DISCONTINUED | OUTPATIENT
Start: 2021-03-16 | End: 2021-03-17

## 2021-03-16 RX ORDER — TAMSULOSIN HYDROCHLORIDE 0.4 MG/1
1 CAPSULE ORAL
Qty: 30 | Refills: 0
Start: 2021-03-16 | End: 2021-04-14

## 2021-03-16 RX ORDER — MAGNESIUM SULFATE 500 MG/ML
2 VIAL (ML) INJECTION ONCE
Refills: 0 | Status: DISCONTINUED | OUTPATIENT
Start: 2021-03-16 | End: 2021-03-17

## 2021-03-16 RX ADMIN — Medication 25 MILLIGRAM(S): at 14:34

## 2021-03-16 RX ADMIN — MIRTAZAPINE 7.5 MILLIGRAM(S): 45 TABLET, ORALLY DISINTEGRATING ORAL at 23:06

## 2021-03-16 RX ADMIN — REPAGLINIDE 1 MILLIGRAM(S): 1 TABLET ORAL at 14:38

## 2021-03-16 RX ADMIN — MAGNESIUM OXIDE 400 MG ORAL TABLET 400 MILLIGRAM(S): 241.3 TABLET ORAL at 12:27

## 2021-03-16 RX ADMIN — REPAGLINIDE 1 MILLIGRAM(S): 1 TABLET ORAL at 23:06

## 2021-03-16 RX ADMIN — Medication 5 MILLIGRAM(S): at 23:05

## 2021-03-16 RX ADMIN — Medication 3: at 12:24

## 2021-03-16 RX ADMIN — SENNA PLUS 2 TABLET(S): 8.6 TABLET ORAL at 23:06

## 2021-03-16 RX ADMIN — Medication 25 MILLIGRAM(S): at 23:05

## 2021-03-16 RX ADMIN — TAMSULOSIN HYDROCHLORIDE 0.4 MILLIGRAM(S): 0.4 CAPSULE ORAL at 23:06

## 2021-03-16 RX ADMIN — CALCITRIOL 0.5 MICROGRAM(S): 0.5 CAPSULE ORAL at 12:25

## 2021-03-16 RX ADMIN — FINASTERIDE 5 MILLIGRAM(S): 5 TABLET, FILM COATED ORAL at 12:26

## 2021-03-16 RX ADMIN — ATORVASTATIN CALCIUM 40 MILLIGRAM(S): 80 TABLET, FILM COATED ORAL at 23:05

## 2021-03-16 NOTE — PROGRESS NOTE ADULT - SUBJECTIVE AND OBJECTIVE BOX
Interval Events:    No acute events overnight  Cr stable today    O: Vital Signs Last 24 Hrs  T(C): 36.9 (16 Mar 2021 13:44), Max: 36.9 (16 Mar 2021 13:44)  T(F): 98.5 (16 Mar 2021 13:44), Max: 98.5 (16 Mar 2021 13:44)  HR: 95 (16 Mar 2021 13:44) (73 - 95)  BP: 171/105 (16 Mar 2021 13:44) (159/73 - 179/84)  BP(mean): --  RR: 19 (16 Mar 2021 13:44) (17 - 19)  SpO2: 95% (16 Mar 2021 13:44) (94% - 96%)      15 Mar 2021 07:01  -  16 Mar 2021 07:00  --------------------------------------------------------  IN:    Oral Fluid: 240 mL  Total IN: 240 mL    OUT:    Voided (mL): 600 mL  Total OUT: 600 mL    Total NET: -360 mL          Physical Exam:    Gen: Well-developed, well-nourished in no acute distress  Resp: No additional work of breathing   GI: Soft, non-tender, non-distended, with normoactive bowel sounds.  No masses.  MSK: Moves all extremities equally  Skin: No rashes    Labs:                        11.2   5.33  )-----------( 152      ( 16 Mar 2021 11:07 )             32.6     16 Mar 2021 11:07    139    |  106    |  23     ----------------------------<  292    4.4     |  26     |  1.67     Ca    8.4        16 Mar 2021 11:07  Phos  2.9       16 Mar 2021 11:07  Mg     1.5       16 Mar 2021 11:07        CAPILLARY BLOOD GLUCOSE      POCT Blood Glucose.: 281 mg/dL (16 Mar 2021 12:09)  POCT Blood Glucose.: 162 mg/dL (16 Mar 2021 08:27)  POCT Blood Glucose.: 160 mg/dL (15 Mar 2021 21:33)  POCT Blood Glucose.: 195 mg/dL (15 Mar 2021 16:42)    CARDIAC MARKERS ( 15 Mar 2021 08:21 )  <0.015 ng/mL / x     / x     / x     / x              Urinalysis Basic - ( 15 Mar 2021 13:21 )    Color: Yellow / Appearance: Clear / S.010 / pH: x  Gluc: x / Ketone: Negative  / Bili: Negative / Urobili: Negative   Blood: x / Protein: 15 / Nitrite: Negative   Leuk Esterase: Moderate / RBC: 0-2 /HPF / WBC >50 /HPF   Sq Epi: x / Non Sq Epi: Occasional /HPF / Bacteria: Trace /HPF        MEDICATIONS  (STANDING):  apixaban 2.5 milliGRAM(s) Oral every 12 hours  atorvastatin 40 milliGRAM(s) Oral at bedtime  calcitriol   Capsule 0.5 MICROGram(s) Oral daily  finasteride 5 milliGRAM(s) Oral daily  hydrALAZINE 25 milliGRAM(s) Oral three times a day  insulin lispro (ADMELOG) corrective regimen sliding scale   SubCutaneous three times a day before meals  insulin lispro (ADMELOG) corrective regimen sliding scale   SubCutaneous at bedtime  labetalol 200 milliGRAM(s) Oral every 12 hours  magnesium oxide 400 milliGRAM(s) Oral three times a day with meals  magnesium sulfate  IVPB 2 Gram(s) IV Intermittent once  mirtazapine 7.5 milliGRAM(s) Oral at bedtime  pantoprazole    Tablet 40 milliGRAM(s) Oral before breakfast  repaglinide 1 milliGRAM(s) Oral three times a day  senna 2 Tablet(s) Oral at bedtime  sodium chloride 0.9%. 1000 milliLiter(s) (75 mL/Hr) IV Continuous <Continuous>  tamsulosin 0.4 milliGRAM(s) Oral at bedtime    MEDICATIONS  (PRN):  meclizine 12.5 milliGRAM(s) Oral every 6 hours PRN Dizziness  ondansetron Injectable 4 milliGRAM(s) IV Push every 6 hours PRN Nausea and/or Vomiting  polyethylene glycol 3350 17 Gram(s) Oral daily PRN Constipation

## 2021-03-16 NOTE — DISCHARGE NOTE NURSING/CASE MANAGEMENT/SOCIAL WORK - PATIENT PORTAL LINK FT
You can access the FollowMyHealth Patient Portal offered by [facility] by registering at the following website: [email or web address]. By joining [facility]’s FollowMyHealth portal, you will also be able to view your health information using other applications (apps) compatible with our system.

## 2021-03-16 NOTE — DISCHARGE NOTE NURSING/CASE MANAGEMENT/SOCIAL WORK - NSDCFUADDAPPT_GEN_ALL_CORE_FT
Please follow up with a movement specialist in the [facility] located within the [facility]

## 2021-03-16 NOTE — PROGRESS NOTE ADULT - SUBJECTIVE AND OBJECTIVE BOX
condition same  no pain or fever    MEDICATIONS  (STANDING):  apixaban 2.5 milliGRAM(s) Oral every 12 hours  atorvastatin 40 milliGRAM(s) Oral at bedtime  calcitriol   Capsule 0.5 MICROGram(s) Oral daily  finasteride 5 milliGRAM(s) Oral daily  hydrALAZINE 25 milliGRAM(s) Oral three times a day  insulin lispro (ADMELOG) corrective regimen sliding scale   SubCutaneous three times a day before meals  insulin lispro (ADMELOG) corrective regimen sliding scale   SubCutaneous at bedtime  labetalol 200 milliGRAM(s) Oral every 12 hours  magnesium oxide 400 milliGRAM(s) Oral three times a day with meals  mirtazapine 7.5 milliGRAM(s) Oral at bedtime  pantoprazole    Tablet 40 milliGRAM(s) Oral before breakfast  repaglinide 1 milliGRAM(s) Oral three times a day  senna 2 Tablet(s) Oral at bedtime  sodium chloride 0.9%. 1000 milliLiter(s) (75 mL/Hr) IV Continuous <Continuous>  tamsulosin 0.4 milliGRAM(s) Oral at bedtime    MEDICATIONS  (PRN):  meclizine 12.5 milliGRAM(s) Oral every 6 hours PRN Dizziness  ondansetron Injectable 4 milliGRAM(s) IV Push every 6 hours PRN Nausea and/or Vomiting  polyethylene glycol 3350 17 Gram(s) Oral daily PRN Constipation      Allergies    Allergy Status Unknown  No Known Allergies    Intolerances        Vital Signs Last 24 Hrs  T(C): 36.8 (16 Mar 2021 07:59), Max: 36.8 (15 Mar 2021 19:09)  T(F): 98.3 (16 Mar 2021 07:59), Max: 98.3 (15 Mar 2021 21:03)  HR: 73 (16 Mar 2021 07:59) (73 - 84)  BP: 167/88 (16 Mar 2021 07:59) (142/78 - 179/84)  BP(mean): --  RR: 18 (16 Mar 2021 07:59) (17 - 18)  SpO2: 95% (16 Mar 2021 07:59) (94% - 96%)    PHYSICAL EXAM  General: adult in NAD  HEENT: clear oropharynx, anicteric sclera, pink conjunctiva  Neck: supple  CV: normal S1/S2 with no murmur rubs or gallops  Lungs: positive air movement b/l ant lungs,clear to auscultation, no wheezes, no rales  Abdomen: soft non-tender non-distended, no hepatosplenomegaly  Ext: no clubbing cyanosis or edema  Skin: no rashes and no petechiae  Neuro: alert and oriented X 4, no focal deficits  LABS:                          10.4   5.82  )-----------( 144      ( 15 Mar 2021 05:23 )             29.4         Mean Cell Volume : 85.5 fl  Mean Cell Hemoglobin : 30.2 pg  Mean Cell Hemoglobin Concentration : 35.4 gm/dL  Auto Neutrophil # : x  Auto Lymphocyte # : x  Auto Monocyte # : x  Auto Eosinophil # : x  Auto Basophil # : x  Auto Neutrophil % : x  Auto Lymphocyte % : x  Auto Monocyte % : x  Auto Eosinophil % : x  Auto Basophil % : x    Serial CBC  Hematocrit 29.4  Hemoglobin 10.4  Plat 144  RBC 3.44  WBC 5.82  Serial CBC  Hematocrit 27.8  Hemoglobin 10.0  Plat 128  RBC 3.24  WBC 5.28    03-15    139  |  108  |  28<H>  ----------------------------<  149<H>  3.7   |  24  |  1.70<H>    Ca    7.8<L>      15 Mar 2021 05:23            Folate, Serum: 7.9 ng/mL (03-11 @ 15:08)  Vitamin B12, Serum: 314 pg/mL (03-11 @ 15:08)      Immunofixation, Serum:    IgG Kappa Band Identified    Reference Range: None Detected (03-11 @ 21:56)  Serum Protein Electrophoresis Interp: Gamma-Migrating Paraprotein Identified (03-11 @ 18:54)        BLOOD SMEAR INTERPRETATION:       RADIOLOGY & ADDITIONAL STUDIES:

## 2021-03-16 NOTE — PROGRESS NOTE BEHAVIORAL HEALTH - NSBHCONSULTRECOMMENDOTHER_PSY_A_CORE FT
1. Recommend c/w Remeron PO 7.5 mg qhs to help with mood and sleep  2. No indication for other standing or PRN psychotropic medications at this time  3. Medical management as directed by primary team  4. As of today's assessment, pt appears to have capacity for DC to self, and agrees to return home with [facility] services as recommended  5. Psychiatry will continue to follow  6. Case d/w KP Huddleston of primary team    Tammy Mead MD  Director, Consultation-Liaison Psychiatry Service  [id number]

## 2021-03-16 NOTE — PROGRESS NOTE ADULT - SUBJECTIVE AND OBJECTIVE BOX
CARDIOLOGY/MEDICAL ATTENDING    CHIEF COMPLAINT:Patient is a 80y old  Male who presents with a chief complaint of dizziness.Pt appears comfortable.    	  REVIEW OF SYSTEMS:  CONSTITUTIONAL: No fever, weight loss, or fatigue  EYES: No eye pain, visual disturbances, or discharge  ENT:  No difficulty hearing, tinnitus, vertigo; No sinus or throat pain  NECK: No pain or stiffness  RESPIRATORY: No cough, wheezing, chills or hemoptysis; No Shortness of Breath  CARDIOVASCULAR: No chest pain, palpitations, passing out, dizziness, or leg swelling  GASTROINTESTINAL: No abdominal or epigastric pain. No nausea, vomiting, or hematemesis; No diarrhea or constipation. No melena or hematochezia.  GENITOURINARY: No dysuria, frequency, hematuria, or incontinence  NEUROLOGICAL: No headaches, memory loss, loss of strength, numbness, or tremors  SKIN: No itching, burning, rashes, or lesions   LYMPH Nodes: No enlarged glands  ENDOCRINE: No heat or cold intolerance; No hair loss  MUSCULOSKELETAL: No joint pain or swelling; No muscle, back, or extremity pain  PSYCHIATRIC: No depression, anxiety, mood swings, or difficulty sleeping  HEME/LYMPH: No easy bruising, or bleeding gums  ALLERGY AND IMMUNOLOGIC: No hives or eczema	        PHYSICAL EXAM:  T(C): 36.8 (03-16-21 @ 07:59), Max: 36.8 (03-15-21 @ 19:09)  HR: 73 (03-16-21 @ 07:59) (73 - 84)  BP: 167/88 (03-16-21 @ 07:59) (142/78 - 179/84)  RR: 18 (03-16-21 @ 07:59) (17 - 18)  SpO2: 95% (03-16-21 @ 07:59) (94% - 96%)  Wt(kg): --  I&O's Summary    15 Mar 2021 07:01  -  16 Mar 2021 07:00  --------------------------------------------------------  IN: 240 mL / OUT: 600 mL / NET: -360 mL        Appearance: Normal	  HEENT:   Normal oral mucosa, PERRL, EOMI	  Lymphatic: No lymphadenopathy  Cardiovascular: Normal S1 S2, No JVD, No murmurs, No edema  Respiratory: Lungs clear to auscultation	  Psychiatry: A & O x 3, Mood & affect appropriate  Gastrointestinal:  Soft, Non-tender, + BS	  Skin: No rashes, No ecchymoses, No cyanosis	  Neurologic: Non-focal  Extremities: Normal range of motion, No clubbing, cyanosis or edema  Vascular: Peripheral pulses palpable 2+ bilaterally    MEDICATIONS  (STANDING):  apixaban 2.5 milliGRAM(s) Oral every 12 hours  atorvastatin 40 milliGRAM(s) Oral at bedtime  calcitriol   Capsule 0.5 MICROGram(s) Oral daily  finasteride 5 milliGRAM(s) Oral daily  hydrALAZINE 25 milliGRAM(s) Oral three times a day  insulin lispro (ADMELOG) corrective regimen sliding scale   SubCutaneous three times a day before meals  insulin lispro (ADMELOG) corrective regimen sliding scale   SubCutaneous at bedtime  labetalol 200 milliGRAM(s) Oral every 12 hours  magnesium oxide 400 milliGRAM(s) Oral three times a day with meals  mirtazapine 7.5 milliGRAM(s) Oral at bedtime  pantoprazole    Tablet 40 milliGRAM(s) Oral before breakfast  repaglinide 1 milliGRAM(s) Oral three times a day  senna 2 Tablet(s) Oral at bedtime  sodium chloride 0.9%. 1000 milliLiter(s) (75 mL/Hr) IV Continuous <Continuous>  tamsulosin 0.4 milliGRAM(s) Oral at bedtime      TELEMETRY: 	nsr    	  	  LABS:	 	      CARDIAC MARKERS ( 15 Mar 2021 08:21 )  <0.015 ng/mL / x     / x     / x     / x                                    11.2   5.33  )-----------( 152      ( 16 Mar 2021 11:07 )             32.6     03-16    139  |  106  |  23<H>  ----------------------------<  292<H>  4.4   |  26  |  1.67<H>    Ca    8.4      16 Mar 2021 11:07  Phos  2.9     03-16  Mg     1.5     03-16      proBNP:   Lipid Profile: Cholesterol 135  LDL --  HDL 37      HgA1c:   TSH: Thyroid Stimulating Hormone, Serum: 1.69 uU/mL (03-11 @ 10:25)      	    c< from: Transthoracic Echocardiogram (03.13.21 @ 14:14) >  OBSERVATIONS:  Mitral Valve: Mitral annular calcification. Mild mitral  regurgitation.  Aortic Root: Normal aortic root.  Aortic Valve: Aortic valve not well visualized. No elevated  gradients were noted across the valve.  Left Atrium: Normal left atrium.  LA volume index = 32  cc/m2.  Left Ventricle: Normal left ventricular systolic function.   Mild diastolic dysfunction (stage I). Increased relative  wall thickness with normal left ventricular (LV) mass  index, consistent with concentric LV remodeling.  Right Heart: Normal right atrium. Normal right ventricular  size and function. There is mild tricuspid regurgitation.  There is trace pulmonic regurgitation.  Pericardium/PleuraNormal pericardium with no pericardial  effusion.  Hemodynamic: Incomplete tricuspid regurgitation jet  precludes accurate assessment of pulmonary artery systolic  pressure.

## 2021-03-17 VITALS
WEIGHT: 159.84 LBS | RESPIRATION RATE: 18 BRPM | OXYGEN SATURATION: 95 % | HEART RATE: 78 BPM | DIASTOLIC BLOOD PRESSURE: 82 MMHG | SYSTOLIC BLOOD PRESSURE: 163 MMHG | TEMPERATURE: 99 F

## 2021-03-17 LAB
ANION GAP SERPL CALC-SCNC: 10 MMOL/L — SIGNIFICANT CHANGE UP (ref 5–17)
BUN SERPL-MCNC: 21 MG/DL — HIGH (ref 7–18)
CALCIUM SERPL-MCNC: 8.6 MG/DL — SIGNIFICANT CHANGE UP (ref 8.4–10.5)
CHLORIDE SERPL-SCNC: 106 MMOL/L — SIGNIFICANT CHANGE UP (ref 96–108)
CO2 SERPL-SCNC: 24 MMOL/L — SIGNIFICANT CHANGE UP (ref 22–31)
CREAT SERPL-MCNC: 1.39 MG/DL — HIGH (ref 0.5–1.3)
GLUCOSE BLDC GLUCOMTR-MCNC: 176 MG/DL — HIGH (ref 70–99)
GLUCOSE BLDC GLUCOMTR-MCNC: 276 MG/DL — HIGH (ref 70–99)
GLUCOSE SERPL-MCNC: 150 MG/DL — HIGH (ref 70–99)
HCT VFR BLD CALC: 31.9 % — LOW (ref 39–50)
HGB BLD-MCNC: 11.1 G/DL — LOW (ref 13–17)
KAPPA LC SER QL IFE: 3.94 MG/DL — HIGH (ref 0.33–1.94)
KAPPA/LAMBDA FREE LIGHT CHAIN RATIO, SERUM: 1.52 RATIO — SIGNIFICANT CHANGE UP (ref 0.26–1.65)
LAMBDA LC SER QL IFE: 2.6 MG/DL — SIGNIFICANT CHANGE UP (ref 0.57–2.63)
MAGNESIUM SERPL-MCNC: 1.6 MG/DL — SIGNIFICANT CHANGE UP (ref 1.6–2.6)
MCHC RBC-ENTMCNC: 29.9 PG — SIGNIFICANT CHANGE UP (ref 27–34)
MCHC RBC-ENTMCNC: 34.8 GM/DL — SIGNIFICANT CHANGE UP (ref 32–36)
MCV RBC AUTO: 86 FL — SIGNIFICANT CHANGE UP (ref 80–100)
NRBC # BLD: 0 /100 WBCS — SIGNIFICANT CHANGE UP (ref 0–0)
PHOSPHATE SERPL-MCNC: 2.8 MG/DL — SIGNIFICANT CHANGE UP (ref 2.5–4.5)
PLATELET # BLD AUTO: 139 K/UL — LOW (ref 150–400)
POTASSIUM SERPL-MCNC: 3.8 MMOL/L — SIGNIFICANT CHANGE UP (ref 3.5–5.3)
POTASSIUM SERPL-SCNC: 3.8 MMOL/L — SIGNIFICANT CHANGE UP (ref 3.5–5.3)
RBC # BLD: 3.71 M/UL — LOW (ref 4.2–5.8)
RBC # FLD: 13.6 % — SIGNIFICANT CHANGE UP (ref 10.3–14.5)
SODIUM SERPL-SCNC: 140 MMOL/L — SIGNIFICANT CHANGE UP (ref 135–145)
WBC # BLD: 5.95 K/UL — SIGNIFICANT CHANGE UP (ref 3.8–10.5)
WBC # FLD AUTO: 5.95 K/UL — SIGNIFICANT CHANGE UP (ref 3.8–10.5)

## 2021-03-17 PROCEDURE — 85027 COMPLETE CBC AUTOMATED: CPT

## 2021-03-17 PROCEDURE — 84165 PROTEIN E-PHORESIS SERUM: CPT

## 2021-03-17 PROCEDURE — 85610 PROTHROMBIN TIME: CPT

## 2021-03-17 PROCEDURE — 74176 CT ABD & PELVIS W/O CONTRAST: CPT

## 2021-03-17 PROCEDURE — 84155 ASSAY OF PROTEIN SERUM: CPT

## 2021-03-17 PROCEDURE — 36415 COLL VENOUS BLD VENIPUNCTURE: CPT

## 2021-03-17 PROCEDURE — 82746 ASSAY OF FOLIC ACID SERUM: CPT

## 2021-03-17 PROCEDURE — U0005: CPT

## 2021-03-17 PROCEDURE — 76775 US EXAM ABDO BACK WALL LIM: CPT

## 2021-03-17 PROCEDURE — 86769 SARS-COV-2 COVID-19 ANTIBODY: CPT

## 2021-03-17 PROCEDURE — 99232 SBSQ HOSP IP/OBS MODERATE 35: CPT

## 2021-03-17 PROCEDURE — 86334 IMMUNOFIX E-PHORESIS SERUM: CPT

## 2021-03-17 PROCEDURE — 80048 BASIC METABOLIC PNL TOTAL CA: CPT

## 2021-03-17 PROCEDURE — 96374 THER/PROPH/DIAG INJ IV PUSH: CPT

## 2021-03-17 PROCEDURE — 71045 X-RAY EXAM CHEST 1 VIEW: CPT

## 2021-03-17 PROCEDURE — 81001 URINALYSIS AUTO W/SCOPE: CPT

## 2021-03-17 PROCEDURE — 96375 TX/PRO/DX INJ NEW DRUG ADDON: CPT

## 2021-03-17 PROCEDURE — 70450 CT HEAD/BRAIN W/O DYE: CPT

## 2021-03-17 PROCEDURE — 84484 ASSAY OF TROPONIN QUANT: CPT

## 2021-03-17 PROCEDURE — 84443 ASSAY THYROID STIM HORMONE: CPT

## 2021-03-17 PROCEDURE — 84300 ASSAY OF URINE SODIUM: CPT

## 2021-03-17 PROCEDURE — 97116 GAIT TRAINING THERAPY: CPT

## 2021-03-17 PROCEDURE — 84100 ASSAY OF PHOSPHORUS: CPT

## 2021-03-17 PROCEDURE — 82962 GLUCOSE BLOOD TEST: CPT

## 2021-03-17 PROCEDURE — 82570 ASSAY OF URINE CREATININE: CPT

## 2021-03-17 PROCEDURE — 82607 VITAMIN B-12: CPT

## 2021-03-17 PROCEDURE — 87635 SARS-COV-2 COVID-19 AMP PRB: CPT

## 2021-03-17 PROCEDURE — 85025 COMPLETE CBC W/AUTO DIFF WBC: CPT

## 2021-03-17 PROCEDURE — 99285 EMERGENCY DEPT VISIT HI MDM: CPT | Mod: 25

## 2021-03-17 PROCEDURE — 83036 HEMOGLOBIN GLYCOSYLATED A1C: CPT

## 2021-03-17 PROCEDURE — 80053 COMPREHEN METABOLIC PANEL: CPT

## 2021-03-17 PROCEDURE — 83735 ASSAY OF MAGNESIUM: CPT

## 2021-03-17 PROCEDURE — 80061 LIPID PANEL: CPT

## 2021-03-17 PROCEDURE — 83521 IG LIGHT CHAINS FREE EACH: CPT

## 2021-03-17 PROCEDURE — 82436 ASSAY OF URINE CHLORIDE: CPT

## 2021-03-17 PROCEDURE — 85730 THROMBOPLASTIN TIME PARTIAL: CPT

## 2021-03-17 PROCEDURE — 83935 ASSAY OF URINE OSMOLALITY: CPT

## 2021-03-17 PROCEDURE — 93306 TTE W/DOPPLER COMPLETE: CPT

## 2021-03-17 PROCEDURE — 93005 ELECTROCARDIOGRAM TRACING: CPT

## 2021-03-17 RX ORDER — HYDRALAZINE HCL 50 MG
1 TABLET ORAL
Qty: 90 | Refills: 0
Start: 2021-03-17 | End: 2021-04-15

## 2021-03-17 RX ORDER — HYDRALAZINE HCL 50 MG
50 TABLET ORAL THREE TIMES A DAY
Refills: 0 | Status: DISCONTINUED | OUTPATIENT
Start: 2021-03-17 | End: 2021-03-17

## 2021-03-17 RX ADMIN — PANTOPRAZOLE SODIUM 40 MILLIGRAM(S): 20 TABLET, DELAYED RELEASE ORAL at 06:18

## 2021-03-17 RX ADMIN — Medication 25 MILLIGRAM(S): at 06:18

## 2021-03-17 RX ADMIN — APIXABAN 2.5 MILLIGRAM(S): 2.5 TABLET, FILM COATED ORAL at 06:18

## 2021-03-17 RX ADMIN — REPAGLINIDE 1 MILLIGRAM(S): 1 TABLET ORAL at 06:18

## 2021-03-17 RX ADMIN — Medication 200 MILLIGRAM(S): at 06:18

## 2021-03-17 RX ADMIN — CALCITRIOL 0.5 MICROGRAM(S): 0.5 CAPSULE ORAL at 12:02

## 2021-03-17 RX ADMIN — FINASTERIDE 5 MILLIGRAM(S): 5 TABLET, FILM COATED ORAL at 12:02

## 2021-03-17 RX ADMIN — MAGNESIUM OXIDE 400 MG ORAL TABLET 400 MILLIGRAM(S): 241.3 TABLET ORAL at 12:02

## 2021-03-17 NOTE — PROGRESS NOTE BEHAVIORAL HEALTH - NSBHCHARTREVIEWIMAGING_PSY_A_CORE FT
< from: CT Head No Cont (03.11.21 @ 02:48) >    FINDINGS:    There is no evidence of mass or acute intracranial hemorrhage. Ventricles and sulci are normal in size and configuration for the patient's stated age. No midline shift or other significant mass effect is noted. There is no CT evidence of acute territorial infarct. There are periventricular white matter hypodensities that are nonspecific in nature but may reflect chronic ischemic microvascular disease.     Mild polypoid mucosal thickening of the maxillary and ethmoid sinuses. Orbits and orbital contents are unremarkable.    There is no depressed calvarial fracture.      < end of copied text >
< from: CT Abdomen and Pelvis No Cont (03.16.21 @ 12:42) >      IMPRESSION:  Thick-walled bladder may be due to outlet obstruction.  Left hydroureteronephrosis  Enlarged prostate gland  Diverticulosis without diverticulitis    < end of copied text >

## 2021-03-17 NOTE — PROGRESS NOTE ADULT - ASSESSMENT
80 yr old male with PMHX of DM,HTN,Lipid d/o,DVT,CRI,Hyperparathyroidism here with dizziness,unsteady fait and hypomagnesemia.  1.Neurology eval.  2.DVT-Eliquis.  3.Echocardiogram.  4.DM-insulin.  5.HTN-labetalol.  6.CRI-f/u lytes,Renal eval.  7.Lipid d/o-statin.  8.PPI.  9.Mg-replacement.  10.PT.  
80 yr old male with PMHX of DM,HTN,Lipid d/o,DVT,CRI,Hyperparathyroidism here with dizziness,unsteady fait and hypomagnesemia.  1.Neurology eval.  2.DVT-Eliquis.  3.Echocardiogram.  4.DM-insulin.  5.HTN-labetalol.  6.CRI-f/u lytes,Renal eval.SPEP-p.  7.Lipid d/o-statin.  8.PPI.  9.Anemia profile,occult.  10.D/W HCP Mishel who agreess with plan and feels pt not safe to discharge home    
80 yr old male with PMHX of DM,HTN,Lipid d/o,DVT,CRI,Hyperparathyroidism here with dizziness,unsteady fait, hypomagnesemia,Lt hydro,+SPEP,syncope.  1.He,me f/u.  2.DVT-Eliquis.  3.DM-insulin.  4.HTN-labetalol,inc hydralazine 50mg q8.  5.CRI-f/u lytes,Renal f/u.  6.Lipid d/o-statin.  7.PPI.  8.Lt hydronephrosis,urinary retention- eval noted,rec flomax,proscar.  9.Orthostatic bp +.      
Mr. Avila is an 80yom with DM,HTN,Lipid d/o,DVT,CRI,Hyperparathyroidism here with dizziness,unsteady fait, hypomagnesemia,Lt hydro,+SPEP,syncope.     - PVR of ~200cc likely baseline for patient  - Slight rise in Cr to 1.7 from 1.55 today, however not significantly different from baseline of ~1.5; would continue to trend  - Discussed catheter placement with patient, patient voiced strong opposition to catheter at this time  -Flomax 0.4mg qHS   -Finasteride 5mg qDay  -Bowel regimen, senna, colace, miralax  -Hydration  -Would repeat PVR after taking flomax for 2-3 days  - F/u with Dr. Longoria as an outpatient  - Please call with any additional questions    
· Assessment	  80 year old male was admitted for near syncope.  He has not been eating well lately.  He has nausea.  near syncope happened in the day he did not eat thing.  He was found to have elevated Cr an a small M spike.      1. near syncope, most likely dehydration and hypotension    2. loss of appetite and weight.  will do a CT abd/pelvis  ?GI    3. small M spike, will do free light chain  urine protein 15, unlikely amyloidosis  but orthostatic hypotension is a feature of amyloidosis  check urine B-J protein.
80 yr old male with PMHX of DM,HTN,Lipid d/o,DVT,CRI,Hyperparathyroidism here with dizziness,unsteady fait, hypomagnesemia,Lt hydro,+SPEP,syncope.  1.Tele monitoring.  2.DVT-Eliquis.  3.Psych f/u noted, pt has capacity. Will await his decision regarding rehab vs home.  4.DM-insulin.  5.HTN-labetalol.  6.CRI-f/u lytes,Renal f/uSPEP+  7.Lipid d/o-statin.  8.PPI.  9.Lt hydronephrosis,urinary retention- eval noted,rec flomax,proscar.  10.Orthostatic bp +.  11.Heme eval appreciated-await CT abd and pelvis.    
· Assessment	  80 year old male was admitted for near syncope.  He has not been eating well lately.  He has nausea.  near syncope happened in the day he did not eat thing.  He was found to have elevated Cr an a small M spike.      1. near syncope, most likely dehydration and hypotension    2. loss of appetite and weight.  will do a CT abd/pelvis  ?GI    3. small M spike, will do free light chain  urine protein 15, unlikely amyloidosis  but orthostatic hypotension is a feature of amyloidosis  check urine B-J protein.
80 yr old male with PMHX of DM,HTN,Lipid d/o,DVT,CRI,Hyperparathyroidism here with dizziness,unsteady fait, hypomagnesemia,Lt hydro,+SPEP,syncope.  1.Tele monitoring.  2.DVT-Eliquis.  3.Echocardiogram.  4.DM-insulin.  5.HTN-labetalol.  6.CRI-f/u lytes,Renal eval.SPEP+  7.Lipid d/o-statin.  8.PPI.  9.Lt hydronephrosis- eval called.  10.Orthostatic bp q shift.  11.D/W HCP Mishel who agreess with plan and feels pt not safe to discharge home    
80 yr old male with PMHX of DM,HTN,Lipid d/o,DVT,CRI,Hyperparathyroidism here with dizziness,unsteady gait, hypomagnesemia,agitation and hydronephrosis.  1.Psych eval.  2.DVT-Eliquis.  3.Echocardiogram.  4.DM-insulin refused by pt,start prandin 1mg tid with meals.  5.HTN-labetalol,add hydralazine 25mg q8h.  6.CRI-f/u lytes,Renal f/u.SPEP+-IG G kappa-Heme eval.  7.Lipid d/o-statin.  8.PPI.  9.Anemia profile,occult.  10.Hydronephrosis-doubt will cooperate for CT scan.  11.D/W HCP Mishel who agrees with plan and feels pt not safe to discharge home.    
Patient is 80 year old male with medical history significant for Afib (on Eliquis), chronic DVT, Anemia, GERD, HTN, HLD, DM, Hypoparathyroidism, Glaucoma presents from home with complains of dizziness and unsteady gait. Patient endorses having sensation of room spinning which started yesterday when patient was shopping at [email or web address]. It did not get better which made him come to ED. Patient is now complaining of some shakiness. Patient admitted to telemetry for dizziness found to have positive orthostatic htn. Patient troponin negative x 2 , Head CT w/o abnormal findings. Neurology consulted, for patient symptoms of vertigo and b/l hand tremors with recommendation to encourage plentiful hydration and consider IV fluids.   Patient should follow up with movement specialist as outpatient to evaluate for parkinsonian symptoms, at: [facility] [address]   Phone:[phone].  Cardiology consulted with recommendation for Echo.      Patient seen and examined at bedside. Denies focal weakness, syncope, fall or trauma. Denies abdominal pain, diarrhea, vomiting, blurry vision, chest pain , palpitations, fever, sick contacts.

## 2021-03-17 NOTE — PROGRESS NOTE BEHAVIORAL HEALTH - NSBHCHARTREVIEWVS_PSY_A_CORE FT
Orthostatic VS    03-15-21 @ 07:35  Lying BP: 178/82 HR: 89   Sitting BP: 140/78 HR: 92  Standing BP: --/-- HR: --  Site: --   Mode: --    Vital Signs Last 24 Hrs  T(C): 36.6 (15 Mar 2021 13:19), Max: 36.6 (15 Mar 2021 13:19)  T(F): 97.8 (15 Mar 2021 13:19), Max: 97.8 (15 Mar 2021 13:19)  HR: 84 (15 Mar 2021 13:19) (68 - 84)  BP: 142/78 (15 Mar 2021 13:19) (142/78 - 180/81)  BP(mean): --  RR: 18 (15 Mar 2021 13:19) (17 - 18)  SpO2: 95% (15 Mar 2021 13:19) (95% - 97%)
Vital Signs Last 24 Hrs  T(C): 36.9 (16 Mar 2021 13:44), Max: 36.9 (16 Mar 2021 13:44)  T(F): 98.5 (16 Mar 2021 13:44), Max: 98.5 (16 Mar 2021 13:44)  HR: 90 (16 Mar 2021 14:20) (73 - 95)  BP: 163/93 (16 Mar 2021 14:20) (163/93 - 179/84)  BP(mean): --  RR: 19 (16 Mar 2021 13:44) (17 - 19)  SpO2: 95% (16 Mar 2021 13:44) (94% - 95%)
Vital Signs Last 24 Hrs  T(C): 37.1 (17 Mar 2021 04:51), Max: 37.1 (17 Mar 2021 04:51)  T(F): 98.7 (17 Mar 2021 04:51), Max: 98.7 (17 Mar 2021 04:51)  HR: 78 (17 Mar 2021 04:51) (78 - 85)  BP: 163/82 (17 Mar 2021 04:51) (147/85 - 163/82)  BP(mean): --  RR: 18 (17 Mar 2021 04:51) (18 - 19)  SpO2: 95% (17 Mar 2021 04:51) (95% - 95%)

## 2021-03-17 NOTE — PROGRESS NOTE ADULT - PROVIDER SPECIALTY LIST ADULT
Cardiology
Internal Medicine
Heme/Onc
Internal Medicine
Urology
Heme/Onc
Internal Medicine

## 2021-03-17 NOTE — PROGRESS NOTE ADULT - SUBJECTIVE AND OBJECTIVE BOX
CHIEF COMPLAINT:Patient is a 80y old  Male who presents with a chief complaint of dizziness.Pt appears comfortable.    	  REVIEW OF SYSTEMS:  CONSTITUTIONAL: No fever, weight loss, or fatigue  EYES: No eye pain, visual disturbances, or discharge  ENT:  No difficulty hearing, tinnitus, vertigo; No sinus or throat pain  NECK: No pain or stiffness  RESPIRATORY: No cough, wheezing, chills or hemoptysis; No Shortness of Breath  CARDIOVASCULAR: No chest pain, palpitations, passing out, dizziness, or leg swelling  GASTROINTESTINAL: No abdominal or epigastric pain. No nausea, vomiting, or hematemesis; No diarrhea or constipation. No melena or hematochezia.  GENITOURINARY: No dysuria, frequency, hematuria, or incontinence  NEUROLOGICAL: No headaches, memory loss, loss of strength, numbness, or tremors  SKIN: No itching, burning, rashes, or lesions   LYMPH Nodes: No enlarged glands  ENDOCRINE: No heat or cold intolerance; No hair loss  MUSCULOSKELETAL: No joint pain or swelling; No muscle, back, or extremity pain  PSYCHIATRIC: No depression, anxiety, mood swings, or difficulty sleeping  HEME/LYMPH: No easy bruising, or bleeding gums  ALLERGY AND IMMUNOLOGIC: No hives or eczema	      PHYSICAL EXAM:  T(C): 37.1 (03-17-21 @ 04:51), Max: 37.1 (03-17-21 @ 04:51)  HR: 78 (03-17-21 @ 04:51) (78 - 95)  BP: 163/82 (03-17-21 @ 04:51) (147/85 - 171/105)  RR: 18 (03-17-21 @ 04:51) (18 - 19)  SpO2: 95% (03-17-21 @ 04:51) (95% - 95%)  Wt(kg): --  I&O's Summary      Appearance: Normal	  HEENT:   Normal oral mucosa, PERRL, EOMI	  Lymphatic: No lymphadenopathy  Cardiovascular: Normal S1 S2, No JVD, No murmurs, No edema  Respiratory: Lungs clear to auscultation	  Psychiatry: A & O x 3, Mood & affect appropriate  Gastrointestinal:  Soft, Non-tender, + BS	  Skin: No rashes, No ecchymoses, No cyanosis	  Neurologic: Non-focal  Extremities: Normal range of motion, No clubbing, cyanosis or edema  Vascular: Peripheral pulses palpable 2+ bilaterally    MEDICATIONS  (STANDING):  apixaban 2.5 milliGRAM(s) Oral every 12 hours  atorvastatin 40 milliGRAM(s) Oral at bedtime  calcitriol   Capsule 0.5 MICROGram(s) Oral daily  finasteride 5 milliGRAM(s) Oral daily  hydrALAZINE 50 milliGRAM(s) Oral three times a day  insulin lispro (ADMELOG) corrective regimen sliding scale   SubCutaneous three times a day before meals  insulin lispro (ADMELOG) corrective regimen sliding scale   SubCutaneous at bedtime  labetalol 200 milliGRAM(s) Oral every 12 hours  magnesium oxide 400 milliGRAM(s) Oral three times a day with meals  magnesium sulfate  IVPB 2 Gram(s) IV Intermittent once  melatonin 5 milliGRAM(s) Oral at bedtime  mirtazapine 7.5 milliGRAM(s) Oral at bedtime  pantoprazole    Tablet 40 milliGRAM(s) Oral before breakfast  repaglinide 1 milliGRAM(s) Oral three times a day  senna 2 Tablet(s) Oral at bedtime  sodium chloride 0.9%. 1000 milliLiter(s) (75 mL/Hr) IV Continuous <Continuous>  tamsulosin 0.4 milliGRAM(s) Oral at bedtime        LABS:	 	                       11.1   5.95  )-----------( 139      ( 17 Mar 2021 07:26 )             31.9     03-17    140  |  106  |  21<H>  ----------------------------<  150<H>  3.8   |  24  |  1.39<H>    Ca    8.6      17 Mar 2021 07:26  Phos  2.8     03-17  Mg     1.6     03-17      Lipid Profile: Cholesterol 135  LDL --  HDL 37        TSH: Thyroid Stimulating Hormone, Serum: 1.69 uU/mL (03-11 @ 10:25)      	    < from: CT Abdomen and Pelvis No Cont (03.16.21 @ 12:42) >  EXAM:  CT ABDOMEN AND PELVIS                            PROCEDURE DATE:  03/16/2021          INTERPRETATION:  CLINICAL INFORMATION: Weight loss and loss of appetite    COMPARISON: None.    CONTRAST/COMPLICATIONS:  IV Contrast: None.  Oral Contrast:None    PROCEDURE:  CT of the Abdomen and Pelvis was performed.  Sagittal and coronal reformats were performed.    FINDINGS:  LOWER CHEST: Within normal limits.    LIVER: Within normal limits.  BILE DUCTS: Normal caliber.  GALLBLADDER: Within normal limits.  SPLEEN: Within normal limits.  PANCREAS: Within normal limits.  ADRENALS: Within normal limits.  KIDNEYS/URETERS: There is left hydroureteronephrosis.    BLADDER: Diffuse wall thickening.  REPRODUCTIVE ORGANS: Prostate is enlarged.    BOWEL: No bowel obstruction. Appendix is normal.. There is diverticulosis without diverticulitis  PERITONEUM: No ascites.  VESSELS: Atherosclerotic changes.  RETROPERITONEUM/LYMPH NODES: No lymphadenopathy.  ABDOMINAL WALL: Small fat-containing umbilical hernia. Left fat-containing inguinal hernia  BONES: Degenerative changes.    IMPRESSION:  Thick-walled bladder may be due to outlet obstruction.  Left hydroureteronephrosis  Enlarged prostate gland  Diverticulosis without diverticulitis            JONE DOMINGO MD; Attending Radiologist

## 2021-03-17 NOTE — PROGRESS NOTE BEHAVIORAL HEALTH - SECONDARY DX2
Adjustment disorder with mixed disturbance of emotions and conduct

## 2021-03-17 NOTE — PROGRESS NOTE ADULT - SUBJECTIVE AND OBJECTIVE BOX
feel fine  n pain or sob      MEDICATIONS  (STANDING):  apixaban 2.5 milliGRAM(s) Oral every 12 hours  atorvastatin 40 milliGRAM(s) Oral at bedtime  calcitriol   Capsule 0.5 MICROGram(s) Oral daily  finasteride 5 milliGRAM(s) Oral daily  hydrALAZINE 25 milliGRAM(s) Oral three times a day  insulin lispro (ADMELOG) corrective regimen sliding scale   SubCutaneous three times a day before meals  insulin lispro (ADMELOG) corrective regimen sliding scale   SubCutaneous at bedtime  labetalol 200 milliGRAM(s) Oral every 12 hours  magnesium oxide 400 milliGRAM(s) Oral three times a day with meals  magnesium sulfate  IVPB 2 Gram(s) IV Intermittent once  melatonin 5 milliGRAM(s) Oral at bedtime  mirtazapine 7.5 milliGRAM(s) Oral at bedtime  pantoprazole    Tablet 40 milliGRAM(s) Oral before breakfast  repaglinide 1 milliGRAM(s) Oral three times a day  senna 2 Tablet(s) Oral at bedtime  sodium chloride 0.9%. 1000 milliLiter(s) (75 mL/Hr) IV Continuous <Continuous>  tamsulosin 0.4 milliGRAM(s) Oral at bedtime    MEDICATIONS  (PRN):  meclizine 12.5 milliGRAM(s) Oral every 6 hours PRN Dizziness  ondansetron Injectable 4 milliGRAM(s) IV Push every 6 hours PRN Nausea and/or Vomiting  polyethylene glycol 3350 17 Gram(s) Oral daily PRN Constipation      Allergies    Allergy Status Unknown    Intolerances        Vital Signs Last 24 Hrs  T(C): 37.1 (17 Mar 2021 04:51), Max: 37.1 (17 Mar 2021 04:51)  T(F): 98.7 (17 Mar 2021 04:51), Max: 98.7 (17 Mar 2021 04:51)  HR: 78 (17 Mar 2021 04:51) (78 - 95)  BP: 163/82 (17 Mar 2021 04:51) (147/85 - 171/105)  BP(mean): --  RR: 18 (17 Mar 2021 04:51) (18 - 19)  SpO2: 95% (17 Mar 2021 04:51) (95% - 95%)    PHYSICAL EXAM  General: adult in NAD  HEENT: clear oropharynx, anicteric sclera, pink conjunctiva  Neck: supple  CV: normal S1/S2 with no murmur rubs or gallops  Lungs: positive air movement b/l ant lungs,clear to auscultation, no wheezes, no rales  Abdomen: soft non-tender non-distended, no hepatosplenomegaly  Ext: no clubbing cyanosis or edema  Skin: no rashes and no petechiae  Neuro: alert and oriented X 4, no focal deficits  LABS:                          11.1   5.95  )-----------( 139      ( 17 Mar 2021 07:26 )             31.9         Mean Cell Volume : 86.0 fl  Mean Cell Hemoglobin : 29.9 pg  Mean Cell Hemoglobin Concentration : 34.8 gm/dL  Auto Neutrophil # : x  Auto Lymphocyte # : x  Auto Monocyte # : x  Auto Eosinophil # : x  Auto Basophil # : x  Auto Neutrophil % : x  Auto Lymphocyte % : x  Auto Monocyte % : x  Auto Eosinophil % : x  Auto Basophil % : x    Serial CBC  Hematocrit 31.9  Hemoglobin 11.1  Plat 139  RBC 3.71  WBC 5.95  Serial CBC  Hematocrit 32.6  Hemoglobin 11.2  Plat 152  RBC 3.78  WBC 5.33  Serial CBC  Hematocrit 29.4  Hemoglobin 10.4  Plat 144  RBC 3.44  WBC 5.82    03-17    140  |  106  |  21<H>  ----------------------------<  150<H>  3.8   |  24  |  1.39<H>    Ca    8.6      17 Mar 2021 07:26  Phos  2.8     03-17  Mg     1.6     03-17            Folate, Serum: 7.9 ng/mL (03-11 @ 15:08)  Vitamin B12, Serum: 314 pg/mL (03-11 @ 15:08)            BLOOD SMEAR INTERPRETATION:       RADIOLOGY & ADDITIONAL STUDIES:

## 2021-03-17 NOTE — PROGRESS NOTE BEHAVIORAL HEALTH - SECONDARY DX3
Encounter for general psychiatric examination, requested by authority

## 2021-03-17 NOTE — PROGRESS NOTE BEHAVIORAL HEALTH - NSBHCONSULTFOLLOWDETAILS_PSY_A_CORE FT
Capacity for dispo is still to be determined  Does not need IP psych admission
Pt has capacity for DC to self  Does not need IP psych admission
Pt has capacity for DC to self  Does not need IP psych admission

## 2021-03-17 NOTE — PROGRESS NOTE BEHAVIORAL HEALTH - NSBHCHARTREVIEWLAB_PSY_A_CORE FT
11.2   5.33  )-----------( 152      ( 16 Mar 2021 11:07 )             32.6   03-16    139  |  106  |  23<H>  ----------------------------<  292<H>  4.4   |  26  |  1.67<H>    Ca    8.4      16 Mar 2021 11:07  Phos  2.9     03-16  Mg     1.5     03-16
10.4   5.82  )-----------( 144      ( 15 Mar 2021 05:23 )             29.4   03-15    139  |  108  |  28<H>  ----------------------------<  149<H>  3.7   |  24  |  1.70<H>    Ca    7.8<L>      15 Mar 2021 05:23
11.1   5.95  )-----------( 139      ( 17 Mar 2021 07:26 )             31.9   03-17    140  |  106  |  21<H>  ----------------------------<  150<H>  3.8   |  24  |  1.39<H>    Ca    8.6      17 Mar 2021 07:26  Phos  2.8     03-17  Mg     1.6     03-17

## 2021-03-17 NOTE — PROGRESS NOTE BEHAVIORAL HEALTH - SECONDARY DX1
Mild neurocognitive disorder due to multiple etiologies

## 2021-03-17 NOTE — PROGRESS NOTE BEHAVIORAL HEALTH - SUMMARY
79 yo M, born in [place] and survived the Holocaust as a child, single, retired and living alone, with unknown formal PHx and MHx of chronic DVT, Anemia, GERD, HTN, HLD, DM, Hypoparathyroidism, Glaucoma and BPH, BIBEMS from [employer] after feeling unwell and lightheaded while shopping and admitted for w/u of near-syncopal episodes. Psych consult was initially requested on 3/14 after pt became agitated with staff and asked to leave the hospital AMA. On initial assessment, pt was found to lack capacity to leave AMA due to poor I/J about the risks and benefits of leaving the hospital AMA. On f/u today, pt initially presents irascible but ultimately engages very well with MD, fluently discussing his personal hx in detail. 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 We have recommended a trial of low-dose Remeron to help with mood and insomnia, and pt agrees to try the medication. Given pt's highly traumatic childhood experiences, it is understandable that pt becomes agitated when he believes he is being coerced by authority figures, c/w adjustment DO with mixed disturbance of emotions and conduct. Pt may also be developing early-stage mild cognitive impairment which at times interferes with his I/J around self-care. Pt's capacity for dispo decisions is still not completely clear, but on today's assessment his I/J appear much improved from initial encounter with psychiatry, and he is much more cooperative with care. Question of capacity to leave the hospital AMA is currently moot, as pt is now agreeing to remain here for at least 24h to complete recommended medical w/u. Pt does not appear to present an acute risk of harm to self or others at the time of assessment, and does not appear to be in need of admission to  psych at the time of assessment.
81 yo M, born in [place] and survived the Holocaust as a child, single, retired and living alone, with unknown formal PHx and MHx of chronic DVT, Anemia, GERD, HTN, HLD, DM, Hypoparathyroidism, Glaucoma and BPH, BIBEMS from [employer] after feeling unwell and lightheaded while shopping and admitted for w/u of near-syncopal episodes. Psych consult was initially requested on 3/14 after pt became agitated with staff and asked to leave the hospital AMA. On initial assessment, pt was found to lack capacity to leave AMA due to poor I/J about the risks and benefits of leaving the hospital AMA. On f/u today, pt engages very well with MD and primary team and states preference for returning home with [facility] services. 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 Pt has agreed to trial of low-dose Remeron to help with mood and insomnia, and appears to be responding well to the medication. Given pt's highly traumatic childhood experiences, it is understandable that pt becomes agitated when he believes he is being coerced by authority figures, c/w adjustment DO with mixed disturbance of emotions and conduct. As of today's assessment, pt appears to have capacity for dispo decisions and expresses a reasonable plan to return home with [facility] services. Pt does not appear to present an acute risk of harm to self or others at the time of assessment, and does not appear to be in need of admission to [facility] at the time of assessment.
81 yo M, born in [place] and survived the Holocaust as a child, single, retired and living alone, with unknown formal PHx and MHx of chronic DVT, Anemia, GERD, HTN, HLD, DM, Hypoparathyroidism, Glaucoma and BPH, BIBEMS from [employer] after feeling unwell and lightheaded while shopping and admitted for w/u of near-syncopal episodes. Psych consult was initially requested on 3/14 after pt became agitated with staff and asked to leave the hospital AMA. On initial assessment, pt was found to lack capacity to leave AMA due to poor I/J about the risks and benefits of leaving the hospital AMA. On f/u today, pt engages very well with MD and primary team and states preference for returning home with [facility] services. 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 Pt has agreed to trial of low-dose Remeron to help with mood and insomnia, and appears to be responding well to the medication. Given pt's highly traumatic childhood experiences, it is understandable that pt becomes agitated when he believes he is being coerced by authority figures, c/w adjustment DO with mixed disturbance of emotions and conduct. As of today's assessment, pt appears to have capacity for dispo decisions and expresses a reasonable plan to return home with [facility] services. Pt does not appear to present an acute risk of harm to self or others at the time of assessment, and does not appear to be in need of admission to [facility] at the time of assessment.

## 2021-03-17 NOTE — PROGRESS NOTE BEHAVIORAL HEALTH - NSBHFUPINTERVALCCFT_PSY_A_CORE
"They messed up the taxi yesterday"
"I will have to make a decision"
"This is like a [place] [facility]"

## 2021-03-17 NOTE — PROGRESS NOTE BEHAVIORAL HEALTH - NSBHCONSULTOBSREASON_PSY_A_CORE FT
Safety i/s/o orthostasis and gait dysfunction

## 2021-03-17 NOTE — PROGRESS NOTE BEHAVIORAL HEALTH - NSBHCONSFOLLOWNEEDS_PSY_A_CORE
Patient needs further psychiatric safety assessment prior to discharge
no psychiatric contraindications to discharge
no psychiatric contraindications to discharge

## 2021-03-17 NOTE — PROGRESS NOTE BEHAVIORAL HEALTH - DETAILS
Lightheadedness on standing/ambulating

## 2021-03-17 NOTE — PROGRESS NOTE BEHAVIORAL HEALTH - NSBHFUPINTERVALHXFT_PSY_A_CORE
Asked to provide f/u for initial telepsych consult on 3/14 due to ongoing agitation (pt loudly yelling at staff). On MD's approach, pt is heard yelling loudly at staff in an angry tone of voice, "You can't keep me here! This is a violation of my rights!" MD approaches, ID's self as psychiatrist and asks to speak to pt, who rapidly calms down and speaks to MD in normal tone of voice. MD asks why pt was upset, and he says, "There is a young man at the nurses' station who is very authoritarian with me. He told me if I don't stay in bed they will have to call security. This is outrageous--it's like a [place] group home." MD asks about pt's background, and he reveals he is a Holocaust survivor born in [place]: At age 2, his parents were deported and murdered by the [employer], and pt was taken to a [facility] children's home because of his young age. He and other children from the home were later removed by the Resistance and sent into hiding with [place] families. 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 Pt reports that he came to the hospital on 3/11 when he felt unwell while shopping at the supermarket and lay down on the floor. Pt initially responds "No, it's a lie," when MD asks about rapid response this morning when pt was again found to be orthostatic, but later seems more accepting of the information. MD attempts to educate pt that the hospital is asking him to stay in bed and only ambulate with assistance in an effort to keep him safe and prevent falls and injuries. Pt agrees with MD's observation that he sometimes overreacts to situations he considers coercive (such as aforementioned prohibition against getting OOB unassisted), due to his traumatic childhood experiences. MD encourages pt to stay in the hospital for at least the next 24h to complete his medical w/u before attempting to return home, and pt agrees to stay for the time being ("24 hours is not so bad"). MD asks about pt's living situation, and he reports that he lives alone without HHA services and is unsure if he can afford any. MD offers to speak to primary SW to inquire about what services pt may be eligible for.  MD inquires further about pt's mental health care, and he reveals he has been seeing a psychiatrist named Maurisio Griffith MD for quite some time (only for therapy, no medications). Pt gives permission for MD to call Dr. Griffith for collateral and care coordination. Pt endorses problems with insomnia, and MD recommends a trial of Remeron 7.5 mg qhs. Pt agrees to try the medication.    Later in day, Dr. Griffith returns MD's call and reports he has been seeing pt for 30 or 40 years. He confirms pt's account of his hx and also confirms MD's impressions of pt's psychiatric presentation (chronic mild depressive state c/w dysthymia). When MD reports recommending a trial of Remeron to help with insomnia, he states that he agrees and believes it would be helpful if pt would agree to take medication. He reports that pt lives a mostly solitary life and appears to have little interest in social contact. Re I/J, he states that pt appears to know he needs help, as he presents to hospitals periodically when he feels unwell, but has incomplete insight into ability to care for self without assistance and would likely benefit from at least P/T HHA services.
Pt seen this morning for f/u in his room, found lying in bed awake and alert dressed in his own clothing. Pt again described mood as "better," denied SI/HI/AVH, and described Remeron 7.5 mg qhs as helpful for sleep. MD asked if pt will be returning home today; he said yes, adding that he thought he was going home yesterday, but waited for a long time for transportation that never came ("They messed up the taxi"). Pt says that rather than wait again today, he would prefer to walk home on his own: "It's only a few blocks." MD urges pt to use transportation due to concern for lightheadedness and falls. Pt dismisses MD's concerns, saying, "I haven't been falling--they're making a big deal over nothing."
Pt seen this morning for f/u together with primary attending Dr. Garcia. Pt described mood as "better," denied SI/HI/AVH, and described Remeron 7.5 mg qhs as helpful for sleep. Pt informed that he has 2 options for a safe DC: [facility] or home with A services. Pt responded, "I will have to make a decision," adding that he wants to speak with  about DC options. Later in day, MD learned from SW that pt had chosen option of returning home with [facility] services.

## 2021-03-17 NOTE — PROGRESS NOTE BEHAVIORAL HEALTH - NSBHCONSULTRECOMMENDOTHER_PSY_A_CORE FT
1. Recommend c/w Remeron PO 7.5 mg qhs to help with mood and sleep  2. No indication for other standing or PRN psychotropic medications at this time  3. Medical management as directed by primary team  4. As of today's assessment, pt appears to have capacity for DC to self, and agrees to return home with [facility] services as recommended  5. Psychiatry will continue to follow  6. Case d/w KP Huddleston of primary team    Tammy Mead MD  Director, Consultation-Liaison Psychiatry Service  [id number] 1. Recommend c/w Remeron PO 7.5 mg qhs to help with mood and sleep  2. No indication for other standing or PRN psychotropic medications at this time  3. Medical management as directed by primary team  4. As of today's assessment, pt appears to have capacity for DC to self, and agrees to return home with [facility] services as recommended  5. Pt is psych cleared for DC home. Psychiatry is signing off in anticipation of DC  6. Case d/w KP Ricks of primary team    Tammy Mead MD  Director, Consultation-Liaison Psychiatry Service  [phone]

## 2021-04-14 NOTE — DISCHARGE NOTE PROVIDER - DISCHARGE DATE
What Type Of Note Output Would You Prefer (Optional)?: Standard Output How Severe Are Your Spot(S)?: mild Have Your Spot(S) Been Treated In The Past?: has not been treated Hpi Title: Evaluation of Skin Lesions 16-Mar-2021 17-Mar-2021

## 2021-06-03 NOTE — ED ADULT NURSE NOTE - RESPIRATORY ASSESSMENT
Please let patient know that I sent over a refill of her Sertraline.  I do not want her to stop this medication abruptly.    Katerin Pérez NP     - - -

## 2021-09-15 NOTE — PATIENT PROFILE ADULT. - TEACHING/LEARNING FACTORS INFLUENCE READINESS TO LEARN
interest in learning Itraconazole Counseling:  I discussed with the patient the risks of itraconazole including but not limited to liver damage, nausea/vomiting, neuropathy, and severe allergy.  The patient understands that this medication is best absorbed when taken with acidic beverages such as non-diet cola or ginger ale.  The patient understands that monitoring is required including baseline LFTs and repeat LFTs at intervals.  The patient understands that they are to contact us or the primary physician if concerning signs are noted. anxiety

## 2021-12-23 ENCOUNTER — INPATIENT (INPATIENT)
Facility: HOSPITAL | Age: 81
LOS: 10 days | Discharge: HOME | DRG: 683 | End: 2022-01-03
Attending: INTERNAL MEDICINE | Admitting: INTERNAL MEDICINE
Payer: MEDICARE

## 2021-12-23 VITALS
RESPIRATION RATE: 19 BRPM | SYSTOLIC BLOOD PRESSURE: 155 MMHG | OXYGEN SATURATION: 95 % | DIASTOLIC BLOOD PRESSURE: 107 MMHG | TEMPERATURE: 98 F | HEART RATE: 126 BPM | HEIGHT: 67 IN

## 2021-12-23 DIAGNOSIS — N39.0 URINARY TRACT INFECTION, SITE NOT SPECIFIED: ICD-10-CM

## 2021-12-23 DIAGNOSIS — E11.9 TYPE 2 DIABETES MELLITUS WITHOUT COMPLICATIONS: ICD-10-CM

## 2021-12-23 DIAGNOSIS — N17.9 ACUTE KIDNEY FAILURE, UNSPECIFIED: ICD-10-CM

## 2021-12-23 DIAGNOSIS — R53.1 WEAKNESS: ICD-10-CM

## 2021-12-23 DIAGNOSIS — I48.91 UNSPECIFIED ATRIAL FIBRILLATION: ICD-10-CM

## 2021-12-23 DIAGNOSIS — E78.5 HYPERLIPIDEMIA, UNSPECIFIED: ICD-10-CM

## 2021-12-23 DIAGNOSIS — Z29.9 ENCOUNTER FOR PROPHYLACTIC MEASURES, UNSPECIFIED: ICD-10-CM

## 2021-12-23 DIAGNOSIS — I10 ESSENTIAL (PRIMARY) HYPERTENSION: ICD-10-CM

## 2021-12-23 LAB
ALBUMIN SERPL ELPH-MCNC: 2.6 G/DL — LOW (ref 3.5–5)
ALP SERPL-CCNC: 88 U/L — SIGNIFICANT CHANGE UP (ref 40–120)
ALT FLD-CCNC: 31 U/L DA — SIGNIFICANT CHANGE UP (ref 10–60)
ANION GAP SERPL CALC-SCNC: 16 MMOL/L — SIGNIFICANT CHANGE UP (ref 5–17)
APPEARANCE UR: ABNORMAL
AST SERPL-CCNC: 20 U/L — SIGNIFICANT CHANGE UP (ref 10–40)
BACTERIA # UR AUTO: ABNORMAL /HPF
BASOPHILS # BLD AUTO: 0.02 K/UL — SIGNIFICANT CHANGE UP (ref 0–0.2)
BASOPHILS NFR BLD AUTO: 0.2 % — SIGNIFICANT CHANGE UP (ref 0–2)
BILIRUB SERPL-MCNC: 0.4 MG/DL — SIGNIFICANT CHANGE UP (ref 0.2–1.2)
BILIRUB UR-MCNC: NEGATIVE — SIGNIFICANT CHANGE UP
BUN SERPL-MCNC: 89 MG/DL — HIGH (ref 7–18)
CALCIUM SERPL-MCNC: 7.8 MG/DL — LOW (ref 8.4–10.5)
CHLORIDE SERPL-SCNC: 109 MMOL/L — HIGH (ref 96–108)
CK SERPL-CCNC: 591 U/L — HIGH (ref 35–232)
CO2 SERPL-SCNC: 19 MMOL/L — LOW (ref 22–31)
COLOR SPEC: ABNORMAL
CREAT SERPL-MCNC: 2.92 MG/DL — HIGH (ref 0.5–1.3)
DIFF PNL FLD: ABNORMAL
EOSINOPHIL # BLD AUTO: 0.02 K/UL — SIGNIFICANT CHANGE UP (ref 0–0.5)
EOSINOPHIL NFR BLD AUTO: 0.2 % — SIGNIFICANT CHANGE UP (ref 0–6)
EPI CELLS # UR: SIGNIFICANT CHANGE UP /HPF
GLUCOSE SERPL-MCNC: 309 MG/DL — HIGH (ref 70–99)
GLUCOSE UR QL: NEGATIVE — SIGNIFICANT CHANGE UP
HCT VFR BLD CALC: 39.3 % — SIGNIFICANT CHANGE UP (ref 39–50)
HGB BLD-MCNC: 13.3 G/DL — SIGNIFICANT CHANGE UP (ref 13–17)
IMM GRANULOCYTES NFR BLD AUTO: 0.8 % — SIGNIFICANT CHANGE UP (ref 0–1.5)
KETONES UR-MCNC: ABNORMAL
LEUKOCYTE ESTERASE UR-ACNC: ABNORMAL
LYMPHOCYTES # BLD AUTO: 0.47 K/UL — LOW (ref 1–3.3)
LYMPHOCYTES # BLD AUTO: 4.1 % — LOW (ref 13–44)
MCHC RBC-ENTMCNC: 29.3 PG — SIGNIFICANT CHANGE UP (ref 27–34)
MCHC RBC-ENTMCNC: 33.8 GM/DL — SIGNIFICANT CHANGE UP (ref 32–36)
MCV RBC AUTO: 86.6 FL — SIGNIFICANT CHANGE UP (ref 80–100)
MONOCYTES # BLD AUTO: 0.77 K/UL — SIGNIFICANT CHANGE UP (ref 0–0.9)
MONOCYTES NFR BLD AUTO: 6.6 % — SIGNIFICANT CHANGE UP (ref 2–14)
NEUTROPHILS # BLD AUTO: 10.21 K/UL — HIGH (ref 1.8–7.4)
NEUTROPHILS NFR BLD AUTO: 88.1 % — HIGH (ref 43–77)
NITRITE UR-MCNC: NEGATIVE — SIGNIFICANT CHANGE UP
NRBC # BLD: 0 /100 WBCS — SIGNIFICANT CHANGE UP (ref 0–0)
PH UR: 5 — SIGNIFICANT CHANGE UP (ref 5–8)
PLATELET # BLD AUTO: 259 K/UL — SIGNIFICANT CHANGE UP (ref 150–400)
POTASSIUM SERPL-MCNC: 4.7 MMOL/L — SIGNIFICANT CHANGE UP (ref 3.5–5.3)
POTASSIUM SERPL-SCNC: 4.7 MMOL/L — SIGNIFICANT CHANGE UP (ref 3.5–5.3)
PROT SERPL-MCNC: 8.6 G/DL — HIGH (ref 6–8.3)
PROT UR-MCNC: 100
RBC # BLD: 4.54 M/UL — SIGNIFICANT CHANGE UP (ref 4.2–5.8)
RBC # FLD: 13.6 % — SIGNIFICANT CHANGE UP (ref 10.3–14.5)
RBC CASTS # UR COMP ASSIST: ABNORMAL /HPF (ref 0–2)
SARS-COV-2 RNA SPEC QL NAA+PROBE: SIGNIFICANT CHANGE UP
SODIUM SERPL-SCNC: 144 MMOL/L — SIGNIFICANT CHANGE UP (ref 135–145)
SP GR SPEC: 1.02 — SIGNIFICANT CHANGE UP (ref 1.01–1.02)
T4 AB SER-ACNC: 8.8 UG/DL — SIGNIFICANT CHANGE UP (ref 4.6–12)
TROPONIN I, HIGH SENSITIVITY RESULT: 17.2 NG/L — SIGNIFICANT CHANGE UP
TSH SERPL-MCNC: 1.38 UU/ML — SIGNIFICANT CHANGE UP (ref 0.34–4.82)
UROBILINOGEN FLD QL: NEGATIVE — SIGNIFICANT CHANGE UP
WBC # BLD: 11.58 K/UL — HIGH (ref 3.8–10.5)
WBC # FLD AUTO: 11.58 K/UL — HIGH (ref 3.8–10.5)
WBC UR QL: ABNORMAL /HPF (ref 0–5)

## 2021-12-23 PROCEDURE — 70450 CT HEAD/BRAIN W/O DYE: CPT | Mod: 26

## 2021-12-23 PROCEDURE — 93010 ELECTROCARDIOGRAM REPORT: CPT

## 2021-12-23 PROCEDURE — 71045 X-RAY EXAM CHEST 1 VIEW: CPT | Mod: 26

## 2021-12-23 PROCEDURE — 99285 EMERGENCY DEPT VISIT HI MDM: CPT

## 2021-12-23 RX ORDER — CEFTRIAXONE 500 MG/1
1000 INJECTION, POWDER, FOR SOLUTION INTRAMUSCULAR; INTRAVENOUS ONCE
Refills: 0 | Status: COMPLETED | OUTPATIENT
Start: 2021-12-23 | End: 2021-12-23

## 2021-12-23 RX ORDER — SODIUM CHLORIDE 9 MG/ML
1000 INJECTION, SOLUTION INTRAVENOUS ONCE
Refills: 0 | Status: COMPLETED | OUTPATIENT
Start: 2021-12-23 | End: 2021-12-23

## 2021-12-23 RX ORDER — SODIUM CHLORIDE 9 MG/ML
1000 INJECTION INTRAMUSCULAR; INTRAVENOUS; SUBCUTANEOUS ONCE
Refills: 0 | Status: COMPLETED | OUTPATIENT
Start: 2021-12-23 | End: 2021-12-23

## 2021-12-23 RX ADMIN — CEFTRIAXONE 100 MILLIGRAM(S): 500 INJECTION, POWDER, FOR SOLUTION INTRAMUSCULAR; INTRAVENOUS at 22:16

## 2021-12-23 RX ADMIN — SODIUM CHLORIDE 1000 MILLILITER(S): 9 INJECTION INTRAMUSCULAR; INTRAVENOUS; SUBCUTANEOUS at 20:32

## 2021-12-23 RX ADMIN — SODIUM CHLORIDE 1000 MILLILITER(S): 9 INJECTION, SOLUTION INTRAVENOUS at 23:40

## 2021-12-23 NOTE — H&P ADULT - PROBLEM SELECTOR PLAN 1
Pt with generalized weakness, WBC 11  UA +  f/u urine cultures  s/p IV fluids and Rocephin in ED  Will continue Rocephin Pt with generalized weakness, WBC 11  UA +  f/u urine cultures  s/p IV fluids and Rocephin in ED  Will continue Rocephin  Urology to be consulted in am due to recent urologic procedure

## 2021-12-23 NOTE — ED PROVIDER NOTE - CLINICAL SUMMARY MEDICAL DECISION MAKING FREE TEXT BOX
81 yr old male with hx of Chronic DVT, Anemia, GERD, HTN, HLD, DM, Hypoparathyroidism, Glaucoma and BPH presents to ed for generalized weakness and family called ems after unable to reach pt at home. pt states was at kitchen and felt weak, layed himself on floor and was unable to get up. pt denies any palpitations, cp, sob, cough, headache, fever, chills, n/v, diaphoresis, dysuria. pt lives alone.    weakness r/o anemia vs uti vs lytes imbalance vs atypical acs vs rhabdo vs deconditioning. pt unkept if organic cause not found then admit for PT and possibly rehab. labs, ekg, ct head, cxr, ua,

## 2021-12-23 NOTE — H&P ADULT - NSHPREVIEWOFSYSTEMS_GEN_ALL_CORE
CONSTITUTIONAL: Generalized weakness  EYES: No eye pain, visual disturbances, or discharge  ENT:  No difficulty hearing, tinnitus, vertigo; No sinus or throat pain  NECK: No pain or stiffness  RESPIRATORY: No cough, wheezing, chills or hemoptysis; No Shortness of Breath  CARDIOVASCULAR: No chest pain, palpitations, passing out, dizziness, or leg swelling  GASTROINTESTINAL: No abdominal or epigastric pain. No nausea, vomiting, or hematemesis; No diarrhea or constipation. No melena or hematochezia.  GENITOURINARY: No dysuria, frequency, hematuria, or incontinence  NEUROLOGICAL: No headaches, memory loss, loss of strength, numbness, or tremors  SKIN: No itching, burning, rashes, or lesions   LYMPH Nodes: No enlarged glands  ENDOCRINE: No heat or cold intolerance; No hair loss  MUSCULOSKELETAL: No joint pain or swelling; No muscle, back, No extremity pain  PSYCHIATRIC: No depression, anxiety, mood swings, or difficulty sleeping  HEME/LYMPH: No easy bruising, or bleeding gums  ALLERGY AND IMMUNOLOGIC: No hives or eczema CONSTITUTIONAL: Generalized weakness  EYES: No eye pain, visual disturbances, or discharge  ENT:  No difficulty hearing, tinnitus, vertigo; No sinus or throat pain  NECK: No pain or stiffness  RESPIRATORY: No cough, wheezing, chills or hemoptysis; No Shortness of Breath  CARDIOVASCULAR: No chest pain, palpitations, passing out, dizziness, or leg swelling  GASTROINTESTINAL: No abdominal or epigastric pain. No nausea, vomiting, or hematemesis; No diarrhea or constipation. No melena or hematochezia.  GENITOURINARY: + dysuria, No frequency, hematuria, or incontinence. Recent urologic procedure  NEUROLOGICAL: No headaches, memory loss, loss of strength, numbness, or tremors  SKIN: No itching, burning, rashes, or lesions   LYMPH Nodes: No enlarged glands  ENDOCRINE: No heat or cold intolerance; No hair loss  MUSCULOSKELETAL: No joint pain or swelling; No muscle, back, No extremity pain  PSYCHIATRIC: No depression, anxiety, mood swings, or difficulty sleeping  HEME/LYMPH: No easy bruising, or bleeding gums  ALLERGY AND IMMUNOLOGIC: No hives or eczema

## 2021-12-23 NOTE — ED PROVIDER NOTE - CARE PLAN
1 Principal Discharge DX:	Weakness  Secondary Diagnosis:	MANUEL (acute kidney injury)   Principal Discharge DX:	Weakness  Secondary Diagnosis:	MANUEL (acute kidney injury)  Secondary Diagnosis:	Acute UTI

## 2021-12-23 NOTE — ED ADULT NURSE NOTE - NSIMPLEMENTINTERV_GEN_ALL_ED
Implemented All Universal Safety Interventions:  [place] to call system. Call bell, personal items and telephone within reach. Instruct patient to call for assistance. Room bathroom lighting operational. Non-slip footwear when patient is off stretcher. Physically safe environment: no spills, clutter or unnecessary equipment. Stretcher in lowest position, wheels locked, appropriate side rails in place.

## 2021-12-23 NOTE — ED PROVIDER NOTE - MUSCULOSKELETAL, MLM
Spine appears normal, range of motion is not limited, no muscle or joint tenderness. weakness of upper and lower extremity

## 2021-12-23 NOTE — H&P ADULT - PROBLEM SELECTOR PLAN 6
pmh of dm  f/u hgb a1c  will start insulin ss  monitor bs  adjust as needed  diabetic diet pmh of dvt  continue eliquis

## 2021-12-23 NOTE — H&P ADULT - PROBLEM SELECTOR PLAN 2
Pt with Larry on CKD, baseline 1.5-1.6  f/u urine studies  s/p IV fluids  monitor Creatinine  f/u US kidney,bladder Pt with Larry on CKD, baseline 1.5-1.6  f/u urine studies  s/p IV fluids  monitor Creatinine  f/u US kidney,bladder  Nephro Dr. Orellana

## 2021-12-23 NOTE — H&P ADULT - HISTORY OF PRESENT ILLNESS
Pt is a 81 yr old male with hx of Chronic DVT, Anemia, GERD, HTN, HLD, DM, Hypoparathyroidism, Glaucoma and BPH presents to ed for generalized weakness and family called ems after unable to reach pt at home. pt states was at kitchen and felt weak, laid himself on floor and was unable to get up. pt denies any palpitations, cp, sob, cough, headache, fever, chills, n/v, diaphoresis, dysuria. pt lives alone. Pt is a 81 yr old male with hx of Chronic DVT, Anemia, GERD, HTN, HLD, DM, Hypoparathyroidism, Glaucoma and BPH presents to ed for generalized weakness and family called ems after unable to reach pt at home. As per ED note, pt states was at kitchen and felt weak, laid himself on floor and was unable to get up. To me patient states that he was going to the bathroom, and couldn't make it, he states he fell, hit the back of his head lightly, initially said was with the floor, then with furniture. Pts story doesn't line up. He states that he had a prostate stent? placed 1 or 2 weeks ago by doctor Benjamin Son? and endorsed some dysuria. He denies any LOC, Seizure, Dizziness, CP, palpitations, SOB, abd pain, N/V/D/C. Pt lives alone, has not help, he is a Holocausts survivor and says has no family.

## 2021-12-23 NOTE — ED ADULT TRIAGE NOTE - CHIEF COMPLAINT QUOTE
Pt from home, as per EMS pt found on the floor. Pt states weakness to bilateral leg, then sit and lay on the floor, unable to get up.

## 2021-12-23 NOTE — H&P ADULT - PROBLEM SELECTOR PLAN 5
pmh of afib, continue home meds  CHADVASC: 4 points, moderate -high risk, pt should be on full dose ac pmh of afib, continue home meds  CHADVASC: 4 points, moderate -high risk  continue eliquis pmh of dm  f/u hgb a1c  will start insulin ss  monitor bs  adjust as needed  diabetic diet

## 2021-12-23 NOTE — ED PROVIDER NOTE - OBJECTIVE STATEMENT
81 yr old male with hx of Chronic DVT, Anemia, GERD, HTN, HLD, DM, Hypoparathyroidism, Glaucoma and BPH presents to ed for generalized weakness and family called ems after unable to reach pt at home. pt states was at kitchen and felt weak, layed himself on floor and was unable to get up. pt denies any palpitations, cp, sob, cough, headache, fever, chills, n/v, diaphoresis, dysuria. pt lives alone.

## 2021-12-23 NOTE — ED PROVIDER NOTE - ADMIT DISPOSITION PRESENT ON ADMISSION SEPSIS
No
Patient states he hears voices to kill people and has thoughts of hurting other people with no plan.

## 2021-12-23 NOTE — H&P ADULT - ATTENDING COMMENTS
81 yr old male with hx of Chronic DVT, Anemia, GERD, HTN, HLD, DM, Hypoparathyroidism, Glaucoma and BPH presents to ed for generalized weakness,MANUEL,UTI.  1.MANUEL-IVF,Renal eval.  2.UTI-ABX.  3.DM-insulin.  4.HTN-hold Ace,cont rest of bp medication.  5.DM-Insulin.  6.GI and DVT prophylaxis.

## 2021-12-23 NOTE — H&P ADULT - ASSESSMENT
Pt is a 81 yr old male with hx of Chronic DVT, Anemia, GERD, HTN, HLD, DM, Hypoparathyroidism, Glaucoma and BPH presents to ed for generalized weakness, fount to have UTI.

## 2021-12-23 NOTE — ED PROVIDER NOTE - CONSTITUTIONAL, MLM
awake, alert, oriented to person, place, time/situation and in no apparent distress. unkept, slender normal...

## 2021-12-23 NOTE — ED PROVIDER NOTE - PROGRESS NOTE DETAILS
Hanson: pending ct head and ua. work up shows simone likely from dehydration with hypernatremia. glucose high- will give 2nd L but LR instead.   admit to med for dehydration and weakness. tachycardia likely from dehydration

## 2021-12-24 DIAGNOSIS — I82.409 ACUTE EMBOLISM AND THROMBOSIS OF UNSPECIFIED DEEP VEINS OF UNSPECIFIED LOWER EXTREMITY: ICD-10-CM

## 2021-12-24 LAB
A1C WITH ESTIMATED AVERAGE GLUCOSE RESULT: 7.4 % — HIGH (ref 4–5.6)
ALBUMIN SERPL ELPH-MCNC: 2.1 G/DL — LOW (ref 3.5–5)
ALP SERPL-CCNC: 81 U/L — SIGNIFICANT CHANGE UP (ref 40–120)
ALT FLD-CCNC: 26 U/L DA — SIGNIFICANT CHANGE UP (ref 10–60)
ANION GAP SERPL CALC-SCNC: 14 MMOL/L — SIGNIFICANT CHANGE UP (ref 5–17)
AST SERPL-CCNC: 26 U/L — SIGNIFICANT CHANGE UP (ref 10–40)
BASOPHILS # BLD AUTO: 0.02 K/UL — SIGNIFICANT CHANGE UP (ref 0–0.2)
BASOPHILS NFR BLD AUTO: 0.2 % — SIGNIFICANT CHANGE UP (ref 0–2)
BILIRUB SERPL-MCNC: 0.4 MG/DL — SIGNIFICANT CHANGE UP (ref 0.2–1.2)
BUN SERPL-MCNC: 87 MG/DL — HIGH (ref 7–18)
CALCIUM SERPL-MCNC: 6.6 MG/DL — LOW (ref 8.4–10.5)
CALCIUM SERPL-MCNC: 6.9 MG/DL — LOW (ref 8.4–10.5)
CHLORIDE SERPL-SCNC: 110 MMOL/L — HIGH (ref 96–108)
CHOLEST SERPL-MCNC: 145 MG/DL — SIGNIFICANT CHANGE UP
CO2 SERPL-SCNC: 17 MMOL/L — LOW (ref 22–31)
CREAT SERPL-MCNC: 2.94 MG/DL — HIGH (ref 0.5–1.3)
EOSINOPHIL # BLD AUTO: 0.01 K/UL — SIGNIFICANT CHANGE UP (ref 0–0.5)
EOSINOPHIL NFR BLD AUTO: 0.1 % — SIGNIFICANT CHANGE UP (ref 0–6)
ESTIMATED AVERAGE GLUCOSE: 166 MG/DL — HIGH (ref 68–114)
GLUCOSE BLDC GLUCOMTR-MCNC: 238 MG/DL — HIGH (ref 70–99)
GLUCOSE BLDC GLUCOMTR-MCNC: 244 MG/DL — HIGH (ref 70–99)
GLUCOSE BLDC GLUCOMTR-MCNC: 284 MG/DL — HIGH (ref 70–99)
GLUCOSE BLDC GLUCOMTR-MCNC: 304 MG/DL — HIGH (ref 70–99)
GLUCOSE BLDC GLUCOMTR-MCNC: 311 MG/DL — HIGH (ref 70–99)
GLUCOSE SERPL-MCNC: 261 MG/DL — HIGH (ref 70–99)
HCT VFR BLD CALC: 31.6 % — LOW (ref 39–50)
HDLC SERPL-MCNC: 20 MG/DL — LOW
HGB BLD-MCNC: 10.6 G/DL — LOW (ref 13–17)
IMM GRANULOCYTES NFR BLD AUTO: 0.6 % — SIGNIFICANT CHANGE UP (ref 0–1.5)
LIPID PNL WITH DIRECT LDL SERPL: 77 MG/DL — SIGNIFICANT CHANGE UP
LYMPHOCYTES # BLD AUTO: 0.86 K/UL — LOW (ref 1–3.3)
LYMPHOCYTES # BLD AUTO: 8.7 % — LOW (ref 13–44)
MAGNESIUM SERPL-MCNC: 1.6 MG/DL — SIGNIFICANT CHANGE UP (ref 1.6–2.6)
MCHC RBC-ENTMCNC: 29.1 PG — SIGNIFICANT CHANGE UP (ref 27–34)
MCHC RBC-ENTMCNC: 33.5 GM/DL — SIGNIFICANT CHANGE UP (ref 32–36)
MCV RBC AUTO: 86.8 FL — SIGNIFICANT CHANGE UP (ref 80–100)
MONOCYTES # BLD AUTO: 0.62 K/UL — SIGNIFICANT CHANGE UP (ref 0–0.9)
MONOCYTES NFR BLD AUTO: 6.3 % — SIGNIFICANT CHANGE UP (ref 2–14)
NEUTROPHILS # BLD AUTO: 8.31 K/UL — HIGH (ref 1.8–7.4)
NEUTROPHILS NFR BLD AUTO: 84.1 % — HIGH (ref 43–77)
NON HDL CHOLESTEROL: 125 MG/DL — SIGNIFICANT CHANGE UP
NRBC # BLD: 0 /100 WBCS — SIGNIFICANT CHANGE UP (ref 0–0)
PHOSPHATE SERPL-MCNC: 3.6 MG/DL — SIGNIFICANT CHANGE UP (ref 2.5–4.5)
PLATELET # BLD AUTO: 220 K/UL — SIGNIFICANT CHANGE UP (ref 150–400)
POTASSIUM SERPL-MCNC: 4.4 MMOL/L — SIGNIFICANT CHANGE UP (ref 3.5–5.3)
POTASSIUM SERPL-SCNC: 4.4 MMOL/L — SIGNIFICANT CHANGE UP (ref 3.5–5.3)
PROT SERPL-MCNC: 7.1 G/DL — SIGNIFICANT CHANGE UP (ref 6–8.3)
PTH-INTACT FLD-MCNC: 89 PG/ML — HIGH (ref 15–65)
RBC # BLD: 3.64 M/UL — LOW (ref 4.2–5.8)
RBC # FLD: 13.8 % — SIGNIFICANT CHANGE UP (ref 10.3–14.5)
SODIUM SERPL-SCNC: 141 MMOL/L — SIGNIFICANT CHANGE UP (ref 135–145)
TRIGL SERPL-MCNC: 238 MG/DL — HIGH
WBC # BLD: 9.88 K/UL — SIGNIFICANT CHANGE UP (ref 3.8–10.5)
WBC # FLD AUTO: 9.88 K/UL — SIGNIFICANT CHANGE UP (ref 3.8–10.5)

## 2021-12-24 RX ORDER — SODIUM CHLORIDE 9 MG/ML
1000 INJECTION INTRAMUSCULAR; INTRAVENOUS; SUBCUTANEOUS
Refills: 0 | Status: DISCONTINUED | OUTPATIENT
Start: 2021-12-24 | End: 2021-12-25

## 2021-12-24 RX ORDER — INSULIN LISPRO 100/ML
VIAL (ML) SUBCUTANEOUS
Refills: 0 | Status: DISCONTINUED | OUTPATIENT
Start: 2021-12-24 | End: 2021-12-27

## 2021-12-24 RX ORDER — PANTOPRAZOLE SODIUM 20 MG/1
40 TABLET, DELAYED RELEASE ORAL
Refills: 0 | Status: DISCONTINUED | OUTPATIENT
Start: 2021-12-24 | End: 2021-12-29

## 2021-12-24 RX ORDER — MIRTAZAPINE 45 MG/1
7.5 TABLET, ORALLY DISINTEGRATING ORAL DAILY
Refills: 0 | Status: DISCONTINUED | OUTPATIENT
Start: 2021-12-24 | End: 2022-01-03

## 2021-12-24 RX ORDER — HYDRALAZINE HCL 50 MG
50 TABLET ORAL THREE TIMES A DAY
Refills: 0 | Status: DISCONTINUED | OUTPATIENT
Start: 2021-12-24 | End: 2021-12-24

## 2021-12-24 RX ORDER — CEFTRIAXONE 500 MG/1
1000 INJECTION, POWDER, FOR SOLUTION INTRAMUSCULAR; INTRAVENOUS EVERY 24 HOURS
Refills: 0 | Status: DISCONTINUED | OUTPATIENT
Start: 2021-12-24 | End: 2021-12-28

## 2021-12-24 RX ORDER — SODIUM BICARBONATE 1 MEQ/ML
650 SYRINGE (ML) INTRAVENOUS THREE TIMES A DAY
Refills: 0 | Status: DISCONTINUED | OUTPATIENT
Start: 2021-12-24 | End: 2022-01-03

## 2021-12-24 RX ORDER — LABETALOL HCL 100 MG
200 TABLET ORAL EVERY 12 HOURS
Refills: 0 | Status: DISCONTINUED | OUTPATIENT
Start: 2021-12-24 | End: 2022-01-03

## 2021-12-24 RX ORDER — APIXABAN 2.5 MG/1
2.5 TABLET, FILM COATED ORAL
Refills: 0 | Status: DISCONTINUED | OUTPATIENT
Start: 2021-12-24 | End: 2022-01-03

## 2021-12-24 RX ORDER — CALCITRIOL 0.5 UG/1
0.5 CAPSULE ORAL DAILY
Refills: 0 | Status: DISCONTINUED | OUTPATIENT
Start: 2021-12-24 | End: 2022-01-03

## 2021-12-24 RX ORDER — ATORVASTATIN CALCIUM 80 MG/1
10 TABLET, FILM COATED ORAL AT BEDTIME
Refills: 0 | Status: DISCONTINUED | OUTPATIENT
Start: 2021-12-24 | End: 2022-01-03

## 2021-12-24 RX ORDER — RISPERIDONE 4 MG/1
0.25 TABLET ORAL ONCE
Refills: 0 | Status: COMPLETED | OUTPATIENT
Start: 2021-12-24 | End: 2021-12-24

## 2021-12-24 RX ORDER — HEPARIN SODIUM 5000 [USP'U]/ML
5000 INJECTION INTRAVENOUS; SUBCUTANEOUS EVERY 8 HOURS
Refills: 0 | Status: DISCONTINUED | OUTPATIENT
Start: 2021-12-24 | End: 2021-12-24

## 2021-12-24 RX ORDER — TAMSULOSIN HYDROCHLORIDE 0.4 MG/1
0.4 CAPSULE ORAL AT BEDTIME
Refills: 0 | Status: DISCONTINUED | OUTPATIENT
Start: 2021-12-24 | End: 2022-01-03

## 2021-12-24 RX ORDER — FINASTERIDE 5 MG/1
5 TABLET, FILM COATED ORAL DAILY
Refills: 0 | Status: DISCONTINUED | OUTPATIENT
Start: 2021-12-24 | End: 2022-01-03

## 2021-12-24 RX ORDER — INSULIN LISPRO 100/ML
VIAL (ML) SUBCUTANEOUS AT BEDTIME
Refills: 0 | Status: DISCONTINUED | OUTPATIENT
Start: 2021-12-24 | End: 2021-12-27

## 2021-12-24 RX ADMIN — FINASTERIDE 5 MILLIGRAM(S): 5 TABLET, FILM COATED ORAL at 12:24

## 2021-12-24 RX ADMIN — SODIUM CHLORIDE 75 MILLILITER(S): 9 INJECTION INTRAMUSCULAR; INTRAVENOUS; SUBCUTANEOUS at 06:10

## 2021-12-24 RX ADMIN — RISPERIDONE 0.25 MILLIGRAM(S): 4 TABLET ORAL at 12:24

## 2021-12-24 RX ADMIN — Medication 2: at 17:14

## 2021-12-24 RX ADMIN — APIXABAN 2.5 MILLIGRAM(S): 2.5 TABLET, FILM COATED ORAL at 17:15

## 2021-12-24 RX ADMIN — Medication 200 MILLIGRAM(S): at 05:40

## 2021-12-24 RX ADMIN — Medication 200 MILLIGRAM(S): at 17:15

## 2021-12-24 RX ADMIN — CALCITRIOL 0.5 MICROGRAM(S): 0.5 CAPSULE ORAL at 12:24

## 2021-12-24 RX ADMIN — Medication 4: at 12:20

## 2021-12-24 RX ADMIN — TAMSULOSIN HYDROCHLORIDE 0.4 MILLIGRAM(S): 0.4 CAPSULE ORAL at 22:00

## 2021-12-24 RX ADMIN — ATORVASTATIN CALCIUM 10 MILLIGRAM(S): 80 TABLET, FILM COATED ORAL at 22:00

## 2021-12-24 RX ADMIN — PANTOPRAZOLE SODIUM 40 MILLIGRAM(S): 20 TABLET, DELAYED RELEASE ORAL at 05:40

## 2021-12-24 RX ADMIN — Medication 650 MILLIGRAM(S): at 22:00

## 2021-12-24 RX ADMIN — MIRTAZAPINE 7.5 MILLIGRAM(S): 45 TABLET, ORALLY DISINTEGRATING ORAL at 12:24

## 2021-12-24 RX ADMIN — APIXABAN 2.5 MILLIGRAM(S): 2.5 TABLET, FILM COATED ORAL at 05:40

## 2021-12-24 RX ADMIN — CEFTRIAXONE 100 MILLIGRAM(S): 500 INJECTION, POWDER, FOR SOLUTION INTRAMUSCULAR; INTRAVENOUS at 17:14

## 2021-12-24 RX ADMIN — Medication 1: at 22:00

## 2021-12-24 RX ADMIN — Medication 4: at 08:02

## 2021-12-24 NOTE — PROGRESS NOTE ADULT - PROBLEM SELECTOR PLAN 2
Pt with Larry on CKD, baseline 1.5-1.6  f/u urine studies  s/p IV fluids  monitor Creatinine  f/u US kidney, bladder  Patient states he recently had a  urologic procedure where he had a stent placed in prostate  R/O spontaneous dislodgement of stent  Nephro Dr. Orellana: will get CT A/P

## 2021-12-24 NOTE — CONSULT NOTE ADULT - ASSESSMENT
History of BPH and hydronephrosis, as per his outpatient urology , stents were placed in Nov 2021.  Renal function deteriorated in 11/2021 , 2.58 and 2.82 in 12/2021.  Most likely post obstructive ATN with progressive renal function damage.    Presence of RBC and WBC in urine and moderate bacteria, r/o ATN versus UTI .  R/O spontaneous dislodgement of stent  Follow up with pelvis CT and urine culture, placed on Ceftriaxone.    Increased BUN/Creatinine ratio , possible dehydration versus reduced muscle mass.  Continue hydration.    Reduced Carbon Dioxide , start sodium bicarbonate PO.    Hypertension with reduced BP suggest to hold Hydralazine at present and follow BP

## 2021-12-24 NOTE — PROGRESS NOTE ADULT - SUBJECTIVE AND OBJECTIVE BOX
PGY-1 Progress Note discussed with attending    PAGER #: [[phone]] TILL 5:00 PM  PLEASE CONTACT ON CALL TEAM:  - On Call Team (Please refer to Caleb) FROM 5:00 PM - 8:30PM  - Nightfloat Team FROM 8:30 -7:30 AM    CHIEF COMPLAINT & BRIEF HOSPITAL COURSE:    INTERVAL HPI/OVERNIGHT EVENTS: no acute overnight events       REVIEW OF SYSTEMS:  CONSTITUTIONAL: No fever, weight loss, or fatigue  RESPIRATORY: No cough, wheezing, chills or hemoptysis; No shortness of breath  CARDIOVASCULAR: No chest pain, palpitations, dizziness, or leg swelling  GASTROINTESTINAL: No abdominal pain. No nausea, vomiting, or hematemesis; No diarrhea or constipation. No melena or hematochezia.  GENITOURINARY: No dysuria or hematuria, urinary frequency  NEUROLOGICAL: No headaches, memory loss, loss of strength, numbness, or tremors  SKIN: No itching, burning, rashes, or lesions     Vital Signs Last 24 Hrs  T(C): 36.8 (24 Dec 2021 12:18), Max: 37 (24 Dec 2021 00:16)  T(F): 98.3 (24 Dec 2021 12:18), Max: 98.6 (24 Dec 2021 00:16)  HR: 85 (24 Dec 2021 12:18) (53 - 126)  BP: 107/61 (24 Dec 2021 12:18) (107/61 - 174/96)  BP(mean): --  RR: 18 (24 Dec 2021 12:18) (18 - 19)  SpO2: 98% (24 Dec 2021 12:18) (95% - 98%)    PHYSICAL EXAMINATION:  GENERAL: NAD,   HEAD:  Atraumatic, Normocephalic  EYES:  conjunctiva and sclera clear  NECK: Supple, No JVD, Normal thyroid  CHEST/LUNG: Clear to auscultation. Clear to percussion bilaterally; No rales, rhonchi, wheezing, or rubs  HEART: Regular rate and rhythm; No murmurs, rubs, or gallops  ABDOMEN: Soft, Nontender, Nondistended; Bowel sounds present  NERVOUS SYSTEM:  Alert & Oriented X3,    EXTREMITIES:  2+ Peripheral Pulses, No clubbing, cyanosis, or edema  SKIN: warm dry                          10.6   9.88  )-----------( 220      ( 24 Dec 2021 10:07 )             31.6     12-24    141  |  110<H>  |  87<H>  ----------------------------<  261<H>  4.4   |  17<L>  |  2.94<H>    Ca    6.9<L>      24 Dec 2021 10:07  Phos  3.6     12-24  Mg     1.6     12-24    TPro  7.1  /  Alb  2.1<L>  /  TBili  0.4  /  DBili  x   /  AST  26  /  ALT  26  /  AlkPhos  81  12-24    LIVER FUNCTIONS - ( 24 Dec 2021 10:07 )  Alb: 2.1 g/dL / Pro: 7.1 g/dL / ALK PHOS: 81 U/L / ALT: 26 U/L DA / AST: 26 U/L / GGT: x           CARDIAC MARKERS ( 23 Dec 2021 20:32 )  x     / x     / 591 U/L / x     / x              CAPILLARY BLOOD GLUCOSE      RADIOLOGY & ADDITIONAL TESTS:                   PGY-1 Progress Note discussed with attending    PAGER #: [[phone]] TILL 5:00 PM  PLEASE CONTACT ON CALL TEAM:  - On Call Team (Please refer to Caleb) FROM 5:00 PM - 8:30PM  - Nightfloat Team FROM 8:30 -7:30 AM    CHIEF COMPLAINT & BRIEF HOSPITAL COURSE:    INTERVAL HPI/OVERNIGHT EVENTS: no acute overnight events. Patients keep stating he doesn't know why he is here       REVIEW OF SYSTEMS:  CONSTITUTIONAL: No fever, weight loss, or fatigue  RESPIRATORY: No cough, wheezing, chills or hemoptysis; No shortness of breath  CARDIOVASCULAR: No chest pain, palpitations, dizziness, or leg swelling  GASTROINTESTINAL: No abdominal pain. No nausea, vomiting, or hematemesis; No diarrhea or constipation. No melena or hematochezia.  GENITOURINARY: No dysuria or hematuria, urinary frequency  NEUROLOGICAL: No headaches, memory loss, loss of strength, numbness, or tremors  SKIN: No itching, burning, rashes, or lesions     Vital Signs Last 24 Hrs  T(C): 36.8 (24 Dec 2021 12:18), Max: 37 (24 Dec 2021 00:16)  T(F): 98.3 (24 Dec 2021 12:18), Max: 98.6 (24 Dec 2021 00:16)  HR: 85 (24 Dec 2021 12:18) (53 - 126)  BP: 107/61 (24 Dec 2021 12:18) (107/61 - 174/96)  BP(mean): --  RR: 18 (24 Dec 2021 12:18) (18 - 19)  SpO2: 98% (24 Dec 2021 12:18) (95% - 98%)    PHYSICAL EXAMINATION:  GENERAL: NAD,   HEAD:  Atraumatic, Normocephalic  EYES:  conjunctiva and sclera clear  NECK: Supple, No JVD, Normal thyroid  CHEST/LUNG: Clear to auscultation. Clear to percussion bilaterally; No rales, rhonchi, wheezing, or rubs  HEART: Regular rate and rhythm; No murmurs, rubs, or gallops  ABDOMEN: Soft, Nontender, Nondistended; Bowel sounds present  NERVOUS SYSTEM:  Alert & Oriented X 2,  following commands   EXTREMITIES:  2+ Peripheral Pulses, No clubbing, cyanosis, or edema  SKIN: warm dry                          10.6   9.88  )-----------( 220      ( 24 Dec 2021 10:07 )             31.6     12-24    141  |  110<H>  |  87<H>  ----------------------------<  261<H>  4.4   |  17<L>  |  2.94<H>    Ca    6.9<L>      24 Dec 2021 10:07  Phos  3.6     12-24  Mg     1.6     12-24    TPro  7.1  /  Alb  2.1<L>  /  TBili  0.4  /  DBili  x   /  AST  26  /  ALT  26  /  AlkPhos  81  12-24    LIVER FUNCTIONS - ( 24 Dec 2021 10:07 )  Alb: 2.1 g/dL / Pro: 7.1 g/dL / ALK PHOS: 81 U/L / ALT: 26 U/L DA / AST: 26 U/L / GGT: x           CARDIAC MARKERS ( 23 Dec 2021 20:32 )  x     / x     / 591 U/L / x     / x              CAPILLARY BLOOD GLUCOSE      RADIOLOGY & ADDITIONAL TESTS:

## 2021-12-24 NOTE — CONSULT NOTE ADULT - SUBJECTIVE AND OBJECTIVE BOX
Chief complain/HPI  Pt is a 81 yr old male with hx of Chronic DVT, Anemia, GERD, HTN, HLD, DM, Hypoparathyroidism, Glaucoma and BPH presents to ed for generalized weakness and family called ems after unable to .  History of dementia with deterioration in the last few weeks  History of Hydronephrosis in the past and BPH  He was seen by Urology few weeks ago   2021 he was at [facility] due to bilateral hydronephrosis and renal failure followed by bilateral ureteral stent placement, MANUEL creatinine was 2.58 to 2.35  21 on discharge   Creatinine 2.58         Allergies:  	Allergy Status Unknown:     Home Medications:   * Incomplete Medication History as of 24-Dec-2021 01:32 documented in Structured Notes  · 	hydrALAZINE 50 mg oral tablet: Last Dose Taken:  , 1 tab(s) orally 3 times a day  · 	meclizine 12.5 mg oral tablet: Last Dose Taken:  , 1 tab(s) orally every 6 hours, As needed, Dizziness  · 	mirtazapine 7.5 mg oral tablet: Last Dose Taken:  , 1 tab(s) orally once a day (at bedtime)  · 	tamsulosin 0.4 mg oral capsule: Last Dose Taken:  , 1 cap(s) orally once a day (at bedtime)  · 	calcitriol 0.5 mcg oral capsule: Last Dose Taken:  , 1 cap(s) orally once a day  · 	labetalol 200 mg oral tablet: Last Dose Taken:  , 1 tab(s) orally every 12 hours  · 	apixaban 2.5 mg oral tablet: Last Dose Taken:  , 1 tab(s) orally every 12 hours  · 	finasteride 5 mg oral tablet: Last Dose Taken:  , 1 tab(s) orally once a day  · 	METFORMIN  MG TABLET: Last Dose Taken:  , TAKE 1 TABLET TWICE DAILY  · 	omeprazole 20 mg capsule,delayed release: Last Dose Taken:    · 	glimepiride 4 mg tablet: Last Dose Taken:    · 	pravastatin 40 mg tablet: Last Dose Taken:    · 	enalapril maleate 20 mg tablet: Last Dose Taken:      PAST MEDICAL & SURGICAL HISTORY:  Diabetes    Hypertension    Hypercholesterolemia    GERD (gastroesophageal reflux disease)    Anemia    Hypomagnesemia    DVT (deep venous thrombosis)    No significant past surgical history        Home Medications Reviewed    Hospital Medications:   MEDICATIONS  (STANDING):  apixaban 2.5 milliGRAM(s) Oral two times a day  atorvastatin 10 milliGRAM(s) Oral at bedtime  calcitriol   Capsule 0.5 MICROGram(s) Oral daily  cefTRIAXone   IVPB 1000 milliGRAM(s) IV Intermittent every 24 hours  finasteride 5 milliGRAM(s) Oral daily  hydrALAZINE 50 milliGRAM(s) Oral three times a day  insulin lispro (ADMELOG) corrective regimen sliding scale   SubCutaneous three times a day before meals  insulin lispro (ADMELOG) corrective regimen sliding scale   SubCutaneous at bedtime  labetalol 200 milliGRAM(s) Oral every 12 hours  mirtazapine 7.5 milliGRAM(s) Oral daily  pantoprazole    Tablet 40 milliGRAM(s) Oral before breakfast  sodium chloride 0.9%. 1000 milliLiter(s) (75 mL/Hr) IV Continuous <Continuous>  tamsulosin 0.4 milliGRAM(s) Oral at bedtime    MEDICATIONS  (PRN):      Allergies    Allergy Status Unknown    Intolerances     CREATININE 2.58                          10.6   9.88  )-----------( 220      ( 24 Dec 2021 10:07 )             31.6     12-24    141  |  110<H>  |  87<H>  ----------------------------<  261<H>  4.4   |  17<L>  |  2.94<H>    Ca    6.9<L>      24 Dec 2021 10:07 corrected calcium to Albumin 8.5  Phos  3.6     12-24  Mg     1.6     12-24    TPro  7.1  /  Alb  2.1<L>  /  TBili  0.4  /  DBili  x   /  AST  26  /  ALT  26  /  AlkPhos  81  12-24      Urinalysis Basic - ( 23 Dec 2021 21:20 )    Color: Brown / Appearance: Slightly Turbid / S.020 / pH: x  Gluc: x / Ketone: Small  / Bili: Negative / Urobili: Negative   Blood: x / Protein: 100 / Nitrite: Negative   Leuk Esterase: Moderate / RBC: 10-25 /HPF / WBC 11-25 /HPF   Sq Epi: x / Non Sq Epi: Few /HPF / Bacteria: Moderate /HPF            RADIOLOGY & ADDITIONAL STUDIES:    SOCIAL HISTORY: Denies ETOh,Smoking,     FAMILY HISTORY:  No pertinent family history in first degree relatives        REVIEW OF SYSTEMS:  CONSTITUTIONAL: No malaise, No fatigue, No fevers or chills, well developed, no diaphoresis  EYES/ENT: No visual changes;  No vertigo or throat pain   NECK: No pain or stiffness  RESPIRATORY: No cough, wheezing, hemoptysis; No shortness of breath  CARDIOVASCULAR: No chest pain or palpitations. No edema  GASTROINTESTINAL: No abdominal or epigastric pain. No nausea, vomiting, or hematemesis; No diarrhea or constipation. No melena or hematochezia.  GENITOURINARY: No dysuria, frequency, foamy urine, urinary urgency, incontinence or hematuria  NEUROLOGICAL: No numbness or weakness, No tremor  SKIN: No itching, burning, rashes, or lesions   VASCULAR: No claudication  Musculoskeletal: no arthralgia, no myalgia  All other review of systems is negative unless indicated above.    VITALS:  Vital Signs Last 24 Hrs  T(C): 36.8 (24 Dec 2021 12:18), Max: 37 (24 Dec 2021 00:16)  T(F): 98.3 (24 Dec 2021 12:18), Max: 98.6 (24 Dec 2021 00:16)  HR: 85 (24 Dec 2021 12:18) (53 - 126)  BP: 107/61 (24 Dec 2021 12:18) (107/61 - 174/96)  BP(mean): --  RR: 18 (24 Dec 2021 12:18) (18 - 19)  SpO2: 98% (24 Dec 2021 12:18) (95% - 98%)    Height (cm): 170.2 (12-23 @ 18:53)    PHYSICAL EXAM:  Constitutional: NAD  HEENT: anicteric sclera, oropharynx clear, MMM  Neck: No JVD  Respiratory: good air entrance B/L, no wheezes, rales or rhonchi  Cardiovascular: S1, S2, RRR, no pericardial rub, no murmur  Gastrointestinal: BS+, soft, no tenderness, no distension, no bruit  Pelvis: bladder non-distended, no CVA tenderness  Extremities: No cyanosis or clubbing. No peripheral edema  Neurological: A/O x 3, no focal deficits  Psychiatric: Normal mood, normal affect  : No CVA tenderness. No pena.   Skin: No rashes  Vascular: all pulses present  Access:                     Chief complain/HPI  Pt is a 81 yr old male with hx of Chronic DVT, Anemia, GERD, HTN, HLD, DM, Hypoparathyroidism, Glaucoma and BPH presents to ed for generalized weakness and family called ems after unable to .  History of dementia with deterioration in the last few weeks  History of Hydronephrosis in the past and BPH  He was seen by Urology few weeks ago   2021 he was at [facility] due to bilateral hydronephrosis and renal failure followed by bilateral ureteral stent placement, MANUEL creatinine was 2.58 to 2.35  21 on discharge   Creatinine 2.58    As per the nurse he is incontinent and bladder scan had 282 ml of urine         Allergies:  	Allergy Status Unknown:     Home Medications:   * Incomplete Medication History as of 24-Dec-2021 01:32 documented in Structured Notes  · 	hydrALAZINE 50 mg oral tablet: Last Dose Taken:  , 1 tab(s) orally 3 times a day  · 	meclizine 12.5 mg oral tablet: Last Dose Taken:  , 1 tab(s) orally every 6 hours, As needed, Dizziness  · 	mirtazapine 7.5 mg oral tablet: Last Dose Taken:  , 1 tab(s) orally once a day (at bedtime)  · 	tamsulosin 0.4 mg oral capsule: Last Dose Taken:  , 1 cap(s) orally once a day (at bedtime)  · 	calcitriol 0.5 mcg oral capsule: Last Dose Taken:  , 1 cap(s) orally once a day  · 	labetalol 200 mg oral tablet: Last Dose Taken:  , 1 tab(s) orally every 12 hours  · 	apixaban 2.5 mg oral tablet: Last Dose Taken:  , 1 tab(s) orally every 12 hours  · 	finasteride 5 mg oral tablet: Last Dose Taken:  , 1 tab(s) orally once a day  · 	METFORMIN  MG TABLET: Last Dose Taken:  , TAKE 1 TABLET TWICE DAILY  · 	omeprazole 20 mg capsule,delayed release: Last Dose Taken:    · 	glimepiride 4 mg tablet: Last Dose Taken:    · 	pravastatin 40 mg tablet: Last Dose Taken:    · 	enalapril maleate 20 mg tablet: Last Dose Taken:      PAST MEDICAL & SURGICAL HISTORY:  Diabetes    Hypertension    Hypercholesterolemia    GERD (gastroesophageal reflux disease)    Anemia    Hypomagnesemia    DVT (deep venous thrombosis)    No significant past surgical history        Home Medications Reviewed    Hospital Medications:   MEDICATIONS  (STANDING):  apixaban 2.5 milliGRAM(s) Oral two times a day  atorvastatin 10 milliGRAM(s) Oral at bedtime  calcitriol   Capsule 0.5 MICROGram(s) Oral daily  cefTRIAXone   IVPB 1000 milliGRAM(s) IV Intermittent every 24 hours  finasteride 5 milliGRAM(s) Oral daily  hydrALAZINE 50 milliGRAM(s) Oral three times a day  insulin lispro (ADMELOG) corrective regimen sliding scale   SubCutaneous three times a day before meals  insulin lispro (ADMELOG) corrective regimen sliding scale   SubCutaneous at bedtime  labetalol 200 milliGRAM(s) Oral every 12 hours  mirtazapine 7.5 milliGRAM(s) Oral daily  pantoprazole    Tablet 40 milliGRAM(s) Oral before breakfast  sodium chloride 0.9%. 1000 milliLiter(s) (75 mL/Hr) IV Continuous <Continuous>  tamsulosin 0.4 milliGRAM(s) Oral at bedtime    MEDICATIONS  (PRN):      Allergies    Allergy Status Unknown    Intolerances     CREATININE 2.58                          10.6   9.88  )-----------( 220      ( 24 Dec 2021 10:07 )             31.6     12-24    141  |  110<H>  |  87<H>  ----------------------------<  261<H>  4.4   |  17<L>  |  2.94<H>    Ca    6.9<L>      24 Dec 2021 10:07 corrected calcium to Albumin 8.5  Phos  3.6     12-24  Mg     1.6     12-24    TPro  7.1  /  Alb  2.1<L>  /  TBili  0.4  /  DBili  x   /  AST  26  /  ALT  26  /  AlkPhos  81  12-24      Urinalysis Basic - ( 23 Dec 2021 21:20 )    Color: Brown / Appearance: Slightly Turbid / S.020 / pH: x  Gluc: x / Ketone: Small  / Bili: Negative / Urobili: Negative   Blood: x / Protein: 100 / Nitrite: Negative   Leuk Esterase: Moderate / RBC: 10-25 /HPF / WBC 11-25 /HPF   Sq Epi: x / Non Sq Epi: Few /HPF / Bacteria: Moderate /HPF            RADIOLOGY & ADDITIONAL STUDIES:    SOCIAL HISTORY: Denies ETOh,Smoking,     FAMILY HISTORY:  No pertinent family history in first degree relatives        REVIEW OF SYSTEMS  Quiet and cinfused    VITALS:  Vital Signs Last 24 Hrs  T(C): 36.8 (24 Dec 2021 12:18), Max: 37 (24 Dec 2021 00:16)  T(F): 98.3 (24 Dec 2021 12:18), Max: 98.6 (24 Dec 2021 00:16)  HR: 85 (24 Dec 2021 12:18) (53 - 126)  BP: 107/61 (24 Dec 2021 12:18) (107/61 - 174/96)  BP(mean): --  RR: 18 (24 Dec 2021 12:18) (18 - 19)  SpO2: 98% (24 Dec 2021 12:18) (95% - 98%)    Height (cm): 170.2 (12-23 @ 18:53)    PHYSICAL EXAM:  Constitutional: NAD  HEENT: anicteric sclera, oropharynx clear, MMM  Neck: No JVD  Respiratory:  air entrance B/L, no wheezes, rales or rhonchi  Cardiovascular: S1, S2, RRR, no pericardial rub, no murmur  Gastrointestinal: BS+, soft, no tenderness, no distension, no bruit  Pelvis: bladder non-distended, no CVA tenderness  Extremities: No cyanosis or clubbing. No peripheral edema

## 2021-12-24 NOTE — PROGRESS NOTE ADULT - SUBJECTIVE AND OBJECTIVE BOX
CARDIOLOGY/MEDICAL ATTENDING    CHIEF COMPLAINT:Patient is a 81y old  Male who presents with a chief complaint of UTI.Pt appears comfortable,confused,    	  REVIEW OF SYSTEMS:    [ x] Unable to obtain    PHYSICAL EXAM:  T(C): 36.8 (12-24-21 @ 05:25), Max: 37 (12-24-21 @ 00:16)  HR: 53 (12-24-21 @ 05:25) (53 - 126)  BP: 118/48 (12-24-21 @ 05:25) (118/48 - 174/96)  RR: 18 (12-24-21 @ 05:25) (18 - 19)  SpO2: 96% (12-24-21 @ 05:25) (95% - 97%)  Wt(kg): --  I&O's Summary      Appearance: Normal	  HEENT:   Normal oral mucosa, PERRL, EOMI	  Lymphatic: No lymphadenopathy  Cardiovascular: Normal S1 S2, No JVD, No murmurs, No edema  Respiratory: Lungs clear to auscultation	  Psychiatry: A & O x 3, Mood & affect appropriate  Gastrointestinal:  Soft, Non-tender, + BS	  Skin: No rashes, No ecchymoses, No cyanosis	  Neurologic: Non-focal  Extremities: Normal range of motion, No clubbing, cyanosis or edema  Vascular: Peripheral pulses palpable 2+ bilaterally    MEDICATIONS  (STANDING):  apixaban 2.5 milliGRAM(s) Oral two times a day  atorvastatin 10 milliGRAM(s) Oral at bedtime  calcitriol   Capsule 0.5 MICROGram(s) Oral daily  cefTRIAXone   IVPB 1000 milliGRAM(s) IV Intermittent every 24 hours  finasteride 5 milliGRAM(s) Oral daily  hydrALAZINE 50 milliGRAM(s) Oral three times a day  insulin lispro (ADMELOG) corrective regimen sliding scale   SubCutaneous three times a day before meals  insulin lispro (ADMELOG) corrective regimen sliding scale   SubCutaneous at bedtime  labetalol 200 milliGRAM(s) Oral every 12 hours  mirtazapine 7.5 milliGRAM(s) Oral daily  pantoprazole    Tablet 40 milliGRAM(s) Oral before breakfast  sodium chloride 0.9%. 1000 milliLiter(s) (75 mL/Hr) IV Continuous <Continuous>  tamsulosin 0.4 milliGRAM(s) Oral at bedtime      	  	  LABS:	 	    CARDIAC MARKERS:  CARDIAC MARKERS ( 23 Dec 2021 20:32 )  x     / x     / 591 U/L / x     / x          Troponin I, High Sensitivity Result: 17.2 ng/L (12-23 @ 20:32)                            10.6   9.88  )-----------( 220      ( 24 Dec 2021 10:07 )             31.6     12-24    141  |  110<H>  |  87<H>  ----------------------------<  261<H>  4.4   |  17<L>  |  2.94<H>    Ca    6.9<L>      24 Dec 2021 10:07  Phos  3.6     12-24  Mg     1.6     12-24    TPro  7.1  /  Alb  2.1<L>  /  TBili  0.4  /  DBili  x   /  AST  26  /  ALT  26  /  AlkPhos  81  12-24      Lipid Profile: Cholesterol 145  LDL --  HDL 20    Ldl calc 77  Ratio --      TSH: Thyroid Stimulating Hormone, Serum: 1.38 uU/mL (12-23 @ 20:32)      	    e< from: Transthoracic Echocardiogram (03.13.21 @ 14:14) >  OBSERVATIONS:  Mitral Valve: Mitral annular calcification. Mild mitral  regurgitation.  Aortic Root: Normal aortic root.  Aortic Valve: Aortic valve not well visualized. No elevated  gradients were noted across the valve.  Left Atrium: Normal left atrium.  LA volume index = 32  cc/m2.  Left Ventricle: Normal left ventricular systolic function.   Mild diastolic dysfunction (stage I). Increased relative  wall thickness with normal left ventricular (LV) mass  index, consistent with concentric LV remodeling.  Right Heart: Normal right atrium. Normal right ventricular  size and function. There is mild tricuspid regurgitation.  There is trace pulmonic regurgitation.  Pericardium/PleuraNormal pericardium with no pericardial  effusion.  Hemodynamic: Incomplete tricuspid regurgitation jet  precludes accurate assessment of pulmonary artery systolic  pressure.

## 2021-12-24 NOTE — PATIENT PROFILE ADULT - FALL HARM RISK - HARM RISK INTERVENTIONS

## 2021-12-24 NOTE — PROGRESS NOTE ADULT - PROBLEM SELECTOR PLAN 1
Pt with generalized weakness, WBC 11  UA +  f/u urine cultures  s/p IV fluids and Rocephin in ED  Will continue Rocephin

## 2021-12-25 ENCOUNTER — TRANSCRIPTION ENCOUNTER (OUTPATIENT)
Age: 81
End: 2021-12-25

## 2021-12-25 DIAGNOSIS — F99 MENTAL DISORDER, NOT OTHERWISE SPECIFIED: ICD-10-CM

## 2021-12-25 LAB
A1C WITH ESTIMATED AVERAGE GLUCOSE RESULT: 7.5 % — HIGH (ref 4–5.6)
ANION GAP SERPL CALC-SCNC: 13 MMOL/L — SIGNIFICANT CHANGE UP (ref 5–17)
BASOPHILS # BLD AUTO: 0.02 K/UL — SIGNIFICANT CHANGE UP (ref 0–0.2)
BASOPHILS NFR BLD AUTO: 0.2 % — SIGNIFICANT CHANGE UP (ref 0–2)
BUN SERPL-MCNC: 100 MG/DL — HIGH (ref 7–18)
CALCIUM SERPL-MCNC: 6.6 MG/DL — LOW (ref 8.4–10.5)
CHLORIDE SERPL-SCNC: 109 MMOL/L — HIGH (ref 96–108)
CO2 SERPL-SCNC: 15 MMOL/L — LOW (ref 22–31)
CREAT SERPL-MCNC: 3.03 MG/DL — HIGH (ref 0.5–1.3)
EOSINOPHIL # BLD AUTO: 0.03 K/UL — SIGNIFICANT CHANGE UP (ref 0–0.5)
EOSINOPHIL NFR BLD AUTO: 0.3 % — SIGNIFICANT CHANGE UP (ref 0–6)
ESTIMATED AVERAGE GLUCOSE: 169 MG/DL — HIGH (ref 68–114)
GLUCOSE BLDC GLUCOMTR-MCNC: 307 MG/DL — HIGH (ref 70–99)
GLUCOSE BLDC GLUCOMTR-MCNC: 345 MG/DL — HIGH (ref 70–99)
GLUCOSE BLDC GLUCOMTR-MCNC: 352 MG/DL — HIGH (ref 70–99)
GLUCOSE BLDC GLUCOMTR-MCNC: 422 MG/DL — HIGH (ref 70–99)
GLUCOSE SERPL-MCNC: 323 MG/DL — HIGH (ref 70–99)
HCT VFR BLD CALC: 27.8 % — LOW (ref 39–50)
HGB BLD-MCNC: 9.7 G/DL — LOW (ref 13–17)
IMM GRANULOCYTES NFR BLD AUTO: 1.3 % — SIGNIFICANT CHANGE UP (ref 0–1.5)
LYMPHOCYTES # BLD AUTO: 0.71 K/UL — LOW (ref 1–3.3)
LYMPHOCYTES # BLD AUTO: 6.5 % — LOW (ref 13–44)
MAGNESIUM SERPL-MCNC: 1.4 MG/DL — LOW (ref 1.6–2.6)
MCHC RBC-ENTMCNC: 29.6 PG — SIGNIFICANT CHANGE UP (ref 27–34)
MCHC RBC-ENTMCNC: 34.9 GM/DL — SIGNIFICANT CHANGE UP (ref 32–36)
MCV RBC AUTO: 84.8 FL — SIGNIFICANT CHANGE UP (ref 80–100)
MONOCYTES # BLD AUTO: 0.63 K/UL — SIGNIFICANT CHANGE UP (ref 0–0.9)
MONOCYTES NFR BLD AUTO: 5.7 % — SIGNIFICANT CHANGE UP (ref 2–14)
NEUTROPHILS # BLD AUTO: 9.45 K/UL — HIGH (ref 1.8–7.4)
NEUTROPHILS NFR BLD AUTO: 86 % — HIGH (ref 43–77)
NRBC # BLD: 0 /100 WBCS — SIGNIFICANT CHANGE UP (ref 0–0)
PHOSPHATE SERPL-MCNC: 2.5 MG/DL — SIGNIFICANT CHANGE UP (ref 2.5–4.5)
PLATELET # BLD AUTO: 169 K/UL — SIGNIFICANT CHANGE UP (ref 150–400)
POTASSIUM SERPL-MCNC: 3.7 MMOL/L — SIGNIFICANT CHANGE UP (ref 3.5–5.3)
POTASSIUM SERPL-SCNC: 3.7 MMOL/L — SIGNIFICANT CHANGE UP (ref 3.5–5.3)
RBC # BLD: 3.28 M/UL — LOW (ref 4.2–5.8)
RBC # FLD: 13.7 % — SIGNIFICANT CHANGE UP (ref 10.3–14.5)
SODIUM SERPL-SCNC: 137 MMOL/L — SIGNIFICANT CHANGE UP (ref 135–145)
WBC # BLD: 10.98 K/UL — HIGH (ref 3.8–10.5)
WBC # FLD AUTO: 10.98 K/UL — HIGH (ref 3.8–10.5)

## 2021-12-25 RX ORDER — REPAGLINIDE 1 MG/1
1 TABLET ORAL
Refills: 0 | Status: DISCONTINUED | OUTPATIENT
Start: 2021-12-25 | End: 2021-12-26

## 2021-12-25 RX ORDER — SODIUM CHLORIDE 9 MG/ML
1000 INJECTION INTRAMUSCULAR; INTRAVENOUS; SUBCUTANEOUS
Refills: 0 | Status: DISCONTINUED | OUTPATIENT
Start: 2021-12-25 | End: 2022-01-01

## 2021-12-25 RX ORDER — MAGNESIUM SULFATE 500 MG/ML
1 VIAL (ML) INJECTION ONCE
Refills: 0 | Status: COMPLETED | OUTPATIENT
Start: 2021-12-25 | End: 2021-12-25

## 2021-12-25 RX ADMIN — Medication 650 MILLIGRAM(S): at 05:48

## 2021-12-25 RX ADMIN — ATORVASTATIN CALCIUM 10 MILLIGRAM(S): 80 TABLET, FILM COATED ORAL at 22:11

## 2021-12-25 RX ADMIN — CALCITRIOL 0.5 MICROGRAM(S): 0.5 CAPSULE ORAL at 12:21

## 2021-12-25 RX ADMIN — Medication 3: at 22:15

## 2021-12-25 RX ADMIN — MIRTAZAPINE 7.5 MILLIGRAM(S): 45 TABLET, ORALLY DISINTEGRATING ORAL at 12:21

## 2021-12-25 RX ADMIN — FINASTERIDE 5 MILLIGRAM(S): 5 TABLET, FILM COATED ORAL at 12:21

## 2021-12-25 RX ADMIN — Medication 200 MILLIGRAM(S): at 05:48

## 2021-12-25 RX ADMIN — TAMSULOSIN HYDROCHLORIDE 0.4 MILLIGRAM(S): 0.4 CAPSULE ORAL at 22:11

## 2021-12-25 RX ADMIN — APIXABAN 2.5 MILLIGRAM(S): 2.5 TABLET, FILM COATED ORAL at 17:39

## 2021-12-25 RX ADMIN — APIXABAN 2.5 MILLIGRAM(S): 2.5 TABLET, FILM COATED ORAL at 05:48

## 2021-12-25 RX ADMIN — REPAGLINIDE 1 MILLIGRAM(S): 1 TABLET ORAL at 17:39

## 2021-12-25 RX ADMIN — CEFTRIAXONE 100 MILLIGRAM(S): 500 INJECTION, POWDER, FOR SOLUTION INTRAMUSCULAR; INTRAVENOUS at 17:39

## 2021-12-25 RX ADMIN — Medication 650 MILLIGRAM(S): at 22:11

## 2021-12-25 RX ADMIN — Medication 6: at 17:40

## 2021-12-25 RX ADMIN — Medication 4: at 08:10

## 2021-12-25 RX ADMIN — PANTOPRAZOLE SODIUM 40 MILLIGRAM(S): 20 TABLET, DELAYED RELEASE ORAL at 05:49

## 2021-12-25 RX ADMIN — Medication 4: at 12:18

## 2021-12-25 RX ADMIN — Medication 100 GRAM(S): at 14:03

## 2021-12-25 RX ADMIN — Medication 650 MILLIGRAM(S): at 14:07

## 2021-12-25 RX ADMIN — SODIUM CHLORIDE 75 MILLILITER(S): 9 INJECTION INTRAMUSCULAR; INTRAVENOUS; SUBCUTANEOUS at 14:10

## 2021-12-25 RX ADMIN — Medication 200 MILLIGRAM(S): at 17:41

## 2021-12-25 NOTE — PROGRESS NOTE ADULT - PROBLEM SELECTOR PLAN 3
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

## 2021-12-25 NOTE — DISCHARGE NOTE PROVIDER - PROVIDER TOKENS
PROVIDER:[TOKEN:[[id number]]],PROVIDER:[TOKEN:[[id number]]] PROVIDER:[TOKEN:[[id number]]],PROVIDER:[TOKEN:[[id number]]],PROVIDER:[TOKEN:[[id number]],FOLLOWUP:[1 week]]

## 2021-12-25 NOTE — PROGRESS NOTE ADULT - SUBJECTIVE AND OBJECTIVE BOX
CARDIOLOGY/MEDICAL ATTENDING    CHIEF COMPLAINT:Patient is a 81y old  Male who presents with a chief complaint of UTI.Pt appears comfortable.    	  REVIEW OF SYSTEMS:  CONSTITUTIONAL: No fever, weight loss, or fatigue  EYES: No eye pain, visual disturbances, or discharge  ENT:  No difficulty hearing, tinnitus, vertigo; No sinus or throat pain  NECK: No pain or stiffness  RESPIRATORY: No cough, wheezing, chills or hemoptysis; No Shortness of Breath  CARDIOVASCULAR: No chest pain, palpitations, passing out, dizziness, or leg swelling  GASTROINTESTINAL: No abdominal or epigastric pain. No nausea, vomiting, or hematemesis; No diarrhea or constipation. No melena or hematochezia.  GENITOURINARY: No dysuria, frequency, hematuria, or incontinence  NEUROLOGICAL: No headaches, memory loss, loss of strength, numbness, or tremors  SKIN: No itching, burning, rashes, or lesions   LYMPH Nodes: No enlarged glands  ENDOCRINE: No heat or cold intolerance; No hair loss  MUSCULOSKELETAL: No joint pain or swelling; No muscle, back, or extremity pain  PSYCHIATRIC: No depression, anxiety, mood swings, or difficulty sleeping  HEME/LYMPH: No easy bruising, or bleeding gums  ALLERGY AND IMMUNOLOGIC: No hives or eczema	      PHYSICAL EXAM:  T(C): 36.9 (12-25-21 @ 05:36), Max: 36.9 (12-25-21 @ 05:36)  HR: 91 (12-25-21 @ 05:36) (79 - 91)  BP: 133/79 (12-25-21 @ 05:36) (105/63 - 133/79)  RR: 18 (12-25-21 @ 05:36) (18 - 18)  SpO2: 97% (12-25-21 @ 05:36) (97% - 97%)  Wt(kg): --  I&O's Summary      Appearance: Normal	  HEENT:   Normal oral mucosa, PERRL, EOMI	  Lymphatic: No lymphadenopathy  Cardiovascular: Normal S1 S2, No JVD, No murmurs, No edema  Respiratory: Lungs clear to auscultation	  Psychiatry: A & O x 3, Mood & affect appropriate  Gastrointestinal:  Soft, Non-tender, + BS	  Skin: No rashes, No ecchymoses, No cyanosis	  Neurologic: Non-focal  Extremities: Normal range of motion, No clubbing, cyanosis or edema  Vascular: Peripheral pulses palpable 2+ bilaterally    MEDICATIONS  (STANDING):  apixaban 2.5 milliGRAM(s) Oral two times a day  atorvastatin 10 milliGRAM(s) Oral at bedtime  calcitriol   Capsule 0.5 MICROGram(s) Oral daily  cefTRIAXone   IVPB 1000 milliGRAM(s) IV Intermittent every 24 hours  finasteride 5 milliGRAM(s) Oral daily  insulin lispro (ADMELOG) corrective regimen sliding scale   SubCutaneous three times a day before meals  insulin lispro (ADMELOG) corrective regimen sliding scale   SubCutaneous at bedtime  labetalol 200 milliGRAM(s) Oral every 12 hours  magnesium sulfate  IVPB 1 Gram(s) IV Intermittent once  mirtazapine 7.5 milliGRAM(s) Oral daily  pantoprazole    Tablet 40 milliGRAM(s) Oral before breakfast  sodium bicarbonate 650 milliGRAM(s) Oral three times a day  sodium chloride 0.9%. 1000 milliLiter(s) (75 mL/Hr) IV Continuous <Continuous>  tamsulosin 0.4 milliGRAM(s) Oral at bedtime        LABS:	 	      CARDIAC MARKERS ( 23 Dec 2021 20:32 )  x     / x     / 591 U/L / x     / x          Troponin I, High Sensitivity Result: 17.2 ng/L (12-23 @ 20:32)                            9.7    10.98 )-----------( 169      ( 25 Dec 2021 08:40 )             27.8     12-25    137  |  109<H>  |  100<H>  ----------------------------<  323<H>  3.7   |  15<L>  |  3.03<H>    Ca    6.6<L>      25 Dec 2021 08:40  Phos  2.5     12-25  Mg     1.4     12-25    TPro  7.1  /  Alb  2.1<L>  /  TBili  0.4  /  DBili  x   /  AST  26  /  ALT  26  /  AlkPhos  81  12-24    proBNP:   Lipid Profile: Cholesterol 145  LDL --  HDL 20    Ldl calc 77  Ratio --    HgA1c:   TSH: Thyroid Stimulating Hormone, Serum: 1.38 uU/mL (12-23 @ 20:32)

## 2021-12-25 NOTE — DISCHARGE NOTE PROVIDER - NSDCCPCAREPLAN_GEN_ALL_CORE_FT
PRINCIPAL DISCHARGE DIAGNOSIS  Diagnosis: Acute UTI  Assessment and Plan of Treatment: You were found to have a urinary tract infection on your admission.    You were treated with **** days of iv antibiotics in the hospital with the goal of clearing this infection  You will be discharged on *** Please continue to take your medications as prescribed. Urinary tract infections can cause weakness, confusion, or spread to other organ systems in the body. If you experience continued symptoms of infection (fever, chills, weakness, or burning with urination), please follow up with your primary care provider.        SECONDARY DISCHARGE DIAGNOSES  Diagnosis: MANUEL (acute kidney injury)  Assessment and Plan of Treatment: You came in with a Serum Creatinine function of ___ which is a reflection of your kidney function. You have a baseline level of ___. You were treated with IV hydration, which improved your kidney function. Please follow up with your PCP within 1 week of your discharge for further management of this condition and to check your Serum Creatinine.      Diagnosis: HTN (hypertension)  Assessment and Plan of Treatment: You have high blood pressure. Please continue to taking your medications as prescribed. Please measure your blood pressure at least once daily. Maintain a healthy diet which includes incorporating more vegetables and decreasing the amount of salt (low sodium) and sugar in your diet such as rice, bread, and pasta low sugar, low fat, low sodium diet. Exercise frequently if possible.  Please follow up with your primary care provider within one week of your discharge.      Diagnosis: HLD (hyperlipidemia)  Assessment and Plan of Treatment: You have hyperlipidemia. Please continue to take your cholesterol medications. Maintain a healthy diet that consist of low sugar, low fat, low sodium. Decrease the amount of fried foods that you consume. Exercise frequently if possible. Please follow up with  your primary care provider within 1 week of your discharge.  You are recommended a DASH Diet that emphasizes mostly vegetables, fruits, and fat-free or low-fat dairy products. Please limit intake of sodium, sweets, beverages w/ high sugar/carbohydrate content.      Diagnosis: Deep vein thrombosis (DVT)  Assessment and Plan of Treatment:     Diagnosis: Psychiatric disturbance  Assessment and Plan of Treatment:      PRINCIPAL DISCHARGE DIAGNOSIS  Diagnosis: Acute UTI  Assessment and Plan of Treatment: You were found to have a urinary tract infection on your admission.    You were treated with **** days of iv antibiotics in the hospital with the goal of clearing this infection  You will be discharged on *** Please continue to take your medications as prescribed. Urinary tract infections can cause weakness, confusion, or spread to other organ systems in the body. If you experience continued symptoms of infection (fever, chills, weakness, or burning with urination), please follow up with your primary care provider.        SECONDARY DISCHARGE DIAGNOSES  Diagnosis: MANUEL (acute kidney injury)  Assessment and Plan of Treatment: You came in with a Serum Creatinine function of ___ which is a reflection of your kidney function. You have a baseline level of ___. You were treated with IV hydration, which improved your kidney function. Please follow up with your PCP within 1 week of your discharge for further management of this condition and to check your Serum Creatinine.      Diagnosis: Psychiatric disturbance  Assessment and Plan of Treatment: You presented with altered mental status, with possible psychological distrubance. You were seen by psychiatry  in last admission. 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 You were continued with home meds mirtazapine. Your mental status improved. Please continue your home mediacation MIRTAZAPINE as directed.    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 You were continued on your blood thinner ELIQUIS. Please continue your medication as directed. .    Diagnosis: HTN (hypertension)  Assessment and Plan of Treatment: You have high blood pressure. Please continue to taking your medications as prescribed. Please measure your blood pressure at least once daily. Maintain a healthy diet which includes incorporating more vegetables and decreasing the amount of salt (low sodium) and sugar in your diet such as rice, bread, and pasta low sugar, low fat, low sodium diet. Exercise frequently if possible.  Please follow up with your primary care provider within one week of your discharge.      Diagnosis: HLD (hyperlipidemia)  Assessment and Plan of Treatment: You have hyperlipidemia. Please continue to take your cholesterol medications. Maintain a healthy diet that consist of low sugar, low fat, low sodium. Decrease the amount of fried foods that you consume. Exercise frequently if possible. Please follow up with  your primary care provider within 1 week of your discharge.  You are recommended a DASH Diet that emphasizes mostly vegetables, fruits, and fat-free or low-fat dairy products. Please limit intake of sodium, sweets, beverages w/ high sugar/carbohydrate content.       PRINCIPAL DISCHARGE DIAGNOSIS  Diagnosis: Acute UTI  Assessment and Plan of Treatment: You were found to have a urinary tract infection on your admission.    You were treated with rocpehin for 2 days of iv antibiotics. Your cultures were positive for E. faecalis and so you were placed on Ampicillin for 2 in the hospital with the goal of clearing this infection  You will be discharged on AUGMENTIN 875MG EVERY 12 HOURS UNTIL 1/11/22. Please continue to take your medications as prescribed. Urinary tract infections can cause weakness, confusion, or spread to other organ systems in the body. If you experience continued symptoms of infection (fever, chills, weakness, or burning with urination), please follow up with your primary care provider.        SECONDARY DISCHARGE DIAGNOSES  Diagnosis: MANUEL (acute kidney injury)  Assessment and Plan of Treatment: You came in with a Serum Creatinine function of 2.94 which is a reflection of your kidney function. You have a baseline level of 1.5-1.6. You stated you recently had a  urologic procedure where a stent was placed in prostate. There was concern for possible spontaneous dislodgement of stent. Nephrologist Dr. Orellana was consulted. CT Pelvis showed bilateral hydronephrosis. Urology was consulted due to possible dislodgement of stent and reccommends to follow up with you OUTPATIENT UROLOGIST to have your stent replaced every 3 months. You were treated with IV hydration, which improved your kidney function. Please follow up with your PCP within 1 week of your discharge for further management of this condition and to check your Serum Creatinine.      Diagnosis: Psychiatric disturbance  Assessment and Plan of Treatment: You presented with altered mental status, with possible psychological distrubance. You were seen by psychiatry  in last admission. 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 You were continued with home meds mirtazapine. Your mental status improved. Please continue your home mediacation MIRTAZAPINE as directed.    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 You were continued on your blood thinner ELIQUIS. Please continue your medication as directed. .    Diagnosis: HTN (hypertension)  Assessment and Plan of Treatment: You have high blood pressure. Please continue to taking your medications as prescribed. Please measure your blood pressure at least once daily. Maintain a healthy diet which includes incorporating more vegetables and decreasing the amount of salt (low sodium) and sugar in your diet such as rice, bread, and pasta low sugar, low fat, low sodium diet. Exercise frequently if possible.  Please follow up with your primary care provider within one week of your discharge.      Diagnosis: HLD (hyperlipidemia)  Assessment and Plan of Treatment: You have hyperlipidemia. Please continue to take your cholesterol medications. Maintain a healthy diet that consist of low sugar, low fat, low sodium. Decrease the amount of fried foods that you consume. Exercise frequently if possible. Please follow up with  your primary care provider within 1 week of your discharge.  You are recommended a DASH Diet that emphasizes mostly vegetables, fruits, and fat-free or low-fat dairy products. Please limit intake of sodium, sweets, beverages w/ high sugar/carbohydrate content.       PRINCIPAL DISCHARGE DIAGNOSIS  Diagnosis: Acute UTI  Assessment and Plan of Treatment: You were found to have a urinary tract infection on your admission.    You were treated with rocpehin for 2 days of iv antibiotics. Your cultures were positive for E. faecalis and so you were placed on Ampicillin for 2 in the hospital with the goal of clearing this infection  You will be discharged on AUGMENTIN 875MG EVERY 12 HOURS UNTIL 1/11/22. Please continue to take your medications as prescribed. Urinary tract infections can cause weakness, confusion, or spread to other organ systems in the body. If you experience continued symptoms of infection (fever, chills, weakness, or burning with urination), please follow up with your primary care provider.        SECONDARY DISCHARGE DIAGNOSES  Diagnosis: MANUEL (acute kidney injury)  Assessment and Plan of Treatment: You came in with a Serum Creatinine function of 2.94 which is a reflection of your kidney function. You have a baseline level of 1.5-1.6. You stated you recently had a  urologic procedure where a stent was placed in prostate. There was concern for possible spontaneous dislodgement of stent. Nephrologist Dr. Orellana was consulted. CT Pelvis showed bilateral hydronephrosis. Urology was consulted due to possible dislodgement of stent and reccommends to follow up with you OUTPATIENT UROLOGIST to have your stent replaced every 3 months. You were treated with IV hydration, which improved your kidney function. Please follow up with your PCP within 1 week of your discharge for further management of this condition and to check your Serum Creatinine.      Diagnosis: Psychiatric disturbance  Assessment and Plan of Treatment: You presented with altered mental status, with possible psychological distrubance. You were seen by psychiatry  in last admission. 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 You were continued with home meds mirtazapine. Your mental status improved. Please continue your home mediacation MIRTAZAPINE as directed.    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 You were continued on your blood thinner ELIQUIS. Please continue your medication as directed. .    Diagnosis: HTN (hypertension)  Assessment and Plan of Treatment: You have high blood pressure. Please continue to taking your medications as prescribed. Please measure your blood pressure at least once daily. Maintain a healthy diet which includes incorporating more vegetables and decreasing the amount of salt (low sodium) and sugar in your diet such as rice, bread, and pasta low sugar, low fat, low sodium diet. Exercise frequently if possible. Please Continue to take Labetolol 200mg one tablet every twelve hours. PLEASE DO NOT TAKE HYDRALAZINE 50MG AND DO NOT TAKE ENALPRIL 20MG. THESE MEDICATIONS HAVE BEEN DISCONTINUED.   Please follow up with your primary care provider within one week of your discharge.      Diagnosis: HLD (hyperlipidemia)  Assessment and Plan of Treatment: You have hyperlipidemia. Please continue to take your cholesterol medications. Maintain a healthy diet that consist of low sugar, low fat, low sodium. Decrease the amount of fried foods that you consume. Exercise frequently if possible. Please follow up with  your primary care provider within 1 week of your discharge.  You are recommended a DASH Diet that emphasizes mostly vegetables, fruits, and fat-free or low-fat dairy products. Please limit intake of sodium, sweets, beverages w/ high sugar/carbohydrate content.       PRINCIPAL DISCHARGE DIAGNOSIS  Diagnosis: Acute UTI  Assessment and Plan of Treatment: You were found to have a urinary tract infection on your admission.    You were treated with rocpehin for 2 days of iv antibiotics. Your cultures were positive for E. faecalis and so you were placed on Ampicillin for 2 in the hospital with the goal of clearing this infection  You will be discharged on AUGMENTIN 875MG EVERY 12 HOURS UNTIL 1/11/22. PLEASE TAKE AUGMENTIN 875MG ONE TABLET EVERY 12 HOURS FOR 8 DAYS UNTIL JANUARY 11, 2022. Please continue to take your medications as prescribed. Urinary tract infections can cause weakness, confusion, or spread to other organ systems in the body. If you experience continued symptoms of infection (fever, chills, weakness, or burning with urination), please follow up with your primary care provider.        SECONDARY DISCHARGE DIAGNOSES  Diagnosis: MANUEL (acute kidney injury)  Assessment and Plan of Treatment: You came in with a Serum Creatinine function of 2.94 which is a reflection of your kidney function. You have a baseline level of 1.5-1.6. You stated you recently had a  urologic procedure where a stent was placed in prostate. There was concern for possible spontaneous dislodgement of stent. Nephrologist Dr. Orellana was consulted. CT Pelvis showed bilateral hydronephrosis. Urology was consulted due to possible dislodgement of stent and reccommends to follow up with you OUTPATIENT UROLOGIST to have your stent replaced every 3 months. You were treated with IV hydration, which improved your kidney function. Please follow up with your PCP within 1 week of your discharge for further management of this condition and to check your Serum Creatinine.      Diagnosis: Psychiatric disturbance  Assessment and Plan of Treatment: You presented with altered mental status, with possible psychological distrubance. You were seen by psychiatry  in last admission. 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 You were continued with home meds mirtazapine. Your mental status improved. Please continue your home mediacation MIRTAZAPINE as directed.    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 You were continued on your blood thinner ELIQUIS. Please continue your medication as directed. .    Diagnosis: HTN (hypertension)  Assessment and Plan of Treatment: You have high blood pressure. Please continue to taking your medications as prescribed. Please measure your blood pressure at least once daily. Maintain a healthy diet which includes incorporating more vegetables and decreasing the amount of salt (low sodium) and sugar in your diet such as rice, bread, and pasta low sugar, low fat, low sodium diet. Exercise frequently if possible. Please Continue to take Labetolol 200mg one tablet every twelve hours. PLEASE DO NOT TAKE HYDRALAZINE 50MG AND DO NOT TAKE ENALPRIL 20MG. THESE MEDICATIONS HAVE BEEN DISCONTINUED.   Please follow up with your primary care provider within one week of your discharge.      Diagnosis: HLD (hyperlipidemia)  Assessment and Plan of Treatment: You have hyperlipidemia. Please continue to take your cholesterol medications. Maintain a healthy diet that consist of low sugar, low fat, low sodium. Decrease the amount of fried foods that you consume. Exercise frequently if possible. Please follow up with  your primary care provider within 1 week of your discharge.  You are recommended a DASH Diet that emphasizes mostly vegetables, fruits, and fat-free or low-fat dairy products. Please limit intake of sodium, sweets, beverages w/ high sugar/carbohydrate content. Please continue pravastatin 40mg one table at bedtime. Please follow with primary care doctor.       Diagnosis: Diabetes  Assessment and Plan of Treatment: You have history of diabetes. You were noted to be on Metformin and Amaryl. These medications can affect your kindeys and not recommended  to take. PLEASE STOP TAKING METFORMIN AND GLIMEPIRIDE. PLEASE TAKE GLIPIZIDE 4MG ONE TABLET TWO TIMES A DAY. Please check your blood glucose everyday and consume a healthy diet. Please follow with your primary care provider.     PRINCIPAL DISCHARGE DIAGNOSIS  Diagnosis: Acute UTI  Assessment and Plan of Treatment: You were diagnoed with urinary tract infection as your Urinalysis was positive. You were treated with ROCEPHIN for 2 days THEN CHANGED TO UNASYN IV with the goal of clearing this infection as your urine culture was positive for E. Faecalis.    You are being discharged on AUGMENTIN 875 MG EVERY 12 HOURS UNTIL 1/11/22 to complete treatement.   Please continue to take medications as prescribed and follow up with your PCP in a week from discharge.        SECONDARY DISCHARGE DIAGNOSES  Diagnosis: Acute kidney injury superimposed on CKD  Assessment and Plan of Treatment: You came in with a Serum Creatinine function of 2.94 which is a reflection of your kidney function. You have a baseline level of 1.5-1.6. You stated you recently had a  urologic procedure where a stent was placed. There was concern for possible spontaneous dislodgement of stent. Nephrologist Dr. Orellana was consulted for further recommendations regarding kidney function. CT Abdomen and Pelvis showed bilateral hydronephrosis. Urology was consulted due to possible dislodgement of stent and reccommended to follow up with your OUTPATIENT UROLOGIST to have your stent replaced every 3 months.   You were treated with IV hydration which improved your kidney function. You are being discharged on CALCITRIOL, CALCIUM CARBONATE AND VITAMIN D SUPPLEMENTS.   Please take medications as prescribed and follow up with your PCP Nephrologist in a week of your discharge for further management of this condition and to check your Serum Creatinine.      Diagnosis: Diabetes  Assessment and Plan of Treatment: You have history of diabetes ON METFORMIN AND AMARYL AT HOME.  You were seen by Endocrinologist who recommended TO STOP METFORMIN AND GLIMEPIRIDE AND STARTED ON GLIPIZIDE 5MG TWO TIMES A DAY. Please check your blood glucose everyday and consume a healthy diet. Please follow with your primary care and Endocrinologist within a week of discharge to adjust medications as needed.    Diagnosis: HTN (hypertension)  Assessment and Plan of Treatment: You have a history of Hypertension ON ENALAPRIL AND HYDRALAZINE AT HOME.   On this admission, your Blood Pressure was adequately controlled with LABETALOL.  Your blood pressure target is 130/80, maintain healthy lifestyle, low salt diet, avoid fatty food, stay active as tolerated 30 mins X 3 times per week. Notify your doctor if you have any of the following symptoms:   (Dizziness, Lightheadedness, Blurry vision, Headache, Chest pain, Shortness of breath.)  PLEASE STOP TAKING HYDRALAZINE AND ENALPRIL FOR NOW AND CONTINUE TAKING LABETALOL AS PRESCRIBED.   Please follow-up with your PCP in 1 week from discharge to ADJUST medications as needed.      Diagnosis: Vitamin D deficiency  Assessment and Plan of Treatment: You were diagnosed with low vitamin D levels of 7. You were started on supplementation with ERGOCALCIFEROL 50,000 UNITS WEEKLY EVERY THURSDAY. Please take medication as prescribed and follow up with your PCP in a week from discharge.      Diagnosis: Psychiatric disturbance  Assessment and Plan of Treatment: You presented with altered mental status with possible psychological distrubance. You were seen by psychiatris on last admission diagnosed with adjustment Disorder with Mixed Disturbance of Emotions and Conduct. Please continue taking your home mediacation MIRTAZAPINE and follow up with your PCP in a week of discharge.    Diagnosis: Deep vein thrombosis (DVT)  Assessment and Plan of Treatment: You have a history of DVT, a disease characterized by clots in your lower extremities. You were continued on your blood thinner ELIQUIS. Please continue your HOME medications and follow up with your PCP in a week from discharge.      Diagnosis: HLD (hyperlipidemia)  Assessment and Plan of Treatment: You have hyperlipidemia. Please continue to take your cholesterol medications, maintain a healthy diet that consist of low sugar, low fat, low sodium, decrease the amount of fried foods that you consume.   Please continue taking PRAVASTATIN at bedtime and follow with PCP in a week from discharge for further intructions.       PRINCIPAL DISCHARGE DIAGNOSIS  Diagnosis: Acute UTI  Assessment and Plan of Treatment: You were diagnosed with urinary tract infection as your Urinalysis was positive. You were treated with ROCEPHIN for 2 days THEN CHANGED TO UNASYN IV with the goal of clearing this infection as your urine culture was positive for E. Faecalis.    You are being discharged on AUGMENTIN 875 MG EVERY 12 HOURS UNTIL 1/11/22 to complete treatement as per Infectious Disease recommendation.   Please continue to take medications as prescribed and follow up with your PCP in a week from discharge.        SECONDARY DISCHARGE DIAGNOSES  Diagnosis: Acute kidney injury superimposed on CKD  Assessment and Plan of Treatment: You came in with a Serum Creatinine function of 2.94 which is a reflection of your kidney function. You have a baseline level of 1.5-1.6. You stated you recently had a  urologic procedue -stent placement. There was concern for possible spontaneous dislodgement of stent. Nephrologist Dr. Orellana was consulted for further recommendations regarding kidney function. CT Abdomen and Pelvis showed bilateral hydronephrosis. Urology was consulted due to possible dislodgement of stent and reccommended to follow up with your OUTPATIENT UROLOGIST to have your stent replaced every 3 months.   You were treated with IV hydration which improved your kidney function. You are being discharged on CALCITRIOL, CALCIUM CARBONATE AND VITAMIN D SUPPLEMENTS.   Please take medications as prescribed and follow up with your PCP Nephrologist in a week of your discharge for further management of this condition and to check your Serum Creatinine.      Diagnosis: Diabetes  Assessment and Plan of Treatment: You have history of diabetes ON METFORMIN AND GLIMEPIRIDE AT HOME.  You were seen by Endocrinologist who recommended TO STOP METFORMIN AND GLIMEPIRIDE AND STARTED ON GLIPIZIDE 5MG TWO TIMES A DAY. Please check your blood glucose everyday and consume a healthy diet. Please follow with your PCP and Endocrinologist within a week of discharge to adjust medications as needed.    Diagnosis: HTN (hypertension)  Assessment and Plan of Treatment: You have a history of Hypertension ON ENALAPRIL AND HYDRALAZINE AT HOME.   On this admission, your Blood Pressure was adequately controlled with LABETALOL.  Your blood pressure target is 130/80, maintain healthy lifestyle, low salt diet, avoid fatty food, stay active as tolerated 30 mins X 3 times per week. Notify your doctor if you have any of the following symptoms:   (Dizziness, Lightheadedness, Blurry vision, Headache, Chest pain, Shortness of breath.)  PLEASE STOP TAKING HYDRALAZINE AND ENALPRIL FOR NOW AND CONTINUE TAKING LABETALOL AS PRESCRIBED.   Please follow-up with your PCP in 1 week from discharge to ADJUST medications as needed.      Diagnosis: Vitamin D deficiency  Assessment and Plan of Treatment: You were diagnosed with low vitamin D levels of 7. You were started on supplementation with ERGOCALCIFEROL 50,000 UNITS WEEKLY EVERY THURSDAY. Please take medication as prescribed and follow up with your PCP in a week from discharge.      Diagnosis: Psychiatric disturbance  Assessment and Plan of Treatment: You presented with altered mental status with possible psychological distrubance. You were seen by psychiatris on last admission diagnosed with adjustment Disorder with Mixed Disturbance of Emotions and Conduct. Please continue taking your home mediacation MIRTAZAPINE and follow up with your PCP in a week of discharge.    Diagnosis: Deep vein thrombosis (DVT)  Assessment and Plan of Treatment: You have a history of DVT, a disease characterized by clots in your lower extremities. You were continued on your blood thinner ELIQUIS 2.5 MG TWICE A DAY. Please continue your HOME medications and follow up with your PCP in a week from discharge.      Diagnosis: HLD (hyperlipidemia)  Assessment and Plan of Treatment: You have hyperlipidemia. Please continue to take your cholesterol medications, maintain a healthy diet that consist of low sugar, low fat, low sodium, decrease the amount of fried foods that you consume.   Please continue taking PRAVASTATIN at bedtime and follow with PCP in a week from discharge for further intructions.

## 2021-12-25 NOTE — PROGRESS NOTE ADULT - PROBLEM SELECTOR PLAN 1
Pt with generalized weakness, WBC 11  UA +  f/u urine cultures  leucocytosis   s/p IV fluids and Rocephin in ED  Will continue Rocephin

## 2021-12-25 NOTE — DISCHARGE NOTE PROVIDER - CARE PROVIDER_API CALL
[facility]  INTERNAL MEDICINE  [address] [place] [address]  Phone: [phone]  Fax: [phone]  Follow Up Time:     Jillian Kamara)  EndocrinologyMetabDiabetes  [address] [facility] [place] [address]  Phone: [phone]  Fax: [phone]  Follow Up Time:    Autumn Garcia  INTERNAL MEDICINE  [address] [place] [address]  Phone: [phone]  Fax: [phone]  Follow Up Time:     Jillian Kamara)  EndocrinologyMetabDiabetes  [address] [place] [address]  Phone: [phone]  Fax: [phone]  Follow Up Time:     Jennifer Orellana  INTERNAL MEDICINE  [address] [place] [address]  Phone: [phone]  Fax: [phone]  Follow Up Time: 1 week

## 2021-12-25 NOTE — PROGRESS NOTE ADULT - PROBLEM SELECTOR PLAN 2
Pt with Larry on CKD, baseline 1.5-1.6  s/p IV fluids  Creatinine 2.94  f/u US kidney, bladder  Patient states he recently had a  urologic procedure where he had a stent placed in prostate  R/O spontaneous dislodgement of stent  Nephro Dr. Orellana: f/ u CT pelvis

## 2021-12-25 NOTE — DISCHARGE NOTE PROVIDER - HOSPITAL COURSE
81 yr old male with hx of Chronic DVT, Anemia, GERD, HTN, HLD, DM, Hypoparathyroidism, Glaucoma and BPH presents to ed for generalized weakness, found to have UTI.     Acute UTI: Presented with generalized weakness, WBC 11. UA was positive. Urine Cultures showed XXXX. Was on Rocephin for XXX days.     MANUEL (acute kidney injury): Patient has a hx of CKD with baseline creatinine 1.5-1.6. Creatinine on admission was 2.94. Patient states he recently had a  urologic procedure where he had a stent placed in prostate  Concern for possible spontaneous dislodgement of stent. Nephrologist Dr. Orellana was consulted. CT Pelvis showed XXXXXX    Psychiatric disturbance: Was seen by psychiatry  in last admission. 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 Continue with home meds mirtazapine.    Deep vein thrombosis (DVT): has history of DVT, continue Eliquis.    Diabetes mellitus: hgb a1c 7.4, endocrinologist was consulted and recommended XXX    HTN (hypertension): held hydralazine as BP was on the softer side, C/W labetalol     HLD (hyperlipidemia): continue with home meds    Pt is stable for discharge. Pt has been advised to follow up as outpatient. Case has been discussed with the attending. This is just a summary of the case. For further information please refer to pt. chart document.     81 yr old male with hx of Chronic DVT, Anemia, GERD, HTN, HLD, DM, Hypoparathyroidism, Glaucoma and BPH presents to ed for generalized weakness, found to have UTI.     Acute UTI: Presented with generalized weakness, WBC 11. UA was positive. Urine Cultures showed XXXX. Was on Rocephin for XXX days.     MANUEL (acute kidney injury): Patient has a hx of CKD with baseline creatinine 1.5-1.6. Creatinine on admission was 2.94. Patient states he recently had a  urologic procedure where he had a stent placed in prostate  Concern for possible spontaneous dislodgement of stent. Nephrologist Dr. Orellana was consulted. CT Pelvis showed bilateral hydronephrosis. Urology was consulted due to possible dislodgement of stent and recc xxxx    Psychiatric disturbance: Was seen by psychiatry  in last admission. 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 Continue with home meds mirtazapine.    Deep vein thrombosis (DVT): has history of DVT, continue Eliquis.    Diabetes mellitus: hgb a1c 7.4, endocrinologist was consulted and recommended Lantus 16 and admelog 4U.     HTN (hypertension): held hydralazine as BP was on the softer side, C/W labetalol     HLD (hyperlipidemia): continue with home meds    Pt is stable for discharge. Pt has been advised to follow up as outpatient. Case has been discussed with the attending. This is just a summary of the case. For further information please refer to pt. chart document.     81 yr old male with hx of Chronic DVT, Anemia, GERD, HTN, HLD, DM, Hypoparathyroidism, Glaucoma and BPH presents to ed for generalized weakness, found to have UTI.     Acute UTI: Presented with generalized weakness, WBC 11. UA was positive. Was on Rocephin for 2 days. Urine Cultures showed urine cultures E. Faecalis sensitive to ampicillin  started on ampicillin on 12/29. Oral Augmentin 875 mg q 12hours on discharge to continue until 1/11/22.      MANUEL (acute kidney injury): Patient has a hx of CKD with baseline creatinine 1.5-1.6. Creatinine on admission was 2.94. Patient states he recently had a  urologic procedure where he had a stent placed in prostate  Concern for possible spontaneous dislodgement of stent. Nephrologist Dr. Orellana was consulted. CT Pelvis showed bilateral hydronephrosis. Urology was consulted due to possible dislodgement of stent and recc to follow up with outpatient urology to have your stent replaced every 3 months.    Psychiatric disturbance: Was seen by psychiatry  in last admission. 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 Continue with home meds mirtazapine.    Deep vein thrombosis (DVT): has history of DVT, continue Eliquis.    Diabetes mellitus: hgb a1c 7.4, endocrinologist was consulted and recommended Lantus 20 and admelog 4U.     HTN (hypertension): held hydralazine as BP was on the softer side, C/W labetalol     HLD (hyperlipidemia): continue with home meds    Pt is stable for discharge. Pt has been advised to follow up as outpatient. Case has been discussed with the attending. This is just a summary of the case. For further information please refer to pt. chart document.     81 yr old male with hx of Chronic DVT, Anemia, GERD, HTN, HLD, DM, Hypoparathyroidism, Glaucoma and BPH presents to ed for generalized weakness, found to have UTI.     For urinary tract infection WBC 11. UA was positive. Was on Rocephin for 2 days. Urine Cultures showed urine cultures E. Faecalis sensitive to ampicillin  started on ampicillin on 12/29. Patient will be dischagrged on Oral Augmentin 875 mg q 12hours on discharge to continue until 1/11/22.      Patient also found to have MANUEL (acute kidney injury): Patient has a hx of CKD with baseline creatinine 1.5-1.6. Creatinine on admission was 2.94. Patient states he recently had a  urologic procedure where he had a stent placed in prostate  Concern for possible spontaneous dislodgement of stent. Nephrologist Dr. Orellana was consulted. CT Pelvis showed bilateral hydronephrosis. Urology was consulted due to possible dislodgement of stent and urology recommended  to follow up with outpatient urology to have your stent replaced every 3 months.    Psychiatric disturbance: Was seen by psychiatry  in last admission. 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 Continue with home meds mirtazapine.    Patient has history Deep vein thrombosis (DVT): has history of DVT, his home medication of eliquis was continued.     Diabetes mellitus: hgb a1c 7.4, endocrinologist was consulted and recommended Lantus 20 and admelog 4U. However due to behavioral and cognitive issues, insulin was deemed not feasible. Endocrine recommended Glypizide 5mg Two times a day, and home mediations of metformin and Amaryl were discontinued due to elevated renal function.     HTN (hypertension): held hydralazine and enapril  as BP was on the softer side, C/W labetalol 200mg one tablet every twelve hours.     HLD (hyperlipidemia): continue with home meds    Pt is stable for discharge. Pt has been advised to follow up as outpatient. Case has been discussed with the attending. This is just a summary of the case. For further information please refer to pt. chart document.     81 yr old male with hx of Chronic DVT, Anemia, GERD, HTN, HLD, DM, Hypoparathyroidism, Glaucoma and BPH presented to ED for generalized weakness, found to have UTI.     For urinary tract infection WBC 11. UA was positive, treated with Rocephin for 2 days, then started on Unasyn 12/29 for positive Urine Cultures with E. Faecalis. Patient discharged on Oral Augmentin 875 mg q 12hours until 1/11/22.    For MANUEL on CKD was treated with IV fluids, has hx of stent placement, seen by Nephrologist Dr. Orellana, CT Pelvis showed bilateral hydronephrosis. Urology was consulted due to possible dislodgement of stent and urology recommended to follow up with outpatient urology to have your stent replaced every 3 months.    Psychiatric disturbance, was seen by psychiatry on last admission. 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 Continued with home meds mirtazapine.    For Deep vein thrombosis (DVT) continued on home Eliquis     For Diabetes mellitus: hgb a1c 7.4, endocrinologist recommended Lantus 20 and Admelog 4U, however due to behavioral and cognitive issues, insulin was deemed not feasible. Endocrine recommended Glypizide 5mg Two times a day. Home mediations of metformin and Amaryl were discontinued due to decreased renal function.     HTN (hypertension): held hydralazine and enalapril as BP was on the softer side, continued with labetalol 200mg one tablet every twelve hours.     HLD (hyperlipidemia): continue with home meds    Pt is stable for discharge. Pt has been advised to follow up as outpatient. Case has been discussed with the attending. This is just a summary of the case. For further information please refer to pt. chart document.

## 2021-12-25 NOTE — DISCHARGE NOTE PROVIDER - NSDCMRMEDTOKEN_GEN_ALL_CORE_FT
apixaban 2.5 mg oral tablet: 1 tab(s) orally every 12 hours  calcitriol 0.5 mcg oral capsule: 1 cap(s) orally once a day  enalapril maleate 20 mg tablet:   finasteride 5 mg oral tablet: 1 tab(s) orally once a day  glimepiride 4 mg tablet:   hydrALAZINE 50 mg oral tablet: 1 tab(s) orally 3 times a day  labetalol 200 mg oral tablet: 1 tab(s) orally every 12 hours  meclizine 12.5 mg oral tablet: 1 tab(s) orally every 6 hours, As needed, Dizziness  METFORMIN  MG TABLET: TAKE 1 TABLET TWICE DAILY  mirtazapine 7.5 mg oral tablet: 1 tab(s) orally once a day (at bedtime)  omeprazole 20 mg capsule,delayed release:   pravastatin 40 mg tablet:   tamsulosin 0.4 mg oral capsule: 1 cap(s) orally once a day (at bedtime)   amoxicillin-clavulanate 875 mg-125 mg oral tablet: 1 tab(s) orally 2 times a day   apixaban 2.5 mg oral tablet: 1 tab(s) orally every 12 hours  calcitriol 0.5 mcg oral capsule: 1 cap(s) orally once a day  finasteride 5 mg oral tablet: 1 tab(s) orally once a day  glipiZIDE 5 mg oral tablet: 1 tab(s) orally 2 times a day   labetalol 200 mg oral tablet: 1 tab(s) orally every 12 hours  meclizine 12.5 mg oral tablet: 1 tab(s) orally every 6 hours, As needed, Dizziness  mirtazapine 7.5 mg oral tablet: 1 tab(s) orally once a day (at bedtime)  omeprazole 20 mg capsule,delayed release:   pravastatin 40 mg tablet:   tamsulosin 0.4 mg oral capsule: 1 cap(s) orally once a day (at bedtime)   amoxicillin-clavulanate 875 mg-125 mg oral tablet: 1 tab(s) orally 2 times a day from 1/4-1/11  apixaban 2.5 mg oral tablet: 1 tab(s) orally every 12 hours  calcitriol 0.5 mcg oral capsule: 1 cap(s) orally once a day  calcium carbonate 500 mg (200 mg elemental calcium) oral tablet, chewable: 1 tab(s) chewed 2 times a day   ergocalciferol 1.25 mg (50,000 intl units) oral capsule: 1 cap(s) orally once a week EVERY THURSDAY  finasteride 5 mg oral tablet: 1 tab(s) orally once a day  glipiZIDE 5 mg oral tablet: 1 tab(s) orally 2 times a day   labetalol 200 mg oral tablet: 1 tab(s) orally every 12 hours  meclizine 12.5 mg oral tablet: 1 tab(s) orally every 6 hours, As needed, Dizziness  mirtazapine 7.5 mg oral tablet: 1 tab(s) orally once a day (at bedtime)  omeprazole 20 mg capsule,delayed release:   pravastatin 40 mg tablet: 1 tab(s) orally once a day (at bedtime)  tamsulosin 0.4 mg oral capsule: 1 cap(s) orally once a day (at bedtime)

## 2021-12-25 NOTE — PROGRESS NOTE ADULT - SUBJECTIVE AND OBJECTIVE BOX
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, bilateral hydronephrosis and post stent placement 11/2021  Incontinence  UTI  Dementia     PAST MEDICAL & SURGICAL HISTORY:  Diabetes    Hypertension    Hypercholesterolemia    GERD (gastroesophageal reflux disease)    Anemia    Hypomagnesemia    DVT (deep venous thrombosis)    No significant past surgical history        Allergy Status Unknown      MEDICATIONS  (STANDING):  apixaban 2.5 milliGRAM(s) Oral two times a day  atorvastatin 10 milliGRAM(s) Oral at bedtime  calcitriol   Capsule 0.5 MICROGram(s) Oral daily  cefTRIAXone   IVPB 1000 milliGRAM(s) IV Intermittent every 24 hours  finasteride 5 milliGRAM(s) Oral daily  insulin lispro (ADMELOG) corrective regimen sliding scale   SubCutaneous three times a day before meals  insulin lispro (ADMELOG) corrective regimen sliding scale   SubCutaneous at bedtime  labetalol 200 milliGRAM(s) Oral every 12 hours  mirtazapine 7.5 milliGRAM(s) Oral daily  pantoprazole    Tablet 40 milliGRAM(s) Oral before breakfast  sodium bicarbonate 650 milliGRAM(s) Oral three times a day  sodium chloride 0.9%. 1000 milliLiter(s) (75 mL/Hr) IV Continuous <Continuous>  tamsulosin 0.4 milliGRAM(s) Oral at bedtime    MEDICATIONS  (PRN):                            9.7    10.98 )-----------( 169      ( 25 Dec 2021 08:40 )             27.8     12-25    137  |  109<H>  |  100<H>  ----------------------------<  323<H>  3.7   |  15<L>  |  3.03<H>    Ca    6.6<L>      25 Dec 2021 08:40  Phos  2.5     12-25  Mg     1.4     12-25    TPro  7.1  /  Alb  2.1<L>  /  TBili  0.4  /  DBili  x   /  AST  26  /  ALT  26  /  AlkPhos  81  12-24  corrected calcium 8.3          REVIEW OF SYSTEMS:  confused    VITALS:  T(F): 98.4 (12-25-21 @ 05:36), Max: 98.4 (12-25-21 @ 05:36)  HR: 91 (12-25-21 @ 05:36)  BP: 133/79 (12-25-21 @ 05:36)  RR: 18 (12-25-21 @ 05:36)  SpO2: 97% (12-25-21 @ 05:36)  Wt(kg): --      PHYSICAL EXAM:  Constitutional: awake but confused, no diaphoresis, no distress.  Neck: No JVD, no carotid bruit, supple, no adenopathy  Respiratory:  air entrance B/L, no wheezes, rales or rhonchi  Cardiovascular: S1, S2, RRR, no pericardial rub, no murmur  Abdomen: BS+, soft, no tenderness, no bruit  Pelvis: bladder nondistended

## 2021-12-25 NOTE — PROGRESS NOTE ADULT - SUBJECTIVE AND OBJECTIVE BOX
PGY-1 Progress Note discussed with attending    PAGER #: [phone]] TILL 5:00 PM  PLEASE CONTACT ON CALL TEAM:  - On Call Team (Please refer to Caleb) FROM 5:00 PM - 8:30PM  - Nightfloat Team FROM 8:30 -7:30 AM      INTERVAL HPI/OVERNIGHT EVENTS: no acute overnight events. Patient still states he does not know why he is here       REVIEW OF SYSTEMS:  CONSTITUTIONAL: No fever, weight loss, or fatigue  RESPIRATORY: No cough, wheezing, chills or hemoptysis; No shortness of breath  CARDIOVASCULAR: No chest pain, palpitations, dizziness, or leg swelling  GASTROINTESTINAL: No abdominal pain. No nausea, vomiting, or hematemesis; No diarrhea or constipation. No melena or hematochezia.  GENITOURINARY: No dysuria or hematuria, urinary frequency  NEUROLOGICAL: No headaches, memory loss, loss of strength, numbness, or tremors  SKIN: No itching, burning, rashes, or lesions     Vital Signs Last 24 Hrs  T(C): 36.9 (25 Dec 2021 05:36), Max: 36.9 (25 Dec 2021 05:36)  T(F): 98.4 (25 Dec 2021 05:36), Max: 98.4 (25 Dec 2021 05:36)  HR: 91 (25 Dec 2021 05:36) (79 - 91)  BP: 133/79 (25 Dec 2021 05:36) (105/63 - 133/79)  BP(mean): --  RR: 18 (25 Dec 2021 05:36) (18 - 18)  SpO2: 97% (25 Dec 2021 05:36) (97% - 98%)    PHYSICAL EXAMINATION:  GENERAL: NAD,   HEAD:  Atraumatic, Normocephalic  EYES:  conjunctiva and sclera clear  NECK: Supple, No JVD, Normal thyroid  CHEST/LUNG: Clear to auscultation. Clear to percussion bilaterally; No rales, rhonchi, wheezing, or rubs  HEART: Regular rate and rhythm; No murmurs, rubs, or gallops  ABDOMEN: Soft, Nontender, Nondistended; Bowel sounds present  NERVOUS SYSTEM:  Alert & Oriented X 2,  following commands   EXTREMITIES:  2+ Peripheral Pulses, No clubbing, cyanosis, or edema  SKIN: warm dry                        9.7    10.98 )-----------( 169      ( 25 Dec 2021 08:40 )             27.8     12-24    141  |  110<H>  |  87<H>  ----------------------------<  261<H>  4.4   |  17<L>  |  2.94<H>    Ca    6.9<L>      24 Dec 2021 10:07  Phos  3.6     12-24  Mg     1.6     12-24    TPro  7.1  /  Alb  2.1<L>  /  TBili  0.4  /  DBili  x   /  AST  26  /  ALT  26  /  AlkPhos  81  12-24    LIVER FUNCTIONS - ( 24 Dec 2021 10:07 )  Alb: 2.1 g/dL / Pro: 7.1 g/dL / ALK PHOS: 81 U/L / ALT: 26 U/L DA / AST: 26 U/L / GGT: x           CARDIAC MARKERS ( 23 Dec 2021 20:32 )  x     / x     / 591 U/L / x     / x              CAPILLARY BLOOD GLUCOSE      RADIOLOGY & ADDITIONAL TESTS:

## 2021-12-26 LAB
ANION GAP SERPL CALC-SCNC: 13 MMOL/L — SIGNIFICANT CHANGE UP (ref 5–17)
BASOPHILS # BLD AUTO: 0.02 K/UL — SIGNIFICANT CHANGE UP (ref 0–0.2)
BASOPHILS NFR BLD AUTO: 0.1 % — SIGNIFICANT CHANGE UP (ref 0–2)
BUN SERPL-MCNC: 82 MG/DL — HIGH (ref 7–18)
CALCIUM SERPL-MCNC: 6.5 MG/DL — CRITICAL LOW (ref 8.4–10.5)
CHLORIDE SERPL-SCNC: 111 MMOL/L — HIGH (ref 96–108)
CO2 SERPL-SCNC: 16 MMOL/L — LOW (ref 22–31)
CREAT SERPL-MCNC: 3.16 MG/DL — HIGH (ref 0.5–1.3)
EOSINOPHIL # BLD AUTO: 0.02 K/UL — SIGNIFICANT CHANGE UP (ref 0–0.5)
EOSINOPHIL NFR BLD AUTO: 0.1 % — SIGNIFICANT CHANGE UP (ref 0–6)
GLUCOSE BLDC GLUCOMTR-MCNC: 258 MG/DL — HIGH (ref 70–99)
GLUCOSE BLDC GLUCOMTR-MCNC: 311 MG/DL — HIGH (ref 70–99)
GLUCOSE BLDC GLUCOMTR-MCNC: 335 MG/DL — HIGH (ref 70–99)
GLUCOSE BLDC GLUCOMTR-MCNC: 475 MG/DL — CRITICAL HIGH (ref 70–99)
GLUCOSE SERPL-MCNC: 333 MG/DL — HIGH (ref 70–99)
HCT VFR BLD CALC: 26.7 % — LOW (ref 39–50)
HGB BLD-MCNC: 9.2 G/DL — LOW (ref 13–17)
IMM GRANULOCYTES NFR BLD AUTO: 1.3 % — SIGNIFICANT CHANGE UP (ref 0–1.5)
LYMPHOCYTES # BLD AUTO: 0.71 K/UL — LOW (ref 1–3.3)
LYMPHOCYTES # BLD AUTO: 5.3 % — LOW (ref 13–44)
MAGNESIUM SERPL-MCNC: 1.6 MG/DL — SIGNIFICANT CHANGE UP (ref 1.6–2.6)
MCHC RBC-ENTMCNC: 28.8 PG — SIGNIFICANT CHANGE UP (ref 27–34)
MCHC RBC-ENTMCNC: 34.5 GM/DL — SIGNIFICANT CHANGE UP (ref 32–36)
MCV RBC AUTO: 83.7 FL — SIGNIFICANT CHANGE UP (ref 80–100)
MONOCYTES # BLD AUTO: 0.62 K/UL — SIGNIFICANT CHANGE UP (ref 0–0.9)
MONOCYTES NFR BLD AUTO: 4.6 % — SIGNIFICANT CHANGE UP (ref 2–14)
NEUTROPHILS # BLD AUTO: 11.92 K/UL — HIGH (ref 1.8–7.4)
NEUTROPHILS NFR BLD AUTO: 88.6 % — HIGH (ref 43–77)
NRBC # BLD: 0 /100 WBCS — SIGNIFICANT CHANGE UP (ref 0–0)
PHOSPHATE SERPL-MCNC: 2.1 MG/DL — LOW (ref 2.5–4.5)
PLATELET # BLD AUTO: 163 K/UL — SIGNIFICANT CHANGE UP (ref 150–400)
POTASSIUM SERPL-MCNC: 3.6 MMOL/L — SIGNIFICANT CHANGE UP (ref 3.5–5.3)
POTASSIUM SERPL-SCNC: 3.6 MMOL/L — SIGNIFICANT CHANGE UP (ref 3.5–5.3)
RBC # BLD: 3.19 M/UL — LOW (ref 4.2–5.8)
RBC # FLD: 13.2 % — SIGNIFICANT CHANGE UP (ref 10.3–14.5)
SODIUM SERPL-SCNC: 140 MMOL/L — SIGNIFICANT CHANGE UP (ref 135–145)
WBC # BLD: 13.47 K/UL — HIGH (ref 3.8–10.5)
WBC # FLD AUTO: 13.47 K/UL — HIGH (ref 3.8–10.5)

## 2021-12-26 PROCEDURE — 74176 CT ABD & PELVIS W/O CONTRAST: CPT | Mod: 26

## 2021-12-26 RX ORDER — INSULIN GLARGINE 100 [IU]/ML
10 INJECTION, SOLUTION SUBCUTANEOUS AT BEDTIME
Refills: 0 | Status: DISCONTINUED | OUTPATIENT
Start: 2021-12-26 | End: 2021-12-27

## 2021-12-26 RX ORDER — REPAGLINIDE 1 MG/1
1 TABLET ORAL
Refills: 0 | Status: DISCONTINUED | OUTPATIENT
Start: 2021-12-26 | End: 2021-12-29

## 2021-12-26 RX ORDER — LANOLIN ALCOHOL/MO/W.PET/CERES
5 CREAM (GRAM) TOPICAL AT BEDTIME
Refills: 0 | Status: DISCONTINUED | OUTPATIENT
Start: 2021-12-26 | End: 2022-01-03

## 2021-12-26 RX ORDER — CALCIUM GLUCONATE 100 MG/ML
2 VIAL (ML) INTRAVENOUS ONCE
Refills: 0 | Status: DISCONTINUED | OUTPATIENT
Start: 2021-12-26 | End: 2021-12-26

## 2021-12-26 RX ORDER — CALCIUM CARBONATE 500(1250)
1 TABLET ORAL THREE TIMES A DAY
Refills: 0 | Status: DISCONTINUED | OUTPATIENT
Start: 2021-12-26 | End: 2022-01-03

## 2021-12-26 RX ADMIN — Medication 1 TABLET(S): at 17:03

## 2021-12-26 RX ADMIN — Medication 650 MILLIGRAM(S): at 05:59

## 2021-12-26 RX ADMIN — MIRTAZAPINE 7.5 MILLIGRAM(S): 45 TABLET, ORALLY DISINTEGRATING ORAL at 12:07

## 2021-12-26 RX ADMIN — Medication 3: at 17:03

## 2021-12-26 RX ADMIN — FINASTERIDE 5 MILLIGRAM(S): 5 TABLET, FILM COATED ORAL at 12:07

## 2021-12-26 RX ADMIN — REPAGLINIDE 1 MILLIGRAM(S): 1 TABLET ORAL at 12:16

## 2021-12-26 RX ADMIN — REPAGLINIDE 1 MILLIGRAM(S): 1 TABLET ORAL at 17:08

## 2021-12-26 RX ADMIN — APIXABAN 2.5 MILLIGRAM(S): 2.5 TABLET, FILM COATED ORAL at 05:59

## 2021-12-26 RX ADMIN — PANTOPRAZOLE SODIUM 40 MILLIGRAM(S): 20 TABLET, DELAYED RELEASE ORAL at 05:59

## 2021-12-26 RX ADMIN — Medication 650 MILLIGRAM(S): at 17:08

## 2021-12-26 RX ADMIN — TAMSULOSIN HYDROCHLORIDE 0.4 MILLIGRAM(S): 0.4 CAPSULE ORAL at 22:49

## 2021-12-26 RX ADMIN — APIXABAN 2.5 MILLIGRAM(S): 2.5 TABLET, FILM COATED ORAL at 17:08

## 2021-12-26 RX ADMIN — Medication 200 MILLIGRAM(S): at 17:08

## 2021-12-26 RX ADMIN — CALCITRIOL 0.5 MICROGRAM(S): 0.5 CAPSULE ORAL at 12:07

## 2021-12-26 RX ADMIN — Medication 200 MILLIGRAM(S): at 05:59

## 2021-12-26 RX ADMIN — Medication 6: at 12:07

## 2021-12-26 RX ADMIN — Medication 4: at 08:08

## 2021-12-26 RX ADMIN — REPAGLINIDE 1 MILLIGRAM(S): 1 TABLET ORAL at 05:59

## 2021-12-26 RX ADMIN — Medication 1 TABLET(S): at 12:16

## 2021-12-26 RX ADMIN — INSULIN GLARGINE 10 UNIT(S): 100 INJECTION, SOLUTION SUBCUTANEOUS at 22:28

## 2021-12-26 RX ADMIN — Medication 650 MILLIGRAM(S): at 22:49

## 2021-12-26 RX ADMIN — CEFTRIAXONE 100 MILLIGRAM(S): 500 INJECTION, POWDER, FOR SOLUTION INTRAMUSCULAR; INTRAVENOUS at 17:03

## 2021-12-26 RX ADMIN — ATORVASTATIN CALCIUM 10 MILLIGRAM(S): 80 TABLET, FILM COATED ORAL at 22:49

## 2021-12-26 RX ADMIN — Medication 1 TABLET(S): at 22:49

## 2021-12-26 RX ADMIN — Medication 2: at 22:28

## 2021-12-26 RX ADMIN — Medication 5 MILLIGRAM(S): at 22:49

## 2021-12-26 NOTE — PHYSICAL THERAPY INITIAL EVALUATION ADULT - MANUAL MUSCLE TESTING RESULTS, REHAB EVAL
impaired cognition and ability to follow commands;;; BUE 4-/5; B/l hips 3-/5, Knees at least 3+/5, Ankles at least 3/5 functionally/grossly assessed due to

## 2021-12-26 NOTE — PHYSICAL THERAPY INITIAL EVALUATION ADULT - LEVEL OF INDEPENDENCE: STAIR NEGOTIATION, REHAB EVAL
Unsafe at this time secondary to pt imapired ability to consistently follow directions and pt. reports not require stairs to access/negotiate home environment

## 2021-12-26 NOTE — CONSULT NOTE ADULT - PROBLEM SELECTOR RECOMMENDATION 9
uncontrolled with hyperglycemia due to acute illness  a1c- 7.5% on metformin/amaryl as out pt  rec starting lantus 10 units   cont prandin - change ac tid  fsg ac and hs

## 2021-12-26 NOTE — PHYSICAL THERAPY INITIAL EVALUATION ADULT - GENERAL OBSERVATIONS, REHAB EVAL
Consult received, chart reviewed. Patient received supine in bed, NAD. Patient agreed to EVALUATION from Physical Therapist. Limited assessment secondary to impaired ability to follow commands

## 2021-12-26 NOTE — PHYSICAL THERAPY INITIAL EVALUATION ADULT - GAIT DEVIATIONS NOTED, PT EVAL
decreased chelsie/decreased velocity of limb motion/decreased step length/decreased stride length/decreased weight-shifting ability

## 2021-12-26 NOTE — PROGRESS NOTE ADULT - SUBJECTIVE AND OBJECTIVE BOX
CARDIOLOGY/MEDICAL ATTENDING    CHIEF COMPLAINT:Patient is a 81y old  Male who presents with a chief complaint of UTI.Pt appears comfortable.    	  REVIEW OF SYSTEMS:  CONSTITUTIONAL: No fever, weight loss, or fatigue  EYES: No eye pain, visual disturbances, or discharge  ENT:  No difficulty hearing, tinnitus, vertigo; No sinus or throat pain  NECK: No pain or stiffness  RESPIRATORY: No cough, wheezing, chills or hemoptysis; No Shortness of Breath  CARDIOVASCULAR: No chest pain, palpitations, passing out, dizziness, or leg swelling  GASTROINTESTINAL: No abdominal or epigastric pain. No nausea, vomiting, or hematemesis; No diarrhea or constipation. No melena or hematochezia.  GENITOURINARY: No dysuria, frequency, hematuria, or incontinence  NEUROLOGICAL: No headaches, memory loss, loss of strength, numbness, or tremors  SKIN: No itching, burning, rashes, or lesions   LYMPH Nodes: No enlarged glands  ENDOCRINE: No heat or cold intolerance; No hair loss  MUSCULOSKELETAL: No joint pain or swelling; No muscle, back, or extremity pain  PSYCHIATRIC: No depression, anxiety, mood swings, or difficulty sleeping  HEME/LYMPH: No easy bruising, or bleeding gums  ALLERGY AND IMMUNOLOGIC: No hives or eczema	      PHYSICAL EXAM:  T(C): 36.7 (12-26-21 @ 05:08), Max: 37 (12-25-21 @ 19:00)  HR: 83 (12-26-21 @ 08:47) (78 - 102)  BP: 111/57 (12-26-21 @ 08:47) (109/57 - 150/80)  RR: 18 (12-26-21 @ 05:08) (18 - 18)  SpO2: 99% (12-26-21 @ 08:47) (97% - 99%)  Wt(kg): --  I&O's Summary    25 Dec 2021 07:01  -  26 Dec 2021 07:00  --------------------------------------------------------  IN: 0 mL / OUT: 200 mL / NET: -200 mL        Appearance: Normal	  HEENT:   Normal oral mucosa, PERRL, EOMI	  Lymphatic: No lymphadenopathy  Cardiovascular: Normal S1 S2, No JVD, No murmurs, No edema  Respiratory: Lungs clear to auscultation	  Psychiatry: A & O x 3, Mood & affect appropriate  Gastrointestinal:  Soft, Non-tender, + BS	  Skin: No rashes, No ecchymoses, No cyanosis	  Neurologic: Non-focal  Extremities: Normal range of motion, No clubbing, cyanosis or edema  Vascular: Peripheral pulses palpable 2+ bilaterally    MEDICATIONS  (STANDING):  apixaban 2.5 milliGRAM(s) Oral two times a day  atorvastatin 10 milliGRAM(s) Oral at bedtime  calcitriol   Capsule 0.5 MICROGram(s) Oral daily  calcium carbonate    500 mG (Tums) Chewable 1 Tablet(s) Chew three times a day  cefTRIAXone   IVPB 1000 milliGRAM(s) IV Intermittent every 24 hours  finasteride 5 milliGRAM(s) Oral daily  insulin glargine Injectable (LANTUS) 10 Unit(s) SubCutaneous at bedtime  insulin lispro (ADMELOG) corrective regimen sliding scale   SubCutaneous three times a day before meals  insulin lispro (ADMELOG) corrective regimen sliding scale   SubCutaneous at bedtime  labetalol 200 milliGRAM(s) Oral every 12 hours  mirtazapine 7.5 milliGRAM(s) Oral daily  pantoprazole    Tablet 40 milliGRAM(s) Oral before breakfast  repaglinide 1 milliGRAM(s) Oral three times a day before meals  sodium bicarbonate 650 milliGRAM(s) Oral three times a day  sodium chloride 0.9%. 1000 milliLiter(s) (75 mL/Hr) IV Continuous <Continuous>  tamsulosin 0.4 milliGRAM(s) Oral at bedtime      	  	  LABS:	 	    Troponin I, High Sensitivity Result: 17.2 ng/L (12-23 @ 20:32)                            9.2    13.47 )-----------( 163      ( 26 Dec 2021 09:07 )             26.7     12-26    140  |  111<H>  |  82<H>  ----------------------------<  333<H>  3.6   |  16<L>  |  3.16<H>    Ca    6.5<LL>      26 Dec 2021 09:07  Phos  2.1     12-26  Mg     1.6     12-26      proBNP:   Lipid Profile: Cholesterol 145  LDL --  HDL 20    Ldl calc 77  Ratio --    HgA1c:   TSH: Thyroid Stimulating Hormone, Serum: 1.38 uU/mL (12-23 @ 20:32)

## 2021-12-26 NOTE — CONSULT NOTE ADULT - ASSESSMENT
Pt is a 81 yr old male with hx of Chronic DVT, Anemia, GERD, HTN, HLD, DM, Hypoparathyroidism, Glaucoma and BPH presents to ed for generalized weakness and family called ems after unable to reach pt at home. )  Found to have simone/ uti and uncont dm. Pt takes metformin and amaryl as out pt. Does not check fsg and denies hypoglycemic sx. Now hyperglycemic.

## 2021-12-26 NOTE — CONSULT NOTE ADULT - SUBJECTIVE AND OBJECTIVE BOX
Patient is a 81y old  Male who presents with a chief complaint of UTI (25 Dec 2021 12:43)      HPI:  Pt is a 81 yr old male with hx of Chronic DVT, Anemia, GERD, HTN, HLD, DM, Hypoparathyroidism, Glaucoma and BPH presents to ed for generalized weakness and family called ems after unable to reach pt at home. As per ED note, pt states was at kitchen and felt weak, laid himself on floor and was unable to get up. To me patient states that he was going to the bathroom, and couldn't make it, he states he fell, hit the back of his head lightly, initially said was with the floor, then with furniture. Pts story doesn't line up. He states that he had a prostate stent? placed 1 or 2 weeks ago by doctor Benjamin Son? and endorsed some dysuria. He denies any LOC, Seizure, Dizziness, CP, palpitations, SOB, abd pain, N/V/D/C. Pt lives alone, has not help, he is a Holocausts survivor and says has no family.  (23 Dec 2021 23:50)  Found to have simone/ uti and uncont dm. Pt takes metformin and amaryl as out pt. Does not check fsg and denies hypoglycemic sx. Now hyperglycemic.    PAST MEDICAL & SURGICAL HISTORY:  Diabetes    Hypertension    Hypercholesterolemia    GERD (gastroesophageal reflux disease)    Anemia    Hypomagnesemia    DVT (deep venous thrombosis)    No significant past surgical history           MEDICATIONS  (STANDING):  apixaban 2.5 milliGRAM(s) Oral two times a day  atorvastatin 10 milliGRAM(s) Oral at bedtime  calcitriol   Capsule 0.5 MICROGram(s) Oral daily  cefTRIAXone   IVPB 1000 milliGRAM(s) IV Intermittent every 24 hours  finasteride 5 milliGRAM(s) Oral daily  insulin lispro (ADMELOG) corrective regimen sliding scale   SubCutaneous three times a day before meals  insulin lispro (ADMELOG) corrective regimen sliding scale   SubCutaneous at bedtime  labetalol 200 milliGRAM(s) Oral every 12 hours  mirtazapine 7.5 milliGRAM(s) Oral daily  pantoprazole    Tablet 40 milliGRAM(s) Oral before breakfast  repaglinide 1 milliGRAM(s) Oral two times a day before meals  sodium bicarbonate 650 milliGRAM(s) Oral three times a day  sodium chloride 0.9%. 1000 milliLiter(s) (75 mL/Hr) IV Continuous <Continuous>  tamsulosin 0.4 milliGRAM(s) Oral at bedtime    MEDICATIONS  (PRN):      FAMILY HISTORY:  No pertinent family history in first degree relatives        SOCIAL HISTORY:      REVIEW OF SYSTEMS:  CONSTITUTIONAL: No fever, weight loss, or fatigue  EYES: No eye pain, visual disturbances, or discharge  ENT:  No difficulty hearing, tinnitus, vertigo; No sinus or throat pain  NECK: No pain or stiffness  RESPIRATORY: No cough, wheezing, chills or hemoptysis; No Shortness of Breath  CARDIOVASCULAR: No chest pain, palpitations, passing out, dizziness, or leg swelling  GASTROINTESTINAL: No abdominal or epigastric pain. No nausea, vomiting, or hematemesis; No diarrhea or constipation. No melena or hematochezia.  GENITOURINARY: No dysuria, frequency, hematuria, or incontinence  NEUROLOGICAL: No headaches, memory loss, loss of strength, numbness, or tremors  SKIN: No itching, burning, rashes, or lesions   LYMPH Nodes: No enlarged glands  ENDOCRINE: No heat or cold intolerance; No hair loss  MUSCULOSKELETAL: No joint pain or swelling; No muscle, back, or extremity pain  PSYCHIATRIC: No depression, anxiety, mood swings, or difficulty sleeping  HEME/LYMPH: No easy bruising, or bleeding gums  ALLERGY AND IMMUNOLOGIC: No hives or eczema	        Vital Signs Last 24 Hrs  T(C): 36.7 (26 Dec 2021 05:08), Max: 37 (25 Dec 2021 19:00)  T(F): 98.1 (26 Dec 2021 05:08), Max: 98.6 (25 Dec 2021 19:00)  HR: 83 (26 Dec 2021 08:47) (78 - 102)  BP: 111/57 (26 Dec 2021 08:47) (109/57 - 150/80)  BP(mean): --  RR: 18 (26 Dec 2021 05:08) (18 - 18)  SpO2: 99% (26 Dec 2021 08:47) (97% - 99%)      Constitutional:    HEENT: nad    Neck:  No JVD, bruits or thyromegaly    Respiratory:  Clear without rales or rhonchi    Cardiovascular:  RR without murmur, rub or gallop.    Gastrointestinal: Soft without hepatosplenomegaly.    Extremities: without cyanosis, clubbing or edema.    Neurological:  Oriented   x    2 . No gross sensory or motor defects.        LABS:                        9.2    13.47 )-----------( 163      ( 26 Dec 2021 09:07 )             26.7     12-26    140  |  111<H>  |  82<H>  ----------------------------<  333<H>  3.6   |  16<L>  |  3.16<H>    Ca    6.5<LL>      26 Dec 2021 09:07  Phos  2.1     12-26  Mg     1.6     12-26              CAPILLARY BLOOD GLUCOSE      POCT Blood Glucose.: 335 mg/dL (26 Dec 2021 08:03)  POCT Blood Glucose.: 352 mg/dL (25 Dec 2021 22:14)  POCT Blood Glucose.: 422 mg/dL (25 Dec 2021 16:58)  POCT Blood Glucose.: 345 mg/dL (25 Dec 2021 11:38)      RADIOLOGY & ADDITIONAL STUDIES:

## 2021-12-27 DIAGNOSIS — E87.2 ACIDOSIS: ICD-10-CM

## 2021-12-27 DIAGNOSIS — E83.41 HYPERMAGNESEMIA: ICD-10-CM

## 2021-12-27 DIAGNOSIS — E83.51 HYPOCALCEMIA: ICD-10-CM

## 2021-12-27 LAB
-  AMPICILLIN: SIGNIFICANT CHANGE UP
-  CIPROFLOXACIN: SIGNIFICANT CHANGE UP
-  LEVOFLOXACIN: SIGNIFICANT CHANGE UP
-  NITROFURANTOIN: SIGNIFICANT CHANGE UP
-  TETRACYCLINE: SIGNIFICANT CHANGE UP
-  VANCOMYCIN: SIGNIFICANT CHANGE UP
ACETONE SERPL-MCNC: NEGATIVE — SIGNIFICANT CHANGE UP
ANION GAP SERPL CALC-SCNC: 10 MMOL/L — SIGNIFICANT CHANGE UP (ref 5–17)
B-OH-BUTYR SERPL-SCNC: 0.1 MMOL/L — SIGNIFICANT CHANGE UP
BUN SERPL-MCNC: 72 MG/DL — HIGH (ref 7–18)
CA-I BLD-SCNC: 0.86 MMOL/L — LOW (ref 1.15–1.33)
CALCIUM SERPL-MCNC: 6.6 MG/DL — LOW (ref 8.4–10.5)
CHLORIDE SERPL-SCNC: 112 MMOL/L — HIGH (ref 96–108)
CO2 SERPL-SCNC: 18 MMOL/L — LOW (ref 22–31)
CREAT ?TM UR-MCNC: 47 MG/DL — SIGNIFICANT CHANGE UP
CREAT SERPL-MCNC: 3.02 MG/DL — HIGH (ref 0.5–1.3)
CULTURE RESULTS: SIGNIFICANT CHANGE UP
GLUCOSE BLDC GLUCOMTR-MCNC: 195 MG/DL — HIGH (ref 70–99)
GLUCOSE BLDC GLUCOMTR-MCNC: 205 MG/DL — HIGH (ref 70–99)
GLUCOSE BLDC GLUCOMTR-MCNC: 270 MG/DL — HIGH (ref 70–99)
GLUCOSE BLDC GLUCOMTR-MCNC: 327 MG/DL — HIGH (ref 70–99)
GLUCOSE SERPL-MCNC: 278 MG/DL — HIGH (ref 70–99)
MAGNESIUM SERPL-MCNC: 1.3 MG/DL — LOW (ref 1.6–2.6)
METHOD TYPE: SIGNIFICANT CHANGE UP
ORGANISM # SPEC MICROSCOPIC CNT: SIGNIFICANT CHANGE UP
ORGANISM # SPEC MICROSCOPIC CNT: SIGNIFICANT CHANGE UP
PHOSPHATE SERPL-MCNC: 2.1 MG/DL — LOW (ref 2.5–4.5)
POTASSIUM SERPL-MCNC: 3.4 MMOL/L — LOW (ref 3.5–5.3)
POTASSIUM SERPL-SCNC: 3.4 MMOL/L — LOW (ref 3.5–5.3)
SODIUM SERPL-SCNC: 140 MMOL/L — SIGNIFICANT CHANGE UP (ref 135–145)
SPECIMEN SOURCE: SIGNIFICANT CHANGE UP

## 2021-12-27 PROCEDURE — 76775 US EXAM ABDO BACK WALL LIM: CPT | Mod: 26

## 2021-12-27 RX ORDER — POTASSIUM CHLORIDE 20 MEQ
40 PACKET (EA) ORAL ONCE
Refills: 0 | Status: DISCONTINUED | OUTPATIENT
Start: 2021-12-27 | End: 2021-12-27

## 2021-12-27 RX ORDER — POTASSIUM CHLORIDE 20 MEQ
40 PACKET (EA) ORAL ONCE
Refills: 0 | Status: COMPLETED | OUTPATIENT
Start: 2021-12-27 | End: 2021-12-27

## 2021-12-27 RX ORDER — MAGNESIUM SULFATE 500 MG/ML
2 VIAL (ML) INJECTION ONCE
Refills: 0 | Status: COMPLETED | OUTPATIENT
Start: 2021-12-27 | End: 2021-12-27

## 2021-12-27 RX ORDER — INSULIN GLARGINE 100 [IU]/ML
16 INJECTION, SOLUTION SUBCUTANEOUS AT BEDTIME
Refills: 0 | Status: DISCONTINUED | OUTPATIENT
Start: 2021-12-27 | End: 2021-12-29

## 2021-12-27 RX ORDER — MAGNESIUM OXIDE 400 MG ORAL TABLET 241.3 MG
400 TABLET ORAL DAILY
Refills: 0 | Status: DISCONTINUED | OUTPATIENT
Start: 2021-12-27 | End: 2021-12-29

## 2021-12-27 RX ORDER — INSULIN LISPRO 100/ML
VIAL (ML) SUBCUTANEOUS
Refills: 0 | Status: DISCONTINUED | OUTPATIENT
Start: 2021-12-27 | End: 2022-01-03

## 2021-12-27 RX ORDER — INSULIN LISPRO 100/ML
4 VIAL (ML) SUBCUTANEOUS
Refills: 0 | Status: DISCONTINUED | OUTPATIENT
Start: 2021-12-27 | End: 2022-01-03

## 2021-12-27 RX ORDER — INSULIN LISPRO 100/ML
VIAL (ML) SUBCUTANEOUS AT BEDTIME
Refills: 0 | Status: DISCONTINUED | OUTPATIENT
Start: 2021-12-27 | End: 2022-01-03

## 2021-12-27 RX ADMIN — Medication 200 MILLIGRAM(S): at 17:18

## 2021-12-27 RX ADMIN — Medication 1: at 16:53

## 2021-12-27 RX ADMIN — Medication 1 TABLET(S): at 06:08

## 2021-12-27 RX ADMIN — MIRTAZAPINE 7.5 MILLIGRAM(S): 45 TABLET, ORALLY DISINTEGRATING ORAL at 11:32

## 2021-12-27 RX ADMIN — Medication 4 UNIT(S): at 16:53

## 2021-12-27 RX ADMIN — REPAGLINIDE 1 MILLIGRAM(S): 1 TABLET ORAL at 06:08

## 2021-12-27 RX ADMIN — Medication 3: at 11:34

## 2021-12-27 RX ADMIN — CEFTRIAXONE 100 MILLIGRAM(S): 500 INJECTION, POWDER, FOR SOLUTION INTRAMUSCULAR; INTRAVENOUS at 16:54

## 2021-12-27 RX ADMIN — Medication 1 TABLET(S): at 13:40

## 2021-12-27 RX ADMIN — Medication 5 MILLIGRAM(S): at 21:51

## 2021-12-27 RX ADMIN — PANTOPRAZOLE SODIUM 40 MILLIGRAM(S): 20 TABLET, DELAYED RELEASE ORAL at 06:08

## 2021-12-27 RX ADMIN — Medication 650 MILLIGRAM(S): at 06:08

## 2021-12-27 RX ADMIN — FINASTERIDE 5 MILLIGRAM(S): 5 TABLET, FILM COATED ORAL at 11:32

## 2021-12-27 RX ADMIN — APIXABAN 2.5 MILLIGRAM(S): 2.5 TABLET, FILM COATED ORAL at 17:18

## 2021-12-27 RX ADMIN — Medication 25 GRAM(S): at 17:24

## 2021-12-27 RX ADMIN — SODIUM CHLORIDE 75 MILLILITER(S): 9 INJECTION INTRAMUSCULAR; INTRAVENOUS; SUBCUTANEOUS at 06:11

## 2021-12-27 RX ADMIN — Medication 650 MILLIGRAM(S): at 21:52

## 2021-12-27 RX ADMIN — REPAGLINIDE 1 MILLIGRAM(S): 1 TABLET ORAL at 16:56

## 2021-12-27 RX ADMIN — Medication 40 MILLIEQUIVALENT(S): at 16:56

## 2021-12-27 RX ADMIN — APIXABAN 2.5 MILLIGRAM(S): 2.5 TABLET, FILM COATED ORAL at 06:08

## 2021-12-27 RX ADMIN — Medication 200 MILLIGRAM(S): at 06:08

## 2021-12-27 RX ADMIN — TAMSULOSIN HYDROCHLORIDE 0.4 MILLIGRAM(S): 0.4 CAPSULE ORAL at 21:50

## 2021-12-27 RX ADMIN — Medication 4 UNIT(S): at 11:34

## 2021-12-27 RX ADMIN — INSULIN GLARGINE 16 UNIT(S): 100 INJECTION, SOLUTION SUBCUTANEOUS at 21:51

## 2021-12-27 RX ADMIN — Medication 650 MILLIGRAM(S): at 13:40

## 2021-12-27 RX ADMIN — REPAGLINIDE 1 MILLIGRAM(S): 1 TABLET ORAL at 11:32

## 2021-12-27 RX ADMIN — MAGNESIUM OXIDE 400 MG ORAL TABLET 400 MILLIGRAM(S): 241.3 TABLET ORAL at 11:33

## 2021-12-27 RX ADMIN — Medication 4: at 08:04

## 2021-12-27 RX ADMIN — Medication 1 TABLET(S): at 21:50

## 2021-12-27 RX ADMIN — ATORVASTATIN CALCIUM 10 MILLIGRAM(S): 80 TABLET, FILM COATED ORAL at 21:52

## 2021-12-27 RX ADMIN — CALCITRIOL 0.5 MICROGRAM(S): 0.5 CAPSULE ORAL at 11:32

## 2021-12-27 NOTE — PROGRESS NOTE ADULT - PROBLEM SELECTOR PLAN 3
Pt with Larry on CKD, baseline 1.5-1.6  s/p IV fluids  Creatinine 2.94  f/u US kidney, bladder  Patient states he recently had a  urologic procedure where he had a stent placed in prostate  R/O spontaneous dislodgement of stent  Nephro Dr. Orellana: f/ u CT pelvis Pt with Larry on CKD, baseline 2.5  likely 2/2 obstructive uropathy  s/p IV fluids  Creatinine 2.94  f/u US kidney, bladder  Patient states he recently had a  urologic procedure where he had a stent placed in prostate  R/O spontaneous dislodgement of stent  Nephro Dr. Orellana:  CT pelvis b/l hydronephrosis  Uro consulted

## 2021-12-27 NOTE — PROGRESS NOTE ADULT - SUBJECTIVE AND OBJECTIVE BOX
PGY-1 Progress Note discussed with attending    PAGER #: [phone]] TILL 5:00 PM  PLEASE CONTACT ON CALL TEAM:  - On Call Team (Please refer to Caleb) FROM 5:00 PM - 8:30PM  - Nightfloat Team FROM 8:30 -7:30 AM      INTERVAL HPI/OVERNIGHT EVENTS:   - Pt is comfortably resting in bed. Patient is refusing to be assessed, not willing to participate in ROS.     REVIEW OF SYSTEMS:  as stated in hpi    MEDICATIONS  (STANDING):  apixaban 2.5 milliGRAM(s) Oral two times a day  atorvastatin 10 milliGRAM(s) Oral at bedtime  calcitriol   Capsule 0.5 MICROGram(s) Oral daily  calcium carbonate    500 mG (Tums) Chewable 1 Tablet(s) Chew three times a day  cefTRIAXone   IVPB 1000 milliGRAM(s) IV Intermittent every 24 hours  finasteride 5 milliGRAM(s) Oral daily  insulin glargine Injectable (LANTUS) 16 Unit(s) SubCutaneous at bedtime  insulin lispro (ADMELOG) corrective regimen sliding scale   SubCutaneous three times a day before meals  insulin lispro (ADMELOG) corrective regimen sliding scale   SubCutaneous at bedtime  insulin lispro Injectable (ADMELOG) 4 Unit(s) SubCutaneous three times a day before meals  labetalol 200 milliGRAM(s) Oral every 12 hours  magnesium oxide 400 milliGRAM(s) Oral daily  melatonin 5 milliGRAM(s) Oral at bedtime  mirtazapine 7.5 milliGRAM(s) Oral daily  pantoprazole    Tablet 40 milliGRAM(s) Oral before breakfast  repaglinide 1 milliGRAM(s) Oral three times a day before meals  sodium bicarbonate 650 milliGRAM(s) Oral three times a day  sodium chloride 0.9%. 1000 milliLiter(s) (75 mL/Hr) IV Continuous <Continuous>  tamsulosin 0.4 milliGRAM(s) Oral at bedtime    MEDICATIONS  (PRN):      Vital Signs Last 24 Hrs  T(C): 36.2 (27 Dec 2021 12:25), Max: 37.1 (26 Dec 2021 20:53)  T(F): 97.2 (27 Dec 2021 12:25), Max: 98.8 (26 Dec 2021 20:53)  HR: 77 (27 Dec 2021 12:25) (77 - 89)  BP: 130/64 (27 Dec 2021 12:25) (113/65 - 130/64)  BP(mean): --  RR: 18 (27 Dec 2021 12:25) (18 - 18)  SpO2: 100% (27 Dec 2021 12:25) (95% - 100%)    PHYSICAL EXAMINATION:  GENERAL: NAD, AAOx3  HEAD: AT/NC  EYES: conjunctiva and sclera clear  NECK: supple, No JVD noted, Normal thyroid  CHEST/LUNG: CTABL; no rales, rhonchi, wheezing, or rubs  HEART: regular rate and rhythm; no murmurs, rubs, or gallops  ABDOMEN: soft, nontender, nondistended; Bowel sounds present  EXTREMITIES:  2+ Peripheral Pulses, No clubbing, cyanosis, or edema  SKIN: warm dry                          9.2    13.47 )-----------( 163      ( 26 Dec 2021 09:07 )             26.7     12-27    140  |  112<H>  |  72<H>  ----------------------------<  278<H>  3.4<L>   |  18<L>  |  3.02<H>    Ca    6.6<L>      27 Dec 2021 12:30  Phos  2.1     12-27  Mg     1.3     12-27              COVID-19 PCR: NotDetec (23 Dec 2021 20:32)      CAPILLARY BLOOD GLUCOSE      POCT Blood Glucose.: 270 mg/dL (27 Dec 2021 11:27)  POCT Blood Glucose.: 327 mg/dL (27 Dec 2021 07:49)  POCT Blood Glucose.: 311 mg/dL (26 Dec 2021 21:28)  POCT Blood Glucose.: 258 mg/dL (26 Dec 2021 16:29)      RADIOLOGY & ADDITIONAL TESTS:

## 2021-12-27 NOTE — PROGRESS NOTE ADULT - SUBJECTIVE AND OBJECTIVE BOX
Interval Events:  pt in nad    Allergies    Allergy Status Unknown    Intolerances      Endocrine/Metabolic Medications:  atorvastatin 10 milliGRAM(s) Oral at bedtime  finasteride 5 milliGRAM(s) Oral daily  insulin glargine Injectable (LANTUS) 16 Unit(s) SubCutaneous at bedtime  insulin lispro (ADMELOG) corrective regimen sliding scale   SubCutaneous three times a day before meals  insulin lispro (ADMELOG) corrective regimen sliding scale   SubCutaneous at bedtime  insulin lispro Injectable (ADMELOG) 4 Unit(s) SubCutaneous three times a day before meals  repaglinide 1 milliGRAM(s) Oral three times a day before meals      Vital Signs Last 24 Hrs  T(C): 36.7 (27 Dec 2021 05:29), Max: 37.1 (26 Dec 2021 20:53)  T(F): 98 (27 Dec 2021 05:29), Max: 98.8 (26 Dec 2021 20:53)  HR: 82 (27 Dec 2021 05:29) (80 - 89)  BP: 118/64 (27 Dec 2021 05:29) (101/64 - 118/64)  BP(mean): --  RR: 18 (27 Dec 2021 05:29) (18 - 18)  SpO2: 98% (27 Dec 2021 05:29) (95% - 98%)    Weight (kg): 74.8 (12-26 @ 20:53)    PHYSICAL EXAM  All physical exam findings normal, except those marked:  General:	Alert, active, cooperative, NAD, well hydrated  .		[] Abnormal:  Neck		Normal: supple, no cervical adenopathy, no palpable thyroid  .		[] Abnormal:  Cardiovascular	Normal: regular rate, normal S1, S2, no murmurs  .		[] Abnormal:  Respiratory	Normal: no chest wall deformity, normal respiratory pattern, CTA B/L  .		[] Abnormal:  Abdominal	Normal: soft, ND, NT, bowel sounds present, no masses, no organomegaly  .		[] Abnormal:  		Normal normal genitalia, testes descended, circumcised/uncircumcised  .		Denice stage:			Breast denice:  .		Menstrual history:  .		[] Abnormal:  Extremities	Normal: FROM x4  .		[] Abnormal:  Skin		Normal: intact and not indurated, no rash, no acanthosis nigricans  .		[] Abnormal:  Neurologic	Normal: grossly intact  .		[] Abnormal:    LABS        CAPILLARY BLOOD GLUCOSE      POCT Blood Glucose.: 327 mg/dL (27 Dec 2021 07:49)  POCT Blood Glucose.: 311 mg/dL (26 Dec 2021 21:28)  POCT Blood Glucose.: 258 mg/dL (26 Dec 2021 16:29)  POCT Blood Glucose.: 475 mg/dL (26 Dec 2021 11:38)        Assesment/plan  Pt is a 81 yr old male with hx of Chronic DVT, Anemia, GERD, HTN, HLD, DM, Hypoparathyroidism, Glaucoma and BPH presents to ed for generalized weakness and family called ems after unable to reach pt at home. )  Found to have manuel/ uti and uncont dm. Pt takes metformin and amaryl as out pt. Does not check fsg and denies hypoglycemic sx. Now hyperglycemic.     Problem/Recommendation - 1:  ·  Problem: Diabetes mellitus.   ·  Recommendation: uncontrolled with hyperglycemia due to acute illness  a1c- 7.5% on metformin/amaryl as out pt  remains hyperglycemic  on lantus 10 units and prandin  ac tid  d/c prandin  change lantus to 16 units  add admelog 4 ac tid   fsg ac and hs.  d/w prim team      Problem/Recommendation - 2:  ·  Problem: Acute UTI.   ·  Recommendation: cont iv abx  per ID.     Problem/Recommendation - 3:  ·  Problem: MANUEL (acute kidney injury).   ·  Recommendation: acute on ckd- (from obstructive uropathy)  f/u nephro recs  pt cannot be on metformin and amaryl.

## 2021-12-27 NOTE — PROGRESS NOTE ADULT - PROBLEM SELECTOR PLAN 6
Pt has calcium of 6.6, 8.1 corrected   c/w Calcitriol and calcium Pt has calcium of 6.6, 8.1 corrected   c/w Calcitriol and calcium  f/u ionized calcium to determine if true hypocalcemia

## 2021-12-27 NOTE — PROGRESS NOTE ADULT - SUBJECTIVE AND OBJECTIVE BOX
Problem List:  CKD stage 4 with MANUEL .  Bilateral hydronephrosis  Bilateral stents  UTI,   SHPTH      PAST MEDICAL & SURGICAL HISTORY:  Diabetes    Hypertension    Hypercholesterolemia    GERD (gastroesophageal reflux disease)    Anemia    Hypomagnesemia    DVT (deep venous thrombosis)    No significant past surgical history        Allergy Status Unknown      MEDICATIONS  (STANDING):  apixaban 2.5 milliGRAM(s) Oral two times a day  atorvastatin 10 milliGRAM(s) Oral at bedtime  calcitriol   Capsule 0.5 MICROGram(s) Oral daily  calcium carbonate    500 mG (Tums) Chewable 1 Tablet(s) Chew three times a day  cefTRIAXone   IVPB 1000 milliGRAM(s) IV Intermittent every 24 hours  finasteride 5 milliGRAM(s) Oral daily  insulin glargine Injectable (LANTUS) 10 Unit(s) SubCutaneous at bedtime  insulin lispro (ADMELOG) corrective regimen sliding scale   SubCutaneous three times a day before meals  insulin lispro (ADMELOG) corrective regimen sliding scale   SubCutaneous at bedtime  labetalol 200 milliGRAM(s) Oral every 12 hours  melatonin 5 milliGRAM(s) Oral at bedtime  mirtazapine 7.5 milliGRAM(s) Oral daily  pantoprazole    Tablet 40 milliGRAM(s) Oral before breakfast  repaglinide 1 milliGRAM(s) Oral three times a day before meals  sodium bicarbonate 650 milliGRAM(s) Oral three times a day  sodium chloride 0.9%. 1000 milliLiter(s) (75 mL/Hr) IV Continuous <Continuous>  tamsulosin 0.4 milliGRAM(s) Oral at bedtime    MEDICATIONS  (PRN):                            9.2    13.47 )-----------( 163      ( 26 Dec 2021 09:07 )             26.7     12-26    140  |  111<H>  |  82<H>  ----------------------------<  333<H>  3.6   |  16<L>  |  3.16<H>    Ca    6.5<LL>      26 Dec 2021 09:07  Phos  2.1     12-26  Mg     1.6     12-26    Albumin, Serum: 2.1 g/dL (12.24.21 @ 10:07)           REVIEW OF SYSTEMS:  Confused      VITALS:  T(F): 98 (12-27-21 @ 05:29), Max: 98.8 (12-26-21 @ 20:53)  HR: 82 (12-27-21 @ 05:29)  BP: 118/64 (12-27-21 @ 05:29)  RR: 18 (12-27-21 @ 05:29)  SpO2: 98% (12-27-21 @ 05:29)  Wt(kg): --    12-26 @ 07:01  -  12-27 @ 07:00  --------------------------------------------------------  IN: 0 mL / OUT: 200 mL / NET: -200 mL        Weight (kg): 74.8 (12-26 @ 20:53)  PHYSICAL EXAM:  Constitutional: well developed, no diaphoresis, no distress.  Neck: No JVD, no carotid bruit, supple, no adenopathy  Respiratory: Good air entrance B/L, no wheezes, rales or rhonchi  Cardiovascular: S1, S2, RRR, no pericardial rub, no murmur  Abdomen: BS+, soft, no tenderness, no bruit  Pelvis: bladder nondistended  Extremities: No cyanosis or clubbing. No peripheral edema.   confused

## 2021-12-27 NOTE — PROGRESS NOTE ADULT - PROBLEM SELECTOR PLAN 4
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

## 2021-12-27 NOTE — PROGRESS NOTE ADULT - PROBLEM SELECTOR PLAN 8
pmh of dm  hgb a1c 7.4  insulin ss  monitor bs  adjust as needed  diabetic diet  increased Lantus 16U, Admelog 4U  Endo on board

## 2021-12-27 NOTE — PROGRESS NOTE ADULT - PROBLEM SELECTOR PLAN 1
Pt with generalized weakness, WBC 11  UA +  f/u urine cultures  leucocytosis   s/p IV fluids and Rocephin in ED  Will continue Rocephin today is day 5

## 2021-12-27 NOTE — PROGRESS NOTE ADULT - SUBJECTIVE AND OBJECTIVE BOX
CARDIOLOGy/MEDICAL ATTENDING    CHIEF COMPLAINT:Patient is a 81y old  Male who presents with a chief complaint of UTI .Pt appears comfortable.    	  REVIEW OF SYSTEMS:  CONSTITUTIONAL: No fever, weight loss, or fatigue  EYES: No eye pain, visual disturbances, or discharge  ENT:  No difficulty hearing, tinnitus, vertigo; No sinus or throat pain  NECK: No pain or stiffness  RESPIRATORY: No cough, wheezing, chills or hemoptysis; No Shortness of Breath  CARDIOVASCULAR: No chest pain, palpitations, passing out, dizziness, or leg swelling  GASTROINTESTINAL: No abdominal or epigastric pain. No nausea, vomiting, or hematemesis; No diarrhea or constipation. No melena or hematochezia.  GENITOURINARY: No dysuria, frequency, hematuria, or incontinence  NEUROLOGICAL: No headaches, memory loss, loss of strength, numbness, or tremors  SKIN: No itching, burning, rashes, or lesions   LYMPH Nodes: No enlarged glands  ENDOCRINE: No heat or cold intolerance; No hair loss  MUSCULOSKELETAL: No joint pain or swelling; No muscle, back, or extremity pain  PSYCHIATRIC: No depression, anxiety, mood swings, or difficulty sleeping  HEME/LYMPH: No easy bruising, or bleeding gums  ALLERGY AND IMMUNOLOGIC: No hives or eczema	        PHYSICAL EXAM:  T(C): 36.7 (12-27-21 @ 05:29), Max: 37.1 (12-26-21 @ 20:53)  HR: 82 (12-27-21 @ 05:29) (80 - 89)  BP: 118/64 (12-27-21 @ 05:29) (101/64 - 118/64)  RR: 18 (12-27-21 @ 05:29) (18 - 18)  SpO2: 98% (12-27-21 @ 05:29) (95% - 98%)  Wt(kg): --  I&O's Summary    26 Dec 2021 07:01  -  27 Dec 2021 07:00  --------------------------------------------------------  IN: 0 mL / OUT: 200 mL / NET: -200 mL        Appearance: Normal	  HEENT:   Normal oral mucosa, PERRL, EOMI	  Lymphatic: No lymphadenopathy  Cardiovascular: Normal S1 S2, No JVD, No murmurs, No edema  Respiratory: Lungs clear to auscultation	  Psychiatry: A & O x 3, Mood & affect appropriate  Gastrointestinal:  Soft, Non-tender, + BS	  Skin: No rashes, No ecchymoses, No cyanosis	  Neurologic: Non-focal  Extremities: Normal range of motion, No clubbing, cyanosis or edema  Vascular: Peripheral pulses palpable 2+ bilaterally    MEDICATIONS  (STANDING):  apixaban 2.5 milliGRAM(s) Oral two times a day  atorvastatin 10 milliGRAM(s) Oral at bedtime  calcitriol   Capsule 0.5 MICROGram(s) Oral daily  calcium carbonate    500 mG (Tums) Chewable 1 Tablet(s) Chew three times a day  cefTRIAXone   IVPB 1000 milliGRAM(s) IV Intermittent every 24 hours  finasteride 5 milliGRAM(s) Oral daily  insulin glargine Injectable (LANTUS) 16 Unit(s) SubCutaneous at bedtime  insulin lispro (ADMELOG) corrective regimen sliding scale   SubCutaneous three times a day before meals  insulin lispro (ADMELOG) corrective regimen sliding scale   SubCutaneous at bedtime  insulin lispro Injectable (ADMELOG) 4 Unit(s) SubCutaneous three times a day before meals  labetalol 200 milliGRAM(s) Oral every 12 hours  magnesium oxide 400 milliGRAM(s) Oral daily  melatonin 5 milliGRAM(s) Oral at bedtime  mirtazapine 7.5 milliGRAM(s) Oral daily  pantoprazole    Tablet 40 milliGRAM(s) Oral before breakfast  repaglinide 1 milliGRAM(s) Oral three times a day before meals  sodium bicarbonate 650 milliGRAM(s) Oral three times a day  sodium chloride 0.9%. 1000 milliLiter(s) (75 mL/Hr) IV Continuous <Continuous>  tamsulosin 0.4 milliGRAM(s) Oral at bedtime        LABS:	 	                          9.2    13.47 )-----------( 163      ( 26 Dec 2021 09:07 )             26.7     12-26    140  |  111<H>  |  82<H>  ----------------------------<  333<H>  3.6   |  16<L>  |  3.16<H>    Ca    6.5<LL>      26 Dec 2021 09:07  Phos  2.1     12-26  Mg     1.6     12-26      Lipid Profile: Cholesterol 145  LDL --  HDL 20    Ldl calc 77  Ratio --    HgA1c:   TSH: Thyroid Stimulating Hormone, Serum: 1.38 uU/mL (12-23 @ 20:32)

## 2021-12-27 NOTE — CONSULT NOTE ADULT - ASSESSMENT
81y.o. Male w b/l hydronephrosis and b/l stent placement   +UA, Urine culture show E. Faecalis, on day 5 rocephin   WBC 13.4, afebrile   Crt 3.02    Plan pending     81y.o. Male w b/l hydronephrosis and b/l stent placement   +UA, Urine culture show E. Faecalis, on day 5 rocephin   WBC 13.4, afebrile   Crt 3.02  As per Dr. Renee, patient follows up with Dr. Nikunj Flores at [facility] with his last visit being 11/9. He was seeing Dr. Nikunj Flores for chronic b/l hydronephrosis and stent placement.     - Trend crt  - recommend Flomax  - recommend Finasteride   - IVF  - treat UA as per primary team   - urology will follow   - discussed and agreed with Dr. Renee     81y.o. Male w b/l hydronephrosis and b/l stent placement   +UA, Urine culture show E. Faecalis, on day 5 rocephin   WBC 13.4, afebrile   Crt 3.02  As per Dr. Renee, patient follows up with Dr. Nikunj Flores at [facility] with his last visit being 11/9. He was seeing Dr. Nikunj Flores for chronic b/l hydronephrosis and stent placement.     - Trend crt  - recommend Flomax if medically safe   - recommend Finasteride if medically safe   - IVF  - treat +UA as per primary team   - urology will follow   - discussed and agreed with Dr. Renee     81y.o. Male w b/l hydronephrosis and b/l stent placement   +UA, Urine culture show E. Faecalis, on day 5 rocephin   WBC 13.4, afebrile   Crt 3.02  As per Dr. Renee, patient follows up with Dr. Nikunj Flores at [facility] with his last visit being 11/9. He was seeing Dr. Nikunj Flores for chronic b/l hydronephrosis and stent placement.     - Trend crt  - recommend Flomax if medically safe   - recommend Finasteride if medically safe   - hydration  - treat +UA as per primary team   - urology will follow   - discussed and agreed with Dr. Renee     81y.o. Male w b/l hydronephrosis and b/l stent placement   +UA, Urine culture show E. Faecalis, on day 5 rocephin   WBC 13.4, afebrile   Crt 3.02  As per Dr. Renee, patient follows up with Dr. Nikunj Flores at [facility] with his last visit being 11/9. He was seeing Dr. Nikunj Flores for chronic b/l hydronephrosis and stent placement. Dr. Renee discussed with Dr. Mcintosh's PA who stated that the plan is to change the patients stents every 3 months and to have the patient follow up with them.     - Trend crt  - recommend Flomax if medically safe   - recommend Finasteride if medically safe   - hydration  - treat +UA as per primary team   - f/u outpatient with his primary urologist, Dr. Flores   - discussed and agreed with Dr. Renee

## 2021-12-27 NOTE — PROGRESS NOTE ADULT - PROBLEM SELECTOR PLAN 2
Glucose 333 yesterday, and above 278 today  low bicarb  normal AG  f/u acetone  f/u b hydroxybutrate  Endo on board  Nephro on board  Lantus 16U  Admelog 4U Glucose 333 yesterday, and above 278 today  low bicarb - pt started on sodium bicarb   normal AG  f/u acetone  f/u b hydroxybutrate  Endo on board  Nephro on board  Lantus 16U  Admelog 4U

## 2021-12-27 NOTE — CONSULT NOTE ADULT - SUBJECTIVE AND OBJECTIVE BOX
consult pending  Urology called to see and evaluate 81 year old male with PMH significant for BPH, Chronic DVT, Anemia, GERD, HLD, HTN, Hypoparathyroid, Glaucoma for b/l hydronephrosis and stent placement assessment, that according to medical resident taking care of the patient was done a month ago at [facility]. Patient is currently admitted for UTI, generalized weakness.   Patient not oriented to place, and could not name the hospital that he was in. He states that he lives alone, doesnt have family, does not see a urologist and can not remember if he had any urology or surgical procedures done in the past.   Patient denies any abdominal pain, back pain, or any other pain, fever, chills, SOB, dysuria.   As per patient nurse, the patient has been voiding throughout the day, some times asking for a bedside urinal and other times being incontinent and being found wet in bed. As per bedside bladder US done during the exam, patient had 100cc in the bladder.     As per chart review       PAST MEDICAL & SURGICAL HISTORY:  Diabetes    Hypertension    Hypercholesterolemia    GERD (gastroesophageal reflux disease)    Anemia    Hypomagnesemia    DVT (deep venous thrombosis)    No significant past surgical history        MEDICATIONS  (STANDING):  apixaban 2.5 milliGRAM(s) Oral two times a day  atorvastatin 10 milliGRAM(s) Oral at bedtime  calcitriol   Capsule 0.5 MICROGram(s) Oral daily  calcium carbonate    500 mG (Tums) Chewable 1 Tablet(s) Chew three times a day  cefTRIAXone   IVPB 1000 milliGRAM(s) IV Intermittent every 24 hours  finasteride 5 milliGRAM(s) Oral daily  insulin glargine Injectable (LANTUS) 16 Unit(s) SubCutaneous at bedtime  insulin lispro (ADMELOG) corrective regimen sliding scale   SubCutaneous three times a day before meals  insulin lispro (ADMELOG) corrective regimen sliding scale   SubCutaneous at bedtime  insulin lispro Injectable (ADMELOG) 4 Unit(s) SubCutaneous three times a day before meals  labetalol 200 milliGRAM(s) Oral every 12 hours  magnesium oxide 400 milliGRAM(s) Oral daily  magnesium sulfate  IVPB 2 Gram(s) IV Intermittent once  melatonin 5 milliGRAM(s) Oral at bedtime  mirtazapine 7.5 milliGRAM(s) Oral daily  pantoprazole    Tablet 40 milliGRAM(s) Oral before breakfast  potassium chloride   Powder 40 milliEquivalent(s) Oral once  repaglinide 1 milliGRAM(s) Oral three times a day before meals  sodium bicarbonate 650 milliGRAM(s) Oral three times a day  sodium chloride 0.9%. 1000 milliLiter(s) (75 mL/Hr) IV Continuous <Continuous>  tamsulosin 0.4 milliGRAM(s) Oral at bedtime    MEDICATIONS  (PRN):      Allergies    Allergy Status Unknown    Intolerances        Vital Signs Last 24 Hrs  T(C): 36.2 (27 Dec 2021 12:25), Max: 37.1 (26 Dec 2021 20:53)  T(F): 97.2 (27 Dec 2021 12:25), Max: 98.8 (26 Dec 2021 20:53)  HR: 77 (27 Dec 2021 12:25) (77 - 89)  BP: 130/64 (27 Dec 2021 12:25) (113/65 - 130/64)  BP(mean): --  RR: 18 (27 Dec 2021 12:25) (18 - 18)  SpO2: 100% (27 Dec 2021 12:25) (95% - 100%)    Physical:  Gen: NAD  Respirations: nonlabored   Abd: Soft, nondistended, tender to deep palpation in the lower mid abdomen, no masses, no lesions   Gu: no pena cath, no CVAT b/l, voiding  EXT: no peripheral edema        I&O's Detail    26 Dec 2021 07:01  -  27 Dec 2021 07:00  --------------------------------------------------------  IN:  Total IN: 0 mL    OUT:    Voided (mL): 200 mL  Total OUT: 200 mL    Total NET: -200 mL      27 Dec 2021 07:01  -  27 Dec 2021 16:48  --------------------------------------------------------  IN:  Total IN: 0 mL    OUT:    Stool (mL): 2 mL  Total OUT: 2 mL    Total NET: -2 mL          LABS:                        9.2    13.47 )-----------( 163      ( 26 Dec 2021 09:07 )             26.7              12-27    140  |  112<H>  |  72<H>  ----------------------------<  278<H>  3.4<L>   |  18<L>  |  3.02<H>    Ca    6.6<L>      27 Dec 2021 12:30  Phos  2.1     12-27  Mg     1.3     12-27                Culture - Urine (12.24.21 @ 04:08)    -  Ampicillin: S <=2 Predicts results to ampicillin/sulbactam, amoxacillin-clavulanate and  piperacillin-tazobactam.    -  Ciprofloxacin: S <=1    -  Levofloxacin: S <=1    -  Nitrofurantoin: S <=32 Should not be used to treat pyelonephritis.    -  Tetra/Doxy: R >8    -  Vancomycin: S 4    Specimen Source: Clean Catch Clean Catch (Midstream)    Culture Results:   >100,000 CFU/ml Enterococcus faecalis    Organism Identification: Enterococcus faecalis    Organism: Enterococcus faecalis    Method Type: MICHAEL            RADIOLOGY & ADDITIONAL STUDIES:  < from: US Renal (12.27.21 @ 10:33) >  NTERPRETATION:  CLINICAL INFORMATION: Renal insufficiency  Urinalysis (12.23.21 @ 21:20)    pH Urine: 5.0    Blood, Urine: Large    Glucose Qualitative, Urine: Negative    Color: Brown    Urine Appearance: Slightly Turbid    Bilirubin: Negative    Ketone - Urine: Small    Specific Gravity: 1.020    Protein, Urine: 100    Urobilinogen: Negative    Nitrite: Negative    Leukocyte Esterase Concentration: Moderate      COMPARISON: CT abdomen/pelvis 12/26/2021.    TECHNIQUE: Sonography of the kidneys and bladder.    FINDINGS: Bilateral ureteral stents noted on CT evaluation performed   12/26/2021 are not sonographically demonstrated.    Right kidney: 10.2 cm. Mild to moderate right-sided hydronephrosis.    Left kidney: 12.0 cm. Moderate left-sided hydronephrosis.    Urinary bladder: Not well distended, limiting assessment. An element of   bladder wall thickening is suggested.    The IVC and abdominal aorta are not well-visualized.    IMPRESSION: Bilateral hydronephrosis. See above discussion.    < end of copied text >    < from: CT Abdomen and Pelvis No Cont (12.26.21 @ 11:55) >  INTERPRETATION:  CLINICAL INFORMATION: Acute kidney injury. Urinary tract   infection.    COMPARISON: Prior CT dated 3/16/2021.    CONTRAST/COMPLICATIONS:  IV Contrast: NONE  Oral Contrast: NONE  Complications: None reported at time of study completion    PROCEDURE:  CT of the Abdomen and Pelvis was performed.  Sagittal and coronal reformats were performed.    Evaluation of the solid visceral organs is limited without intravenous   contrast and secondary to motion artifact.    FINDINGS:  LOWER CHEST: Coronary arterial calcification. Calcification of the mitral   annulus. Linear atelectasis in the right lower lobe. Peripheral reticular   opacities in the lower lobes bilaterally, similar to prior study.    LIVER: Within normal limits.  BILE DUCTS: Normal caliber.  GALLBLADDER: Within normal limits.  SPLEEN: Within normal limits.  PANCREAS: Diffusely atrophic.  ADRENALS: Within normal limits.  KIDNEYS/URETERS: Bilateral ureteral stents. Moderate left   hydroureteronephrosis and mild to moderate right hydroureteronephrosis.   Both ureters are dilated to the level of the urinary bladder. Proximal   left ureteral stent is coiled in theregion of the ureteropelvic   junction. Proximal right ureteral stent is coiled within an upper pole   calyx.    BLADDER: Urinary bladder is underdistended. However, the bladder wall is   diffusely trabeculated. Distal ends of bilateral ureteral stents are seen   within the urinary bladder.  REPRODUCTIVE ORGANS: Prostate gland is markedly enlarged, measuring 6 x   4.4 cm.    BOWEL: No bowel obstruction. Appendix is within normal limits. Small   hiatal hernia. Colonic diverticulosis.  PERITONEUM: No ascites.  VESSELS: Atherosclerotic changes. Mild aneurysmal dilatation of the   distal abdominal aorta, measuring 2.9 cm.  RETROPERITONEUM/LYMPH NODES: No lymphadenopathy.  ABDOMINAL WALL: Fat-containing left inguinal hernia.  BONES: Degenerative changes.    IMPRESSION:    Limited study without intravenous contrast and secondary to motion   artifact.    Bilateral ureteral stents. Moderate left hydroureteronephrosis and mild   to moderate right hydroureteronephrosis with dilatation of both ureters   to the level of the urinary bladder.    Enlarged prostate gland.    Markedly trabeculated urinary bladder wall.    < end of copied text >         Urology called to see and evaluate 81 year old male with PMH significant for BPH, Chronic DVT, Anemia, GERD, HLD, HTN, Hypoparathyroid, Glaucoma for b/l hydronephrosis and stent placement assessment, that according to medical resident taking care of the patient was done a month ago at [facility]. Patient is currently admitted for UTI, generalized weakness.   Patient not oriented to place, and could not name the hospital that he was in. He states that he lives alone, doesnt have family, does not see a urologist and can not remember if he had any urology or surgical procedures done in the past.   Patient denies any abdominal pain, back pain, or any other pain, fever, chills, SOB, dysuria.   As per patient nurse, the patient has been voiding throughout the day, some times asking for a bedside urinal and other times being incontinent and being found wet in bed. As per bedside bladder US done during the exam, patient had 100cc in the bladder.         PAST MEDICAL & SURGICAL HISTORY:  Diabetes    Hypertension    Hypercholesterolemia    GERD (gastroesophageal reflux disease)    Anemia    Hypomagnesemia    DVT (deep venous thrombosis)    No significant past surgical history        MEDICATIONS  (STANDING):  apixaban 2.5 milliGRAM(s) Oral two times a day  atorvastatin 10 milliGRAM(s) Oral at bedtime  calcitriol   Capsule 0.5 MICROGram(s) Oral daily  calcium carbonate    500 mG (Tums) Chewable 1 Tablet(s) Chew three times a day  cefTRIAXone   IVPB 1000 milliGRAM(s) IV Intermittent every 24 hours  finasteride 5 milliGRAM(s) Oral daily  insulin glargine Injectable (LANTUS) 16 Unit(s) SubCutaneous at bedtime  insulin lispro (ADMELOG) corrective regimen sliding scale   SubCutaneous three times a day before meals  insulin lispro (ADMELOG) corrective regimen sliding scale   SubCutaneous at bedtime  insulin lispro Injectable (ADMELOG) 4 Unit(s) SubCutaneous three times a day before meals  labetalol 200 milliGRAM(s) Oral every 12 hours  magnesium oxide 400 milliGRAM(s) Oral daily  magnesium sulfate  IVPB 2 Gram(s) IV Intermittent once  melatonin 5 milliGRAM(s) Oral at bedtime  mirtazapine 7.5 milliGRAM(s) Oral daily  pantoprazole    Tablet 40 milliGRAM(s) Oral before breakfast  potassium chloride   Powder 40 milliEquivalent(s) Oral once  repaglinide 1 milliGRAM(s) Oral three times a day before meals  sodium bicarbonate 650 milliGRAM(s) Oral three times a day  sodium chloride 0.9%. 1000 milliLiter(s) (75 mL/Hr) IV Continuous <Continuous>  tamsulosin 0.4 milliGRAM(s) Oral at bedtime    MEDICATIONS  (PRN):      Allergies    Allergy Status Unknown    Intolerances        Vital Signs Last 24 Hrs  T(C): 36.2 (27 Dec 2021 12:25), Max: 37.1 (26 Dec 2021 20:53)  T(F): 97.2 (27 Dec 2021 12:25), Max: 98.8 (26 Dec 2021 20:53)  HR: 77 (27 Dec 2021 12:25) (77 - 89)  BP: 130/64 (27 Dec 2021 12:25) (113/65 - 130/64)  BP(mean): --  RR: 18 (27 Dec 2021 12:25) (18 - 18)  SpO2: 100% (27 Dec 2021 12:25) (95% - 100%)    Physical:  Gen: NAD  Respirations: nonlabored   Abd: Soft, nondistended, tender to deep palpation in the lower mid abdomen, no masses, no lesions   Gu: no pena cath, no CVAT b/l, voiding  EXT: no peripheral edema        I&O's Detail    26 Dec 2021 07:01  -  27 Dec 2021 07:00  --------------------------------------------------------  IN:  Total IN: 0 mL    OUT:    Voided (mL): 200 mL  Total OUT: 200 mL    Total NET: -200 mL      27 Dec 2021 07:01  -  27 Dec 2021 16:48  --------------------------------------------------------  IN:  Total IN: 0 mL    OUT:    Stool (mL): 2 mL  Total OUT: 2 mL    Total NET: -2 mL          LABS:                        9.2    13.47 )-----------( 163      ( 26 Dec 2021 09:07 )             26.7              12-27    140  |  112<H>  |  72<H>  ----------------------------<  278<H>  3.4<L>   |  18<L>  |  3.02<H>    Ca    6.6<L>      27 Dec 2021 12:30  Phos  2.1     12-27  Mg     1.3     12-27                Culture - Urine (12.24.21 @ 04:08)    -  Ampicillin: S <=2 Predicts results to ampicillin/sulbactam, amoxacillin-clavulanate and  piperacillin-tazobactam.    -  Ciprofloxacin: S <=1    -  Levofloxacin: S <=1    -  Nitrofurantoin: S <=32 Should not be used to treat pyelonephritis.    -  Tetra/Doxy: R >8    -  Vancomycin: S 4    Specimen Source: Clean Catch Clean Catch (Midstream)    Culture Results:   >100,000 CFU/ml Enterococcus faecalis    Organism Identification: Enterococcus faecalis    Organism: Enterococcus faecalis    Method Type: MICHAEL            RADIOLOGY & ADDITIONAL STUDIES:  < from: US Renal (12.27.21 @ 10:33) >  NTERPRETATION:  CLINICAL INFORMATION: Renal insufficiency  Urinalysis (12.23.21 @ 21:20)    pH Urine: 5.0    Blood, Urine: Large    Glucose Qualitative, Urine: Negative    Color: Brown    Urine Appearance: Slightly Turbid    Bilirubin: Negative    Ketone - Urine: Small    Specific Gravity: 1.020    Protein, Urine: 100    Urobilinogen: Negative    Nitrite: Negative    Leukocyte Esterase Concentration: Moderate      COMPARISON: CT abdomen/pelvis 12/26/2021.    TECHNIQUE: Sonography of the kidneys and bladder.    FINDINGS: Bilateral ureteral stents noted on CT evaluation performed   12/26/2021 are not sonographically demonstrated.    Right kidney: 10.2 cm. Mild to moderate right-sided hydronephrosis.    Left kidney: 12.0 cm. Moderate left-sided hydronephrosis.    Urinary bladder: Not well distended, limiting assessment. An element of   bladder wall thickening is suggested.    The IVC and abdominal aorta are not well-visualized.    IMPRESSION: Bilateral hydronephrosis. See above discussion.    < end of copied text >    < from: CT Abdomen and Pelvis No Cont (12.26.21 @ 11:55) >  INTERPRETATION:  CLINICAL INFORMATION: Acute kidney injury. Urinary tract   infection.    COMPARISON: Prior CT dated 3/16/2021.    CONTRAST/COMPLICATIONS:  IV Contrast: NONE  Oral Contrast: NONE  Complications: None reported at time of study completion    PROCEDURE:  CT of the Abdomen and Pelvis was performed.  Sagittal and coronal reformats were performed.    Evaluation of the solid visceral organs is limited without intravenous   contrast and secondary to motion artifact.    FINDINGS:  LOWER CHEST: Coronary arterial calcification. Calcification of the mitral   annulus. Linear atelectasis in the right lower lobe. Peripheral reticular   opacities in the lower lobes bilaterally, similar to prior study.    LIVER: Within normal limits.  BILE DUCTS: Normal caliber.  GALLBLADDER: Within normal limits.  SPLEEN: Within normal limits.  PANCREAS: Diffusely atrophic.  ADRENALS: Within normal limits.  KIDNEYS/URETERS: Bilateral ureteral stents. Moderate left   hydroureteronephrosis and mild to moderate right hydroureteronephrosis.   Both ureters are dilated to the level of the urinary bladder. Proximal   left ureteral stent is coiled in theregion of the ureteropelvic   junction. Proximal right ureteral stent is coiled within an upper pole   calyx.    BLADDER: Urinary bladder is underdistended. However, the bladder wall is   diffusely trabeculated. Distal ends of bilateral ureteral stents are seen   within the urinary bladder.  REPRODUCTIVE ORGANS: Prostate gland is markedly enlarged, measuring 6 x   4.4 cm.    BOWEL: No bowel obstruction. Appendix is within normal limits. Small   hiatal hernia. Colonic diverticulosis.  PERITONEUM: No ascites.  VESSELS: Atherosclerotic changes. Mild aneurysmal dilatation of the   distal abdominal aorta, measuring 2.9 cm.  RETROPERITONEUM/LYMPH NODES: No lymphadenopathy.  ABDOMINAL WALL: Fat-containing left inguinal hernia.  BONES: Degenerative changes.    IMPRESSION:    Limited study without intravenous contrast and secondary to motion   artifact.    Bilateral ureteral stents. Moderate left hydroureteronephrosis and mild   to moderate right hydroureteronephrosis with dilatation of both ureters   to the level of the urinary bladder.    Enlarged prostate gland.    Markedly trabeculated urinary bladder wall.    < end of copied text >

## 2021-12-28 LAB
ACETONE SERPL-MCNC: NEGATIVE — SIGNIFICANT CHANGE UP
ANION GAP SERPL CALC-SCNC: 11 MMOL/L — SIGNIFICANT CHANGE UP (ref 5–17)
B-OH-BUTYR SERPL-SCNC: 0.8 MMOL/L — HIGH
BUN SERPL-MCNC: 67 MG/DL — HIGH (ref 7–18)
CA-I BLD-SCNC: 0.9 MMOL/L — LOW (ref 1.15–1.33)
CALCIUM SERPL-MCNC: 6.8 MG/DL — LOW (ref 8.4–10.5)
CHLORIDE SERPL-SCNC: 114 MMOL/L — HIGH (ref 96–108)
CO2 SERPL-SCNC: 16 MMOL/L — LOW (ref 22–31)
CREAT SERPL-MCNC: 2.67 MG/DL — HIGH (ref 0.5–1.3)
GLUCOSE BLDC GLUCOMTR-MCNC: 153 MG/DL — HIGH (ref 70–99)
GLUCOSE BLDC GLUCOMTR-MCNC: 189 MG/DL — HIGH (ref 70–99)
GLUCOSE BLDC GLUCOMTR-MCNC: 214 MG/DL — HIGH (ref 70–99)
GLUCOSE BLDC GLUCOMTR-MCNC: 230 MG/DL — HIGH (ref 70–99)
GLUCOSE SERPL-MCNC: 217 MG/DL — HIGH (ref 70–99)
HCT VFR BLD CALC: 29.4 % — LOW (ref 39–50)
HGB BLD-MCNC: 9.9 G/DL — LOW (ref 13–17)
MAGNESIUM SERPL-MCNC: 1.8 MG/DL — SIGNIFICANT CHANGE UP (ref 1.6–2.6)
MAGNESIUM UR-MCNC: 1.5 MG/DL — SIGNIFICANT CHANGE UP
MCHC RBC-ENTMCNC: 29.5 PG — SIGNIFICANT CHANGE UP (ref 27–34)
MCHC RBC-ENTMCNC: 33.7 GM/DL — SIGNIFICANT CHANGE UP (ref 32–36)
MCV RBC AUTO: 87.5 FL — SIGNIFICANT CHANGE UP (ref 80–100)
NRBC # BLD: 0 /100 WBCS — SIGNIFICANT CHANGE UP (ref 0–0)
PHOSPHATE SERPL-MCNC: 2.1 MG/DL — LOW (ref 2.5–4.5)
PLATELET # BLD AUTO: 158 K/UL — SIGNIFICANT CHANGE UP (ref 150–400)
POTASSIUM SERPL-MCNC: 3.5 MMOL/L — SIGNIFICANT CHANGE UP (ref 3.5–5.3)
POTASSIUM SERPL-SCNC: 3.5 MMOL/L — SIGNIFICANT CHANGE UP (ref 3.5–5.3)
RBC # BLD: 3.36 M/UL — LOW (ref 4.2–5.8)
RBC # FLD: 14.3 % — SIGNIFICANT CHANGE UP (ref 10.3–14.5)
SODIUM SERPL-SCNC: 141 MMOL/L — SIGNIFICANT CHANGE UP (ref 135–145)
WBC # BLD: 10.16 K/UL — SIGNIFICANT CHANGE UP (ref 3.8–10.5)
WBC # FLD AUTO: 10.16 K/UL — SIGNIFICANT CHANGE UP (ref 3.8–10.5)

## 2021-12-28 RX ORDER — CALCIUM GLUCONATE 100 MG/ML
1 VIAL (ML) INTRAVENOUS ONCE
Refills: 0 | Status: COMPLETED | OUTPATIENT
Start: 2021-12-28 | End: 2021-12-28

## 2021-12-28 RX ORDER — AMPICILLIN SODIUM AND SULBACTAM SODIUM 250; 125 MG/ML; MG/ML
3 INJECTION, POWDER, FOR SUSPENSION INTRAMUSCULAR; INTRAVENOUS ONCE
Refills: 0 | Status: COMPLETED | OUTPATIENT
Start: 2021-12-28 | End: 2021-12-28

## 2021-12-28 RX ORDER — AMPICILLIN SODIUM AND SULBACTAM SODIUM 250; 125 MG/ML; MG/ML
INJECTION, POWDER, FOR SUSPENSION INTRAMUSCULAR; INTRAVENOUS
Refills: 0 | Status: DISCONTINUED | OUTPATIENT
Start: 2021-12-28 | End: 2022-01-03

## 2021-12-28 RX ORDER — AMPICILLIN SODIUM AND SULBACTAM SODIUM 250; 125 MG/ML; MG/ML
3 INJECTION, POWDER, FOR SUSPENSION INTRAMUSCULAR; INTRAVENOUS EVERY 12 HOURS
Refills: 0 | Status: DISCONTINUED | OUTPATIENT
Start: 2021-12-29 | End: 2022-01-03

## 2021-12-28 RX ADMIN — Medication 650 MILLIGRAM(S): at 13:37

## 2021-12-28 RX ADMIN — APIXABAN 2.5 MILLIGRAM(S): 2.5 TABLET, FILM COATED ORAL at 17:34

## 2021-12-28 RX ADMIN — FINASTERIDE 5 MILLIGRAM(S): 5 TABLET, FILM COATED ORAL at 11:36

## 2021-12-28 RX ADMIN — Medication 200 MILLIGRAM(S): at 17:34

## 2021-12-28 RX ADMIN — Medication 2: at 11:36

## 2021-12-28 RX ADMIN — Medication 4 UNIT(S): at 16:47

## 2021-12-28 RX ADMIN — Medication 1: at 16:47

## 2021-12-28 RX ADMIN — Medication 1 TABLET(S): at 13:37

## 2021-12-28 RX ADMIN — REPAGLINIDE 1 MILLIGRAM(S): 1 TABLET ORAL at 11:35

## 2021-12-28 RX ADMIN — AMPICILLIN SODIUM AND SULBACTAM SODIUM 200 GRAM(S): 250; 125 INJECTION, POWDER, FOR SUSPENSION INTRAMUSCULAR; INTRAVENOUS at 17:34

## 2021-12-28 RX ADMIN — Medication 100 GRAM(S): at 13:37

## 2021-12-28 RX ADMIN — Medication 2: at 08:13

## 2021-12-28 RX ADMIN — ATORVASTATIN CALCIUM 10 MILLIGRAM(S): 80 TABLET, FILM COATED ORAL at 21:09

## 2021-12-28 RX ADMIN — MIRTAZAPINE 7.5 MILLIGRAM(S): 45 TABLET, ORALLY DISINTEGRATING ORAL at 11:35

## 2021-12-28 RX ADMIN — Medication 5 MILLIGRAM(S): at 21:08

## 2021-12-28 RX ADMIN — Medication 4 UNIT(S): at 08:13

## 2021-12-28 RX ADMIN — Medication 1 TABLET(S): at 21:09

## 2021-12-28 RX ADMIN — INSULIN GLARGINE 16 UNIT(S): 100 INJECTION, SOLUTION SUBCUTANEOUS at 21:19

## 2021-12-28 RX ADMIN — REPAGLINIDE 1 MILLIGRAM(S): 1 TABLET ORAL at 17:34

## 2021-12-28 RX ADMIN — CALCITRIOL 0.5 MICROGRAM(S): 0.5 CAPSULE ORAL at 11:36

## 2021-12-28 RX ADMIN — Medication 650 MILLIGRAM(S): at 21:08

## 2021-12-28 RX ADMIN — Medication 4 UNIT(S): at 11:36

## 2021-12-28 RX ADMIN — TAMSULOSIN HYDROCHLORIDE 0.4 MILLIGRAM(S): 0.4 CAPSULE ORAL at 21:08

## 2021-12-28 RX ADMIN — MAGNESIUM OXIDE 400 MG ORAL TABLET 400 MILLIGRAM(S): 241.3 TABLET ORAL at 11:36

## 2021-12-28 NOTE — PROGRESS NOTE ADULT - PROBLEM SELECTOR PLAN 4
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

## 2021-12-28 NOTE — PROGRESS NOTE ADULT - SUBJECTIVE AND OBJECTIVE BOX
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  UTI  Dehydration    PAST MEDICAL & SURGICAL HISTORY:  Diabetes    Hypertension    Hypercholesterolemia    GERD (gastroesophageal reflux disease)    Anemia    Hypomagnesemia    DVT (deep venous thrombosis)    No significant past surgical history        Allergy Status Unknown      MEDICATIONS  (STANDING):  ampicillin/sulbactam  IVPB      apixaban 2.5 milliGRAM(s) Oral two times a day  atorvastatin 10 milliGRAM(s) Oral at bedtime  calcitriol   Capsule 0.5 MICROGram(s) Oral daily  calcium carbonate    500 mG (Tums) Chewable 1 Tablet(s) Chew three times a day  finasteride 5 milliGRAM(s) Oral daily  insulin glargine Injectable (LANTUS) 16 Unit(s) SubCutaneous at bedtime  insulin lispro (ADMELOG) corrective regimen sliding scale   SubCutaneous three times a day before meals  insulin lispro (ADMELOG) corrective regimen sliding scale   SubCutaneous at bedtime  insulin lispro Injectable (ADMELOG) 4 Unit(s) SubCutaneous three times a day before meals  labetalol 200 milliGRAM(s) Oral every 12 hours  magnesium oxide 400 milliGRAM(s) Oral daily  melatonin 5 milliGRAM(s) Oral at bedtime  mirtazapine 7.5 milliGRAM(s) Oral daily  pantoprazole    Tablet 40 milliGRAM(s) Oral before breakfast  repaglinide 1 milliGRAM(s) Oral three times a day before meals  sodium bicarbonate 650 milliGRAM(s) Oral three times a day  sodium chloride 0.9%. 1000 milliLiter(s) (75 mL/Hr) IV Continuous <Continuous>  tamsulosin 0.4 milliGRAM(s) Oral at bedtime    MEDICATIONS  (PRN):                            9.9    10.16 )-----------( 158      ( 28 Dec 2021 07:59 )             29.4     12-28    141  |  114<H>  |  67<H>  ----------------------------<  217<H>  3.5   |  16<L>  |  2.67<H>    Ca    6.8<L>      28 Dec 2021 07:59  Phos  2.1     12-28  Mg     1.8     12-28          Creatinine, Random Urine: 47 mg/dL (12-27 @ 18:09)      REVIEW OF SYSTEMS:  confused    VITALS:  T(F): 98.3 (12-28-21 @ 13:33), Max: 98.3 (12-28-21 @ 05:23)  HR: 82 (12-28-21 @ 13:33)  BP: 115/63 (12-28-21 @ 13:33)  RR: 16 (12-28-21 @ 13:33)  SpO2: 96% (12-28-21 @ 13:33)  Wt(kg): --    12-27 @ 07:01  -  12-28 @ 07:00  --------------------------------------------------------  IN: 0 mL / OUT: 2 mL / NET: -2 mL    12-28 @ 07:01  -  12-28 @ 18:34  --------------------------------------------------------  IN: 0 mL / OUT: 1 mL / NET: -1 mL        PHYSICAL EXAM:  Constitutional: well developed, no diaphoresis, no distress.  Neck: No JVD, no carotid bruit, supple, no adenopathy  Respiratory:  air entrance B/L, no wheezes, rales or rhonchi  Cardiovascular: S1, S2, RRR, no pericardial rub, no murmur  Abdomen: BS+, soft, no tenderness, no bruit  Pelvis: bladder nondistended  Extremities: No cyanosis or clubbing. No peripheral edema.   Appears more awake and talkative but confused

## 2021-12-28 NOTE — PROGRESS NOTE ADULT - PROBLEM SELECTOR PLAN 1
Pt p/w generalized weakness, WBC   UA +  urine cultures E. Faecalis sensitive to cipro  started on cipro Pt p/w generalized weakness, WBC   UA +  urine cultures E. Faecalis sensitive to ampicillin  started on ampicillin  Dr. Jamie OROURKE consulted

## 2021-12-28 NOTE — PROGRESS NOTE ADULT - SUBJECTIVE AND OBJECTIVE BOX
PGY-1 Progress Note discussed with attending    PAGER #: [phone]] TILL 5:00 PM  PLEASE CONTACT ON CALL TEAM:  - On Call Team (Please refer to Caleb) FROM 5:00 PM - 8:30PM  - Nightfloat Team FROM 8:30 -7:30 AM    INTERVAL HPI/OVERNIGHT EVENTS:   - Pt is resting comfortably in bed. Pt states he is feeling okay but has some abdominal pressure. Denies pain. Patient MS better today than yesterday. Yesterday, was refusing to comply and angry.     REVIEW OF SYSTEMS:  as stated in hpi    MEDICATIONS  (STANDING):  apixaban 2.5 milliGRAM(s) Oral two times a day  atorvastatin 10 milliGRAM(s) Oral at bedtime  calcitriol   Capsule 0.5 MICROGram(s) Oral daily  calcium carbonate    500 mG (Tums) Chewable 1 Tablet(s) Chew three times a day  cefTRIAXone   IVPB 1000 milliGRAM(s) IV Intermittent every 24 hours  finasteride 5 milliGRAM(s) Oral daily  insulin glargine Injectable (LANTUS) 16 Unit(s) SubCutaneous at bedtime  insulin lispro (ADMELOG) corrective regimen sliding scale   SubCutaneous three times a day before meals  insulin lispro (ADMELOG) corrective regimen sliding scale   SubCutaneous at bedtime  insulin lispro Injectable (ADMELOG) 4 Unit(s) SubCutaneous three times a day before meals  labetalol 200 milliGRAM(s) Oral every 12 hours  magnesium oxide 400 milliGRAM(s) Oral daily  melatonin 5 milliGRAM(s) Oral at bedtime  mirtazapine 7.5 milliGRAM(s) Oral daily  pantoprazole    Tablet 40 milliGRAM(s) Oral before breakfast  repaglinide 1 milliGRAM(s) Oral three times a day before meals  sodium bicarbonate 650 milliGRAM(s) Oral three times a day  sodium chloride 0.9%. 1000 milliLiter(s) (75 mL/Hr) IV Continuous <Continuous>  tamsulosin 0.4 milliGRAM(s) Oral at bedtime    MEDICATIONS  (PRN):      Vital Signs Last 24 Hrs  T(C): 36.8 (28 Dec 2021 13:33), Max: 36.8 (28 Dec 2021 05:23)  T(F): 98.3 (28 Dec 2021 13:33), Max: 98.3 (28 Dec 2021 05:23)  HR: 82 (28 Dec 2021 13:33) (81 - 88)  BP: 115/63 (28 Dec 2021 13:33) (115/63 - 143/76)  BP(mean): 80 (27 Dec 2021 17:13) (80 - 80)  RR: 16 (28 Dec 2021 13:33) (16 - 18)  SpO2: 96% (28 Dec 2021 13:33) (95% - 100%)    PHYSICAL EXAMINATION:  GENERAL: NAD, AAOx3  HEAD: AT/NC  EYES: conjunctiva and sclera clear  NECK: supple, No JVD noted, Normal thyroid  CHEST/LUNG: CTABL; no rales, rhonchi, wheezing, or rubs  HEART: regular rate and rhythm; no murmurs, rubs, or gallops  ABDOMEN: soft, nontender, nondistended; Bowel sounds present  EXTREMITIES:  2+ Peripheral Pulses, No clubbing, cyanosis, or edema  SKIN: warm dry                          9.9    10.16 )-----------( 158      ( 28 Dec 2021 07:59 )             29.4     12-28    141  |  114<H>  |  67<H>  ----------------------------<  217<H>  3.5   |  16<L>  |  2.67<H>    Ca    6.8<L>      28 Dec 2021 07:59  Phos  2.1     12-28  Mg     1.8     12-28              COVID-19 PCR: NotDetec (23 Dec 2021 20:32)      CAPILLARY BLOOD GLUCOSE      POCT Blood Glucose.: 230 mg/dL (28 Dec 2021 11:23)  POCT Blood Glucose.: 214 mg/dL (28 Dec 2021 07:51)  POCT Blood Glucose.: 205 mg/dL (27 Dec 2021 21:16)  POCT Blood Glucose.: 195 mg/dL (27 Dec 2021 16:18)      RADIOLOGY & ADDITIONAL TESTS:

## 2021-12-28 NOTE — CONSULT NOTE ADULT - SUBJECTIVE AND OBJECTIVE BOX
Patient is a 81y old  Male who presents with a chief complaint of UTI (28 Dec 2021 15:28)        REVIEW OF SYSTEMS: Total of twelve systems have been reviewed with patient and found to be negative unless mentioned in HPI      PAST MEDICAL & SURGICAL HISTORY:  Diabetes  Hypertension  Hypercholesterolemia  GERD (gastroesophageal reflux disease)  Anemia  Hypomagnesemia  DVT (deep venous thrombosis)  No significant past surgical history        SOCIAL HISTORY  Alcohol: Does not drink  Tobacco: Does not smoke  Illicit substance use: None      FAMILY HISTORY: Non contributory to the present illness        ALLERGY: Allergy Status Unknown        Vital Signs Last 24 Hrs  T(C): 36.8 (28 Dec 2021 13:33), Max: 36.8 (28 Dec 2021 05:23)  T(F): 98.3 (28 Dec 2021 13:33), Max: 98.3 (28 Dec 2021 05:23)  HR: 82 (28 Dec 2021 13:33) (81 - 88)  BP: 115/63 (28 Dec 2021 13:33) (115/63 - 143/76)  BP(mean): 80 (27 Dec 2021 17:13) (80 - 80)  RR: 16 (28 Dec 2021 13:33) (16 - 18)  SpO2: 96% (28 Dec 2021 13:33) (95% - 100%)        PHYSICAL EXAM:  GENERAL: Not in distress   CHEST/LUNG: Not using accessory muscles  HEART: s1 and s2 present  ABDOMEN:  Nontender and  Nondistended  EXTREMITIES: No pedal  edema  CNS: Awake and Alert      LABS:                        9.9    10.16 )-----------( 158      ( 28 Dec 2021 07:59 )             29.4     12-28    141  |  114<H>  |  67<H>  ----------------------------<  217<H>  3.5   |  16<L>  |  2.67<H>    Ca    6.8<L>      28 Dec 2021 07:59  Phos  2.1     12-28  Mg     1.8     12-28        CAPILLARY BLOOD GLUCOSE  POCT Blood Glucose.: 230 mg/dL (28 Dec 2021 11:23)  POCT Blood Glucose.: 214 mg/dL (28 Dec 2021 07:51)  POCT Blood Glucose.: 205 mg/dL (27 Dec 2021 21:16)  POCT Blood Glucose.: 195 mg/dL (27 Dec 2021 16:18)        MEDICATIONS  (STANDING):  apixaban 2.5 milliGRAM(s) Oral two times a day  atorvastatin 10 milliGRAM(s) Oral at bedtime  calcitriol   Capsule 0.5 MICROGram(s) Oral daily  calcium carbonate    500 mG (Tums) Chewable 1 Tablet(s) Chew three times a day  cefTRIAXone   IVPB 1000 milliGRAM(s) IV Intermittent every 24 hours  finasteride 5 milliGRAM(s) Oral daily  insulin glargine Injectable (LANTUS) 16 Unit(s) SubCutaneous at bedtime  insulin lispro (ADMELOG) corrective regimen sliding scale   SubCutaneous three times a day before meals  insulin lispro (ADMELOG) corrective regimen sliding scale   SubCutaneous at bedtime  insulin lispro Injectable (ADMELOG) 4 Unit(s) SubCutaneous three times a day before meals  labetalol 200 milliGRAM(s) Oral every 12 hours  magnesium oxide 400 milliGRAM(s) Oral daily  melatonin 5 milliGRAM(s) Oral at bedtime  mirtazapine 7.5 milliGRAM(s) Oral daily  pantoprazole    Tablet 40 milliGRAM(s) Oral before breakfast  repaglinide 1 milliGRAM(s) Oral three times a day before meals  sodium bicarbonate 650 milliGRAM(s) Oral three times a day  sodium chloride 0.9%. 1000 milliLiter(s) (75 mL/Hr) IV Continuous <Continuous>  tamsulosin 0.4 milliGRAM(s) Oral at bedtime    MEDICATIONS  (PRN):      RADIOLOGY & ADDITIONAL TESTS:    < from: US Renal (12.27.21 @ 10:33) >  Right kidney: 10.2 cm. Mild to moderate right-sided hydronephrosis.    Left kidney: 12.0 cm. Moderate left-sided hydronephrosis.    Urinary bladder: Not well distended, limiting assessment. An element of   bladder wall thickening is suggested.    The IVC and abdominal aorta are not well-visualized.      < from: CT Abdomen and Pelvis No Cont (12.26.21 @ 11:55) >  Bilateral ureteral stents. Moderate left hydroureteronephrosis and mild   to moderate right hydroureteronephrosis with dilatation of both ureters   to the level of the urinary bladder.    Enlarged prostate gland.    Markedly trabeculated urinary bladder wall.      MICROBIOLOGY DATA:    Culture - Urine (12.24.21 @ 04:08)   - Ampicillin: S <=2 Predicts results to ampicillin/sulbactam, amoxacillin-clavulanate and piperacillin-tazobactam.   - Ciprofloxacin: S <=1   - Levofloxacin: S <=1   - Nitrofurantoin: S <=32 Should not be used to treat pyelonephritis.   - Tetra/Doxy: R >8   - Vancomycin: S 4   Specimen Source: Clean Catch Clean Catch (Midstream)   Culture Results:   >100,000 CFU/ml Enterococcus faecalis   Organism Identification: Enterococcus faecalis   Organism: Enterococcus faecalis COVID-19 PCR . (12.23.21 @ 20:32)   COVID-19 PCR: NotDetec:               Patient is a 81y old  Male with hx of Chronic DVT, Anemia, GERD, HTN, HLD, DM, Hypoparathyroidism, Glaucoma and BPH presents to the ER on 12/23 for evaluation of generalized weakness and family called EMS after unable to reach pt at home. As per ED note, pt states was at kitchen and felt weak, laid himself on floor and was unable to get up. He states that he had a prostate stent? placed 1 or 2 weeks ago by doctor Benjamin Son? and endorsed some dysuria. On admission, he found to have no fever but tachycardia, Leukocytosis and positive urine analysis. He has started on Ceftriaxone and today, 12/28/21, The ID consult requested to assist with antibiotic  management of Enterococcus UTI.      REVIEW OF SYSTEMS: Total of twelve systems have been reviewed with patient and found to be negative unless mentioned in HPI      PAST MEDICAL & SURGICAL HISTORY:  Diabetes  Hypertension  Hypercholesterolemia  GERD (gastroesophageal reflux disease)  Anemia  Hypomagnesemia  DVT (deep venous thrombosis)  No significant past surgical history      SOCIAL HISTORY  Alcohol: Does not drink  Tobacco: Does not smoke  Illicit substance use: None      FAMILY HISTORY: Non contributory to the present illness      ALLERGY: Allergy Status Unknown      Vital Signs Last 24 Hrs  T(C): 36.8 (28 Dec 2021 13:33), Max: 36.8 (28 Dec 2021 05:23)  T(F): 98.3 (28 Dec 2021 13:33), Max: 98.3 (28 Dec 2021 05:23)  HR: 82 (28 Dec 2021 13:33) (81 - 88)  BP: 115/63 (28 Dec 2021 13:33) (115/63 - 143/76)  BP(mean): 80 (27 Dec 2021 17:13) (80 - 80)  RR: 16 (28 Dec 2021 13:33) (16 - 18)  SpO2: 96% (28 Dec 2021 13:33) (95% - 100%)      PHYSICAL EXAM:  GENERAL: Not in distress   CHEST/LUNG: Not using accessory muscles  HEART: s1 and s2 present  ABDOMEN:  Nontender and  Nondistended  EXTREMITIES: No pedal  edema  CNS: Awake and Alert      LABS:                        9.9    10.16 )-----------( 158      ( 28 Dec 2021 07:59 )             29.4     12-28    141  |  114<H>  |  67<H>  ----------------------------<  217<H>  3.5   |  16<L>  |  2.67<H>    Ca    6.8<L>      28 Dec 2021 07:59  Phos  2.1     12-28  Mg     1.8     12-28        CAPILLARY BLOOD GLUCOSE  POCT Blood Glucose.: 230 mg/dL (28 Dec 2021 11:23)  POCT Blood Glucose.: 214 mg/dL (28 Dec 2021 07:51)  POCT Blood Glucose.: 205 mg/dL (27 Dec 2021 21:16)  POCT Blood Glucose.: 195 mg/dL (27 Dec 2021 16:18)        MEDICATIONS  (STANDING):  apixaban 2.5 milliGRAM(s) Oral two times a day  atorvastatin 10 milliGRAM(s) Oral at bedtime  calcitriol   Capsule 0.5 MICROGram(s) Oral daily  calcium carbonate    500 mG (Tums) Chewable 1 Tablet(s) Chew three times a day  cefTRIAXone   IVPB 1000 milliGRAM(s) IV Intermittent every 24 hours  finasteride 5 milliGRAM(s) Oral daily  insulin glargine Injectable (LANTUS) 16 Unit(s) SubCutaneous at bedtime  insulin lispro (ADMELOG) corrective regimen sliding scale   SubCutaneous three times a day before meals  insulin lispro (ADMELOG) corrective regimen sliding scale   SubCutaneous at bedtime  insulin lispro Injectable (ADMELOG) 4 Unit(s) SubCutaneous three times a day before meals  labetalol 200 milliGRAM(s) Oral every 12 hours  magnesium oxide 400 milliGRAM(s) Oral daily  melatonin 5 milliGRAM(s) Oral at bedtime  mirtazapine 7.5 milliGRAM(s) Oral daily  pantoprazole    Tablet 40 milliGRAM(s) Oral before breakfast  repaglinide 1 milliGRAM(s) Oral three times a day before meals  sodium bicarbonate 650 milliGRAM(s) Oral three times a day  sodium chloride 0.9%. 1000 milliLiter(s) (75 mL/Hr) IV Continuous <Continuous>  tamsulosin 0.4 milliGRAM(s) Oral at bedtime    MEDICATIONS  (PRN):      RADIOLOGY & ADDITIONAL TESTS:    12/27/21 : US Renal (12.27.21 @ 10:33) Right kidney: 10.2 cm. Mild to moderate right-sided hydronephrosis.  Left kidney: 12.0 cm. Moderate left-sided hydronephrosis.  Urinary bladder: Not well distended, limiting assessment. An element of bladder wall thickening is suggested.  The IVC and abdominal aorta are not well-visualized.      12/26/21: CT Abdomen and Pelvis No Cont (12.26.21 @ 11:55) Bilateral ureteral stents. Moderate left hydroureteronephrosis and mild   to moderate right hydroureteronephrosis with dilatation of both ureters  to the level of the urinary bladder.    Enlarged prostate gland. Markedly trabeculated urinary bladder wall.      MICROBIOLOGY DATA:    Culture - Urine (12.24.21 @ 04:08)   - Ampicillin: S <=2 Predicts results to ampicillin/sulbactam, amoxacillin-clavulanate and piperacillin-tazobactam.   - Ciprofloxacin: S <=1   - Levofloxacin: S <=1   - Nitrofurantoin: S <=32 Should not be used to treat pyelonephritis.   - Tetra/Doxy: R >8   - Vancomycin: S 4   Specimen Source: Clean Catch Clean Catch (Midstream)   Culture Results:   >100,000 CFU/ml Enterococcus faecalis   Organism Identification: Enterococcus faecalis   Organism: Enterococcus faecalis COVID-19 PCR . (12.23.21 @ 20:32)   COVID-19 PCR: NotDetec:

## 2021-12-28 NOTE — PROGRESS NOTE ADULT - SUBJECTIVE AND OBJECTIVE BOX
CARDIOLOGY/MEDICAL ATTENDING    CHIEF COMPLAINT:Patient is a 81y old  Male who presents with a chief complaint of UTI.Pt appears comfortable.    	  REVIEW OF SYSTEMS:  CONSTITUTIONAL: No fever, weight loss, or fatigue  EYES: No eye pain, visual disturbances, or discharge  ENT:  No difficulty hearing, tinnitus, vertigo; No sinus or throat pain  NECK: No pain or stiffness  RESPIRATORY: No cough, wheezing, chills or hemoptysis; No Shortness of Breath  CARDIOVASCULAR: No chest pain, palpitations, passing out, dizziness, or leg swelling  GASTROINTESTINAL: No abdominal or epigastric pain. No nausea, vomiting, or hematemesis; No diarrhea or constipation. No melena or hematochezia.  GENITOURINARY: No dysuria, frequency, hematuria, or incontinence  NEUROLOGICAL: No headaches, memory loss, loss of strength, numbness, or tremors  SKIN: No itching, burning, rashes, or lesions   LYMPH Nodes: No enlarged glands  ENDOCRINE: No heat or cold intolerance; No hair loss  MUSCULOSKELETAL: No joint pain or swelling; No muscle, back, or extremity pain  PSYCHIATRIC: No depression, anxiety, mood swings, or difficulty sleeping  HEME/LYMPH: No easy bruising, or bleeding gums  ALLERGY AND IMMUNOLOGIC: No hives or eczema	      PHYSICAL EXAM:  T(C): 36.8 (12-28-21 @ 05:23), Max: 36.8 (12-28-21 @ 05:23)  HR: 85 (12-28-21 @ 05:23) (77 - 88)  BP: 143/76 (12-28-21 @ 05:23) (122/61 - 143/76)  RR: 16 (12-28-21 @ 05:23) (16 - 18)  SpO2: 95% (12-28-21 @ 05:23) (95% - 100%)  Wt(kg): --  I&O's Summary    27 Dec 2021 07:01  -  28 Dec 2021 07:00  --------------------------------------------------------  IN: 0 mL / OUT: 2 mL / NET: -2 mL        Appearance: Normal	  HEENT:   Normal oral mucosa, PERRL, EOMI	  Lymphatic: No lymphadenopathy  Cardiovascular: Normal S1 S2, No JVD, No murmurs, No edema  Respiratory: Lungs clear to auscultation	  Psychiatry: A & O x 3, Mood & affect appropriate  Gastrointestinal:  Soft, Non-tender, + BS	  Skin: No rashes, No ecchymoses, No cyanosis	  Neurologic: Non-focal  Extremities: Normal range of motion, No clubbing, cyanosis or edema  Vascular: Peripheral pulses palpable 2+ bilaterally    MEDICATIONS  (STANDING):  apixaban 2.5 milliGRAM(s) Oral two times a day  atorvastatin 10 milliGRAM(s) Oral at bedtime  calcitriol   Capsule 0.5 MICROGram(s) Oral daily  calcium carbonate    500 mG (Tums) Chewable 1 Tablet(s) Chew three times a day  calcium gluconate IVPB 1 Gram(s) IV Intermittent once  cefTRIAXone   IVPB 1000 milliGRAM(s) IV Intermittent every 24 hours  finasteride 5 milliGRAM(s) Oral daily  insulin glargine Injectable (LANTUS) 16 Unit(s) SubCutaneous at bedtime  insulin lispro (ADMELOG) corrective regimen sliding scale   SubCutaneous three times a day before meals  insulin lispro (ADMELOG) corrective regimen sliding scale   SubCutaneous at bedtime  insulin lispro Injectable (ADMELOG) 4 Unit(s) SubCutaneous three times a day before meals  labetalol 200 milliGRAM(s) Oral every 12 hours  magnesium oxide 400 milliGRAM(s) Oral daily  melatonin 5 milliGRAM(s) Oral at bedtime  mirtazapine 7.5 milliGRAM(s) Oral daily  pantoprazole    Tablet 40 milliGRAM(s) Oral before breakfast  repaglinide 1 milliGRAM(s) Oral three times a day before meals  sodium bicarbonate 650 milliGRAM(s) Oral three times a day  sodium chloride 0.9%. 1000 milliLiter(s) (75 mL/Hr) IV Continuous <Continuous>  tamsulosin 0.4 milliGRAM(s) Oral at bedtime      	  LABS:	 	                       9.9    10.16 )-----------( 158      ( 28 Dec 2021 07:59 )             29.4     12-28    141  |  114<H>  |  67<H>  ----------------------------<  217<H>  3.5   |  16<L>  |  2.67<H>    Ca    6.8<L>      28 Dec 2021 07:59  Phos  2.1     12-28  Mg     1.8     12-28        Lipid Profile: Cholesterol 145  LDL --  HDL 20    Ldl calc 77  Ratio --    HgA1c:   TSH: Thyroid Stimulating Hormone, Serum: 1.38 uU/mL (12-23 @ 20:32)

## 2021-12-28 NOTE — PROGRESS NOTE ADULT - SUBJECTIVE AND OBJECTIVE BOX
Interval Events:  pt in nad    Allergies    Allergy Status Unknown    Intolerances      Endocrine/Metabolic Medications:  atorvastatin 10 milliGRAM(s) Oral at bedtime  finasteride 5 milliGRAM(s) Oral daily  insulin glargine Injectable (LANTUS) 16 Unit(s) SubCutaneous at bedtime  insulin lispro (ADMELOG) corrective regimen sliding scale   SubCutaneous three times a day before meals  insulin lispro (ADMELOG) corrective regimen sliding scale   SubCutaneous at bedtime  insulin lispro Injectable (ADMELOG) 4 Unit(s) SubCutaneous three times a day before meals  repaglinide 1 milliGRAM(s) Oral three times a day before meals      Vital Signs Last 24 Hrs  T(C): 36.8 (28 Dec 2021 05:23), Max: 36.8 (28 Dec 2021 05:23)  T(F): 98.3 (28 Dec 2021 05:23), Max: 98.3 (28 Dec 2021 05:23)  HR: 85 (28 Dec 2021 05:23) (77 - 88)  BP: 143/76 (28 Dec 2021 05:23) (122/61 - 143/76)  BP(mean): 80 (27 Dec 2021 17:13) (80 - 80)  RR: 16 (28 Dec 2021 05:23) (16 - 18)  SpO2: 95% (28 Dec 2021 05:23) (95% - 100%)      PHYSICAL EXAM  All physical exam findings normal, except those marked:  General:	Alert, active, cooperative, NAD, well hydrated  .		[] Abnormal:  Neck		Normal: supple, no cervical adenopathy, no palpable thyroid  .		[] Abnormal:  Cardiovascular	Normal: regular rate, normal S1, S2, no murmurs  .		[] Abnormal:  Respiratory	Normal: no chest wall deformity, normal respiratory pattern, CTA B/L  .		[] Abnormal:  Abdominal	Normal: soft, ND, NT, bowel sounds present, no masses, no organomegaly  .		[] Abnormal:  		Normal normal genitalia, testes descended, circumcised/uncircumcised  .		Denice stage:			Breast denice:  .		Menstrual history:  .		[] Abnormal:  Extremities	Normal: FROM x4  .		[] Abnormal:  Skin		Normal: intact and not indurated, no rash, no acanthosis nigricans  .		[] Abnormal:  Neurologic	Normal: grossly intact  .		[] Abnormal:    LABS                        9.9    10.16 )-----------( 158      ( 28 Dec 2021 07:59 )             29.4                               141    |  114    |  67                  Calcium: 6.8   / iCa: x      (12-28 @ 07:59)    ----------------------------<  217       Magnesium: 1.8                              3.5     |  16     |  2.67             Phosphorous: 2.1        CAPILLARY BLOOD GLUCOSE      POCT Blood Glucose.: 214 mg/dL (28 Dec 2021 07:51)  POCT Blood Glucose.: 205 mg/dL (27 Dec 2021 21:16)  POCT Blood Glucose.: 195 mg/dL (27 Dec 2021 16:18)  POCT Blood Glucose.: 270 mg/dL (27 Dec 2021 11:27)        Assesment/plan    Pt is a 81 yr old male with hx of Chronic DVT, Anemia, GERD, HTN, HLD, DM, Hypoparathyroidism, Glaucoma and BPH presents to ed for generalized weakness and family called ems after unable to reach pt at home. )  Found to have manuel/ uti and uncont dm. Pt takes metformin and amaryl as out pt. Does not check fsg and denies hypoglycemic sx. Now hyperglycemic.     Problem/Recommendation - 1:  ·  Problem: Diabetes mellitus.   ·  Recommendation: uncontrolled with hyperglycemia due to acute illness  a1c- 7.5% on metformin/amaryl as out pt  remains hyperglycemic  on lantus 10 units and prandin  ac tid  d/c prandin  change lantus to 20 units  cont admelog 4 ac tid   fsg ac and hs.  d/w prim team      Problem/Recommendation - 2:  ·  Problem: Acute UTI.   ·  Recommendation: cont iv abx  per ID.     Problem/Recommendation - 3:  ·  Problem: MANUEL (acute kidney injury).   ·  Recommendation: acute on ckd- (from obstructive uropathy)  f/u nephro recs  pt cannot be on metformin and amaryl.  urology eval noted

## 2021-12-28 NOTE — PROGRESS NOTE ADULT - PROBLEM SELECTOR PLAN 2
Glucose 333 yesterday, and above 278 today  low bicarb - pt started on sodium bicarb   normal AG  acetone and b hydroxybutrate WNL  Endo on board  Nephro on board  Lantus 16U  Admelog 4U

## 2021-12-28 NOTE — CONSULT NOTE ADULT - ASSESSMENT
#Complicated UTI  # B/L Hydronephrosis    would recommend:    1. Change Ceftriaxone to Unasyn to cover Enterococcus  2. Monitor kidney function and adjust Abx doses accordingly    will follow the patient with you and make further recommendation based on the clinical course and Lab results  Thank you for the opportunity to participate in Mr. Varner care      Attending Attestation:    Spent more than 65 minutes on total encounter, more than 50 % of the visit was spent counseling and/or coordinating care by the Attending physician.         #Complicated UTI - Urine cx from 12/24 grew Enterococcus  # B/L Hydronephrosis  # Enlarged prostate gland.- on CT abd/pelvis    would recommend:    1. Change Ceftriaxone to Unasyn to cover Enterococcus  2. Monitor kidney function and adjust Abx doses accordingly  3. Management of Hydronephrosis as per Urology  4. Continue Flomax and Finasteride    d/w [facility] staff, DR. Miles    will follow the patient with you and make further recommendation based on the clinical course and Lab results  Thank you for the opportunity to participate in Mr. VALDIVIA's care      Attending Attestation:    Spent more than 65 minutes on total encounter, more than 50 % of the visit was spent counseling and/or coordinating care by the Attending physician.

## 2021-12-28 NOTE — PROGRESS NOTE ADULT - PROBLEM SELECTOR PLAN 3
Pt with Larry on CKD, baseline 2.5  likely 2/2 obstructive uropathy  s/p IV fluids  Creatinine 2.94  f/u US kidney, bladder  Patient states he recently had a  urologic procedure where he had a stent placed in prostate  R/O spontaneous dislodgement of stent  Nephro Dr. Orellana:  CT pelvis b/l hydronephrosis  Uro consulted w/recc to start flomax, Proscar, and pt's outpatient urologist has plan to change stents every 3 months

## 2021-12-28 NOTE — PROGRESS NOTE ADULT - PROBLEM SELECTOR PLAN 6
Pt has calcium of 6.6, 8.1 corrected   c/w Calcitriol and calcium  low ionized calcium 0.9 true hypocalcemia

## 2021-12-29 LAB
24R-OH-CALCIDIOL SERPL-MCNC: 7.2 NG/ML — LOW (ref 30–80)
ANION GAP SERPL CALC-SCNC: 7 MMOL/L — SIGNIFICANT CHANGE UP (ref 5–17)
BUN SERPL-MCNC: 48 MG/DL — HIGH (ref 7–18)
CALCIUM SERPL-MCNC: 7.3 MG/DL — LOW (ref 8.4–10.5)
CHLORIDE SERPL-SCNC: 114 MMOL/L — HIGH (ref 96–108)
CO2 SERPL-SCNC: 19 MMOL/L — LOW (ref 22–31)
CREAT SERPL-MCNC: 2.47 MG/DL — HIGH (ref 0.5–1.3)
GLUCOSE BLDC GLUCOMTR-MCNC: 201 MG/DL — HIGH (ref 70–99)
GLUCOSE BLDC GLUCOMTR-MCNC: 227 MG/DL — HIGH (ref 70–99)
GLUCOSE BLDC GLUCOMTR-MCNC: 233 MG/DL — HIGH (ref 70–99)
GLUCOSE BLDC GLUCOMTR-MCNC: 244 MG/DL — HIGH (ref 70–99)
GLUCOSE BLDC GLUCOMTR-MCNC: 269 MG/DL — HIGH (ref 70–99)
GLUCOSE SERPL-MCNC: 226 MG/DL — HIGH (ref 70–99)
HCT VFR BLD CALC: 25.5 % — LOW (ref 39–50)
HGB BLD-MCNC: 8.8 G/DL — LOW (ref 13–17)
MAGNESIUM SERPL-MCNC: 1.4 MG/DL — LOW (ref 1.6–2.6)
MCHC RBC-ENTMCNC: 29.2 PG — SIGNIFICANT CHANGE UP (ref 27–34)
MCHC RBC-ENTMCNC: 34.5 GM/DL — SIGNIFICANT CHANGE UP (ref 32–36)
MCV RBC AUTO: 84.7 FL — SIGNIFICANT CHANGE UP (ref 80–100)
NRBC # BLD: 0 /100 WBCS — SIGNIFICANT CHANGE UP (ref 0–0)
PHOSPHATE SERPL-MCNC: 2.4 MG/DL — LOW (ref 2.5–4.5)
PLATELET # BLD AUTO: 172 K/UL — SIGNIFICANT CHANGE UP (ref 150–400)
POTASSIUM SERPL-MCNC: 3.7 MMOL/L — SIGNIFICANT CHANGE UP (ref 3.5–5.3)
POTASSIUM SERPL-SCNC: 3.7 MMOL/L — SIGNIFICANT CHANGE UP (ref 3.5–5.3)
RBC # BLD: 3.01 M/UL — LOW (ref 4.2–5.8)
RBC # FLD: 14.1 % — SIGNIFICANT CHANGE UP (ref 10.3–14.5)
SARS-COV-2 RNA SPEC QL NAA+PROBE: SIGNIFICANT CHANGE UP
SODIUM SERPL-SCNC: 140 MMOL/L — SIGNIFICANT CHANGE UP (ref 135–145)
WBC # BLD: 9.22 K/UL — SIGNIFICANT CHANGE UP (ref 3.8–10.5)
WBC # FLD AUTO: 9.22 K/UL — SIGNIFICANT CHANGE UP (ref 3.8–10.5)

## 2021-12-29 RX ORDER — MAGNESIUM SULFATE 500 MG/ML
2 VIAL (ML) INJECTION ONCE
Refills: 0 | Status: COMPLETED | OUTPATIENT
Start: 2021-12-29 | End: 2021-12-29

## 2021-12-29 RX ORDER — FAMOTIDINE 10 MG/ML
20 INJECTION INTRAVENOUS DAILY
Refills: 0 | Status: DISCONTINUED | OUTPATIENT
Start: 2021-12-29 | End: 2022-01-03

## 2021-12-29 RX ORDER — INSULIN GLARGINE 100 [IU]/ML
20 INJECTION, SOLUTION SUBCUTANEOUS AT BEDTIME
Refills: 0 | Status: DISCONTINUED | OUTPATIENT
Start: 2021-12-29 | End: 2021-12-30

## 2021-12-29 RX ADMIN — Medication 200 MILLIGRAM(S): at 17:29

## 2021-12-29 RX ADMIN — AMPICILLIN SODIUM AND SULBACTAM SODIUM 200 GRAM(S): 250; 125 INJECTION, POWDER, FOR SUSPENSION INTRAMUSCULAR; INTRAVENOUS at 19:00

## 2021-12-29 RX ADMIN — Medication 1 TABLET(S): at 05:35

## 2021-12-29 RX ADMIN — Medication 5 MILLIGRAM(S): at 22:26

## 2021-12-29 RX ADMIN — MIRTAZAPINE 7.5 MILLIGRAM(S): 45 TABLET, ORALLY DISINTEGRATING ORAL at 13:28

## 2021-12-29 RX ADMIN — Medication 150 GRAM(S): at 13:29

## 2021-12-29 RX ADMIN — Medication 4 UNIT(S): at 17:29

## 2021-12-29 RX ADMIN — Medication 2: at 12:08

## 2021-12-29 RX ADMIN — Medication 650 MILLIGRAM(S): at 13:29

## 2021-12-29 RX ADMIN — Medication 1 TABLET(S): at 13:28

## 2021-12-29 RX ADMIN — Medication 650 MILLIGRAM(S): at 05:35

## 2021-12-29 RX ADMIN — Medication 2: at 17:28

## 2021-12-29 RX ADMIN — REPAGLINIDE 1 MILLIGRAM(S): 1 TABLET ORAL at 05:35

## 2021-12-29 RX ADMIN — CALCITRIOL 0.5 MICROGRAM(S): 0.5 CAPSULE ORAL at 13:29

## 2021-12-29 RX ADMIN — APIXABAN 2.5 MILLIGRAM(S): 2.5 TABLET, FILM COATED ORAL at 05:35

## 2021-12-29 RX ADMIN — APIXABAN 2.5 MILLIGRAM(S): 2.5 TABLET, FILM COATED ORAL at 17:29

## 2021-12-29 RX ADMIN — Medication 200 MILLIGRAM(S): at 05:35

## 2021-12-29 RX ADMIN — TAMSULOSIN HYDROCHLORIDE 0.4 MILLIGRAM(S): 0.4 CAPSULE ORAL at 22:20

## 2021-12-29 RX ADMIN — Medication 3: at 08:49

## 2021-12-29 RX ADMIN — FINASTERIDE 5 MILLIGRAM(S): 5 TABLET, FILM COATED ORAL at 13:29

## 2021-12-29 RX ADMIN — AMPICILLIN SODIUM AND SULBACTAM SODIUM 200 GRAM(S): 250; 125 INJECTION, POWDER, FOR SUSPENSION INTRAMUSCULAR; INTRAVENOUS at 05:35

## 2021-12-29 RX ADMIN — ATORVASTATIN CALCIUM 10 MILLIGRAM(S): 80 TABLET, FILM COATED ORAL at 22:20

## 2021-12-29 RX ADMIN — Medication 1 TABLET(S): at 17:29

## 2021-12-29 RX ADMIN — Medication 4 UNIT(S): at 08:50

## 2021-12-29 RX ADMIN — Medication 4 UNIT(S): at 12:08

## 2021-12-29 RX ADMIN — PANTOPRAZOLE SODIUM 40 MILLIGRAM(S): 20 TABLET, DELAYED RELEASE ORAL at 05:36

## 2021-12-29 RX ADMIN — Medication 1 TABLET(S): at 22:19

## 2021-12-29 RX ADMIN — Medication 650 MILLIGRAM(S): at 22:20

## 2021-12-29 RX ADMIN — INSULIN GLARGINE 20 UNIT(S): 100 INJECTION, SOLUTION SUBCUTANEOUS at 22:21

## 2021-12-29 RX ADMIN — FAMOTIDINE 20 MILLIGRAM(S): 10 INJECTION INTRAVENOUS at 13:30

## 2021-12-29 NOTE — PROGRESS NOTE ADULT - PROBLEM SELECTOR PLAN 8
pmh of dm  hgb a1c 7.4  insulin ss  monitor bs  adjust as needed  diabetic diet  increased Lantus 20U, Admelog 4U  Endo on board

## 2021-12-29 NOTE — PROGRESS NOTE ADULT - PROBLEM SELECTOR PLAN 4
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

## 2021-12-29 NOTE — DIETITIAN INITIAL EVALUATION ADULT. - FACTORS AFF FOOD INTAKE
weakness, DVT, diabetes, GERD, HTN, anemia/change in mental status/difficulty with food procurement/preparation/Advent/ethnic/cultural/personal food preferences/other (specify) weakness, DVT, diabetes, MANUEL, GERD, HTN, anemia, MANUEL, adjustment disorder/change in mental status/difficulty with food procurement/preparation/Nondenominational/ethnic/cultural/personal food preferences/other (specify)

## 2021-12-29 NOTE — DIETITIAN INITIAL EVALUATION ADULT. - PERTINENT MEDS FT
MEDICATIONS  (STANDING):  ampicillin/sulbactam  IVPB      ampicillin/sulbactam  IVPB 3 Gram(s) IV Intermittent every 12 hours  apixaban 2.5 milliGRAM(s) Oral two times a day  atorvastatin 10 milliGRAM(s) Oral at bedtime  calcitriol   Capsule 0.5 MICROGram(s) Oral daily  calcium carbonate    500 mG (Tums) Chewable 1 Tablet(s) Chew three times a day  famotidine    Tablet 20 milliGRAM(s) Oral daily  finasteride 5 milliGRAM(s) Oral daily  insulin glargine Injectable (LANTUS) 20 Unit(s) SubCutaneous at bedtime  insulin lispro (ADMELOG) corrective regimen sliding scale   SubCutaneous three times a day before meals  insulin lispro (ADMELOG) corrective regimen sliding scale   SubCutaneous at bedtime  insulin lispro Injectable (ADMELOG) 4 Unit(s) SubCutaneous three times a day before meals  labetalol 200 milliGRAM(s) Oral every 12 hours  melatonin 5 milliGRAM(s) Oral at bedtime  mirtazapine 7.5 milliGRAM(s) Oral daily  Nephro-preeti 1 Tablet(s) Oral daily  sodium bicarbonate 650 milliGRAM(s) Oral three times a day  sodium chloride 0.9%. 1000 milliLiter(s) (75 mL/Hr) IV Continuous <Continuous>  tamsulosin 0.4 milliGRAM(s) Oral at bedtime    MEDICATIONS  (PRN):

## 2021-12-29 NOTE — PROGRESS NOTE ADULT - SUBJECTIVE AND OBJECTIVE BOX
PGY-1 Progress Note discussed with attending    PAGER #: [phone]] TILL 5:00 PM  PLEASE CONTACT ON CALL TEAM:  - On Call Team (Please refer to Caleb) FROM 5:00 PM - 8:30PM  - Nightfloat Team FROM 8:30 -7:30 AM    INTERVAL HPI/OVERNIGHT EVENTS:   - Pt is resting comfortably in bed, denies all symptoms including chest pain, SOB, N/V, C/D.     REVIEW OF SYSTEMS:  CONSTITUTIONAL: No fever, weight loss, or fatigue  RESPIRATORY: No cough, wheezing, chills or hemoptysis; No shortness of breath  CARDIOVASCULAR: No chest pain, palpitations, dizziness, or leg swelling  GASTROINTESTINAL: No abdominal pain. No nausea, vomiting, or hematemesis; No diarrhea or constipation. No melena or hematochezia.  GENITOURINARY: No dysuria or hematuria, urinary frequency  NEUROLOGICAL: No headaches, memory loss, loss of strength, numbness, or tremors  SKIN: No itching, burning, rashes, or lesions     MEDICATIONS  (STANDING):  ampicillin/sulbactam  IVPB 3 Gram(s) IV Intermittent every 12 hours  ampicillin/sulbactam  IVPB      apixaban 2.5 milliGRAM(s) Oral two times a day  atorvastatin 10 milliGRAM(s) Oral at bedtime  calcitriol   Capsule 0.5 MICROGram(s) Oral daily  calcium carbonate    500 mG (Tums) Chewable 1 Tablet(s) Chew three times a day  famotidine    Tablet 20 milliGRAM(s) Oral daily  finasteride 5 milliGRAM(s) Oral daily  insulin glargine Injectable (LANTUS) 20 Unit(s) SubCutaneous at bedtime  insulin lispro (ADMELOG) corrective regimen sliding scale   SubCutaneous three times a day before meals  insulin lispro (ADMELOG) corrective regimen sliding scale   SubCutaneous at bedtime  insulin lispro Injectable (ADMELOG) 4 Unit(s) SubCutaneous three times a day before meals  labetalol 200 milliGRAM(s) Oral every 12 hours  melatonin 5 milliGRAM(s) Oral at bedtime  mirtazapine 7.5 milliGRAM(s) Oral daily  Nephro-preeti 1 Tablet(s) Oral daily  sodium bicarbonate 650 milliGRAM(s) Oral three times a day  sodium chloride 0.9%. 1000 milliLiter(s) (75 mL/Hr) IV Continuous <Continuous>  tamsulosin 0.4 milliGRAM(s) Oral at bedtime    MEDICATIONS  (PRN):      Vital Signs Last 24 Hrs  T(C): 36.1 (29 Dec 2021 12:42), Max: 36.6 (29 Dec 2021 05:28)  T(F): 97 (29 Dec 2021 12:42), Max: 97.9 (29 Dec 2021 05:28)  HR: 80 (29 Dec 2021 12:42) (80 - 85)  BP: 128/55 (29 Dec 2021 12:42) (117/61 - 128/55)  BP(mean): --  RR: 18 (29 Dec 2021 12:42) (18 - 18)  SpO2: 97% (29 Dec 2021 12:42) (97% - 100%)    PHYSICAL EXAMINATION:  GENERAL: NAD, AAOx3  HEAD: AT/NC  EYES: conjunctiva and sclera clear  NECK: supple, No JVD noted, Normal thyroid  CHEST/LUNG: CTABL; no rales, rhonchi, wheezing, or rubs  HEART: regular rate and rhythm; no murmurs, rubs, or gallops  ABDOMEN: soft, nontender, nondistended; Bowel sounds present  EXTREMITIES:  2+ Peripheral Pulses, No clubbing, cyanosis, or edema  SKIN: warm dry                          8.8    9.22  )-----------( 172      ( 29 Dec 2021 08:05 )             25.5     12-29    140  |  114<H>  |  48<H>  ----------------------------<  226<H>  3.7   |  19<L>  |  2.47<H>    Ca    7.3<L>      29 Dec 2021 08:05  Phos  2.4     12-29  Mg     1.4     12-29              COVID-19 PCR: NotDetec (23 Dec 2021 20:32)      CAPILLARY BLOOD GLUCOSE      POCT Blood Glucose.: 227 mg/dL (29 Dec 2021 11:17)  POCT Blood Glucose.: 269 mg/dL (29 Dec 2021 08:47)  POCT Blood Glucose.: 244 mg/dL (29 Dec 2021 07:40)  POCT Blood Glucose.: 189 mg/dL (28 Dec 2021 21:16)  POCT Blood Glucose.: 153 mg/dL (28 Dec 2021 16:41)      RADIOLOGY & ADDITIONAL TESTS:

## 2021-12-29 NOTE — PROGRESS NOTE ADULT - SUBJECTIVE AND OBJECTIVE BOX
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  UTI - Enterococcus faecalis in urine  Dehydration  Dementia      PAST MEDICAL & SURGICAL HISTORY:  Diabetes    Hypertension    Hypercholesterolemia    GERD (gastroesophageal reflux disease)    Anemia    Hypomagnesemia    DVT (deep venous thrombosis)    No significant past surgical history        Allergy Status Unknown      MEDICATIONS  (STANDING):  ampicillin/sulbactam  IVPB 3 Gram(s) IV Intermittent every 12 hours  ampicillin/sulbactam  IVPB      apixaban 2.5 milliGRAM(s) Oral two times a day  atorvastatin 10 milliGRAM(s) Oral at bedtime  calcitriol   Capsule 0.5 MICROGram(s) Oral daily  calcium carbonate    500 mG (Tums) Chewable 1 Tablet(s) Chew three times a day  finasteride 5 milliGRAM(s) Oral daily  insulin glargine Injectable (LANTUS) 20 Unit(s) SubCutaneous at bedtime  insulin lispro (ADMELOG) corrective regimen sliding scale   SubCutaneous three times a day before meals  insulin lispro (ADMELOG) corrective regimen sliding scale   SubCutaneous at bedtime  insulin lispro Injectable (ADMELOG) 4 Unit(s) SubCutaneous three times a day before meals  labetalol 200 milliGRAM(s) Oral every 12 hours  magnesium oxide 400 milliGRAM(s) Oral daily  magnesium sulfate  IVPB 2 Gram(s) IV Intermittent once  melatonin 5 milliGRAM(s) Oral at bedtime  mirtazapine 7.5 milliGRAM(s) Oral daily  Nephro-preeti 1 Tablet(s) Oral daily  pantoprazole    Tablet 40 milliGRAM(s) Oral before breakfast  sodium bicarbonate 650 milliGRAM(s) Oral three times a day  sodium chloride 0.9%. 1000 milliLiter(s) (75 mL/Hr) IV Continuous <Continuous>  tamsulosin 0.4 milliGRAM(s) Oral at bedtime    MEDICATIONS  (PRN):                            8.8    9.22  )-----------( 172      ( 29 Dec 2021 08:05 )             25.5     12-29    140  |  114<H>  |  48<H>  ----------------------------<  226<H>  3.7   |  19<L>  |  2.47<H>    Ca    7.3<L>      29 Dec 2021 08:05  Phos  2.4     12-29  Mg     1.4     12-29          Creatinine, Random Urine: 47 mg/dL (12-27 @ 18:09)      REVIEW OF SYSTEMS:  he is awake and walks around but not answering the questions.      VITALS:  T(F): 97.9 (12-29-21 @ 05:28), Max: 98.3 (12-28-21 @ 13:33)  HR: 81 (12-29-21 @ 05:28)  BP: 117/69 (12-29-21 @ 05:28)  RR: 18 (12-29-21 @ 05:28)  SpO2: 97% (12-29-21 @ 05:28)  Wt(kg): --    12-28 @ 07:01  -  12-29 @ 07:00  --------------------------------------------------------  IN: 0 mL / OUT: 201 mL / NET: -201 mL        PHYSICAL EXAM:  Constitutional: well developed, no diaphoresis, no distress.  Neck: No JVD, no carotid bruit, supple, no adenopathy  Respiratory: Good air entrance B/L, no wheezes, rales or rhonchi  Cardiovascular: S1, S2, RRR, no pericardial rub, no murmur  Abdomen: BS+, soft, no tenderness, no bruit  Pelvis: bladder nondistended  Extremities: No cyanosis or clubbing. No peripheral edema.

## 2021-12-29 NOTE — DIETITIAN INITIAL EVALUATION ADULT. - DIET TYPE
DASH/TLC (sodium and cholesterol restricted diet)/soft and bite-sized/consistent carbohydrate renal with evening snacks

## 2021-12-29 NOTE — PROGRESS NOTE ADULT - SUBJECTIVE AND OBJECTIVE BOX
Interval Events:  pt in nad    Allergies    Allergy Status Unknown    Intolerances      Endocrine/Metabolic Medications:  atorvastatin 10 milliGRAM(s) Oral at bedtime  finasteride 5 milliGRAM(s) Oral daily  insulin glargine Injectable (LANTUS) 20 Unit(s) SubCutaneous at bedtime  insulin lispro (ADMELOG) corrective regimen sliding scale   SubCutaneous three times a day before meals  insulin lispro (ADMELOG) corrective regimen sliding scale   SubCutaneous at bedtime  insulin lispro Injectable (ADMELOG) 4 Unit(s) SubCutaneous three times a day before meals      Vital Signs Last 24 Hrs  T(C): 36.6 (29 Dec 2021 05:28), Max: 36.8 (28 Dec 2021 13:33)  T(F): 97.9 (29 Dec 2021 05:28), Max: 98.3 (28 Dec 2021 13:33)  HR: 81 (29 Dec 2021 05:28) (81 - 85)  BP: 117/69 (29 Dec 2021 05:28) (115/63 - 117/69)  BP(mean): --  RR: 18 (29 Dec 2021 05:28) (16 - 18)  SpO2: 97% (29 Dec 2021 05:28) (96% - 100%)      PHYSICAL EXAM  All physical exam findings normal, except those marked:  General:	Alert, active, cooperative, NAD, well hydrated  .		[] Abnormal:  Neck		Normal: supple, no cervical adenopathy, no palpable thyroid  .		[] Abnormal:  Cardiovascular	Normal: regular rate, normal S1, S2, no murmurs  .		[] Abnormal:  Respiratory	Normal: no chest wall deformity, normal respiratory pattern, CTA B/L  .		[] Abnormal:  Abdominal	Normal: soft, ND, NT, bowel sounds present, no masses, no organomegaly  .		[] Abnormal:  		Normal normal genitalia, testes descended, circumcised/uncircumcised  .		Denice stage:			Breast denice:  .		Menstrual history:  .		[] Abnormal:  Extremities	Normal: FROM x4  .		[] Abnormal:  Skin		Normal: intact and not indurated, no rash, no acanthosis nigricans  .		[] Abnormal:  Neurologic	Normal: grossly intact  .		[] Abnormal:    LABS                        8.8    9.22  )-----------( 172      ( 29 Dec 2021 08:05 )             25.5                               140    |  114    |  48                  Calcium: 7.3   / iCa: x      (12-29 @ 08:05)    ----------------------------<  226       Magnesium: 1.4                              3.7     |  19     |  2.47             Phosphorous: 2.4        CAPILLARY BLOOD GLUCOSE      POCT Blood Glucose.: 269 mg/dL (29 Dec 2021 08:47)  POCT Blood Glucose.: 244 mg/dL (29 Dec 2021 07:40)  POCT Blood Glucose.: 189 mg/dL (28 Dec 2021 21:16)  POCT Blood Glucose.: 153 mg/dL (28 Dec 2021 16:41)  POCT Blood Glucose.: 230 mg/dL (28 Dec 2021 11:23)        Assesment/plan        Pt is a 81 yr old male with hx of Chronic DVT, Anemia, GERD, HTN, HLD, DM, Hypoparathyroidism, Glaucoma and BPH presents to ed for generalized weakness and family called ems after unable to reach pt at home. )  Found to have manuel/ uti and uncont dm. Pt takes metformin and amaryl as out pt. Does not check fsg and denies hypoglycemic sx. Now hyperglycemic.     Problem/Recommendation - 1:  ·  Problem: Diabetes mellitus.   ·  Recommendation: uncontrolled with hyperglycemia due to acute illness  a1c- 7.5% on metformin/amaryl as out pt  remains hyperglycemic  on lantus 10 units and prandin  ac tid  d/c prandin  cont lantus 20 units  cont admelog 4 ac tid   fsg ac and hs.  d/w prim team      Problem/Recommendation - 2:  ·  Problem: Acute UTI.   ·  Recommendation: cont iv abx  per ID.     Problem/Recommendation - 3:  ·  Problem: MANUEL (acute kidney injury).   ·  Recommendation: acute on ckd- (from obstructive uropathy)  f/u nephro recs  pt cannot be on metformin and amaryl.  urology eval noted

## 2021-12-29 NOTE — DIETITIAN INITIAL EVALUATION ADULT. - PROBLEM SELECTOR PLAN 1
Pt with generalized weakness, WBC 11  UA +  f/u urine cultures  s/p IV fluids and Rocephin in ED  Will continue Rocephin  Urology to be consulted in am due to recent urologic procedure

## 2021-12-29 NOTE — DIETITIAN INITIAL EVALUATION ADULT. - OTHER INFO
Patient from home Patient from home lives alone. Visited pt. alert, reports alert but confused, poor historian, observed lunch tray at bedside, consumed ~45% with feeding assistance, d/w PCA, at times pt. "resistant", needs encouragement & can benefit "soft consistency" meals noted, has no partial dentures while in hospital, encourage po intake, awaiting placement? skin intact, no edema.

## 2021-12-29 NOTE — PROGRESS NOTE ADULT - SUBJECTIVE AND OBJECTIVE BOX
Patient is seen and examined at the bed side, is afebrile. The kidney function is improving slowly. He is tolerating Unasyn okay.      REVIEW OF SYSTEMS: All other review systems are negative      ALLERGY: Allergy Status Unknown      Vital Signs Last 24 Hrs  T(C): 36.1 (29 Dec 2021 12:42), Max: 36.6 (29 Dec 2021 05:28)  T(F): 97 (29 Dec 2021 12:42), Max: 97.9 (29 Dec 2021 05:28)  HR: 80 (29 Dec 2021 12:42) (80 - 85)  BP: 128/55 (29 Dec 2021 12:42) (117/61 - 128/55)  BP(mean): --  RR: 18 (29 Dec 2021 12:42) (18 - 18)  SpO2: 97% (29 Dec 2021 12:42) (97% - 100%)      PHYSICAL EXAM:  GENERAL: Not in distress   CHEST/LUNG: Not using accessory muscles  HEART: s1 and s2 present  ABDOMEN:  Nontender and  Nondistended  EXTREMITIES: No pedal  edema  CNS: Awake and Alert      LABS:                        8.8    9.22  )-----------( 172      ( 29 Dec 2021 08:05 )             25.5                           9.9    10.16 )-----------( 158      ( 28 Dec 2021 07:59 )             29.4       12-29    140  |  114<H>  |  48<H>  ----------------------------<  226<H>  3.7   |  19<L>  |  2.47<H>    Ca    7.3<L>      29 Dec 2021 08:05  Phos  2.4     12-29  Mg     1.4     12-29      12-28    141  |  114<H>  |  67<H>  ----------------------------<  217<H>  3.5   |  16<L>  |  2.67<H>    Ca    6.8<L>      28 Dec 2021 07:59  Phos  2.1     12-28  Mg     1.8     12-28        CAPILLARY BLOOD GLUCOSE  POCT Blood Glucose.: 230 mg/dL (28 Dec 2021 11:23)  POCT Blood Glucose.: 214 mg/dL (28 Dec 2021 07:51)  POCT Blood Glucose.: 205 mg/dL (27 Dec 2021 21:16)  POCT Blood Glucose.: 195 mg/dL (27 Dec 2021 16:18)        MEDICATIONS  (STANDING):    ampicillin/sulbactam  IVPB 3 Gram(s) IV Intermittent every 12 hours  ampicillin/sulbactam  IVPB      apixaban 2.5 milliGRAM(s) Oral two times a day  atorvastatin 10 milliGRAM(s) Oral at bedtime  calcitriol   Capsule 0.5 MICROGram(s) Oral daily  calcium carbonate    500 mG (Tums) Chewable 1 Tablet(s) Chew three times a day  famotidine    Tablet 20 milliGRAM(s) Oral daily  finasteride 5 milliGRAM(s) Oral daily  insulin glargine Injectable (LANTUS) 20 Unit(s) SubCutaneous at bedtime  insulin lispro (ADMELOG) corrective regimen sliding scale   SubCutaneous three times a day before meals  insulin lispro (ADMELOG) corrective regimen sliding scale   SubCutaneous at bedtime  insulin lispro Injectable (ADMELOG) 4 Unit(s) SubCutaneous three times a day before meals  labetalol 200 milliGRAM(s) Oral every 12 hours  melatonin 5 milliGRAM(s) Oral at bedtime  mirtazapine 7.5 milliGRAM(s) Oral daily  Nephro-preeti 1 Tablet(s) Oral daily  sodium bicarbonate 650 milliGRAM(s) Oral three times a day  sodium chloride 0.9%. 1000 milliLiter(s) (75 mL/Hr) IV Continuous <Continuous>  tamsulosin 0.4 milliGRAM(s) Oral at bedtime      RADIOLOGY & ADDITIONAL TESTS:    12/27/21 : US Renal (12.27.21 @ 10:33) Right kidney: 10.2 cm. Mild to moderate right-sided hydronephrosis.  Left kidney: 12.0 cm. Moderate left-sided hydronephrosis.  Urinary bladder: Not well distended, limiting assessment. An element of bladder wall thickening is suggested.  The IVC and abdominal aorta are not well-visualized.      12/26/21: CT Abdomen and Pelvis No Cont (12.26.21 @ 11:55) Bilateral ureteral stents. Moderate left hydroureteronephrosis and mild   to moderate right hydroureteronephrosis with dilatation of both ureters  to the level of the urinary bladder.    Enlarged prostate gland. Markedly trabeculated urinary bladder wall.      MICROBIOLOGY DATA:    Culture - Urine (12.24.21 @ 04:08)   - Ampicillin: S <=2 Predicts results to ampicillin/sulbactam, amoxacillin-clavulanate and piperacillin-tazobactam.   - Ciprofloxacin: S <=1   - Levofloxacin: S <=1   - Nitrofurantoin: S <=32 Should not be used to treat pyelonephritis.   - Tetra/Doxy: R >8   - Vancomycin: S 4   Specimen Source: Clean Catch Clean Catch (Midstream)   Culture Results:   >100,000 CFU/ml Enterococcus faecalis   Organism Identification: Enterococcus faecalis   Organism: Enterococcus faecalis COVID-19 PCR . (12.23.21 @ 20:32)   COVID-19 PCR: NotDetec:

## 2021-12-29 NOTE — PROGRESS NOTE ADULT - PROBLEM SELECTOR PLAN 3
Pt with Larry on CKD, baseline 2.5  likely 2/2 obstructive uropathy  BUN/CR trended down to baseline  Nephro Dr. Orellana:  CT pelvis b/l hydronephrosis  Uro consulted w/recc to start flomax, Proscar, and pt's outpatient urologist has plan to change stents every 3 months  RESOLVED

## 2021-12-29 NOTE — PROGRESS NOTE ADULT - SUBJECTIVE AND OBJECTIVE BOX
CHIEF COMPLAINT:Patient is a 81y old  Male who presents with a chief complaint of UTI.Pt appears comfortable.    	  REVIEW OF SYSTEMS:  CONSTITUTIONAL: No fever, weight loss, or fatigue  EYES: No eye pain, visual disturbances, or discharge  ENT:  No difficulty hearing, tinnitus, vertigo; No sinus or throat pain  NECK: No pain or stiffness  RESPIRATORY: No cough, wheezing, chills or hemoptysis; No Shortness of Breath  CARDIOVASCULAR: No chest pain, palpitations, passing out, dizziness, or leg swelling  GASTROINTESTINAL: No abdominal or epigastric pain. No nausea, vomiting, or hematemesis; No diarrhea or constipation. No melena or hematochezia.  GENITOURINARY: No dysuria, frequency, hematuria, or incontinence  NEUROLOGICAL: No headaches, memory loss, loss of strength, numbness, or tremors  SKIN: No itching, burning, rashes, or lesions   LYMPH Nodes: No enlarged glands  ENDOCRINE: No heat or cold intolerance; No hair loss  MUSCULOSKELETAL: No joint pain or swelling; No muscle, back, or extremity pain  PSYCHIATRIC: No depression, anxiety, mood swings, or difficulty sleeping  HEME/LYMPH: No easy bruising, or bleeding gums  ALLERGY AND IMMUNOLOGIC: No hives or eczema	      PHYSICAL EXAM:  T(C): 36.6 (12-29-21 @ 05:28), Max: 36.8 (12-28-21 @ 13:33)  HR: 81 (12-29-21 @ 05:28) (81 - 85)  BP: 117/69 (12-29-21 @ 05:28) (115/63 - 117/69)  RR: 18 (12-29-21 @ 05:28) (16 - 18)  SpO2: 97% (12-29-21 @ 05:28) (96% - 100%)  Wt(kg): --  I&O's Summary    28 Dec 2021 07:01  -  29 Dec 2021 07:00  --------------------------------------------------------  IN: 0 mL / OUT: 201 mL / NET: -201 mL        Appearance: Normal	  HEENT:   Normal oral mucosa, PERRL, EOMI	  Lymphatic: No lymphadenopathy  Cardiovascular: Normal S1 S2, No JVD, No murmurs, No edema  Respiratory: Lungs clear to auscultation	  Psychiatry: A & O x 3, Mood & affect appropriate  Gastrointestinal:  Soft, Non-tender, + BS	  Skin: No rashes, No ecchymoses, No cyanosis	  Neurologic: Non-focal  Extremities: Normal range of motion, No clubbing, cyanosis or edema  Vascular: Peripheral pulses palpable 2+ bilaterally    MEDICATIONS  (STANDING):  ampicillin/sulbactam  IVPB 3 Gram(s) IV Intermittent every 12 hours  ampicillin/sulbactam  IVPB      apixaban 2.5 milliGRAM(s) Oral two times a day  atorvastatin 10 milliGRAM(s) Oral at bedtime  calcitriol   Capsule 0.5 MICROGram(s) Oral daily  calcium carbonate    500 mG (Tums) Chewable 1 Tablet(s) Chew three times a day  famotidine    Tablet 20 milliGRAM(s) Oral daily  finasteride 5 milliGRAM(s) Oral daily  insulin glargine Injectable (LANTUS) 20 Unit(s) SubCutaneous at bedtime  insulin lispro (ADMELOG) corrective regimen sliding scale   SubCutaneous three times a day before meals  insulin lispro (ADMELOG) corrective regimen sliding scale   SubCutaneous at bedtime  insulin lispro Injectable (ADMELOG) 4 Unit(s) SubCutaneous three times a day before meals  labetalol 200 milliGRAM(s) Oral every 12 hours  magnesium sulfate  IVPB 2 Gram(s) IV Intermittent once  melatonin 5 milliGRAM(s) Oral at bedtime  mirtazapine 7.5 milliGRAM(s) Oral daily  Nephro-preeti 1 Tablet(s) Oral daily  sodium bicarbonate 650 milliGRAM(s) Oral three times a day  sodium chloride 0.9%. 1000 milliLiter(s) (75 mL/Hr) IV Continuous <Continuous>  tamsulosin 0.4 milliGRAM(s) Oral at bedtime        LABS:	 	                         8.8    9.22  )-----------( 172      ( 29 Dec 2021 08:05 )             25.5     12-29    140  |  114<H>  |  48<H>  ----------------------------<  226<H>  3.7   |  19<L>  |  2.47<H>    Ca    7.3<L>      29 Dec 2021 08:05  Phos  2.4     12-29  Mg     1.4     12-29      proBNP:   Lipid Profile: Cholesterol 145  LDL --  HDL 20    Ldl calc 77  Ratio --      TSH: Thyroid Stimulating Hormone, Serum: 1.38 uU/mL (12-23 @ 20:32)      	    culCulture - Urine (12.24.21 @ 04:08)   - Ampicillin: S <=2 Predicts results to ampicillin/sulbactam, amoxacillin-clavulanate and piperacillin-tazobactam.   - Ciprofloxacin: S <=1   - Levofloxacin: S <=1   - Nitrofurantoin: S <=32 Should not be used to treat pyelonephritis.   - Tetra/Doxy: R >8   - Vancomycin: S 4   Specimen Source: Clean Catch Clean Catch (Midstream)   Culture Results:   >100,000 CFU/ml Enterococcus faecalis   Organism Identification: Enterococcus faecalis   Organism: Enterococcus faecalis   Method Type: MICHAEL

## 2021-12-29 NOTE — PROGRESS NOTE ADULT - PROBLEM SELECTOR PLAN 1
Pt p/w generalized weakness, WBC   UA +  urine cultures E. Faecalis sensitive to ampicillin  started on ampicillin  Dr. Ayala ID onboard following reccs

## 2021-12-29 NOTE — DIETITIAN INITIAL EVALUATION ADULT. - PROBLEM SELECTOR PLAN 2
Pt with Larry on CKD, baseline 1.5-1.6  f/u urine studies  s/p IV fluids  monitor Creatinine  f/u US kidney,bladder  Nephro Dr. Orellana

## 2021-12-29 NOTE — DIETITIAN INITIAL EVALUATION ADULT. - PERTINENT LABORATORY DATA
12-29 Na140 mmol/L Glu 226 mg/dL<H> K+ 3.7 mmol/L Cr  2.47 mg/dL<H> BUN 48 mg/dL<H>   12-29 Phos 2.4 mg/dL<L>   12-24 Alb 2.1 g/dL<L>     12-24 Chol 145 mg/dL LDL --    HDL 20 mg/dL<L> Trig 238 mg/dL<H>    12-25-21 @ 15:11 HgbA1C 7.5  12-24-21 @ 16:47 HgbA1C 7.4      Vitamin D, 25-Hydroxy: 7.2 ng/mL (12-29-21 @ 11:26)

## 2021-12-29 NOTE — PROGRESS NOTE ADULT - PROBLEM SELECTOR PLAN 2
Glucose 333 yesterday, and above 278 today  low bicarb - pt started on sodium bicarb   normal AG  acetone and b hydroxybutrate WNL  Endo on board  Nephro on board  Lantus increased from 16U to 20U  Admelog 4U

## 2021-12-30 LAB
ANION GAP SERPL CALC-SCNC: 10 MMOL/L — SIGNIFICANT CHANGE UP (ref 5–17)
BUN SERPL-MCNC: 39 MG/DL — HIGH (ref 7–18)
CALCIUM SERPL-MCNC: 7.6 MG/DL — LOW (ref 8.4–10.5)
CHLORIDE SERPL-SCNC: 114 MMOL/L — HIGH (ref 96–108)
CO2 SERPL-SCNC: 18 MMOL/L — LOW (ref 22–31)
CREAT SERPL-MCNC: 2.44 MG/DL — HIGH (ref 0.5–1.3)
GLUCOSE BLDC GLUCOMTR-MCNC: 157 MG/DL — HIGH (ref 70–99)
GLUCOSE BLDC GLUCOMTR-MCNC: 200 MG/DL — HIGH (ref 70–99)
GLUCOSE BLDC GLUCOMTR-MCNC: 223 MG/DL — HIGH (ref 70–99)
GLUCOSE BLDC GLUCOMTR-MCNC: 307 MG/DL — HIGH (ref 70–99)
GLUCOSE SERPL-MCNC: 251 MG/DL — HIGH (ref 70–99)
HCT VFR BLD CALC: 24.2 % — LOW (ref 39–50)
HGB BLD-MCNC: 8.5 G/DL — LOW (ref 13–17)
MAGNESIUM SERPL-MCNC: 1.5 MG/DL — LOW (ref 1.6–2.6)
MCHC RBC-ENTMCNC: 29.4 PG — SIGNIFICANT CHANGE UP (ref 27–34)
MCHC RBC-ENTMCNC: 35.1 GM/DL — SIGNIFICANT CHANGE UP (ref 32–36)
MCV RBC AUTO: 83.7 FL — SIGNIFICANT CHANGE UP (ref 80–100)
NRBC # BLD: 0 /100 WBCS — SIGNIFICANT CHANGE UP (ref 0–0)
PHOSPHATE SERPL-MCNC: 2.7 MG/DL — SIGNIFICANT CHANGE UP (ref 2.5–4.5)
PLATELET # BLD AUTO: 176 K/UL — SIGNIFICANT CHANGE UP (ref 150–400)
POTASSIUM SERPL-MCNC: 3.9 MMOL/L — SIGNIFICANT CHANGE UP (ref 3.5–5.3)
POTASSIUM SERPL-SCNC: 3.9 MMOL/L — SIGNIFICANT CHANGE UP (ref 3.5–5.3)
RBC # BLD: 2.89 M/UL — LOW (ref 4.2–5.8)
RBC # FLD: 14.1 % — SIGNIFICANT CHANGE UP (ref 10.3–14.5)
SODIUM SERPL-SCNC: 142 MMOL/L — SIGNIFICANT CHANGE UP (ref 135–145)
WBC # BLD: 8.54 K/UL — SIGNIFICANT CHANGE UP (ref 3.8–10.5)
WBC # FLD AUTO: 8.54 K/UL — SIGNIFICANT CHANGE UP (ref 3.8–10.5)

## 2021-12-30 RX ORDER — ERGOCALCIFEROL 1.25 MG/1
50000 CAPSULE ORAL
Refills: 0 | Status: DISCONTINUED | OUTPATIENT
Start: 2021-12-30 | End: 2022-01-03

## 2021-12-30 RX ORDER — INSULIN GLARGINE 100 [IU]/ML
24 INJECTION, SOLUTION SUBCUTANEOUS AT BEDTIME
Refills: 0 | Status: DISCONTINUED | OUTPATIENT
Start: 2021-12-30 | End: 2022-01-03

## 2021-12-30 RX ORDER — MAGNESIUM SULFATE 500 MG/ML
2 VIAL (ML) INJECTION ONCE
Refills: 0 | Status: COMPLETED | OUTPATIENT
Start: 2021-12-30 | End: 2021-12-30

## 2021-12-30 RX ADMIN — TAMSULOSIN HYDROCHLORIDE 0.4 MILLIGRAM(S): 0.4 CAPSULE ORAL at 22:04

## 2021-12-30 RX ADMIN — FINASTERIDE 5 MILLIGRAM(S): 5 TABLET, FILM COATED ORAL at 11:55

## 2021-12-30 RX ADMIN — Medication 4: at 11:53

## 2021-12-30 RX ADMIN — ATORVASTATIN CALCIUM 10 MILLIGRAM(S): 80 TABLET, FILM COATED ORAL at 22:10

## 2021-12-30 RX ADMIN — Medication 200 MILLIGRAM(S): at 05:59

## 2021-12-30 RX ADMIN — FAMOTIDINE 20 MILLIGRAM(S): 10 INJECTION INTRAVENOUS at 11:55

## 2021-12-30 RX ADMIN — Medication 4 UNIT(S): at 17:09

## 2021-12-30 RX ADMIN — Medication 650 MILLIGRAM(S): at 05:59

## 2021-12-30 RX ADMIN — APIXABAN 2.5 MILLIGRAM(S): 2.5 TABLET, FILM COATED ORAL at 05:59

## 2021-12-30 RX ADMIN — INSULIN GLARGINE 24 UNIT(S): 100 INJECTION, SOLUTION SUBCUTANEOUS at 22:04

## 2021-12-30 RX ADMIN — Medication 200 MILLIGRAM(S): at 17:08

## 2021-12-30 RX ADMIN — Medication 650 MILLIGRAM(S): at 13:25

## 2021-12-30 RX ADMIN — Medication 1 TABLET(S): at 11:55

## 2021-12-30 RX ADMIN — Medication 1 TABLET(S): at 05:59

## 2021-12-30 RX ADMIN — CALCITRIOL 0.5 MICROGRAM(S): 0.5 CAPSULE ORAL at 11:55

## 2021-12-30 RX ADMIN — SODIUM CHLORIDE 75 MILLILITER(S): 9 INJECTION INTRAMUSCULAR; INTRAVENOUS; SUBCUTANEOUS at 12:01

## 2021-12-30 RX ADMIN — APIXABAN 2.5 MILLIGRAM(S): 2.5 TABLET, FILM COATED ORAL at 17:08

## 2021-12-30 RX ADMIN — Medication 1 TABLET(S): at 13:25

## 2021-12-30 RX ADMIN — AMPICILLIN SODIUM AND SULBACTAM SODIUM 200 GRAM(S): 250; 125 INJECTION, POWDER, FOR SUSPENSION INTRAMUSCULAR; INTRAVENOUS at 17:08

## 2021-12-30 RX ADMIN — Medication 25 GRAM(S): at 11:53

## 2021-12-30 RX ADMIN — AMPICILLIN SODIUM AND SULBACTAM SODIUM 200 GRAM(S): 250; 125 INJECTION, POWDER, FOR SUSPENSION INTRAMUSCULAR; INTRAVENOUS at 05:58

## 2021-12-30 RX ADMIN — Medication 1 TABLET(S): at 22:04

## 2021-12-30 RX ADMIN — Medication 650 MILLIGRAM(S): at 22:04

## 2021-12-30 RX ADMIN — MIRTAZAPINE 7.5 MILLIGRAM(S): 45 TABLET, ORALLY DISINTEGRATING ORAL at 11:55

## 2021-12-30 RX ADMIN — ERGOCALCIFEROL 50000 UNIT(S): 1.25 CAPSULE ORAL at 17:22

## 2021-12-30 RX ADMIN — Medication 5 MILLIGRAM(S): at 22:06

## 2021-12-30 RX ADMIN — Medication 1: at 17:09

## 2021-12-30 RX ADMIN — Medication 4 UNIT(S): at 11:53

## 2021-12-30 NOTE — PROGRESS NOTE ADULT - PROBLEM SELECTOR PLAN 4
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

## 2021-12-30 NOTE — PROGRESS NOTE ADULT - SUBJECTIVE AND OBJECTIVE BOX
Problem List:  CKD obstructive uropathy  UTI    PAST MEDICAL & SURGICAL HISTORY:  Diabetes    Hypertension    Hypercholesterolemia    GERD (gastroesophageal reflux disease)    Anemia    Hypomagnesemia    DVT (deep venous thrombosis)    No significant past surgical history        Allergy Status Unknown      MEDICATIONS  (STANDING):  ampicillin/sulbactam  IVPB 3 Gram(s) IV Intermittent every 12 hours  ampicillin/sulbactam  IVPB      apixaban 2.5 milliGRAM(s) Oral two times a day  atorvastatin 10 milliGRAM(s) Oral at bedtime  calcitriol   Capsule 0.5 MICROGram(s) Oral daily  calcium carbonate    500 mG (Tums) Chewable 1 Tablet(s) Chew three times a day  famotidine    Tablet 20 milliGRAM(s) Oral daily  finasteride 5 milliGRAM(s) Oral daily  insulin glargine Injectable (LANTUS) 24 Unit(s) SubCutaneous at bedtime  insulin lispro (ADMELOG) corrective regimen sliding scale   SubCutaneous three times a day before meals  insulin lispro (ADMELOG) corrective regimen sliding scale   SubCutaneous at bedtime  insulin lispro Injectable (ADMELOG) 4 Unit(s) SubCutaneous three times a day before meals  labetalol 200 milliGRAM(s) Oral every 12 hours  melatonin 5 milliGRAM(s) Oral at bedtime  mirtazapine 7.5 milliGRAM(s) Oral daily  Nephro-preeti 1 Tablet(s) Oral daily  sodium bicarbonate 650 milliGRAM(s) Oral three times a day  sodium chloride 0.9%. 1000 milliLiter(s) (75 mL/Hr) IV Continuous <Continuous>  tamsulosin 0.4 milliGRAM(s) Oral at bedtime    MEDICATIONS  (PRN):                            8.5    8.54  )-----------( 176      ( 30 Dec 2021 10:04 )             24.2     12-30    142  |  114<H>  |  39<H>  ----------------------------<  251<H>  3.9   |  18<L>  |  2.44<H>    Ca    7.6<L>      30 Dec 2021 10:04  Phos  2.7     12-30  Mg     1.5     12-30    egfr 27 ML/MIN      Creatinine, Random Urine: 47 mg/dL (12-27 @ 18:09)      REVIEW OF SYSTEMS:  CONFUSED        VITALS:  T(F): 97.6 (12-30-21 @ 12:40), Max: 98 (12-29-21 @ 20:15)  HR: 77 (12-30-21 @ 12:40)  BP: 126/62 (12-30-21 @ 12:40)  RR: 17 (12-30-21 @ 12:40)  SpO2: 98% (12-30-21 @ 12:40)  Wt(kg): --    12-30 @ 07:01  -  12-30 @ 16:23  --------------------------------------------------------  IN: 0 mL / OUT: 1 mL / NET: -1 mL        PHYSICAL EXAM:  Constitutional: well developed, no diaphoresis, no distress.  Neck: No JVD, no carotid bruit, supple, no adenopathy  Respiratory: Good air entrance B/L, no wheezes, rales or rhonchi  Cardiovascular: S1, S2, RRR, no pericardial rub, no murmur  Abdomen: BS+, soft, no tenderness, no bruit  Pelvis: bladder nondistended  Extremities: No cyanosis or clubbing. No peripheral edema.

## 2021-12-30 NOTE — PROGRESS NOTE ADULT - SUBJECTIVE AND OBJECTIVE BOX
PGY-1 Progress Note discussed with attending    PAGER #: [[phone]] TILL 5:00 PM  PLEASE CONTACT ON CALL TEAM:  - On Call Team (Please refer to Caleb) FROM 5:00 PM - 8:30PM  - Nightfloat Team FROM 8:30 -7:30 AM      INTERVAL HPI/OVERNIGHT EVENTS:   - Pt resting comfortably at bedside, irritable today. Does not want to participate in ROS.     REVIEW OF SYSTEMS:  as stated in hpi    MEDICATIONS  (STANDING):  ampicillin/sulbactam  IVPB 3 Gram(s) IV Intermittent every 12 hours  ampicillin/sulbactam  IVPB      apixaban 2.5 milliGRAM(s) Oral two times a day  atorvastatin 10 milliGRAM(s) Oral at bedtime  calcitriol   Capsule 0.5 MICROGram(s) Oral daily  calcium carbonate    500 mG (Tums) Chewable 1 Tablet(s) Chew three times a day  ergocalciferol 89462 Unit(s) Oral <User Schedule>  famotidine    Tablet 20 milliGRAM(s) Oral daily  finasteride 5 milliGRAM(s) Oral daily  insulin glargine Injectable (LANTUS) 24 Unit(s) SubCutaneous at bedtime  insulin lispro (ADMELOG) corrective regimen sliding scale   SubCutaneous three times a day before meals  insulin lispro (ADMELOG) corrective regimen sliding scale   SubCutaneous at bedtime  insulin lispro Injectable (ADMELOG) 4 Unit(s) SubCutaneous three times a day before meals  labetalol 200 milliGRAM(s) Oral every 12 hours  melatonin 5 milliGRAM(s) Oral at bedtime  mirtazapine 7.5 milliGRAM(s) Oral daily  Nephro-preeti 1 Tablet(s) Oral daily  sodium bicarbonate 650 milliGRAM(s) Oral three times a day  sodium chloride 0.9%. 1000 milliLiter(s) (75 mL/Hr) IV Continuous <Continuous>  tamsulosin 0.4 milliGRAM(s) Oral at bedtime    MEDICATIONS  (PRN):      Vital Signs Last 24 Hrs  T(C): 36.4 (30 Dec 2021 12:40), Max: 36.7 (29 Dec 2021 20:15)  T(F): 97.6 (30 Dec 2021 12:40), Max: 98 (29 Dec 2021 20:15)  HR: 79 (30 Dec 2021 17:07) (77 - 88)  BP: 151/91 (30 Dec 2021 17:07) (124/76 - 151/91)  BP(mean): --  RR: 17 (30 Dec 2021 12:40) (16 - 18)  SpO2: 98% (30 Dec 2021 12:40) (97% - 98%)    PHYSICAL EXAMINATION:  GENERAL: NAD, AAOx3  HEAD: AT/NC  EYES: conjunctiva and sclera clear  NECK: supple, No JVD noted, Normal thyroid  CHEST/LUNG: CTABL; no rales, rhonchi, wheezing, or rubs  HEART: regular rate and rhythm; no murmurs, rubs, or gallops  ABDOMEN: soft, nontender, nondistended; Bowel sounds present  EXTREMITIES:  2+ Peripheral Pulses, No clubbing, cyanosis, or edema  SKIN: warm dry                          8.5    8.54  )-----------( 176      ( 30 Dec 2021 10:04 )             24.2     12-30    142  |  114<H>  |  39<H>  ----------------------------<  251<H>  3.9   |  18<L>  |  2.44<H>    Ca    7.6<L>      30 Dec 2021 10:04  Phos  2.7     12-30  Mg     1.5     12-30              COVID-19 PCR: NotDetec (29 Dec 2021 13:05)  COVID-19 PCR: NotDetec (23 Dec 2021 20:32)      CAPILLARY BLOOD GLUCOSE      POCT Blood Glucose.: 157 mg/dL (30 Dec 2021 16:29)  POCT Blood Glucose.: 307 mg/dL (30 Dec 2021 11:19)  POCT Blood Glucose.: 223 mg/dL (30 Dec 2021 07:34)  POCT Blood Glucose.: 233 mg/dL (29 Dec 2021 22:20)      RADIOLOGY & ADDITIONAL TESTS:

## 2021-12-30 NOTE — PROGRESS NOTE ADULT - SUBJECTIVE AND OBJECTIVE BOX
CARDIOLOGY/MEDICAL ATTENDING    CHIEF COMPLAINT:Patient is a 81y old  Male who presents with a chief complaint of UTI.Pt appears comfortable    	  REVIEW OF SYSTEMS:  CONSTITUTIONAL: No fever, weight loss, or fatigue  EYES: No eye pain, visual disturbances, or discharge  ENT:  No difficulty hearing, tinnitus, vertigo; No sinus or throat pain  NECK: No pain or stiffness  RESPIRATORY: No cough, wheezing, chills or hemoptysis; No Shortness of Breath  CARDIOVASCULAR: No chest pain, palpitations, passing out, dizziness, or leg swelling  GASTROINTESTINAL: No abdominal or epigastric pain. No nausea, vomiting, or hematemesis; No diarrhea or constipation. No melena or hematochezia.  GENITOURINARY: No dysuria, frequency, hematuria, or incontinence  NEUROLOGICAL: No headaches, memory loss, loss of strength, numbness, or tremors  SKIN: No itching, burning, rashes, or lesions   LYMPH Nodes: No enlarged glands  ENDOCRINE: No heat or cold intolerance; No hair loss  MUSCULOSKELETAL: No joint pain or swelling; No muscle, back, or extremity pain  PSYCHIATRIC: No depression, anxiety, mood swings, or difficulty sleeping  HEME/LYMPH: No easy bruising, or bleeding gums  ALLERGY AND IMMUNOLOGIC: No hives or eczema	        PHYSICAL EXAM:  T(C): 36.3 (12-30-21 @ 05:45), Max: 36.7 (12-29-21 @ 20:15)  HR: 84 (12-30-21 @ 05:45) (80 - 88)  BP: 126/61 (12-30-21 @ 05:45) (124/76 - 128/55)  RR: 16 (12-30-21 @ 05:45) (16 - 18)  SpO2: 97% (12-30-21 @ 05:45) (97% - 98%)  Wt(kg): --  I&O's Summary      Appearance: Normal	  HEENT:   Normal oral mucosa, PERRL, EOMI	  Lymphatic: No lymphadenopathy  Cardiovascular: Normal S1 S2, No JVD, No murmurs, No edema  Respiratory: Lungs clear to auscultation	  Psychiatry: A & O x 3, Mood & affect appropriate  Gastrointestinal:  Soft, Non-tender, + BS	  Skin: No rashes, No ecchymoses, No cyanosis	  Neurologic: Non-focal  Extremities: Normal range of motion, No clubbing, cyanosis or edema  Vascular: Peripheral pulses palpable 2+ bilaterally    MEDICATIONS  (STANDING):  ampicillin/sulbactam  IVPB 3 Gram(s) IV Intermittent every 12 hours  ampicillin/sulbactam  IVPB      apixaban 2.5 milliGRAM(s) Oral two times a day  atorvastatin 10 milliGRAM(s) Oral at bedtime  calcitriol   Capsule 0.5 MICROGram(s) Oral daily  calcium carbonate    500 mG (Tums) Chewable 1 Tablet(s) Chew three times a day  famotidine    Tablet 20 milliGRAM(s) Oral daily  finasteride 5 milliGRAM(s) Oral daily  insulin glargine Injectable (LANTUS) 24 Unit(s) SubCutaneous at bedtime  insulin lispro (ADMELOG) corrective regimen sliding scale   SubCutaneous three times a day before meals  insulin lispro (ADMELOG) corrective regimen sliding scale   SubCutaneous at bedtime  insulin lispro Injectable (ADMELOG) 4 Unit(s) SubCutaneous three times a day before meals  labetalol 200 milliGRAM(s) Oral every 12 hours  magnesium sulfate  IVPB 2 Gram(s) IV Intermittent once  melatonin 5 milliGRAM(s) Oral at bedtime  mirtazapine 7.5 milliGRAM(s) Oral daily  Nephro-preeti 1 Tablet(s) Oral daily  sodium bicarbonate 650 milliGRAM(s) Oral three times a day  sodium chloride 0.9%. 1000 milliLiter(s) (75 mL/Hr) IV Continuous <Continuous>  tamsulosin 0.4 milliGRAM(s) Oral at bedtime      	  	  LABS:	 	                       8.5    8.54  )-----------( 176      ( 30 Dec 2021 10:04 )             24.2     12-30    142  |  114<H>  |  39<H>  ----------------------------<  251<H>  3.9   |  18<L>  |  2.44<H>    Ca    7.6<L>      30 Dec 2021 10:04  Phos  2.7     12-30  Mg     1.5     12-30      proBNP:   Lipid Profile: Cholesterol 145  LDL --  HDL 20    Ldl calc 77  Ratio --    HgA1c:   TSH: Thyroid Stimulating Hormone, Serum: 1.38 uU/mL (12-23 @ 20:32)

## 2021-12-30 NOTE — PROGRESS NOTE ADULT - PROBLEM SELECTOR PLAN 1
Pt p/w generalized weakness, WBC   UA +  urine cultures E. Faecalis sensitive to ampicillin  started on ampicillin  Dr. Ayala ID onboard following reccs  d/c on Augmentin 875mg qd until 12/31  Plan for d/c pending placement

## 2021-12-30 NOTE — PROGRESS NOTE ADULT - SUBJECTIVE AND OBJECTIVE BOX
Patient is seen and examined at the bed side, is afebrile. No new events.      REVIEW OF SYSTEMS: All other review systems are negative      ALLERGY: Allergy Status Unknown      Vital Signs Last 24 Hrs  T(C): 36.4 (30 Dec 2021 12:40), Max: 36.7 (29 Dec 2021 20:15)  T(F): 97.6 (30 Dec 2021 12:40), Max: 98 (29 Dec 2021 20:15)  HR: 79 (30 Dec 2021 17:07) (77 - 88)  BP: 151/91 (30 Dec 2021 17:07) (124/76 - 151/91)  BP(mean): --  RR: 17 (30 Dec 2021 12:40) (16 - 18)  SpO2: 98% (30 Dec 2021 12:40) (97% - 98%)      PHYSICAL EXAM:  GENERAL: Not in distress   CHEST/LUNG: Not using accessory muscles  HEART: s1 and s2 present  ABDOMEN:  Nontender and  Nondistended  EXTREMITIES: No pedal  edema  CNS: Awake and Alert      LABS:                        8.5    8.54  )-----------( 176      ( 30 Dec 2021 10:04 )             24.2                           8.8    9.22  )-----------( 172      ( 29 Dec 2021 08:05 )             25.5       12-30    142  |  114<H>  |  39<H>  ----------------------------<  251<H>  3.9   |  18<L>  |  2.44<H>    Ca    7.6<L>      30 Dec 2021 10:04  Phos  2.7     12-30  Mg     1.5     12-30 12-29    140  |  114<H>  |  48<H>  ----------------------------<  226<H>  3.7   |  19<L>  |  2.47<H>    Ca    7.3<L>      29 Dec 2021 08:05  Phos  2.4     12-29  Mg     1.4     12-29      CAPILLARY BLOOD GLUCOSE  POCT Blood Glucose.: 230 mg/dL (28 Dec 2021 11:23)  POCT Blood Glucose.: 214 mg/dL (28 Dec 2021 07:51)  POCT Blood Glucose.: 205 mg/dL (27 Dec 2021 21:16)  POCT Blood Glucose.: 195 mg/dL (27 Dec 2021 16:18)        MEDICATIONS  (STANDING):    ampicillin/sulbactam  IVPB 3 Gram(s) IV Intermittent every 12 hours  ampicillin/sulbactam  IVPB      apixaban 2.5 milliGRAM(s) Oral two times a day  atorvastatin 10 milliGRAM(s) Oral at bedtime  calcitriol   Capsule 0.5 MICROGram(s) Oral daily  calcium carbonate    500 mG (Tums) Chewable 1 Tablet(s) Chew three times a day  ergocalciferol 01621 Unit(s) Oral <User Schedule>  famotidine    Tablet 20 milliGRAM(s) Oral daily  finasteride 5 milliGRAM(s) Oral daily  insulin glargine Injectable (LANTUS) 24 Unit(s) SubCutaneous at bedtime  insulin lispro (ADMELOG) corrective regimen sliding scale   SubCutaneous three times a day before meals  insulin lispro (ADMELOG) corrective regimen sliding scale   SubCutaneous at bedtime  insulin lispro Injectable (ADMELOG) 4 Unit(s) SubCutaneous three times a day before meals  labetalol 200 milliGRAM(s) Oral every 12 hours  melatonin 5 milliGRAM(s) Oral at bedtime  mirtazapine 7.5 milliGRAM(s) Oral daily  Nephro-preeti 1 Tablet(s) Oral daily  sodium bicarbonate 650 milliGRAM(s) Oral three times a day  sodium chloride 0.9%. 1000 milliLiter(s) (75 mL/Hr) IV Continuous <Continuous>  tamsulosin 0.4 milliGRAM(s) Oral at bedtime    RADIOLOGY & ADDITIONAL TESTS:    12/27/21 : US Renal (12.27.21 @ 10:33) Right kidney: 10.2 cm. Mild to moderate right-sided hydronephrosis.  Left kidney: 12.0 cm. Moderate left-sided hydronephrosis.  Urinary bladder: Not well distended, limiting assessment. An element of bladder wall thickening is suggested.  The IVC and abdominal aorta are not well-visualized.      12/26/21: CT Abdomen and Pelvis No Cont (12.26.21 @ 11:55) Bilateral ureteral stents. Moderate left hydroureteronephrosis and mild   to moderate right hydroureteronephrosis with dilatation of both ureters  to the level of the urinary bladder.    Enlarged prostate gland. Markedly trabeculated urinary bladder wall.      MICROBIOLOGY DATA:    Culture - Urine (12.24.21 @ 04:08)   - Ampicillin: S <=2 Predicts results to ampicillin/sulbactam, amoxacillin-clavulanate and piperacillin-tazobactam.   - Ciprofloxacin: S <=1   - Levofloxacin: S <=1   - Nitrofurantoin: S <=32 Should not be used to treat pyelonephritis.   - Tetra/Doxy: R >8   - Vancomycin: S 4   Specimen Source: Clean Catch Clean Catch (Midstream)   Culture Results:   >100,000 CFU/ml Enterococcus faecalis   Organism Identification: Enterococcus faecalis   Organism: Enterococcus faecalis COVID-19 PCR . (12.23.21 @ 20:32)   COVID-19 PCR: NotDetec:

## 2021-12-30 NOTE — PROGRESS NOTE ADULT - SUBJECTIVE AND OBJECTIVE BOX
Interval Events:  pt in nad    Allergies    Allergy Status Unknown    Intolerances      Endocrine/Metabolic Medications:  atorvastatin 10 milliGRAM(s) Oral at bedtime  finasteride 5 milliGRAM(s) Oral daily  insulin glargine Injectable (LANTUS) 20 Unit(s) SubCutaneous at bedtime  insulin lispro (ADMELOG) corrective regimen sliding scale   SubCutaneous three times a day before meals  insulin lispro (ADMELOG) corrective regimen sliding scale   SubCutaneous at bedtime  insulin lispro Injectable (ADMELOG) 4 Unit(s) SubCutaneous three times a day before meals      Vital Signs Last 24 Hrs  T(C): 36.3 (30 Dec 2021 05:45), Max: 36.7 (29 Dec 2021 20:15)  T(F): 97.4 (30 Dec 2021 05:45), Max: 98 (29 Dec 2021 20:15)  HR: 84 (30 Dec 2021 05:45) (80 - 88)  BP: 126/61 (30 Dec 2021 05:45) (124/76 - 128/55)  BP(mean): --  RR: 16 (30 Dec 2021 05:45) (16 - 18)  SpO2: 97% (30 Dec 2021 05:45) (97% - 98%)      PHYSICAL EXAM  All physical exam findings normal, except those marked:  General:	Alert, active, cooperative, NAD, well hydrated  .		[] Abnormal:  Neck		Normal: supple, no cervical adenopathy, no palpable thyroid  .		[] Abnormal:  Cardiovascular	Normal: regular rate, normal S1, S2, no murmurs  .		[] Abnormal:  Respiratory	Normal: no chest wall deformity, normal respiratory pattern, CTA B/L  .		[] Abnormal:  Abdominal	Normal: soft, ND, NT, bowel sounds present, no masses, no organomegaly  .		[] Abnormal:  		Normal normal genitalia, testes descended, circumcised/uncircumcised  .		Denice stage:			Breast denice:  .		Menstrual history:  .		[] Abnormal:  Extremities	Normal: FROM x4  .		[] Abnormal:  Skin		Normal: intact and not indurated, no rash, no acanthosis nigricans  .		[] Abnormal:  Neurologic	Normal: grossly intact  .		[] Abnormal:    LABS        CAPILLARY BLOOD GLUCOSE      POCT Blood Glucose.: 223 mg/dL (30 Dec 2021 07:34)  POCT Blood Glucose.: 233 mg/dL (29 Dec 2021 22:20)  POCT Blood Glucose.: 201 mg/dL (29 Dec 2021 17:21)  POCT Blood Glucose.: 227 mg/dL (29 Dec 2021 11:17)        Assesment/plan    Pt is a 81 yr old male with hx of Chronic DVT, Anemia, GERD, HTN, HLD, DM, Hypoparathyroidism, Glaucoma and BPH presents to ed for generalized weakness and family called ems after unable to reach pt at home. )  Found to have manuel/ uti and uncont dm. Pt takes metformin and amaryl as out pt. Does not check fsg and denies hypoglycemic sx. Now hyperglycemic.     Problem/Recommendation - 1:  ·  Problem: Diabetes mellitus.   ·  Recommendation: uncontrolled with hyperglycemia due to acute illness  a1c- 7.5% on metformin/amaryl as out pt  remains hyperglycemic  change lantus to 24 units  cont admelog 4 ac tid   fsg ac and hs.  d/w prim team      Problem/Recommendation - 2:  ·  Problem: Acute UTI.   ·  Recommendation: cont iv abx  per ID.     Problem/Recommendation - 3:  ·  Problem: MANUEL (acute kidney injury).   ·  Recommendation: acute on ckd- (from obstructive uropathy)  f/u nephro recs  pt cannot be on metformin and amaryl.  urology eval noted

## 2021-12-31 LAB
ANION GAP SERPL CALC-SCNC: 8 MMOL/L — SIGNIFICANT CHANGE UP (ref 5–17)
BUN SERPL-MCNC: 32 MG/DL — HIGH (ref 7–18)
CALCIUM SERPL-MCNC: 7.6 MG/DL — LOW (ref 8.4–10.5)
CHLORIDE SERPL-SCNC: 113 MMOL/L — HIGH (ref 96–108)
CO2 SERPL-SCNC: 21 MMOL/L — LOW (ref 22–31)
CREAT SERPL-MCNC: 2.24 MG/DL — HIGH (ref 0.5–1.3)
GLUCOSE BLDC GLUCOMTR-MCNC: 141 MG/DL — HIGH (ref 70–99)
GLUCOSE BLDC GLUCOMTR-MCNC: 156 MG/DL — HIGH (ref 70–99)
GLUCOSE BLDC GLUCOMTR-MCNC: 222 MG/DL — HIGH (ref 70–99)
GLUCOSE BLDC GLUCOMTR-MCNC: 250 MG/DL — HIGH (ref 70–99)
GLUCOSE SERPL-MCNC: 142 MG/DL — HIGH (ref 70–99)
HCT VFR BLD CALC: 24 % — LOW (ref 39–50)
HGB BLD-MCNC: 8 G/DL — LOW (ref 13–17)
MAGNESIUM SERPL-MCNC: 1.8 MG/DL — SIGNIFICANT CHANGE UP (ref 1.6–2.6)
MCHC RBC-ENTMCNC: 28.8 PG — SIGNIFICANT CHANGE UP (ref 27–34)
MCHC RBC-ENTMCNC: 33.3 GM/DL — SIGNIFICANT CHANGE UP (ref 32–36)
MCV RBC AUTO: 86.3 FL — SIGNIFICANT CHANGE UP (ref 80–100)
NRBC # BLD: 0 /100 WBCS — SIGNIFICANT CHANGE UP (ref 0–0)
PHOSPHATE SERPL-MCNC: 2.7 MG/DL — SIGNIFICANT CHANGE UP (ref 2.5–4.5)
PLATELET # BLD AUTO: 216 K/UL — SIGNIFICANT CHANGE UP (ref 150–400)
POTASSIUM SERPL-MCNC: 3.6 MMOL/L — SIGNIFICANT CHANGE UP (ref 3.5–5.3)
POTASSIUM SERPL-SCNC: 3.6 MMOL/L — SIGNIFICANT CHANGE UP (ref 3.5–5.3)
RBC # BLD: 2.78 M/UL — LOW (ref 4.2–5.8)
RBC # FLD: 14 % — SIGNIFICANT CHANGE UP (ref 10.3–14.5)
SODIUM SERPL-SCNC: 142 MMOL/L — SIGNIFICANT CHANGE UP (ref 135–145)
WBC # BLD: 9.36 K/UL — SIGNIFICANT CHANGE UP (ref 3.8–10.5)
WBC # FLD AUTO: 9.36 K/UL — SIGNIFICANT CHANGE UP (ref 3.8–10.5)

## 2021-12-31 RX ADMIN — Medication 1 TABLET(S): at 21:34

## 2021-12-31 RX ADMIN — FINASTERIDE 5 MILLIGRAM(S): 5 TABLET, FILM COATED ORAL at 12:04

## 2021-12-31 RX ADMIN — Medication 650 MILLIGRAM(S): at 13:24

## 2021-12-31 RX ADMIN — Medication 1 TABLET(S): at 12:05

## 2021-12-31 RX ADMIN — Medication 1: at 08:32

## 2021-12-31 RX ADMIN — Medication 2: at 12:10

## 2021-12-31 RX ADMIN — Medication 1 TABLET(S): at 06:31

## 2021-12-31 RX ADMIN — INSULIN GLARGINE 24 UNIT(S): 100 INJECTION, SOLUTION SUBCUTANEOUS at 21:34

## 2021-12-31 RX ADMIN — Medication 1 TABLET(S): at 13:24

## 2021-12-31 RX ADMIN — TAMSULOSIN HYDROCHLORIDE 0.4 MILLIGRAM(S): 0.4 CAPSULE ORAL at 21:34

## 2021-12-31 RX ADMIN — Medication 650 MILLIGRAM(S): at 06:32

## 2021-12-31 RX ADMIN — CALCITRIOL 0.5 MICROGRAM(S): 0.5 CAPSULE ORAL at 12:04

## 2021-12-31 RX ADMIN — MIRTAZAPINE 7.5 MILLIGRAM(S): 45 TABLET, ORALLY DISINTEGRATING ORAL at 12:04

## 2021-12-31 RX ADMIN — AMPICILLIN SODIUM AND SULBACTAM SODIUM 200 GRAM(S): 250; 125 INJECTION, POWDER, FOR SUSPENSION INTRAMUSCULAR; INTRAVENOUS at 06:31

## 2021-12-31 RX ADMIN — Medication 650 MILLIGRAM(S): at 21:34

## 2021-12-31 RX ADMIN — ATORVASTATIN CALCIUM 10 MILLIGRAM(S): 80 TABLET, FILM COATED ORAL at 21:34

## 2021-12-31 RX ADMIN — FAMOTIDINE 20 MILLIGRAM(S): 10 INJECTION INTRAVENOUS at 12:04

## 2021-12-31 RX ADMIN — Medication 200 MILLIGRAM(S): at 06:32

## 2021-12-31 RX ADMIN — Medication 5 MILLIGRAM(S): at 21:34

## 2021-12-31 RX ADMIN — Medication 4 UNIT(S): at 08:32

## 2021-12-31 RX ADMIN — Medication 4 UNIT(S): at 12:03

## 2021-12-31 RX ADMIN — APIXABAN 2.5 MILLIGRAM(S): 2.5 TABLET, FILM COATED ORAL at 06:31

## 2021-12-31 NOTE — PROGRESS NOTE ADULT - PROBLEM SELECTOR PLAN 1
Pt p/w generalized weakness, WBC   UA +  urine cultures E. Faecalis sensitive to ampicillin  c/w ampicillin  Dr. Ayala ID onboard following reccs  Plan for d/c pending placement

## 2021-12-31 NOTE — PROGRESS NOTE ADULT - SUBJECTIVE AND OBJECTIVE BOX
Patient is seen and examined at the bed side, is afebrile. He is tolerating Unasyn well.      REVIEW OF SYSTEMS: All other review systems are negative      ALLERGY: Allergy Status Unknown      Vital Signs Last 24 Hrs  T(C): 36.7 (31 Dec 2021 20:23), Max: 37.1 (31 Dec 2021 14:47)  T(F): 98 (31 Dec 2021 20:23), Max: 98.8 (31 Dec 2021 14:47)  HR: 98 (31 Dec 2021 20:23) (78 - 98)  BP: 168/84 (31 Dec 2021 20:23) (111/90 - 168/84)  BP(mean): --  RR: 18 (31 Dec 2021 20:23) (17 - 18)  SpO2: 100% (31 Dec 2021 20:23) (97% - 100%)      PHYSICAL EXAM:  GENERAL: Not in distress   CHEST/LUNG: Not using accessory muscles  HEART: s1 and s2 present  ABDOMEN:  Nontender and  Nondistended  EXTREMITIES: No pedal  edema  CNS: Awake and Alert      LABS:                        8.0    9.36  )-----------( 216      ( 31 Dec 2021 07:39 )             24.0                           8.5    8.54  )-----------( 176      ( 30 Dec 2021 10:04 )             24.2       12-31    142  |  113<H>  |  32<H>  ----------------------------<  142<H>  3.6   |  21<L>  |  2.24<H>    Ca    7.6<L>      31 Dec 2021 07:39  Phos  2.7     12-31  Mg     1.8     12-31      12-30    142  |  114<H>  |  39<H>  ----------------------------<  251<H>  3.9   |  18<L>  |  2.44<H>    Ca    7.6<L>      30 Dec 2021 10:04  Phos  2.7     12-30  Mg     1.5     12-30      CAPILLARY BLOOD GLUCOSE  POCT Blood Glucose.: 230 mg/dL (28 Dec 2021 11:23)  POCT Blood Glucose.: 214 mg/dL (28 Dec 2021 07:51)  POCT Blood Glucose.: 205 mg/dL (27 Dec 2021 21:16)  POCT Blood Glucose.: 195 mg/dL (27 Dec 2021 16:18)        MEDICATIONS  (STANDING):    ampicillin/sulbactam  IVPB 3 Gram(s) IV Intermittent every 12 hours  ampicillin/sulbactam  IVPB      apixaban 2.5 milliGRAM(s) Oral two times a day  atorvastatin 10 milliGRAM(s) Oral at bedtime  calcitriol   Capsule 0.5 MICROGram(s) Oral daily  calcium carbonate    500 mG (Tums) Chewable 1 Tablet(s) Chew three times a day  ergocalciferol 30788 Unit(s) Oral <User Schedule>  famotidine    Tablet 20 milliGRAM(s) Oral daily  finasteride 5 milliGRAM(s) Oral daily  insulin glargine Injectable (LANTUS) 24 Unit(s) SubCutaneous at bedtime  insulin lispro (ADMELOG) corrective regimen sliding scale   SubCutaneous three times a day before meals  insulin lispro (ADMELOG) corrective regimen sliding scale   SubCutaneous at bedtime  insulin lispro Injectable (ADMELOG) 4 Unit(s) SubCutaneous three times a day before meals  labetalol 200 milliGRAM(s) Oral every 12 hours  melatonin 5 milliGRAM(s) Oral at bedtime  mirtazapine 7.5 milliGRAM(s) Oral daily  Nephro-preeti 1 Tablet(s) Oral daily  sodium bicarbonate 650 milliGRAM(s) Oral three times a day  sodium chloride 0.9%. 1000 milliLiter(s) (75 mL/Hr) IV Continuous <Continuous>  tamsulosin 0.4 milliGRAM(s) Oral at bedtime      RADIOLOGY & ADDITIONAL TESTS:    12/27/21 : US Renal (12.27.21 @ 10:33) Right kidney: 10.2 cm. Mild to moderate right-sided hydronephrosis.  Left kidney: 12.0 cm. Moderate left-sided hydronephrosis. Urinary bladder: Not well distended, limiting assessment. An element of bladder wall thickening is suggested.  The IVC and abdominal aorta are not well-visualized.      12/26/21: CT Abdomen and Pelvis No Cont (12.26.21 @ 11:55) Bilateral ureteral stents. Moderate left hydroureteronephrosis and mild   to moderate right hydroureteronephrosis with dilatation of both ureters  to the level of the urinary bladder.    Enlarged prostate gland. Markedly trabeculated urinary bladder wall.      MICROBIOLOGY DATA:    Culture - Urine (12.24.21 @ 04:08)   - Ampicillin: S <=2 Predicts results to ampicillin/sulbactam, amoxacillin-clavulanate and piperacillin-tazobactam.   - Ciprofloxacin: S <=1   - Levofloxacin: S <=1   - Nitrofurantoin: S <=32 Should not be used to treat pyelonephritis.   - Tetra/Doxy: R >8   - Vancomycin: S 4   Specimen Source: Clean Catch Clean Catch (Midstream)   Culture Results:   >100,000 CFU/ml Enterococcus faecalis   Organism Identification: Enterococcus faecalis   Organism: Enterococcus faecalis COVID-19 PCR . (12.23.21 @ 20:32)   COVID-19 PCR: NotDetec:

## 2021-12-31 NOTE — PROGRESS NOTE ADULT - PROBLEM SELECTOR PLAN 2
Glucose 333 yesterday, and above 278 today  low bicarb - pt started on sodium bicarb   normal AG  acetone and b hydroxybutrate WNL  Endo on board  Nephro on board  Lantus 20U  Admelog 4U

## 2021-12-31 NOTE — PROGRESS NOTE ADULT - SUBJECTIVE AND OBJECTIVE BOX
Interval Events:  pt in nad    Allergies    Allergy Status Unknown    Intolerances      Endocrine/Metabolic Medications:  atorvastatin 10 milliGRAM(s) Oral at bedtime  finasteride 5 milliGRAM(s) Oral daily  insulin glargine Injectable (LANTUS) 24 Unit(s) SubCutaneous at bedtime  insulin lispro (ADMELOG) corrective regimen sliding scale   SubCutaneous three times a day before meals  insulin lispro (ADMELOG) corrective regimen sliding scale   SubCutaneous at bedtime  insulin lispro Injectable (ADMELOG) 4 Unit(s) SubCutaneous three times a day before meals      Vital Signs Last 24 Hrs  T(C): 36.2 (31 Dec 2021 05:57), Max: 36.8 (30 Dec 2021 20:36)  T(F): 97.2 (31 Dec 2021 05:57), Max: 98.3 (30 Dec 2021 20:36)  HR: 93 (31 Dec 2021 05:57) (77 - 93)  BP: 111/90 (31 Dec 2021 05:57) (111/90 - 151/91)  BP(mean): --  RR: 17 (31 Dec 2021 05:57) (16 - 17)  SpO2: 100% (31 Dec 2021 05:57) (98% - 100%)      PHYSICAL EXAM  All physical exam findings normal, except those marked:  General:	Alert, active, cooperative, NAD, well hydrated  .		[] Abnormal:  Neck		Normal: supple, no cervical adenopathy, no palpable thyroid  .		[] Abnormal:  Cardiovascular	Normal: regular rate, normal S1, S2, no murmurs  .		[] Abnormal:  Respiratory	Normal: no chest wall deformity, normal respiratory pattern, CTA B/L  .		[] Abnormal:  Abdominal	Normal: soft, ND, NT, bowel sounds present, no masses, no organomegaly  .		[] Abnormal:  		Normal normal genitalia, testes descended, circumcised/uncircumcised  .		Denice stage:			Breast denice:  .		Menstrual history:  .		[] Abnormal:  Extremities	Normal: FROM x4  .		[] Abnormal:  Skin		Normal: intact and not indurated, no rash, no acanthosis nigricans  .		[] Abnormal:  Neurologic	Normal: grossly intact  .		[] Abnormal:    LABS                        8.0    9.36  )-----------( 216      ( 31 Dec 2021 07:39 )             24.0                               142    |  113    |  32                  Calcium: 7.6   / iCa: x      (12-31 @ 07:39)    ----------------------------<  142       Magnesium: 1.8                              3.6     |  21     |  2.24             Phosphorous: 2.7        CAPILLARY BLOOD GLUCOSE      POCT Blood Glucose.: 222 mg/dL (31 Dec 2021 11:51)  POCT Blood Glucose.: 156 mg/dL (31 Dec 2021 07:48)  POCT Blood Glucose.: 200 mg/dL (30 Dec 2021 22:03)  POCT Blood Glucose.: 157 mg/dL (30 Dec 2021 16:29)        Assesment/plan    Pt is a 81 yr old male with hx of Chronic DVT, Anemia, GERD, HTN, HLD, DM, Hypoparathyroidism, Glaucoma and BPH presents to ed for generalized weakness and family called ems after unable to reach pt at home. )  Found to have manuel/ uti and uncont dm. Pt takes metformin and amaryl as out pt. Does not check fsg and denies hypoglycemic sx. Now hyperglycemic.     Problem/Recommendation - 1:  ·  Problem: Diabetes mellitus.   ·  Recommendation: uncontrolled with hyperglycemia due to acute illness  a1c- 7.5% on metformin/amaryl as out pt  better controlled  cont lantus to 24 units  cont admelog 4 ac tid   fsg ac and hs.  d/w prim team      Problem/Recommendation - 2:  ·  Problem: Acute UTI.   ·  Recommendation: cont iv abx  per ID.     Problem/Recommendation - 3:  ·  Problem: MANUEL (acute kidney injury).   ·  Recommendation: acute on ckd- (from obstructive uropathy)  f/u nephro recs  pt cannot be on metformin and amaryl.  urology eval noted    Hypocalcemia- due to vit D def  low calcium and high pth level  cont vitd 50k qwk  cont calcium supplements   d/w prim team

## 2021-12-31 NOTE — PROGRESS NOTE ADULT - PROBLEM SELECTOR PLAN 4
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

## 2021-12-31 NOTE — PROGRESS NOTE ADULT - SUBJECTIVE AND OBJECTIVE BOX
PGY-1 Progress Note discussed with attending    PAGER #: [[phone]] TILL 5:00 PM  PLEASE CONTACT ON CALL TEAM:  - On Call Team (Please refer to Caleb) FROM 5:00 PM - 8:30PM  - Nightfloat Team FROM 8:30 -7:30 AM    INTERVAL HPI/OVERNIGHT EVENTS:   - Patient lying in bed, denies all symptoms. Irritable and states he would like to continue sleeping.    REVIEW OF SYSTEMS:  as stated in hpi    MEDICATIONS  (STANDING):  ampicillin/sulbactam  IVPB 3 Gram(s) IV Intermittent every 12 hours  ampicillin/sulbactam  IVPB      apixaban 2.5 milliGRAM(s) Oral two times a day  atorvastatin 10 milliGRAM(s) Oral at bedtime  calcitriol   Capsule 0.5 MICROGram(s) Oral daily  calcium carbonate    500 mG (Tums) Chewable 1 Tablet(s) Chew three times a day  ergocalciferol 42651 Unit(s) Oral <User Schedule>  famotidine    Tablet 20 milliGRAM(s) Oral daily  finasteride 5 milliGRAM(s) Oral daily  insulin glargine Injectable (LANTUS) 24 Unit(s) SubCutaneous at bedtime  insulin lispro (ADMELOG) corrective regimen sliding scale   SubCutaneous three times a day before meals  insulin lispro (ADMELOG) corrective regimen sliding scale   SubCutaneous at bedtime  insulin lispro Injectable (ADMELOG) 4 Unit(s) SubCutaneous three times a day before meals  labetalol 200 milliGRAM(s) Oral every 12 hours  melatonin 5 milliGRAM(s) Oral at bedtime  mirtazapine 7.5 milliGRAM(s) Oral daily  Nephro-preeti 1 Tablet(s) Oral daily  sodium bicarbonate 650 milliGRAM(s) Oral three times a day  sodium chloride 0.9%. 1000 milliLiter(s) (75 mL/Hr) IV Continuous <Continuous>  tamsulosin 0.4 milliGRAM(s) Oral at bedtime    MEDICATIONS  (PRN):      Vital Signs Last 24 Hrs  T(C): 36.2 (31 Dec 2021 16:47), Max: 37.1 (31 Dec 2021 14:47)  T(F): 97.2 (31 Dec 2021 16:47), Max: 98.8 (31 Dec 2021 14:47)  HR: 93 (31 Dec 2021 16:47) (78 - 93)  BP: 138/81 (31 Dec 2021 16:47) (111/90 - 138/81)  BP(mean): --  RR: 18 (31 Dec 2021 16:47) (16 - 18)  SpO2: 97% (31 Dec 2021 16:47) (97% - 100%)    PHYSICAL EXAMINATION:  GENERAL: NAD, AAOx3  HEAD: AT/NC  EYES: conjunctiva and sclera clear  NECK: supple, No JVD noted, Normal thyroid  CHEST/LUNG: CTABL; no rales, rhonchi, wheezing, or rubs  HEART: regular rate and rhythm; no murmurs, rubs, or gallops  ABDOMEN: soft, nontender, nondistended; Bowel sounds present  EXTREMITIES:  2+ Peripheral Pulses, No clubbing, cyanosis, or edema  SKIN: warm dry                          8.0    9.36  )-----------( 216      ( 31 Dec 2021 07:39 )             24.0     12-31    142  |  113<H>  |  32<H>  ----------------------------<  142<H>  3.6   |  21<L>  |  2.24<H>    Ca    7.6<L>      31 Dec 2021 07:39  Phos  2.7     12-31  Mg     1.8     12-31              COVID-19 PCR: NotDetec (29 Dec 2021 13:05)  COVID-19 PCR: NotDetec (23 Dec 2021 20:32)      CAPILLARY BLOOD GLUCOSE      POCT Blood Glucose.: 141 mg/dL (31 Dec 2021 16:26)  POCT Blood Glucose.: 222 mg/dL (31 Dec 2021 11:51)  POCT Blood Glucose.: 156 mg/dL (31 Dec 2021 07:48)  POCT Blood Glucose.: 200 mg/dL (30 Dec 2021 22:03)      RADIOLOGY & ADDITIONAL TESTS:

## 2021-12-31 NOTE — PROGRESS NOTE ADULT - SUBJECTIVE AND OBJECTIVE BOX
CARDIOLOGY/MEDICAL ATTENDING    CHIEF COMPLAINT:Patient is a 81y old  Male who presents with a chief complaint of UTI.Pt appears comfortable.    	  REVIEW OF SYSTEMS:  CONSTITUTIONAL: No fever, weight loss, or fatigue  EYES: No eye pain, visual disturbances, or discharge  ENT:  No difficulty hearing, tinnitus, vertigo; No sinus or throat pain  NECK: No pain or stiffness  RESPIRATORY: No cough, wheezing, chills or hemoptysis; No Shortness of Breath  CARDIOVASCULAR: No chest pain, palpitations, passing out, dizziness, or leg swelling  GASTROINTESTINAL: No abdominal or epigastric pain. No nausea, vomiting, or hematemesis; No diarrhea or constipation. No melena or hematochezia.  GENITOURINARY: No dysuria, frequency, hematuria, or incontinence  NEUROLOGICAL: No headaches, memory loss, loss of strength, numbness, or tremors  SKIN: No itching, burning, rashes, or lesions   LYMPH Nodes: No enlarged glands  ENDOCRINE: No heat or cold intolerance; No hair loss  MUSCULOSKELETAL: No joint pain or swelling; No muscle, back, or extremity pain  PSYCHIATRIC: No depression, anxiety, mood swings, or difficulty sleeping  HEME/LYMPH: No easy bruising, or bleeding gums  ALLERGY AND IMMUNOLOGIC: No hives or eczema	        PHYSICAL EXAM:  T(C): 36.2 (12-31-21 @ 05:57), Max: 36.8 (12-30-21 @ 20:36)  HR: 93 (12-31-21 @ 05:57) (79 - 93)  BP: 111/90 (12-31-21 @ 05:57) (111/90 - 151/91)  RR: 17 (12-31-21 @ 05:57) (16 - 17)  SpO2: 100% (12-31-21 @ 05:57) (98% - 100%)  Wt(kg): --  I&O's Summary    30 Dec 2021 07:01  -  31 Dec 2021 07:00  --------------------------------------------------------  IN: 0 mL / OUT: 1 mL / NET: -1 mL        Appearance: Normal	  HEENT:   Normal oral mucosa, PERRL, EOMI	  Lymphatic: No lymphadenopathy  Cardiovascular: Normal S1 S2, No JVD, No murmurs, No edema  Respiratory: Lungs clear to auscultation	  Psychiatry: A & O x 3, Mood & affect appropriate  Gastrointestinal:  Soft, Non-tender, + BS	  Skin: No rashes, No ecchymoses, No cyanosis	  Neurologic: Non-focal  Extremities: Normal range of motion, No clubbing, cyanosis or edema  Vascular: Peripheral pulses palpable 2+ bilaterally    MEDICATIONS  (STANDING):  ampicillin/sulbactam  IVPB 3 Gram(s) IV Intermittent every 12 hours  ampicillin/sulbactam  IVPB      apixaban 2.5 milliGRAM(s) Oral two times a day  atorvastatin 10 milliGRAM(s) Oral at bedtime  calcitriol   Capsule 0.5 MICROGram(s) Oral daily  calcium carbonate    500 mG (Tums) Chewable 1 Tablet(s) Chew three times a day  ergocalciferol 53750 Unit(s) Oral <User Schedule>  famotidine    Tablet 20 milliGRAM(s) Oral daily  finasteride 5 milliGRAM(s) Oral daily  insulin glargine Injectable (LANTUS) 24 Unit(s) SubCutaneous at bedtime  insulin lispro (ADMELOG) corrective regimen sliding scale   SubCutaneous three times a day before meals  insulin lispro (ADMELOG) corrective regimen sliding scale   SubCutaneous at bedtime  insulin lispro Injectable (ADMELOG) 4 Unit(s) SubCutaneous three times a day before meals  labetalol 200 milliGRAM(s) Oral every 12 hours  melatonin 5 milliGRAM(s) Oral at bedtime  mirtazapine 7.5 milliGRAM(s) Oral daily  Nephro-preeti 1 Tablet(s) Oral daily  sodium bicarbonate 650 milliGRAM(s) Oral three times a day  sodium chloride 0.9%. 1000 milliLiter(s) (75 mL/Hr) IV Continuous <Continuous>  tamsulosin 0.4 milliGRAM(s) Oral at bedtime        LABS:	 	                         8.0    9.36  )-----------( 216      ( 31 Dec 2021 07:39 )             24.0     12-31    142  |  113<H>  |  32<H>  ----------------------------<  142<H>  3.6   |  21<L>  |  2.24<H>    Ca    7.6<L>      31 Dec 2021 07:39  Phos  2.7     12-31  Mg     1.8     12-31      proBNP:   Lipid Profile: Cholesterol 145  LDL --  HDL 20    Ldl calc 77  Ratio --    HgA1c:   TSH: Thyroid Stimulating Hormone, Serum: 1.38 uU/mL (12-23 @ 20:32)

## 2022-01-01 LAB
ANION GAP SERPL CALC-SCNC: 7 MMOL/L — SIGNIFICANT CHANGE UP (ref 5–17)
BUN SERPL-MCNC: 27 MG/DL — HIGH (ref 7–18)
CALCIUM SERPL-MCNC: 8 MG/DL — LOW (ref 8.4–10.5)
CHLORIDE SERPL-SCNC: 113 MMOL/L — HIGH (ref 96–108)
CO2 SERPL-SCNC: 22 MMOL/L — SIGNIFICANT CHANGE UP (ref 22–31)
CREAT SERPL-MCNC: 2.08 MG/DL — HIGH (ref 0.5–1.3)
GLUCOSE BLDC GLUCOMTR-MCNC: 106 MG/DL — HIGH (ref 70–99)
GLUCOSE BLDC GLUCOMTR-MCNC: 160 MG/DL — HIGH (ref 70–99)
GLUCOSE BLDC GLUCOMTR-MCNC: 162 MG/DL — HIGH (ref 70–99)
GLUCOSE BLDC GLUCOMTR-MCNC: 184 MG/DL — HIGH (ref 70–99)
GLUCOSE SERPL-MCNC: 117 MG/DL — HIGH (ref 70–99)
HCT VFR BLD CALC: 24.4 % — LOW (ref 39–50)
HGB BLD-MCNC: 8.1 G/DL — LOW (ref 13–17)
MAGNESIUM SERPL-MCNC: 1.4 MG/DL — LOW (ref 1.6–2.6)
MCHC RBC-ENTMCNC: 29.7 PG — SIGNIFICANT CHANGE UP (ref 27–34)
MCHC RBC-ENTMCNC: 33.2 GM/DL — SIGNIFICANT CHANGE UP (ref 32–36)
MCV RBC AUTO: 89.4 FL — SIGNIFICANT CHANGE UP (ref 80–100)
NRBC # BLD: 0 /100 WBCS — SIGNIFICANT CHANGE UP (ref 0–0)
PHOSPHATE SERPL-MCNC: 3.7 MG/DL — SIGNIFICANT CHANGE UP (ref 2.5–4.5)
PLATELET # BLD AUTO: 261 K/UL — SIGNIFICANT CHANGE UP (ref 150–400)
POTASSIUM SERPL-MCNC: 4.2 MMOL/L — SIGNIFICANT CHANGE UP (ref 3.5–5.3)
POTASSIUM SERPL-SCNC: 4.2 MMOL/L — SIGNIFICANT CHANGE UP (ref 3.5–5.3)
RBC # BLD: 2.73 M/UL — LOW (ref 4.2–5.8)
RBC # FLD: 14.2 % — SIGNIFICANT CHANGE UP (ref 10.3–14.5)
SODIUM SERPL-SCNC: 142 MMOL/L — SIGNIFICANT CHANGE UP (ref 135–145)
WBC # BLD: 8.04 K/UL — SIGNIFICANT CHANGE UP (ref 3.8–10.5)
WBC # FLD AUTO: 8.04 K/UL — SIGNIFICANT CHANGE UP (ref 3.8–10.5)

## 2022-01-01 RX ORDER — MAGNESIUM SULFATE 500 MG/ML
2 VIAL (ML) INJECTION ONCE
Refills: 0 | Status: COMPLETED | OUTPATIENT
Start: 2022-01-01 | End: 2022-01-01

## 2022-01-01 RX ADMIN — Medication 1 TABLET(S): at 06:08

## 2022-01-01 RX ADMIN — Medication 650 MILLIGRAM(S): at 06:08

## 2022-01-01 RX ADMIN — Medication 650 MILLIGRAM(S): at 14:22

## 2022-01-01 RX ADMIN — Medication 1 TABLET(S): at 11:45

## 2022-01-01 RX ADMIN — Medication 4 UNIT(S): at 16:43

## 2022-01-01 RX ADMIN — Medication 650 MILLIGRAM(S): at 21:51

## 2022-01-01 RX ADMIN — ATORVASTATIN CALCIUM 10 MILLIGRAM(S): 80 TABLET, FILM COATED ORAL at 21:50

## 2022-01-01 RX ADMIN — FINASTERIDE 5 MILLIGRAM(S): 5 TABLET, FILM COATED ORAL at 11:45

## 2022-01-01 RX ADMIN — Medication 1 TABLET(S): at 21:50

## 2022-01-01 RX ADMIN — Medication 4 UNIT(S): at 07:56

## 2022-01-01 RX ADMIN — TAMSULOSIN HYDROCHLORIDE 0.4 MILLIGRAM(S): 0.4 CAPSULE ORAL at 21:51

## 2022-01-01 RX ADMIN — Medication 200 MILLIGRAM(S): at 17:32

## 2022-01-01 RX ADMIN — Medication 25 GRAM(S): at 16:44

## 2022-01-01 RX ADMIN — Medication 200 MILLIGRAM(S): at 06:08

## 2022-01-01 RX ADMIN — Medication 5 MILLIGRAM(S): at 21:51

## 2022-01-01 RX ADMIN — AMPICILLIN SODIUM AND SULBACTAM SODIUM 200 GRAM(S): 250; 125 INJECTION, POWDER, FOR SUSPENSION INTRAMUSCULAR; INTRAVENOUS at 06:08

## 2022-01-01 RX ADMIN — APIXABAN 2.5 MILLIGRAM(S): 2.5 TABLET, FILM COATED ORAL at 06:08

## 2022-01-01 RX ADMIN — Medication 1: at 16:43

## 2022-01-01 RX ADMIN — APIXABAN 2.5 MILLIGRAM(S): 2.5 TABLET, FILM COATED ORAL at 17:32

## 2022-01-01 RX ADMIN — CALCITRIOL 0.5 MICROGRAM(S): 0.5 CAPSULE ORAL at 11:45

## 2022-01-01 RX ADMIN — FAMOTIDINE 20 MILLIGRAM(S): 10 INJECTION INTRAVENOUS at 11:45

## 2022-01-01 RX ADMIN — Medication 1 TABLET(S): at 14:22

## 2022-01-01 RX ADMIN — Medication 4 UNIT(S): at 11:46

## 2022-01-01 RX ADMIN — Medication 1: at 07:56

## 2022-01-01 RX ADMIN — INSULIN GLARGINE 24 UNIT(S): 100 INJECTION, SOLUTION SUBCUTANEOUS at 21:51

## 2022-01-01 RX ADMIN — MIRTAZAPINE 7.5 MILLIGRAM(S): 45 TABLET, ORALLY DISINTEGRATING ORAL at 11:45

## 2022-01-01 RX ADMIN — AMPICILLIN SODIUM AND SULBACTAM SODIUM 200 GRAM(S): 250; 125 INJECTION, POWDER, FOR SUSPENSION INTRAMUSCULAR; INTRAVENOUS at 17:32

## 2022-01-01 NOTE — PROGRESS NOTE ADULT - PROBLEM SELECTOR PLAN 4
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

## 2022-01-01 NOTE — PROGRESS NOTE ADULT - SUBJECTIVE AND OBJECTIVE BOX
CARDIOLOGY/MEDICAL ATTENDING    CHIEF COMPLAINT:Patient is a 81y old  Male who presents with a chief complaint of UTI .Pt appears comfortable.    	  REVIEW OF SYSTEMS:  CONSTITUTIONAL: No fever, weight loss, or fatigue  EYES: No eye pain, visual disturbances, or discharge  ENT:  No difficulty hearing, tinnitus, vertigo; No sinus or throat pain  NECK: No pain or stiffness  RESPIRATORY: No cough, wheezing, chills or hemoptysis; No Shortness of Breath  CARDIOVASCULAR: No chest pain, palpitations, passing out, dizziness, or leg swelling  GASTROINTESTINAL: No abdominal or epigastric pain. No nausea, vomiting, or hematemesis; No diarrhea or constipation. No melena or hematochezia.  GENITOURINARY: No dysuria, frequency, hematuria, or incontinence  NEUROLOGICAL: No headaches, memory loss, loss of strength, numbness, or tremors  SKIN: No itching, burning, rashes, or lesions   LYMPH Nodes: No enlarged glands  ENDOCRINE: No heat or cold intolerance; No hair loss  MUSCULOSKELETAL: No joint pain or swelling; No muscle, back, or extremity pain  PSYCHIATRIC: No depression, anxiety, mood swings, or difficulty sleeping  HEME/LYMPH: No easy bruising, or bleeding gums  ALLERGY AND IMMUNOLOGIC: No hives or eczema	      PHYSICAL EXAM:  T(C): 36.2 (01-01-22 @ 12:33), Max: 37.1 (12-31-21 @ 14:47)  HR: 73 (01-01-22 @ 12:33) (73 - 98)  BP: 128/72 (01-01-22 @ 12:33) (122/59 - 168/84)  RR: 18 (01-01-22 @ 12:33) (17 - 18)  SpO2: 100% (01-01-22 @ 12:33) (97% - 100%)        Appearance: Normal	  HEENT:   Normal oral mucosa, PERRL, EOMI	  Lymphatic: No lymphadenopathy  Cardiovascular: Normal S1 S2, No JVD, No murmurs, No edema  Respiratory: Lungs clear to auscultation	  Psychiatry: A & O x 3, Mood & affect appropriate  Gastrointestinal:  Soft, Non-tender, + BS	  Skin: No rashes, No ecchymoses, No cyanosis	  Neurologic: Non-focal  Extremities: Normal range of motion, No clubbing, cyanosis or edema  Vascular: Peripheral pulses palpable 2+ bilaterally    MEDICATIONS  (STANDING):  ampicillin/sulbactam  IVPB 3 Gram(s) IV Intermittent every 12 hours  ampicillin/sulbactam  IVPB      apixaban 2.5 milliGRAM(s) Oral two times a day  atorvastatin 10 milliGRAM(s) Oral at bedtime  calcitriol   Capsule 0.5 MICROGram(s) Oral daily  calcium carbonate    500 mG (Tums) Chewable 1 Tablet(s) Chew three times a day  ergocalciferol 29101 Unit(s) Oral <User Schedule>  famotidine    Tablet 20 milliGRAM(s) Oral daily  finasteride 5 milliGRAM(s) Oral daily  insulin glargine Injectable (LANTUS) 24 Unit(s) SubCutaneous at bedtime  insulin lispro (ADMELOG) corrective regimen sliding scale   SubCutaneous three times a day before meals  insulin lispro (ADMELOG) corrective regimen sliding scale   SubCutaneous at bedtime  insulin lispro Injectable (ADMELOG) 4 Unit(s) SubCutaneous three times a day before meals  labetalol 200 milliGRAM(s) Oral every 12 hours  magnesium sulfate  IVPB 2 Gram(s) IV Intermittent once  melatonin 5 milliGRAM(s) Oral at bedtime  mirtazapine 7.5 milliGRAM(s) Oral daily  Nephro-preeti 1 Tablet(s) Oral daily  sodium bicarbonate 650 milliGRAM(s) Oral three times a day  tamsulosin 0.4 milliGRAM(s) Oral at bedtime        	  LABS:	 	                       8.1    8.04  )-----------( 261      ( 01 Jan 2022 11:34 )             24.4     01-01    142  |  113<H>  |  27<H>  ----------------------------<  117<H>  4.2   |  22  |  2.08<H>    Ca    8.0<L>      01 Jan 2022 11:34  Phos  3.7     01-01  Mg     1.4     01-01        Lipid Profile: Cholesterol 145  LDL --  HDL 20    Ldl calc 77  Ratio --      TSH: Thyroid Stimulating Hormone, Serum: 1.38 uU/mL (12-23 @ 20:32)

## 2022-01-01 NOTE — PROGRESS NOTE ADULT - PROBLEM SELECTOR PLAN 2
pt on sodium bicarb   normal AG  acetone and b hydroxybutrate WNL  Endo on board  Nephro on board  Lantus 20U  Admelog 4U  monitor blood glucose

## 2022-01-01 NOTE — PROGRESS NOTE ADULT - SUBJECTIVE AND OBJECTIVE BOX
PGY-1 Progress Note discussed with attending    PAGER #: [[phone]] TILL 5:00 PM  PLEASE CONTACT ON CALL TEAM:  - On Call Team (Please refer to Caleb) FROM 5:00 PM - 8:30PM  - Nightfloat Team FROM 8:30 -7:30 AM    INTERVAL HPI/OVERNIGHT EVENTS:   - Patient is sitting up in chair. Spoke to patient at [facility] about his discharge to rehab. Patient denies all symptoms including chest pain, N/V, C/D.     REVIEW OF SYSTEMS:  as stated in hpi    MEDICATIONS  (STANDING):  ampicillin/sulbactam  IVPB 3 Gram(s) IV Intermittent every 12 hours  ampicillin/sulbactam  IVPB      apixaban 2.5 milliGRAM(s) Oral two times a day  atorvastatin 10 milliGRAM(s) Oral at bedtime  calcitriol   Capsule 0.5 MICROGram(s) Oral daily  calcium carbonate    500 mG (Tums) Chewable 1 Tablet(s) Chew three times a day  ergocalciferol 21408 Unit(s) Oral <User Schedule>  famotidine    Tablet 20 milliGRAM(s) Oral daily  finasteride 5 milliGRAM(s) Oral daily  insulin glargine Injectable (LANTUS) 24 Unit(s) SubCutaneous at bedtime  insulin lispro (ADMELOG) corrective regimen sliding scale   SubCutaneous three times a day before meals  insulin lispro (ADMELOG) corrective regimen sliding scale   SubCutaneous at bedtime  insulin lispro Injectable (ADMELOG) 4 Unit(s) SubCutaneous three times a day before meals  labetalol 200 milliGRAM(s) Oral every 12 hours  melatonin 5 milliGRAM(s) Oral at bedtime  mirtazapine 7.5 milliGRAM(s) Oral daily  Nephro-preeti 1 Tablet(s) Oral daily  sodium bicarbonate 650 milliGRAM(s) Oral three times a day  tamsulosin 0.4 milliGRAM(s) Oral at bedtime    MEDICATIONS  (PRN):      Vital Signs Last 24 Hrs  T(C): 36.9 (01 Jan 2022 20:21), Max: 36.9 (01 Jan 2022 20:21)  T(F): 98.5 (01 Jan 2022 20:21), Max: 98.5 (01 Jan 2022 20:21)  HR: 88 (01 Jan 2022 20:21) (73 - 88)  BP: 139/65 (01 Jan 2022 20:21) (128/72 - 148/62)  BP(mean): --  RR: 18 (01 Jan 2022 20:21) (18 - 18)  SpO2: 100% (01 Jan 2022 20:21) (100% - 100%)    PHYSICAL EXAMINATION:  GENERAL: NAD, AAOx2  HEAD: AT/NC  EYES: conjunctiva and sclera clear  NECK: supple, No JVD noted, Normal thyroid  CHEST/LUNG: CTABL; no rales, rhonchi, wheezing, or rubs  HEART: regular rate and rhythm; no murmurs, rubs, or gallops  ABDOMEN: soft, nontender, nondistended; Bowel sounds present  EXTREMITIES:  2+ Peripheral Pulses, No clubbing, cyanosis, or edema  SKIN: warm dry                          8.1    8.04  )-----------( 261      ( 01 Jan 2022 11:34 )             24.4     01-01    142  |  113<H>  |  27<H>  ----------------------------<  117<H>  4.2   |  22  |  2.08<H>    Ca    8.0<L>      01 Jan 2022 11:34  Phos  3.7     01-01  Mg     1.4     01-01              COVID-19 PCR: NotDetec (29 Dec 2021 13:05)  COVID-19 PCR: NotDetec (23 Dec 2021 20:32)      CAPILLARY BLOOD GLUCOSE      POCT Blood Glucose.: 184 mg/dL (01 Jan 2022 21:49)  POCT Blood Glucose.: 160 mg/dL (01 Jan 2022 16:37)  POCT Blood Glucose.: 106 mg/dL (01 Jan 2022 11:33)  POCT Blood Glucose.: 162 mg/dL (01 Jan 2022 07:34)      RADIOLOGY & ADDITIONAL TESTS:

## 2022-01-01 NOTE — PROGRESS NOTE ADULT - SUBJECTIVE AND OBJECTIVE BOX
Patient is seen and examined at the bed side, is afebrile. He mentioned feeling better.      REVIEW OF SYSTEMS: All other review systems are negative      ALLERGY: Allergy Status Unknown      Vital Signs Last 24 Hrs  T(C): 36.9 (01 Jan 2022 20:21), Max: 36.9 (01 Jan 2022 20:21)  T(F): 98.5 (01 Jan 2022 20:21), Max: 98.5 (01 Jan 2022 20:21)  HR: 88 (01 Jan 2022 20:21) (73 - 88)  BP: 139/65 (01 Jan 2022 20:21) (128/72 - 148/62)  BP(mean): --  RR: 18 (01 Jan 2022 20:21) (18 - 18)  SpO2: 100% (01 Jan 2022 20:21) (100% - 100%)        PHYSICAL EXAM:  GENERAL: Not in distress   CHEST/LUNG: Not using accessory muscles  HEART: s1 and s2 present  ABDOMEN:  Nontender and  Nondistended  EXTREMITIES: No pedal  edema  CNS: Awake and Alert      LABS:                          8.1    8.04  )-----------( 261      ( 01 Jan 2022 11:34 )             24.4                           8.5    8.54  )-----------( 176      ( 30 Dec 2021 10:04 )             24.2       01-01    142  |  113<H>  |  27<H>  ----------------------------<  117<H>  4.2   |  22  |  2.08<H>    Ca    8.0<L>      01 Jan 2022 11:34  Phos  3.7     01-01  Mg     1.4     01-01 12-31    142  |  113<H>  |  32<H>  ----------------------------<  142<H>  3.6   |  21<L>  |  2.24<H>    Ca    7.6<L>      31 Dec 2021 07:39  Phos  2.7     12-31  Mg     1.8     12-31 12-30    142  |  114<H>  |  39<H>  ----------------------------<  251<H>  3.9   |  18<L>  |  2.44<H>    Ca    7.6<L>      30 Dec 2021 10:04  Phos  2.7     12-30  Mg     1.5     12-30      CAPILLARY BLOOD GLUCOSE  POCT Blood Glucose.: 230 mg/dL (28 Dec 2021 11:23)  POCT Blood Glucose.: 214 mg/dL (28 Dec 2021 07:51)  POCT Blood Glucose.: 205 mg/dL (27 Dec 2021 21:16)  POCT Blood Glucose.: 195 mg/dL (27 Dec 2021 16:18)        MEDICATIONS  (STANDING):    ampicillin/sulbactam  IVPB 3 Gram(s) IV Intermittent every 12 hours  ampicillin/sulbactam  IVPB      apixaban 2.5 milliGRAM(s) Oral two times a day  atorvastatin 10 milliGRAM(s) Oral at bedtime  calcitriol   Capsule 0.5 MICROGram(s) Oral daily  calcium carbonate    500 mG (Tums) Chewable 1 Tablet(s) Chew three times a day  ergocalciferol 77221 Unit(s) Oral <User Schedule>  famotidine    Tablet 20 milliGRAM(s) Oral daily  finasteride 5 milliGRAM(s) Oral daily  insulin glargine Injectable (LANTUS) 24 Unit(s) SubCutaneous at bedtime  insulin lispro (ADMELOG) corrective regimen sliding scale   SubCutaneous three times a day before meals  insulin lispro (ADMELOG) corrective regimen sliding scale   SubCutaneous at bedtime  insulin lispro Injectable (ADMELOG) 4 Unit(s) SubCutaneous three times a day before meals  labetalol 200 milliGRAM(s) Oral every 12 hours  melatonin 5 milliGRAM(s) Oral at bedtime  mirtazapine 7.5 milliGRAM(s) Oral daily  Nephro-preeti 1 Tablet(s) Oral daily  sodium bicarbonate 650 milliGRAM(s) Oral three times a day  tamsulosin 0.4 milliGRAM(s) Oral at bedtime          RADIOLOGY & ADDITIONAL TESTS:    12/27/21 : US Renal (12.27.21 @ 10:33) Right kidney: 10.2 cm. Mild to moderate right-sided hydronephrosis.  Left kidney: 12.0 cm. Moderate left-sided hydronephrosis. Urinary bladder: Not well distended, limiting assessment. An element of bladder wall thickening is suggested.  The IVC and abdominal aorta are not well-visualized.      12/26/21: CT Abdomen and Pelvis No Cont (12.26.21 @ 11:55) Bilateral ureteral stents. Moderate left hydroureteronephrosis and mild   to moderate right hydroureteronephrosis with dilatation of both ureters  to the level of the urinary bladder.    Enlarged prostate gland. Markedly trabeculated urinary bladder wall.      MICROBIOLOGY DATA:    Culture - Urine (12.24.21 @ 04:08)   - Ampicillin: S <=2 Predicts results to ampicillin/sulbactam, amoxacillin-clavulanate and piperacillin-tazobactam.   - Ciprofloxacin: S <=1   - Levofloxacin: S <=1   - Nitrofurantoin: S <=32 Should not be used to treat pyelonephritis.   - Tetra/Doxy: R >8   - Vancomycin: S 4   Specimen Source: Clean Catch Clean Catch (Midstream)   Culture Results:   >100,000 CFU/ml Enterococcus faecalis   Organism Identification: Enterococcus faecalis   Organism: Enterococcus faecalis COVID-19 PCR . (12.23.21 @ 20:32)   COVID-19 PCR: NotDetec:

## 2022-01-02 LAB
GLUCOSE BLDC GLUCOMTR-MCNC: 197 MG/DL — HIGH (ref 70–99)
GLUCOSE BLDC GLUCOMTR-MCNC: 208 MG/DL — HIGH (ref 70–99)

## 2022-01-02 RX ADMIN — Medication 5 MILLIGRAM(S): at 21:32

## 2022-01-02 RX ADMIN — FINASTERIDE 5 MILLIGRAM(S): 5 TABLET, FILM COATED ORAL at 12:03

## 2022-01-02 RX ADMIN — ATORVASTATIN CALCIUM 10 MILLIGRAM(S): 80 TABLET, FILM COATED ORAL at 21:33

## 2022-01-02 RX ADMIN — MIRTAZAPINE 7.5 MILLIGRAM(S): 45 TABLET, ORALLY DISINTEGRATING ORAL at 12:03

## 2022-01-02 RX ADMIN — AMPICILLIN SODIUM AND SULBACTAM SODIUM 200 GRAM(S): 250; 125 INJECTION, POWDER, FOR SUSPENSION INTRAMUSCULAR; INTRAVENOUS at 06:17

## 2022-01-02 RX ADMIN — INSULIN GLARGINE 24 UNIT(S): 100 INJECTION, SOLUTION SUBCUTANEOUS at 22:03

## 2022-01-02 RX ADMIN — APIXABAN 2.5 MILLIGRAM(S): 2.5 TABLET, FILM COATED ORAL at 06:16

## 2022-01-02 RX ADMIN — Medication 1: at 12:05

## 2022-01-02 RX ADMIN — Medication 200 MILLIGRAM(S): at 06:16

## 2022-01-02 RX ADMIN — CALCITRIOL 0.5 MICROGRAM(S): 0.5 CAPSULE ORAL at 12:04

## 2022-01-02 RX ADMIN — Medication 1 TABLET(S): at 21:32

## 2022-01-02 RX ADMIN — Medication 4 UNIT(S): at 12:05

## 2022-01-02 RX ADMIN — TAMSULOSIN HYDROCHLORIDE 0.4 MILLIGRAM(S): 0.4 CAPSULE ORAL at 21:33

## 2022-01-02 RX ADMIN — Medication 1 TABLET(S): at 06:16

## 2022-01-02 RX ADMIN — Medication 1 TABLET(S): at 13:35

## 2022-01-02 RX ADMIN — Medication 650 MILLIGRAM(S): at 21:33

## 2022-01-02 RX ADMIN — FAMOTIDINE 20 MILLIGRAM(S): 10 INJECTION INTRAVENOUS at 12:03

## 2022-01-02 RX ADMIN — AMPICILLIN SODIUM AND SULBACTAM SODIUM 200 GRAM(S): 250; 125 INJECTION, POWDER, FOR SUSPENSION INTRAMUSCULAR; INTRAVENOUS at 17:43

## 2022-01-02 RX ADMIN — Medication 1 TABLET(S): at 12:03

## 2022-01-02 RX ADMIN — Medication 650 MILLIGRAM(S): at 13:35

## 2022-01-02 RX ADMIN — Medication 200 MILLIGRAM(S): at 17:43

## 2022-01-02 RX ADMIN — Medication 650 MILLIGRAM(S): at 06:16

## 2022-01-02 RX ADMIN — APIXABAN 2.5 MILLIGRAM(S): 2.5 TABLET, FILM COATED ORAL at 17:43

## 2022-01-02 NOTE — PROGRESS NOTE ADULT - SUBJECTIVE AND OBJECTIVE BOX
CARDIOLOGY/MEDICAL ATTENDING    CHIEF COMPLAINT:Patient is a 81y old  Male who presents with a chief complaint of UTI.Pt appears comfortable.    	  REVIEW OF SYSTEMS:  CONSTITUTIONAL: No fever, weight loss, or fatigue  EYES: No eye pain, visual disturbances, or discharge  ENT:  No difficulty hearing, tinnitus, vertigo; No sinus or throat pain  NECK: No pain or stiffness  RESPIRATORY: No cough, wheezing, chills or hemoptysis; No Shortness of Breath  CARDIOVASCULAR: No chest pain, palpitations, passing out, dizziness, or leg swelling  GASTROINTESTINAL: No abdominal or epigastric pain. No nausea, vomiting, or hematemesis; No diarrhea or constipation. No melena or hematochezia.  GENITOURINARY: No dysuria, frequency, hematuria, or incontinence  NEUROLOGICAL: No headaches, memory loss, loss of strength, numbness, or tremors  SKIN: No itching, burning, rashes, or lesions   LYMPH Nodes: No enlarged glands  ENDOCRINE: No heat or cold intolerance; No hair loss  MUSCULOSKELETAL: No joint pain or swelling; No muscle, back, or extremity pain  PSYCHIATRIC: No depression, anxiety, mood swings, or difficulty sleeping  HEME/LYMPH: No easy bruising, or bleeding gums  ALLERGY AND IMMUNOLOGIC: No hives or eczema	        PHYSICAL EXAM:  T(C): 36.6 (01-02-22 @ 06:16), Max: 36.9 (01-01-22 @ 20:21)  HR: 77 (01-02-22 @ 06:16) (77 - 88)  BP: 145/69 (01-02-22 @ 06:16) (139/65 - 148/62)  RR: 18 (01-02-22 @ 06:16) (18 - 18)  SpO2: 96% (01-02-22 @ 06:16) (96% - 100%)  Wt(kg): --  I&O's Summary      Appearance: Normal	  HEENT:   Normal oral mucosa, PERRL, EOMI	  Lymphatic: No lymphadenopathy  Cardiovascular: Normal S1 S2, No JVD, No murmurs, No edema  Respiratory: Lungs clear to auscultation	  Psychiatry: A & O x 3, Mood & affect appropriate  Gastrointestinal:  Soft, Non-tender, + BS	  Skin: No rashes, No ecchymoses, No cyanosis	  Neurologic: Non-focal  Extremities: Normal range of motion, No clubbing, cyanosis or edema  Vascular: Peripheral pulses palpable 2+ bilaterally    MEDICATIONS  (STANDING):  ampicillin/sulbactam  IVPB 3 Gram(s) IV Intermittent every 12 hours  ampicillin/sulbactam  IVPB      apixaban 2.5 milliGRAM(s) Oral two times a day  atorvastatin 10 milliGRAM(s) Oral at bedtime  calcitriol   Capsule 0.5 MICROGram(s) Oral daily  calcium carbonate    500 mG (Tums) Chewable 1 Tablet(s) Chew three times a day  ergocalciferol 67381 Unit(s) Oral <User Schedule>  famotidine    Tablet 20 milliGRAM(s) Oral daily  finasteride 5 milliGRAM(s) Oral daily  insulin glargine Injectable (LANTUS) 24 Unit(s) SubCutaneous at bedtime  insulin lispro (ADMELOG) corrective regimen sliding scale   SubCutaneous three times a day before meals  insulin lispro (ADMELOG) corrective regimen sliding scale   SubCutaneous at bedtime  insulin lispro Injectable (ADMELOG) 4 Unit(s) SubCutaneous three times a day before meals  labetalol 200 milliGRAM(s) Oral every 12 hours  melatonin 5 milliGRAM(s) Oral at bedtime  mirtazapine 7.5 milliGRAM(s) Oral daily  Nephro-preeti 1 Tablet(s) Oral daily  sodium bicarbonate 650 milliGRAM(s) Oral three times a day  tamsulosin 0.4 milliGRAM(s) Oral at bedtime        LABS:	 	                      8.1    8.04  )-----------( 261      ( 01 Jan 2022 11:34 )             24.4     01-01    142  |  113<H>  |  27<H>  ----------------------------<  117<H>  4.2   |  22  |  2.08<H>    Ca    8.0<L>      01 Jan 2022 11:34  Phos  3.7     01-01  Mg     1.4     01-01      proBNP:   Lipid Profile: Cholesterol 145  LDL --  HDL 20    Ldl calc 77  Ratio --    HgA1c:   TSH: Thyroid Stimulating Hormone, Serum: 1.38 uU/mL (12-23 @ 20:32)

## 2022-01-02 NOTE — PROGRESS NOTE ADULT - SUBJECTIVE AND OBJECTIVE BOX
Patient is seen and examined at the bed side, is afebrile. He mentioned feeling better.      REVIEW OF SYSTEMS: All other review systems are negative      ALLERGY: Allergy Status Unknown      Vital Signs Last 24 Hrs  T(C): 36.8 (02 Jan 2022 20:33), Max: 36.8 (02 Jan 2022 20:33)  T(F): 98.3 (02 Jan 2022 20:33), Max: 98.3 (02 Jan 2022 20:33)  HR: 88 (02 Jan 2022 20:33) (77 - 88)  BP: 144/75 (02 Jan 2022 20:33) (144/75 - 154/77)  BP(mean): --  RR: 18 (02 Jan 2022 20:33) (18 - 18)  SpO2: 100% (02 Jan 2022 20:33) (96% - 100%)      PHYSICAL EXAM:  GENERAL: Not in distress   CHEST/LUNG: Not using accessory muscles  HEART: s1 and s2 present  ABDOMEN:  Nontender and  Nondistended  EXTREMITIES: No pedal  edema  CNS: Awake and Alert      LABS: No new Labs                        8.1    8.04  )-----------( 261      ( 01 Jan 2022 11:34 )             24.4                  8.5    8.54  )-----------( 176      ( 30 Dec 2021 10:04 )             24.2       01-01    142  |  113<H>  |  27<H>  ----------------------------<  117<H>  4.2   |  22  |  2.08<H>    Ca    8.0<L>      01 Jan 2022 11:34  Phos  3.7     01-01  Mg     1.4     01-01 12-31    142  |  113<H>  |  32<H>  ----------------------------<  142<H>  3.6   |  21<L>  |  2.24<H>    Ca    7.6<L>      31 Dec 2021 07:39  Phos  2.7     12-31  Mg     1.8     12-31 12-30    142  |  114<H>  |  39<H>  ----------------------------<  251<H>  3.9   |  18<L>  |  2.44<H>    Ca    7.6<L>      30 Dec 2021 10:04  Phos  2.7     12-30  Mg     1.5     12-30      CAPILLARY BLOOD GLUCOSE  POCT Blood Glucose.: 230 mg/dL (28 Dec 2021 11:23)  POCT Blood Glucose.: 214 mg/dL (28 Dec 2021 07:51)  POCT Blood Glucose.: 205 mg/dL (27 Dec 2021 21:16)  POCT Blood Glucose.: 195 mg/dL (27 Dec 2021 16:18)        MEDICATIONS  (STANDING):    ampicillin/sulbactam  IVPB 3 Gram(s) IV Intermittent every 12 hours  ampicillin/sulbactam  IVPB      apixaban 2.5 milliGRAM(s) Oral two times a day  atorvastatin 10 milliGRAM(s) Oral at bedtime  calcitriol   Capsule 0.5 MICROGram(s) Oral daily  calcium carbonate    500 mG (Tums) Chewable 1 Tablet(s) Chew three times a day  ergocalciferol 76798 Unit(s) Oral <User Schedule>  famotidine    Tablet 20 milliGRAM(s) Oral daily  finasteride 5 milliGRAM(s) Oral daily  insulin glargine Injectable (LANTUS) 24 Unit(s) SubCutaneous at bedtime  insulin lispro (ADMELOG) corrective regimen sliding scale   SubCutaneous at bedtime  insulin lispro (ADMELOG) corrective regimen sliding scale   SubCutaneous three times a day before meals  insulin lispro Injectable (ADMELOG) 4 Unit(s) SubCutaneous three times a day before meals  labetalol 200 milliGRAM(s) Oral every 12 hours  melatonin 5 milliGRAM(s) Oral at bedtime  mirtazapine 7.5 milliGRAM(s) Oral daily  Nephro-preeti 1 Tablet(s) Oral daily  sodium bicarbonate 650 milliGRAM(s) Oral three times a day  tamsulosin 0.4 milliGRAM(s) Oral at bedtime         RADIOLOGY & ADDITIONAL TESTS:    12/27/21 : US Renal (12.27.21 @ 10:33) Right kidney: 10.2 cm. Mild to moderate right-sided hydronephrosis.  Left kidney: 12.0 cm. Moderate left-sided hydronephrosis. Urinary bladder: Not well distended, limiting assessment. An element of bladder wall thickening is suggested.  The IVC and abdominal aorta are not well-visualized.      12/26/21: CT Abdomen and Pelvis No Cont (12.26.21 @ 11:55) Bilateral ureteral stents. Moderate left hydroureteronephrosis and mild   to moderate right hydroureteronephrosis with dilatation of both ureters  to the level of the urinary bladder.    Enlarged prostate gland. Markedly trabeculated urinary bladder wall.      MICROBIOLOGY DATA:    Culture - Urine (12.24.21 @ 04:08)   - Ampicillin: S <=2 Predicts results to ampicillin/sulbactam, amoxacillin-clavulanate and piperacillin-tazobactam.   - Ciprofloxacin: S <=1   - Levofloxacin: S <=1   - Nitrofurantoin: S <=32 Should not be used to treat pyelonephritis.   - Tetra/Doxy: R >8   - Vancomycin: S 4   Specimen Source: Clean Catch Clean Catch (Midstream)   Culture Results:   >100,000 CFU/ml Enterococcus faecalis   Organism Identification: Enterococcus faecalis   Organism: Enterococcus faecalis COVID-19 PCR . (12.23.21 @ 20:32)   COVID-19 PCR: NotDetec:

## 2022-01-03 ENCOUNTER — TRANSCRIPTION ENCOUNTER (OUTPATIENT)
Age: 82
End: 2022-01-03

## 2022-01-03 VITALS
HEART RATE: 82 BPM | RESPIRATION RATE: 18 BRPM | SYSTOLIC BLOOD PRESSURE: 138 MMHG | TEMPERATURE: 98 F | DIASTOLIC BLOOD PRESSURE: 61 MMHG | OXYGEN SATURATION: 99 %

## 2022-01-03 LAB — GLUCOSE BLDC GLUCOMTR-MCNC: 224 MG/DL — HIGH (ref 70–99)

## 2022-01-03 PROCEDURE — 87086 URINE CULTURE/COLONY COUNT: CPT

## 2022-01-03 PROCEDURE — 83735 ASSAY OF MAGNESIUM: CPT

## 2022-01-03 PROCEDURE — 74176 CT ABD & PELVIS W/O CONTRAST: CPT

## 2022-01-03 PROCEDURE — 81001 URINALYSIS AUTO W/SCOPE: CPT

## 2022-01-03 PROCEDURE — 83036 HEMOGLOBIN GLYCOSYLATED A1C: CPT

## 2022-01-03 PROCEDURE — 97162 PT EVAL MOD COMPLEX 30 MIN: CPT

## 2022-01-03 PROCEDURE — 80053 COMPREHEN METABOLIC PANEL: CPT

## 2022-01-03 PROCEDURE — 82330 ASSAY OF CALCIUM: CPT

## 2022-01-03 PROCEDURE — 82962 GLUCOSE BLOOD TEST: CPT

## 2022-01-03 PROCEDURE — 87635 SARS-COV-2 COVID-19 AMP PRB: CPT

## 2022-01-03 PROCEDURE — 82306 VITAMIN D 25 HYDROXY: CPT

## 2022-01-03 PROCEDURE — 82010 KETONE BODYS QUAN: CPT

## 2022-01-03 PROCEDURE — 83970 ASSAY OF PARATHORMONE: CPT

## 2022-01-03 PROCEDURE — 36415 COLL VENOUS BLD VENIPUNCTURE: CPT

## 2022-01-03 PROCEDURE — 85025 COMPLETE CBC W/AUTO DIFF WBC: CPT

## 2022-01-03 PROCEDURE — 76775 US EXAM ABDO BACK WALL LIM: CPT

## 2022-01-03 PROCEDURE — 82310 ASSAY OF CALCIUM: CPT

## 2022-01-03 PROCEDURE — 99285 EMERGENCY DEPT VISIT HI MDM: CPT | Mod: 25

## 2022-01-03 PROCEDURE — 93005 ELECTROCARDIOGRAM TRACING: CPT

## 2022-01-03 PROCEDURE — 84100 ASSAY OF PHOSPHORUS: CPT

## 2022-01-03 PROCEDURE — 84484 ASSAY OF TROPONIN QUANT: CPT

## 2022-01-03 PROCEDURE — 85027 COMPLETE CBC AUTOMATED: CPT

## 2022-01-03 PROCEDURE — 82009 KETONE BODYS QUAL: CPT

## 2022-01-03 PROCEDURE — 82550 ASSAY OF CK (CPK): CPT

## 2022-01-03 PROCEDURE — 87186 SC STD MICRODIL/AGAR DIL: CPT

## 2022-01-03 PROCEDURE — 84436 ASSAY OF TOTAL THYROXINE: CPT

## 2022-01-03 PROCEDURE — 80048 BASIC METABOLIC PNL TOTAL CA: CPT

## 2022-01-03 PROCEDURE — 82570 ASSAY OF URINE CREATININE: CPT

## 2022-01-03 PROCEDURE — 80061 LIPID PANEL: CPT

## 2022-01-03 PROCEDURE — 84443 ASSAY THYROID STIM HORMONE: CPT

## 2022-01-03 PROCEDURE — 71045 X-RAY EXAM CHEST 1 VIEW: CPT

## 2022-01-03 PROCEDURE — 70450 CT HEAD/BRAIN W/O DYE: CPT | Mod: MA

## 2022-01-03 RX ORDER — ERGOCALCIFEROL 1.25 MG/1
1 CAPSULE ORAL
Qty: 4 | Refills: 0
Start: 2022-01-03 | End: 2022-02-01

## 2022-01-03 RX ORDER — METFORMIN HYDROCHLORIDE 850 MG/1
0 TABLET ORAL
Qty: 0 | Refills: 5 | DISCHARGE

## 2022-01-03 RX ORDER — GLIMEPIRIDE 1 MG
0 TABLET ORAL
Qty: 0 | Refills: 0 | DISCHARGE

## 2022-01-03 RX ORDER — CALCIUM CARBONATE 500(1250)
1 TABLET ORAL
Qty: 60 | Refills: 0
Start: 2022-01-03 | End: 2022-02-01

## 2022-01-03 RX ADMIN — APIXABAN 2.5 MILLIGRAM(S): 2.5 TABLET, FILM COATED ORAL at 06:07

## 2022-01-03 RX ADMIN — APIXABAN 2.5 MILLIGRAM(S): 2.5 TABLET, FILM COATED ORAL at 17:28

## 2022-01-03 RX ADMIN — CALCITRIOL 0.5 MICROGRAM(S): 0.5 CAPSULE ORAL at 12:55

## 2022-01-03 RX ADMIN — Medication 2: at 16:48

## 2022-01-03 RX ADMIN — Medication 650 MILLIGRAM(S): at 14:24

## 2022-01-03 RX ADMIN — MIRTAZAPINE 7.5 MILLIGRAM(S): 45 TABLET, ORALLY DISINTEGRATING ORAL at 12:55

## 2022-01-03 RX ADMIN — Medication 1 TABLET(S): at 12:55

## 2022-01-03 RX ADMIN — Medication 650 MILLIGRAM(S): at 06:07

## 2022-01-03 RX ADMIN — Medication 200 MILLIGRAM(S): at 06:07

## 2022-01-03 RX ADMIN — FINASTERIDE 5 MILLIGRAM(S): 5 TABLET, FILM COATED ORAL at 12:55

## 2022-01-03 RX ADMIN — Medication 1 TABLET(S): at 06:07

## 2022-01-03 RX ADMIN — Medication 4 UNIT(S): at 16:48

## 2022-01-03 RX ADMIN — Medication 200 MILLIGRAM(S): at 17:28

## 2022-01-03 RX ADMIN — AMPICILLIN SODIUM AND SULBACTAM SODIUM 200 GRAM(S): 250; 125 INJECTION, POWDER, FOR SUSPENSION INTRAMUSCULAR; INTRAVENOUS at 06:07

## 2022-01-03 RX ADMIN — FAMOTIDINE 20 MILLIGRAM(S): 10 INJECTION INTRAVENOUS at 12:55

## 2022-01-03 NOTE — PROGRESS NOTE ADULT - PROBLEM SELECTOR PLAN 9
pmh of htn  hold hydralazine, C/W labetalol   monitor bp  dash diet

## 2022-01-03 NOTE — PROGRESS NOTE ADULT - PROBLEM SELECTOR PLAN 8
pmh of dm  hgb a1c 7.4  insulin ss  monitor bs  adjust as needed  diabetic diet  increased Lantus 20U, Admelog 4U  Endo on board pmh of dm  hgb a1c 7.4  insulin ss  monitor bs  adjust as needed  diabetic diet  increased Lantus 20U, Admelog 4U  Endo on board  due to MANUEL will not discharge on metformin and amaryl as per endo patient will be discharged on glipizide 4mg BID

## 2022-01-03 NOTE — PROGRESS NOTE ADULT - PROBLEM SELECTOR PLAN 10
pmh of hld  lipid profile shows elevated triglycerides and low HDL  continue home meds  dash diet
pmh of hld  f/u lipid profile  conitnue home meds  dash diet

## 2022-01-03 NOTE — PROGRESS NOTE ADULT - PROBLEM SELECTOR PLAN 4
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

## 2022-01-03 NOTE — PROGRESS NOTE ADULT - PROBLEM SELECTOR PLAN 3
Pt with Larry on CKD, baseline 2.5, currently 2.08   likely 2/2 obstructive uropathy  BUN/CR trended down to baseline  Nephro Dr. Orellana:  CT pelvis b/l hydronephrosis  Uro consulted w/recc to start flomax, Proscar, and pt's outpatient urologist has plan to change stents every 3 months  RESOLVED

## 2022-01-03 NOTE — PROGRESS NOTE ADULT - ASSESSMENT
81 yr old male with hx of Chronic DVT, Anemia, GERD, HTN, HLD, DM, Hyperparathyroidism, Glaucoma,+SPEP,Hx of hydronephrosis and BPH presents to ed for generalized weakness,MANUEL,UTI,hydronephrosis.  1.MANUEL-IVF,Renal f/u.  2.UTI-ABX.  3.DM-insulin. prandin 1mg tid.  4.HTN-cont bp medication.  5.DVT-eliquis.  6.Lipid d/o-statin.  7. eval noted,hydronephrosis-f/u at [facility] .  8.BPH-flomax.  9.Replace Mg.  10.GI prophylaxis.  11.D/W pt agrees for NELI.
81 yr old male with hx of Chronic DVT, Anemia, GERD, HTN, HLD, DM, Hyperparathyroidism, Glaucoma,+SPEP,Hx of hydronephrosis and BPH presents to ed for generalized weakness,MANUEL,UTI,hydronephrosis.  1.MANUEL-IVF,Renal f/u.  2.UTI-ABX.  3.DM-insulin. prandin 1mg tid.  4.HTN-cont bp medication.  5.DVT-eliquis.  6.Lipid d/o-statin.  7. eval noted,hydronephrosis-f/u at [facility] .  8.BPH-flomax.  9.GI prophylaxis.  10.D/W HCP, pt to go home with home care/visiting miah.
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 Deterioration in renal function.  MANUEL etiology not clear , CT bilateral hydronephrosis and stents . Suggest in hospital urology consult.   Non anion and anion gap MA , corrected to albumin anion gap is 18, Cause deterioration in renal function and r/o DKA  Hypomagnesemia. Supplement magnesium.  Hypocalcemia corrected to albumin 2.1 is 8.4 , continue Calcitriol and calcium.  PO daily magnesium supplement, urine magnesium , possible cause Pantoprazole.            
Patient is a 81y old  Male with hx of Chronic DVT, Anemia, GERD, HTN, HLD, DM, Hypoparathyroidism, Glaucoma and BPH presents to the ER on 12/23 for evaluation of generalized weakness and family called EMS after unable to reach pt at home. As per ED note, pt states was at kitchen and felt weak, laid himself on floor and was unable to get up. He states that he had a prostate stent? placed 1 or 2 weeks ago by doctor Benjamin Son? and endorsed some dysuria. On admission, he found to have no fever but tachycardia, Leukocytosis and positive urine analysis. He has started on Ceftriaxone and today, 12/28/21, The ID consult requested to assist with antibiotic  management of Enterococcus UTI.    #Complicated UTI - Urine cx from 12/24 grew Enterococcus  # B/L Hydronephrosis  # Enlarged prostate gland.- on CT abd/pelvis    would recommend:    1. Obtain Labs in AM to Monitor kidney function  2. Continue Flomax and Finasteride   3. Continue Unasyn to cover Enterococcus and May change to oral Augmentin 875 mg q 12hours on discharge to continue until 1/11/22  4. Management of Hydronephrosis as per Urology  5. OOB to chair     Attending Attestation:    Spent more than 35 minutes on total encounter, more than 50 % of the visit was spent counseling and/or coordinating care by the Attending physician.  
  Patient is a 81y old  Male with hx of Chronic DVT, Anemia, GERD, HTN, HLD, DM, Hypoparathyroidism, Glaucoma and BPH presents to the ER on 12/23 for evaluation of generalized weakness and family called EMS after unable to reach pt at home. As per ED note, pt states was at kitchen and felt weak, laid himself on floor and was unable to get up. He states that he had a prostate stent? placed 1 or 2 weeks ago by doctor Benjamin Son? and endorsed some dysuria. On admission, he found to have no fever but tachycardia, Leukocytosis and positive urine analysis. He has started on Ceftriaxone and today, 12/28/21, The ID consult requested to assist with antibiotic  management of Enterococcus UTI.      #Complicated UTI - Urine cx from 12/24 grew Enterococcus  # B/L Hydronephrosis  # Enlarged prostate gland.- on CT abd/pelvis    would recommend:    1. Continue Unasyn to cover Enterococcus and May change to oral Augmentin 875 mg q 12hours on discharge to continue until 1/11/22  2. Monitor kidney function and adjust Abx doses accordingly  3. Management of Hydronephrosis as per Urology  4. Continue Flomax and Finasteride    d/w [facility] staff, DR. Miles      Attending Attestation:    Spent more than 45 minutes on total encounter, more than 50 % of the visit was spent counseling and/or coordinating care by the Attending physician.        
81 yr old male with hx of Chronic DVT, Anemia, GERD, HTN, HLD, DM, Hyperparathyroidism, Glaucoma,+SPEP,Hx of hydronephrosis and BPH presents to ed for generalized weakness,MANUEL,UTI,hydronephrosis.  1.MANUEL-IVF,Renal f/u.  2.UTI-ABX.  3.DM-insulin. prandin 1mg tid.  4.HTN-cont bp medication.  5.DVT-eliquis.  6.Lipid d/o-statin.  7. eval noted,hydronephrosis-f/u at [facility] .  8.BPH-flomax.  9.Replace lytes.  10.GI prophylaxis.  11.PT.
81 yr old male with hx of Chronic DVT, Anemia, GERD, HTN, HLD, DM, Hyperparathyroidism, Glaucoma,+SPEP,Hx of hydronephrosis and BPH presents to ed for generalized weakness,MANUEL,UTI,hydronephrosis.  1.MANUEL-IVF,Renal f/u.  2.UTI-ABX.  3.DM-insulin. prandin 1mg tid.  4.HTN-cont bp medication.  5.DVT-eliquis.  6.Lipid d/o-statin.  7. eval noted,hydronephrosis-f/u at [facility] .  8.BPH-flomax.  9.GI prophylaxis.  10.D/W HCP, pt to go home with home care/visiting nurse.
81 yr old male with hx of Chronic DVT, Anemia, GERD, HTN, HLD, DM, Hyperparathyroidism, Glaucoma,+SPEP,Hx of hydronephrosis and BPH presents to ed for generalized weakness,MANUEL,UTI.  1.MANUEL-IVF,Renal eval.  2.UTI-ABX.  3.DM-insulin.  4.HTN-cont bp medication.  5.DVT-eliquis.  6.Lipid d/o-statin.  7.Check bladder scan, Renal sono-p.  8.BPH-flomax.  9.GI prophylaxis.
81 yr old male with hx of Chronic DVT, Anemia, GERD, HTN, HLD, DM, Hyperparathyroidism, Glaucoma,+SPEP,Hx of hydronephrosis and BPH presents to ed for generalized weakness,MANUEL,UTI.  1.MANUEL-IVF,Renal f/u.  2.UTI-ABX.  3.DM-insulin.inc prandin 1mg tid and Endo eval noted..  4.HTN-cont bp medication.  5.DVT-eliquis.  6.Lipid d/o-statin.  7.Renal sono-p.  8.BPH-flomax.  9.Replace Ca,check ionized.  10.GI prophylaxis.
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   MANUEL -Deterioration in renal function possible dehydration  Hypocalcemia low ionized calcium, continue calcitriol and supplement calcium, patient on calcium carbonate 500 mg TID  MA continue sodium bicarbonate.  Hypomagnesemia continue daily magnesium and if possible change pantoprazole to pepcid  Start Daily Nephrovit.  Follow Vitamin D level          
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  Admitted with MANUEL and altered MS. Found to have UTI .  Enterococcus Faecalis S Culture - Urine (12.24.21 @ 04:08) - Ampicillin: S <=2 Predicts results to ampicillin/sulbactam.  1.MANUEL -Deterioration in renal function due to UTI and dehydration, improved renal function  2. CKD - obstructive uropathy post stents placement, produce urine.   3. UTI as above started IV antibiotics  4. Hypocalcemia low ionized calcium on 12/28/21, continue calcitriol and supplement calcium, patient on calcium carbonate 500 mg TID, calcium improving. Vitamin D result 7.2.  Supplement Vitamin D  5.MA continue sodium bicarbonate.  6. Hypomagnesemia continue daily magnesium and if possible change pantoprazole to pepcid , today Magnesium 1.5 in need for supplement.  Start Daily Nephrovit.            
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  Admitted with MANUEL and altered MS. Found to have UTI .  Enterococcus Faecalis S Culture - Urine (12.24.21 @ 04:08) - Ampicillin: S <=2 Predicts results to ampicillin/sulbactam.  1.MANUEL -Deterioration in renal function due to UTI and dehydration, improved renal function  2. CKD - obstructive uropathy post stents placement, produce urine.   3. UTI as above started IV antibiotics  4. Hypocalcemia low ionized calcium on 12/28/21, continue calcitriol and supplement calcium, patient on calcium carbonate 500 mg TID, calcium improving. Vitamin D result pending.  5.MA continue sodium bicarbonate.  6. Hypomagnesemia continue daily magnesium and if possible change pantoprazole to pepcid , today Magnesium 1.4 in need for supplement.  Start Daily Nephrovit.            
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  MANUEL etiology not cleat , follow up CT, US  Non anion and anion gap MA , corrected to albumin anion gap is 18, Cause deterioration in renal function and r/o DKA  Hypomagnesemia. Supplement magnesium      Await CT abdomen and or US, in need for further  imaging  Hyperglycemia follow up acetone level and control of BS.        
Patient is a 81y old  Male with hx of Chronic DVT, Anemia, GERD, HTN, HLD, DM, Hypoparathyroidism, Glaucoma and BPH presents to the ER on 12/23 for evaluation of generalized weakness and family called EMS after unable to reach pt at home. As per ED note, pt states was at kitchen and felt weak, laid himself on floor and was unable to get up. He states that he had a prostate stent? placed 1 or 2 weeks ago by doctor Benjamin Son? and endorsed some dysuria. On admission, he found to have no fever but tachycardia, Leukocytosis and positive urine analysis. He has started on Ceftriaxone and today, 12/28/21, The ID consult requested to assist with antibiotic  management of Enterococcus UTI.      #Complicated UTI - Urine cx from 12/24 grew Enterococcus  # B/L Hydronephrosis  # Enlarged prostate gland.- on CT abd/pelvis    would recommend:    1. OOB to chair   2. Continue Unasyn to cover Enterococcus and May change to oral Augmentin 875 mg q 12hours on discharge to continue until 1/11/22  3. Monitor kidney function and adjust Abx doses accordingly  4. Management of Hydronephrosis as per Urology  5. Continue Flomax and Finasteride    Attending Attestation:    Spent more than 35 minutes on total encounter, more than 50 % of the visit was spent counseling and/or coordinating care by the Attending physician.
Pt is a 81 yr old male with hx of Chronic DVT, Anemia, GERD, HTN, HLD, DM, Hypoparathyroidism, Glaucoma and BPH presents to ed for generalized weakness, found to have UTI. 
Pt is a 81 yr old male with hx of Chronic DVT, Anemia, GERD, HTN, HLD, DM, Hypoparathyroidism, Glaucoma and BPH presents to ed for generalized weakness, found to have UTI. 
81 yr old male with hx of Chronic DVT, Anemia, GERD, HTN, HLD, DM, Hyperparathyroidism, Glaucoma,+SPEP,Hx of hydronephrosis and BPH presents to ed for generalized weakness,MANUEL,UTI,hydronephrosis.  1.MANUEL-IVF,Renal f/u.  2.UTI-ABX.  3.DM-insulin. prandin 1mg tid.  4.HTN-cont bp medication.  5.DVT-eliquis.  6.Lipid d/o-statin.  7. eval noted,hydronephrosis-f/u at [facility] .  8.BPH-flomax.  9.GI prophylaxis.
81 yr old male with hx of Chronic DVT, Anemia, GERD, HTN, HLD, DM, Hyperparathyroidism, Glaucoma,+SPEP,Hx of hydronephrosis and BPH presents to ed for generalized weakness,MANUEL,UTI,hydronephrosis.  1.MANUEL-IVF,Renal f/u.  2.UTI-ABX.  3.DM-insulin. prandin 1mg tid.  4.HTN-cont bp medication.  5.DVT-eliquis.  6.Lipid d/o-statin.  7. eval noted,hydronephrosis-f/u at [facility] .  8.BPH-flomax.  9.GI prophylaxis.  10.Replace Mg.  11.Plan for NELI, d/w pt at length.
81 yr old male with hx of Chronic DVT, Anemia, GERD, HTN, HLD, DM, Hyperparathyroidism, Glaucoma,+SPEP,Hx of hydronephrosis and BPH presents to ed for generalized weakness,MANUEL,UTI.  1.MANUEL-IVF,Renal f/u.  2.UTI-ABX.  3.DM-insulin.Add prandin 1mg bid and Endo eval called. A1c-p.  4.HTN-cont bp medication.  5.DVT-eliquis.  6.Lipid d/o-statin.  7.Check bladder scan, Renal sono-p.  8.BPH-flomax.  9.GI prophylaxis.
81 yr old male with hx of Chronic DVT, Anemia, GERD, HTN, HLD, DM, Hyperparathyroidism, Glaucoma,+SPEP,Hx of hydronephrosis and BPH presents to ed for generalized weakness,MANUEL,UTI.  1.MANUEL-IVF,Renal f/u.  2.UTI-ABX.  3.DM-insulin.inc prandin 1mg tid and Endo eval noted..  4.HTN-cont bp medication.  5.DVT-eliquis.  6.Lipid d/o-statin.  7.Renal sono-p.  8.BPH-flomax.  9.Replace Ca,check ionized in am.  10.GI prophylaxis.
Patient is a 81y old  Male with hx of Chronic DVT, Anemia, GERD, HTN, HLD, DM, Hypoparathyroidism, Glaucoma and BPH presents to the ER on 12/23 for evaluation of generalized weakness and family called EMS after unable to reach pt at home. As per ED note, pt states was at kitchen and felt weak, laid himself on floor and was unable to get up. He states that he had a prostate stent? placed 1 or 2 weeks ago by doctor Benjamin Son? and endorsed some dysuria. On admission, he found to have no fever but tachycardia, Leukocytosis and positive urine analysis. He has started on Ceftriaxone and today, 12/28/21, The ID consult requested to assist with antibiotic  management of Enterococcus UTI.      #Complicated UTI - Urine cx from 12/24 grew Enterococcus  # B/L Hydronephrosis  # Enlarged prostate gland.- on CT abd/pelvis    would recommend:    1. OOB top chair   2. Continue Unasyn to cover Enterococcus and May change to oral Augmentin 875 mg q 12hours on discharge to continue until 1/11/22  3. Monitor kidney function and adjust Abx doses accordingly  4. Management of Hydronephrosis as per Urology  5. Continue Flomax and Finasteride    d/w [facility] staff, DR. Miles    Attending Attestation:    Spent more than 35 minutes on total encounter, more than 50 % of the visit was spent counseling and/or coordinating care by the Attending physician.    
81 yr old male with hx of Chronic DVT, Anemia, GERD, HTN, HLD, DM, Hyperparathyroidism, Glaucoma,+SPEP,Hx of hydronephrosis and BPH presents to ed for generalized weakness,MANUEL,UTI,hydronephrosis.  1.MANUEL-IVF,Renal f/u.  2.UTI-ABX.  3.DM-insulin.inc prandin 1mg tid and Endo eval noted..  4.HTN-cont bp medication.  5.DVT-eliquis.  6.Lipid d/o-statin.  7. eval noted,hydronephrosis-f/u at [facility] .  8.BPH-flomax.  9.Replace Ca.  10.GI prophylaxis.
Patient is a 81y old  Male with hx of Chronic DVT, Anemia, GERD, HTN, HLD, DM, Hypoparathyroidism, Glaucoma and BPH presents to the ER on 12/23 for evaluation of generalized weakness and family called EMS after unable to reach pt at home. As per ED note, pt states was at kitchen and felt weak, laid himself on floor and was unable to get up. He states that he had a prostate stent? placed 1 or 2 weeks ago by doctor Benjamin Son? and endorsed some dysuria. On admission, he found to have no fever but tachycardia, Leukocytosis and positive urine analysis. He has started on Ceftriaxone and today, 12/28/21, The ID consult requested to assist with antibiotic  management of Enterococcus UTI.      #Complicated UTI - Urine cx from 12/24 grew Enterococcus  # B/L Hydronephrosis  # Enlarged prostate gland.- on CT abd/pelvis    would recommend:    1. Continue Flomax and Finasteride   2. Continue Unasyn to cover Enterococcus and May change to oral Augmentin 875 mg q 12hours on discharge to continue until 1/11/22  3. Monitor kidney function and adjust Abx doses accordingly  4. Management of Hydronephrosis as per Urology  5. OOB to chair     Attending Attestation:      Spent more than 35 minutes on total encounter, more than 50 % of the visit was spent counseling and/or coordinating care by the Attending physician.
Pt is a 81 yr old male with hx of Chronic DVT, Anemia, GERD, HTN, HLD, DM, Hypoparathyroidism, Glaucoma and BPH presents to ed for generalized weakness, found to have UTI. 

## 2022-01-03 NOTE — PROGRESS NOTE ADULT - PROBLEM SELECTOR PROBLEM 5
Hypermagnesemia
Diabetes mellitus
Hypermagnesemia
Diabetes mellitus
Hypermagnesemia

## 2022-01-03 NOTE — DISCHARGE NOTE NURSING/CASE MANAGEMENT/SOCIAL WORK - PATIENT PORTAL LINK FT
You can access the FollowMyHealth Patient Portal offered by [facility] by registering at the following website: [email or web address]. By joining [email or web address]’s FollowMyHealth portal, you will also be able to view your health information using other applications (apps) compatible with our system.

## 2022-01-03 NOTE — PROGRESS NOTE ADULT - PROBLEM SELECTOR PROBLEM 8
Diabetes mellitus
Prophylactic measure

## 2022-01-03 NOTE — PROGRESS NOTE ADULT - SUBJECTIVE AND OBJECTIVE BOX
CHIEF COMPLAINT:Patient is a 81y old  Male who presents with a chief complaint of UTI.Pt appears comfortable.    	  REVIEW OF SYSTEMS:  CONSTITUTIONAL: No fever, weight loss, or fatigue  EYES: No eye pain, visual disturbances, or discharge  ENT:  No difficulty hearing, tinnitus, vertigo; No sinus or throat pain  NECK: No pain or stiffness  RESPIRATORY: No cough, wheezing, chills or hemoptysis; No Shortness of Breath  CARDIOVASCULAR: No chest pain, palpitations, passing out, dizziness, or leg swelling  GASTROINTESTINAL: No abdominal or epigastric pain. No nausea, vomiting, or hematemesis; No diarrhea or constipation. No melena or hematochezia.  GENITOURINARY: No dysuria, frequency, hematuria, or incontinence  NEUROLOGICAL: No headaches, memory loss, loss of strength, numbness, or tremors  SKIN: No itching, burning, rashes, or lesions   LYMPH Nodes: No enlarged glands  ENDOCRINE: No heat or cold intolerance; No hair loss  MUSCULOSKELETAL: No joint pain or swelling; No muscle, back, or extremity pain  PSYCHIATRIC: No depression, anxiety, mood swings, or difficulty sleeping  HEME/LYMPH: No easy bruising, or bleeding gums  ALLERGY AND IMMUNOLOGIC: No hives or eczema	      PHYSICAL EXAM:  T(C): 36.8 (01-02-22 @ 20:33), Max: 36.8 (01-02-22 @ 20:33)  HR: 88 (01-02-22 @ 20:33) (83 - 88)  BP: 144/75 (01-02-22 @ 20:33) (144/75 - 154/77)  RR: 18 (01-02-22 @ 20:33) (18 - 18)  SpO2: 100% (01-02-22 @ 20:33) (100% - 100%)        Appearance: Normal	  HEENT:   Normal oral mucosa, PERRL, EOMI	  Lymphatic: No lymphadenopathy  Cardiovascular: Normal S1 S2, No JVD, No murmurs, No edema  Respiratory: Lungs clear to auscultation	  Psychiatry: A & O x 3, Mood & affect appropriate  Gastrointestinal:  Soft, Non-tender, + BS	  Skin: No rashes, No ecchymoses, No cyanosis	  Neurologic: Non-focal  Extremities: Normal range of motion, No clubbing, cyanosis or edema  Vascular: Peripheral pulses palpable 2+ bilaterally    MEDICATIONS  (STANDING):  ampicillin/sulbactam  IVPB 3 Gram(s) IV Intermittent every 12 hours  ampicillin/sulbactam  IVPB      apixaban 2.5 milliGRAM(s) Oral two times a day  atorvastatin 10 milliGRAM(s) Oral at bedtime  calcitriol   Capsule 0.5 MICROGram(s) Oral daily  calcium carbonate    500 mG (Tums) Chewable 1 Tablet(s) Chew three times a day  ergocalciferol 45003 Unit(s) Oral <User Schedule>  famotidine    Tablet 20 milliGRAM(s) Oral daily  finasteride 5 milliGRAM(s) Oral daily  insulin glargine Injectable (LANTUS) 24 Unit(s) SubCutaneous at bedtime  insulin lispro (ADMELOG) corrective regimen sliding scale   SubCutaneous three times a day before meals  insulin lispro (ADMELOG) corrective regimen sliding scale   SubCutaneous at bedtime  insulin lispro Injectable (ADMELOG) 4 Unit(s) SubCutaneous three times a day before meals  labetalol 200 milliGRAM(s) Oral every 12 hours  melatonin 5 milliGRAM(s) Oral at bedtime  mirtazapine 7.5 milliGRAM(s) Oral daily  Nephro-preeti 1 Tablet(s) Oral daily  sodium bicarbonate 650 milliGRAM(s) Oral three times a day  tamsulosin 0.4 milliGRAM(s) Oral at bedtime        LABS:	 	                      8.1    8.04  )-----------( 261      ( 01 Jan 2022 11:34 )             24.4     01-01    142  |  113<H>  |  27<H>  ----------------------------<  117<H>  4.2   |  22  |  2.08<H>    Ca    8.0<L>      01 Jan 2022 11:34  Phos  3.7     01-01  Mg     1.4     01-01        Lipid Profile: Cholesterol 145  LDL --  HDL 20    Ldl calc 77  Ratio --    TSH: Thyroid Stimulating Hormone, Serum: 1.38 uU/mL (12-23 @ 20:32)

## 2022-01-03 NOTE — PROGRESS NOTE ADULT - PROVIDER SPECIALTY LIST ADULT
Cardiology
Endocrinology
Infectious Disease
Infectious Disease
Internal Medicine
Internal Medicine
Nephrology
Endocrinology
Internal Medicine
Nephrology
Endocrinology
Endocrinology
Internal Medicine
Internal Medicine
Nephrology
Infectious Disease
Internal Medicine

## 2022-01-03 NOTE — PROGRESS NOTE ADULT - PROBLEM SELECTOR PLAN 11
eliquis  protonix

## 2022-01-03 NOTE — DISCHARGE NOTE NURSING/CASE MANAGEMENT/SOCIAL WORK - NSDCPEFALRISK_GEN_ALL_CORE
For information on Fall & Injury Prevention, visit: [email or web address] OR  [email or web address] OR  https://www.cdc.gov/steadi/patient.html

## 2022-01-03 NOTE — PROGRESS NOTE ADULT - PROBLEM SELECTOR PLAN 2
pt on sodium bicarb   normal AG  acetone and b hydroxybutrate WNL  Endo on board  Nephro on board  Lantus 20U, increased Lantus to 24u as per endo   Admelog 4U  monitor blood glucose

## 2022-01-03 NOTE — PROGRESS NOTE ADULT - SUBJECTIVE AND OBJECTIVE BOX
PGY1 Progress Note discussed with attending     PAGER #: [[phone]] TILL 5:00 PM  PLEASE CONTACT ON CALL TEAM:  - On Call Team (Please refer to Caleb) FROM 5:00 PM - 8:30PM  - Nightfloat Team FROM 8:30 -7:30 AM    CHIEF COMPLAINT & BRIEF HOSPITAL COURSE:    INTERVAL HPI/OVERNIGHT EVENTS:       REVIEW OF SYSTEMS:  CONSTITUTIONAL: No fever, weight loss, or fatigue  RESPIRATORY: No cough, wheezing, chills or hemoptysis; No shortness of breath  CARDIOVASCULAR: No chest pain, palpitations, dizziness, or leg swelling  GASTROINTESTINAL: No abdominal pain. No nausea, vomiting, or hematemesis; No diarrhea or constipation. No melena or hematochezia.  GENITOURINARY: No dysuria or hematuria, urinary frequency  NEUROLOGICAL: No headaches, memory loss, loss of strength, numbness, or tremors  SKIN: No itching, burning, rashes, or lesions     Vital Signs Last 24 Hrs  T(C): 36.8 (02 Jan 2022 20:33), Max: 36.8 (02 Jan 2022 20:33)  T(F): 98.3 (02 Jan 2022 20:33), Max: 98.3 (02 Jan 2022 20:33)  HR: 88 (02 Jan 2022 20:33) (83 - 88)  BP: 144/75 (02 Jan 2022 20:33) (144/75 - 154/77)  BP(mean): --  RR: 18 (02 Jan 2022 20:33) (18 - 18)  SpO2: 100% (02 Jan 2022 20:33) (100% - 100%)    PHYSICAL EXAMINATION:  GENERAL: NAD, well built  HEAD:  Atraumatic, Normocephalic  EYES:  conjunctiva and sclera clear  NECK: Supple, No JVD, Normal thyroid  CHEST/LUNG: Clear to auscultation. Clear to percussion bilaterally; No rales, rhonchi, wheezing, or rubs  HEART: Regular rate and rhythm; No murmurs, rubs, or gallops  ABDOMEN: Soft, Nontender, Nondistended; Bowel sounds present  NERVOUS SYSTEM:  Alert & Oriented X3,    EXTREMITIES:  2+ Peripheral Pulses, No clubbing, cyanosis, or edema  SKIN: warm dry                          8.1    8.04  )-----------( 261      ( 01 Jan 2022 11:34 )             24.4     01-01    142  |  113<H>  |  27<H>  ----------------------------<  117<H>  4.2   |  22  |  2.08<H>    Ca    8.0<L>      01 Jan 2022 11:34  Phos  3.7     01-01  Mg     1.4     01-01                CAPILLARY BLOOD GLUCOSE      RADIOLOGY & ADDITIONAL TESTS:                   PGY1 Progress Note discussed with attending     PAGER #: [[phone]] TILL 5:00 PM  PLEASE CONTACT ON CALL TEAM:  - On Call Team (Please refer to Caleb) FROM 5:00 PM - 8:30PM  - Nightfloat Team FROM 8:30 -7:30 AM    CHIEF COMPLAINT & BRIEF HOSPITAL COURSE:    INTERVAL HPI/OVERNIGHT EVENTS: No acute events noted overnight. Patient denies dysuria, fevers or chills. Patient wanting to go home.       REVIEW OF SYSTEMS:  CONSTITUTIONAL: No fever, weight loss, or fatigue  RESPIRATORY: No cough, wheezing, chills or hemoptysis; No shortness of breath  CARDIOVASCULAR: No chest pain, palpitations, dizziness, or leg swelling  GASTROINTESTINAL: No abdominal pain. No nausea, vomiting, or hematemesis; No diarrhea or constipation. No melena or hematochezia.  GENITOURINARY: No dysuria or hematuria, urinary frequency  NEUROLOGICAL: No headaches, memory loss, loss of strength, numbness, or tremors  SKIN: No itching, burning, rashes, or lesions     Vital Signs Last 24 Hrs  T(C): 36.8 (02 Jan 2022 20:33), Max: 36.8 (02 Jan 2022 20:33)  T(F): 98.3 (02 Jan 2022 20:33), Max: 98.3 (02 Jan 2022 20:33)  HR: 88 (02 Jan 2022 20:33) (83 - 88)  BP: 144/75 (02 Jan 2022 20:33) (144/75 - 154/77)  BP(mean): --  RR: 18 (02 Jan 2022 20:33) (18 - 18)  SpO2: 100% (02 Jan 2022 20:33) (100% - 100%)    PHYSICAL EXAMINATION:  Appearance: Normal	  HEENT:   Normal oral mucosa, PERRL, EOMI	  Lymphatic: No lymphadenopathy  Cardiovascular: Normal S1 S2, No JVD, No murmurs, No edema  Respiratory: Lungs clear to auscultation	  Psychiatry: A & O x 3, Mood & affect appropriate  Gastrointestinal:  Soft, Non-tender, + BS	  Skin: No rashes, No ecchymoses, No cyanosis	  Neurologic: Non-focal  Extremities: Normal range of motion, No clubbing, cyanosis or edema  Vascular: Peripheral pulses palpable 2+ bilaterally                            8.1    8.04  )-----------( 261      ( 01 Jan 2022 11:34 )             24.4     01-01    142  |  113<H>  |  27<H>  ----------------------------<  117<H>  4.2   |  22  |  2.08<H>    Ca    8.0<L>      01 Jan 2022 11:34  Phos  3.7     01-01  Mg     1.4     01-01                CAPILLARY BLOOD GLUCOSE      RADIOLOGY & ADDITIONAL TESTS:

## 2022-01-03 NOTE — PROGRESS NOTE ADULT - SUBJECTIVE AND OBJECTIVE BOX
Interval Events:  pt in nad    Allergies    Allergy Status Unknown    Intolerances      Endocrine/Metabolic Medications:  atorvastatin 10 milliGRAM(s) Oral at bedtime  finasteride 5 milliGRAM(s) Oral daily  insulin glargine Injectable (LANTUS) 24 Unit(s) SubCutaneous at bedtime  insulin lispro (ADMELOG) corrective regimen sliding scale   SubCutaneous three times a day before meals  insulin lispro (ADMELOG) corrective regimen sliding scale   SubCutaneous at bedtime  insulin lispro Injectable (ADMELOG) 4 Unit(s) SubCutaneous three times a day before meals      Vital Signs Last 24 Hrs  T(C): 36.8 (02 Jan 2022 20:33), Max: 36.8 (02 Jan 2022 20:33)  T(F): 98.3 (02 Jan 2022 20:33), Max: 98.3 (02 Jan 2022 20:33)  HR: 88 (02 Jan 2022 20:33) (83 - 88)  BP: 144/75 (02 Jan 2022 20:33) (144/75 - 154/77)  BP(mean): --  RR: 18 (02 Jan 2022 20:33) (18 - 18)  SpO2: 100% (02 Jan 2022 20:33) (100% - 100%)      PHYSICAL EXAM  All physical exam findings normal, except those marked:  General:	Alert, active, cooperative, NAD, well hydrated  .		[] Abnormal:  Neck		Normal: supple, no cervical adenopathy, no palpable thyroid  .		[] Abnormal:  Cardiovascular	Normal: regular rate, normal S1, S2, no murmurs  .		[] Abnormal:  Respiratory	Normal: no chest wall deformity, normal respiratory pattern, CTA B/L  .		[] Abnormal:  Abdominal	Normal: soft, ND, NT, bowel sounds present, no masses, no organomegaly  .		[] Abnormal:  		Normal normal genitalia, testes descended, circumcised/uncircumcised  .		Denice stage:			Breast denice:  .		Menstrual history:  .		[] Abnormal:  Extremities	Normal: FROM x4  .		[] Abnormal:  Skin		Normal: intact and not indurated, no rash, no acanthosis nigricans  .		[] Abnormal:  Neurologic	Normal: grossly intact  .		[] Abnormal:    LABS        CAPILLARY BLOOD GLUCOSE      POCT Blood Glucose.: 208 mg/dL (02 Jan 2022 21:38)  POCT Blood Glucose.: 197 mg/dL (02 Jan 2022 12:02)        Assesment/plan  Pt is a 81 yr old male with hx of Chronic DVT, Anemia, GERD, HTN, HLD, DM, Hypoparathyroidism, Glaucoma and BPH presents to ed for generalized weakness and family called ems after unable to reach pt at home. )  Found to have manuel/ uti and uncont dm. Pt takes metformin and amaryl as out pt. Does not check fsg and denies hypoglycemic sx. Now hyperglycemic.     Problem/Recommendation - 1:  ·  Problem: Diabetes mellitus.   ·  Recommendation: uncontrolled with hyperglycemia due to acute illness  a1c- 7.5% on metformin/amaryl as out pt  better controlled now  cont lantus to 24 units  cont admelog 4 ac tid   fsg ac and hs.  d/w prim team      Problem/Recommendation - 2:  ·  Problem: Acute UTI.   ·  Recommendation: cont iv abx  per ID.     Problem/Recommendation - 3:  ·  Problem: MANUEL (acute kidney injury).   ·  Recommendation: acute on ckd- (from obstructive uropathy)  f/u nephro recs  pt cannot be on metformin and amaryl.  urology eval noted    Hypocalcemia- due to vit D def  low calcium and high pth level  cont vitd 50k qwk  cont calcium supplements   d/w prim team

## 2022-01-03 NOTE — PROGRESS NOTE ADULT - PROBLEM SELECTOR PLAN 5
Pt has low magnesium during hospitalization  may be 2/2 PPI  mag replaced today  continue to monitor
Pt has low magnesium during hospitalization  replaced  continue to monitor
Pt has low magnesium during hospitalization  replaced  continue to monitor
pmh of dm  f/u hgb a1c  will start insulin ss  monitor bs  adjust as needed  diabetic diet
Pt has low magnesium during hospitalization  may be 2/2 PPI  mag replaced today  continue to monitor
pmh of dm  hgb a1c 7.4  insulin ss  monitor bs  adjust as needed  diabetic diet
Pt has low magnesium during hospitalization  may be 2/2 PPI  mag WNL today  continue to monitor
Pt has low magnesium during hospitalization  may be 2/2 PPI  mag replaced today  continue to monitor
Pt has low magnesium   may be 2/2 PPI  replaced 12/27  continue to monitor

## 2022-01-03 NOTE — PROGRESS NOTE ADULT - REASON FOR ADMISSION
UTI

## 2022-01-03 NOTE — PROGRESS NOTE ADULT - PROBLEM SELECTOR PLAN 1
Pt p/w generalized weakness, WBC   UA +  urine cultures E. Faecalis sensitive to ampicillin  c/w ampicillin  Dr. Ayala ID onboard following reccs  Plan for d/c pending placement  will discharge on Augmentin 875mg PO BID until 1/11/22

## 2022-01-14 ENCOUNTER — EMERGENCY (EMERGENCY)
Facility: HOSPITAL | Age: 82
LOS: 1 days | Discharge: HOME | End: 2022-01-14
Attending: STUDENT IN AN ORGANIZED HEALTH CARE EDUCATION/TRAINING PROGRAM
Payer: MEDICARE

## 2022-01-14 VITALS
HEIGHT: 67 IN | RESPIRATION RATE: 20 BRPM | OXYGEN SATURATION: 100 % | DIASTOLIC BLOOD PRESSURE: 70 MMHG | SYSTOLIC BLOOD PRESSURE: 103 MMHG | HEART RATE: 69 BPM

## 2022-01-14 VITALS — TEMPERATURE: 97 F

## 2022-01-14 LAB
ALBUMIN SERPL ELPH-MCNC: 2.3 G/DL — LOW (ref 3.5–5)
ALP SERPL-CCNC: 73 U/L — SIGNIFICANT CHANGE UP (ref 40–120)
ALT FLD-CCNC: 14 U/L DA — SIGNIFICANT CHANGE UP (ref 10–60)
ANION GAP SERPL CALC-SCNC: 9 MMOL/L — SIGNIFICANT CHANGE UP (ref 5–17)
APTT BLD: 28.4 SEC — SIGNIFICANT CHANGE UP (ref 27.5–35.5)
AST SERPL-CCNC: 9 U/L — LOW (ref 10–40)
BASOPHILS # BLD AUTO: 0.02 K/UL — SIGNIFICANT CHANGE UP (ref 0–0.2)
BASOPHILS NFR BLD AUTO: 0.3 % — SIGNIFICANT CHANGE UP (ref 0–2)
BILIRUB SERPL-MCNC: 0.5 MG/DL — SIGNIFICANT CHANGE UP (ref 0.2–1.2)
BUN SERPL-MCNC: 36 MG/DL — HIGH (ref 7–18)
CALCIUM SERPL-MCNC: 8.3 MG/DL — LOW (ref 8.4–10.5)
CHLORIDE SERPL-SCNC: 104 MMOL/L — SIGNIFICANT CHANGE UP (ref 96–108)
CO2 SERPL-SCNC: 21 MMOL/L — LOW (ref 22–31)
CREAT SERPL-MCNC: 2.63 MG/DL — HIGH (ref 0.5–1.3)
EOSINOPHIL # BLD AUTO: 0.05 K/UL — SIGNIFICANT CHANGE UP (ref 0–0.5)
EOSINOPHIL NFR BLD AUTO: 0.7 % — SIGNIFICANT CHANGE UP (ref 0–6)
GLUCOSE SERPL-MCNC: 244 MG/DL — HIGH (ref 70–99)
HCT VFR BLD CALC: 28.2 % — LOW (ref 39–50)
HGB BLD-MCNC: 9.4 G/DL — LOW (ref 13–17)
IMM GRANULOCYTES NFR BLD AUTO: 0.6 % — SIGNIFICANT CHANGE UP (ref 0–1.5)
INR BLD: 1.14 RATIO — SIGNIFICANT CHANGE UP (ref 0.88–1.16)
LYMPHOCYTES # BLD AUTO: 0.96 K/UL — LOW (ref 1–3.3)
LYMPHOCYTES # BLD AUTO: 14.3 % — SIGNIFICANT CHANGE UP (ref 13–44)
MCHC RBC-ENTMCNC: 29 PG — SIGNIFICANT CHANGE UP (ref 27–34)
MCHC RBC-ENTMCNC: 33.3 GM/DL — SIGNIFICANT CHANGE UP (ref 32–36)
MCV RBC AUTO: 87 FL — SIGNIFICANT CHANGE UP (ref 80–100)
MONOCYTES # BLD AUTO: 0.36 K/UL — SIGNIFICANT CHANGE UP (ref 0–0.9)
MONOCYTES NFR BLD AUTO: 5.4 % — SIGNIFICANT CHANGE UP (ref 2–14)
NEUTROPHILS # BLD AUTO: 5.28 K/UL — SIGNIFICANT CHANGE UP (ref 1.8–7.4)
NEUTROPHILS NFR BLD AUTO: 78.7 % — HIGH (ref 43–77)
NRBC # BLD: 0 /100 WBCS — SIGNIFICANT CHANGE UP (ref 0–0)
PLATELET # BLD AUTO: 185 K/UL — SIGNIFICANT CHANGE UP (ref 150–400)
POTASSIUM SERPL-MCNC: 4.8 MMOL/L — SIGNIFICANT CHANGE UP (ref 3.5–5.3)
POTASSIUM SERPL-SCNC: 4.8 MMOL/L — SIGNIFICANT CHANGE UP (ref 3.5–5.3)
PROT SERPL-MCNC: 6.9 G/DL — SIGNIFICANT CHANGE UP (ref 6–8.3)
PROTHROM AB SERPL-ACNC: 13.5 SEC — SIGNIFICANT CHANGE UP (ref 10.6–13.6)
RBC # BLD: 3.24 M/UL — LOW (ref 4.2–5.8)
RBC # FLD: 14.7 % — HIGH (ref 10.3–14.5)
SODIUM SERPL-SCNC: 134 MMOL/L — LOW (ref 135–145)
TROPONIN I, HIGH SENSITIVITY RESULT: 7.7 NG/L — SIGNIFICANT CHANGE UP
WBC # BLD: 6.71 K/UL — SIGNIFICANT CHANGE UP (ref 3.8–10.5)
WBC # FLD AUTO: 6.71 K/UL — SIGNIFICANT CHANGE UP (ref 3.8–10.5)

## 2022-01-14 PROCEDURE — 99283 EMERGENCY DEPT VISIT LOW MDM: CPT

## 2022-01-14 PROCEDURE — 36415 COLL VENOUS BLD VENIPUNCTURE: CPT

## 2022-01-14 PROCEDURE — 82962 GLUCOSE BLOOD TEST: CPT

## 2022-01-14 PROCEDURE — 85025 COMPLETE CBC W/AUTO DIFF WBC: CPT

## 2022-01-14 PROCEDURE — 85610 PROTHROMBIN TIME: CPT

## 2022-01-14 PROCEDURE — 85730 THROMBOPLASTIN TIME PARTIAL: CPT

## 2022-01-14 PROCEDURE — 99285 EMERGENCY DEPT VISIT HI MDM: CPT

## 2022-01-14 PROCEDURE — 80053 COMPREHEN METABOLIC PANEL: CPT

## 2022-01-14 PROCEDURE — 93005 ELECTROCARDIOGRAM TRACING: CPT

## 2022-01-14 PROCEDURE — 84484 ASSAY OF TROPONIN QUANT: CPT

## 2022-01-14 RX ORDER — SODIUM CHLORIDE 9 MG/ML
500 INJECTION INTRAMUSCULAR; INTRAVENOUS; SUBCUTANEOUS ONCE
Refills: 0 | Status: COMPLETED | OUTPATIENT
Start: 2022-01-14 | End: 2022-01-14

## 2022-01-14 RX ADMIN — SODIUM CHLORIDE 500 MILLILITER(S): 9 INJECTION INTRAMUSCULAR; INTRAVENOUS; SUBCUTANEOUS at 18:18

## 2022-01-14 NOTE — ED PROVIDER NOTE - PATIENT PORTAL LINK FT
You can access the FollowMyHealth Patient Portal offered by [facility] by registering at the following website: [email or web address]. By joining [facility]’s FollowMyHealth portal, you will also be able to view your health information using other applications (apps) compatible with our system.

## 2022-01-14 NOTE — ED PROVIDER NOTE - CROS ED ENDOCRINE ALL NEG
Care Manager received a referral for home health RN, PT and OT  Referrals sent to [facility] [phone]  Acceptance is pending clinical and financial review    3:[facility] unable to accept due to staffing. Referrals sent to various agencies, pending acceptance   - - -

## 2022-01-14 NOTE — ED ADULT TRIAGE NOTE - CHIEF COMPLAINT QUOTE
BIBA for s/p found on the floor, called in by neighbor. Syncope with EMS for 40 sec after pt stood from floor,  in field

## 2022-01-14 NOTE — ED PROVIDER NOTE - OBJECTIVE STATEMENT
81 y.o presenting for fall per ems. patient found on flooor. patient aox3, endorses that the floor felt cool so he laid down. denies trauma, fall, head injury, cp, sob. per ems, patient had syncopal episode when he was brought up from the floor. patient endorses he does not believe he lost conciousness

## 2022-01-14 NOTE — ED PROVIDER NOTE - PROGRESS NOTE DETAILS
Patient declined xray, ambulatory. endorses that he does not want imaging since he doesn't have any pain. offered admission, explained that I am concern of his home safety, patient declined, states he want sot go home, aox3. SW consulted, patient declined ambulance and ambulate. state he wants to walk home rather than wait for transporation. explained to patient that it is cold out and my concern for fall. patient endorses that he understands but still adament that she does not want to stay for admission, tranportation or imaging. patient aox3, has capacity, clothing provided by casa. discharged after shared decision making patient now agreeable to ambulette but still declined admission and imaging. aox3, neuro intact, has capacity

## 2022-01-14 NOTE — CHART NOTE - NSCHARTNOTEFT_GEN_A_CORE
Verbal referral received for safe d/c and sbirt consult.     Pt is a 81 year old male who was brought to the ED by EMS post fall . Per ED physician , Pt declined admission and he requested for d/c to home from the ED. . Johanna met with Pt at bedside, he was alert and orientedx3. Johanna introduced self and role explained. Pt was treated / released. Pt declined ambulance /ambulette when offered but requested for a cab. Pt reports that he does not have money for cab. Pt was brought to the ED by EMS with just his under wear  and a skimpy T shirt. Johanna offered emotional support and encouraged him to comply with treatment plan goals. Pt declined imaging and stated that he was not in any pain. He states that he did not know why he was brought to the ED . Johanna provided Pt with cloths, winter jacket and bill back ambulette  transportation .  Johanna explained to the Pt that it was not safe for him to walk home due to the cold weather. Pt finally consented to travel home with the ambulette after several attempts of convincing  him of the importance of travelling in an automobile other than trekking home. Pt lives alone on the first floor and he ambulates independently. Pt reports that he does not have home care service but declines home care service when offered.     Pt screened positive for sbirt . Pt denied hx of alcohol and illicit drug use. Sbirt screen completed in sunrise.     Ambulette transportation to be arranged by ED . Pt was socially cleared and Physician was made aware.

## 2022-01-27 ENCOUNTER — EMERGENCY (EMERGENCY)
Facility: HOSPITAL | Age: 82
LOS: 1 days | Discharge: HOME | End: 2022-01-27
Attending: STUDENT IN AN ORGANIZED HEALTH CARE EDUCATION/TRAINING PROGRAM
Payer: MEDICARE

## 2022-01-27 VITALS
RESPIRATION RATE: 18 BRPM | DIASTOLIC BLOOD PRESSURE: 83 MMHG | OXYGEN SATURATION: 98 % | HEIGHT: 67 IN | SYSTOLIC BLOOD PRESSURE: 125 MMHG | HEART RATE: 103 BPM | TEMPERATURE: 98 F | WEIGHT: 151.9 LBS

## 2022-01-27 VITALS
DIASTOLIC BLOOD PRESSURE: 74 MMHG | RESPIRATION RATE: 18 BRPM | SYSTOLIC BLOOD PRESSURE: 121 MMHG | HEART RATE: 84 BPM | OXYGEN SATURATION: 97 % | TEMPERATURE: 98 F

## 2022-01-27 LAB
ALBUMIN SERPL ELPH-MCNC: 2.9 G/DL — LOW (ref 3.5–5)
ALP SERPL-CCNC: 97 U/L — SIGNIFICANT CHANGE UP (ref 40–120)
ALT FLD-CCNC: 13 U/L DA — SIGNIFICANT CHANGE UP (ref 10–60)
ANION GAP SERPL CALC-SCNC: 9 MMOL/L — SIGNIFICANT CHANGE UP (ref 5–17)
APPEARANCE UR: ABNORMAL
AST SERPL-CCNC: 4 U/L — LOW (ref 10–40)
BACTERIA # UR AUTO: ABNORMAL /HPF
BASE EXCESS BLDV CALC-SCNC: -3.3 MMOL/L — SIGNIFICANT CHANGE UP
BASOPHILS # BLD AUTO: 0.03 K/UL — SIGNIFICANT CHANGE UP (ref 0–0.2)
BASOPHILS NFR BLD AUTO: 0.4 % — SIGNIFICANT CHANGE UP (ref 0–2)
BILIRUB SERPL-MCNC: 0.2 MG/DL — SIGNIFICANT CHANGE UP (ref 0.2–1.2)
BILIRUB UR-MCNC: NEGATIVE — SIGNIFICANT CHANGE UP
BUN SERPL-MCNC: 69 MG/DL — HIGH (ref 7–18)
CALCIUM SERPL-MCNC: 9 MG/DL — SIGNIFICANT CHANGE UP (ref 8.4–10.5)
CHLORIDE SERPL-SCNC: 98 MMOL/L — SIGNIFICANT CHANGE UP (ref 96–108)
CO2 SERPL-SCNC: 23 MMOL/L — SIGNIFICANT CHANGE UP (ref 22–31)
COLOR SPEC: YELLOW — SIGNIFICANT CHANGE UP
CREAT SERPL-MCNC: 2.87 MG/DL — HIGH (ref 0.5–1.3)
DIFF PNL FLD: ABNORMAL
EOSINOPHIL # BLD AUTO: 0.07 K/UL — SIGNIFICANT CHANGE UP (ref 0–0.5)
EOSINOPHIL NFR BLD AUTO: 0.9 % — SIGNIFICANT CHANGE UP (ref 0–6)
GLUCOSE SERPL-MCNC: 352 MG/DL — HIGH (ref 70–99)
GLUCOSE UR QL: 1000 MG/DL
HCO3 BLDV-SCNC: 24 MMOL/L — SIGNIFICANT CHANGE UP (ref 22–29)
HCT VFR BLD CALC: 30.9 % — LOW (ref 39–50)
HGB BLD-MCNC: 10.4 G/DL — LOW (ref 13–17)
IMM GRANULOCYTES NFR BLD AUTO: 0.8 % — SIGNIFICANT CHANGE UP (ref 0–1.5)
KETONES UR-MCNC: NEGATIVE — SIGNIFICANT CHANGE UP
LEUKOCYTE ESTERASE UR-ACNC: ABNORMAL
LYMPHOCYTES # BLD AUTO: 0.96 K/UL — LOW (ref 1–3.3)
LYMPHOCYTES # BLD AUTO: 12.6 % — LOW (ref 13–44)
MCHC RBC-ENTMCNC: 29 PG — SIGNIFICANT CHANGE UP (ref 27–34)
MCHC RBC-ENTMCNC: 33.7 GM/DL — SIGNIFICANT CHANGE UP (ref 32–36)
MCV RBC AUTO: 86.1 FL — SIGNIFICANT CHANGE UP (ref 80–100)
MONOCYTES # BLD AUTO: 0.45 K/UL — SIGNIFICANT CHANGE UP (ref 0–0.9)
MONOCYTES NFR BLD AUTO: 5.9 % — SIGNIFICANT CHANGE UP (ref 2–14)
NEUTROPHILS # BLD AUTO: 6.06 K/UL — SIGNIFICANT CHANGE UP (ref 1.8–7.4)
NEUTROPHILS NFR BLD AUTO: 79.4 % — HIGH (ref 43–77)
NITRITE UR-MCNC: NEGATIVE — SIGNIFICANT CHANGE UP
NRBC # BLD: 0 /100 WBCS — SIGNIFICANT CHANGE UP (ref 0–0)
PCO2 BLDV: 51 MMHG — SIGNIFICANT CHANGE UP (ref 42–55)
PH BLDV: 7.28 — LOW (ref 7.32–7.43)
PH UR: 6 — SIGNIFICANT CHANGE UP (ref 5–8)
PLATELET # BLD AUTO: 226 K/UL — SIGNIFICANT CHANGE UP (ref 150–400)
PO2 BLDV: 25 MMHG — SIGNIFICANT CHANGE UP
POTASSIUM SERPL-MCNC: 4.6 MMOL/L — SIGNIFICANT CHANGE UP (ref 3.5–5.3)
POTASSIUM SERPL-SCNC: 4.6 MMOL/L — SIGNIFICANT CHANGE UP (ref 3.5–5.3)
PROT SERPL-MCNC: 7.6 G/DL — SIGNIFICANT CHANGE UP (ref 6–8.3)
PROT UR-MCNC: 100
RBC # BLD: 3.59 M/UL — LOW (ref 4.2–5.8)
RBC # FLD: 13.5 % — SIGNIFICANT CHANGE UP (ref 10.3–14.5)
RBC CASTS # UR COMP ASSIST: SIGNIFICANT CHANGE UP /HPF (ref 0–2)
SAO2 % BLDV: 38 % — SIGNIFICANT CHANGE UP
SARS-COV-2 RNA SPEC QL NAA+PROBE: SIGNIFICANT CHANGE UP
SODIUM SERPL-SCNC: 130 MMOL/L — LOW (ref 135–145)
SP GR SPEC: 1.01 — SIGNIFICANT CHANGE UP (ref 1.01–1.02)
UROBILINOGEN FLD QL: NEGATIVE — SIGNIFICANT CHANGE UP
WBC # BLD: 7.63 K/UL — SIGNIFICANT CHANGE UP (ref 3.8–10.5)
WBC # FLD AUTO: 7.63 K/UL — SIGNIFICANT CHANGE UP (ref 3.8–10.5)
WBC UR QL: >50 /HPF (ref 0–5)

## 2022-01-27 PROCEDURE — 80053 COMPREHEN METABOLIC PANEL: CPT

## 2022-01-27 PROCEDURE — 87635 SARS-COV-2 COVID-19 AMP PRB: CPT

## 2022-01-27 PROCEDURE — 99285 EMERGENCY DEPT VISIT HI MDM: CPT | Mod: 25

## 2022-01-27 PROCEDURE — 82803 BLOOD GASES ANY COMBINATION: CPT

## 2022-01-27 PROCEDURE — 81001 URINALYSIS AUTO W/SCOPE: CPT

## 2022-01-27 PROCEDURE — 36415 COLL VENOUS BLD VENIPUNCTURE: CPT

## 2022-01-27 PROCEDURE — 70450 CT HEAD/BRAIN W/O DYE: CPT | Mod: MA

## 2022-01-27 PROCEDURE — 70450 CT HEAD/BRAIN W/O DYE: CPT | Mod: 26,MA

## 2022-01-27 PROCEDURE — 93010 ELECTROCARDIOGRAM REPORT: CPT

## 2022-01-27 PROCEDURE — 87186 SC STD MICRODIL/AGAR DIL: CPT

## 2022-01-27 PROCEDURE — 96374 THER/PROPH/DIAG INJ IV PUSH: CPT

## 2022-01-27 PROCEDURE — 93005 ELECTROCARDIOGRAM TRACING: CPT

## 2022-01-27 PROCEDURE — 87086 URINE CULTURE/COLONY COUNT: CPT

## 2022-01-27 PROCEDURE — 82962 GLUCOSE BLOOD TEST: CPT

## 2022-01-27 PROCEDURE — 96375 TX/PRO/DX INJ NEW DRUG ADDON: CPT

## 2022-01-27 PROCEDURE — 99285 EMERGENCY DEPT VISIT HI MDM: CPT

## 2022-01-27 PROCEDURE — 85025 COMPLETE CBC W/AUTO DIFF WBC: CPT

## 2022-01-27 RX ORDER — CEPHALEXIN 500 MG
1 CAPSULE ORAL
Qty: 21 | Refills: 0
Start: 2022-01-27 | End: 2022-02-02

## 2022-01-27 RX ORDER — INSULIN HUMAN 100 [IU]/ML
5 INJECTION, SOLUTION SUBCUTANEOUS ONCE
Refills: 0 | Status: COMPLETED | OUTPATIENT
Start: 2022-01-27 | End: 2022-01-27

## 2022-01-27 RX ORDER — SODIUM CHLORIDE 9 MG/ML
1000 INJECTION INTRAMUSCULAR; INTRAVENOUS; SUBCUTANEOUS ONCE
Refills: 0 | Status: COMPLETED | OUTPATIENT
Start: 2022-01-27 | End: 2022-01-27

## 2022-01-27 RX ORDER — CEFTRIAXONE 500 MG/1
1000 INJECTION, POWDER, FOR SOLUTION INTRAMUSCULAR; INTRAVENOUS ONCE
Refills: 0 | Status: COMPLETED | OUTPATIENT
Start: 2022-01-27 | End: 2022-01-27

## 2022-01-27 RX ADMIN — SODIUM CHLORIDE 1000 MILLILITER(S): 9 INJECTION INTRAMUSCULAR; INTRAVENOUS; SUBCUTANEOUS at 11:51

## 2022-01-27 RX ADMIN — CEFTRIAXONE 100 MILLIGRAM(S): 500 INJECTION, POWDER, FOR SOLUTION INTRAMUSCULAR; INTRAVENOUS at 16:21

## 2022-01-27 RX ADMIN — INSULIN HUMAN 5 UNIT(S): 100 INJECTION, SOLUTION SUBCUTANEOUS at 16:20

## 2022-01-27 NOTE — ED ADULT NURSE NOTE - NSIMPLEMENTINTERV_GEN_ALL_ED
Implemented All Fall Risk Interventions:  [place] to call system. Call bell, personal items and telephone within reach. Instruct patient to call for assistance. Room bathroom lighting operational. Non-slip footwear when patient is off stretcher. Physically safe environment: no spills, clutter or unnecessary equipment. Stretcher in lowest position, wheels locked, appropriate side rails in place. Provide visual cue, wrist band, yellow gown, etc. Monitor gait and stability. Monitor for mental status changes and reorient to person, place, and time. Review medications for side effects contributing to fall risk. Reinforce activity limits and safety measures with patient and family.

## 2022-01-27 NOTE — CHART NOTE - NSCHARTNOTEFT_GEN_A_CORE
Patient is an 81 year old male with a PMHx of chronic DVT (on eliquis), anemia, GERD, HTN, HLD, DM, hypoparathyroidism, BPH, and UTI tx at [facility] 3 weeks ago; patient presented to [facility] via EMS for AMS after patient's Rabbi visited patient at home, discovered patient confused and observed gas burner on.    SW consult requested to assess safety to discharge back home.    SW met with patient at bedside; role of SW explained.  Patient presented A&Ox3, poor historian and guarded.  Patient presented verbally aggressive during assessment- stating he does not need help. Patient confirmed that he rents the first floor of 3 family house with no steps to enter ([address] [place] [address], [place], [place], [address]); patient reports he does not use any DME for ambulation, however, has a walker at home.  Patient reports he does not need SW intervention and wants to discharge home.  When asked about leaving his gar burner on; patient stated that he left it on because he has no heat in the home since the start of winter; patient stated he does not remember the name/contact information to his landlord. Patient agreeable for SW to outreach his emergency contact Mishel (phone [phone]) if it will get him home quicker/faster.    SW outreached patient's cousin Mishel; role of SW explained.  Mishel explained that she is currently in [place] with her son Cisco whom is also listed as an emergency contact (phone [phone]) and that phone number listed as [phone] Is her daughter Lucho.  As per Misehl, patient is lucid, has episodes of confusion, however, is independent and has been able to manage.  Mishel reports that patient does not want to go in to a LTC/BRINDA and has adamantly declined [facility] placement. Mishel reports that she visits patient frequently- has cleaned the home 3 weeks ago thoroughly and confirmed that patient has heat.  Mishel reports patient makes these statements when he feels embarrassed and  does not want to be further questioned.  Mishel explained that she does not want APS involvement and will assume physical responsibility for patient- Mishel reports she is in [place] until Wednesday and that her sister Antonia (phone [phone]) whom resides in [place] will pick patient up from the hospital to bring him home and assume physical responsibility short term of patient until Mishel returns back from [place].  Mishel explained that she has looked into requesting guardianship of patient as she is a , however, has not been able to at this time.    Mishel called SW back and stated that her sister Antonia will pick patient up from the hospital between 7PM-9PM today. Warm hand off provided to evening unit SW as well as medical team assigned to patient's care.

## 2022-01-27 NOTE — ED PROVIDER NOTE - PATIENT PORTAL LINK FT
You can access the FollowMyHealth Patient Portal offered by [facility] by registering at the following website: [email or web address]. By joining [facility]’s FollowMyHealth portal, you will also be able to view your health information using other applications (apps) compatible with our system.

## 2022-01-27 NOTE — ED PROVIDER NOTE - CARE PLAN
1 Principal Discharge DX:	Hyperglycemia   Principal Discharge DX:	Hyperglycemia  Secondary Diagnosis:	Acute UTI

## 2022-01-27 NOTE — ED PROVIDER NOTE - ATTENDING CONTRIBUTION TO CARE
81M from home, lives alone, PMH of Chronic DVT (on eliquis), anemia, GERD, HTN, HLD, DM, Hypoparathyroidism, BPH, UTI tx at [facility] 3 weeks ago BIBEMS for AMS. Pt's  came to visit him this AM at home and saw that he was confused and that the stove burner was on. Noted to be hyperglycemic. Pt states he has been in his usual state of health, denies any symptoms. States he hasn't been taking his DM meds because the doses were changed after his last admission and he was concerned about taking them at the new doses. States he knew the stove burner was on because he put it on himself to heat the apartment because the landlord keeps the home too cold. Pt is well appearing, A&Ox3, neuro intact, HD stable, abd benign. Will assess for DKA, infection, and likely consult SW to ensure safe discharge if labs wnl.

## 2022-01-27 NOTE — ED PROVIDER NOTE - CLINICAL SUMMARY MEDICAL DECISION MAKING FREE TEXT BOX
81M from home, lives alone, PMH of Chronic DVT (on eliquis), anemia, GERD, HTN, HLD, DM, Hypoparathyroidism, BPH, UTI tx at [facility] 3 weeks ago BIBEMS for AMS. Pt oriented at time of exam.   R/o DKA   Per chart, pt with recurrent episodes of syncope and is on AC at home. Was found with AMS and gas burner on   Likely unsafe DC. 81M from home, lives alone, PMH of Chronic DVT (on eliquis), anemia, GERD, HTN, HLD, DM, Hypoparathyroidism, BPH, UTI tx at [facility] 3 weeks ago BIBEMS for AMS. Pt oriented at time of exam.   R/o DKA   Labs, blood gas, UA, CT Head  Per chart, pt with recurrent episodes of syncope and is on AC at home. Was found with AMS and gas burner on   Likely unsafe DC. 81M from home, lives alone, PMH of Chronic DVT (on eliquis), anemia, GERD, HTN, HLD, DM, Hypoparathyroidism, BPH, UTI tx at [facility] 3 weeks ago BIBEMS for AMS. Pt oriented at time of exam.   R/o DKA   Labs, blood gas, UA, CT Head  Per chart, pt with recurrent episodes of syncope and is on AC at home. Was found with AMS and gas burner on

## 2022-01-27 NOTE — ED ADULT TRIAGE NOTE - CHIEF COMPLAINT QUOTE
pt biba as per the ems pt looks confused this morning , the stove burner was on flame . blood Sugar on field is 600

## 2022-01-27 NOTE — ED PROVIDER NOTE - OBJECTIVE STATEMENT
81M from home, lives alone, PMH of Chronic DVT (on eliquis), anemia, GERD, HTN, HLD, DM, Hypoparathyroidism, BPH, UTI tx at [facility] 3 weeks ago BIBEMS for AMS. Pt's  came to visit him this AM at home and saw that he was confused, gas burner on stove. Pt had BS of 600 in the field per EMS. Upon questioning pt feels fine with no complaints.  AAOx3 but mild historian. Pt reports he is noncompliant with HTN and DM meds. Per chart, pt with recurrent episodes of syncope but previously refused admission.

## 2022-01-27 NOTE — ED PROVIDER NOTE - PROGRESS NOTE DETAILS
ortiz: pt seen by ELMER and cleared. family outside to pick pt up. keflex given.  dx uti and hyperglycemia. rx keflex. stay hydrated, watch glucose levels. ambulatory

## 2022-01-27 NOTE — CHART NOTE - NSCHARTNOTEFT_GEN_A_CORE
Handoff received by Ana christian re family picking up Pt from the ED.       Ana christian reported that Abraham Knight's cousin will be picking Pt up  from the ED between 7pm- 9pm.  Covering nurseAndrea and Physician made aware.      Per ED nurseAndrea, Pt was picked up by Antonia ([phone]) as scheduled. No further swk intervention needed at this time

## 2022-01-27 NOTE — ED PROVIDER NOTE - NSICDXPASTMEDICALHX_GEN_ALL_CORE_FT
PAST MEDICAL HISTORY:  Anemia     BPH (benign prostatic hyperplasia)     Diabetes     DVT (deep venous thrombosis)     GERD (gastroesophageal reflux disease)     H/O hypoparathyroidism     Hypercholesterolemia     Hypertension     Hypomagnesemia

## 2022-02-02 ENCOUNTER — EMERGENCY (EMERGENCY)
Facility: HOSPITAL | Age: 82
LOS: 1 days | Discharge: HOME | End: 2022-02-02
Attending: EMERGENCY MEDICINE
Payer: MEDICARE

## 2022-02-02 VITALS
RESPIRATION RATE: 18 BRPM | OXYGEN SATURATION: 95 % | WEIGHT: 179.9 LBS | HEART RATE: 109 BPM | HEIGHT: 67 IN | SYSTOLIC BLOOD PRESSURE: 159 MMHG | DIASTOLIC BLOOD PRESSURE: 82 MMHG | TEMPERATURE: 98 F

## 2022-02-02 VITALS
TEMPERATURE: 97 F | DIASTOLIC BLOOD PRESSURE: 59 MMHG | SYSTOLIC BLOOD PRESSURE: 126 MMHG | OXYGEN SATURATION: 95 % | HEART RATE: 94 BPM | RESPIRATION RATE: 18 BRPM

## 2022-02-02 PROBLEM — N40.0 BENIGN PROSTATIC HYPERPLASIA WITHOUT LOWER URINARY TRACT SYMPTOMS: Chronic | Status: ACTIVE | Noted: 2022-01-27

## 2022-02-02 PROBLEM — Z86.39 PERSONAL HISTORY OF OTHER ENDOCRINE, NUTRITIONAL AND METABOLIC DISEASE: Chronic | Status: ACTIVE | Noted: 2022-01-27

## 2022-02-02 LAB
ALBUMIN SERPL ELPH-MCNC: 2.5 G/DL — LOW (ref 3.5–5)
ALP SERPL-CCNC: 86 U/L — SIGNIFICANT CHANGE UP (ref 40–120)
ALT FLD-CCNC: 11 U/L DA — SIGNIFICANT CHANGE UP (ref 10–60)
ANION GAP SERPL CALC-SCNC: 9 MMOL/L — SIGNIFICANT CHANGE UP (ref 5–17)
APPEARANCE UR: ABNORMAL
APTT BLD: 33.2 SEC — SIGNIFICANT CHANGE UP (ref 27.5–35.5)
AST SERPL-CCNC: 11 U/L — SIGNIFICANT CHANGE UP (ref 10–40)
BASOPHILS # BLD AUTO: 0.01 K/UL — SIGNIFICANT CHANGE UP (ref 0–0.2)
BASOPHILS NFR BLD AUTO: 0.1 % — SIGNIFICANT CHANGE UP (ref 0–2)
BILIRUB SERPL-MCNC: 0.2 MG/DL — SIGNIFICANT CHANGE UP (ref 0.2–1.2)
BILIRUB UR-MCNC: NEGATIVE — SIGNIFICANT CHANGE UP
BUN SERPL-MCNC: 53 MG/DL — HIGH (ref 7–18)
CALCIUM SERPL-MCNC: 8.4 MG/DL — SIGNIFICANT CHANGE UP (ref 8.4–10.5)
CHLORIDE SERPL-SCNC: 103 MMOL/L — SIGNIFICANT CHANGE UP (ref 96–108)
CO2 SERPL-SCNC: 20 MMOL/L — LOW (ref 22–31)
COLOR SPEC: YELLOW — SIGNIFICANT CHANGE UP
CREAT SERPL-MCNC: 3.1 MG/DL — HIGH (ref 0.5–1.3)
DIFF PNL FLD: ABNORMAL
EOSINOPHIL # BLD AUTO: 0.04 K/UL — SIGNIFICANT CHANGE UP (ref 0–0.5)
EOSINOPHIL NFR BLD AUTO: 0.5 % — SIGNIFICANT CHANGE UP (ref 0–6)
GLUCOSE SERPL-MCNC: 276 MG/DL — HIGH (ref 70–99)
GLUCOSE UR QL: 50 MG/DL
HCT VFR BLD CALC: 26.8 % — LOW (ref 39–50)
HGB BLD-MCNC: 9.2 G/DL — LOW (ref 13–17)
IMM GRANULOCYTES NFR BLD AUTO: 1 % — SIGNIFICANT CHANGE UP (ref 0–1.5)
INR BLD: 1.5 RATIO — HIGH (ref 0.88–1.16)
KETONES UR-MCNC: NEGATIVE — SIGNIFICANT CHANGE UP
LEUKOCYTE ESTERASE UR-ACNC: ABNORMAL
LYMPHOCYTES # BLD AUTO: 0.76 K/UL — LOW (ref 1–3.3)
LYMPHOCYTES # BLD AUTO: 10.4 % — LOW (ref 13–44)
MCHC RBC-ENTMCNC: 29.2 PG — SIGNIFICANT CHANGE UP (ref 27–34)
MCHC RBC-ENTMCNC: 34.3 GM/DL — SIGNIFICANT CHANGE UP (ref 32–36)
MCV RBC AUTO: 85.1 FL — SIGNIFICANT CHANGE UP (ref 80–100)
MONOCYTES # BLD AUTO: 0.32 K/UL — SIGNIFICANT CHANGE UP (ref 0–0.9)
MONOCYTES NFR BLD AUTO: 4.4 % — SIGNIFICANT CHANGE UP (ref 2–14)
NEUTROPHILS # BLD AUTO: 6.11 K/UL — SIGNIFICANT CHANGE UP (ref 1.8–7.4)
NEUTROPHILS NFR BLD AUTO: 83.6 % — HIGH (ref 43–77)
NITRITE UR-MCNC: NEGATIVE — SIGNIFICANT CHANGE UP
NRBC # BLD: 0 /100 WBCS — SIGNIFICANT CHANGE UP (ref 0–0)
PH UR: 6 — SIGNIFICANT CHANGE UP (ref 5–8)
PLATELET # BLD AUTO: 175 K/UL — SIGNIFICANT CHANGE UP (ref 150–400)
POTASSIUM SERPL-MCNC: 4.1 MMOL/L — SIGNIFICANT CHANGE UP (ref 3.5–5.3)
POTASSIUM SERPL-SCNC: 4.1 MMOL/L — SIGNIFICANT CHANGE UP (ref 3.5–5.3)
PROT SERPL-MCNC: 7 G/DL — SIGNIFICANT CHANGE UP (ref 6–8.3)
PROT UR-MCNC: 100
PROTHROM AB SERPL-ACNC: 17.5 SEC — HIGH (ref 10.6–13.6)
RBC # BLD: 3.15 M/UL — LOW (ref 4.2–5.8)
RBC # FLD: 14.6 % — HIGH (ref 10.3–14.5)
SARS-COV-2 RNA SPEC QL NAA+PROBE: SIGNIFICANT CHANGE UP
SODIUM SERPL-SCNC: 132 MMOL/L — LOW (ref 135–145)
SP GR SPEC: 1.01 — SIGNIFICANT CHANGE UP (ref 1.01–1.02)
TROPONIN I, HIGH SENSITIVITY RESULT: 11.1 NG/L — SIGNIFICANT CHANGE UP
UROBILINOGEN FLD QL: NEGATIVE — SIGNIFICANT CHANGE UP
WBC # BLD: 7.31 K/UL — SIGNIFICANT CHANGE UP (ref 3.8–10.5)
WBC # FLD AUTO: 7.31 K/UL — SIGNIFICANT CHANGE UP (ref 3.8–10.5)

## 2022-02-02 PROCEDURE — 87635 SARS-COV-2 COVID-19 AMP PRB: CPT

## 2022-02-02 PROCEDURE — 99284 EMERGENCY DEPT VISIT MOD MDM: CPT | Mod: 25

## 2022-02-02 PROCEDURE — 82962 GLUCOSE BLOOD TEST: CPT

## 2022-02-02 PROCEDURE — 99285 EMERGENCY DEPT VISIT HI MDM: CPT

## 2022-02-02 PROCEDURE — 81001 URINALYSIS AUTO W/SCOPE: CPT

## 2022-02-02 PROCEDURE — 36415 COLL VENOUS BLD VENIPUNCTURE: CPT

## 2022-02-02 PROCEDURE — 87086 URINE CULTURE/COLONY COUNT: CPT

## 2022-02-02 PROCEDURE — 85025 COMPLETE CBC W/AUTO DIFF WBC: CPT

## 2022-02-02 PROCEDURE — 85610 PROTHROMBIN TIME: CPT

## 2022-02-02 PROCEDURE — 96365 THER/PROPH/DIAG IV INF INIT: CPT

## 2022-02-02 PROCEDURE — 80053 COMPREHEN METABOLIC PANEL: CPT

## 2022-02-02 PROCEDURE — 96361 HYDRATE IV INFUSION ADD-ON: CPT

## 2022-02-02 PROCEDURE — 85730 THROMBOPLASTIN TIME PARTIAL: CPT

## 2022-02-02 PROCEDURE — 93010 ELECTROCARDIOGRAM REPORT: CPT

## 2022-02-02 PROCEDURE — 93005 ELECTROCARDIOGRAM TRACING: CPT

## 2022-02-02 PROCEDURE — 87186 SC STD MICRODIL/AGAR DIL: CPT

## 2022-02-02 PROCEDURE — 99053 MED SERV 10PM-8AM 24 HR FAC: CPT

## 2022-02-02 PROCEDURE — 84484 ASSAY OF TROPONIN QUANT: CPT

## 2022-02-02 RX ORDER — SODIUM CHLORIDE 9 MG/ML
500 INJECTION INTRAMUSCULAR; INTRAVENOUS; SUBCUTANEOUS ONCE
Refills: 0 | Status: COMPLETED | OUTPATIENT
Start: 2022-02-02 | End: 2022-02-02

## 2022-02-02 RX ORDER — CEFUROXIME AXETIL 250 MG
1 TABLET ORAL
Qty: 14 | Refills: 0
Start: 2022-02-02 | End: 2022-02-08

## 2022-02-02 RX ORDER — CEFTRIAXONE 500 MG/1
1000 INJECTION, POWDER, FOR SOLUTION INTRAMUSCULAR; INTRAVENOUS ONCE
Refills: 0 | Status: COMPLETED | OUTPATIENT
Start: 2022-02-02 | End: 2022-02-02

## 2022-02-02 RX ADMIN — CEFTRIAXONE 1000 MILLIGRAM(S): 500 INJECTION, POWDER, FOR SOLUTION INTRAMUSCULAR; INTRAVENOUS at 05:20

## 2022-02-02 RX ADMIN — CEFTRIAXONE 100 MILLIGRAM(S): 500 INJECTION, POWDER, FOR SOLUTION INTRAMUSCULAR; INTRAVENOUS at 04:11

## 2022-02-02 RX ADMIN — SODIUM CHLORIDE 500 MILLILITER(S): 9 INJECTION INTRAMUSCULAR; INTRAVENOUS; SUBCUTANEOUS at 02:41

## 2022-02-02 RX ADMIN — SODIUM CHLORIDE 500 MILLILITER(S): 9 INJECTION INTRAMUSCULAR; INTRAVENOUS; SUBCUTANEOUS at 06:33

## 2022-02-02 NOTE — ED PROVIDER NOTE - OBJECTIVE STATEMENT
82yo M with hx Chronic DVT, Anemia, GERD, HTN, HLD, DM, Hypoparathyroidism, Glaucoma and BPH presents reporting he needs to get antibiotic. Reports his discharge paperwork from last week told him he needed antibiotic. reports he did not get his prescription filled at pharmacy. Denies fever, vomiting, chest pain, abdominal pain. Reports he wants his medications and then wants to go home. Reports he lives nearby and he walked to ED from his home.

## 2022-02-02 NOTE — ED PROVIDER NOTE - CLINICAL SUMMARY MEDICAL DECISION MAKING FREE TEXT BOX
82yo M with hx Chronic DVT, Anemia, GERD, HTN, HLD, DM, Hypoparathyroidism, Glaucoma and BPH presents reporting he needs to get antibiotic. Patient prescribed keflex for UTI last week but did not fill rx. in ED ekg shows sinus tach with PVCs, Cr 3.1 (previously 2.8). UA cw UTI-given ceftriaxone  Discussed above over phone with patient emergency contact, Mishel Avila who reports he is at baseline mental status and request we dispense antibiotic in ED prior to discharge. She reports patient lives home alone and can be discharged home, she is in process of establishing home health care.  Will await pharmacy to dispense abx and have SW consult for safe discharge. 82yo M with hx Chronic DVT, Anemia, GERD, HTN, HLD, DM, Hypoparathyroidism, Glaucoma and BPH presents reporting he needs to get antibiotic. Patient prescribed keflex for UTI last week but did not fill rx. in ED ekg shows sinus tach with PVCs, Cr 3.1 (previously 2.8). UA cw UTI-given ceftriaxone  Discussed above over phone with patient emergency contact, Mishel Avila who reports he is at baseline mental status and request we dispense antibiotic in ED prior to discharge. She reports patient lives home alone and can be discharged home, she is in process of establishing home health care.  Will await pharmacy to dispense med-ceftin 250 BID and have  consult for safe discharge.  patient signedout to Dr. Odonnell at 730am. 82yo M with hx Chronic DVT, Anemia, GERD, HTN, HLD, DM, Hypoparathyroidism, Glaucoma and BPH presents reporting he needs to get antibiotic. Patient prescribed keflex for UTI last week but did not fill rx. in ED ekg shows sinus tach with PVCs, Cr 3.1 (previously 2.8). UA cw UTI-given ceftriaxone  Discussed above over phone with patient emergency contact, Mishel Avila who reports he is at baseline mental status and request we dispense antibiotic in ED prior to discharge. She reports patient lives home alone and can be discharged home, she is in process of establishing home health care.  Will await pharmacy to dispense med-ceftin 250 BID and have  consult for safe discharge.  Ceftin rx sent to [facility] but pharmacy closed at this time thus unable to instruct them to dispense medication to [facility].  @7am spoke to Dr. Garcia who agrees with plan for discharge with dispensed medications, reports that on prior admission patient has refused [facility] placement and she is in contact with family member Mishel who is arranging home health care services.  patient signedout to Dr. Odonnell at 730am.

## 2022-02-02 NOTE — ED PROVIDER NOTE - CONSTITUTIONAL, MLM
Well appearing, awake, alert, oriented to person, place, time- month but not year and in no apparent distress. normal...

## 2022-02-02 NOTE — ED ADULT NURSE REASSESSMENT NOTE - NS ED NURSE REASSESS COMMENT FT1
Pt is resting on chair at this time; VSS. NAD noted. Pt denies any pain. Still awaiting ; pt made aware of same and verbalized understanding. Will continue to monitor pt.

## 2022-02-02 NOTE — ED PROVIDER NOTE - NEUROLOGICAL, MLM
Alert and oriented x3, oriented to month but not year, no focal deficits, no motor or sensory deficits.

## 2022-02-02 NOTE — CHART NOTE - NSCHARTNOTEFT_GEN_A_CORE
Patient is an 81 year old male with a PMHx of chronic DVT, anemia, GERD, HTN, HLD, DM, hypoparathyroidism, glaucoma and BPH; presented to [facility] reporting he needs to get antibiotic.    SW consult placed reg: safe discharge and dispense outpatient medication.    EMR appreciated; patient assessed by this SW on 01/27/2022 with discharge arranged home with family member.    As per medical team, patient unable to obtain medication that patient was discharged with on 01/27/2022. SW met with patient at bedside; role of SW explained. Patient presented A&Ox3, poor historian, guarded, verbally aggressive.  Patient reports his known pharmacy is out of the way and he cannot get to it.  Patients Rx's sent to [facility] pharmacy in [place] and assisted being obtained by [facility] staff.  Patient declined to participate in assessment with SW reg: safe discharge planning; lashing out and verbally yelling derogatory language.     SW outreached patient's emergenct contact and self identified HCP/POA Mishel Avila (phone [phone]) whom explained that she is aware that patient is in the hospital; reports she has outreached [employer] to arranged for community services such as home-care as well as outreached an elder care  to initiate emergency guardianship.  Additionally, Mishel receptive to APS referral at this time to assist with this process.  Mishel reports patient is A&Ox3, ambulatory and has all required provisions at home to safely return upon medical clearance.  Mishel reports she will return to [place] from [place] on Friday and will come see patient promptly; in the meantime, Mishel reports her sister Antonia will be available whom Mishel will outreach as needed.     APS referral for medication non-compliance and frequent ED/hospitalizations placed.     Information relayed to medical team. Patient for discharge home once medication is obtained.

## 2022-02-02 NOTE — ED PROVIDER NOTE - PATIENT PORTAL LINK FT
You can access the FollowMyHealth Patient Portal offered by [facility] by registering at the following website: [email or web address]. By joining [facility]’s FollowMyHealth portal, you will also be able to view your health information using other applications (apps) compatible with our system.

## 2022-02-02 NOTE — ED PROVIDER NOTE - PROGRESS NOTE DETAILS
[facility] received from Dr Rose. Pt evaluated at bedside, asymptomatic. Labs reviewed and discussed with patient. Antibiotics filled at a neighborhood pharmacy and delivered in the ED to the patient. Emphasized need to f/u w PMD for further overall evaluation and medication compliance. Understood and verbalized instructions. Cab called, pt safe and stable for DC. Left before giving DC papers

## 2022-02-02 NOTE — ED PROVIDER NOTE - NSFOLLOWUPINSTRUCTIONS_ED_ALL_ED_FT
take antibiotic twice daily for 7 days.  Followup with Dr. Banks or Dr. Garcia for reevaluation.  Return to ED if your symptoms worsen.      Urinary Tract Infection in Women    WHAT YOU NEED TO KNOW:    A urinary tract infection (UTI) is caused by bacteria that get inside your urinary tract. Most bacteria that enter your urinary tract come out when you urinate. If the bacteria stay in your urinary tract, you may get an infection. Your urinary tract includes your kidneys, ureters, bladder, and urethra. Urine is made in your kidneys, and it flows from the ureters to the bladder. Urine leaves the bladder through the urethra. A UTI is more common in your lower urinary tract, which includes your bladder and urethra.     Female Urinary System         DISCHARGE INSTRUCTIONS:    Return to the emergency department if:   •You are urinating very little or not at all.      •You have a high fever with shaking chills.       •You have side or back pain that gets worse.      Call your doctor if:   •You have a fever.      •You do not feel better after 2 days of taking antibiotics.      •You are vomiting.       •You have questions or concerns about your condition or care.      Medicines:   •Antibiotics help fight a bacterial infection. If you have UTIs often (called recurrent UTIs), you may be given antibiotics to take regularly. You will be given directions for when and how to use antibiotics. The goal is to prevent UTIs but not cause antibiotic resistance by using antibiotics too often.      •Medicines may be given to decrease pain and burning when you urinate. They will also help decrease the feeling that you need to urinate often. These medicines will make your urine orange or red.      •Take your medicine as directed. Contact your healthcare provider if you think your medicine is not helping or if you have side effects. Tell him or her if you are allergic to any medicine. Keep a list of the medicines, vitamins, and herbs you take. Include the amounts, and when and why you take them. Bring the list or the pill bottles to follow-up visits. Carry your medicine list with you in case of an emergency.      Follow up with your doctor as directed: Write down your questions so you remember to ask them during your visits.     Prevent another UTI:   •Empty your bladder often. Urinate and empty your bladder as soon as you feel the need. Do not hold your urine for long periods of time.      •Wipe from front to back after you urinate or have a bowel movement. This will help prevent germs from getting into your urinary tract through your urethra.      •Drink liquids as directed. Ask how much liquid to drink each day and which liquids are best for you. You may need to drink more liquids than usual to help flush out the bacteria. Do not drink alcohol, caffeine, or citrus juices. These can irritate your bladder and increase your symptoms. Your healthcare provider may recommend cranberry juice to help prevent a UTI.      •Urinate after you have sex. This can help flush out bacteria passed during sex.      •Do not douche or use feminine deodorants. These can change the chemical balance in your vagina.      •Change sanitary pads or tampons often. This will help prevent germs from getting into your urinary tract.       •Talk to your healthcare provider about your birth control method. You may need to change your method if it is increasing your risk for UTIs.      •Wear cotton underwear and clothes that are loose. Tight pants and nylon underwear can trap moisture and cause bacteria to grow.      •Vaginal estrogen may be recommended. This medicine helps prevent UTIs in women who have gone through menopause or are in marvin-menopause.      •Do pelvic muscle exercises often. Pelvic muscle exercises may help you start and stop urinating. Strong pelvic muscles may help you empty your bladder easier. Squeeze these muscles tightly for 5 seconds like you are trying to hold back urine. Then relax for 5 seconds. Gradually work up to squeezing for 10 seconds. Do 3 sets of 15 repetitions a day, or as directed.

## 2022-02-02 NOTE — ED ADULT TRIAGE NOTE - CCCP TRG CHIEF CMPLNT
[FreeTextEntry1] : 8 yo here with strep throat. \par \par 9 year boy found to be rapid strep positive. Complete 10 days of antibiotics. Use antipyretics as needed. Can return to school after 2 doses of antibiotics and when fever free for 24 hours.  Supportive care with Tylenol and Motrin PRN and salt water gargles. Change toothbrush 2-3 days. Return for failure to improve or persistent fever of 100.4. \par \par Appropriate PPE was utilized during the entirety of this visit.\par 
feels tired

## 2022-02-04 ENCOUNTER — INPATIENT (INPATIENT)
Facility: HOSPITAL | Age: 82
LOS: 18 days | Discharge: NURSING, REHAB OR LONG-TERM CARE | DRG: 689 | End: 2022-02-23
Attending: INTERNAL MEDICINE | Admitting: INTERNAL MEDICINE
Payer: MEDICARE

## 2022-02-04 VITALS
RESPIRATION RATE: 19 BRPM | SYSTOLIC BLOOD PRESSURE: 148 MMHG | WEIGHT: 153.22 LBS | OXYGEN SATURATION: 96 % | HEIGHT: 67 IN | DIASTOLIC BLOOD PRESSURE: 81 MMHG | HEART RATE: 91 BPM | TEMPERATURE: 98 F

## 2022-02-04 DIAGNOSIS — Z29.9 ENCOUNTER FOR PROPHYLACTIC MEASURES, UNSPECIFIED: ICD-10-CM

## 2022-02-04 DIAGNOSIS — Z86.718 PERSONAL HISTORY OF OTHER VENOUS THROMBOSIS AND EMBOLISM: ICD-10-CM

## 2022-02-04 DIAGNOSIS — Z87.438 PERSONAL HISTORY OF OTHER DISEASES OF MALE GENITAL ORGANS: ICD-10-CM

## 2022-02-04 DIAGNOSIS — Z02.9 ENCOUNTER FOR ADMINISTRATIVE EXAMINATIONS, UNSPECIFIED: ICD-10-CM

## 2022-02-04 DIAGNOSIS — I10 ESSENTIAL (PRIMARY) HYPERTENSION: ICD-10-CM

## 2022-02-04 DIAGNOSIS — R41.0 DISORIENTATION, UNSPECIFIED: ICD-10-CM

## 2022-02-04 DIAGNOSIS — N39.0 URINARY TRACT INFECTION, SITE NOT SPECIFIED: ICD-10-CM

## 2022-02-04 DIAGNOSIS — N18.9 CHRONIC KIDNEY DISEASE, UNSPECIFIED: ICD-10-CM

## 2022-02-04 DIAGNOSIS — E11.9 TYPE 2 DIABETES MELLITUS WITHOUT COMPLICATIONS: ICD-10-CM

## 2022-02-04 LAB
ALBUMIN SERPL ELPH-MCNC: 2.3 G/DL — LOW (ref 3.5–5)
ALP SERPL-CCNC: 70 U/L — SIGNIFICANT CHANGE UP (ref 40–120)
ALT FLD-CCNC: 12 U/L DA — SIGNIFICANT CHANGE UP (ref 10–60)
ANION GAP SERPL CALC-SCNC: 9 MMOL/L — SIGNIFICANT CHANGE UP (ref 5–17)
APPEARANCE UR: CLEAR — SIGNIFICANT CHANGE UP
AST SERPL-CCNC: 6 U/L — LOW (ref 10–40)
BACTERIA # UR AUTO: ABNORMAL /HPF
BILIRUB SERPL-MCNC: 0.3 MG/DL — SIGNIFICANT CHANGE UP (ref 0.2–1.2)
BILIRUB UR-MCNC: NEGATIVE — SIGNIFICANT CHANGE UP
BUN SERPL-MCNC: 44 MG/DL — HIGH (ref 7–18)
CALCIUM SERPL-MCNC: 8.3 MG/DL — LOW (ref 8.4–10.5)
CHLORIDE SERPL-SCNC: 104 MMOL/L — SIGNIFICANT CHANGE UP (ref 96–108)
CO2 SERPL-SCNC: 21 MMOL/L — LOW (ref 22–31)
COLOR SPEC: YELLOW — SIGNIFICANT CHANGE UP
CREAT SERPL-MCNC: 2.64 MG/DL — HIGH (ref 0.5–1.3)
DIFF PNL FLD: ABNORMAL
GLUCOSE SERPL-MCNC: 231 MG/DL — HIGH (ref 70–99)
GLUCOSE UR QL: NEGATIVE — SIGNIFICANT CHANGE UP
HCT VFR BLD CALC: 27.1 % — LOW (ref 39–50)
HGB BLD-MCNC: 9.1 G/DL — LOW (ref 13–17)
KETONES UR-MCNC: NEGATIVE — SIGNIFICANT CHANGE UP
LEUKOCYTE ESTERASE UR-ACNC: ABNORMAL
MCHC RBC-ENTMCNC: 28.5 PG — SIGNIFICANT CHANGE UP (ref 27–34)
MCHC RBC-ENTMCNC: 33.6 GM/DL — SIGNIFICANT CHANGE UP (ref 32–36)
MCV RBC AUTO: 85 FL — SIGNIFICANT CHANGE UP (ref 80–100)
NITRITE UR-MCNC: NEGATIVE — SIGNIFICANT CHANGE UP
NRBC # BLD: 0 /100 WBCS — SIGNIFICANT CHANGE UP (ref 0–0)
PH UR: 5 — SIGNIFICANT CHANGE UP (ref 5–8)
PLATELET # BLD AUTO: 225 K/UL — SIGNIFICANT CHANGE UP (ref 150–400)
POTASSIUM SERPL-MCNC: 4.5 MMOL/L — SIGNIFICANT CHANGE UP (ref 3.5–5.3)
POTASSIUM SERPL-SCNC: 4.5 MMOL/L — SIGNIFICANT CHANGE UP (ref 3.5–5.3)
PROT SERPL-MCNC: 7.1 G/DL — SIGNIFICANT CHANGE UP (ref 6–8.3)
PROT UR-MCNC: 30 MG/DL
RBC # BLD: 3.19 M/UL — LOW (ref 4.2–5.8)
RBC # FLD: 14.4 % — SIGNIFICANT CHANGE UP (ref 10.3–14.5)
RBC CASTS # UR COMP ASSIST: ABNORMAL /HPF (ref 0–2)
SARS-COV-2 RNA SPEC QL NAA+PROBE: SIGNIFICANT CHANGE UP
SODIUM SERPL-SCNC: 134 MMOL/L — LOW (ref 135–145)
SP GR SPEC: 1.01 — SIGNIFICANT CHANGE UP (ref 1.01–1.02)
UROBILINOGEN FLD QL: NEGATIVE — SIGNIFICANT CHANGE UP
WBC # BLD: 5.88 K/UL — SIGNIFICANT CHANGE UP (ref 3.8–10.5)
WBC # FLD AUTO: 5.88 K/UL — SIGNIFICANT CHANGE UP (ref 3.8–10.5)
WBC UR QL: ABNORMAL /HPF (ref 0–5)

## 2022-02-04 PROCEDURE — 99285 EMERGENCY DEPT VISIT HI MDM: CPT

## 2022-02-04 RX ORDER — TAMSULOSIN HYDROCHLORIDE 0.4 MG/1
0.4 CAPSULE ORAL AT BEDTIME
Refills: 0 | Status: DISCONTINUED | OUTPATIENT
Start: 2022-02-04 | End: 2022-02-23

## 2022-02-04 RX ORDER — INSULIN LISPRO 100/ML
VIAL (ML) SUBCUTANEOUS
Refills: 0 | Status: DISCONTINUED | OUTPATIENT
Start: 2022-02-04 | End: 2022-02-23

## 2022-02-04 RX ORDER — MIRTAZAPINE 45 MG/1
7.5 TABLET, ORALLY DISINTEGRATING ORAL DAILY
Refills: 0 | Status: DISCONTINUED | OUTPATIENT
Start: 2022-02-04 | End: 2022-02-05

## 2022-02-04 RX ORDER — CALCITRIOL 0.5 UG/1
0.5 CAPSULE ORAL DAILY
Refills: 0 | Status: DISCONTINUED | OUTPATIENT
Start: 2022-02-04 | End: 2022-02-23

## 2022-02-04 RX ORDER — ONDANSETRON 8 MG/1
4 TABLET, FILM COATED ORAL EVERY 8 HOURS
Refills: 0 | Status: DISCONTINUED | OUTPATIENT
Start: 2022-02-04 | End: 2022-02-23

## 2022-02-04 RX ORDER — APIXABAN 2.5 MG/1
2.5 TABLET, FILM COATED ORAL
Refills: 0 | Status: DISCONTINUED | OUTPATIENT
Start: 2022-02-05 | End: 2022-02-23

## 2022-02-04 RX ORDER — MECLIZINE HCL 12.5 MG
12.5 TABLET ORAL EVERY 6 HOURS
Refills: 0 | Status: DISCONTINUED | OUTPATIENT
Start: 2022-02-04 | End: 2022-02-23

## 2022-02-04 RX ORDER — SODIUM CHLORIDE 9 MG/ML
1000 INJECTION INTRAMUSCULAR; INTRAVENOUS; SUBCUTANEOUS
Refills: 0 | Status: DISCONTINUED | OUTPATIENT
Start: 2022-02-04 | End: 2022-02-08

## 2022-02-04 RX ORDER — LABETALOL HCL 100 MG
200 TABLET ORAL EVERY 12 HOURS
Refills: 0 | Status: DISCONTINUED | OUTPATIENT
Start: 2022-02-04 | End: 2022-02-06

## 2022-02-04 RX ORDER — LANOLIN ALCOHOL/MO/W.PET/CERES
3 CREAM (GRAM) TOPICAL AT BEDTIME
Refills: 0 | Status: DISCONTINUED | OUTPATIENT
Start: 2022-02-04 | End: 2022-02-23

## 2022-02-04 RX ORDER — AMPICILLIN SODIUM AND SULBACTAM SODIUM 250; 125 MG/ML; MG/ML
3 INJECTION, POWDER, FOR SUSPENSION INTRAMUSCULAR; INTRAVENOUS EVERY 8 HOURS
Refills: 0 | Status: DISCONTINUED | OUTPATIENT
Start: 2022-02-04 | End: 2022-02-08

## 2022-02-04 RX ORDER — CALCIUM CARBONATE 500(1250)
1 TABLET ORAL
Refills: 0 | Status: DISCONTINUED | OUTPATIENT
Start: 2022-02-04 | End: 2022-02-23

## 2022-02-04 RX ORDER — CEFTRIAXONE 500 MG/1
1000 INJECTION, POWDER, FOR SOLUTION INTRAMUSCULAR; INTRAVENOUS ONCE
Refills: 0 | Status: COMPLETED | OUTPATIENT
Start: 2022-02-04 | End: 2022-02-04

## 2022-02-04 RX ORDER — ACETAMINOPHEN 500 MG
650 TABLET ORAL EVERY 6 HOURS
Refills: 0 | Status: DISCONTINUED | OUTPATIENT
Start: 2022-02-04 | End: 2022-02-23

## 2022-02-04 RX ORDER — PANTOPRAZOLE SODIUM 20 MG/1
40 TABLET, DELAYED RELEASE ORAL
Refills: 0 | Status: DISCONTINUED | OUTPATIENT
Start: 2022-02-04 | End: 2022-02-23

## 2022-02-04 RX ORDER — ATORVASTATIN CALCIUM 80 MG/1
40 TABLET, FILM COATED ORAL AT BEDTIME
Refills: 0 | Status: DISCONTINUED | OUTPATIENT
Start: 2022-02-04 | End: 2022-02-23

## 2022-02-04 RX ORDER — FINASTERIDE 5 MG/1
5 TABLET, FILM COATED ORAL DAILY
Refills: 0 | Status: DISCONTINUED | OUTPATIENT
Start: 2022-02-04 | End: 2022-02-23

## 2022-02-04 RX ADMIN — CEFTRIAXONE 100 MILLIGRAM(S): 500 INJECTION, POWDER, FOR SOLUTION INTRAMUSCULAR; INTRAVENOUS at 19:53

## 2022-02-04 NOTE — H&P ADULT - PROBLEM SELECTOR PLAN 1
- patient PW confusion.  - Patient was recently admitted for UTI and was evaluated by ID , urine culture grew enterococcus fecalis sensitive to ampicillin and patient was discharged on augmentin PO but he didnot  meds, a Note from recent ED visit states that pt came back to ED and seen by SW ( refer to SW note ) ,  pt was given bottle of pills for PO abx treatment of UTI, but pt states he does not remember this.  - UA positive.   - started on unasyn IV for now , IV fluids  - f/u urine culture

## 2022-02-04 NOTE — H&P ADULT - PROBLEM/PLAN-1
Reason for Call:  Other call back and prescription    Detailed comments: Pt. Received this medication by mail  (gentamicin irrigation) and it came frozen. Pt needs to talk to a nurse to know what to do with this medication as soon as possible.     Phone Number Patient can be reached at: Home number on file [phone] (home)     Best Time: Anytime     Can we leave a detailed message on this number? YES    Call taken on 9/25/2019 at 12:53 PM by Shoshana Shafer       DISPLAY PLAN FREE TEXT

## 2022-02-04 NOTE — H&P ADULT - ATTENDING COMMENTS
82 yo M with PMH of DVT (on eliquis) , HTN, HLD, DM , multiple recent visits for UTI presents with confusion.  1.UTI-ABX,f/u cx.  2.DM-Insulin,Endo eval.  3.Psych eval-pt depressed doesn't want to live anymore.  4.DVT-eliquis.  5.CRI-f/u lytes.  6.HTN-cont bp medication.  7.Lipid d/o-statin.  8.PPI.

## 2022-02-04 NOTE — ED PROVIDER NOTE - CLINICAL SUMMARY MEDICAL DECISION MAKING FREE TEXT BOX
80 yo M with UTI and confusion about why he called EMS. Given pt's multiple ED visits and not taking abx, also with adult protective services evaluation pending, pt will benefit from admission for abx and possible placement. Endorsed to Dr Garcia and MAR.

## 2022-02-04 NOTE — ED PROVIDER NOTE - PHYSICAL EXAMINATION
GENERAL: well appearing, no acute distress   HEAD: atraumatic   EYES: EOMI, pink conjunctiva   ENT: moist oral mucosa   CARDIAC: RRR, no edema, distal pulses present   RESPIRATORY: lungs CTAB, no increased work of breathing   GASTROINTESTINAL: no abdominal tenderness, no rebound or guarding, bowel sounds presents  GENITOURINARY: no CVA tenderness   MUSCULOSKELETAL: no deformity   NEUROLOGICAL: alert, oriented to person place and time, spontaneous movement of extremities   SKIN: intact   PSYCHIATRIC: cooperative  HEME LYMPH: no lymphadenopathy

## 2022-02-04 NOTE — ED PROVIDER NOTE - OBJECTIVE STATEMENT
82 yo M DVT (on AC), HTN, HLD, DM, multiple recent visits for UTI presents with confusion. Pt arrived by EMS, but he is unable to tell me why he called ambulance. Note from recent ED visit states pt was given bottle of pills for PO abx treatment of UTI, but pt states he does not remember this. Spoke with pt's DIAMANTE Florentino who states she is working with SW to try and arrange for [facility] vs assisted living. Pt denies acute complaints.

## 2022-02-04 NOTE — H&P ADULT - NSHPPHYSICALEXAM_GEN_ALL_CORE
ICU Vital Signs Last 24 Hrs  T(C): 36.5 (04 Feb 2022 21:19), Max: 36.5 (04 Feb 2022 17:33)  T(F): 97.7 (04 Feb 2022 21:19), Max: 97.7 (04 Feb 2022 17:33)  HR: 101 (04 Feb 2022 21:19) (91 - 101)  BP: 137/74 (04 Feb 2022 21:19) (137/74 - 148/81)  BP(mean): --  ABP: --  ABP(mean): --  RR: 17 (04 Feb 2022 21:19) (17 - 19)  SpO2: 91% (04 Feb 2022 21:19) (91% - 96%)

## 2022-02-04 NOTE — ED ADULT NURSE NOTE - OBJECTIVE STATEMENT
Patient AOx3, Patient AOx3, calm, cooperative, in no acute distress, reports lower abd pain with LLQ tenderness. Patient denies nausea or vomiting at this time, patient has good appetite.

## 2022-02-04 NOTE — H&P ADULT - HISTORY OF PRESENT ILLNESS
82 yo M with PMH of DVT (on eliquis) , HTN, HLD, DM , multiple recent visits for UTI presents with confusion. Pt arrived by EMS, but he is unable to tell me why he called ambulance. Patient is AAO x 2 but he is so aggressive and poor historian. Hence most of history is taken from ED and medical records.  Patient was recently admitted for UTI and was evaluated by ID , urine culture grew enterococcus fecalis sensitive to ampicillin and patient was discharged on augmentin PO but he didnot  meds, a Note from recent ED visit states that pt came back to ED and seen by SW ( refer to SW note ) ,  pt was given bottle of pills for PO abx treatment of UTI, but pt states he does not remember this. ED attending Spoke with pt's DIAMANTE Florentino who states she is working with SW to try and arrange for HHA vs assisted living.   patient denies any headache, dizziness, chest pain, palpitations, shortness of breath, abdominal pain, nausea/vomiting/diarrhea, urinary symptoms or any other acute complaint.

## 2022-02-04 NOTE — H&P ADULT - ASSESSMENT
80 yo M with PMH of DVT (on eliquis) , HTN, HLD, DM , multiple recent visits for UTI presents with confusion. Admitted for UTI .

## 2022-02-04 NOTE — ED ADULT NURSE NOTE - CHIEF COMPLAINT QUOTE
Patient presents with intermittent palpitations.  States that over the last couple weeks he has noticed intermittent painful palpitations especially with exercise.  Has history of a.fib and takes diltizem daily, states that these palpitations feels different. Also reports some increase in his acid reflux.    
lower abdomen x 4 days.

## 2022-02-05 LAB
-  AMPICILLIN: SIGNIFICANT CHANGE UP
-  CIPROFLOXACIN: SIGNIFICANT CHANGE UP
-  LEVOFLOXACIN: SIGNIFICANT CHANGE UP
-  NITROFURANTOIN: SIGNIFICANT CHANGE UP
-  TETRACYCLINE: SIGNIFICANT CHANGE UP
-  VANCOMYCIN: SIGNIFICANT CHANGE UP
ALBUMIN SERPL ELPH-MCNC: 2 G/DL — LOW (ref 3.5–5)
ALP SERPL-CCNC: 72 U/L — SIGNIFICANT CHANGE UP (ref 40–120)
ALT FLD-CCNC: 12 U/L DA — SIGNIFICANT CHANGE UP (ref 10–60)
ANION GAP SERPL CALC-SCNC: 10 MMOL/L — SIGNIFICANT CHANGE UP (ref 5–17)
AST SERPL-CCNC: 10 U/L — SIGNIFICANT CHANGE UP (ref 10–40)
BASOPHILS # BLD AUTO: 0.02 K/UL — SIGNIFICANT CHANGE UP (ref 0–0.2)
BASOPHILS NFR BLD AUTO: 0.3 % — SIGNIFICANT CHANGE UP (ref 0–2)
BILIRUB SERPL-MCNC: 0.2 MG/DL — SIGNIFICANT CHANGE UP (ref 0.2–1.2)
BUN SERPL-MCNC: 43 MG/DL — HIGH (ref 7–18)
CALCIUM SERPL-MCNC: 8.1 MG/DL — LOW (ref 8.4–10.5)
CHLORIDE SERPL-SCNC: 103 MMOL/L — SIGNIFICANT CHANGE UP (ref 96–108)
CO2 SERPL-SCNC: 20 MMOL/L — LOW (ref 22–31)
CREAT SERPL-MCNC: 2.69 MG/DL — HIGH (ref 0.5–1.3)
CULTURE RESULTS: SIGNIFICANT CHANGE UP
EOSINOPHIL # BLD AUTO: 0.06 K/UL — SIGNIFICANT CHANGE UP (ref 0–0.5)
EOSINOPHIL NFR BLD AUTO: 1 % — SIGNIFICANT CHANGE UP (ref 0–6)
GLUCOSE BLDC GLUCOMTR-MCNC: 306 MG/DL — HIGH (ref 70–99)
GLUCOSE BLDC GLUCOMTR-MCNC: 321 MG/DL — HIGH (ref 70–99)
GLUCOSE BLDC GLUCOMTR-MCNC: 323 MG/DL — HIGH (ref 70–99)
GLUCOSE SERPL-MCNC: 299 MG/DL — HIGH (ref 70–99)
HCT VFR BLD CALC: 26.4 % — LOW (ref 39–50)
HGB BLD-MCNC: 8.8 G/DL — LOW (ref 13–17)
IMM GRANULOCYTES NFR BLD AUTO: 0.7 % — SIGNIFICANT CHANGE UP (ref 0–1.5)
LYMPHOCYTES # BLD AUTO: 1.29 K/UL — SIGNIFICANT CHANGE UP (ref 1–3.3)
LYMPHOCYTES # BLD AUTO: 21.9 % — SIGNIFICANT CHANGE UP (ref 13–44)
MAGNESIUM SERPL-MCNC: 1 MG/DL — CRITICAL LOW (ref 1.6–2.6)
MCHC RBC-ENTMCNC: 28.8 PG — SIGNIFICANT CHANGE UP (ref 27–34)
MCHC RBC-ENTMCNC: 33.3 GM/DL — SIGNIFICANT CHANGE UP (ref 32–36)
MCV RBC AUTO: 86.3 FL — SIGNIFICANT CHANGE UP (ref 80–100)
METHOD TYPE: SIGNIFICANT CHANGE UP
MONOCYTES # BLD AUTO: 0.37 K/UL — SIGNIFICANT CHANGE UP (ref 0–0.9)
MONOCYTES NFR BLD AUTO: 6.3 % — SIGNIFICANT CHANGE UP (ref 2–14)
NEUTROPHILS # BLD AUTO: 4.1 K/UL — SIGNIFICANT CHANGE UP (ref 1.8–7.4)
NEUTROPHILS NFR BLD AUTO: 69.8 % — SIGNIFICANT CHANGE UP (ref 43–77)
NRBC # BLD: 0 /100 WBCS — SIGNIFICANT CHANGE UP (ref 0–0)
ORGANISM # SPEC MICROSCOPIC CNT: SIGNIFICANT CHANGE UP
ORGANISM # SPEC MICROSCOPIC CNT: SIGNIFICANT CHANGE UP
PHOSPHATE SERPL-MCNC: 3.1 MG/DL — SIGNIFICANT CHANGE UP (ref 2.5–4.5)
PLATELET # BLD AUTO: 214 K/UL — SIGNIFICANT CHANGE UP (ref 150–400)
POTASSIUM SERPL-MCNC: 4.4 MMOL/L — SIGNIFICANT CHANGE UP (ref 3.5–5.3)
POTASSIUM SERPL-SCNC: 4.4 MMOL/L — SIGNIFICANT CHANGE UP (ref 3.5–5.3)
PROT SERPL-MCNC: 6.6 G/DL — SIGNIFICANT CHANGE UP (ref 6–8.3)
RBC # BLD: 3.06 M/UL — LOW (ref 4.2–5.8)
RBC # FLD: 14.5 % — SIGNIFICANT CHANGE UP (ref 10.3–14.5)
SODIUM SERPL-SCNC: 133 MMOL/L — LOW (ref 135–145)
SPECIMEN SOURCE: SIGNIFICANT CHANGE UP
WBC # BLD: 5.88 K/UL — SIGNIFICANT CHANGE UP (ref 3.8–10.5)
WBC # FLD AUTO: 5.88 K/UL — SIGNIFICANT CHANGE UP (ref 3.8–10.5)

## 2022-02-05 RX ORDER — MAGNESIUM SULFATE 500 MG/ML
1 VIAL (ML) INJECTION
Refills: 0 | Status: COMPLETED | OUTPATIENT
Start: 2022-02-05 | End: 2022-02-05

## 2022-02-05 RX ORDER — MIRTAZAPINE 45 MG/1
15 TABLET, ORALLY DISINTEGRATING ORAL AT BEDTIME
Refills: 0 | Status: DISCONTINUED | OUTPATIENT
Start: 2022-02-05 | End: 2022-02-23

## 2022-02-05 RX ORDER — SODIUM CHLORIDE 9 MG/ML
1000 INJECTION INTRAMUSCULAR; INTRAVENOUS; SUBCUTANEOUS
Refills: 0 | Status: DISCONTINUED | OUTPATIENT
Start: 2022-02-05 | End: 2022-02-08

## 2022-02-05 RX ORDER — HALOPERIDOL DECANOATE 100 MG/ML
1 INJECTION INTRAMUSCULAR ONCE
Refills: 0 | Status: COMPLETED | OUTPATIENT
Start: 2022-02-05 | End: 2022-02-05

## 2022-02-05 RX ADMIN — Medication 1 TABLET(S): at 06:09

## 2022-02-05 RX ADMIN — MIRTAZAPINE 15 MILLIGRAM(S): 45 TABLET, ORALLY DISINTEGRATING ORAL at 21:33

## 2022-02-05 RX ADMIN — Medication 4: at 08:37

## 2022-02-05 RX ADMIN — SODIUM CHLORIDE 75 MILLILITER(S): 9 INJECTION INTRAMUSCULAR; INTRAVENOUS; SUBCUTANEOUS at 06:06

## 2022-02-05 RX ADMIN — PANTOPRAZOLE SODIUM 40 MILLIGRAM(S): 20 TABLET, DELAYED RELEASE ORAL at 06:12

## 2022-02-05 RX ADMIN — Medication 200 MILLIGRAM(S): at 20:07

## 2022-02-05 RX ADMIN — MIRTAZAPINE 7.5 MILLIGRAM(S): 45 TABLET, ORALLY DISINTEGRATING ORAL at 11:53

## 2022-02-05 RX ADMIN — AMPICILLIN SODIUM AND SULBACTAM SODIUM 200 GRAM(S): 250; 125 INJECTION, POWDER, FOR SUSPENSION INTRAMUSCULAR; INTRAVENOUS at 06:06

## 2022-02-05 RX ADMIN — TAMSULOSIN HYDROCHLORIDE 0.4 MILLIGRAM(S): 0.4 CAPSULE ORAL at 21:33

## 2022-02-05 RX ADMIN — Medication 100 GRAM(S): at 07:47

## 2022-02-05 RX ADMIN — ATORVASTATIN CALCIUM 40 MILLIGRAM(S): 80 TABLET, FILM COATED ORAL at 21:33

## 2022-02-05 RX ADMIN — Medication 100 GRAM(S): at 08:43

## 2022-02-05 RX ADMIN — Medication 3 MILLIGRAM(S): at 02:14

## 2022-02-05 RX ADMIN — SODIUM CHLORIDE 75 MILLILITER(S): 9 INJECTION INTRAMUSCULAR; INTRAVENOUS; SUBCUTANEOUS at 20:07

## 2022-02-05 RX ADMIN — FINASTERIDE 5 MILLIGRAM(S): 5 TABLET, FILM COATED ORAL at 11:53

## 2022-02-05 RX ADMIN — HALOPERIDOL DECANOATE 1 MILLIGRAM(S): 100 INJECTION INTRAMUSCULAR at 15:48

## 2022-02-05 RX ADMIN — Medication 650 MILLIGRAM(S): at 02:14

## 2022-02-05 RX ADMIN — Medication 4: at 12:07

## 2022-02-05 RX ADMIN — APIXABAN 2.5 MILLIGRAM(S): 2.5 TABLET, FILM COATED ORAL at 06:05

## 2022-02-05 RX ADMIN — Medication 100 GRAM(S): at 10:12

## 2022-02-05 RX ADMIN — Medication 200 MILLIGRAM(S): at 06:10

## 2022-02-05 NOTE — PROGRESS NOTE ADULT - ASSESSMENT
80 yo M with PMH of DVT (on eliquis) , HTN, HLD, DM , multiple recent visits for UTI presents with confusion.  1.UTI-ABX,f/u cx.  2.DM-Insulin,Endo eval.  3.Psych eval-pt depressed doesn't want to live any more.Inc remeron 15mg qhs.  4.DVT-eliquis.  5.CRI-f/u lytes.Replace lytes.  6.HTN-cont bp medication.  7.Lipid d/o-statin.  8.PPI.

## 2022-02-05 NOTE — PROGRESS NOTE ADULT - SUBJECTIVE AND OBJECTIVE BOX
CARDIOLOGY/MEDICAL ATTENDING    CHIEF COMPLAINT:Patient is a 81y old  Male who presents with a chief complaint of altered mental status. Pt appears comfortable.  	  REVIEW OF SYSTEMS:  CONSTITUTIONAL: No fever, weight loss, or fatigue  EYES: No eye pain, visual disturbances, or discharge  ENT:  No difficulty hearing, tinnitus, vertigo; No sinus or throat pain  NECK: No pain or stiffness  RESPIRATORY: No cough, wheezing, chills or hemoptysis; No Shortness of Breath  CARDIOVASCULAR: No chest pain, palpitations, passing out, dizziness, or leg swelling  GASTROINTESTINAL: No abdominal or epigastric pain. No nausea, vomiting, or hematemesis; No diarrhea or constipation. No melena or hematochezia.  GENITOURINARY: No dysuria, frequency, hematuria, or incontinence  NEUROLOGICAL: No headaches, memory loss, loss of strength, numbness, or tremors  SKIN: No itching, burning, rashes, or lesions   LYMPH Nodes: No enlarged glands  ENDOCRINE: No heat or cold intolerance; No hair loss  MUSCULOSKELETAL: No joint pain or swelling; No muscle, back, or extremity pain  PSYCHIATRIC: + depression, No anxiety, mood swings, or difficulty sleeping  HEME/LYMPH: No easy bruising, or bleeding gums  ALLERGY AND IMMUNOLOGIC: No hives or eczema	      PHYSICAL EXAM:  T(C): 36.6 (02-05-22 @ 08:12), Max: 36.6 (02-05-22 @ 08:12)  HR: 73 (02-05-22 @ 08:12) (73 - 101)  BP: 116/78 (02-05-22 @ 08:12) (116/78 - 148/81)  RR: 17 (02-05-22 @ 08:12) (17 - 19)  SpO2: 94% (02-05-22 @ 08:12) (91% - 96%)  Wt(kg): --  I&O's Summary      Appearance: Normal	  HEENT:   Normal oral mucosa, PERRL, EOMI	  Lymphatic: No lymphadenopathy  Cardiovascular: Normal S1 S2, No JVD, No murmurs, No edema  Respiratory: Lungs clear to auscultation	  Gastrointestinal:  Soft, Non-tender, + BS	  Skin: No rashes, No ecchymoses, No cyanosis	  Neurologic: Non-focal  Extremities: Normal range of motion, No clubbing, cyanosis or edema  Vascular: Peripheral pulses palpable 2+ bilaterally    MEDICATIONS  (STANDING):  ampicillin/sulbactam  IVPB 3 Gram(s) IV Intermittent every 8 hours  apixaban 2.5 milliGRAM(s) Oral two times a day  atorvastatin 40 milliGRAM(s) Oral at bedtime  calcitriol   Capsule 0.5 MICROGram(s) Oral daily  calcium carbonate    500 mG (Tums) Chewable 1 Tablet(s) Chew two times a day  finasteride 5 milliGRAM(s) Oral daily  insulin lispro (ADMELOG) corrective regimen sliding scale   SubCutaneous three times a day before meals  labetalol 200 milliGRAM(s) Oral every 12 hours  mirtazapine 7.5 milliGRAM(s) Oral daily  pantoprazole    Tablet 40 milliGRAM(s) Oral before breakfast  sodium chloride 0.9%. 1000 milliLiter(s) (75 mL/Hr) IV Continuous <Continuous>  tamsulosin 0.4 milliGRAM(s) Oral at bedtime    	  	  LABS:	 	                          8.8    5.88  )-----------( 214      ( 05 Feb 2022 06:08 )             26.4     02-05    133<L>  |  103  |  43<H>  ----------------------------<  299<H>  4.4   |  20<L>  |  2.69<H>    Ca    8.1<L>      05 Feb 2022 06:08  Phos  3.1     02-05  Mg     1.0     02-05    TPro  6.6  /  Alb  2.0<L>  /  TBili  0.2  /  DBili  x   /  AST  10  /  ALT  12  /  AlkPhos  72  02-05

## 2022-02-05 NOTE — PATIENT PROFILE ADULT - FALL HARM RISK - HARM RISK INTERVENTIONS
Assistance with ambulation/Assistance OOB with selected safe patient handling equipment/Communicate Risk of Fall with Harm to all staff/Monitor for mental status changes/Move patient closer to nurses' station/Reinforce activity limits and safety measures with patient and family/Reorient to person, place and time as needed/Tailored Fall Risk Interventions/Toileting schedule using arm’s reach rule for commode and bathroom/Use of alarms - bed, chair and/or voice tab/Visual Cue: Yellow wristband and red socks/Bed in lowest position, wheels locked, appropriate side rails in place/Call bell, personal items and telephone in reach/Instruct patient to call for assistance before getting out of bed or chair/Non-slip footwear when patient is out of bed/[place] to call system/Physically safe environment - no spills, clutter or unnecessary equipment/Purposeful Proactive Rounding/Room/bathroom lighting operational, light cord in reach

## 2022-02-05 NOTE — RAPID RESPONSE TEAM SUMMARY - NSSITUATIONBACKGROUNDRRT_GEN_ALL_CORE
RRT was called by the Nursing staff as Pt experienced a near syncopal episode while walking towards the restroom unassisted. Pt awake and alert and able to recall the event. He mentioned mild light headed ness after getting up and walking towards restroom. No chest pain, no SOB, no Headache.   Vitals- 140/80, Hr 84. Spo2 94% on RA. FS- 323

## 2022-02-06 DIAGNOSIS — E11.65 TYPE 2 DIABETES MELLITUS WITH HYPERGLYCEMIA: ICD-10-CM

## 2022-02-06 LAB
ALBUMIN SERPL ELPH-MCNC: 1.8 G/DL — LOW (ref 3.5–5)
ALBUMIN SERPL ELPH-MCNC: 1.9 G/DL — LOW (ref 3.5–5)
ALP SERPL-CCNC: 59 U/L — SIGNIFICANT CHANGE UP (ref 40–120)
ALP SERPL-CCNC: 63 U/L — SIGNIFICANT CHANGE UP (ref 40–120)
ALT FLD-CCNC: 10 U/L DA — SIGNIFICANT CHANGE UP (ref 10–60)
ALT FLD-CCNC: 12 U/L DA — SIGNIFICANT CHANGE UP (ref 10–60)
ANION GAP SERPL CALC-SCNC: 11 MMOL/L — SIGNIFICANT CHANGE UP (ref 5–17)
ANION GAP SERPL CALC-SCNC: 9 MMOL/L — SIGNIFICANT CHANGE UP (ref 5–17)
AST SERPL-CCNC: 6 U/L — LOW (ref 10–40)
AST SERPL-CCNC: 8 U/L — LOW (ref 10–40)
BASOPHILS # BLD AUTO: 0.02 K/UL — SIGNIFICANT CHANGE UP (ref 0–0.2)
BASOPHILS NFR BLD AUTO: 0.4 % — SIGNIFICANT CHANGE UP (ref 0–2)
BILIRUB SERPL-MCNC: 0.3 MG/DL — SIGNIFICANT CHANGE UP (ref 0.2–1.2)
BILIRUB SERPL-MCNC: 0.3 MG/DL — SIGNIFICANT CHANGE UP (ref 0.2–1.2)
BUN SERPL-MCNC: 40 MG/DL — HIGH (ref 7–18)
BUN SERPL-MCNC: 41 MG/DL — HIGH (ref 7–18)
CALCIUM SERPL-MCNC: 8.2 MG/DL — LOW (ref 8.4–10.5)
CALCIUM SERPL-MCNC: 8.2 MG/DL — LOW (ref 8.4–10.5)
CHLORIDE SERPL-SCNC: 103 MMOL/L — SIGNIFICANT CHANGE UP (ref 96–108)
CHLORIDE SERPL-SCNC: 105 MMOL/L — SIGNIFICANT CHANGE UP (ref 96–108)
CO2 SERPL-SCNC: 19 MMOL/L — LOW (ref 22–31)
CO2 SERPL-SCNC: 20 MMOL/L — LOW (ref 22–31)
CREAT ?TM UR-MCNC: 46 MG/DL — SIGNIFICANT CHANGE UP
CREAT SERPL-MCNC: 2.55 MG/DL — HIGH (ref 0.5–1.3)
CREAT SERPL-MCNC: 2.62 MG/DL — HIGH (ref 0.5–1.3)
EOSINOPHIL # BLD AUTO: 0.08 K/UL — SIGNIFICANT CHANGE UP (ref 0–0.5)
EOSINOPHIL NFR BLD AUTO: 1.7 % — SIGNIFICANT CHANGE UP (ref 0–6)
GLUCOSE BLDC GLUCOMTR-MCNC: 226 MG/DL — HIGH (ref 70–99)
GLUCOSE BLDC GLUCOMTR-MCNC: 255 MG/DL — HIGH (ref 70–99)
GLUCOSE BLDC GLUCOMTR-MCNC: 262 MG/DL — HIGH (ref 70–99)
GLUCOSE BLDC GLUCOMTR-MCNC: 320 MG/DL — HIGH (ref 70–99)
GLUCOSE SERPL-MCNC: 299 MG/DL — HIGH (ref 70–99)
GLUCOSE SERPL-MCNC: 311 MG/DL — HIGH (ref 70–99)
HCT VFR BLD CALC: 23.4 % — LOW (ref 39–50)
HCT VFR BLD CALC: 24.1 % — LOW (ref 39–50)
HGB BLD-MCNC: 7.8 G/DL — LOW (ref 13–17)
HGB BLD-MCNC: 8.1 G/DL — LOW (ref 13–17)
IMM GRANULOCYTES NFR BLD AUTO: 0.8 % — SIGNIFICANT CHANGE UP (ref 0–1.5)
LACTATE SERPL-SCNC: 1.9 MMOL/L — SIGNIFICANT CHANGE UP (ref 0.7–2)
LYMPHOCYTES # BLD AUTO: 0.92 K/UL — LOW (ref 1–3.3)
LYMPHOCYTES # BLD AUTO: 19.4 % — SIGNIFICANT CHANGE UP (ref 13–44)
MAGNESIUM SERPL-MCNC: 1.7 MG/DL — SIGNIFICANT CHANGE UP (ref 1.6–2.6)
MAGNESIUM SERPL-MCNC: 1.8 MG/DL — SIGNIFICANT CHANGE UP (ref 1.6–2.6)
MCHC RBC-ENTMCNC: 28.3 PG — SIGNIFICANT CHANGE UP (ref 27–34)
MCHC RBC-ENTMCNC: 28.8 PG — SIGNIFICANT CHANGE UP (ref 27–34)
MCHC RBC-ENTMCNC: 33.3 GM/DL — SIGNIFICANT CHANGE UP (ref 32–36)
MCHC RBC-ENTMCNC: 33.6 GM/DL — SIGNIFICANT CHANGE UP (ref 32–36)
MCV RBC AUTO: 84.3 FL — SIGNIFICANT CHANGE UP (ref 80–100)
MCV RBC AUTO: 86.3 FL — SIGNIFICANT CHANGE UP (ref 80–100)
MONOCYTES # BLD AUTO: 0.32 K/UL — SIGNIFICANT CHANGE UP (ref 0–0.9)
MONOCYTES NFR BLD AUTO: 6.7 % — SIGNIFICANT CHANGE UP (ref 2–14)
NEUTROPHILS # BLD AUTO: 3.37 K/UL — SIGNIFICANT CHANGE UP (ref 1.8–7.4)
NEUTROPHILS NFR BLD AUTO: 71 % — SIGNIFICANT CHANGE UP (ref 43–77)
NRBC # BLD: 0 /100 WBCS — SIGNIFICANT CHANGE UP (ref 0–0)
NRBC # BLD: 0 /100 WBCS — SIGNIFICANT CHANGE UP (ref 0–0)
OSMOLALITY UR: 321 MOS/KG — SIGNIFICANT CHANGE UP (ref 50–1200)
PHOSPHATE SERPL-MCNC: 2.9 MG/DL — SIGNIFICANT CHANGE UP (ref 2.5–4.5)
PHOSPHATE SERPL-MCNC: 3.5 MG/DL — SIGNIFICANT CHANGE UP (ref 2.5–4.5)
PLATELET # BLD AUTO: 199 K/UL — SIGNIFICANT CHANGE UP (ref 150–400)
PLATELET # BLD AUTO: 202 K/UL — SIGNIFICANT CHANGE UP (ref 150–400)
POTASSIUM SERPL-MCNC: 4.3 MMOL/L — SIGNIFICANT CHANGE UP (ref 3.5–5.3)
POTASSIUM SERPL-MCNC: 4.4 MMOL/L — SIGNIFICANT CHANGE UP (ref 3.5–5.3)
POTASSIUM SERPL-SCNC: 4.3 MMOL/L — SIGNIFICANT CHANGE UP (ref 3.5–5.3)
POTASSIUM SERPL-SCNC: 4.4 MMOL/L — SIGNIFICANT CHANGE UP (ref 3.5–5.3)
POTASSIUM UR-SCNC: 18 MMOL/L — SIGNIFICANT CHANGE UP
PROT SERPL-MCNC: 6 G/DL — SIGNIFICANT CHANGE UP (ref 6–8.3)
PROT SERPL-MCNC: 6.4 G/DL — SIGNIFICANT CHANGE UP (ref 6–8.3)
RBC # BLD: 2.71 M/UL — LOW (ref 4.2–5.8)
RBC # BLD: 2.86 M/UL — LOW (ref 4.2–5.8)
RBC # FLD: 14.4 % — SIGNIFICANT CHANGE UP (ref 10.3–14.5)
RBC # FLD: 14.4 % — SIGNIFICANT CHANGE UP (ref 10.3–14.5)
SODIUM SERPL-SCNC: 133 MMOL/L — LOW (ref 135–145)
SODIUM SERPL-SCNC: 134 MMOL/L — LOW (ref 135–145)
SODIUM UR-SCNC: 58 MMOL/L — SIGNIFICANT CHANGE UP
TROPONIN I, HIGH SENSITIVITY RESULT: 9.7 NG/L — SIGNIFICANT CHANGE UP
WBC # BLD: 4.75 K/UL — SIGNIFICANT CHANGE UP (ref 3.8–10.5)
WBC # BLD: 5.47 K/UL — SIGNIFICANT CHANGE UP (ref 3.8–10.5)
WBC # FLD AUTO: 4.75 K/UL — SIGNIFICANT CHANGE UP (ref 3.8–10.5)
WBC # FLD AUTO: 5.47 K/UL — SIGNIFICANT CHANGE UP (ref 3.8–10.5)

## 2022-02-06 RX ORDER — INSULIN GLARGINE 100 [IU]/ML
15 INJECTION, SOLUTION SUBCUTANEOUS AT BEDTIME
Refills: 0 | Status: DISCONTINUED | OUTPATIENT
Start: 2022-02-06 | End: 2022-02-07

## 2022-02-06 RX ORDER — DEXTROSE 50 % IN WATER 50 %
15 SYRINGE (ML) INTRAVENOUS ONCE
Refills: 0 | Status: DISCONTINUED | OUTPATIENT
Start: 2022-02-06 | End: 2022-02-23

## 2022-02-06 RX ORDER — DEXTROSE 50 % IN WATER 50 %
12.5 SYRINGE (ML) INTRAVENOUS ONCE
Refills: 0 | Status: DISCONTINUED | OUTPATIENT
Start: 2022-02-06 | End: 2022-02-23

## 2022-02-06 RX ORDER — SODIUM CHLORIDE 9 MG/ML
1000 INJECTION, SOLUTION INTRAVENOUS
Refills: 0 | Status: DISCONTINUED | OUTPATIENT
Start: 2022-02-06 | End: 2022-02-23

## 2022-02-06 RX ORDER — GLUCAGON INJECTION, SOLUTION 0.5 MG/.1ML
1 INJECTION, SOLUTION SUBCUTANEOUS ONCE
Refills: 0 | Status: DISCONTINUED | OUTPATIENT
Start: 2022-02-06 | End: 2022-02-23

## 2022-02-06 RX ORDER — INSULIN LISPRO 100/ML
3 VIAL (ML) SUBCUTANEOUS
Refills: 0 | Status: DISCONTINUED | OUTPATIENT
Start: 2022-02-06 | End: 2022-02-07

## 2022-02-06 RX ORDER — DEXTROSE 50 % IN WATER 50 %
25 SYRINGE (ML) INTRAVENOUS ONCE
Refills: 0 | Status: DISCONTINUED | OUTPATIENT
Start: 2022-02-06 | End: 2022-02-23

## 2022-02-06 RX ORDER — LABETALOL HCL 100 MG
100 TABLET ORAL EVERY 12 HOURS
Refills: 0 | Status: DISCONTINUED | OUTPATIENT
Start: 2022-02-06 | End: 2022-02-21

## 2022-02-06 RX ADMIN — INSULIN GLARGINE 15 UNIT(S): 100 INJECTION, SOLUTION SUBCUTANEOUS at 21:42

## 2022-02-06 RX ADMIN — APIXABAN 2.5 MILLIGRAM(S): 2.5 TABLET, FILM COATED ORAL at 17:32

## 2022-02-06 RX ADMIN — FINASTERIDE 5 MILLIGRAM(S): 5 TABLET, FILM COATED ORAL at 12:12

## 2022-02-06 RX ADMIN — Medication 1 TABLET(S): at 17:32

## 2022-02-06 RX ADMIN — AMPICILLIN SODIUM AND SULBACTAM SODIUM 200 GRAM(S): 250; 125 INJECTION, POWDER, FOR SUSPENSION INTRAMUSCULAR; INTRAVENOUS at 05:48

## 2022-02-06 RX ADMIN — MIRTAZAPINE 15 MILLIGRAM(S): 45 TABLET, ORALLY DISINTEGRATING ORAL at 21:41

## 2022-02-06 RX ADMIN — Medication 4: at 08:07

## 2022-02-06 RX ADMIN — AMPICILLIN SODIUM AND SULBACTAM SODIUM 200 GRAM(S): 250; 125 INJECTION, POWDER, FOR SUSPENSION INTRAMUSCULAR; INTRAVENOUS at 13:06

## 2022-02-06 RX ADMIN — ATORVASTATIN CALCIUM 40 MILLIGRAM(S): 80 TABLET, FILM COATED ORAL at 21:41

## 2022-02-06 RX ADMIN — Medication 3: at 11:35

## 2022-02-06 RX ADMIN — SODIUM CHLORIDE 65 MILLILITER(S): 9 INJECTION INTRAMUSCULAR; INTRAVENOUS; SUBCUTANEOUS at 01:23

## 2022-02-06 RX ADMIN — Medication 3: at 17:31

## 2022-02-06 RX ADMIN — Medication 3 UNIT(S): at 17:31

## 2022-02-06 RX ADMIN — Medication 3 UNIT(S): at 11:36

## 2022-02-06 RX ADMIN — Medication 100 MILLIGRAM(S): at 21:41

## 2022-02-06 RX ADMIN — TAMSULOSIN HYDROCHLORIDE 0.4 MILLIGRAM(S): 0.4 CAPSULE ORAL at 21:41

## 2022-02-06 RX ADMIN — AMPICILLIN SODIUM AND SULBACTAM SODIUM 200 GRAM(S): 250; 125 INJECTION, POWDER, FOR SUSPENSION INTRAMUSCULAR; INTRAVENOUS at 21:41

## 2022-02-06 RX ADMIN — Medication 100 MILLIGRAM(S): at 17:33

## 2022-02-06 NOTE — CONSULT NOTE ADULT - SUBJECTIVE AND OBJECTIVE BOX
Chief complain/HPI    82 yo M with PMH of DVT (on eliquis) , HTN, HLD, DM , multiple recent visits for UTI presents with confusion. < from: US Renal (21 @ 10:33) >  TECHNIQUE: Sonography of the kidneys and bladder.  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  History of UTI on last admission.      FINDINGS: Bilateral ureteral stents noted on CT evaluation performed   2021 are not sonographically demonstrated.    Right kidney: 10.2 cm. Mild to moderate right-sided hydronephrosis.    Left kidney: 12.0 cm. Moderate left-sided hydronephrosis.    Urinary bladder: Not well distended, limiting assessment. An element of   bladder wall thickening is suggested.    The IVC and abdominal aorta are not well-visualized.    IMPRESSION: Bilateral hydronephrosis. See above discussion.      < end of copied text >    Home Medications:   * Patient Currently Takes Medications as of 2022 22:49 documented in Structured Notes  · 	calcium carbonate 500 mg (200 mg elemental calcium) oral tablet, chewable: Last Dose Taken:  , 1 tab(s) chewed 2 times a day   · 	ergocalciferol 1.25 mg (50,000 intl units) oral capsule: Last Dose Taken:  , 1 cap(s) orally once a week EVERY THURSDAY  · 	amoxicillin-clavulanate 875 mg-125 mg oral tablet: Last Dose Taken:  , 1 tab(s) orally 2 times a day from -  · 	glipiZIDE 5 mg oral tablet: Last Dose Taken:  , 1 tab(s) orally 2 times a day   · 	meclizine 12.5 mg oral tablet: Last Dose Taken:  , 1 tab(s) orally every 6 hours, As needed, Dizziness  · 	mirtazapine 7.5 mg oral tablet: Last Dose Taken:  , 1 tab(s) orally once a day (at bedtime)  · 	tamsulosin 0.4 mg oral capsule: Last Dose Taken:  , 1 cap(s) orally once a day (at bedtime)  · 	calcitriol 0.5 mcg oral capsule: Last Dose Taken:  , 1 cap(s) orally once a day  · 	labetalol 200 mg oral tablet: Last Dose Taken:  , 1 tab(s) orally every 12 hours  · 	apixaban 2.5 mg oral tablet: Last Dose Taken:  , 1 tab(s) orally every 12 hours  · 	finasteride 5 mg oral tablet: Last Dose Taken:  , 1 tab(s) orally once a day  · 	omeprazole 20 mg capsule,delayed release: Last Dose Taken:    · 	pravastatin 40 mg tablet: Last Dose Taken:  , 1 tab(s) orally once a day (at bedtime)·  Problem: UTI (urinary tract infection).     PAST MEDICAL & SURGICAL HISTORY:  Diabetes    Hypertension    Hypercholesterolemia    GERD (gastroesophageal reflux disease)    Anemia    Hypomagnesemia    DVT (deep venous thrombosis)    H/O hypoparathyroidism    BPH (benign prostatic hyperplasia)    No significant past surgical history        Home Medications Reviewed    Hospital Medications:   MEDICATIONS  (STANDING):  ampicillin/sulbactam  IVPB 3 Gram(s) IV Intermittent every 8 hours  apixaban 2.5 milliGRAM(s) Oral two times a day  atorvastatin 40 milliGRAM(s) Oral at bedtime  calcitriol   Capsule 0.5 MICROGram(s) Oral daily  calcium carbonate    500 mG (Tums) Chewable 1 Tablet(s) Chew two times a day  dextrose 40% Gel 15 Gram(s) Oral once  dextrose 5%. 1000 milliLiter(s) (50 mL/Hr) IV Continuous <Continuous>  dextrose 5%. 1000 milliLiter(s) (100 mL/Hr) IV Continuous <Continuous>  dextrose 50% Injectable 25 Gram(s) IV Push once  dextrose 50% Injectable 12.5 Gram(s) IV Push once  dextrose 50% Injectable 25 Gram(s) IV Push once  finasteride 5 milliGRAM(s) Oral daily  glucagon  Injectable 1 milliGRAM(s) IntraMuscular once  insulin glargine Injectable (LANTUS) 15 Unit(s) SubCutaneous at bedtime  insulin lispro (ADMELOG) corrective regimen sliding scale   SubCutaneous three times a day before meals  insulin lispro Injectable (ADMELOG) 3 Unit(s) SubCutaneous three times a day before meals  labetalol 200 milliGRAM(s) Oral every 12 hours  mirtazapine 15 milliGRAM(s) Oral at bedtime  pantoprazole    Tablet 40 milliGRAM(s) Oral before breakfast  sodium chloride 0.9%. 1000 milliLiter(s) (75 mL/Hr) IV Continuous <Continuous>  sodium chloride 0.9%. 1000 milliLiter(s) (65 mL/Hr) IV Continuous <Continuous>  tamsulosin 0.4 milliGRAM(s) Oral at bedtime    MEDICATIONS  (PRN):  acetaminophen     Tablet .. 650 milliGRAM(s) Oral every 6 hours PRN Temp greater or equal to 38C (100.4F), Mild Pain (1 - 3)  meclizine 12.5 milliGRAM(s) Oral every 6 hours PRN Dizziness  melatonin 3 milliGRAM(s) Oral at bedtime PRN Insomnia  ondansetron Injectable 4 milliGRAM(s) IV Push every 8 hours PRN Nausea and/or Vomiting      Allergies    Allergy Status Unknown    Intolerances                              7.8    4.75  )-----------( 199      ( 2022 07:19 )             23.4     02-06    134<L>  |  105  |  40<H>  ----------------------------<  311<H>  4.3   |  20<L>  |  2.55<H>    Ca    8.2<L>      2022 07:19  Phos  3.5     02-06  Mg     1.8     02-06    TPro  6.0  /  Alb  1.8<L>  /  TBili  0.3  /  DBili  x   /  AST  6<L>  /  ALT  10  /  AlkPhos  59  02-06      Urinalysis Basic - ( 2022 20:08 )    Color: Yellow / Appearance: Clear / S.010 / pH: x  Gluc: x / Ketone: Negative  / Bili: Negative / Urobili: Negative   Blood: x / Protein: 30 mg/dL / Nitrite: Negative   Leuk Esterase: Moderate / RBC: 10-25 /HPF / WBC 26-50 /HPF   Sq Epi: x / Non Sq Epi: x / Bacteria: Trace /HPF            RADIOLOGY & ADDITIONAL STUDIES:    SOCIAL HISTORY: Denies ETOh,Smoking,     FAMILY HISTORY:      REVIEW OF SYSTEMS:  Confused    VITALS:  Vital Signs Last 24 Hrs  T(C): 36.7 (2022 05:43), Max: 36.8 (2022 19:30)  T(F): 98 (2022 05:43), Max: 98.3 (2022 19:30)  HR: 77 (2022 05:43) (77 - 112)  BP: 90/54 (2022 05:43) (90/54 - 147/82)  BP(mean): 104 (2022 19:30) (104 - 104)  RR: 16 (2022 05:43) (16 - 19)  SpO2: 92% (2022 05:43) (90% - 94%)        PHYSICAL EXAM:  Constitutional: NAD  HEENT: anicteric sclera, oropharynx clear, MMM  Neck: No JVD  Respiratory: good air entrance B/L, no wheezes, rales or rhonchi  Cardiovascular: S1, S2, RRR, no pericardial rub, no murmur  Gastrointestinal: BS+, soft, no tenderness, no distension, no bruit  Pelvis: bladder non-distended, no CVA tenderness  Extremities: No cyanosis or clubbing. No peripheral edema  Neurological: A/O x 3, no focal deficits  Psychiatric: Normal mood, normal affect  : No CVA tenderness. No pena.   Skin: No rashes  Vascular: all pulses present  Access:                     Chief complain/HPI    80 yo M with PMH of DVT (on eliquis) , HTN, HLD, DM , multiple recent visits for UTI presents with confusion. History of demetia  Renal consult was called for renal failure  Admission creatinine 2.64     < from: US Renal (12.27.21 @ 10:33) >  TECHNIQUE: Sonography of the kidneys and bladder.    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  History of UTI on last admission.  FINDINGS: Bilateral ureteral stents noted on CT evaluation performed   2021 are not sonographically demonstrated.    Right kidney: 10.2 cm. Mild to moderate right-sided hydronephrosis.    Left kidney: 12.0 cm. Moderate left-sided hydronephrosis.    Urinary bladder: Not well distended, limiting assessment. An element of   bladder wall thickening is suggested.    The IVC and abdominal aorta are not well-visualized.    IMPRESSION: Bilateral hydronephrosis. See above discussion.      < end of copied text >    Home Medications:   * Patient Currently Takes Medications as of 2022 22:49 documented in Structured Notes  · 	calcium carbonate 500 mg (200 mg elemental calcium) oral tablet, chewable: Last Dose Taken:  , 1 tab(s) chewed 2 times a day   · 	ergocalciferol 1.25 mg (50,000 intl units) oral capsule: Last Dose Taken:  , 1 cap(s) orally once a week EVERY THURSDAY  · 	amoxicillin-clavulanate 875 mg-125 mg oral tablet: Last Dose Taken:  , 1 tab(s) orally 2 times a day from -  · 	glipiZIDE 5 mg oral tablet: Last Dose Taken:  , 1 tab(s) orally 2 times a day   · 	meclizine 12.5 mg oral tablet: Last Dose Taken:  , 1 tab(s) orally every 6 hours, As needed, Dizziness  · 	mirtazapine 7.5 mg oral tablet: Last Dose Taken:  , 1 tab(s) orally once a day (at bedtime)  · 	tamsulosin 0.4 mg oral capsule: Last Dose Taken:  , 1 cap(s) orally once a day (at bedtime)  · 	calcitriol 0.5 mcg oral capsule: Last Dose Taken:  , 1 cap(s) orally once a day  · 	labetalol 200 mg oral tablet: Last Dose Taken:  , 1 tab(s) orally every 12 hours  · 	apixaban 2.5 mg oral tablet: Last Dose Taken:  , 1 tab(s) orally every 12 hours  · 	finasteride 5 mg oral tablet: Last Dose Taken:  , 1 tab(s) orally once a day  · 	omeprazole 20 mg capsule,delayed release: Last Dose Taken:    · 	pravastatin 40 mg tablet: Last Dose Taken:  , 1 tab(s) orally once a day (at bedtime)·  Problem: UTI (urinary tract infection).     PAST MEDICAL & SURGICAL HISTORY:  Diabetes    Hypertension    Hypercholesterolemia    GERD (gastroesophageal reflux disease)    Anemia    Hypomagnesemia    DVT (deep venous thrombosis)    H/O hypoparathyroidism    BPH (benign prostatic hyperplasia)    No significant past surgical history        Home Medications Reviewed    Hospital Medications:   MEDICATIONS  (STANDING):  ampicillin/sulbactam  IVPB 3 Gram(s) IV Intermittent every 8 hours  apixaban 2.5 milliGRAM(s) Oral two times a day  atorvastatin 40 milliGRAM(s) Oral at bedtime  calcitriol   Capsule 0.5 MICROGram(s) Oral daily  calcium carbonate    500 mG (Tums) Chewable 1 Tablet(s) Chew two times a day  dextrose 40% Gel 15 Gram(s) Oral once  dextrose 5%. 1000 milliLiter(s) (50 mL/Hr) IV Continuous <Continuous>  dextrose 5%. 1000 milliLiter(s) (100 mL/Hr) IV Continuous <Continuous>  dextrose 50% Injectable 25 Gram(s) IV Push once  dextrose 50% Injectable 12.5 Gram(s) IV Push once  dextrose 50% Injectable 25 Gram(s) IV Push once  finasteride 5 milliGRAM(s) Oral daily  glucagon  Injectable 1 milliGRAM(s) IntraMuscular once  insulin glargine Injectable (LANTUS) 15 Unit(s) SubCutaneous at bedtime  insulin lispro (ADMELOG) corrective regimen sliding scale   SubCutaneous three times a day before meals  insulin lispro Injectable (ADMELOG) 3 Unit(s) SubCutaneous three times a day before meals  labetalol 200 milliGRAM(s) Oral every 12 hours  mirtazapine 15 milliGRAM(s) Oral at bedtime  pantoprazole    Tablet 40 milliGRAM(s) Oral before breakfast  sodium chloride 0.9%. 1000 milliLiter(s) (75 mL/Hr) IV Continuous <Continuous>  sodium chloride 0.9%. 1000 milliLiter(s) (65 mL/Hr) IV Continuous <Continuous>  tamsulosin 0.4 milliGRAM(s) Oral at bedtime    MEDICATIONS  (PRN):  acetaminophen     Tablet .. 650 milliGRAM(s) Oral every 6 hours PRN Temp greater or equal to 38C (100.4F), Mild Pain (1 - 3)  meclizine 12.5 milliGRAM(s) Oral every 6 hours PRN Dizziness  melatonin 3 milliGRAM(s) Oral at bedtime PRN Insomnia  ondansetron Injectable 4 milliGRAM(s) IV Push every 8 hours PRN Nausea and/or Vomiting      Allergies    Allergy Status Unknown    Intolerances    `  `< from: CT Head No Cont (22 @ 12:26) >  No hydrocephalus, mass effect, midline shift, acute intracranial   hemorrhage, or brain edema.    Mild white matter microvascular ischemic disease.    Visualized paranasal sinuses and mastoid air cells are clear.    IMPRESSION:    No hydrocephalus, acute intracranial hemorrhage, mass effect, or brain   edema.    < end of copied text >                          7.8    4.75  )-----------( 199      ( 2022 07:19 )             23.4     02-06    134<L>  |  105  |  40<H>  ----------------------------<  311<H>  4.3   |  20<L>  |  2.55<H>    Ca    8.2<L>      2022 07:19  Phos  3.5     02-06  Mg     1.8     02-06    TPro  6.0  /  Alb  1.8<L>  /  TBili  0.3  /  DBili  x   /  AST  6<L>  /  ALT  10  /  AlkPhos  59  02-06      Urinalysis Basic - ( 2022 20:08 )    Color: Yellow / Appearance: Clear / S.010 / pH: x  Gluc: x / Ketone: Negative  / Bili: Negative / Urobili: Negative   Blood: x / Protein: 30 mg/dL / Nitrite: Negative   Leuk Esterase: Moderate / RBC: 10-25 /HPF / WBC 26-50 /HPF   Sq Epi: x / Non Sq Epi: x / Bacteria: Trace /HPF            RADIOLOGY & ADDITIONAL STUDIES:    SOCIAL HISTORY: Denies ETOh,Smoking,     FAMILY HISTORY:      REVIEW OF SYSTEMS:  Confused    VITALS:  Vital Signs Last 24 Hrs  T(C): 36.7 (2022 05:43), Max: 36.8 (2022 19:30)  T(F): 98 (2022 05:43), Max: 98.3 (2022 19:30)  HR: 77 (2022 05:43) (77 - 112)  BP: 90/54 (2022 05:43) (90/54 - 147/82)  BP(mean): 104 (2022 19:30) (104 - 104)  RR: 16 (2022 05:43) (16 - 19)  SpO2: 92% (2022 05:43) (90% - 94%)        PHYSICAL EXAM:  Constitutional: NAD  HEENT: anicteric sclera, oropharynx clear, MMM    Respiratory: good air entrance B/L, no wheezes, rales or rhonchi  Cardiovascular: S1, S2, RRR, no pericardial rub, no murmur  Gastrointestinal: BS+, soft, no tenderness, no distension, no bruit  Pelvis: bladder non-distended, no CVA tenderness  Extremities: No cyanosis or clubbing. No peripheral edema  Neurological: A/O x 3, no focal deficits  Talkative but confused, not oriented to place time.

## 2022-02-06 NOTE — CONSULT NOTE ADULT - PROBLEM SELECTOR RECOMMENDATION 9
due to acute illness and need for insulin tx  start lantus 15 units  add admelog 3 ac tid  fsg ac and hs  d/w prim team

## 2022-02-06 NOTE — CONSULT NOTE ADULT - SUBJECTIVE AND OBJECTIVE BOX
Patient is a 81y old  Male who presents with a chief complaint of Confusion/AMS (2022 11:52)      HPI:  80 yo M with PMH of DVT (on eliquis) , HTN, HLD, DM , multiple recent visits for UTI presents with confusion. Pt arrived by EMS, but he is unable to tell me why he called ambulance. Patient is AAO x 2 but he is so aggressive and poor historian. Hence most of history is taken from ED and medical records.  Patient was recently admitted for UTI and was evaluated by ID , urine culture grew enterococcus fecalis sensitive to ampicillin and patient was discharged on augmentin PO but he didnot  meds, a Note from recent ED visit states that pt came back to ED and seen by SW ( refer to SW note ) ,  pt was given bottle of pills for PO abx treatment of UTI, but pt states he does not remember this. ED attending Spoke with pt's DIAMANTE Florentino who states she is working with SW to try and arrange for [facility] vs assisted living.   patient denies any headache, dizziness, chest pain, palpitations, shortness of breath, abdominal pain, nausea/vomiting/diarrhea, urinary symptoms or any other acute complaint. Found to have uncont dm. Pt states he takes oral dm meds as out pt with poorly cont dm. S/p recent hospitalization for uti/ uncont dm.      (2022 23:06)      PAST MEDICAL & SURGICAL HISTORY:  Diabetes    Hypertension    Hypercholesterolemia    GERD (gastroesophageal reflux disease)    Anemia    Hypomagnesemia    DVT (deep venous thrombosis)    H/O hypoparathyroidism    BPH (benign prostatic hyperplasia)    No significant past surgical history           MEDICATIONS  (STANDING):  ampicillin/sulbactam  IVPB 3 Gram(s) IV Intermittent every 8 hours  apixaban 2.5 milliGRAM(s) Oral two times a day  atorvastatin 40 milliGRAM(s) Oral at bedtime  calcitriol   Capsule 0.5 MICROGram(s) Oral daily  calcium carbonate    500 mG (Tums) Chewable 1 Tablet(s) Chew two times a day  dextrose 40% Gel 15 Gram(s) Oral once  dextrose 5%. 1000 milliLiter(s) (50 mL/Hr) IV Continuous <Continuous>  dextrose 5%. 1000 milliLiter(s) (100 mL/Hr) IV Continuous <Continuous>  dextrose 50% Injectable 25 Gram(s) IV Push once  dextrose 50% Injectable 12.5 Gram(s) IV Push once  dextrose 50% Injectable 25 Gram(s) IV Push once  finasteride 5 milliGRAM(s) Oral daily  glucagon  Injectable 1 milliGRAM(s) IntraMuscular once  insulin glargine Injectable (LANTUS) 15 Unit(s) SubCutaneous at bedtime  insulin lispro (ADMELOG) corrective regimen sliding scale   SubCutaneous three times a day before meals  insulin lispro Injectable (ADMELOG) 3 Unit(s) SubCutaneous three times a day before meals  labetalol 200 milliGRAM(s) Oral every 12 hours  mirtazapine 15 milliGRAM(s) Oral at bedtime  pantoprazole    Tablet 40 milliGRAM(s) Oral before breakfast  sodium chloride 0.9%. 1000 milliLiter(s) (75 mL/Hr) IV Continuous <Continuous>  sodium chloride 0.9%. 1000 milliLiter(s) (65 mL/Hr) IV Continuous <Continuous>  tamsulosin 0.4 milliGRAM(s) Oral at bedtime    MEDICATIONS  (PRN):  acetaminophen     Tablet .. 650 milliGRAM(s) Oral every 6 hours PRN Temp greater or equal to 38C (100.4F), Mild Pain (1 - 3)  meclizine 12.5 milliGRAM(s) Oral every 6 hours PRN Dizziness  melatonin 3 milliGRAM(s) Oral at bedtime PRN Insomnia  ondansetron Injectable 4 milliGRAM(s) IV Push every 8 hours PRN Nausea and/or Vomiting      FAMILY HISTORY:      SOCIAL HISTORY:      REVIEW OF SYSTEMS:  CONSTITUTIONAL: No fever, weight loss, or fatigue  EYES: No eye pain, visual disturbances, or discharge  ENT:  No difficulty hearing, tinnitus, vertigo; No sinus or throat pain  NECK: No pain or stiffness  RESPIRATORY: No cough, wheezing, chills or hemoptysis; No Shortness of Breath  CARDIOVASCULAR: No chest pain, palpitations, passing out, dizziness, or leg swelling  GASTROINTESTINAL: No abdominal or epigastric pain. No nausea, vomiting, or hematemesis; No diarrhea or constipation. No melena or hematochezia.  GENITOURINARY: No dysuria, frequency, hematuria, or incontinence  NEUROLOGICAL: No headaches, memory loss, loss of strength, numbness, or tremors  SKIN: No itching, burning, rashes, or lesions   LYMPH Nodes: No enlarged glands  ENDOCRINE: No heat or cold intolerance; No hair loss  MUSCULOSKELETAL: No joint pain or swelling; No muscle, back, or extremity pain  PSYCHIATRIC: No depression, anxiety, mood swings, or difficulty sleeping  HEME/LYMPH: No easy bruising, or bleeding gums  ALLERGY AND IMMUNOLOGIC: No hives or eczema	        Vital Signs Last 24 Hrs  T(C): 36.7 (2022 05:43), Max: 36.8 (2022 19:30)  T(F): 98 (2022 05:43), Max: 98.3 (2022 19:30)  HR: 77 (2022 05:43) (77 - 112)  BP: 90/54 (2022 05:43) (90/54 - 147/82)  BP(mean): 104 (2022 19:30) (104 - 104)  RR: 16 (2022 05:43) (16 - 19)  SpO2: 92% (2022 05:43) (90% - 94%)      Constitutional:    NC/AT: nad    Neck:  No JVD, bruits or thyromegaly    Respiratory:  Clear without rales or rhonchi    Cardiovascular:  RR without murmur, rub or gallop.    Gastrointestinal: Soft without hepatosplenomegaly.    Extremities: without cyanosis, clubbing or edema.    Neurological:  Oriented   x   3   . No gross sensory or motor defects.        LABS:                        7.8    4.75  )-----------( 199      ( 2022 07:19 )             23.4     02-06    134<L>  |  105  |  40<H>  ----------------------------<  311<H>  4.3   |  20<L>  |  2.55<H>    Ca    8.2<L>      2022 07:19  Phos  3.5     02-06  Mg     1.8     02-06    TPro  6.0  /  Alb  1.8<L>  /  TBili  0.3  /  DBili  x   /  AST  6<L>  /  ALT  10  /  AlkPhos  59  02-06          Urinalysis Basic - ( 2022 20:08 )    Color: Yellow / Appearance: Clear / S.010 / pH: x  Gluc: x / Ketone: Negative  / Bili: Negative / Urobili: Negative   Blood: x / Protein: 30 mg/dL / Nitrite: Negative   Leuk Esterase: Moderate / RBC: 10-25 /HPF / WBC 26-50 /HPF   Sq Epi: x / Non Sq Epi: x / Bacteria: Trace /HPF      CAPILLARY BLOOD GLUCOSE      POCT Blood Glucose.: 320 mg/dL (2022 07:37)  POCT Blood Glucose.: 323 mg/dL (2022 23:13)  POCT Blood Glucose.: 321 mg/dL (2022 11:56)      RADIOLOGY & ADDITIONAL STUDIES:

## 2022-02-06 NOTE — CONSULT NOTE ADULT - ASSESSMENT
82 yo M with PMH of DVT (on eliquis) , HTN, HLD, DM , multiple recent visits for UTI presents with confusion. . Found to have uncont dm. Pt states he takes oral dm meds as out pt with poorly cont dm. S/p recent hospitalization for uti/ uncont dm.

## 2022-02-06 NOTE — CHART NOTE - NSCHARTNOTEFT_GEN_A_CORE
Patient is a 81y old  Male who presents with a chief complaint of Confusion/AMS (2022 11:52)    Vital Signs Last 24 Hrs  T(F): 98 (2022 05:43), Max: 98.3 (2022 19:30)  HR: 77 (2022 05:43) (77 - 112)  BP: 90/54 (2022 05:43) (90/54 - 147/82)  BP(mean): 104 (2022 19:30) (104 - 104)  RR: 16 (2022 05:43) (16 - 19)  SpO2: 92% (2022 05:43) (90% - 94%)    PAST MEDICAL & SURGICAL HISTORY:  Diabetes   Hypertension   Hypercholesterolemia    GERD (gastroesophageal reflux disease)  Anemia    Hypomagnesemia   DVT (deep venous thrombosis)  H/O hypoparathyroidism   BPH (benign prostatic hyperplasia)    LABS:                        7.8    4.75  )-----------( 199      ( 2022 07:19 )             23.4     02-06    134<L>  |  105  |  40<H>  ----------------------------<  311<H>  4.3   |  20<L>  |  2.55<H>    Ca    8.2<L>      2022 07:19  Phos  3.5     02-06  Mg     1.8     02-06    TPro  6.0  /  Alb  1.8<L>  /  TBili  0.3  /  DBili  x   /  AST  6<L>  /  ALT  10  /  AlkPhos  59  02-06      Urinalysis Basic - ( 2022 20:08 )    Color: Yellow / Appearance: Clear / S.010 / pH: x  Gluc: x / Ketone: Negative  / Bili: Negative / Urobili: Negative   Blood: x / Protein: 30 mg/dL / Nitrite: Negative   Leuk Esterase: Moderate / RBC: 10-25 /HPF / WBC 26-50 /HPF   Sq Epi: x / Non Sq Epi: x / Bacteria: Trace /HPF      CAPILLARY BLOOD GLUCOSE      POCT Blood Glucose.: 320 mg/dL (2022 07:37)  POCT Blood Glucose.: 323 mg/dL (2022 23:13)  POCT Blood Glucose.: 321 mg/dL (2022 11:56)        Urinalysis Basic - ( 2022 20:08 )    Color: Yellow / Appearance: Clear / S.010 / pH: x  Gluc: x / Ketone: Negative  / Bili: Negative / Urobili: Negative   Blood: x / Protein: 30 mg/dL / Nitrite: Negative   Leuk Esterase: Moderate / RBC: 10-25 /HPF / WBC 26-50 /HPF   Sq Epi: x / Non Sq Epi: x / Bacteria: Trace /HPF    ASSESSMENT AND PLAN  Patient is a 81y old  Male who presents with a chief complaint of Confusion/AMS (2022 11:52)  Patient with h/o UTI started on empirical treatment with Unasyn  f/u urine cultures   spoke to endocrinology DARBY Kamara will start lantus 15 and 3 units of Adelog with meals     Per nursing staff patient confused, high risk for fall - enhanced observation and close supervision requested   will f/u with attending for meds adjustment or possible psych consult        Care Collaborated Discussed with Consultants/Other Providers [x] YES  [ ] NO

## 2022-02-06 NOTE — PROGRESS NOTE ADULT - ASSESSMENT
80 yo M with PMH of DVT (on eliquis) , HTN, HLD, DM , multiple recent visits for UTI presents with confusion,depression and suicidal ideation.  1.UTI-ABX.  2.DM-Insulin,Endo eval.  3.Psych eval-pt depressed doesn't want to live any more,suicidal1 to1 obs..Cont remeron 15mg qhs.  4.DVT-eliquis.  5.CRI-f/u lytes.Renal eval called.  6.HTN-cont bp medication.  7.Lipid d/o-statin.  8.PPI.

## 2022-02-06 NOTE — PROGRESS NOTE ADULT - SUBJECTIVE AND OBJECTIVE BOX
CHIEF COMPLAINT:Patient is a 81y old  Male who presents with a chief complaint of Confusion/AMS.Pt on 1 to 1, wants to kill himself.    	  REVIEW OF SYSTEMS:  CONSTITUTIONAL: No fever, weight loss, or fatigue  EYES: No eye pain, visual disturbances, or discharge  ENT:  No difficulty hearing, tinnitus, vertigo; No sinus or throat pain  NECK: No pain or stiffness  RESPIRATORY: No cough, wheezing, chills or hemoptysis; No Shortness of Breath  CARDIOVASCULAR: No chest pain, palpitations, passing out, dizziness, or leg swelling  GASTROINTESTINAL: No abdominal or epigastric pain. No nausea, vomiting, or hematemesis; No diarrhea or constipation. No melena or hematochezia.  GENITOURINARY: No dysuria, frequency, hematuria, or incontinence  NEUROLOGICAL: No headaches, memory loss, loss of strength, numbness, or tremors  SKIN: No itching, burning, rashes, or lesions   LYMPH Nodes: No enlarged glands  ENDOCRINE: No heat or cold intolerance; No hair loss  MUSCULOSKELETAL: No joint pain or swelling; No muscle, back, or extremity pain  PSYCHIATRIC: + depression, No anxiety, mood swings, or difficulty sleeping  HEME/LYMPH: No easy bruising, or bleeding gums  ALLERGY AND IMMUNOLOGIC: No hives or eczema	        PHYSICAL EXAM:  T(C): 36.7 (02-06-22 @ 05:43), Max: 36.8 (02-05-22 @ 19:30)  HR: 77 (02-06-22 @ 05:43) (77 - 112)  BP: 90/54 (02-06-22 @ 05:43) (90/54 - 147/82)  RR: 16 (02-06-22 @ 05:43) (16 - 19)  SpO2: 92% (02-06-22 @ 05:43) (90% - 94%)  Wt(kg): --  I&O's Summary      Appearance: Normal	  HEENT:   Normal oral mucosa, PERRL, EOMI	  Lymphatic: No lymphadenopathy  Cardiovascular: Normal S1 S2, No JVD, No murmurs, No edema  Respiratory: Lungs clear to auscultation	  Psychiatry: A & O x 3, Mood & affect appropriate  Gastrointestinal:  Soft, Non-tender, + BS	  Skin: No rashes, No ecchymoses, No cyanosis	  Neurologic: Non-focal  Extremities: Normal range of motion, No clubbing, cyanosis or edema  Vascular: Peripheral pulses palpable 2+ bilaterally    MEDICATIONS  (STANDING):  ampicillin/sulbactam  IVPB 3 Gram(s) IV Intermittent every 8 hours  apixaban 2.5 milliGRAM(s) Oral two times a day  atorvastatin 40 milliGRAM(s) Oral at bedtime  calcitriol   Capsule 0.5 MICROGram(s) Oral daily  calcium carbonate    500 mG (Tums) Chewable 1 Tablet(s) Chew two times a day  dextrose 40% Gel 15 Gram(s) Oral once  dextrose 5%. 1000 milliLiter(s) (50 mL/Hr) IV Continuous <Continuous>  dextrose 5%. 1000 milliLiter(s) (100 mL/Hr) IV Continuous <Continuous>  dextrose 50% Injectable 25 Gram(s) IV Push once  dextrose 50% Injectable 12.5 Gram(s) IV Push once  dextrose 50% Injectable 25 Gram(s) IV Push once  finasteride 5 milliGRAM(s) Oral daily  glucagon  Injectable 1 milliGRAM(s) IntraMuscular once  insulin glargine Injectable (LANTUS) 15 Unit(s) SubCutaneous at bedtime  insulin lispro (ADMELOG) corrective regimen sliding scale   SubCutaneous three times a day before meals  insulin lispro Injectable (ADMELOG) 3 Unit(s) SubCutaneous three times a day before meals  labetalol 200 milliGRAM(s) Oral every 12 hours  mirtazapine 15 milliGRAM(s) Oral at bedtime  pantoprazole    Tablet 40 milliGRAM(s) Oral before breakfast  sodium chloride 0.9%. 1000 milliLiter(s) (75 mL/Hr) IV Continuous <Continuous>  sodium chloride 0.9%. 1000 milliLiter(s) (65 mL/Hr) IV Continuous <Continuous>  tamsulosin 0.4 milliGRAM(s) Oral at bedtime      	  LABS:	 	          Troponin I, High Sensitivity Result: 9.7 ng/L (02-06 @ 00:42)                            7.8    4.75  )-----------( 199      ( 06 Feb 2022 07:19 )             23.4     02-06    134<L>  |  105  |  40<H>  ----------------------------<  311<H>  4.3   |  20<L>  |  2.55<H>    Ca    8.2<L>      06 Feb 2022 07:19  Phos  3.5     02-06  Mg     1.8     02-06    TPro  6.0  /  Alb  1.8<L>  /  TBili  0.3  /  DBili  x   /  AST  6<L>  /  ALT  10  /  AlkPhos  59  02-06      	    uCulture - Urine (02.02.22 @ 06:10)   - Ampicillin: S <=2 Predicts results to ampicillin/sulbactam, amoxacillin-clavulanate and piperacillin-tazobactam.   - Ciprofloxacin: S <=1   - Levofloxacin: S 2   - Nitrofurantoin: S <=32 Should not be used to treat pyelonephritis.   - Tetra/Doxy: R >8   - Vancomycin: S 4   Specimen Source: Clean Catch Clean Catch (Midstream)   Culture Results:   >100,000 CFU/ml Enterococcus faecalis   Organism Identification: Enterococcus faecalis   Organism: Enterococcus faecalis   Method Type: MICHAEL

## 2022-02-06 NOTE — PHYSICAL THERAPY INITIAL EVALUATION ADULT - GENERAL OBSERVATIONS, REHAB EVAL
Consult received, EMR, radiology and labs reviewed. Patient received supine in bed, NAD, gets very agitated at times , states feels very tired.  Patient agreed to EVALUATION from Physical Therapist.

## 2022-02-07 DIAGNOSIS — F34.1 DYSTHYMIC DISORDER: ICD-10-CM

## 2022-02-07 DIAGNOSIS — G92.9 UNSPECIFIED TOXIC ENCEPHALOPATHY: ICD-10-CM

## 2022-02-07 DIAGNOSIS — G30.9 ALZHEIMER'S DISEASE, UNSPECIFIED: ICD-10-CM

## 2022-02-07 LAB
ALBUMIN SERPL ELPH-MCNC: 2.1 G/DL — LOW (ref 3.5–5)
ALP SERPL-CCNC: 64 U/L — SIGNIFICANT CHANGE UP (ref 40–120)
ALT FLD-CCNC: 11 U/L DA — SIGNIFICANT CHANGE UP (ref 10–60)
ANION GAP SERPL CALC-SCNC: 10 MMOL/L — SIGNIFICANT CHANGE UP (ref 5–17)
AST SERPL-CCNC: 11 U/L — SIGNIFICANT CHANGE UP (ref 10–40)
BASOPHILS # BLD AUTO: 0.03 K/UL — SIGNIFICANT CHANGE UP (ref 0–0.2)
BASOPHILS NFR BLD AUTO: 0.5 % — SIGNIFICANT CHANGE UP (ref 0–2)
BILIRUB SERPL-MCNC: 0.4 MG/DL — SIGNIFICANT CHANGE UP (ref 0.2–1.2)
BUN SERPL-MCNC: 37 MG/DL — HIGH (ref 7–18)
CALCIUM SERPL-MCNC: 8.3 MG/DL — LOW (ref 8.4–10.5)
CHLORIDE SERPL-SCNC: 106 MMOL/L — SIGNIFICANT CHANGE UP (ref 96–108)
CO2 SERPL-SCNC: 19 MMOL/L — LOW (ref 22–31)
CREAT SERPL-MCNC: 2.47 MG/DL — HIGH (ref 0.5–1.3)
EOSINOPHIL # BLD AUTO: 0.12 K/UL — SIGNIFICANT CHANGE UP (ref 0–0.5)
EOSINOPHIL NFR BLD AUTO: 2.1 % — SIGNIFICANT CHANGE UP (ref 0–6)
GLUCOSE BLDC GLUCOMTR-MCNC: 237 MG/DL — HIGH (ref 70–99)
GLUCOSE BLDC GLUCOMTR-MCNC: 248 MG/DL — HIGH (ref 70–99)
GLUCOSE BLDC GLUCOMTR-MCNC: 258 MG/DL — HIGH (ref 70–99)
GLUCOSE BLDC GLUCOMTR-MCNC: 277 MG/DL — HIGH (ref 70–99)
GLUCOSE SERPL-MCNC: 210 MG/DL — HIGH (ref 70–99)
HCT VFR BLD CALC: 24.9 % — LOW (ref 39–50)
HGB BLD-MCNC: 8.4 G/DL — LOW (ref 13–17)
IMM GRANULOCYTES NFR BLD AUTO: 0.5 % — SIGNIFICANT CHANGE UP (ref 0–1.5)
LYMPHOCYTES # BLD AUTO: 0.78 K/UL — LOW (ref 1–3.3)
LYMPHOCYTES # BLD AUTO: 13.6 % — SIGNIFICANT CHANGE UP (ref 13–44)
MAGNESIUM SERPL-MCNC: 1.4 MG/DL — LOW (ref 1.6–2.6)
MCHC RBC-ENTMCNC: 28.6 PG — SIGNIFICANT CHANGE UP (ref 27–34)
MCHC RBC-ENTMCNC: 33.7 GM/DL — SIGNIFICANT CHANGE UP (ref 32–36)
MCV RBC AUTO: 84.7 FL — SIGNIFICANT CHANGE UP (ref 80–100)
MONOCYTES # BLD AUTO: 0.35 K/UL — SIGNIFICANT CHANGE UP (ref 0–0.9)
MONOCYTES NFR BLD AUTO: 6.1 % — SIGNIFICANT CHANGE UP (ref 2–14)
NEUTROPHILS # BLD AUTO: 4.41 K/UL — SIGNIFICANT CHANGE UP (ref 1.8–7.4)
NEUTROPHILS NFR BLD AUTO: 77.2 % — HIGH (ref 43–77)
NRBC # BLD: 0 /100 WBCS — SIGNIFICANT CHANGE UP (ref 0–0)
PHOSPHATE SERPL-MCNC: 3.1 MG/DL — SIGNIFICANT CHANGE UP (ref 2.5–4.5)
PLATELET # BLD AUTO: 236 K/UL — SIGNIFICANT CHANGE UP (ref 150–400)
POTASSIUM SERPL-MCNC: 4.2 MMOL/L — SIGNIFICANT CHANGE UP (ref 3.5–5.3)
POTASSIUM SERPL-SCNC: 4.2 MMOL/L — SIGNIFICANT CHANGE UP (ref 3.5–5.3)
PROT SERPL-MCNC: 6.8 G/DL — SIGNIFICANT CHANGE UP (ref 6–8.3)
RBC # BLD: 2.94 M/UL — LOW (ref 4.2–5.8)
RBC # FLD: 14.5 % — SIGNIFICANT CHANGE UP (ref 10.3–14.5)
SODIUM SERPL-SCNC: 135 MMOL/L — SIGNIFICANT CHANGE UP (ref 135–145)
WBC # BLD: 5.72 K/UL — SIGNIFICANT CHANGE UP (ref 3.8–10.5)
WBC # FLD AUTO: 5.72 K/UL — SIGNIFICANT CHANGE UP (ref 3.8–10.5)

## 2022-02-07 PROCEDURE — 90792 PSYCH DIAG EVAL W/MED SRVCS: CPT

## 2022-02-07 RX ORDER — HALOPERIDOL DECANOATE 100 MG/ML
1 INJECTION INTRAMUSCULAR
Refills: 0 | Status: DISCONTINUED | OUTPATIENT
Start: 2022-02-07 | End: 2022-02-07

## 2022-02-07 RX ORDER — INSULIN GLARGINE 100 [IU]/ML
22 INJECTION, SOLUTION SUBCUTANEOUS AT BEDTIME
Refills: 0 | Status: DISCONTINUED | OUTPATIENT
Start: 2022-02-07 | End: 2022-02-14

## 2022-02-07 RX ORDER — HALOPERIDOL DECANOATE 100 MG/ML
2 INJECTION INTRAMUSCULAR EVERY 12 HOURS
Refills: 0 | Status: DISCONTINUED | OUTPATIENT
Start: 2022-02-07 | End: 2022-02-09

## 2022-02-07 RX ORDER — INSULIN LISPRO 100/ML
5 VIAL (ML) SUBCUTANEOUS
Refills: 0 | Status: DISCONTINUED | OUTPATIENT
Start: 2022-02-07 | End: 2022-02-14

## 2022-02-07 RX ORDER — OLANZAPINE 15 MG/1
5 TABLET, FILM COATED ORAL ONCE
Refills: 0 | Status: DISCONTINUED | OUTPATIENT
Start: 2022-02-07 | End: 2022-02-07

## 2022-02-07 RX ORDER — OLANZAPINE 15 MG/1
5 TABLET, FILM COATED ORAL ONCE
Refills: 0 | Status: COMPLETED | OUTPATIENT
Start: 2022-02-07 | End: 2022-02-07

## 2022-02-07 RX ORDER — MAGNESIUM SULFATE 500 MG/ML
1 VIAL (ML) INJECTION
Refills: 0 | Status: COMPLETED | OUTPATIENT
Start: 2022-02-07 | End: 2022-02-07

## 2022-02-07 RX ORDER — HALOPERIDOL DECANOATE 100 MG/ML
2 INJECTION INTRAMUSCULAR
Refills: 0 | Status: DISCONTINUED | OUTPATIENT
Start: 2022-02-07 | End: 2022-02-07

## 2022-02-07 RX ADMIN — ATORVASTATIN CALCIUM 40 MILLIGRAM(S): 80 TABLET, FILM COATED ORAL at 21:15

## 2022-02-07 RX ADMIN — AMPICILLIN SODIUM AND SULBACTAM SODIUM 200 GRAM(S): 250; 125 INJECTION, POWDER, FOR SUSPENSION INTRAMUSCULAR; INTRAVENOUS at 12:55

## 2022-02-07 RX ADMIN — Medication 1 MILLIGRAM(S): at 15:04

## 2022-02-07 RX ADMIN — Medication 1 MILLIGRAM(S): at 16:40

## 2022-02-07 RX ADMIN — CALCITRIOL 0.5 MICROGRAM(S): 0.5 CAPSULE ORAL at 12:15

## 2022-02-07 RX ADMIN — Medication 3 UNIT(S): at 12:16

## 2022-02-07 RX ADMIN — MIRTAZAPINE 15 MILLIGRAM(S): 45 TABLET, ORALLY DISINTEGRATING ORAL at 21:15

## 2022-02-07 RX ADMIN — OLANZAPINE 5 MILLIGRAM(S): 15 TABLET, FILM COATED ORAL at 10:43

## 2022-02-07 RX ADMIN — Medication 100 MILLIGRAM(S): at 05:27

## 2022-02-07 RX ADMIN — PANTOPRAZOLE SODIUM 40 MILLIGRAM(S): 20 TABLET, DELAYED RELEASE ORAL at 05:27

## 2022-02-07 RX ADMIN — Medication 2: at 08:06

## 2022-02-07 RX ADMIN — FINASTERIDE 5 MILLIGRAM(S): 5 TABLET, FILM COATED ORAL at 12:15

## 2022-02-07 RX ADMIN — TAMSULOSIN HYDROCHLORIDE 0.4 MILLIGRAM(S): 0.4 CAPSULE ORAL at 21:15

## 2022-02-07 RX ADMIN — SODIUM CHLORIDE 65 MILLILITER(S): 9 INJECTION INTRAMUSCULAR; INTRAVENOUS; SUBCUTANEOUS at 05:21

## 2022-02-07 RX ADMIN — HALOPERIDOL DECANOATE 2 MILLIGRAM(S): 100 INJECTION INTRAMUSCULAR at 16:44

## 2022-02-07 RX ADMIN — INSULIN GLARGINE 22 UNIT(S): 100 INJECTION, SOLUTION SUBCUTANEOUS at 21:51

## 2022-02-07 RX ADMIN — Medication 3 UNIT(S): at 08:06

## 2022-02-07 RX ADMIN — SODIUM CHLORIDE 65 MILLILITER(S): 9 INJECTION INTRAMUSCULAR; INTRAVENOUS; SUBCUTANEOUS at 21:15

## 2022-02-07 RX ADMIN — AMPICILLIN SODIUM AND SULBACTAM SODIUM 200 GRAM(S): 250; 125 INJECTION, POWDER, FOR SUSPENSION INTRAMUSCULAR; INTRAVENOUS at 05:26

## 2022-02-07 RX ADMIN — AMPICILLIN SODIUM AND SULBACTAM SODIUM 200 GRAM(S): 250; 125 INJECTION, POWDER, FOR SUSPENSION INTRAMUSCULAR; INTRAVENOUS at 21:15

## 2022-02-07 RX ADMIN — Medication 5 UNIT(S): at 18:03

## 2022-02-07 RX ADMIN — APIXABAN 2.5 MILLIGRAM(S): 2.5 TABLET, FILM COATED ORAL at 05:26

## 2022-02-07 RX ADMIN — Medication 1 TABLET(S): at 05:26

## 2022-02-07 RX ADMIN — Medication 2: at 12:15

## 2022-02-07 RX ADMIN — Medication 3: at 18:03

## 2022-02-07 RX ADMIN — Medication 100 GRAM(S): at 12:28

## 2022-02-07 NOTE — PROGRESS NOTE ADULT - SUBJECTIVE AND OBJECTIVE BOX
*/*/* INCOMPLETE  NP Note discussed with  primary attending    Patient is a 81y old  Male who presents with a chief complaint of AMS (07 Feb 2022 11:53)      INTERVAL HPI/OVERNIGHT EVENTS: agitation this AM requiring 1:1 and chemical restraints     MEDICATIONS  (STANDING):  ampicillin/sulbactam  IVPB 3 Gram(s) IV Intermittent every 8 hours  apixaban 2.5 milliGRAM(s) Oral two times a day  atorvastatin 40 milliGRAM(s) Oral at bedtime  calcitriol   Capsule 0.5 MICROGram(s) Oral daily  calcium carbonate    500 mG (Tums) Chewable 1 Tablet(s) Chew two times a day  dextrose 40% Gel 15 Gram(s) Oral once  dextrose 5%. 1000 milliLiter(s) (50 mL/Hr) IV Continuous <Continuous>  dextrose 5%. 1000 milliLiter(s) (100 mL/Hr) IV Continuous <Continuous>  dextrose 50% Injectable 25 Gram(s) IV Push once  dextrose 50% Injectable 12.5 Gram(s) IV Push once  dextrose 50% Injectable 25 Gram(s) IV Push once  finasteride 5 milliGRAM(s) Oral daily  glucagon  Injectable 1 milliGRAM(s) IntraMuscular once  insulin glargine Injectable (LANTUS) 22 Unit(s) SubCutaneous at bedtime  insulin lispro (ADMELOG) corrective regimen sliding scale   SubCutaneous three times a day before meals  insulin lispro Injectable (ADMELOG) 5 Unit(s) SubCutaneous three times a day before meals  labetalol 100 milliGRAM(s) Oral every 12 hours  mirtazapine 15 milliGRAM(s) Oral at bedtime  pantoprazole    Tablet 40 milliGRAM(s) Oral before breakfast  sodium chloride 0.9%. 1000 milliLiter(s) (75 mL/Hr) IV Continuous <Continuous>  sodium chloride 0.9%. 1000 milliLiter(s) (65 mL/Hr) IV Continuous <Continuous>  tamsulosin 0.4 milliGRAM(s) Oral at bedtime    MEDICATIONS  (PRN):  acetaminophen     Tablet .. 650 milliGRAM(s) Oral every 6 hours PRN Temp greater or equal to 38C (100.4F), Mild Pain (1 - 3)  guaiFENesin Oral Liquid (Sugar-Free) 100 milliGRAM(s) Oral every 6 hours PRN Cough  haloperidol    Injectable 2 milliGRAM(s) IntraMuscular every 12 hours PRN Agitation  LORazepam   Injectable 1 milliGRAM(s) IV Push two times a day PRN Agitation  meclizine 12.5 milliGRAM(s) Oral every 6 hours PRN Dizziness  melatonin 3 milliGRAM(s) Oral at bedtime PRN Insomnia  ondansetron Injectable 4 milliGRAM(s) IV Push every 8 hours PRN Nausea and/or Vomiting      __________________________________________________  REVIEW OF SYSTEMS:  refuses to participate       Vital Signs Last 24 Hrs  T(C): 36.4 (07 Feb 2022 14:34), Max: 37.1 (06 Feb 2022 21:08)  T(F): 97.6 (07 Feb 2022 14:34), Max: 98.7 (06 Feb 2022 21:08)  HR: 107 (07 Feb 2022 14:34) (88 - 107)  BP: 109/63 (07 Feb 2022 14:34) (109/63 - 138/72)  BP(mean): --  RR: 22 (07 Feb 2022 14:34) (18 - 22)  SpO2: 94% (07 Feb 2022 14:34) (91% - 96%)    ________________________________________________  PHYSICAL EXAM:  GENERAL: agitated and combative   HEENT: Normocephalic;  conjunctivae and sclerae clear;   NECK : supple  CHEST/LUNG: Clear to ausculitation bilaterally with good air entry   HEART: S1 S2  regular; no murmurs, gallops or rubs  ABDOMEN: Soft, Nontender, Nondistended; Bowel sounds present  EXTREMITIES: no cyanosis; no edema; no calf tenderness  SKIN: warm and dry; no rash  NERVOUS SYSTEM: a&ox2     _________________________________________________  LABS:                        8.4    5.72  )-----------( 236      ( 07 Feb 2022 07:25 )             24.9     02-07    135  |  106  |  37<H>  ----------------------------<  210<H>  4.2   |  19<L>  |  2.47<H>    Ca    8.3<L>      07 Feb 2022 07:25  Phos  3.1     02-07  Mg     1.4     02-07    TPro  6.8  /  Alb  2.1<L>  /  TBili  0.4  /  DBili  x   /  AST  11  /  ALT  11  /  AlkPhos  64  02-07        CAPILLARY BLOOD GLUCOSE      POCT Blood Glucose.: 237 mg/dL (07 Feb 2022 11:55)  POCT Blood Glucose.: 248 mg/dL (07 Feb 2022 07:53)  POCT Blood Glucose.: 226 mg/dL (06 Feb 2022 21:32)  POCT Blood Glucose.: 255 mg/dL (06 Feb 2022 17:08)        RADIOLOGY & ADDITIONAL TESTS:     < from: CT Head No Cont (01.27.22 @ 12:26) >  IMPRESSION:    No hydrocephalus, acute intracranial hemorrhage, mass effect, or brain   edema.    --- End of Report ---    < end of copied text >  Imaging Personally Reviewed:  YES/  Consultant(s) Notes Reviewed:   YES/    Care Discussed with Consultants : Psych     Plan of care was discussed with patient and /or primary care giver; all questions and concerns were addressed and care was aligned with patient's wishes.

## 2022-02-07 NOTE — BH CONSULTATION LIAISON ASSESSMENT NOTE - HPI (INCLUDE ILLNESS QUALITY, SEVERITY, DURATION, TIMING, CONTEXT, MODIFYING FACTORS, ASSOCIATED SIGNS AND SYMPTOMS)
80 yo M, born in [place] and survived the Holocaust as a child, single, retired and living alone, with PHx of dysthymia f/w psychiatrist Maurisio Griffith MD for many years (no meds), known to MD from capacity consult during prior admission in 3/2021, and MHx of chronic DVT, Anemia, GERD, HTN, HLD, DM, Hypoparathyroidism, Glaucoma and BPH, multiple recent presentations for UTI, BIBEMS on 2/4 unable to explain why he called EMS, found to have UTI. Psych consult was requested for management of agitation (pt is repeatedly trying to get OOB without assistance and has been trying to hit and pinch staff). On 2/5, pt had RRT for lightheadness s/p getting up unassisted to walk to bathroom. Pt seen in his room for eval, with 2 PCAs attempting to help him sit on the side of his bed. Pt reacts very irritably to PCAs' attempts to help him, yelling, "They are grabbing me by the arm! They are coercing me!" MD introduces self and asks if pt remembers meeting MD last year; pt says yes, but then continues as if he is in an office setting and MD is interviewing him for a job. 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 Pt says he would like his old job back, and seems annoyed when MD denies any ability to help pt with this. MD tries to elicit how pt has been doing since last encounter in 3/2021; pt perseverates about his health ("I was sick for the last 6 weeks") and claims that he is "homeless," explaining when challenged that he feels this way because he rents, rather than owns, his apartment. Pt angrily retorts, "That's a lie!" when MD asks if he has been having orthostatic episodes and falls, as was reported during last admission.

## 2022-02-07 NOTE — BH CONSULTATION LIAISON ASSESSMENT NOTE - CURRENT MEDICATION
MEDICATIONS  (STANDING):  ampicillin/sulbactam  IVPB 3 Gram(s) IV Intermittent every 8 hours  apixaban 2.5 milliGRAM(s) Oral two times a day  atorvastatin 40 milliGRAM(s) Oral at bedtime  calcitriol   Capsule 0.5 MICROGram(s) Oral daily  calcium carbonate    500 mG (Tums) Chewable 1 Tablet(s) Chew two times a day  dextrose 40% Gel 15 Gram(s) Oral once  dextrose 5%. 1000 milliLiter(s) (50 mL/Hr) IV Continuous <Continuous>  dextrose 5%. 1000 milliLiter(s) (100 mL/Hr) IV Continuous <Continuous>  dextrose 50% Injectable 25 Gram(s) IV Push once  dextrose 50% Injectable 12.5 Gram(s) IV Push once  dextrose 50% Injectable 25 Gram(s) IV Push once  finasteride 5 milliGRAM(s) Oral daily  glucagon  Injectable 1 milliGRAM(s) IntraMuscular once  insulin glargine Injectable (LANTUS) 22 Unit(s) SubCutaneous at bedtime  insulin lispro (ADMELOG) corrective regimen sliding scale   SubCutaneous three times a day before meals  insulin lispro Injectable (ADMELOG) 5 Unit(s) SubCutaneous three times a day before meals  labetalol 100 milliGRAM(s) Oral every 12 hours  mirtazapine 15 milliGRAM(s) Oral at bedtime  pantoprazole    Tablet 40 milliGRAM(s) Oral before breakfast  sodium chloride 0.9%. 1000 milliLiter(s) (75 mL/Hr) IV Continuous <Continuous>  sodium chloride 0.9%. 1000 milliLiter(s) (65 mL/Hr) IV Continuous <Continuous>  tamsulosin 0.4 milliGRAM(s) Oral at bedtime    MEDICATIONS  (PRN):  acetaminophen     Tablet .. 650 milliGRAM(s) Oral every 6 hours PRN Temp greater or equal to 38C (100.4F), Mild Pain (1 - 3)  guaiFENesin Oral Liquid (Sugar-Free) 100 milliGRAM(s) Oral every 6 hours PRN Cough  haloperidol    Injectable 2 milliGRAM(s) IntraMuscular every 12 hours PRN Agitation  LORazepam   Injectable 1 milliGRAM(s) IV Push two times a day PRN Agitation  meclizine 12.5 milliGRAM(s) Oral every 6 hours PRN Dizziness  melatonin 3 milliGRAM(s) Oral at bedtime PRN Insomnia  ondansetron Injectable 4 milliGRAM(s) IV Push every 8 hours PRN Nausea and/or Vomiting

## 2022-02-07 NOTE — BH CONSULTATION LIAISON ASSESSMENT NOTE - NSBHCHARTREVIEWLAB_PSY_A_CORE FT
8.4    5.72  )-----------( 236      ( 07 Feb 2022 07:25 )             24.9   02-07    135  |  106  |  37<H>  ----------------------------<  210<H>  4.2   |  19<L>  |  2.47<H>    Ca    8.3<L>      07 Feb 2022 07:25  Phos  3.1     02-07  Mg     1.4     02-07    TPro  6.8  /  Alb  2.1<L>  /  TBili  0.4  /  DBili  x   /  AST  11  /  ALT  11  /  AlkPhos  64  02-07

## 2022-02-07 NOTE — BH CONSULTATION LIAISON ASSESSMENT NOTE - SUMMARY
82 yo M, born in [place] and survived the Holocaust as a child, single, retired and living alone, with PHx of dysthymia f/w psychiatrist Maurisio Griffith MD for many years (no meds), known to MD from capacity consult during prior admission in 3/2021, and MHx of chronic DVT, Anemia, GERD, HTN, HLD, DM, Hypoparathyroidism, Glaucoma and BPH, multiple recent presentations for UTI, BIBEMS on 2/4 unable to explain why he called EMS, found to have UTI. Psych consult was requested for management of agitation (pt is repeatedly trying to get OOB without assistance and has been trying to hit and pinch staff). On 2/5, pt had RRT for lightheadness s/p getting up unassisted to walk to bathroom. On exam today, pt presents awake, alert and irascible but poorly oriented (believes the year is 1940 and he is in an [employer] office). Pt was already showing some s/s of early-stage dementia at prior encounter in 3/2021, but since then he seems to have a significant decline in cognitive performance. While acute delirium 2/2 UTI may be playing a role, we strongly suspect progressive dementia as a more significant factor. To address agitation and delirium in the hospital, we recommend starting Haldol 1 mg q12h standing (pt has a right to refuse), with PRN of Haldol 2 mg plus Ativan 1 mg IV or IM q12h PRN agitation. Unless pt's delirium improves dramatically in the next few days, we doubt that pt will prove to be capable of caring for himself safely at home, in which case he would need disposition to a more supervised setting such as [facility]/LTC. Pt does not appear to present an acute risk of harm to self or others at the time of assessment, and does not appear to be in need of admission to  psych at the time of assessment.

## 2022-02-07 NOTE — BH CONSULTATION LIAISON ASSESSMENT NOTE - NSBHCHARTREVIEWINVESTIGATE_PSY_A_CORE FT
< from: CT Head No Cont (01.27.22 @ 12:26) >    FINDINGS:    No hydrocephalus, mass effect, midline shift, acute intracranial   hemorrhage, or brain edema.    Mild white matter microvascular ischemic disease.    Visualized paranasal sinuses and mastoid air cells are clear.      < end of copied text >

## 2022-02-07 NOTE — PROGRESS NOTE ADULT - SUBJECTIVE AND OBJECTIVE BOX
Problem List:  CKD , hydronephrosis chronic .          PAST MEDICAL & SURGICAL HISTORY:  Diabetes    Hypertension    Hypercholesterolemia    GERD (gastroesophageal reflux disease)    Anemia    Hypomagnesemia    DVT (deep venous thrombosis)    H/O hypoparathyroidism    BPH (benign prostatic hyperplasia)    No significant past surgical history        Allergy Status Unknown      MEDICATIONS  (STANDING):  ampicillin/sulbactam  IVPB 3 Gram(s) IV Intermittent every 8 hours  apixaban 2.5 milliGRAM(s) Oral two times a day  atorvastatin 40 milliGRAM(s) Oral at bedtime  calcitriol   Capsule 0.5 MICROGram(s) Oral daily  calcium carbonate    500 mG (Tums) Chewable 1 Tablet(s) Chew two times a day  dextrose 40% Gel 15 Gram(s) Oral once  dextrose 5%. 1000 milliLiter(s) (50 mL/Hr) IV Continuous <Continuous>  dextrose 5%. 1000 milliLiter(s) (100 mL/Hr) IV Continuous <Continuous>  dextrose 50% Injectable 25 Gram(s) IV Push once  dextrose 50% Injectable 12.5 Gram(s) IV Push once  dextrose 50% Injectable 25 Gram(s) IV Push once  finasteride 5 milliGRAM(s) Oral daily  glucagon  Injectable 1 milliGRAM(s) IntraMuscular once  insulin glargine Injectable (LANTUS) 15 Unit(s) SubCutaneous at bedtime  insulin lispro (ADMELOG) corrective regimen sliding scale   SubCutaneous three times a day before meals  insulin lispro Injectable (ADMELOG) 3 Unit(s) SubCutaneous three times a day before meals  labetalol 100 milliGRAM(s) Oral every 12 hours  mirtazapine 15 milliGRAM(s) Oral at bedtime  pantoprazole    Tablet 40 milliGRAM(s) Oral before breakfast  sodium chloride 0.9%. 1000 milliLiter(s) (75 mL/Hr) IV Continuous <Continuous>  sodium chloride 0.9%. 1000 milliLiter(s) (65 mL/Hr) IV Continuous <Continuous>  tamsulosin 0.4 milliGRAM(s) Oral at bedtime    MEDICATIONS  (PRN):  acetaminophen     Tablet .. 650 milliGRAM(s) Oral every 6 hours PRN Temp greater or equal to 38C (100.4F), Mild Pain (1 - 3)  guaiFENesin Oral Liquid (Sugar-Free) 100 milliGRAM(s) Oral every 6 hours PRN Cough  meclizine 12.5 milliGRAM(s) Oral every 6 hours PRN Dizziness  melatonin 3 milliGRAM(s) Oral at bedtime PRN Insomnia  ondansetron Injectable 4 milliGRAM(s) IV Push every 8 hours PRN Nausea and/or Vomiting                            8.4    5.72  )-----------( 236      ( 07 Feb 2022 07:25 )             24.9     02-07    135  |  106  |  37<H>  ----------------------------<  210<H>  4.2   |  19<L>  |  2.47<H>    Ca    8.3<L>      07 Feb 2022 07:25  Phos  3.1     02-07  Mg     1.4     02-07    TPro  6.8  /  Alb  2.1<L>  /  TBili  0.4  /  DBili  x   /  AST  11  /  ALT  11  /  AlkPhos  64  02-07    eGFR if : 27 mL/min/1.73M2 (02.07.22 @ 07:25)     Osmolality, Random Urine: 321 mos/kg (02-06 @ 14:44)  Potassium, Random Urine: 18 mmol/L (02-06 @ 14:44)  Sodium, Random Urine: 58 mmol/L (02-06 @ 14:44)  Creatinine, Random Urine: 46 mg/dL (02-06 @ 14:44)      REVIEW OF SYSTEMS:  General: no fever no chills, no weight loss.  RESPIRATORY: No cough, wheezing, hemoptysis; No shortness of breath  CARDIOVASCULAR: No chest pain or palpitations. No Edema  GASTROINTESTINAL: No abdominal or epigastric pain. No nausea, vomiting. No diarrhea or constipation. No melena.  GENITOURINARY: No dysuria, frequency, foamy urine, urinary urgency, incontinence or hematuria  NEUROLOGICAL: No numbness or weakness, no tremor , no dizziness.   Muscle skeletal : no joint pain and no swelling of joints and limbs.  SKIN: No itching, burning, rashes.        VITALS:  T(F): 98.5 (02-07-22 @ 04:17), Max: 98.7 (02-06-22 @ 21:08)  HR: 88 (02-07-22 @ 04:17)  BP: 138/72 (02-07-22 @ 04:17)  RR: 20 (02-07-22 @ 04:17)  SpO2: 91% (02-07-22 @ 04:17)  Wt(kg): --      PHYSICAL EXAM:  Constitutional: well developed, no diaphoresis, no distress.  Neck: No JVD, no carotid bruit, supple, no adenopathy  Respiratory: air entrance B/L, no wheezes, rales or rhonchi  Cardiovascular: S1, S2, RRR, no pericardial rub, no murmur  Abdomen: BS+, soft, no tenderness, no bruit  Pelvis: bladder nondistended  Extremities: No cyanosis or clubbing. No peripheral edema.

## 2022-02-07 NOTE — BH CONSULTATION LIAISON ASSESSMENT NOTE - DESCRIPTION
Holocaust survivor born in [place]. At age 2, his parents were deported and murdered by the Nazis, and pt was taken to a Nazi children's home because of his young age. He and other children from the home were later removed by the Resistance and sent into hiding with [place] families. 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

## 2022-02-07 NOTE — PROGRESS NOTE ADULT - SUBJECTIVE AND OBJECTIVE BOX
CARDIOLOGY/MEDICAL ATTENDING    CHIEF COMPLAINT:Patient is a 81y old  Male who presents with a chief complaint of AMS .Pt was agitated ,s/p zyprexa.    	  REVIEW OF SYSTEMS:  CONSTITUTIONAL: No fever, weight loss, or fatigue  EYES: No eye pain, visual disturbances, or discharge  ENT:  No difficulty hearing, tinnitus, vertigo; No sinus or throat pain  NECK: No pain or stiffness  RESPIRATORY: No cough, wheezing, chills or hemoptysis; No Shortness of Breath  CARDIOVASCULAR: No chest pain, palpitations, passing out, dizziness, or leg swelling  GASTROINTESTINAL: No abdominal or epigastric pain. No nausea, vomiting, or hematemesis; No diarrhea or constipation. No melena or hematochezia.  GENITOURINARY: No dysuria, frequency, hematuria, or incontinence  NEUROLOGICAL: No headaches, memory loss, loss of strength, numbness, or tremors  SKIN: No itching, burning, rashes, or lesions   LYMPH Nodes: No enlarged glands  ENDOCRINE: No heat or cold intolerance; No hair loss  MUSCULOSKELETAL: No joint pain or swelling; No muscle, back, or extremity pain  PSYCHIATRIC: No depression, anxiety, mood swings, or difficulty sleeping  HEME/LYMPH: No easy bruising, or bleeding gums  ALLERGY AND IMMUNOLOGIC: No hives or eczema	        PHYSICAL EXAM:  T(C): 36.9 (02-07-22 @ 04:17), Max: 37.1 (02-06-22 @ 21:08)  HR: 88 (02-07-22 @ 04:17) (88 - 109)  BP: 138/72 (02-07-22 @ 04:17) (120/88 - 138/72)  RR: 20 (02-07-22 @ 04:17) (18 - 20)  SpO2: 91% (02-07-22 @ 04:17) (91% - 96%)  Wt(kg): --  I&O's Summary    07 Feb 2022 07:01  -  07 Feb 2022 11:54  --------------------------------------------------------  IN: 0 mL / OUT: 300 mL / NET: -300 mL        Appearance: Normal	  HEENT:   Normal oral mucosa, PERRL, EOMI	  Lymphatic: No lymphadenopathy  Cardiovascular: Normal S1 S2, No JVD, No murmurs, No edema  Respiratory: Lungs clear to auscultation	  Psychiatry: A & O x 3, Mood & affect appropriate  Gastrointestinal:  Soft, Non-tender, + BS	  Skin: No rashes, No ecchymoses, No cyanosis	  Neurologic: Non-focal  Extremities: Normal range of motion, No clubbing, cyanosis or edema  Vascular: Peripheral pulses palpable 2+ bilaterally    MEDICATIONS  (STANDING):  ampicillin/sulbactam  IVPB 3 Gram(s) IV Intermittent every 8 hours  apixaban 2.5 milliGRAM(s) Oral two times a day  atorvastatin 40 milliGRAM(s) Oral at bedtime  calcitriol   Capsule 0.5 MICROGram(s) Oral daily  calcium carbonate    500 mG (Tums) Chewable 1 Tablet(s) Chew two times a day  dextrose 40% Gel 15 Gram(s) Oral once  dextrose 5%. 1000 milliLiter(s) (50 mL/Hr) IV Continuous <Continuous>  dextrose 5%. 1000 milliLiter(s) (100 mL/Hr) IV Continuous <Continuous>  dextrose 50% Injectable 25 Gram(s) IV Push once  dextrose 50% Injectable 12.5 Gram(s) IV Push once  dextrose 50% Injectable 25 Gram(s) IV Push once  finasteride 5 milliGRAM(s) Oral daily  glucagon  Injectable 1 milliGRAM(s) IntraMuscular once  insulin glargine Injectable (LANTUS) 15 Unit(s) SubCutaneous at bedtime  insulin lispro (ADMELOG) corrective regimen sliding scale   SubCutaneous three times a day before meals  insulin lispro Injectable (ADMELOG) 3 Unit(s) SubCutaneous three times a day before meals  labetalol 100 milliGRAM(s) Oral every 12 hours  magnesium sulfate  IVPB 1 Gram(s) IV Intermittent every 1 hour  mirtazapine 15 milliGRAM(s) Oral at bedtime  pantoprazole    Tablet 40 milliGRAM(s) Oral before breakfast  sodium chloride 0.9%. 1000 milliLiter(s) (75 mL/Hr) IV Continuous <Continuous>  sodium chloride 0.9%. 1000 milliLiter(s) (65 mL/Hr) IV Continuous <Continuous>  tamsulosin 0.4 milliGRAM(s) Oral at bedtime      	  	  LABS:	 	    Troponin I, High Sensitivity Result: 9.7 ng/L (02-06 @ 00:42)                            8.4    5.72  )-----------( 236      ( 07 Feb 2022 07:25 )             24.9     02-07    135  |  106  |  37<H>  ----------------------------<  210<H>  4.2   |  19<L>  |  2.47<H>    Ca    8.3<L>      07 Feb 2022 07:25  Phos  3.1     02-07  Mg     1.4     02-07    TPro  6.8  /  Alb  2.1<L>  /  TBili  0.4  /  DBili  x   /  AST  11  /  ALT  11  /  AlkPhos  64  02-07

## 2022-02-07 NOTE — PROGRESS NOTE ADULT - PROBLEM SELECTOR PLAN 1
- patient PW confusion.  - Patient was recently admitted for UTI and was evaluated by ID , urine culture grew enterococcus fecalis sensitive to ampicillin and patient was discharged on augmentin PO but he didnot  meds, a Note from recent ED visit states that pt came back to ED and seen by SW ( refer to SW note ) ,  pt was given bottle of pills for PO abx treatment of UTI, but pt states he does not remember this.  - UA positive.   - started on unasyn IV for now , IV fluids  - f/u urine culture CX + confirmed ampicillin sensitive Entercoccus faecalis  patient recently discharged on augmentin PO but did not  meds  Unasyn D3

## 2022-02-07 NOTE — BH CONSULTATION LIAISON ASSESSMENT NOTE - NSBHCONSULTRECOMMENDOTHER_PSY_A_CORE FT
1. Recommend starting Haldol 1 mg PO q12h standing for delirium/behavioral disturbance  --Pt should be offered the medication, but he has the right to refuse if he so chooses  2. For acute agitation, recommend Haldol 2 mg plus Ativan 1 mg IV or IM q12h PRN agitation  3. No indication for other standing or PRN psychotropic medications at this time  4. Medical management as directed by primary team  5. Psychiatry will continue to follow  6. VM left for pt's NOK/HCP Mishel Avila, awaiting response  7. Case d/w KP Earl of primary team    Tammy Mead MD  Director, Consultation-Liaison Psychiatry Service  [id number]

## 2022-02-07 NOTE — PROGRESS NOTE ADULT - ASSESSMENT
82 yo M with PMH of DVT (on eliquis) , HTN, HLD, DM , multiple recent visits for UTI presents with confusion. Admitted for UTI .  82 yo M from home with PMH of DVT (on eliquis) , HTN, HLD, DM, [facility] with PO ABX. Presents to the ED with AMS, and agitation. UCX confirms Ampicillin sensitive enterococcus faecalis started on Unasyn. Admitted to medicine for UTI, hospital course complicated by worsening dementia with behavioral disturbances. Placed on 1:1 for safety psych consulted. Emergency contact updated on plan of care, she shared her concern for patient's living situation and would like him to be placed in [facility] on admission giving his cognitive decline. SW to follow

## 2022-02-07 NOTE — PROGRESS NOTE ADULT - PROBLEM SELECTOR PLAN 4
- Patient has past history of HTN   - Resumed home meds w parameters   - Monitor BP  - DASH diet uncontrolled  last a1c 7.5  lantus increased to 22U QHS + Ademlog 5U TID   monitor bs   Endo Asad follows

## 2022-02-07 NOTE — PROGRESS NOTE ADULT - PROBLEM SELECTOR PLAN 7
-f/u SW for safe Dc plan   - refer to SW note controlled  's   - Resumed home meds w parameters   - Monitor BP  - DASH diet

## 2022-02-07 NOTE — PROGRESS NOTE ADULT - PROBLEM SELECTOR PLAN 2
- Patient has history of DVT on eliquis    - Resumed home meds requiring chemical restraints   continue 1:1 for toro  psych consulted   reccs appreciated

## 2022-02-07 NOTE — PROGRESS NOTE ADULT - PROBLEM SELECTOR PLAN 6
- Patient has history of BPH  - Resumed home meds - Patient has history of BPH  does not appear in retention at this time   - Resumed home meds

## 2022-02-07 NOTE — PROGRESS NOTE ADULT - PROBLEM SELECTOR PLAN 5
- Patient has PMH of DM   - Will hold home meds and start on sliding scale. - Patient has history of DVT on eliquis    - Resumed home meds

## 2022-02-07 NOTE — PROGRESS NOTE ADULT - SUBJECTIVE AND OBJECTIVE BOX
Interval Events:  pt in nad    Allergies    Allergy Status Unknown    Intolerances      Endocrine/Metabolic Medications:  atorvastatin 40 milliGRAM(s) Oral at bedtime  dextrose 40% Gel 15 Gram(s) Oral once  dextrose 50% Injectable 25 Gram(s) IV Push once  dextrose 50% Injectable 12.5 Gram(s) IV Push once  dextrose 50% Injectable 25 Gram(s) IV Push once  finasteride 5 milliGRAM(s) Oral daily  glucagon  Injectable 1 milliGRAM(s) IntraMuscular once  insulin glargine Injectable (LANTUS) 15 Unit(s) SubCutaneous at bedtime  insulin lispro (ADMELOG) corrective regimen sliding scale   SubCutaneous three times a day before meals  insulin lispro Injectable (ADMELOG) 3 Unit(s) SubCutaneous three times a day before meals      Vital Signs Last 24 Hrs  T(C): 36.9 (07 Feb 2022 04:17), Max: 37.1 (06 Feb 2022 21:08)  T(F): 98.5 (07 Feb 2022 04:17), Max: 98.7 (06 Feb 2022 21:08)  HR: 88 (07 Feb 2022 04:17) (88 - 109)  BP: 138/72 (07 Feb 2022 04:17) (120/88 - 138/72)  BP(mean): --  RR: 20 (07 Feb 2022 04:17) (18 - 20)  SpO2: 91% (07 Feb 2022 04:17) (91% - 96%)      PHYSICAL EXAM  All physical exam findings normal, except those marked:  General:	Alert, active, cooperative, NAD, well hydrated  .		[] Abnormal:  Neck		Normal: supple, no cervical adenopathy, no palpable thyroid  .		[] Abnormal:  Cardiovascular	Normal: regular rate, normal S1, S2, no murmurs  .		[] Abnormal:  Respiratory	Normal: no chest wall deformity, normal respiratory pattern, CTA B/L  .		[] Abnormal:  Abdominal	Normal: soft, ND, NT, bowel sounds present, no masses, no organomegaly  .		[] Abnormal:  		Normal normal genitalia, testes descended, circumcised/uncircumcised  .		Denice stage:			Breast denice:  .		Menstrual history:  .		[] Abnormal:  Extremities	Normal: FROM x4  .		[] Abnormal:  Skin		Normal: intact and not indurated, no rash, no acanthosis nigricans  .		[] Abnormal:  Neurologic	Normal: grossly intact  .		[] Abnormal:    LABS                        8.4    5.72  )-----------( 236      ( 07 Feb 2022 07:25 )             24.9                               135    |  106    |  37                  Calcium: 8.3   / iCa: x      (02-07 @ 07:25)    ----------------------------<  210       Magnesium: 1.4                              4.2     |  19     |  2.47             Phosphorous: 3.1      TPro  6.8    /  Alb  2.1    /  TBili  0.4    /  DBili  x      /  AST  11     /  ALT  11     /  AlkPhos  64     07 Feb 2022 07:25    CAPILLARY BLOOD GLUCOSE      POCT Blood Glucose.: 248 mg/dL (07 Feb 2022 07:53)  POCT Blood Glucose.: 226 mg/dL (06 Feb 2022 21:32)  POCT Blood Glucose.: 255 mg/dL (06 Feb 2022 17:08)  POCT Blood Glucose.: 262 mg/dL (06 Feb 2022 11:33)        Assesment/plan    80 yo M with PMH of DVT (on eliquis) , HTN, HLD, DM , multiple recent visits for UTI presents with confusion. . Found to have uncont dm. Pt states he takes oral dm meds as out pt with poorly cont dm. S/p recent hospitalization for uti/ uncont dm.      Problem/Recommendation - 1:  ·  Problem: Uncontrolled diabetes mellitus.   ·  Recommendation: due to acute illness and need for insulin tx  change lantus 22 units  and admelog 5 ac tid  fsg ac and hs  d/w prim team.     Problem/Recommendation - 2:  ·  Problem: UTI (urinary tract infection).   ·  Recommendation: cont iv abx  per prim team.

## 2022-02-07 NOTE — BH CONSULTATION LIAISON ASSESSMENT NOTE - RISK ASSESSMENT
Risk factors: Older age, lives alone, very limited social supports. Protective factors: Absent h/o SI/SA/SIB, stable domicile, supportive cousins, therapeutic relationship with psychiatrist, attachment to independence, help seeking. Acute risk level appears low at the time of assessment, but chronic risk may be mildly elevated due to above risk factors.

## 2022-02-07 NOTE — PROGRESS NOTE ADULT - ASSESSMENT
80 yo M with PMH of DVT (on eliquis) , HTN, HLD, DM , multiple recent visits for UTI presents with confusion,depression and suicidal ideation.  1.UTI-ABX.  2.DM-Insulin,Endo f/u.  3.Psych eval-pt depressed doesn't want to live any more,suicidal1 to1 obs..Cont remeron 15mg qhs.  4.DVT-eliquis.  5.CRI-f/u lytes.Renal f/u.Replace Mg.  6.HTN-cont bp medication.  7.Lipid d/o-statin.  8.PPI.

## 2022-02-07 NOTE — PROGRESS NOTE ADULT - PROBLEM SELECTOR PLAN 3
- Patient has history of CKD , baseline 2.5   - avoid nephrotoxic agents MANUEL on CKD   improving   avoid nephrotoxins   trend BMP

## 2022-02-07 NOTE — BH CONSULTATION LIAISON ASSESSMENT NOTE - NSBHCHARTREVIEWVS_PSY_A_CORE FT
Vital Signs Last 24 Hrs  T(C): 36.4 (07 Feb 2022 14:34), Max: 37.1 (06 Feb 2022 21:08)  T(F): 97.6 (07 Feb 2022 14:34), Max: 98.7 (06 Feb 2022 21:08)  HR: 107 (07 Feb 2022 14:34) (88 - 107)  BP: 109/63 (07 Feb 2022 14:34) (109/63 - 138/72)  BP(mean): --  RR: 22 (07 Feb 2022 14:34) (18 - 22)  SpO2: 94% (07 Feb 2022 14:34) (91% - 96%)

## 2022-02-07 NOTE — PROGRESS NOTE ADULT - ASSESSMENT
CKD  stage 4, post obstructive uropathy.  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   Anemia with deterioration in H/H due CKD.   Non anion gap Metabolic acidosis   Orthostatic hypotension patient on Labetalol , dose reduced    Plan;  IV antibiotics for UTI  NS 65 ml /hour, continue Hydration  PO sodium bicarbonate.  Anemia work up, evaluate the need for iron and YAKOV  Phosphorus ok    Follow urine lytes .

## 2022-02-07 NOTE — BH CONSULTATION LIAISON ASSESSMENT NOTE - DETAILS
Case is being opened by SW during this admission Lightheadedness on standing/ambulating Holocaust survivor

## 2022-02-08 LAB
-  AMPICILLIN: SIGNIFICANT CHANGE UP
-  CIPROFLOXACIN: SIGNIFICANT CHANGE UP
-  LEVOFLOXACIN: SIGNIFICANT CHANGE UP
-  NITROFURANTOIN: SIGNIFICANT CHANGE UP
-  TETRACYCLINE: SIGNIFICANT CHANGE UP
-  VANCOMYCIN: SIGNIFICANT CHANGE UP
ANION GAP SERPL CALC-SCNC: 7 MMOL/L — SIGNIFICANT CHANGE UP (ref 5–17)
BUN SERPL-MCNC: 34 MG/DL — HIGH (ref 7–18)
CALCIUM SERPL-MCNC: 8.4 MG/DL — SIGNIFICANT CHANGE UP (ref 8.4–10.5)
CHLORIDE SERPL-SCNC: 115 MMOL/L — HIGH (ref 96–108)
CO2 SERPL-SCNC: 18 MMOL/L — LOW (ref 22–31)
CREAT SERPL-MCNC: 2.59 MG/DL — HIGH (ref 0.5–1.3)
CULTURE RESULTS: SIGNIFICANT CHANGE UP
FERRITIN SERPL-MCNC: 545 NG/ML — HIGH (ref 30–400)
GLUCOSE BLDC GLUCOMTR-MCNC: 139 MG/DL — HIGH (ref 70–99)
GLUCOSE BLDC GLUCOMTR-MCNC: 143 MG/DL — HIGH (ref 70–99)
GLUCOSE BLDC GLUCOMTR-MCNC: 148 MG/DL — HIGH (ref 70–99)
GLUCOSE BLDC GLUCOMTR-MCNC: 186 MG/DL — HIGH (ref 70–99)
GLUCOSE BLDC GLUCOMTR-MCNC: 227 MG/DL — HIGH (ref 70–99)
GLUCOSE SERPL-MCNC: 127 MG/DL — HIGH (ref 70–99)
HCT VFR BLD CALC: 24.3 % — LOW (ref 39–50)
HGB BLD-MCNC: 7.9 G/DL — LOW (ref 13–17)
IRON SATN MFR SERPL: 21 % — SIGNIFICANT CHANGE UP (ref 20–55)
IRON SATN MFR SERPL: 44 UG/DL — LOW (ref 65–170)
MCHC RBC-ENTMCNC: 28.6 PG — SIGNIFICANT CHANGE UP (ref 27–34)
MCHC RBC-ENTMCNC: 32.5 GM/DL — SIGNIFICANT CHANGE UP (ref 32–36)
MCV RBC AUTO: 88 FL — SIGNIFICANT CHANGE UP (ref 80–100)
METHOD TYPE: SIGNIFICANT CHANGE UP
NRBC # BLD: 0 /100 WBCS — SIGNIFICANT CHANGE UP (ref 0–0)
ORGANISM # SPEC MICROSCOPIC CNT: SIGNIFICANT CHANGE UP
ORGANISM # SPEC MICROSCOPIC CNT: SIGNIFICANT CHANGE UP
PLATELET # BLD AUTO: 226 K/UL — SIGNIFICANT CHANGE UP (ref 150–400)
POTASSIUM SERPL-MCNC: 5.2 MMOL/L — SIGNIFICANT CHANGE UP (ref 3.5–5.3)
POTASSIUM SERPL-SCNC: 5.2 MMOL/L — SIGNIFICANT CHANGE UP (ref 3.5–5.3)
RBC # BLD: 2.76 M/UL — LOW (ref 4.2–5.8)
RBC # FLD: 14.7 % — HIGH (ref 10.3–14.5)
SODIUM SERPL-SCNC: 140 MMOL/L — SIGNIFICANT CHANGE UP (ref 135–145)
SPECIMEN SOURCE: SIGNIFICANT CHANGE UP
TIBC SERPL-MCNC: 213 UG/DL — LOW (ref 250–450)
UIBC SERPL-MCNC: 169 UG/DL — SIGNIFICANT CHANGE UP (ref 110–370)
WBC # BLD: 5.1 K/UL — SIGNIFICANT CHANGE UP (ref 3.8–10.5)
WBC # FLD AUTO: 5.1 K/UL — SIGNIFICANT CHANGE UP (ref 3.8–10.5)

## 2022-02-08 PROCEDURE — 99232 SBSQ HOSP IP/OBS MODERATE 35: CPT

## 2022-02-08 RX ORDER — HALOPERIDOL DECANOATE 100 MG/ML
1 INJECTION INTRAMUSCULAR
Refills: 0 | Status: DISCONTINUED | OUTPATIENT
Start: 2022-02-08 | End: 2022-02-09

## 2022-02-08 RX ORDER — HALOPERIDOL DECANOATE 100 MG/ML
1 INJECTION INTRAMUSCULAR ONCE
Refills: 0 | Status: COMPLETED | OUTPATIENT
Start: 2022-02-08 | End: 2022-02-08

## 2022-02-08 RX ADMIN — FINASTERIDE 5 MILLIGRAM(S): 5 TABLET, FILM COATED ORAL at 11:19

## 2022-02-08 RX ADMIN — Medication 100 MILLIGRAM(S): at 07:12

## 2022-02-08 RX ADMIN — Medication 5 UNIT(S): at 08:15

## 2022-02-08 RX ADMIN — Medication 1 MILLIGRAM(S): at 01:59

## 2022-02-08 RX ADMIN — Medication 5 UNIT(S): at 11:40

## 2022-02-08 RX ADMIN — Medication 0.25 MILLIGRAM(S): at 14:18

## 2022-02-08 RX ADMIN — Medication 0.25 MILLIGRAM(S): at 17:07

## 2022-02-08 RX ADMIN — Medication 5 UNIT(S): at 17:07

## 2022-02-08 RX ADMIN — APIXABAN 2.5 MILLIGRAM(S): 2.5 TABLET, FILM COATED ORAL at 07:12

## 2022-02-08 RX ADMIN — Medication 1 TABLET(S): at 07:12

## 2022-02-08 RX ADMIN — PANTOPRAZOLE SODIUM 40 MILLIGRAM(S): 20 TABLET, DELAYED RELEASE ORAL at 07:12

## 2022-02-08 RX ADMIN — Medication 100 MILLIGRAM(S): at 17:07

## 2022-02-08 RX ADMIN — Medication 1 TABLET(S): at 17:07

## 2022-02-08 RX ADMIN — INSULIN GLARGINE 22 UNIT(S): 100 INJECTION, SOLUTION SUBCUTANEOUS at 21:27

## 2022-02-08 RX ADMIN — Medication 1: at 17:07

## 2022-02-08 RX ADMIN — Medication 2: at 11:39

## 2022-02-08 RX ADMIN — CALCITRIOL 0.5 MICROGRAM(S): 0.5 CAPSULE ORAL at 11:19

## 2022-02-08 RX ADMIN — SODIUM CHLORIDE 65 MILLILITER(S): 9 INJECTION INTRAMUSCULAR; INTRAVENOUS; SUBCUTANEOUS at 07:13

## 2022-02-08 RX ADMIN — AMPICILLIN SODIUM AND SULBACTAM SODIUM 200 GRAM(S): 250; 125 INJECTION, POWDER, FOR SUSPENSION INTRAMUSCULAR; INTRAVENOUS at 05:19

## 2022-02-08 RX ADMIN — APIXABAN 2.5 MILLIGRAM(S): 2.5 TABLET, FILM COATED ORAL at 17:07

## 2022-02-08 RX ADMIN — MIRTAZAPINE 15 MILLIGRAM(S): 45 TABLET, ORALLY DISINTEGRATING ORAL at 21:27

## 2022-02-08 RX ADMIN — HALOPERIDOL DECANOATE 1 MILLIGRAM(S): 100 INJECTION INTRAMUSCULAR at 11:39

## 2022-02-08 RX ADMIN — ATORVASTATIN CALCIUM 40 MILLIGRAM(S): 80 TABLET, FILM COATED ORAL at 21:28

## 2022-02-08 RX ADMIN — TAMSULOSIN HYDROCHLORIDE 0.4 MILLIGRAM(S): 0.4 CAPSULE ORAL at 21:27

## 2022-02-08 RX ADMIN — AMPICILLIN SODIUM AND SULBACTAM SODIUM 200 GRAM(S): 250; 125 INJECTION, POWDER, FOR SUSPENSION INTRAMUSCULAR; INTRAVENOUS at 13:24

## 2022-02-08 RX ADMIN — HALOPERIDOL DECANOATE 1 MILLIGRAM(S): 100 INJECTION INTRAMUSCULAR at 17:07

## 2022-02-08 NOTE — PROGRESS NOTE ADULT - SUBJECTIVE AND OBJECTIVE BOX
CARDIOLOGY/MEDICAL ATTENDING    CHIEF COMPLAINT:Patient is a 81y old  Male who presents with a chief complaint of AMS.Pt appears comfortable.    	  REVIEW OF SYSTEMS:  	  [x ] Unable to obtain    PHYSICAL EXAM:  T(C): 36.7 (02-08-22 @ 11:24), Max: 37.4 (02-07-22 @ 21:14)  HR: 114 (02-08-22 @ 11:24) (99 - 114)  BP: 123/75 (02-08-22 @ 11:24) (109/63 - 152/81)  RR: 17 (02-08-22 @ 11:24) (17 - 22)  SpO2: 99% (02-08-22 @ 11:24) (90% - 99%)  Wt(kg): --  I&O's Summary    07 Feb 2022 07:01  -  08 Feb 2022 07:00  --------------------------------------------------------  IN: 0 mL / OUT: 300 mL / NET: -300 mL        Appearance: Normal	  HEENT:   Normal oral mucosa, PERRL, EOMI	  Lymphatic: No lymphadenopathy  Cardiovascular: Normal S1 S2, No JVD, No murmurs, No edema  Respiratory: Lungs clear to auscultation	  Gastrointestinal:  Soft, Non-tender, + BS	  Skin: No rashes, No ecchymoses, No cyanosis	  Extremities: Normal range of motion, No clubbing, cyanosis or edema  Vascular: Peripheral pulses palpable 2+ bilaterally    MEDICATIONS  (STANDING):  ampicillin/sulbactam  IVPB 3 Gram(s) IV Intermittent every 8 hours  apixaban 2.5 milliGRAM(s) Oral two times a day  atorvastatin 40 milliGRAM(s) Oral at bedtime  calcitriol   Capsule 0.5 MICROGram(s) Oral daily  calcium carbonate    500 mG (Tums) Chewable 1 Tablet(s) Chew two times a day  dextrose 40% Gel 15 Gram(s) Oral once  dextrose 5%. 1000 milliLiter(s) (50 mL/Hr) IV Continuous <Continuous>  dextrose 5%. 1000 milliLiter(s) (100 mL/Hr) IV Continuous <Continuous>  dextrose 50% Injectable 25 Gram(s) IV Push once  dextrose 50% Injectable 12.5 Gram(s) IV Push once  dextrose 50% Injectable 25 Gram(s) IV Push once  finasteride 5 milliGRAM(s) Oral daily  glucagon  Injectable 1 milliGRAM(s) IntraMuscular once  haloperidol     Tablet 1 milliGRAM(s) Oral two times a day  insulin glargine Injectable (LANTUS) 22 Unit(s) SubCutaneous at bedtime  insulin lispro (ADMELOG) corrective regimen sliding scale   SubCutaneous three times a day before meals  insulin lispro Injectable (ADMELOG) 5 Unit(s) SubCutaneous three times a day before meals  labetalol 100 milliGRAM(s) Oral every 12 hours  mirtazapine 15 milliGRAM(s) Oral at bedtime  pantoprazole    Tablet 40 milliGRAM(s) Oral before breakfast  tamsulosin 0.4 milliGRAM(s) Oral at bedtime        LABS:	 	    Troponin I, High Sensitivity Result: 9.7 ng/L (02-06 @ 00:42)                            7.9    5.10  )-----------( 226      ( 08 Feb 2022 07:48 )             24.3     02-08    140  |  115<H>  |  34<H>  ----------------------------<  127<H>  5.2   |  18<L>  |  2.59<H>    Ca    8.4      08 Feb 2022 07:48  Phos  3.1     02-07  Mg     1.4     02-07    TPro  6.8  /  Alb  2.1<L>  /  TBili  0.4  /  DBili  x   /  AST  11  /  ALT  11  /  AlkPhos  64  02-07

## 2022-02-08 NOTE — PROGRESS NOTE ADULT - PROBLEM SELECTOR PLAN 4
uncontrolled  last a1c 7.5  lantus increased to 22U QHS + Ademlog 5U TID   monitor bs   Endo Asad follows

## 2022-02-08 NOTE — PROGRESS NOTE ADULT - PROBLEM SELECTOR PLAN 1
CX + confirmed ampicillin sensitive Entercoccus faecalis  patient recently discharged on augmentin PO but did not  meds  continue augmentin D4 of 7

## 2022-02-08 NOTE — PROGRESS NOTE ADULT - ASSESSMENT
82 yo M from home with PMH of DVT (on eliquis) , HTN, HLD, DM, recently discharged from [facility] ED seen and discharged home with PO ABX. Presents to the ED with AMS, and agitation. UCX confirms Ampicillin sensitive enterococcus faecalis started on Unasyn. Admitted to medicine for UTI, hospital course complicated by worsening dementia with behavioral disturbances. Also expressing thoughts of self harm and suicide. Placed on 1:1 for safety, psych consulted- reccs appreciated. Emergency contact updated on plan of care, she shared her concern for patient's living situation and would like him to be placed in [facility] on admission giving his cognitive decline. SW to follow

## 2022-02-08 NOTE — PROGRESS NOTE ADULT - SUBJECTIVE AND OBJECTIVE BOX
Interval Events:  pt in nad    Allergies    Allergy Status Unknown    Intolerances      Endocrine/Metabolic Medications:  atorvastatin 40 milliGRAM(s) Oral at bedtime  dextrose 40% Gel 15 Gram(s) Oral once  dextrose 50% Injectable 25 Gram(s) IV Push once  dextrose 50% Injectable 12.5 Gram(s) IV Push once  dextrose 50% Injectable 25 Gram(s) IV Push once  finasteride 5 milliGRAM(s) Oral daily  glucagon  Injectable 1 milliGRAM(s) IntraMuscular once  insulin glargine Injectable (LANTUS) 22 Unit(s) SubCutaneous at bedtime  insulin lispro (ADMELOG) corrective regimen sliding scale   SubCutaneous three times a day before meals  insulin lispro Injectable (ADMELOG) 5 Unit(s) SubCutaneous three times a day before meals      Vital Signs Last 24 Hrs  T(C): 36.6 (08 Feb 2022 05:54), Max: 37.4 (07 Feb 2022 21:14)  T(F): 97.8 (08 Feb 2022 05:54), Max: 99.3 (07 Feb 2022 21:14)  HR: 103 (08 Feb 2022 07:32) (99 - 114)  BP: 127/75 (08 Feb 2022 07:32) (109/63 - 152/81)  BP(mean): --  RR: 17 (08 Feb 2022 07:32) (17 - 22)  SpO2: 97% (08 Feb 2022 05:54) (90% - 97%)      PHYSICAL EXAM  All physical exam findings normal, except those marked:  General:	Alert, active, cooperative, NAD, well hydrated  .		[] Abnormal:  Neck		Normal: supple, no cervical adenopathy, no palpable thyroid  .		[] Abnormal:  Cardiovascular	Normal: regular rate, normal S1, S2, no murmurs  .		[] Abnormal:  Respiratory	Normal: no chest wall deformity, normal respiratory pattern, CTA B/L  .		[] Abnormal:  Abdominal	Normal: soft, ND, NT, bowel sounds present, no masses, no organomegaly  .		[] Abnormal:  		Normal normal genitalia, testes descended, circumcised/uncircumcised  .		Denice stage:			Breast denice:  .		Menstrual history:  .		[] Abnormal:  Extremities	Normal: FROM x4  .		[] Abnormal:  Skin		Normal: intact and not indurated, no rash, no acanthosis nigricans  .		[] Abnormal:  Neurologic	Normal: grossly intact  .		[] Abnormal:    LABS                        7.9    5.10  )-----------( 226      ( 08 Feb 2022 07:48 )             24.3                               140    |  115    |  34                  Calcium: 8.4   / iCa: x      (02-08 @ 07:48)    ----------------------------<  127       Magnesium: x                                5.2     |  18     |  2.59             Phosphorous: x          CAPILLARY BLOOD GLUCOSE      POCT Blood Glucose.: 143 mg/dL (08 Feb 2022 08:09)  POCT Blood Glucose.: 139 mg/dL (08 Feb 2022 08:08)  POCT Blood Glucose.: 277 mg/dL (07 Feb 2022 21:23)  POCT Blood Glucose.: 258 mg/dL (07 Feb 2022 17:57)  POCT Blood Glucose.: 237 mg/dL (07 Feb 2022 11:55)        Assesment/plan    80 yo M with PMH of DVT (on eliquis) , HTN, HLD, DM , multiple recent visits for UTI presents with confusion. . Found to have uncont dm. Pt states he takes oral dm meds as out pt with poorly cont dm. S/p recent hospitalization for uti/ uncont dm.      Problem/Recommendation - 1:  ·  Problem: Uncontrolled diabetes mellitus.   ·  Recommendation: due to acute illness and need for insulin tx  cont lantus 22 units  and admelog 5 ac tid- hold if poor po intake   fsg ac and hs  d/w prim team.     Problem/Recommendation - 2:  ·  Problem: UTI (urinary tract infection).   ·  Recommendation: cont iv abx  per prim team.

## 2022-02-08 NOTE — PROGRESS NOTE ADULT - SUBJECTIVE AND OBJECTIVE BOX
NP Note discussed with  primary attending    Patient is a 81y old  Male who presents with a chief complaint of AMS. (08 Feb 2022 11:56)      INTERVAL HPI/OVERNIGHT EVENTS: verbalizing thought of self harm- kept on constant observation for safety     MEDICATIONS  (STANDING):  amoxicillin  500 milliGRAM(s)/clavulanate 1 Tablet(s) Oral two times a day  apixaban 2.5 milliGRAM(s) Oral two times a day  atorvastatin 40 milliGRAM(s) Oral at bedtime  calcitriol   Capsule 0.5 MICROGram(s) Oral daily  calcium carbonate    500 mG (Tums) Chewable 1 Tablet(s) Chew two times a day  dextrose 40% Gel 15 Gram(s) Oral once  dextrose 5%. 1000 milliLiter(s) (50 mL/Hr) IV Continuous <Continuous>  dextrose 5%. 1000 milliLiter(s) (100 mL/Hr) IV Continuous <Continuous>  dextrose 50% Injectable 25 Gram(s) IV Push once  dextrose 50% Injectable 12.5 Gram(s) IV Push once  dextrose 50% Injectable 25 Gram(s) IV Push once  finasteride 5 milliGRAM(s) Oral daily  glucagon  Injectable 1 milliGRAM(s) IntraMuscular once  haloperidol     Tablet 1 milliGRAM(s) Oral two times a day  insulin glargine Injectable (LANTUS) 22 Unit(s) SubCutaneous at bedtime  insulin lispro (ADMELOG) corrective regimen sliding scale   SubCutaneous three times a day before meals  insulin lispro Injectable (ADMELOG) 5 Unit(s) SubCutaneous three times a day before meals  labetalol 100 milliGRAM(s) Oral every 12 hours  LORazepam     Tablet 0.25 milliGRAM(s) Oral two times a day  mirtazapine 15 milliGRAM(s) Oral at bedtime  pantoprazole    Tablet 40 milliGRAM(s) Oral before breakfast  tamsulosin 0.4 milliGRAM(s) Oral at bedtime    MEDICATIONS  (PRN):  acetaminophen     Tablet .. 650 milliGRAM(s) Oral every 6 hours PRN Temp greater or equal to 38C (100.4F), Mild Pain (1 - 3)  guaiFENesin Oral Liquid (Sugar-Free) 100 milliGRAM(s) Oral every 6 hours PRN Cough  haloperidol    Injectable 2 milliGRAM(s) IntraMuscular every 12 hours PRN Agitation  LORazepam   Injectable 1 milliGRAM(s) IV Push two times a day PRN Agitation  meclizine 12.5 milliGRAM(s) Oral every 6 hours PRN Dizziness  melatonin 3 milliGRAM(s) Oral at bedtime PRN Insomnia  ondansetron Injectable 4 milliGRAM(s) IV Push every 8 hours PRN Nausea and/or Vomiting      __________________________________________________  REVIEW OF SYSTEMS:    CONSTITUTIONAL: No fever,   EYES: no acute visual disturbances  NECK: No pain or stiffness  RESPIRATORY: No cough; No shortness of breath  CARDIOVASCULAR: No chest pain, no palpitations  GASTROINTESTINAL: No pain. No nausea or vomiting; No diarrhea   NEUROLOGICAL: No headache or numbness, no tremors  MUSCULOSKELETAL: No joint pain, no muscle pain  GENITOURINARY: no dysuria, no frequency, no hesitancy  PSYCHIATRY: +depression , no anxiety  ALL OTHER  ROS negative        Vital Signs Last 24 Hrs  T(C): 36.7 (08 Feb 2022 11:24), Max: 37.4 (07 Feb 2022 21:14)  T(F): 98 (08 Feb 2022 11:24), Max: 99.3 (07 Feb 2022 21:14)  HR: 114 (08 Feb 2022 11:24) (99 - 114)  BP: 123/75 (08 Feb 2022 11:24) (123/75 - 152/81)  BP(mean): --  RR: 17 (08 Feb 2022 11:24) (17 - 20)  SpO2: 99% (08 Feb 2022 11:24) (90% - 99%)    ________________________________________________  PHYSICAL EXAM:  GENERAL: withdrawn NAD   HEENT: Normocephalic; conjunctivae and sclerae clear;   NECK : supple  CHEST/LUNG: Clear to auscultation bilaterally with good air entry   HEART: S1 S2  regular; no murmurs, gallops or rubs  ABDOMEN: Soft, Nontender, Nondistended; Bowel sounds present  EXTREMITIES: no cyanosis; no edema; no calf tenderness  SKIN: warm and dry; no rash  NERVOUS SYSTEM: a&ox2    _________________________________________________  LABS:                        7.9    5.10  )-----------( 226      ( 08 Feb 2022 07:48 )             24.3     02-08    140  |  115<H>  |  34<H>  ----------------------------<  127<H>  5.2   |  18<L>  |  2.59<H>    Ca    8.4      08 Feb 2022 07:48  Phos  3.1     02-07  Mg     1.4     02-07    TPro  6.8  /  Alb  2.1<L>  /  TBili  0.4  /  DBili  x   /  AST  11  /  ALT  11  /  AlkPhos  64  02-07        CAPILLARY BLOOD GLUCOSE      POCT Blood Glucose.: 227 mg/dL (08 Feb 2022 11:30)  POCT Blood Glucose.: 143 mg/dL (08 Feb 2022 08:09)  POCT Blood Glucose.: 139 mg/dL (08 Feb 2022 08:08)  POCT Blood Glucose.: 277 mg/dL (07 Feb 2022 21:23)  POCT Blood Glucose.: 258 mg/dL (07 Feb 2022 17:57)        RADIOLOGY & ADDITIONAL TESTS: < from: CT Head No Cont (01.27.22 @ 12:26) >    FINDINGS:    No hydrocephalus, mass effect, midline shift, acute intracranial   hemorrhage, or brain edema.    Mild white matter microvascular ischemic disease.    Visualized paranasal sinuses and mastoid air cells are clear.    IMPRESSION:    No hydrocephalus, acute intracranial hemorrhage, mass effect, or brain   edema.    --- End o    < end of copied text >      Imaging Personally Reviewed:  YES    Consultant(s) Notes Reviewed:   YES    Care Discussed with Consultants: psych    Plan of care was discussed with patient and /or primary care giver; all questions and concerns were addressed and care was aligned with patient's wishes.

## 2022-02-08 NOTE — PROGRESS NOTE ADULT - ASSESSMENT
82 yo M with PMH of DVT (on eliquis) , HTN, HLD, DM , multiple recent visits for UTI presents with confusion,depression and suicidal ideation.  1.UTI-ABX.  2.DM-Insulin,Endo f/u.  3.Psych eval appreciated,cont haldol.  4.DVT-eliquis.  5.CRI-f/u lytes.Renal f/u.  6.HTN-cont bp medication.  7.Lipid d/o-statin.  8.PPI.

## 2022-02-08 NOTE — BH CONSULTATION LIAISON PROGRESS NOTE - NSBHCONSULTRECOMMENDOTHER_PSY_A_CORE FT
1. Recommend Haldol 1 mg PO plus Ativan 0.25 mg PO q12h standing for delirium/behavioral disturbance  --Ativan has been added to address possible SE of Haldol (tremors)  2. For acute agitation, recommend Haldol 2 mg plus Ativan 1 mg IV or IM q12h PRN agitation  3. No indication for other standing or PRN psychotropic medications at this time  4. Medical management as directed by primary team  5. Psychiatry will continue to follow  6. Pt does NOT appear to have capacity to care for self safely at home or to make dispo decisions, due to progressive dementia  --HCP/POA Mishel Margarita should be approached for placement preferences and consents  7. Case d/w KP Earl of primary team    Tammy Mead MD  Director, Consultation-Liaison Psychiatry Service  [id number]

## 2022-02-09 LAB
ANION GAP SERPL CALC-SCNC: 10 MMOL/L — SIGNIFICANT CHANGE UP (ref 5–17)
BUN SERPL-MCNC: 28 MG/DL — HIGH (ref 7–18)
CALCIUM SERPL-MCNC: 8.3 MG/DL — LOW (ref 8.4–10.5)
CHLORIDE SERPL-SCNC: 109 MMOL/L — HIGH (ref 96–108)
CO2 SERPL-SCNC: 20 MMOL/L — LOW (ref 22–31)
CREAT SERPL-MCNC: 2.61 MG/DL — HIGH (ref 0.5–1.3)
GLUCOSE BLDC GLUCOMTR-MCNC: 155 MG/DL — HIGH (ref 70–99)
GLUCOSE BLDC GLUCOMTR-MCNC: 193 MG/DL — HIGH (ref 70–99)
GLUCOSE BLDC GLUCOMTR-MCNC: 246 MG/DL — HIGH (ref 70–99)
GLUCOSE SERPL-MCNC: 214 MG/DL — HIGH (ref 70–99)
HCT VFR BLD CALC: 21.5 % — LOW (ref 39–50)
HGB BLD-MCNC: 7.1 G/DL — LOW (ref 13–17)
MCHC RBC-ENTMCNC: 28.4 PG — SIGNIFICANT CHANGE UP (ref 27–34)
MCHC RBC-ENTMCNC: 33 GM/DL — SIGNIFICANT CHANGE UP (ref 32–36)
MCV RBC AUTO: 86 FL — SIGNIFICANT CHANGE UP (ref 80–100)
NRBC # BLD: 0 /100 WBCS — SIGNIFICANT CHANGE UP (ref 0–0)
PLATELET # BLD AUTO: 227 K/UL — SIGNIFICANT CHANGE UP (ref 150–400)
POTASSIUM SERPL-MCNC: 3.7 MMOL/L — SIGNIFICANT CHANGE UP (ref 3.5–5.3)
POTASSIUM SERPL-SCNC: 3.7 MMOL/L — SIGNIFICANT CHANGE UP (ref 3.5–5.3)
RBC # BLD: 2.5 M/UL — LOW (ref 4.2–5.8)
RBC # FLD: 14.5 % — SIGNIFICANT CHANGE UP (ref 10.3–14.5)
SARS-COV-2 RNA SPEC QL NAA+PROBE: SIGNIFICANT CHANGE UP
SODIUM SERPL-SCNC: 139 MMOL/L — SIGNIFICANT CHANGE UP (ref 135–145)
WBC # BLD: 5.33 K/UL — SIGNIFICANT CHANGE UP (ref 3.8–10.5)
WBC # FLD AUTO: 5.33 K/UL — SIGNIFICANT CHANGE UP (ref 3.8–10.5)

## 2022-02-09 PROCEDURE — 99232 SBSQ HOSP IP/OBS MODERATE 35: CPT

## 2022-02-09 RX ORDER — HALOPERIDOL DECANOATE 100 MG/ML
2 INJECTION INTRAMUSCULAR
Refills: 0 | Status: DISCONTINUED | OUTPATIENT
Start: 2022-02-09 | End: 2022-02-09

## 2022-02-09 RX ORDER — HALOPERIDOL DECANOATE 100 MG/ML
1 INJECTION INTRAMUSCULAR EVERY 12 HOURS
Refills: 0 | Status: DISCONTINUED | OUTPATIENT
Start: 2022-02-09 | End: 2022-02-16

## 2022-02-09 RX ORDER — HALOPERIDOL DECANOATE 100 MG/ML
1 INJECTION INTRAMUSCULAR DAILY
Refills: 0 | Status: DISCONTINUED | OUTPATIENT
Start: 2022-02-10 | End: 2022-02-11

## 2022-02-09 RX ORDER — HALOPERIDOL DECANOATE 100 MG/ML
2 INJECTION INTRAMUSCULAR AT BEDTIME
Refills: 0 | Status: DISCONTINUED | OUTPATIENT
Start: 2022-02-09 | End: 2022-02-16

## 2022-02-09 RX ORDER — SODIUM CHLORIDE 9 MG/ML
1000 INJECTION INTRAMUSCULAR; INTRAVENOUS; SUBCUTANEOUS
Refills: 0 | Status: DISCONTINUED | OUTPATIENT
Start: 2022-02-09 | End: 2022-02-11

## 2022-02-09 RX ADMIN — Medication 0.5 MILLIGRAM(S): at 18:28

## 2022-02-09 RX ADMIN — APIXABAN 2.5 MILLIGRAM(S): 2.5 TABLET, FILM COATED ORAL at 18:23

## 2022-02-09 RX ADMIN — Medication 1 TABLET(S): at 18:23

## 2022-02-09 RX ADMIN — TAMSULOSIN HYDROCHLORIDE 0.4 MILLIGRAM(S): 0.4 CAPSULE ORAL at 21:20

## 2022-02-09 RX ADMIN — Medication 1: at 17:37

## 2022-02-09 RX ADMIN — HALOPERIDOL DECANOATE 2 MILLIGRAM(S): 100 INJECTION INTRAMUSCULAR at 10:59

## 2022-02-09 RX ADMIN — ATORVASTATIN CALCIUM 40 MILLIGRAM(S): 80 TABLET, FILM COATED ORAL at 21:20

## 2022-02-09 RX ADMIN — Medication 100 MILLIGRAM(S): at 18:23

## 2022-02-09 RX ADMIN — Medication 5 UNIT(S): at 17:38

## 2022-02-09 RX ADMIN — Medication 100 MILLIGRAM(S): at 07:06

## 2022-02-09 RX ADMIN — INSULIN GLARGINE 22 UNIT(S): 100 INJECTION, SOLUTION SUBCUTANEOUS at 21:25

## 2022-02-09 RX ADMIN — Medication 1 TABLET(S): at 07:06

## 2022-02-09 RX ADMIN — MIRTAZAPINE 15 MILLIGRAM(S): 45 TABLET, ORALLY DISINTEGRATING ORAL at 21:23

## 2022-02-09 RX ADMIN — Medication 0.25 MILLIGRAM(S): at 07:09

## 2022-02-09 RX ADMIN — HALOPERIDOL DECANOATE 1 MILLIGRAM(S): 100 INJECTION INTRAMUSCULAR at 07:06

## 2022-02-09 RX ADMIN — APIXABAN 2.5 MILLIGRAM(S): 2.5 TABLET, FILM COATED ORAL at 07:06

## 2022-02-09 RX ADMIN — Medication 1: at 08:23

## 2022-02-09 RX ADMIN — PANTOPRAZOLE SODIUM 40 MILLIGRAM(S): 20 TABLET, DELAYED RELEASE ORAL at 07:06

## 2022-02-09 RX ADMIN — HALOPERIDOL DECANOATE 2 MILLIGRAM(S): 100 INJECTION INTRAMUSCULAR at 18:23

## 2022-02-09 RX ADMIN — Medication 1 MILLIGRAM(S): at 10:59

## 2022-02-09 RX ADMIN — Medication 5 UNIT(S): at 08:25

## 2022-02-09 NOTE — PROGRESS NOTE ADULT - SUBJECTIVE AND OBJECTIVE BOX
CHIEF COMPLAINT:Patient is a 81y old  Male who presents with a chief complaint of Acute encephalopathy.Pt still on 1 to 1 ,requiring sedation for agitation.    	  REVIEW OF SYSTEMS:  unable to obtain	        PHYSICAL EXAM:  T(C): 36.7 (02-09-22 @ 05:35), Max: 36.8 (02-08-22 @ 19:33)  HR: 107 (02-09-22 @ 05:35) (74 - 107)  BP: 128/71 (02-09-22 @ 05:35) (128/71 - 173/80)  RR: 16 (02-09-22 @ 05:35) (16 - 24)  SpO2: 96% (02-09-22 @ 05:35) (88% - 96%)        Appearance: Normal	  HEENT:   Normal oral mucosa, PERRL, EOMI	  Lymphatic: No lymphadenopathy  Cardiovascular: Normal S1 S2, No JVD, No murmurs, No edema  Respiratory: Lungs clear to auscultation	  Gastrointestinal:  Soft, Non-tender, + BS	  Skin: No rashes, No ecchymoses, No cyanosis	  Extremities: Normal range of motion, No clubbing, cyanosis or edema  Vascular: Peripheral pulses palpable 2+ bilaterally    MEDICATIONS  (STANDING):  amoxicillin  500 milliGRAM(s)/clavulanate 1 Tablet(s) Oral two times a day  apixaban 2.5 milliGRAM(s) Oral two times a day  atorvastatin 40 milliGRAM(s) Oral at bedtime  calcitriol   Capsule 0.5 MICROGram(s) Oral daily  calcium carbonate    500 mG (Tums) Chewable 1 Tablet(s) Chew two times a day  dextrose 40% Gel 15 Gram(s) Oral once  dextrose 5%. 1000 milliLiter(s) (50 mL/Hr) IV Continuous <Continuous>  dextrose 5%. 1000 milliLiter(s) (100 mL/Hr) IV Continuous <Continuous>  dextrose 50% Injectable 25 Gram(s) IV Push once  dextrose 50% Injectable 12.5 Gram(s) IV Push once  dextrose 50% Injectable 25 Gram(s) IV Push once  finasteride 5 milliGRAM(s) Oral daily  glucagon  Injectable 1 milliGRAM(s) IntraMuscular once  haloperidol     Tablet 1 milliGRAM(s) Oral two times a day  insulin glargine Injectable (LANTUS) 22 Unit(s) SubCutaneous at bedtime  insulin lispro (ADMELOG) corrective regimen sliding scale   SubCutaneous three times a day before meals  insulin lispro Injectable (ADMELOG) 5 Unit(s) SubCutaneous three times a day before meals  labetalol 100 milliGRAM(s) Oral every 12 hours  LORazepam     Tablet 0.25 milliGRAM(s) Oral two times a day  mirtazapine 15 milliGRAM(s) Oral at bedtime  pantoprazole    Tablet 40 milliGRAM(s) Oral before breakfast  tamsulosin 0.4 milliGRAM(s) Oral at bedtime      	  	  LABS:	 	                       7.1    5.33  )-----------( 227      ( 09 Feb 2022 10:27 )             21.5     02-09    139  |  109<H>  |  28<H>  ----------------------------<  214<H>  3.7   |  20<L>  |  2.61<H>    Ca    8.3<L>      09 Feb 2022 10:27      proBNP:   Lipid Profile:   HgA1c:   TSH:

## 2022-02-09 NOTE — PROGRESS NOTE ADULT - ASSESSMENT
Patient is a 81 year old Male from home with PMH of DVT (on eliquis) , HTN, HLD, DM, recently discharged from [facility] ED seen and discharged home with PO ABX. Patient presents to the ED with AMS, and agitation. UCX confirms Ampicillin sensitive enterococcus faecalis started on Unasyn. Patient admitted to medicine for enterococcus faecalis  UTI, hospital course complicated by worsening dementia with behavioral disturbances. Also expressing thoughts of self harm and suicide. Placed on 1:1 for safety, psych consulted- reccs appreciated. Emergency contact updated on plan of care, she shared her concern for patient's living situation and would like him to be placed in [facility] on admission giving his cognitive decline. SW to follow

## 2022-02-09 NOTE — PROGRESS NOTE ADULT - SUBJECTIVE AND OBJECTIVE BOX
NP Note discussed with  Primary Attending    Patient is a 81y old  Male who presents with a chief complaint of UTI (09 Feb 2022 12:09)      INTERVAL HPI/OVERNIGHT EVENTS: Patient seen and examined at bedside.    MEDICATIONS  (STANDING):  amoxicillin  500 milliGRAM(s)/clavulanate 1 Tablet(s) Oral two times a day  apixaban 2.5 milliGRAM(s) Oral two times a day  atorvastatin 40 milliGRAM(s) Oral at bedtime  calcitriol   Capsule 0.5 MICROGram(s) Oral daily  calcium carbonate    500 mG (Tums) Chewable 1 Tablet(s) Chew two times a day  dextrose 40% Gel 15 Gram(s) Oral once  dextrose 5%. 1000 milliLiter(s) (50 mL/Hr) IV Continuous <Continuous>  dextrose 5%. 1000 milliLiter(s) (100 mL/Hr) IV Continuous <Continuous>  dextrose 50% Injectable 25 Gram(s) IV Push once  dextrose 50% Injectable 12.5 Gram(s) IV Push once  dextrose 50% Injectable 25 Gram(s) IV Push once  finasteride 5 milliGRAM(s) Oral daily  glucagon  Injectable 1 milliGRAM(s) IntraMuscular once  haloperidol     Tablet 2 milliGRAM(s) Oral two times a day  insulin glargine Injectable (LANTUS) 22 Unit(s) SubCutaneous at bedtime  insulin lispro (ADMELOG) corrective regimen sliding scale   SubCutaneous three times a day before meals  insulin lispro Injectable (ADMELOG) 5 Unit(s) SubCutaneous three times a day before meals  labetalol 100 milliGRAM(s) Oral every 12 hours  LORazepam     Tablet 0.5 milliGRAM(s) Oral two times a day  mirtazapine 15 milliGRAM(s) Oral at bedtime  pantoprazole    Tablet 40 milliGRAM(s) Oral before breakfast  tamsulosin 0.4 milliGRAM(s) Oral at bedtime    MEDICATIONS  (PRN):  acetaminophen     Tablet .. 650 milliGRAM(s) Oral every 6 hours PRN Temp greater or equal to 38C (100.4F), Mild Pain (1 - 3)  guaiFENesin Oral Liquid (Sugar-Free) 100 milliGRAM(s) Oral every 6 hours PRN Cough  haloperidol    Injectable 1 milliGRAM(s) IntraMuscular every 12 hours PRN Agitation  LORazepam   Injectable 0.5 milliGRAM(s) IV Push two times a day PRN Agitation  meclizine 12.5 milliGRAM(s) Oral every 6 hours PRN Dizziness  melatonin 3 milliGRAM(s) Oral at bedtime PRN Insomnia  ondansetron Injectable 4 milliGRAM(s) IV Push every 8 hours PRN Nausea and/or Vomiting      __________________________________________________  REVIEW OF SYSTEMS:    unable to assess poor historian     Vital Signs Last 24 Hrs  T(C): 36.7 (09 Feb 2022 05:35), Max: 36.8 (08 Feb 2022 19:33)  T(F): 98 (09 Feb 2022 05:35), Max: 98.3 (08 Feb 2022 19:33)  HR: 107 (09 Feb 2022 05:35) (74 - 107)  BP: 128/71 (09 Feb 2022 05:35) (128/71 - 173/80)  BP(mean): --  RR: 16 (09 Feb 2022 05:35) (16 - 24)  SpO2: 96% (09 Feb 2022 05:35) (88% - 96%)    ________________________________________________  PHYSICAL EXAM:  GENERAL: NAD  HEENT: Normocephalic;  conjunctivae and sclerae clear; moist mucous membranes;   NECK : supple  CHEST/LUNG: Clear to auscultation bilaterally with good air entry   HEART: S1 S2  regular; no murmurs, gallops or rubs  ABDOMEN: Soft, Nontender, Nondistended; Bowel sounds present  EXTREMITIES: no cyanosis; no edema; no calf tenderness  SKIN: warm and dry; no rash  NERVOUS SYSTEM:  confused, agitated, restless    _________________________________________________  LABS:                        7.1    5.33  )-----------( 227      ( 09 Feb 2022 10:27 )             21.5     02-09    139  |  109<H>  |  28<H>  ----------------------------<  214<H>  3.7   |  20<L>  |  2.61<H>    Ca    8.3<L>      09 Feb 2022 10:27          CAPILLARY BLOOD GLUCOSE      POCT Blood Glucose.: 193 mg/dL (09 Feb 2022 07:45)  POCT Blood Glucose.: 148 mg/dL (08 Feb 2022 20:47)        RADIOLOGY & ADDITIONAL TESTS:  < from: CT Head No Cont (01.27.22 @ 12:26) >    ACC: [id number] EXAM:  CT BRAIN                          PROCEDURE DATE:  01/27/2022          INTERPRETATION:  Noncontrast CT of the brain.    CLINICAL INDICATION:  Altered mental status    TECHNIQUE : Axial CT scanning of the brain was obtained fromthe skull   base to the vertex without the administration of intravenous contrast.   Sagittal and coronal reformats were provided.    COMPARISON: CT brain 12/23/2021    FINDINGS:    No hydrocephalus, mass effect, midline shift, acute intracranial   hemorrhage, or brain edema.    Mild white matter microvascular ischemic disease.    Visualized paranasal sinuses and mastoid air cells are clear.    IMPRESSION:    No hydrocephalus, acute intracranial hemorrhage, mass effect, or brain   edema.    --- End of Report ---    < end of copied text >    Imaging  Reviewed:  YES    Consultant(s) Notes Reviewed:   YES      Plan of care was discussed with patient and /or primary care giver; all questions and concerns were addressed

## 2022-02-09 NOTE — PROGRESS NOTE ADULT - PROBLEM SELECTOR PLAN 1
CX + confirmed ampicillin sensitive Entercoccus faecalis  patient recently discharged on augmentin PO but did not  meds  continue Augmentin Day 5 of 7

## 2022-02-09 NOTE — PROGRESS NOTE ADULT - PROBLEM SELECTOR PLAN 4
uncontrolled  last a1c 7.5  lantus 22Units QHS   Ademlog 5Units TID   ACCU check ACHS  Endo Kamara follows

## 2022-02-09 NOTE — PROGRESS NOTE ADULT - PROBLEM SELECTOR PLAN 2
Haldol 1-->2mg PO two times a day  Ativan 0.25-->0.5mg two times a teri  PRN Haldol 2-->1mg IM q12 hrs  PRN Ativan 0.5-->0.25mg IV q12hrs  continue observation   psych Dr. Mead

## 2022-02-09 NOTE — BH CONSULTATION LIAISON PROGRESS NOTE - NSBHCONSULTRECOMMENDOTHER_PSY_A_CORE FT
1. Change standing regimen to Haldol 1 mg PO plus Ativan 0.25 mg PO qam/Haldol 2 mg PO plus Ativan 0.5 mg PO qhs for delirium/behavioral disturbance  2. For acute agitation, recommend Haldol 1 mg plus Ativan 0.5 mg IV or IM q12h PRN agitation  --Dose has been halved to reduce somnolence  3. No indication for other standing or PRN psychotropic medications at this time  4. Medical management as directed by primary team  5. Psychiatry will continue to follow  6. Pt does NOT appear to have capacity to care for self safely at home or to make dispo decisions, due to progressive dementia  --HCP/POA Mishel Margarita should be approached for placement preferences and consents  7. Case d/w NP Michelle of primary team    Tammy Mead MD  Director, Consultation-Liaison Psychiatry Service  [id number]

## 2022-02-09 NOTE — PROGRESS NOTE ADULT - SUBJECTIVE AND OBJECTIVE BOX
Interval Events:  pt in nad  ate breakfast     Allergies    Allergy Status Unknown    Intolerances      Endocrine/Metabolic Medications:  atorvastatin 40 milliGRAM(s) Oral at bedtime  dextrose 40% Gel 15 Gram(s) Oral once  dextrose 50% Injectable 25 Gram(s) IV Push once  dextrose 50% Injectable 12.5 Gram(s) IV Push once  dextrose 50% Injectable 25 Gram(s) IV Push once  finasteride 5 milliGRAM(s) Oral daily  glucagon  Injectable 1 milliGRAM(s) IntraMuscular once  insulin glargine Injectable (LANTUS) 22 Unit(s) SubCutaneous at bedtime  insulin lispro (ADMELOG) corrective regimen sliding scale   SubCutaneous three times a day before meals  insulin lispro Injectable (ADMELOG) 5 Unit(s) SubCutaneous three times a day before meals      Vital Signs Last 24 Hrs  T(C): 36.7 (09 Feb 2022 05:35), Max: 36.8 (08 Feb 2022 19:33)  T(F): 98 (09 Feb 2022 05:35), Max: 98.3 (08 Feb 2022 19:33)  HR: 107 (09 Feb 2022 05:35) (74 - 114)  BP: 128/71 (09 Feb 2022 05:35) (123/75 - 173/80)  BP(mean): --  RR: 16 (09 Feb 2022 05:35) (16 - 24)  SpO2: 96% (09 Feb 2022 05:35) (88% - 99%)      PHYSICAL EXAM  All physical exam findings normal, except those marked:  General:	Alert, active, cooperative, NAD, well hydrated  .		[] Abnormal:  Neck		Normal: supple, no cervical adenopathy, no palpable thyroid  .		[] Abnormal:  Cardiovascular	Normal: regular rate, normal S1, S2, no murmurs  .		[] Abnormal:  Respiratory	Normal: no chest wall deformity, normal respiratory pattern, CTA B/L  .		[] Abnormal:  Abdominal	Normal: soft, ND, NT, bowel sounds present, no masses, no organomegaly  .		[] Abnormal:  		Normal normal genitalia, testes descended, circumcised/uncircumcised  .		Denice stage:			Breast denice:  .		Menstrual history:  .		[] Abnormal:  Extremities	Normal: FROM x4  .		[] Abnormal:  Skin		Normal: intact and not indurated, no rash, no acanthosis nigricans  .		[] Abnormal:  Neurologic	Normal: grossly intact  .		[] Abnormal:    LABS        CAPILLARY BLOOD GLUCOSE      POCT Blood Glucose.: 193 mg/dL (09 Feb 2022 07:45)  POCT Blood Glucose.: 148 mg/dL (08 Feb 2022 20:47)  POCT Blood Glucose.: 186 mg/dL (08 Feb 2022 16:48)  POCT Blood Glucose.: 227 mg/dL (08 Feb 2022 11:30)        Assesment/plan    80 yo M with PMH of DVT (on eliquis) , HTN, HLD, DM , multiple recent visits for UTI presents with confusion. . Found to have uncont dm. Pt states he takes oral dm meds as out pt with poorly cont dm. S/p recent hospitalization for uti/ uncont dm.      Problem/Recommendation - 1:  ·  Problem: Uncontrolled diabetes mellitus.   ·  Recommendation: due to acute illness and need for insulin tx  cont lantus 22 units  and admelog 5 ac tid- hold if poor po intake   fsg ac and hs  d/w prim team.     Problem/Recommendation - 2:  ·  Problem: UTI (urinary tract infection).   ·  Recommendation: cont iv abx  per prim team.

## 2022-02-09 NOTE — PROGRESS NOTE ADULT - ASSESSMENT
82 yo M with PMH of DVT (on eliquis) , HTN, HLD, DM , multiple recent visits for UTI presents with confusion,depression and suicidal ideation.  1.UTI-ABX.  2.DM-Insulin,Endo f/u.  3.Psych f/u appreciated,cont haldol and ativan.  4.DVT-eliquis.  5.CRI-f/u lytes.Renal f/u.  6.HTN-cont bp medication.  7.Lipid d/o-statin.  8.PPI.

## 2022-02-10 LAB
ANION GAP SERPL CALC-SCNC: 7 MMOL/L — SIGNIFICANT CHANGE UP (ref 5–17)
BUN SERPL-MCNC: 32 MG/DL — HIGH (ref 7–18)
CALCIUM SERPL-MCNC: 8.4 MG/DL — SIGNIFICANT CHANGE UP (ref 8.4–10.5)
CHLORIDE SERPL-SCNC: 110 MMOL/L — HIGH (ref 96–108)
CO2 SERPL-SCNC: 21 MMOL/L — LOW (ref 22–31)
CREAT SERPL-MCNC: 2.45 MG/DL — HIGH (ref 0.5–1.3)
GLUCOSE BLDC GLUCOMTR-MCNC: 177 MG/DL — HIGH (ref 70–99)
GLUCOSE BLDC GLUCOMTR-MCNC: 186 MG/DL — HIGH (ref 70–99)
GLUCOSE BLDC GLUCOMTR-MCNC: 219 MG/DL — HIGH (ref 70–99)
GLUCOSE BLDC GLUCOMTR-MCNC: 220 MG/DL — HIGH (ref 70–99)
GLUCOSE BLDC GLUCOMTR-MCNC: 232 MG/DL — HIGH (ref 70–99)
GLUCOSE SERPL-MCNC: 163 MG/DL — HIGH (ref 70–99)
HCT VFR BLD CALC: 21.4 % — LOW (ref 39–50)
HGB BLD-MCNC: 7.3 G/DL — LOW (ref 13–17)
MCHC RBC-ENTMCNC: 29 PG — SIGNIFICANT CHANGE UP (ref 27–34)
MCHC RBC-ENTMCNC: 34.1 GM/DL — SIGNIFICANT CHANGE UP (ref 32–36)
MCV RBC AUTO: 84.9 FL — SIGNIFICANT CHANGE UP (ref 80–100)
NRBC # BLD: 0 /100 WBCS — SIGNIFICANT CHANGE UP (ref 0–0)
PLATELET # BLD AUTO: 232 K/UL — SIGNIFICANT CHANGE UP (ref 150–400)
POTASSIUM SERPL-MCNC: 3.6 MMOL/L — SIGNIFICANT CHANGE UP (ref 3.5–5.3)
POTASSIUM SERPL-SCNC: 3.6 MMOL/L — SIGNIFICANT CHANGE UP (ref 3.5–5.3)
RBC # BLD: 2.52 M/UL — LOW (ref 4.2–5.8)
RBC # FLD: 14.7 % — HIGH (ref 10.3–14.5)
SODIUM SERPL-SCNC: 138 MMOL/L — SIGNIFICANT CHANGE UP (ref 135–145)
WBC # BLD: 5.78 K/UL — SIGNIFICANT CHANGE UP (ref 3.8–10.5)
WBC # FLD AUTO: 5.78 K/UL — SIGNIFICANT CHANGE UP (ref 3.8–10.5)

## 2022-02-10 PROCEDURE — 99232 SBSQ HOSP IP/OBS MODERATE 35: CPT

## 2022-02-10 RX ADMIN — FINASTERIDE 5 MILLIGRAM(S): 5 TABLET, FILM COATED ORAL at 11:44

## 2022-02-10 RX ADMIN — TAMSULOSIN HYDROCHLORIDE 0.4 MILLIGRAM(S): 0.4 CAPSULE ORAL at 22:41

## 2022-02-10 RX ADMIN — INSULIN GLARGINE 22 UNIT(S): 100 INJECTION, SOLUTION SUBCUTANEOUS at 22:41

## 2022-02-10 RX ADMIN — APIXABAN 2.5 MILLIGRAM(S): 2.5 TABLET, FILM COATED ORAL at 17:32

## 2022-02-10 RX ADMIN — HALOPERIDOL DECANOATE 2 MILLIGRAM(S): 100 INJECTION INTRAMUSCULAR at 22:42

## 2022-02-10 RX ADMIN — PANTOPRAZOLE SODIUM 40 MILLIGRAM(S): 20 TABLET, DELAYED RELEASE ORAL at 05:56

## 2022-02-10 RX ADMIN — Medication 2: at 17:28

## 2022-02-10 RX ADMIN — Medication 100 MILLIGRAM(S): at 17:32

## 2022-02-10 RX ADMIN — Medication 0.25 MILLIGRAM(S): at 11:43

## 2022-02-10 RX ADMIN — Medication 1 TABLET(S): at 17:32

## 2022-02-10 RX ADMIN — Medication 5 UNIT(S): at 17:28

## 2022-02-10 RX ADMIN — Medication 1: at 07:43

## 2022-02-10 RX ADMIN — Medication 5 UNIT(S): at 07:43

## 2022-02-10 RX ADMIN — CALCITRIOL 0.5 MICROGRAM(S): 0.5 CAPSULE ORAL at 11:44

## 2022-02-10 RX ADMIN — Medication 3 MILLIGRAM(S): at 00:55

## 2022-02-10 RX ADMIN — HALOPERIDOL DECANOATE 1 MILLIGRAM(S): 100 INJECTION INTRAMUSCULAR at 11:44

## 2022-02-10 RX ADMIN — MIRTAZAPINE 15 MILLIGRAM(S): 45 TABLET, ORALLY DISINTEGRATING ORAL at 22:43

## 2022-02-10 RX ADMIN — Medication 100 MILLIGRAM(S): at 05:55

## 2022-02-10 RX ADMIN — ATORVASTATIN CALCIUM 40 MILLIGRAM(S): 80 TABLET, FILM COATED ORAL at 22:41

## 2022-02-10 RX ADMIN — APIXABAN 2.5 MILLIGRAM(S): 2.5 TABLET, FILM COATED ORAL at 05:55

## 2022-02-10 RX ADMIN — Medication 1 TABLET(S): at 05:55

## 2022-02-10 RX ADMIN — Medication 2: at 11:44

## 2022-02-10 RX ADMIN — Medication 0.5 MILLIGRAM(S): at 22:44

## 2022-02-10 RX ADMIN — Medication 5 UNIT(S): at 11:44

## 2022-02-10 NOTE — BH CONSULTATION LIAISON PROGRESS NOTE - NSBHCONSULTRECOMMENDOTHER_PSY_A_CORE FT
1. Change standing regimen to Haldol 1 mg PO plus Ativan 0.25 mg PO qam/Haldol 2 mg PO plus Ativan 0.5 mg PO qhs for delirium/behavioral disturbance  2. For acute agitation, recommend Haldol 1 mg plus Ativan 0.5 mg IV or IM q12h PRN agitation  --Dose has been halved to reduce somnolence  3. No indication for other standing or PRN psychotropic medications at this time  4. Medical management as directed by primary team  5. Psychiatry will continue to follow  6. Pt does NOT appear to have capacity to care for self safely at home or to make dispo decisions, due to progressive dementia  --HCP/POA Mishel Margarita should be approached for placement preferences and consents  7. Case d/w NP Michelle of primary team    Tammy Mead MD  Director, Consultation-Liaison Psychiatry Service  [phone]

## 2022-02-10 NOTE — PROGRESS NOTE ADULT - ASSESSMENT
Patient is a 81 year old Male from home with PMH of DVT (on eliquis) , HTN, HLD, DM, recently discharged from [facility] ED seen and discharged home with PO ABX. Patient presents to the ED with AMS, and agitation. UCX confirms Ampicillin sensitive enterococcus faecalis started on Unasyn. Patient admitted to medicine for enterococcus faecalis  UTI, hospital course complicated by worsening dementia with behavioral disturbances. Also expressing thoughts of self harm and suicide. Placed on 1:1 for safety, psych consulted- reccs appreciated. Emergency contact updated on plan of care, she shared her concern for patient's living situation and would like him to be placed in [facility] on admission giving his cognitive decline. SW to follow

## 2022-02-10 NOTE — DIETITIAN INITIAL EVALUATION ADULT. - OTHER INFO
Pt visited. Pt  seen for LOS. Pt is on 1: 1 Observation. Pt asleep. D/W RN Pt is eating Good. Po intake ~ % of meal.  able to feed self w tray set up. Labs Noted A1c 7.5. Endo consult noted.  Pt with CKD on MANUEL.  Bed  scale 149 lb.

## 2022-02-10 NOTE — PROGRESS NOTE ADULT - ASSESSMENT
80 yo M with PMH of DVT (on eliquis) , HTN, HLD, DM , multiple recent visits for UTI presents with confusion,depression and suicidal ideation.  1.UTI-ABX.  2.DM-Insulin,Endo f/u.  3.Psych f/u appreciated,cont haldol and ativan.  4.DVT-eliquis.  5.CRI-f/u lytes.Renal f/u.  6.HTN-cont bp medication.  7.Lipid d/o-statin.  8.PPI.  9.Unable to reach HCP regarding placement.

## 2022-02-10 NOTE — DIETITIAN INITIAL EVALUATION ADULT. - PERTINENT LABORATORY DATA
02-10 Na138 mmol/L Glu 163 mg/dL<H> K+ 3.6 mmol/L Cr  2.45 mg/dL<H> BUN 32 mg/dL<H>   02-07 Phos 3.1 mg/dL   02-07 Alb 2.1 g/dL<L>

## 2022-02-10 NOTE — PROGRESS NOTE ADULT - SUBJECTIVE AND OBJECTIVE BOX
CHIEF COMPLAINT:Patient is a 81y old  Male who presents with a chief complaint of UTI.Pt appears comfortable,still on 1 to 1.    	  REVIEW OF SYSTEMS:    [x ] Unable to obtain    PHYSICAL EXAM:  T(C): 37.2 (02-10-22 @ 05:13), Max: 37.2 (02-10-22 @ 05:13)  HR: 99 (02-10-22 @ 05:13) (91 - 99)  BP: 136/80 (02-10-22 @ 05:13) (110/63 - 136/80)  RR: 18 (02-10-22 @ 05:13) (18 - 20)  SpO2: 95% (02-10-22 @ 05:13) (95% - 95%)  Wt(kg): --  I&O's Summary      Appearance: Normal	  HEENT:   Normal oral mucosa, PERRL, EOMI	  Lymphatic: No lymphadenopathy  Cardiovascular: Normal S1 S2, No JVD, No murmurs, No edema  Respiratory: Lungs clear to auscultation	  Gastrointestinal:  Soft, Non-tender, + BS	  Skin: No rashes, No ecchymoses, No cyanosis	  Extremities: Normal range of motion, No clubbing, cyanosis or edema  Vascular: Peripheral pulses palpable 2+ bilaterally    MEDICATIONS  (STANDING):  amoxicillin  500 milliGRAM(s)/clavulanate 1 Tablet(s) Oral two times a day  apixaban 2.5 milliGRAM(s) Oral two times a day  atorvastatin 40 milliGRAM(s) Oral at bedtime  calcitriol   Capsule 0.5 MICROGram(s) Oral daily  calcium carbonate    500 mG (Tums) Chewable 1 Tablet(s) Chew two times a day  dextrose 40% Gel 15 Gram(s) Oral once  dextrose 5%. 1000 milliLiter(s) (50 mL/Hr) IV Continuous <Continuous>  dextrose 5%. 1000 milliLiter(s) (100 mL/Hr) IV Continuous <Continuous>  dextrose 50% Injectable 25 Gram(s) IV Push once  dextrose 50% Injectable 12.5 Gram(s) IV Push once  dextrose 50% Injectable 25 Gram(s) IV Push once  finasteride 5 milliGRAM(s) Oral daily  glucagon  Injectable 1 milliGRAM(s) IntraMuscular once  haloperidol     Tablet 1 milliGRAM(s) Oral daily  haloperidol     Tablet 2 milliGRAM(s) Oral at bedtime  insulin glargine Injectable (LANTUS) 22 Unit(s) SubCutaneous at bedtime  insulin lispro (ADMELOG) corrective regimen sliding scale   SubCutaneous three times a day before meals  insulin lispro Injectable (ADMELOG) 5 Unit(s) SubCutaneous three times a day before meals  labetalol 100 milliGRAM(s) Oral every 12 hours  LORazepam     Tablet 0.25 milliGRAM(s) Oral daily  LORazepam     Tablet 0.5 milliGRAM(s) Oral at bedtime  mirtazapine 15 milliGRAM(s) Oral at bedtime  pantoprazole    Tablet 40 milliGRAM(s) Oral before breakfast  sodium chloride 0.9%. 1000 milliLiter(s) (75 mL/Hr) IV Continuous <Continuous>  tamsulosin 0.4 milliGRAM(s) Oral at bedtime    	  	  LABS:	 	                       7.3    5.78  )-----------( 232      ( 10 Feb 2022 07:09 )             21.4     02-10    138  |  110<H>  |  32<H>  ----------------------------<  163<H>  3.6   |  21<L>  |  2.45<H>    Ca    8.4      10 Feb 2022 07:09

## 2022-02-10 NOTE — PROGRESS NOTE ADULT - PROBLEM SELECTOR PLAN 1
CX + confirmed ampicillin sensitive Entercoccus faecalis  patient recently discharged on augmentin PO but did not  meds  continue Augmentin Day 6 of 7

## 2022-02-10 NOTE — DIETITIAN INITIAL EVALUATION ADULT. - PERTINENT MEDS FT
MEDICATIONS:  acetaminophen     Tablet .. 650 every 6 hours PRN  amoxicillin  500 milliGRAM(s)/clavulanate 1 two times a day  apixaban 2.5 two times a day  atorvastatin 40 at bedtime  calcitriol   Capsule 0.5 daily  calcium carbonate    500 mG (Tums) Chewable 1 two times a day  dextrose 40% Gel 15 once  dextrose 5%. 1000 <Continuous>  dextrose 5%. 1000 <Continuous>  dextrose 50% Injectable 25 once  dextrose 50% Injectable 12.5 once  dextrose 50% Injectable 25 once  finasteride 5 daily  glucagon  Injectable 1 once  guaiFENesin Oral Liquid (Sugar-Free) 100 every 6 hours PRN  haloperidol     Tablet 1 daily  haloperidol     Tablet 2 at bedtime  haloperidol    Injectable 1 every 12 hours PRN  insulin glargine Injectable (LANTUS) 22 at bedtime  insulin lispro (ADMELOG) corrective regimen sliding scale  three times a day before meals  insulin lispro Injectable (ADMELOG) 5 three times a day before meals  labetalol 100 every 12 hours  LORazepam     Tablet 0.25 daily  LORazepam     Tablet 0.5 at bedtime  LORazepam   Injectable 0.5 two times a day PRN  meclizine 12.5 every 6 hours PRN  melatonin 3 at bedtime PRN  mirtazapine 15 at bedtime  ondansetron Injectable 4 every 8 hours PRN  pantoprazole    Tablet 40 before breakfast  sodium chloride 0.9%. 1000 <Continuous>  tamsulosin 0.4 at bedtime

## 2022-02-10 NOTE — PROGRESS NOTE ADULT - SUBJECTIVE AND OBJECTIVE BOX
NP Note discussed with  Primary Attending    Patient is a 81y old  Male who presents with a chief complaint of UTI (10 Feb 2022 11:34)      INTERVAL HPI/OVERNIGHT EVENTS: Patient seen and examined at bedside. Patient states he want to die, but does not have a plan. He denies homicidal ideation. Patient does not want to talk to me further regarding wanting to die. Patient on constant observation.     MEDICATIONS  (STANDING):  amoxicillin  500 milliGRAM(s)/clavulanate 1 Tablet(s) Oral two times a day  apixaban 2.5 milliGRAM(s) Oral two times a day  atorvastatin 40 milliGRAM(s) Oral at bedtime  calcitriol   Capsule 0.5 MICROGram(s) Oral daily  calcium carbonate    500 mG (Tums) Chewable 1 Tablet(s) Chew two times a day  dextrose 40% Gel 15 Gram(s) Oral once  dextrose 5%. 1000 milliLiter(s) (50 mL/Hr) IV Continuous <Continuous>  dextrose 5%. 1000 milliLiter(s) (100 mL/Hr) IV Continuous <Continuous>  dextrose 50% Injectable 25 Gram(s) IV Push once  dextrose 50% Injectable 12.5 Gram(s) IV Push once  dextrose 50% Injectable 25 Gram(s) IV Push once  finasteride 5 milliGRAM(s) Oral daily  glucagon  Injectable 1 milliGRAM(s) IntraMuscular once  haloperidol     Tablet 1 milliGRAM(s) Oral daily  haloperidol     Tablet 2 milliGRAM(s) Oral at bedtime  insulin glargine Injectable (LANTUS) 22 Unit(s) SubCutaneous at bedtime  insulin lispro (ADMELOG) corrective regimen sliding scale   SubCutaneous three times a day before meals  insulin lispro Injectable (ADMELOG) 5 Unit(s) SubCutaneous three times a day before meals  labetalol 100 milliGRAM(s) Oral every 12 hours  LORazepam     Tablet 0.25 milliGRAM(s) Oral daily  LORazepam     Tablet 0.5 milliGRAM(s) Oral at bedtime  mirtazapine 15 milliGRAM(s) Oral at bedtime  pantoprazole    Tablet 40 milliGRAM(s) Oral before breakfast  sodium chloride 0.9%. 1000 milliLiter(s) (75 mL/Hr) IV Continuous <Continuous>  tamsulosin 0.4 milliGRAM(s) Oral at bedtime    MEDICATIONS  (PRN):  acetaminophen     Tablet .. 650 milliGRAM(s) Oral every 6 hours PRN Temp greater or equal to 38C (100.4F), Mild Pain (1 - 3)  guaiFENesin Oral Liquid (Sugar-Free) 100 milliGRAM(s) Oral every 6 hours PRN Cough  haloperidol    Injectable 1 milliGRAM(s) IntraMuscular every 12 hours PRN Agitation  LORazepam   Injectable 0.5 milliGRAM(s) IV Push two times a day PRN Agitation  meclizine 12.5 milliGRAM(s) Oral every 6 hours PRN Dizziness  melatonin 3 milliGRAM(s) Oral at bedtime PRN Insomnia  ondansetron Injectable 4 milliGRAM(s) IV Push every 8 hours PRN Nausea and/or Vomiting      __________________________________________________  REVIEW OF SYSTEMS:    CONSTITUTIONAL: No fever,   EYES: no acute visual disturbances  NECK: No pain or stiffness  RESPIRATORY: No cough; No shortness of breath  CARDIOVASCULAR: No chest pain, no palpitations  GASTROINTESTINAL: No pain. No nausea or vomiting; No diarrhea   NEUROLOGICAL: No headache or numbness, no tremors  MUSCULOSKELETAL: No joint pain, no muscle pain  GENITOURINARY: no dysuria, no frequency, no hesitancy  PSYCHIATRY: depression , no anxiety  ALL OTHER  ROS negative        Vital Signs Last 24 Hrs  T(C): 36.6 (10 Feb 2022 13:09), Max: 37.2 (10 Feb 2022 05:13)  T(F): 97.8 (10 Feb 2022 13:09), Max: 98.9 (10 Feb 2022 05:13)  HR: 81 (10 Feb 2022 13:09) (81 - 99)  BP: 116/70 (10 Feb 2022 13:09) (110/63 - 136/80)  BP(mean): --  RR: 18 (10 Feb 2022 13:09) (18 - 20)  SpO2: 95% (10 Feb 2022 13:09) (95% - 95%)    ________________________________________________  PHYSICAL EXAM:  GENERAL: suicidal, no plan   HEENT: Normocephalic;  conjunctivae and sclerae clear; moist mucous membranes;   NECK : supple  CHEST/LUNG: Clear to auscultation bilaterally with good air entry   HEART: S1 S2  regular; no murmurs, gallops or rubs  ABDOMEN: Soft, Nontender, Nondistended; Bowel sounds present  EXTREMITIES: no cyanosis; no edema; no calf tenderness  SKIN: warm and dry; no rash  NERVOUS SYSTEM:  Awake and alert; Oriented  to person and place. confused, illogical, paranoid.    _________________________________________________  LABS:                        7.3    5.78  )-----------( 232      ( 10 Feb 2022 07:09 )             21.4     02-10    138  |  110<H>  |  32<H>  ----------------------------<  163<H>  3.6   |  21<L>  |  2.45<H>    Ca    8.4      10 Feb 2022 07:09    CAPILLARY BLOOD GLUCOSE    POCT Blood Glucose.: 232 mg/dL (10 Feb 2022 11:30)  POCT Blood Glucose.: 177 mg/dL (10 Feb 2022 07:20)  POCT Blood Glucose.: 246 mg/dL (09 Feb 2022 21:21)  POCT Blood Glucose.: 155 mg/dL (09 Feb 2022 17:07)        RADIOLOGY & ADDITIONAL TESTS:  < from: CT Head No Cont (01.27.22 @ 12:26) >    ACC: [id number] EXAM:  CT BRAIN                          PROCEDURE DATE:  01/27/2022          INTERPRETATION:  Noncontrast CT of the brain.    CLINICAL INDICATION:  Altered mental status    TECHNIQUE : Axial CT scanning of the brain was obtained fromthe skull   base to the vertex without the administration of intravenous contrast.   Sagittal and coronal reformats were provided.    COMPARISON: CT brain 12/23/2021    FINDINGS:    No hydrocephalus, mass effect, midline shift, acute intracranial   hemorrhage, or brain edema.    Mild white matter microvascular ischemic disease.    Visualized paranasal sinuses and mastoid air cells are clear.    IMPRESSION:    No hydrocephalus, acute intracranial hemorrhage, mass effect, or brain   edema.    --- End of Report ---    < end of copied text >    Imaging  Reviewed:  YES    Consultant(s) Notes Reviewed:   YES      Plan of care was discussed with patient and /or primary care giver; all questions and concerns were addressed

## 2022-02-10 NOTE — PROGRESS NOTE ADULT - SUBJECTIVE AND OBJECTIVE BOX
Interval Events:  pt in nad    Allergies    Allergy Status Unknown    Intolerances      Endocrine/Metabolic Medications:  atorvastatin 40 milliGRAM(s) Oral at bedtime  dextrose 40% Gel 15 Gram(s) Oral once  dextrose 50% Injectable 25 Gram(s) IV Push once  dextrose 50% Injectable 12.5 Gram(s) IV Push once  dextrose 50% Injectable 25 Gram(s) IV Push once  finasteride 5 milliGRAM(s) Oral daily  glucagon  Injectable 1 milliGRAM(s) IntraMuscular once  insulin glargine Injectable (LANTUS) 22 Unit(s) SubCutaneous at bedtime  insulin lispro (ADMELOG) corrective regimen sliding scale   SubCutaneous three times a day before meals  insulin lispro Injectable (ADMELOG) 5 Unit(s) SubCutaneous three times a day before meals      Vital Signs Last 24 Hrs  T(C): 37.2 (10 Feb 2022 05:13), Max: 37.2 (10 Feb 2022 05:13)  T(F): 98.9 (10 Feb 2022 05:13), Max: 98.9 (10 Feb 2022 05:13)  HR: 99 (10 Feb 2022 05:13) (91 - 99)  BP: 136/80 (10 Feb 2022 05:13) (110/63 - 136/80)  BP(mean): --  RR: 18 (10 Feb 2022 05:13) (18 - 20)  SpO2: 95% (10 Feb 2022 05:13) (95% - 95%)      PHYSICAL EXAM  All physical exam findings normal, except those marked:  General:	Alert, active, cooperative, NAD, well hydrated  .		[] Abnormal:  Neck		Normal: supple, no cervical adenopathy, no palpable thyroid  .		[] Abnormal:  Cardiovascular	Normal: regular rate, normal S1, S2, no murmurs  .		[] Abnormal:  Respiratory	Normal: no chest wall deformity, normal respiratory pattern, CTA B/L  .		[] Abnormal:  Abdominal	Normal: soft, ND, NT, bowel sounds present, no masses, no organomegaly  .		[] Abnormal:  		Normal normal genitalia, testes descended, circumcised/uncircumcised  .		Denice stage:			Breast denice:  .		Menstrual history:  .		[] Abnormal:  Extremities	Normal: FROM x4  .		[] Abnormal:  Skin		Normal: intact and not indurated, no rash, no acanthosis nigricans  .		[] Abnormal:  Neurologic	Normal: grossly intact  .		[] Abnormal:    LABS                        7.3    5.78  )-----------( 232      ( 10 Feb 2022 07:09 )             21.4                               138    |  110    |  32                  Calcium: 8.4   / iCa: x      (02-10 @ 07:09)    ----------------------------<  163       Magnesium: x                                3.6     |  21     |  2.45             Phosphorous: x          CAPILLARY BLOOD GLUCOSE      POCT Blood Glucose.: 177 mg/dL (10 Feb 2022 07:20)  POCT Blood Glucose.: 246 mg/dL (09 Feb 2022 21:21)  POCT Blood Glucose.: 155 mg/dL (09 Feb 2022 17:07)        Assesment/plan    80 yo M with PMH of DVT (on eliquis) , HTN, HLD, DM , multiple recent visits for UTI presents with confusion. . Found to have uncont dm. Pt states he takes oral dm meds as out pt with poorly cont dm. S/p recent hospitalization for uti/ uncont dm.      Problem/Recommendation - 1:  ·  Problem: Uncontrolled diabetes mellitus.   ·  Recommendation: due to acute illness and need for insulin tx  cont lantus 22 units  and admelog 5 ac tid- hold if poor po intake   fsg ac and hs  d/w prim team.     Problem/Recommendation - 2:  ·  Problem: UTI (urinary tract infection).   ·  Recommendation: cont iv abx  per prim team.

## 2022-02-11 ENCOUNTER — TRANSCRIPTION ENCOUNTER (OUTPATIENT)
Age: 82
End: 2022-02-11

## 2022-02-11 LAB
ANION GAP SERPL CALC-SCNC: 8 MMOL/L — SIGNIFICANT CHANGE UP (ref 5–17)
BUN SERPL-MCNC: 30 MG/DL — HIGH (ref 7–18)
CALCIUM SERPL-MCNC: 8.7 MG/DL — SIGNIFICANT CHANGE UP (ref 8.4–10.5)
CHLORIDE SERPL-SCNC: 109 MMOL/L — HIGH (ref 96–108)
CO2 SERPL-SCNC: 21 MMOL/L — LOW (ref 22–31)
CREAT SERPL-MCNC: 2.35 MG/DL — HIGH (ref 0.5–1.3)
GLUCOSE BLDC GLUCOMTR-MCNC: 122 MG/DL — HIGH (ref 70–99)
GLUCOSE BLDC GLUCOMTR-MCNC: 154 MG/DL — HIGH (ref 70–99)
GLUCOSE BLDC GLUCOMTR-MCNC: 178 MG/DL — HIGH (ref 70–99)
GLUCOSE BLDC GLUCOMTR-MCNC: 199 MG/DL — HIGH (ref 70–99)
GLUCOSE SERPL-MCNC: 178 MG/DL — HIGH (ref 70–99)
HCT VFR BLD CALC: 22.9 % — LOW (ref 39–50)
HGB BLD-MCNC: 7.6 G/DL — LOW (ref 13–17)
MCHC RBC-ENTMCNC: 28.4 PG — SIGNIFICANT CHANGE UP (ref 27–34)
MCHC RBC-ENTMCNC: 33.2 GM/DL — SIGNIFICANT CHANGE UP (ref 32–36)
MCV RBC AUTO: 85.4 FL — SIGNIFICANT CHANGE UP (ref 80–100)
NRBC # BLD: 0 /100 WBCS — SIGNIFICANT CHANGE UP (ref 0–0)
PLATELET # BLD AUTO: 261 K/UL — SIGNIFICANT CHANGE UP (ref 150–400)
POTASSIUM SERPL-MCNC: 3.8 MMOL/L — SIGNIFICANT CHANGE UP (ref 3.5–5.3)
POTASSIUM SERPL-SCNC: 3.8 MMOL/L — SIGNIFICANT CHANGE UP (ref 3.5–5.3)
RBC # BLD: 2.68 M/UL — LOW (ref 4.2–5.8)
RBC # FLD: 14.7 % — HIGH (ref 10.3–14.5)
SODIUM SERPL-SCNC: 138 MMOL/L — SIGNIFICANT CHANGE UP (ref 135–145)
WBC # BLD: 5.14 K/UL — SIGNIFICANT CHANGE UP (ref 3.8–10.5)
WBC # FLD AUTO: 5.14 K/UL — SIGNIFICANT CHANGE UP (ref 3.8–10.5)

## 2022-02-11 PROCEDURE — 99232 SBSQ HOSP IP/OBS MODERATE 35: CPT

## 2022-02-11 RX ORDER — SODIUM CHLORIDE 9 MG/ML
1000 INJECTION INTRAMUSCULAR; INTRAVENOUS; SUBCUTANEOUS
Refills: 0 | Status: DISCONTINUED | OUTPATIENT
Start: 2022-02-11 | End: 2022-02-23

## 2022-02-11 RX ORDER — HALOPERIDOL DECANOATE 100 MG/ML
0.5 INJECTION INTRAMUSCULAR DAILY
Refills: 0 | Status: DISCONTINUED | OUTPATIENT
Start: 2022-02-12 | End: 2022-02-16

## 2022-02-11 RX ADMIN — Medication 5 UNIT(S): at 12:03

## 2022-02-11 RX ADMIN — Medication 0.5 MILLIGRAM(S): at 18:56

## 2022-02-11 RX ADMIN — HALOPERIDOL DECANOATE 1 MILLIGRAM(S): 100 INJECTION INTRAMUSCULAR at 12:04

## 2022-02-11 RX ADMIN — APIXABAN 2.5 MILLIGRAM(S): 2.5 TABLET, FILM COATED ORAL at 06:22

## 2022-02-11 RX ADMIN — TAMSULOSIN HYDROCHLORIDE 0.4 MILLIGRAM(S): 0.4 CAPSULE ORAL at 21:30

## 2022-02-11 RX ADMIN — Medication 5 UNIT(S): at 08:00

## 2022-02-11 RX ADMIN — Medication 0.5 MILLIGRAM(S): at 21:30

## 2022-02-11 RX ADMIN — Medication 1 TABLET(S): at 17:27

## 2022-02-11 RX ADMIN — CALCITRIOL 0.5 MICROGRAM(S): 0.5 CAPSULE ORAL at 12:03

## 2022-02-11 RX ADMIN — FINASTERIDE 5 MILLIGRAM(S): 5 TABLET, FILM COATED ORAL at 12:03

## 2022-02-11 RX ADMIN — Medication 1: at 07:58

## 2022-02-11 RX ADMIN — Medication 1: at 12:01

## 2022-02-11 RX ADMIN — Medication 1 TABLET(S): at 06:22

## 2022-02-11 RX ADMIN — APIXABAN 2.5 MILLIGRAM(S): 2.5 TABLET, FILM COATED ORAL at 17:27

## 2022-02-11 RX ADMIN — HALOPERIDOL DECANOATE 2 MILLIGRAM(S): 100 INJECTION INTRAMUSCULAR at 21:30

## 2022-02-11 RX ADMIN — PANTOPRAZOLE SODIUM 40 MILLIGRAM(S): 20 TABLET, DELAYED RELEASE ORAL at 06:22

## 2022-02-11 RX ADMIN — MIRTAZAPINE 15 MILLIGRAM(S): 45 TABLET, ORALLY DISINTEGRATING ORAL at 21:30

## 2022-02-11 RX ADMIN — SODIUM CHLORIDE 75 MILLILITER(S): 9 INJECTION INTRAMUSCULAR; INTRAVENOUS; SUBCUTANEOUS at 21:31

## 2022-02-11 RX ADMIN — Medication 1 TABLET(S): at 06:23

## 2022-02-11 RX ADMIN — Medication 100 MILLIGRAM(S): at 06:23

## 2022-02-11 RX ADMIN — INSULIN GLARGINE 22 UNIT(S): 100 INJECTION, SOLUTION SUBCUTANEOUS at 21:29

## 2022-02-11 RX ADMIN — Medication 0.25 MILLIGRAM(S): at 12:11

## 2022-02-11 RX ADMIN — Medication 5 UNIT(S): at 17:25

## 2022-02-11 RX ADMIN — SODIUM CHLORIDE 75 MILLILITER(S): 9 INJECTION INTRAMUSCULAR; INTRAVENOUS; SUBCUTANEOUS at 17:28

## 2022-02-11 RX ADMIN — ATORVASTATIN CALCIUM 40 MILLIGRAM(S): 80 TABLET, FILM COATED ORAL at 21:30

## 2022-02-11 RX ADMIN — HALOPERIDOL DECANOATE 1 MILLIGRAM(S): 100 INJECTION INTRAMUSCULAR at 18:53

## 2022-02-11 NOTE — PROGRESS NOTE ADULT - SUBJECTIVE AND OBJECTIVE BOX
Interval Events:  pt in nad    Allergies    Allergy Status Unknown    Intolerances      Endocrine/Metabolic Medications:  atorvastatin 40 milliGRAM(s) Oral at bedtime  dextrose 40% Gel 15 Gram(s) Oral once  dextrose 50% Injectable 25 Gram(s) IV Push once  dextrose 50% Injectable 12.5 Gram(s) IV Push once  dextrose 50% Injectable 25 Gram(s) IV Push once  finasteride 5 milliGRAM(s) Oral daily  glucagon  Injectable 1 milliGRAM(s) IntraMuscular once  insulin glargine Injectable (LANTUS) 22 Unit(s) SubCutaneous at bedtime  insulin lispro (ADMELOG) corrective regimen sliding scale   SubCutaneous three times a day before meals  insulin lispro Injectable (ADMELOG) 5 Unit(s) SubCutaneous three times a day before meals      Vital Signs Last 24 Hrs  T(C): 36.6 (11 Feb 2022 13:56), Max: 36.6 (10 Feb 2022 17:31)  T(F): 97.8 (11 Feb 2022 13:56), Max: 97.8 (10 Feb 2022 17:31)  HR: 91 (11 Feb 2022 13:56) (82 - 94)  BP: 107/67 (11 Feb 2022 13:56) (107/67 - 130/76)  BP(mean): --  RR: 20 (11 Feb 2022 13:56) (18 - 20)  SpO2: 99% (11 Feb 2022 13:56) (93% - 99%)      PHYSICAL EXAM  All physical exam findings normal, except those marked:  General:	Alert, active, cooperative, NAD, well hydrated  .		[] Abnormal:  Neck		Normal: supple, no cervical adenopathy, no palpable thyroid  .		[] Abnormal:  Cardiovascular	Normal: regular rate, normal S1, S2, no murmurs  .		[] Abnormal:  Respiratory	Normal: no chest wall deformity, normal respiratory pattern, CTA B/L  .		[] Abnormal:  Abdominal	Normal: soft, ND, NT, bowel sounds present, no masses, no organomegaly  .		[] Abnormal:  		Normal normal genitalia, testes descended, circumcised/uncircumcised  .		Denice stage:			Breast denice:  .		Menstrual history:  .		[] Abnormal:  Extremities	Normal: FROM x4  .		[] Abnormal:  Skin		Normal: intact and not indurated, no rash, no acanthosis nigricans  .		[] Abnormal:  Neurologic	Normal: grossly intact  .		[] Abnormal:    LABS                        7.6    5.14  )-----------( 261      ( 11 Feb 2022 07:27 )             22.9                               138    |  109    |  30                  Calcium: 8.7   / iCa: x      (02-11 @ 07:27)    ----------------------------<  178       Magnesium: x                                3.8     |  21     |  2.35             Phosphorous: x          CAPILLARY BLOOD GLUCOSE      POCT Blood Glucose.: 199 mg/dL (11 Feb 2022 11:23)  POCT Blood Glucose.: 154 mg/dL (11 Feb 2022 07:51)  POCT Blood Glucose.: 186 mg/dL (10 Feb 2022 21:52)  POCT Blood Glucose.: 220 mg/dL (10 Feb 2022 20:37)  POCT Blood Glucose.: 219 mg/dL (10 Feb 2022 16:52)        Assesment/plan    82 yo M with PMH of DVT (on eliquis) , HTN, HLD, DM , multiple recent visits for UTI presents with confusion. . Found to have uncont dm. Pt states he takes oral dm meds as out pt with poorly cont dm. S/p recent hospitalization for uti/ uncont dm.      Problem/Recommendation - 1:  ·  Problem: Uncontrolled diabetes mellitus.   ·  Recommendation: due to acute illness and need for insulin tx  cont lantus 22 units  and admelog 5 ac tid- hold if poor po intake   fsg ac and hs  d/w prim team.     Problem/Recommendation - 2:  ·  Problem: UTI (urinary tract infection).   ·  Recommendation: cont iv abx  per prim team.

## 2022-02-11 NOTE — PROGRESS NOTE ADULT - ASSESSMENT
Patient is a 81 year old Male from home with PMH of DVT (on eliquis) , HTN, HLD, DM, recently discharged from [facility] ED seen and discharged home with PO ABX. Patient presents to the ED with AMS, and agitation. UCX confirms Ampicillin sensitive enterococcus faecalis started on Unasyn. Patient admitted to medicine for enterococcus faecalis  UTI, hospital course complicated by worsening dementia with behavioral disturbances. Also expressing thoughts of self harm and suicide. Placed on 1:1 for safety, psych consulted- reccs appreciated. Emergency contact updated on plan of care, she shared her concern for patient's living situation and would like him to be placed in [facility] on admission giving his cognitive decline. SW to follow

## 2022-02-11 NOTE — PROGRESS NOTE ADULT - ASSESSMENT
80 yo M with PMH of DVT (on eliquis) , HTN, HLD, DM , multiple recent visits for UTI presents with confusion,depression and suicidal ideation.  1.UTI-ABX.  2.DM-Insulin,Endo f/u.  3.Psych f/u appreciated,cont haldol and ativan.  4.DVT-eliquis.  5.CRI-f/u lytes.Renal f/u.  6.HTN-cont bp medication.  7.Lipid d/o-statin.  8.PPI.

## 2022-02-11 NOTE — DISCHARGE NOTE PROVIDER - CARE PROVIDER_API CALL
Bj Banks  CRITICAL CARE MEDICINE  [address] [facility], [address]  [place] [address]  Phone: [phone]  Fax: [phone]  Follow Up Time:

## 2022-02-11 NOTE — DISCHARGE NOTE PROVIDER - NSDCMRMEDTOKEN_GEN_ALL_CORE_FT
amoxicillin-clavulanate 875 mg-125 mg oral tablet: 1 tab(s) orally 2 times a day from 1/4-1/11  apixaban 2.5 mg oral tablet: 1 tab(s) orally every 12 hours  calcitriol 0.5 mcg oral capsule: 1 cap(s) orally once a day  calcium carbonate 500 mg (200 mg elemental calcium) oral tablet, chewable: 1 tab(s) chewed 2 times a day   ergocalciferol 1.25 mg (50,000 intl units) oral capsule: 1 cap(s) orally once a week EVERY THURSDAY  finasteride 5 mg oral tablet: 1 tab(s) orally once a day  glipiZIDE 5 mg oral tablet: 1 tab(s) orally 2 times a day   labetalol 200 mg oral tablet: 1 tab(s) orally every 12 hours  meclizine 12.5 mg oral tablet: 1 tab(s) orally every 6 hours, As needed, Dizziness  mirtazapine 7.5 mg oral tablet: 1 tab(s) orally once a day (at bedtime)  omeprazole 20 mg capsule,delayed release:   pravastatin 40 mg tablet: 1 tab(s) orally once a day (at bedtime)  tamsulosin 0.4 mg oral capsule: 1 cap(s) orally once a day (at bedtime)   acetaminophen 325 mg oral tablet: 2 tab(s) orally every 6 hours, As needed, Temp greater or equal to 38C (100.4F), Mild Pain (1 - 3)  apixaban 2.5 mg oral tablet: 1 tab(s) orally 2 times a day  atorvastatin 40 mg oral tablet: 1 tab(s) orally once a day (at bedtime)  calcitriol 0.5 mcg oral capsule: 1 cap(s) orally once a day  calcium carbonate 500 mg (200 mg elemental calcium) oral tablet, chewable: 1 tab(s) orally 2 times a day  finasteride 5 mg oral tablet: 1 tab(s) orally once a day  insulin glargine: 20 unit(s) subcutaneous once a day (at bedtime)  insulin lispro 100 units/mL injectable solution: 4 unit(s) injectable 3 times a day (before meals)  labetalol 200 mg oral tablet: 1 tab(s) orally every 12 hours  meclizine 12.5 mg oral tablet: 1 tab(s) orally every 6 hours, As needed, Dizziness  melatonin 3 mg oral tablet: 1 tab(s) orally once a day (at bedtime), As needed, Insomnia  mirtazapine 15 mg oral tablet: 1 tab(s) orally once a day (at bedtime)  pantoprazole 40 mg oral delayed release tablet: 1 tab(s) orally once a day (before a meal)  QUEtiapine 50 mg oral tablet: 1 tab(s) orally once a day (at bedtime)  tamsulosin 0.4 mg oral capsule: 1 cap(s) orally once a day (at bedtime)   acetaminophen 325 mg oral tablet: 2 tab(s) orally every 6 hours, As needed, Temp greater or equal to 38C (100.4F), Mild Pain (1 - 3)  apixaban 2.5 mg oral tablet: 1 tab(s) orally 2 times a day  atorvastatin 40 mg oral tablet: 1 tab(s) orally once a day (at bedtime)  calcitriol 0.5 mcg oral capsule: 1 cap(s) orally once a day  calcium carbonate 500 mg (200 mg elemental calcium) oral tablet, chewable: 1 tab(s) orally 2 times a day  finasteride 5 mg oral tablet: 1 tab(s) orally once a day  insulin glargine: 20 unit(s) subcutaneous once a day (at bedtime)  insulin lispro 100 units/mL injectable solution: 4 unit(s) injectable 3 times a day (before meals)  labetalol 100 mg oral tablet: 1 tab(s) orally every 12 hours  meclizine 12.5 mg oral tablet: 1 tab(s) orally every 6 hours, As needed, Dizziness  melatonin 3 mg oral tablet: 1 tab(s) orally once a day (at bedtime), As needed, Insomnia  mirtazapine 15 mg oral tablet: 1 tab(s) orally once a day (at bedtime)  pantoprazole 40 mg oral delayed release tablet: 1 tab(s) orally once a day (before a meal)  QUEtiapine 50 mg oral tablet: 1 tab(s) orally once a day (at bedtime)  tamsulosin 0.4 mg oral capsule: 1 cap(s) orally once a day (at bedtime)

## 2022-02-11 NOTE — PROGRESS NOTE ADULT - PROBLEM SELECTOR PLAN 2
Haldol 1-->2mg PO two times a day  Ativan 0.25-->0.5mg two times a teri  PRN Haldol 2-->1mg IM q12 hrs  PRN Ativan 0.5-->0.25mg IV q12hrs  continue observation   pending Psych clearance   psych Dr. Mead

## 2022-02-11 NOTE — PROGRESS NOTE ADULT - SUBJECTIVE AND OBJECTIVE BOX
CHIEF COMPLAINT:Patient is a 81y old  Male who presents with a chief complaint of UTI.Pt appears comfortable.    	  REVIEW OF SYSTEMS:  CONSTITUTIONAL: No fever, weight loss, or fatigue  EYES: No eye pain, visual disturbances, or discharge  ENT:  No difficulty hearing, tinnitus, vertigo; No sinus or throat pain  NECK: No pain or stiffness  RESPIRATORY: No cough, wheezing, chills or hemoptysis; No Shortness of Breath  CARDIOVASCULAR: No chest pain, palpitations, passing out, dizziness, or leg swelling  GASTROINTESTINAL: No abdominal or epigastric pain. No nausea, vomiting, or hematemesis; No diarrhea or constipation. No melena or hematochezia.  GENITOURINARY: No dysuria, frequency, hematuria, or incontinence  NEUROLOGICAL: No headaches, memory loss, loss of strength, numbness, or tremors  SKIN: No itching, burning, rashes, or lesions   LYMPH Nodes: No enlarged glands  ENDOCRINE: No heat or cold intolerance; No hair loss  MUSCULOSKELETAL: No joint pain or swelling; No muscle, back, or extremity pain  PSYCHIATRIC: No depression, anxiety, mood swings, or difficulty sleeping  HEME/LYMPH: No easy bruising, or bleeding gums  ALLERGY AND IMMUNOLOGIC: No hives or eczema	        PHYSICAL EXAM:  T(C): 36.4 (02-11-22 @ 05:01), Max: 36.6 (02-10-22 @ 13:09)  HR: 82 (02-11-22 @ 05:01) (81 - 94)  BP: 120/66 (02-11-22 @ 05:01) (116/70 - 130/76)  RR: 18 (02-11-22 @ 05:01) (18 - 18)  SpO2: 93% (02-11-22 @ 05:01) (93% - 95%)  Wt(kg): --  I&O's Summary      Appearance: Normal	  HEENT:   Normal oral mucosa, PERRL, EOMI	  Lymphatic: No lymphadenopathy  Cardiovascular: Normal S1 S2, No JVD, No murmurs, No edema  Respiratory: Lungs clear to auscultation	  Gastrointestinal:  Soft, Non-tender, + BS	  Skin: No rashes, No ecchymoses, No cyanosis	  Neurologic: Non-focal  Extremities: Normal range of motion, No clubbing, cyanosis or edema  Vascular: Peripheral pulses palpable 2+ bilaterally    MEDICATIONS  (STANDING):  amoxicillin  500 milliGRAM(s)/clavulanate 1 Tablet(s) Oral two times a day  apixaban 2.5 milliGRAM(s) Oral two times a day  atorvastatin 40 milliGRAM(s) Oral at bedtime  calcitriol   Capsule 0.5 MICROGram(s) Oral daily  calcium carbonate    500 mG (Tums) Chewable 1 Tablet(s) Chew two times a day  dextrose 40% Gel 15 Gram(s) Oral once  dextrose 5%. 1000 milliLiter(s) (50 mL/Hr) IV Continuous <Continuous>  dextrose 5%. 1000 milliLiter(s) (100 mL/Hr) IV Continuous <Continuous>  dextrose 50% Injectable 25 Gram(s) IV Push once  dextrose 50% Injectable 12.5 Gram(s) IV Push once  dextrose 50% Injectable 25 Gram(s) IV Push once  finasteride 5 milliGRAM(s) Oral daily  glucagon  Injectable 1 milliGRAM(s) IntraMuscular once  haloperidol     Tablet 1 milliGRAM(s) Oral daily  haloperidol     Tablet 2 milliGRAM(s) Oral at bedtime  insulin glargine Injectable (LANTUS) 22 Unit(s) SubCutaneous at bedtime  insulin lispro (ADMELOG) corrective regimen sliding scale   SubCutaneous three times a day before meals  insulin lispro Injectable (ADMELOG) 5 Unit(s) SubCutaneous three times a day before meals  labetalol 100 milliGRAM(s) Oral every 12 hours  LORazepam     Tablet 0.25 milliGRAM(s) Oral daily  LORazepam     Tablet 0.5 milliGRAM(s) Oral at bedtime  mirtazapine 15 milliGRAM(s) Oral at bedtime  pantoprazole    Tablet 40 milliGRAM(s) Oral before breakfast  sodium chloride 0.9%. 1000 milliLiter(s) (75 mL/Hr) IV Continuous <Continuous>  tamsulosin 0.4 milliGRAM(s) Oral at bedtime      	  	  LABS:	 	                      7.6    5.14  )-----------( 261      ( 11 Feb 2022 07:27 )             22.9     02-11    138  |  109<H>  |  30<H>  ----------------------------<  178<H>  3.8   |  21<L>  |  2.35<H>    Ca    8.7      11 Feb 2022 07:27

## 2022-02-11 NOTE — DISCHARGE NOTE PROVIDER - NSDCCPCAREPLAN_GEN_ALL_CORE_FT
PRINCIPAL DISCHARGE DIAGNOSIS  Diagnosis: Alzheimer's dementia with behavioral disturbance  Assessment and Plan of Treatment: While your were in the hospital you were seen by a Psychologist.  You have progressive alzheimers dementia.  It's important to maintain a safe environment.    HOME CARE INSTRUCTIONS  The care of individuals with dementia is varied and dependent upon the progression of the dementia. The following suggestions are intended for the person living with, or caring for, the person with dementia.  Create a safe environment.  Remove the locks on bathroom doors to prevent the person from accidentally locking himself or herself in.  Use childproof latches on kitchen cabinets and any place where cleaning supplies, chemicals, or alcohol are kept.  Use childproof covers in unused electrical outlets.  Install childproof devices to keep doors and windows secured.  Remove stove knobs or install safety knobs and an automatic shut-off on the stove.  Lower the temperature on water heaters.  Label medicines and keep them locked up.  Secure knives, lighters, matches, power tools, and guns, and keep these items out of reach.  Keep the house free from clutter. Remove rugs or anything that might contribute to a fall.  Remove objects that might break and hurt the person.  Make sure lighting is good, both inside and outside.  Install grab rails as needed.  Use a monitoring device to alert you to falls or other needs for help.   Reduce confusion.  Keep familiar objects and people around.  Use night lights or dim lights at night.  Label items or areas.  Use reminders, notes, or directions for daily activities or tasks.Keep a simple, consistent routine for waking, meals, bathing, dressing, and bedtime  Create a calm, quiet environment.  Place large clocks and calendars prominently.  Display emergency numbers and home address near all telephones..  Have a consistent nighttime routine.  Ensure a regular walking or physical activity schedule. Involve the person in daily activities as much as possible.  Limit napping during the day.  Limit caffeine.  Attend social events that stimulate rather than overwhelm the senses.  Encourage      SECONDARY DISCHARGE DIAGNOSES  Diagnosis: UTI (urinary tract infection)  Assessment and Plan of Treatment: On admission your urine was positive for entercoccus faecalis.  You were treated with the antibiotic Augmentin and complted this antibiotic treatment in the hospital.    HOME CARE INSTRUCTIONS  Drink enough water and fluids to keep your urine clear or pale yellow.  Avoid caffeine, tea, and carbonated beverages. They tend to irritate your bladder.  Empty your bladder often. Avoid holding urine for long periods of time.  Empty your bladder before and after sexual intercourse.  After a bowel movement, women should cleanse from front to back. Use each tissue only once.  SEEK MEDICAL CARE IF:  You have back pain.  You develop a fever.  Your symptoms do not begin to resolve within 3 days.  SEEK IMMEDIATE MEDICAL CARE IF:  You have severe back pain or lower abdominal pain.  You develop chills.  You have nausea or vomiting.  You have continued burning or discomfort with urination.      Diagnosis: Diabetes  Assessment and Plan of Treatment: Your last A1c= 7.5 and reflects your diabetes is uncontrolled.    While you were in the hospital you were seen by an Endocrinoloigst.   You have diabetes and its important to monitor blood sugar. It is recommended that you monitore your blood sugar 3 times a day one to two hours after eating.  Record your blood sugar numbers in a log to discuss with your Endocrinologist or PCP.  Its important to prevent low blood sugar as well as high blood sugar.  It is also recomened that you practice foot care by inspecting your feet daily.  Keep your feet clean and dry.  Your shoes to prevent your feet from cuts or scrapes.  If your blood sugar is not controlled it could lead to eye disease, kidney disease, or nerve damage.  Please take your medication as prescribed to manage your blood sugar.

## 2022-02-11 NOTE — DISCHARGE NOTE PROVIDER - HOSPITAL COURSE
Patient is a 81 year old Male from home with PMH of DVT (on eliquis) , HTN, HLD, DM, recently discharged from [facility] ED seen and discharged home with PO ABX. Patient presents to the ED with AMS, and agitation. UCX confirms Ampicillin sensitive enterococcus faecalis started on Unasyn. Patient admitted to medicine for enterococcus faecalis  UTI, hospital course complicated by worsening dementia with behavioral disturbances. Also expressing thoughts of self harm and suicide. Placed on 1:1 for safety, psych consulted- reccs appreciated. Emergency contact updated on plan of care, she shared her concern for patient's living situation and would like him to be placed in [facility] on admission giving his cognitive decline. SW to follow     Incomplete 80yo Male from home with PMH of DVT (on Eliquis), HTN, HLD, DM, recently discharged from [facility] ED seen and discharged home with PO ABX.  Presented to the ED with AMS, and agitation.  Admitted for enterococcus faecalis treated w/ Unasyn.  However, hospital course complicated by worsening dementia with behavioral disturbances.    THIS IS A SUMMARY OF EVENTS.  FOR A FULL HOSPITAL COURSE PLEASE SEE CHART.

## 2022-02-11 NOTE — BH CONSULTATION LIAISON PROGRESS NOTE - NSBHCONSULTRECOMMENDOTHER_PSY_A_CORE FT
1. Change standing regimen to Haldol 0..5 mg PO [reduced] plus Ativan 0.25 mg PO qam/Haldol 2 mg PO plus Ativan 0.5 mg PO qhs for delirium/behavioral disturbance  2. For acute agitation, recommend Haldol 1 mg plus Ativan 0.5 mg IV or IM q12h PRN agitation  3. No indication for other standing or PRN psychotropic medications at this time  4. Medical management as directed by primary team  5. Psychiatry will continue to follow  6. Pt does NOT appear to have capacity to care for self safely at home or to make dispo decisions, due to progressive dementia  --HCP/POA Mishel Avila should be approached for placement preferences and consents  7. Case d/w NP Michelle of primary team    Tammy Mead MD  Director, Consultation-Liaison Psychiatry Service  [id number]

## 2022-02-11 NOTE — PROGRESS NOTE ADULT - PROBLEM SELECTOR PLAN 1
CX + confirmed ampicillin sensitive Entercoccus faecalis  patient recently discharged on augmentin PO but did not  meds  continue Augmentin Day 7 of 7, last dose today

## 2022-02-11 NOTE — PROGRESS NOTE ADULT - SUBJECTIVE AND OBJECTIVE BOX
NP Note discussed with  Primary Attending    Patient is a 81y old  Male who presents with a chief complaint of UTI (11 Feb 2022 10:46)      INTERVAL HPI/OVERNIGHT EVENTS: Patient seen and examined at bedside.    MEDICATIONS  (STANDING):  amoxicillin  500 milliGRAM(s)/clavulanate 1 Tablet(s) Oral two times a day  apixaban 2.5 milliGRAM(s) Oral two times a day  atorvastatin 40 milliGRAM(s) Oral at bedtime  calcitriol   Capsule 0.5 MICROGram(s) Oral daily  calcium carbonate    500 mG (Tums) Chewable 1 Tablet(s) Chew two times a day  dextrose 40% Gel 15 Gram(s) Oral once  dextrose 5%. 1000 milliLiter(s) (50 mL/Hr) IV Continuous <Continuous>  dextrose 5%. 1000 milliLiter(s) (100 mL/Hr) IV Continuous <Continuous>  dextrose 50% Injectable 25 Gram(s) IV Push once  dextrose 50% Injectable 12.5 Gram(s) IV Push once  dextrose 50% Injectable 25 Gram(s) IV Push once  finasteride 5 milliGRAM(s) Oral daily  glucagon  Injectable 1 milliGRAM(s) IntraMuscular once  haloperidol     Tablet 2 milliGRAM(s) Oral at bedtime  insulin glargine Injectable (LANTUS) 22 Unit(s) SubCutaneous at bedtime  insulin lispro (ADMELOG) corrective regimen sliding scale   SubCutaneous three times a day before meals  insulin lispro Injectable (ADMELOG) 5 Unit(s) SubCutaneous three times a day before meals  labetalol 100 milliGRAM(s) Oral every 12 hours  LORazepam     Tablet 0.25 milliGRAM(s) Oral daily  LORazepam     Tablet 0.5 milliGRAM(s) Oral at bedtime  mirtazapine 15 milliGRAM(s) Oral at bedtime  pantoprazole    Tablet 40 milliGRAM(s) Oral before breakfast  sodium chloride 0.9%. 1000 milliLiter(s) (75 mL/Hr) IV Continuous <Continuous>  tamsulosin 0.4 milliGRAM(s) Oral at bedtime    MEDICATIONS  (PRN):  acetaminophen     Tablet .. 650 milliGRAM(s) Oral every 6 hours PRN Temp greater or equal to 38C (100.4F), Mild Pain (1 - 3)  guaiFENesin Oral Liquid (Sugar-Free) 100 milliGRAM(s) Oral every 6 hours PRN Cough  haloperidol    Injectable 1 milliGRAM(s) IntraMuscular every 12 hours PRN Agitation  LORazepam   Injectable 0.5 milliGRAM(s) IV Push two times a day PRN Agitation  meclizine 12.5 milliGRAM(s) Oral every 6 hours PRN Dizziness  melatonin 3 milliGRAM(s) Oral at bedtime PRN Insomnia  ondansetron Injectable 4 milliGRAM(s) IV Push every 8 hours PRN Nausea and/or Vomiting      __________________________________________________  REVIEW OF SYSTEMS:    unable to assess poor       Vital Signs Last 24 Hrs  T(C): 36.6 (11 Feb 2022 13:56), Max: 36.6 (10 Feb 2022 17:31)  T(F): 97.8 (11 Feb 2022 13:56), Max: 97.8 (10 Feb 2022 17:31)  HR: 91 (11 Feb 2022 13:56) (82 - 94)  BP: 107/67 (11 Feb 2022 13:56) (107/67 - 130/76)  BP(mean): --  RR: 20 (11 Feb 2022 13:56) (18 - 20)  SpO2: 99% (11 Feb 2022 13:56) (93% - 99%)    ________________________________________________  PHYSICAL EXAM:  GENERAL: NAD  HEENT: Normocephalic;  conjunctivae and sclerae clear; moist mucous membranes;   NECK : supple  CHEST/LUNG: Clear to auscultation bilaterally with good air entry   HEART: S1 S2  regular; no murmurs, gallops or rubs  ABDOMEN: Soft, Nontender, Nondistended; Bowel sounds present  EXTREMITIES: no cyanosis; no edema; no calf tenderness  SKIN: warm and dry; no rash  NERVOUS SYSTEM:  Awake and alert; Oriented  to place, person and time ; no new deficits    _________________________________________________  LABS:                        7.6    5.14  )-----------( 261      ( 11 Feb 2022 07:27 )             22.9     02-11    138  |  109<H>  |  30<H>  ----------------------------<  178<H>  3.8   |  21<L>  |  2.35<H>    Ca    8.7      11 Feb 2022 07:27      CAPILLARY BLOOD GLUCOSE      POCT Blood Glucose.: 122 mg/dL (11 Feb 2022 16:48)  POCT Blood Glucose.: 199 mg/dL (11 Feb 2022 11:23)  POCT Blood Glucose.: 154 mg/dL (11 Feb 2022 07:51)  POCT Blood Glucose.: 186 mg/dL (10 Feb 2022 21:52)  POCT Blood Glucose.: 220 mg/dL (10 Feb 2022 20:37)  POCT Blood Glucose.: 219 mg/dL (10 Feb 2022 16:52)        RADIOLOGY & ADDITIONAL TESTS:  < from: CT Head No Cont (01.27.22 @ 12:26) >    ACC: [id number] EXAM:  CT BRAIN                          PROCEDURE DATE:  01/27/2022          INTERPRETATION:  Noncontrast CT of the brain.    CLINICAL INDICATION:  Altered mental status    TECHNIQUE : Axial CT scanning of the brain was obtained fromthe skull   base to the vertex without the administration of intravenous contrast.   Sagittal and coronal reformats were provided.    COMPARISON: CT brain 12/23/2021    FINDINGS:    No hydrocephalus, mass effect, midline shift, acute intracranial   hemorrhage, or brain edema.    Mild white matter microvascular ischemic disease.    Visualized paranasal sinuses and mastoid air cells are clear.    IMPRESSION:    No hydrocephalus, acute intracranial hemorrhage, mass effect, or brain   edema.    --- End of Report ---    < end of copied text >      Imaging  Reviewed:  YES    Consultant(s) Notes Reviewed:   YES      Plan of care was discussed with patient and /or primary care giver; all questions and concerns were addressed

## 2022-02-12 LAB
ANION GAP SERPL CALC-SCNC: 9 MMOL/L — SIGNIFICANT CHANGE UP (ref 5–17)
BUN SERPL-MCNC: 34 MG/DL — HIGH (ref 7–18)
CALCIUM SERPL-MCNC: 8.6 MG/DL — SIGNIFICANT CHANGE UP (ref 8.4–10.5)
CHLORIDE SERPL-SCNC: 110 MMOL/L — HIGH (ref 96–108)
CO2 SERPL-SCNC: 20 MMOL/L — LOW (ref 22–31)
CREAT SERPL-MCNC: 2.51 MG/DL — HIGH (ref 0.5–1.3)
GLUCOSE BLDC GLUCOMTR-MCNC: 113 MG/DL — HIGH (ref 70–99)
GLUCOSE BLDC GLUCOMTR-MCNC: 179 MG/DL — HIGH (ref 70–99)
GLUCOSE BLDC GLUCOMTR-MCNC: 217 MG/DL — HIGH (ref 70–99)
GLUCOSE BLDC GLUCOMTR-MCNC: 243 MG/DL — HIGH (ref 70–99)
GLUCOSE BLDC GLUCOMTR-MCNC: 98 MG/DL — SIGNIFICANT CHANGE UP (ref 70–99)
GLUCOSE SERPL-MCNC: 182 MG/DL — HIGH (ref 70–99)
HCT VFR BLD CALC: 23.7 % — LOW (ref 39–50)
HGB BLD-MCNC: 7.8 G/DL — LOW (ref 13–17)
MCHC RBC-ENTMCNC: 28.7 PG — SIGNIFICANT CHANGE UP (ref 27–34)
MCHC RBC-ENTMCNC: 32.9 GM/DL — SIGNIFICANT CHANGE UP (ref 32–36)
MCV RBC AUTO: 87.1 FL — SIGNIFICANT CHANGE UP (ref 80–100)
NRBC # BLD: 0 /100 WBCS — SIGNIFICANT CHANGE UP (ref 0–0)
PLATELET # BLD AUTO: 281 K/UL — SIGNIFICANT CHANGE UP (ref 150–400)
POTASSIUM SERPL-MCNC: 3.9 MMOL/L — SIGNIFICANT CHANGE UP (ref 3.5–5.3)
POTASSIUM SERPL-SCNC: 3.9 MMOL/L — SIGNIFICANT CHANGE UP (ref 3.5–5.3)
RBC # BLD: 2.72 M/UL — LOW (ref 4.2–5.8)
RBC # FLD: 14.6 % — HIGH (ref 10.3–14.5)
SODIUM SERPL-SCNC: 139 MMOL/L — SIGNIFICANT CHANGE UP (ref 135–145)
WBC # BLD: 5.4 K/UL — SIGNIFICANT CHANGE UP (ref 3.8–10.5)
WBC # FLD AUTO: 5.4 K/UL — SIGNIFICANT CHANGE UP (ref 3.8–10.5)

## 2022-02-12 RX ADMIN — TAMSULOSIN HYDROCHLORIDE 0.4 MILLIGRAM(S): 0.4 CAPSULE ORAL at 23:00

## 2022-02-12 RX ADMIN — SODIUM CHLORIDE 75 MILLILITER(S): 9 INJECTION INTRAMUSCULAR; INTRAVENOUS; SUBCUTANEOUS at 05:53

## 2022-02-12 RX ADMIN — SODIUM CHLORIDE 75 MILLILITER(S): 9 INJECTION INTRAMUSCULAR; INTRAVENOUS; SUBCUTANEOUS at 07:52

## 2022-02-12 RX ADMIN — Medication 5 UNIT(S): at 17:22

## 2022-02-12 RX ADMIN — Medication 100 MILLIGRAM(S): at 17:20

## 2022-02-12 RX ADMIN — Medication 1 TABLET(S): at 17:20

## 2022-02-12 RX ADMIN — FINASTERIDE 5 MILLIGRAM(S): 5 TABLET, FILM COATED ORAL at 11:23

## 2022-02-12 RX ADMIN — HALOPERIDOL DECANOATE 1 MILLIGRAM(S): 100 INJECTION INTRAMUSCULAR at 09:59

## 2022-02-12 RX ADMIN — Medication 1 TABLET(S): at 05:53

## 2022-02-12 RX ADMIN — CALCITRIOL 0.5 MICROGRAM(S): 0.5 CAPSULE ORAL at 11:23

## 2022-02-12 RX ADMIN — Medication 1 TABLET(S): at 17:23

## 2022-02-12 RX ADMIN — APIXABAN 2.5 MILLIGRAM(S): 2.5 TABLET, FILM COATED ORAL at 17:20

## 2022-02-12 RX ADMIN — Medication 3 MILLIGRAM(S): at 23:08

## 2022-02-12 RX ADMIN — ATORVASTATIN CALCIUM 40 MILLIGRAM(S): 80 TABLET, FILM COATED ORAL at 23:00

## 2022-02-12 RX ADMIN — Medication 1: at 07:49

## 2022-02-12 RX ADMIN — PANTOPRAZOLE SODIUM 40 MILLIGRAM(S): 20 TABLET, DELAYED RELEASE ORAL at 05:54

## 2022-02-12 RX ADMIN — Medication 100 MILLIGRAM(S): at 05:54

## 2022-02-12 RX ADMIN — INSULIN GLARGINE 22 UNIT(S): 100 INJECTION, SOLUTION SUBCUTANEOUS at 23:02

## 2022-02-12 RX ADMIN — HALOPERIDOL DECANOATE 2 MILLIGRAM(S): 100 INJECTION INTRAMUSCULAR at 22:59

## 2022-02-12 RX ADMIN — Medication 5 UNIT(S): at 07:51

## 2022-02-12 RX ADMIN — Medication 0.5 MILLIGRAM(S): at 09:57

## 2022-02-12 RX ADMIN — APIXABAN 2.5 MILLIGRAM(S): 2.5 TABLET, FILM COATED ORAL at 05:54

## 2022-02-12 RX ADMIN — Medication 0.5 MILLIGRAM(S): at 22:59

## 2022-02-12 RX ADMIN — MIRTAZAPINE 15 MILLIGRAM(S): 45 TABLET, ORALLY DISINTEGRATING ORAL at 23:08

## 2022-02-12 NOTE — PROGRESS NOTE ADULT - ASSESSMENT
80 yo M with PMH of DVT (on eliquis) , HTN, HLD, DM , multiple recent visits for UTI presents with confusion,depression and suicidal ideation.  1.UTI-ABX.  2.DM-Insulin,Endo f/u.  3.Psych f/u,cont 1to 1 obs,cont  haldol and ativan.  4.DVT-eliquis.  5.CRI-f/u lytes.Renal f/u.  6.HTN-cont bp medication.  7.Lipid d/o-statin.  8.PPI.

## 2022-02-12 NOTE — PROGRESS NOTE ADULT - SUBJECTIVE AND OBJECTIVE BOX
Interval Events:  nad    Allergies    Allergy Status Unknown    Intolerances      Endocrine/Metabolic Medications:  atorvastatin 40 milliGRAM(s) Oral at bedtime  dextrose 40% Gel 15 Gram(s) Oral once  dextrose 50% Injectable 25 Gram(s) IV Push once  dextrose 50% Injectable 12.5 Gram(s) IV Push once  dextrose 50% Injectable 25 Gram(s) IV Push once  finasteride 5 milliGRAM(s) Oral daily  glucagon  Injectable 1 milliGRAM(s) IntraMuscular once  insulin glargine Injectable (LANTUS) 22 Unit(s) SubCutaneous at bedtime  insulin lispro (ADMELOG) corrective regimen sliding scale   SubCutaneous three times a day before meals  insulin lispro Injectable (ADMELOG) 5 Unit(s) SubCutaneous three times a day before meals      Vital Signs Last 24 Hrs  T(C): 36.3 (12 Feb 2022 12:25), Max: 36.6 (11 Feb 2022 13:56)  T(F): 97.4 (12 Feb 2022 12:25), Max: 97.8 (11 Feb 2022 13:56)  HR: 91 (12 Feb 2022 12:25) (91 - 95)  BP: 138/- (12 Feb 2022 12:25) (99/55 - 138/-)  BP(mean): 64 (11 Feb 2022 17:55) (64 - 64)  RR: 17 (12 Feb 2022 12:25) (17 - 20)  SpO2: 94% (12 Feb 2022 12:25) (94% - 99%)      PHYSICAL EXAM  All physical exam findings normal, except those marked:  General:	Alert, active, cooperative, NAD, well hydrated  .		[] Abnormal:  Neck		Normal: supple, no cervical adenopathy, no palpable thyroid  .		[] Abnormal:  Cardiovascular	Normal: regular rate, normal S1, S2, no murmurs  .		[] Abnormal:  Respiratory	Normal: no chest wall deformity, normal respiratory pattern, CTA B/L  .		[] Abnormal:  Abdominal	Normal: soft, ND, NT, bowel sounds present, no masses, no organomegaly  .		[] Abnormal:  		Normal normal genitalia, testes descended, circumcised/uncircumcised  .		Denice stage:			Breast denice:  .		Menstrual history:  .		[] Abnormal:  Extremities	Normal: FROM x4  .		[] Abnormal:  Skin		Normal: intact and not indurated, no rash, no acanthosis nigricans  .		[] Abnormal:  Neurologic	Normal: grossly intact  .		[] Abnormal:    LABS                        7.8    5.40  )-----------( 281      ( 12 Feb 2022 07:03 )             23.7                               139    |  110    |  34                  Calcium: 8.6   / iCa: x      (02-12 @ 07:03)    ----------------------------<  182       Magnesium: x                                3.9     |  20     |  2.51             Phosphorous: x          CAPILLARY BLOOD GLUCOSE      POCT Blood Glucose.: 98 mg/dL (12 Feb 2022 11:38)  POCT Blood Glucose.: 179 mg/dL (12 Feb 2022 07:33)  POCT Blood Glucose.: 178 mg/dL (11 Feb 2022 20:45)  POCT Blood Glucose.: 122 mg/dL (11 Feb 2022 16:48)        Assesment/plan  80 yo M with PMH of DVT (on eliquis) , HTN, HLD, DM , multiple recent visits for UTI presents with confusion. . Found to have uncont dm. Pt states he takes oral dm meds as out pt with poorly cont dm. S/p recent hospitalization for uti/ uncont dm.      Problem/Recommendation - 1:  ·  Problem: Uncontrolled diabetes mellitus.   ·  Recommendation: due to acute illness and need for insulin tx  cont lantus 22 units  and admelog 5 ac tid- hold if poor po intake   fsg ac and hs  d/w prim team.     Problem/Recommendation - 2:  ·  Problem: UTI (urinary tract infection).   ·  Recommendation: cont iv abx  per prim team.

## 2022-02-12 NOTE — PROGRESS NOTE ADULT - SUBJECTIVE AND OBJECTIVE BOX
CHIEF COMPLAINT:Patient is a 81y old  Male who presents with a chief complaint of UTI.Pt still suicidal, on 1 to 1 obs.    	  REVIEW OF SYSTEMS:  CONSTITUTIONAL: No fever, weight loss, or fatigue  EYES: No eye pain, visual disturbances, or discharge  ENT:  No difficulty hearing, tinnitus, vertigo; No sinus or throat pain  NECK: No pain or stiffness  RESPIRATORY: No cough, wheezing, chills or hemoptysis; No Shortness of Breath  CARDIOVASCULAR: No chest pain, palpitations, passing out, dizziness, or leg swelling  GASTROINTESTINAL: No abdominal or epigastric pain. No nausea, vomiting, or hematemesis; No diarrhea or constipation. No melena or hematochezia.  GENITOURINARY: No dysuria, frequency, hematuria, or incontinence  NEUROLOGICAL: No headaches, memory loss, loss of strength, numbness, or tremors  SKIN: No itching, burning, rashes, or lesions   LYMPH Nodes: No enlarged glands  ENDOCRINE: No heat or cold intolerance; No hair loss  MUSCULOSKELETAL: No joint pain or swelling; No muscle, back, or extremity pain  PSYCHIATRIC: + depression,No anxiety, mood swings, or difficulty sleeping  HEME/LYMPH: No easy bruising, or bleeding gums  ALLERGY AND IMMUNOLOGIC: No hives or eczema	    PHYSICAL EXAM:  T(C): 36.1 (02-11-22 @ 17:55), Max: 36.6 (02-11-22 @ 13:56)  HR: 95 (02-11-22 @ 17:55) (91 - 95)  BP: 99/55 (02-11-22 @ 17:55) (99/55 - 124/67)  RR: 19 (02-11-22 @ 17:55) (19 - 20)  SpO2: 99% (02-11-22 @ 17:55) (93% - 99%)  Wt(kg): --  I&O's Summary    12 Feb 2022 07:01  -  12 Feb 2022 11:50  --------------------------------------------------------  IN: 75 mL / OUT: 0 mL / NET: 75 mL        Appearance: Normal	  HEENT:   Normal oral mucosa, PERRL, EOMI	  Lymphatic: No lymphadenopathy  Cardiovascular: Normal S1 S2, No JVD, No murmurs, No edema  Respiratory: Lungs clear to auscultation	  Psychiatry: A & O x 3, Mood & affect appropriate  Gastrointestinal:  Soft, Non-tender, + BS	  Skin: No rashes, No ecchymoses, No cyanosis	  Neurologic: Non-focal  Extremities: Normal range of motion, No clubbing, cyanosis or edema  Vascular: Peripheral pulses palpable 2+ bilaterally    MEDICATIONS  (STANDING):  amoxicillin  500 milliGRAM(s)/clavulanate 1 Tablet(s) Oral two times a day  apixaban 2.5 milliGRAM(s) Oral two times a day  atorvastatin 40 milliGRAM(s) Oral at bedtime  calcitriol   Capsule 0.5 MICROGram(s) Oral daily  calcium carbonate    500 mG (Tums) Chewable 1 Tablet(s) Chew two times a day  dextrose 40% Gel 15 Gram(s) Oral once  dextrose 5%. 1000 milliLiter(s) (50 mL/Hr) IV Continuous <Continuous>  dextrose 5%. 1000 milliLiter(s) (100 mL/Hr) IV Continuous <Continuous>  dextrose 50% Injectable 25 Gram(s) IV Push once  dextrose 50% Injectable 12.5 Gram(s) IV Push once  dextrose 50% Injectable 25 Gram(s) IV Push once  finasteride 5 milliGRAM(s) Oral daily  glucagon  Injectable 1 milliGRAM(s) IntraMuscular once  haloperidol     Tablet 2 milliGRAM(s) Oral at bedtime  haloperidol     Tablet 0.5 milliGRAM(s) Oral daily  insulin glargine Injectable (LANTUS) 22 Unit(s) SubCutaneous at bedtime  insulin lispro (ADMELOG) corrective regimen sliding scale   SubCutaneous three times a day before meals  insulin lispro Injectable (ADMELOG) 5 Unit(s) SubCutaneous three times a day before meals  labetalol 100 milliGRAM(s) Oral every 12 hours  LORazepam     Tablet 0.5 milliGRAM(s) Oral at bedtime  LORazepam     Tablet 0.25 milliGRAM(s) Oral daily  mirtazapine 15 milliGRAM(s) Oral at bedtime  pantoprazole    Tablet 40 milliGRAM(s) Oral before breakfast  sodium chloride 0.9%. 1000 milliLiter(s) (75 mL/Hr) IV Continuous <Continuous>  tamsulosin 0.4 milliGRAM(s) Oral at bedtime    	  	  LABS:	 	                      7.8    5.40  )-----------( 281      ( 12 Feb 2022 07:03 )             23.7     02-12    139  |  110<H>  |  34<H>  ----------------------------<  182<H>  3.9   |  20<L>  |  2.51<H>    Ca    8.6      12 Feb 2022 07:03

## 2022-02-13 LAB
ANION GAP SERPL CALC-SCNC: 6 MMOL/L — SIGNIFICANT CHANGE UP (ref 5–17)
ANISOCYTOSIS BLD QL: SLIGHT — SIGNIFICANT CHANGE UP
BLD GP AB SCN SERPL QL: SIGNIFICANT CHANGE UP
BUN SERPL-MCNC: 31 MG/DL — HIGH (ref 7–18)
CALCIUM SERPL-MCNC: 8 MG/DL — LOW (ref 8.4–10.5)
CHLORIDE SERPL-SCNC: 112 MMOL/L — HIGH (ref 96–108)
CO2 SERPL-SCNC: 21 MMOL/L — LOW (ref 22–31)
CREAT SERPL-MCNC: 2.21 MG/DL — HIGH (ref 0.5–1.3)
GLUCOSE BLDC GLUCOMTR-MCNC: 113 MG/DL — HIGH (ref 70–99)
GLUCOSE BLDC GLUCOMTR-MCNC: 127 MG/DL — HIGH (ref 70–99)
GLUCOSE BLDC GLUCOMTR-MCNC: 168 MG/DL — HIGH (ref 70–99)
GLUCOSE BLDC GLUCOMTR-MCNC: 95 MG/DL — SIGNIFICANT CHANGE UP (ref 70–99)
GLUCOSE SERPL-MCNC: 112 MG/DL — HIGH (ref 70–99)
HCT VFR BLD CALC: 21.3 % — LOW (ref 39–50)
HCT VFR BLD CALC: 27.8 % — LOW (ref 39–50)
HGB BLD-MCNC: 7 G/DL — CRITICAL LOW (ref 13–17)
HGB BLD-MCNC: 9.1 G/DL — LOW (ref 13–17)
HYPOCHROMIA BLD QL: SLIGHT — SIGNIFICANT CHANGE UP
MANUAL SMEAR VERIFICATION: SIGNIFICANT CHANGE UP
MCHC RBC-ENTMCNC: 28 PG — SIGNIFICANT CHANGE UP (ref 27–34)
MCHC RBC-ENTMCNC: 28.6 PG — SIGNIFICANT CHANGE UP (ref 27–34)
MCHC RBC-ENTMCNC: 32.7 GM/DL — SIGNIFICANT CHANGE UP (ref 32–36)
MCHC RBC-ENTMCNC: 32.9 GM/DL — SIGNIFICANT CHANGE UP (ref 32–36)
MCV RBC AUTO: 85.5 FL — SIGNIFICANT CHANGE UP (ref 80–100)
MCV RBC AUTO: 86.9 FL — SIGNIFICANT CHANGE UP (ref 80–100)
NRBC # BLD: 0 /100 WBCS — SIGNIFICANT CHANGE UP (ref 0–0)
NRBC # BLD: 0 /100 WBCS — SIGNIFICANT CHANGE UP (ref 0–0)
PLAT MORPH BLD: NORMAL — SIGNIFICANT CHANGE UP
PLATELET # BLD AUTO: 249 K/UL — SIGNIFICANT CHANGE UP (ref 150–400)
PLATELET # BLD AUTO: 277 K/UL — SIGNIFICANT CHANGE UP (ref 150–400)
PLATELET COUNT - ESTIMATE: NORMAL — SIGNIFICANT CHANGE UP
POIKILOCYTOSIS BLD QL AUTO: SLIGHT — SIGNIFICANT CHANGE UP
POLYCHROMASIA BLD QL SMEAR: SLIGHT — SIGNIFICANT CHANGE UP
POTASSIUM SERPL-MCNC: 3.9 MMOL/L — SIGNIFICANT CHANGE UP (ref 3.5–5.3)
POTASSIUM SERPL-SCNC: 3.9 MMOL/L — SIGNIFICANT CHANGE UP (ref 3.5–5.3)
RBC # BLD: 2.45 M/UL — LOW (ref 4.2–5.8)
RBC # BLD: 3.25 M/UL — LOW (ref 4.2–5.8)
RBC # FLD: 14 % — SIGNIFICANT CHANGE UP (ref 10.3–14.5)
RBC # FLD: 14.5 % — SIGNIFICANT CHANGE UP (ref 10.3–14.5)
RBC BLD AUTO: ABNORMAL
SARS-COV-2 RNA SPEC QL NAA+PROBE: SIGNIFICANT CHANGE UP
SODIUM SERPL-SCNC: 139 MMOL/L — SIGNIFICANT CHANGE UP (ref 135–145)
WBC # BLD: 5.39 K/UL — SIGNIFICANT CHANGE UP (ref 3.8–10.5)
WBC # BLD: 5.46 K/UL — SIGNIFICANT CHANGE UP (ref 3.8–10.5)
WBC # FLD AUTO: 5.39 K/UL — SIGNIFICANT CHANGE UP (ref 3.8–10.5)
WBC # FLD AUTO: 5.46 K/UL — SIGNIFICANT CHANGE UP (ref 3.8–10.5)

## 2022-02-13 RX ADMIN — Medication 100 MILLIGRAM(S): at 17:36

## 2022-02-13 RX ADMIN — Medication 100 MILLIGRAM(S): at 06:08

## 2022-02-13 RX ADMIN — TAMSULOSIN HYDROCHLORIDE 0.4 MILLIGRAM(S): 0.4 CAPSULE ORAL at 21:17

## 2022-02-13 RX ADMIN — ATORVASTATIN CALCIUM 40 MILLIGRAM(S): 80 TABLET, FILM COATED ORAL at 21:17

## 2022-02-13 RX ADMIN — APIXABAN 2.5 MILLIGRAM(S): 2.5 TABLET, FILM COATED ORAL at 17:36

## 2022-02-13 RX ADMIN — Medication 5 UNIT(S): at 08:23

## 2022-02-13 RX ADMIN — INSULIN GLARGINE 22 UNIT(S): 100 INJECTION, SOLUTION SUBCUTANEOUS at 21:50

## 2022-02-13 RX ADMIN — HALOPERIDOL DECANOATE 2 MILLIGRAM(S): 100 INJECTION INTRAMUSCULAR at 21:17

## 2022-02-13 RX ADMIN — Medication 5 UNIT(S): at 17:35

## 2022-02-13 RX ADMIN — HALOPERIDOL DECANOATE 1 MILLIGRAM(S): 100 INJECTION INTRAMUSCULAR at 00:59

## 2022-02-13 RX ADMIN — Medication 0.25 MILLIGRAM(S): at 11:35

## 2022-02-13 RX ADMIN — CALCITRIOL 0.5 MICROGRAM(S): 0.5 CAPSULE ORAL at 11:33

## 2022-02-13 RX ADMIN — Medication 1 TABLET(S): at 06:08

## 2022-02-13 RX ADMIN — Medication 1 TABLET(S): at 17:36

## 2022-02-13 RX ADMIN — MIRTAZAPINE 15 MILLIGRAM(S): 45 TABLET, ORALLY DISINTEGRATING ORAL at 21:17

## 2022-02-13 RX ADMIN — PANTOPRAZOLE SODIUM 40 MILLIGRAM(S): 20 TABLET, DELAYED RELEASE ORAL at 06:08

## 2022-02-13 RX ADMIN — Medication 5 UNIT(S): at 11:32

## 2022-02-13 RX ADMIN — FINASTERIDE 5 MILLIGRAM(S): 5 TABLET, FILM COATED ORAL at 11:33

## 2022-02-13 RX ADMIN — Medication 1: at 11:32

## 2022-02-13 RX ADMIN — APIXABAN 2.5 MILLIGRAM(S): 2.5 TABLET, FILM COATED ORAL at 06:08

## 2022-02-13 RX ADMIN — Medication 650 MILLIGRAM(S): at 19:57

## 2022-02-13 RX ADMIN — Medication 1 TABLET(S): at 17:35

## 2022-02-13 RX ADMIN — Medication 0.5 MILLIGRAM(S): at 21:17

## 2022-02-13 RX ADMIN — HALOPERIDOL DECANOATE 0.5 MILLIGRAM(S): 100 INJECTION INTRAMUSCULAR at 14:42

## 2022-02-13 NOTE — PROGRESS NOTE ADULT - ASSESSMENT
80 yo M with PMH of DVT (on eliquis) , HTN, HLD, DM , multiple recent visits for UTI presents with confusion,depression and suicidal ideation.  1.UTI-ABX.  2.DM-Insulin,Endo f/u.  3.Psych f/u,cont 1to 1 obs,cont  haldol and ativan.  4.DVT-eliquis.  5.CRI-f/u lytes.Renal f/u.  6.HTN-cont bp medication.  7.Lipid d/o-statin.  8.Anemia-transfuse prbc.  9.PPI.

## 2022-02-13 NOTE — PROGRESS NOTE ADULT - SUBJECTIVE AND OBJECTIVE BOX
CARDIOLOGY/MEDICAL ATTENDING    CHIEF COMPLAINT:Patient is a 81y old  Male who presents with a chief complaint of UTI .Pt appears comfortable.    	  REVIEW OF SYSTEMS:  CONSTITUTIONAL: No fever, weight loss, or fatigue  EYES: No eye pain, visual disturbances, or discharge  ENT:  No difficulty hearing, tinnitus, vertigo; No sinus or throat pain  NECK: No pain or stiffness  RESPIRATORY: No cough, wheezing, chills or hemoptysis; No Shortness of Breath  CARDIOVASCULAR: No chest pain, palpitations, passing out, dizziness, or leg swelling  GASTROINTESTINAL: No abdominal or epigastric pain. No nausea, vomiting, or hematemesis; No diarrhea or constipation. No melena or hematochezia.  GENITOURINARY: No dysuria, frequency, hematuria, or incontinence  NEUROLOGICAL: No headaches, memory loss, loss of strength, numbness, or tremors  SKIN: No itching, burning, rashes, or lesions   LYMPH Nodes: No enlarged glands  ENDOCRINE: No heat or cold intolerance; No hair loss  MUSCULOSKELETAL: No joint pain or swelling; No muscle, back, or extremity pain  PSYCHIATRIC: No depression, anxiety, mood swings, or difficulty sleeping  HEME/LYMPH: No easy bruising, or bleeding gums  ALLERGY AND IMMUNOLOGIC: No hives or eczema	      PHYSICAL EXAM:  T(C): 36.7 (02-13-22 @ 10:45), Max: 36.7 (02-13-22 @ 10:45)  HR: 73 (02-13-22 @ 10:45) (73 - 96)  BP: 98/57 (02-13-22 @ 10:45) (98/57 - 138/-)  RR: 18 (02-13-22 @ 10:45) (17 - 18)  SpO2: 95% (02-13-22 @ 10:45) (93% - 95%)  Wt(kg): --  I&O's Summary    12 Feb 2022 07:01  -  13 Feb 2022 07:00  --------------------------------------------------------  IN: 75 mL / OUT: 0 mL / NET: 75 mL        Appearance: Normal	  HEENT:   Normal oral mucosa, PERRL, EOMI	  Lymphatic: No lymphadenopathy  Cardiovascular: Normal S1 S2, No JVD, No murmurs, No edema  Respiratory: Lungs clear to auscultation	  Gastrointestinal:  Soft, Non-tender, + BS	  Skin: No rashes, No ecchymoses, No cyanosis	  Neurologic: Non-focal  Extremities: Normal range of motion, No clubbing, cyanosis or edema  Vascular: Peripheral pulses palpable 2+ bilaterally    MEDICATIONS  (STANDING):  amoxicillin  500 milliGRAM(s)/clavulanate 1 Tablet(s) Oral two times a day  apixaban 2.5 milliGRAM(s) Oral two times a day  atorvastatin 40 milliGRAM(s) Oral at bedtime  calcitriol   Capsule 0.5 MICROGram(s) Oral daily  calcium carbonate    500 mG (Tums) Chewable 1 Tablet(s) Chew two times a day  dextrose 40% Gel 15 Gram(s) Oral once  dextrose 5%. 1000 milliLiter(s) (50 mL/Hr) IV Continuous <Continuous>  dextrose 5%. 1000 milliLiter(s) (100 mL/Hr) IV Continuous <Continuous>  dextrose 50% Injectable 25 Gram(s) IV Push once  dextrose 50% Injectable 12.5 Gram(s) IV Push once  dextrose 50% Injectable 25 Gram(s) IV Push once  finasteride 5 milliGRAM(s) Oral daily  glucagon  Injectable 1 milliGRAM(s) IntraMuscular once  haloperidol     Tablet 2 milliGRAM(s) Oral at bedtime  haloperidol     Tablet 0.5 milliGRAM(s) Oral daily  insulin glargine Injectable (LANTUS) 22 Unit(s) SubCutaneous at bedtime  insulin lispro (ADMELOG) corrective regimen sliding scale   SubCutaneous three times a day before meals  insulin lispro Injectable (ADMELOG) 5 Unit(s) SubCutaneous three times a day before meals  labetalol 100 milliGRAM(s) Oral every 12 hours  LORazepam     Tablet 0.25 milliGRAM(s) Oral daily  LORazepam     Tablet 0.5 milliGRAM(s) Oral at bedtime  mirtazapine 15 milliGRAM(s) Oral at bedtime  pantoprazole    Tablet 40 milliGRAM(s) Oral before breakfast  sodium chloride 0.9%. 1000 milliLiter(s) (75 mL/Hr) IV Continuous <Continuous>  tamsulosin 0.4 milliGRAM(s) Oral at bedtime    	  LABS:	 	                        7.0    5.39  )-----------( 249      ( 13 Feb 2022 06:53 )             21.3     02-13    139  |  112<H>  |  31<H>  ----------------------------<  112<H>  3.9   |  21<L>  |  2.21<H>    Ca    8.0<L>      13 Feb 2022 06:53

## 2022-02-13 NOTE — CHART NOTE - NSCHARTNOTEFT_GEN_A_CORE
Notified by RN of critical hgb level:                          7.0    5.39  )-----------( 249      ( 13 Feb 2022 06:53 )             21.3       Vital Signs Last 24 Hrs  T(C): 36.3 (13 Feb 2022 05:11), Max: 36.3 (12 Feb 2022 12:25)  T(F): 97.3 (13 Feb 2022 05:11), Max: 97.4 (12 Feb 2022 12:25)  HR: 96 (13 Feb 2022 05:11) (91 - 96)  BP: 130/70 (13 Feb 2022 05:11) (130/70 - 138/-)  BP(mean): 91 (13 Feb 2022 05:11) (91 - 91)  RR: 18 (13 Feb 2022 05:11) (17 - 18)  SpO2: 93% (13 Feb 2022 05:11) (93% - 94%)      Physical Exam:  General:   No acute distress, laying in bed.  appears comfortable.    Neuro:  sleepy, easily arouseable with confusion,  non-consentable for transfusion  Pulm:  clear to auscultation.  No rales, wheezes or rhonchi  Cardiac:  regular rate and rhythm.  Abd:   soft and non-tender    Skin:   Pallor in appearance.  intact distal pulses      Patient is a 81 year old Male from home with PMH of DVT (on eliquis), HTN, HLD, DM, recently discharged from [facility] beds at the [facility] ED seen and discharged home with PO antibiotics. Patient presents to the ED with AMS, and agitation. urine culture confirms Ampicillin sensitive enterococcus faecalis started on Unasyn. Patient admitted to medicine for enterococcus faecalis  UTI, hospital course complicated by worsening dementia with behavioral disturbances. Also expressing thoughts of self harm and suicide. Placed on 1:1 for safety.  Hospital course complicated by down trending hemoglobin level, 7.0 this morning.      -   Patient remains hemodynamically stable  -   Type and screen ordered STAT  -   One unit of PRBC ordered  -   Stool for occult blood ordered  -   monitor post transfusion CBC  -   Blood transfusion consent obtained from emergency contact Mishel Avila    Above plan discussed with Dr Garcia

## 2022-02-14 LAB
ANION GAP SERPL CALC-SCNC: 7 MMOL/L — SIGNIFICANT CHANGE UP (ref 5–17)
BUN SERPL-MCNC: 32 MG/DL — HIGH (ref 7–18)
CALCIUM SERPL-MCNC: 8.6 MG/DL — SIGNIFICANT CHANGE UP (ref 8.4–10.5)
CHLORIDE SERPL-SCNC: 109 MMOL/L — HIGH (ref 96–108)
CO2 SERPL-SCNC: 22 MMOL/L — SIGNIFICANT CHANGE UP (ref 22–31)
CREAT SERPL-MCNC: 2.1 MG/DL — HIGH (ref 0.5–1.3)
GLUCOSE BLDC GLUCOMTR-MCNC: 116 MG/DL — HIGH (ref 70–99)
GLUCOSE BLDC GLUCOMTR-MCNC: 140 MG/DL — HIGH (ref 70–99)
GLUCOSE BLDC GLUCOMTR-MCNC: 213 MG/DL — HIGH (ref 70–99)
GLUCOSE SERPL-MCNC: 125 MG/DL — HIGH (ref 70–99)
HCT VFR BLD CALC: 28.5 % — LOW (ref 39–50)
HGB BLD-MCNC: 9.5 G/DL — LOW (ref 13–17)
MCHC RBC-ENTMCNC: 28.4 PG — SIGNIFICANT CHANGE UP (ref 27–34)
MCHC RBC-ENTMCNC: 33.3 GM/DL — SIGNIFICANT CHANGE UP (ref 32–36)
MCV RBC AUTO: 85.3 FL — SIGNIFICANT CHANGE UP (ref 80–100)
NRBC # BLD: 0 /100 WBCS — SIGNIFICANT CHANGE UP (ref 0–0)
PLATELET # BLD AUTO: 282 K/UL — SIGNIFICANT CHANGE UP (ref 150–400)
POTASSIUM SERPL-MCNC: 3.6 MMOL/L — SIGNIFICANT CHANGE UP (ref 3.5–5.3)
POTASSIUM SERPL-SCNC: 3.6 MMOL/L — SIGNIFICANT CHANGE UP (ref 3.5–5.3)
RBC # BLD: 3.34 M/UL — LOW (ref 4.2–5.8)
RBC # FLD: 14 % — SIGNIFICANT CHANGE UP (ref 10.3–14.5)
SODIUM SERPL-SCNC: 138 MMOL/L — SIGNIFICANT CHANGE UP (ref 135–145)
WBC # BLD: 5.61 K/UL — SIGNIFICANT CHANGE UP (ref 3.8–10.5)
WBC # FLD AUTO: 5.61 K/UL — SIGNIFICANT CHANGE UP (ref 3.8–10.5)

## 2022-02-14 PROCEDURE — 99232 SBSQ HOSP IP/OBS MODERATE 35: CPT

## 2022-02-14 RX ORDER — INSULIN GLARGINE 100 [IU]/ML
20 INJECTION, SOLUTION SUBCUTANEOUS AT BEDTIME
Refills: 0 | Status: DISCONTINUED | OUTPATIENT
Start: 2022-02-14 | End: 2022-02-23

## 2022-02-14 RX ORDER — INSULIN LISPRO 100/ML
4 VIAL (ML) SUBCUTANEOUS
Refills: 0 | Status: DISCONTINUED | OUTPATIENT
Start: 2022-02-14 | End: 2022-02-23

## 2022-02-14 RX ADMIN — APIXABAN 2.5 MILLIGRAM(S): 2.5 TABLET, FILM COATED ORAL at 17:31

## 2022-02-14 RX ADMIN — APIXABAN 2.5 MILLIGRAM(S): 2.5 TABLET, FILM COATED ORAL at 05:33

## 2022-02-14 RX ADMIN — TAMSULOSIN HYDROCHLORIDE 0.4 MILLIGRAM(S): 0.4 CAPSULE ORAL at 21:39

## 2022-02-14 RX ADMIN — Medication 4 UNIT(S): at 11:59

## 2022-02-14 RX ADMIN — FINASTERIDE 5 MILLIGRAM(S): 5 TABLET, FILM COATED ORAL at 12:13

## 2022-02-14 RX ADMIN — CALCITRIOL 0.5 MICROGRAM(S): 0.5 CAPSULE ORAL at 12:08

## 2022-02-14 RX ADMIN — Medication 0.5 MILLIGRAM(S): at 21:39

## 2022-02-14 RX ADMIN — Medication 100 MILLIGRAM(S): at 05:33

## 2022-02-14 RX ADMIN — Medication 1 TABLET(S): at 05:33

## 2022-02-14 RX ADMIN — PANTOPRAZOLE SODIUM 40 MILLIGRAM(S): 20 TABLET, DELAYED RELEASE ORAL at 05:33

## 2022-02-14 RX ADMIN — HALOPERIDOL DECANOATE 0.5 MILLIGRAM(S): 100 INJECTION INTRAMUSCULAR at 12:08

## 2022-02-14 RX ADMIN — ATORVASTATIN CALCIUM 40 MILLIGRAM(S): 80 TABLET, FILM COATED ORAL at 21:39

## 2022-02-14 RX ADMIN — HALOPERIDOL DECANOATE 2 MILLIGRAM(S): 100 INJECTION INTRAMUSCULAR at 21:39

## 2022-02-14 RX ADMIN — INSULIN GLARGINE 20 UNIT(S): 100 INJECTION, SOLUTION SUBCUTANEOUS at 21:39

## 2022-02-14 RX ADMIN — Medication 100 MILLIGRAM(S): at 17:32

## 2022-02-14 RX ADMIN — Medication 5 UNIT(S): at 08:09

## 2022-02-14 RX ADMIN — Medication 1 TABLET(S): at 17:31

## 2022-02-14 RX ADMIN — Medication 0.25 MILLIGRAM(S): at 12:13

## 2022-02-14 RX ADMIN — MIRTAZAPINE 15 MILLIGRAM(S): 45 TABLET, ORALLY DISINTEGRATING ORAL at 21:39

## 2022-02-14 NOTE — PROGRESS NOTE ADULT - ASSESSMENT
Patient is a 81 year old Male from home with PMH of DVT (on eliquis) , HTN, HLD, DM, recently discharged from [facility] ED seen and discharged home with PO ABX. Patient presents to the ED with AMS, and agitation. UCX confirms Ampicillin sensitive enterococcus faecalis started on Unasyn. Patient admitted to medicine for enterococcus faecalis  UTI, hospital course complicated by worsening dementia with behavioral disturbances. Also expressing thoughts of self harm and suicide. Placed on 1:1 for safety, psych consulted- reccs appreciated. Emergency contact updated on plan of care, she shared her concern for patient's living situation and would like him to be placed in [facility] on admission giving his cognitive decline. SW to follow  Patient is a 81 year old Male from home with PMH of DVT (on eliquis) , HTN, HLD, DM, recently discharged from [facility] ED seen and discharged home with PO ABX. Patient presents to the ED with AMS, and agitation. UCX confirms Ampicillin sensitive enterococcus faecalis started on Unasyn. Patient admitted to medicine for enterococcus faecalis  UTI, hospital course complicated by worsening dementia with behavioral disturbances. Also expressing thoughts of self harm and suicide. Placed on 1:1 for safety, psych consulted- reccs appreciated. Also complicated by anemia requiring 1u PRBCs, now HBG improved. Emergency contact updated on plan of care, she shared her concern for patient's living situation and would like him to be placed in [facility] on admission giving his cognitive decline. SW to follow

## 2022-02-14 NOTE — PROGRESS NOTE ADULT - PROBLEM SELECTOR PLAN 4
uncontrolled  last a1c 7.5  lantus 22Units QHS   Ademlog 5Units TID   ACCU check ACHS  Endo Kamara follows uncontrolled  last a1c 7.5  lantus 20U QHS   Ademlog 4 Units TID   ACCU check ACHS  Endo Kamara follows

## 2022-02-14 NOTE — PROGRESS NOTE ADULT - SUBJECTIVE AND OBJECTIVE BOX
CARDIOLOGY/MEDICAL ATTENDING    CHIEF COMPLAINT:Patient is a 81y old  Male who presents with a chief complaint of AMS.Pt appears comfortable.    	  REVIEW OF SYSTEMS:  CONSTITUTIONAL: No fever, weight loss, or fatigue  EYES: No eye pain, visual disturbances, or discharge  ENT:  No difficulty hearing, tinnitus, vertigo; No sinus or throat pain  NECK: No pain or stiffness  RESPIRATORY: No cough, wheezing, chills or hemoptysis; No Shortness of Breath  CARDIOVASCULAR: No chest pain, palpitations, passing out, dizziness, or leg swelling  GASTROINTESTINAL: No abdominal or epigastric pain. No nausea, vomiting, or hematemesis; No diarrhea or constipation. No melena or hematochezia.  GENITOURINARY: No dysuria, frequency, hematuria, or incontinence  NEUROLOGICAL: No headaches, memory loss, loss of strength, numbness, or tremors  SKIN: No itching, burning, rashes, or lesions   LYMPH Nodes: No enlarged glands  ENDOCRINE: No heat or cold intolerance; No hair loss  MUSCULOSKELETAL: No joint pain or swelling; No muscle, back, or extremity pain  PSYCHIATRIC: No depression, anxiety, mood swings, or difficulty sleeping  HEME/LYMPH: No easy bruising, or bleeding gums  ALLERGY AND IMMUNOLOGIC: No hives or eczema	      PHYSICAL EXAM:  T(C): 36.8 (02-14-22 @ 04:16), Max: 36.8 (02-14-22 @ 04:16)  HR: 88 (02-14-22 @ 04:16) (86 - 88)  BP: 123/66 (02-14-22 @ 04:16) (123/66 - 123/66)  RR: 20 (02-14-22 @ 04:16) (17 - 20)  SpO2: 94% (02-14-22 @ 04:16) (94% - 96%)  Wt(kg): --  I&O's Summary    14 Feb 2022 07:01  -  14 Feb 2022 11:39  --------------------------------------------------------  IN: 0 mL / OUT: 200 mL / NET: -200 mL        Appearance: Normal	  HEENT:   Normal oral mucosa, PERRL, EOMI	  Lymphatic: No lymphadenopathy  Cardiovascular: Normal S1 S2, No JVD, No murmurs, No edema  Respiratory: Lungs clear to auscultation	  Psychiatry: A & O x 3, Mood & affect appropriate  Gastrointestinal:  Soft, Non-tender, + BS	  Skin: No rashes, No ecchymoses, No cyanosis	  Neurologic: Non-focal  Extremities: Normal range of motion, No clubbing, cyanosis or edema  Vascular: Peripheral pulses palpable 2+ bilaterally    MEDICATIONS  (STANDING):  apixaban 2.5 milliGRAM(s) Oral two times a day  atorvastatin 40 milliGRAM(s) Oral at bedtime  calcitriol   Capsule 0.5 MICROGram(s) Oral daily  calcium carbonate    500 mG (Tums) Chewable 1 Tablet(s) Chew two times a day  dextrose 40% Gel 15 Gram(s) Oral once  dextrose 5%. 1000 milliLiter(s) (50 mL/Hr) IV Continuous <Continuous>  dextrose 5%. 1000 milliLiter(s) (100 mL/Hr) IV Continuous <Continuous>  dextrose 50% Injectable 25 Gram(s) IV Push once  dextrose 50% Injectable 12.5 Gram(s) IV Push once  dextrose 50% Injectable 25 Gram(s) IV Push once  finasteride 5 milliGRAM(s) Oral daily  glucagon  Injectable 1 milliGRAM(s) IntraMuscular once  haloperidol     Tablet 2 milliGRAM(s) Oral at bedtime  haloperidol     Tablet 0.5 milliGRAM(s) Oral daily  insulin glargine Injectable (LANTUS) 20 Unit(s) SubCutaneous at bedtime  insulin lispro (ADMELOG) corrective regimen sliding scale   SubCutaneous three times a day before meals  insulin lispro Injectable (ADMELOG) 4 Unit(s) SubCutaneous three times a day before meals  labetalol 100 milliGRAM(s) Oral every 12 hours  LORazepam     Tablet 0.25 milliGRAM(s) Oral daily  LORazepam     Tablet 0.5 milliGRAM(s) Oral at bedtime  mirtazapine 15 milliGRAM(s) Oral at bedtime  pantoprazole    Tablet 40 milliGRAM(s) Oral before breakfast  sodium chloride 0.9%. 1000 milliLiter(s) (75 mL/Hr) IV Continuous <Continuous>  tamsulosin 0.4 milliGRAM(s) Oral at bedtime      	  	  LABS:	 	                        9.5    5.61  )-----------( 282      ( 14 Feb 2022 07:39 )             28.5     02-14    138  |  109<H>  |  32<H>  ----------------------------<  125<H>  3.6   |  22  |  2.10<H>    Ca    8.6      14 Feb 2022 07:39

## 2022-02-14 NOTE — PROGRESS NOTE ADULT - ASSESSMENT
80 yo M with PMH of DVT (on eliquis) , HTN, HLD, DM , multiple recent visits for UTI presents with confusion,depression and suicidal ideation.  1.UTI-ABX.  2.DM-Insulin,Endo f/u.  3.Psych f/u,cont 1to 1 obs,cont  haldol and ativan.  4.DVT-eliquis.  5.CRI-f/u lytes.Renal f/u.  6.HTN-cont bp medication.  7.Lipid d/o-statin.  8.Anemia-s/p prbc.  9.PPI.

## 2022-02-14 NOTE — BH CONSULTATION LIAISON PROGRESS NOTE - NSBHCONSULTRECOMMENDOTHER_PSY_A_CORE FT
1. Change standing regimen to Haldol 0.5 mg PO qam/Haldol 2 mg PO plus Ativan 0.5 mg PO qhs for delirium/behavioral disturbance  --AM dose of Ativan has been DC'd to improve daytime alertness  2. For acute agitation, recommend Haldol 1 mg plus Ativan 0.5 mg IV or IM q12h PRN agitation  3. No indication for other standing or PRN psychotropic medications at this time  4. Medical management as directed by primary team  5. Psychiatry will continue to follow  6. Pt does NOT appear to have capacity to care for self safely at home or to make dispo decisions, due to progressive dementia  --HCP/POA Mishel Margarita should be approached for placement preferences and consents  7. Case d/w NP Michelle of primary team    Tammy Mead MD  Director, Consultation-Liaison Psychiatry Service  [phone]

## 2022-02-14 NOTE — PROGRESS NOTE ADULT - PROBLEM SELECTOR PLAN 3
MANUEL on CKD   trending  continue trial of IV fluids  avoid nephrotoxins MANUEL on CKD   down trending  appears at baseline  avoid nephrotoxins

## 2022-02-14 NOTE — PROGRESS NOTE ADULT - PROBLEM SELECTOR PLAN 1
CX + confirmed ampicillin sensitive Entercoccus faecalis  patient recently discharged on augmentin PO but did not  meds  continue Augmentin Day 7 of 7, last dose today CX + confirmed ampicillin sensitive Entercoccus faecalis  patient recently discharged on augmentin PO but did not  meds  S/p Augmentin

## 2022-02-14 NOTE — BH CONSULTATION LIAISON PROGRESS NOTE - NSBHMSESPABN_PSY_A_CORE
Soft volume/Decreased productivity
Soft volume/Decreased productivity
Loud volume
Soft volume/Decreased productivity

## 2022-02-14 NOTE — PROGRESS NOTE ADULT - SUBJECTIVE AND OBJECTIVE BOX
-**-* incomplete  NP Note discussed with  primary attending    Patient is a 81y old  Male who presents with a chief complaint of UTI (14 Feb 2022 11:38)      INTERVAL HPI/OVERNIGHT EVENTS: improved HBG this am, remains on 1:1 for safety     MEDICATIONS  (STANDING):  apixaban 2.5 milliGRAM(s) Oral two times a day  atorvastatin 40 milliGRAM(s) Oral at bedtime  calcitriol   Capsule 0.5 MICROGram(s) Oral daily  calcium carbonate    500 mG (Tums) Chewable 1 Tablet(s) Chew two times a day  dextrose 40% Gel 15 Gram(s) Oral once  dextrose 5%. 1000 milliLiter(s) (50 mL/Hr) IV Continuous <Continuous>  dextrose 5%. 1000 milliLiter(s) (100 mL/Hr) IV Continuous <Continuous>  dextrose 50% Injectable 25 Gram(s) IV Push once  dextrose 50% Injectable 12.5 Gram(s) IV Push once  dextrose 50% Injectable 25 Gram(s) IV Push once  finasteride 5 milliGRAM(s) Oral daily  glucagon  Injectable 1 milliGRAM(s) IntraMuscular once  haloperidol     Tablet 2 milliGRAM(s) Oral at bedtime  haloperidol     Tablet 0.5 milliGRAM(s) Oral daily  insulin glargine Injectable (LANTUS) 20 Unit(s) SubCutaneous at bedtime  insulin lispro (ADMELOG) corrective regimen sliding scale   SubCutaneous three times a day before meals  insulin lispro Injectable (ADMELOG) 4 Unit(s) SubCutaneous three times a day before meals  labetalol 100 milliGRAM(s) Oral every 12 hours  LORazepam     Tablet 0.25 milliGRAM(s) Oral daily  LORazepam     Tablet 0.5 milliGRAM(s) Oral at bedtime  mirtazapine 15 milliGRAM(s) Oral at bedtime  pantoprazole    Tablet 40 milliGRAM(s) Oral before breakfast  sodium chloride 0.9%. 1000 milliLiter(s) (75 mL/Hr) IV Continuous <Continuous>  tamsulosin 0.4 milliGRAM(s) Oral at bedtime    MEDICATIONS  (PRN):  acetaminophen     Tablet .. 650 milliGRAM(s) Oral every 6 hours PRN Temp greater or equal to 38C (100.4F), Mild Pain (1 - 3)  guaiFENesin Oral Liquid (Sugar-Free) 100 milliGRAM(s) Oral every 6 hours PRN Cough  haloperidol    Injectable 1 milliGRAM(s) IntraMuscular every 12 hours PRN Agitation  LORazepam   Injectable 0.5 milliGRAM(s) IV Push two times a day PRN Agitation  meclizine 12.5 milliGRAM(s) Oral every 6 hours PRN Dizziness  melatonin 3 milliGRAM(s) Oral at bedtime PRN Insomnia  ondansetron Injectable 4 milliGRAM(s) IV Push every 8 hours PRN Nausea and/or Vomiting      __________________________________________________  REVIEW OF SYSTEMS:    CONSTITUTIONAL: No fever,   EYES: no acute visual disturbances  NECK: No pain or stiffness  RESPIRATORY: No cough; No shortness of breath  CARDIOVASCULAR: No chest pain, no palpitations  GASTROINTESTINAL: No pain. No nausea or vomiting; No diarrhea   NEUROLOGICAL: No headache or numbness, no tremors  MUSCULOSKELETAL: No joint pain, no muscle pain  GENITOURINARY: no dysuria, no frequency, no hesitancy  PSYCHIATRY: no depression , no anxiety  ALL OTHER  ROS negative        Vital Signs Last 24 Hrs  T(C): 36.8 (14 Feb 2022 04:16), Max: 36.8 (14 Feb 2022 04:16)  T(F): 98.3 (14 Feb 2022 04:16), Max: 98.3 (14 Feb 2022 04:16)  HR: 88 (14 Feb 2022 04:16) (86 - 88)  BP: 123/66 (14 Feb 2022 04:16) (123/66 - 123/66)  BP(mean): --  RR: 20 (14 Feb 2022 04:16) (17 - 20)  SpO2: 94% (14 Feb 2022 04:16) (94% - 96%)    ________________________________________________  PHYSICAL EXAM:  GENERAL: NAD- Calm and cooperative on 1:1   HEENT: Normocephalic;  conjunctivae and sclerae clear  NECK : supple  CHEST/LUNG: Clear to auscultation bilaterally with good air entry   HEART: S1 S2  regular; no murmurs, gallops or rubs  ABDOMEN: Soft, Nontender, Nondistended; Bowel sounds present  EXTREMITIES: no cyanosis; no edema; no calf tenderness  SKIN: warm and dry; no rash  NERVOUS SYSTEM:  Awake and alert; Oriented  to place, person and time    _________________________________________________  LABS:                        9.5    5.61  )-----------( 282      ( 14 Feb 2022 07:39 )             28.5     02-14    138  |  109<H>  |  32<H>  ----------------------------<  125<H>  3.6   |  22  |  2.10<H>    Ca    8.6      14 Feb 2022 07:39          CAPILLARY BLOOD GLUCOSE      POCT Blood Glucose.: 140 mg/dL (14 Feb 2022 11:11)  POCT Blood Glucose.: 116 mg/dL (14 Feb 2022 07:46)  POCT Blood Glucose.: 113 mg/dL (13 Feb 2022 21:41)  POCT Blood Glucose.: 95 mg/dL (13 Feb 2022 17:21)        RADIOLOGY & ADDITIONAL TESTS: < from: CT Head No Cont (01.27.22 @ 12:26) >  IMPRESSION:    No hydrocephalus, acute intracranial hemorrhage, mass effect, or brain   edema.    --- End of Report ---      < end of copied text >      Imaging Personally Reviewed:  YES    Consultant(s) Notes Reviewed:   YES    Plan of care was discussed with patient and /or primary care giver; all questions and concerns were addressed and care was aligned with patient's wishes.

## 2022-02-14 NOTE — PROGRESS NOTE ADULT - SUBJECTIVE AND OBJECTIVE BOX
Interval Events:  pt in nad    Allergies    Allergy Status Unknown    Intolerances      Endocrine/Metabolic Medications:  atorvastatin 40 milliGRAM(s) Oral at bedtime  dextrose 40% Gel 15 Gram(s) Oral once  dextrose 50% Injectable 25 Gram(s) IV Push once  dextrose 50% Injectable 12.5 Gram(s) IV Push once  dextrose 50% Injectable 25 Gram(s) IV Push once  finasteride 5 milliGRAM(s) Oral daily  glucagon  Injectable 1 milliGRAM(s) IntraMuscular once  insulin glargine Injectable (LANTUS) 22 Unit(s) SubCutaneous at bedtime  insulin lispro (ADMELOG) corrective regimen sliding scale   SubCutaneous three times a day before meals  insulin lispro Injectable (ADMELOG) 5 Unit(s) SubCutaneous three times a day before meals      Vital Signs Last 24 Hrs  T(C): 36.8 (14 Feb 2022 04:16), Max: 36.8 (14 Feb 2022 04:16)  T(F): 98.3 (14 Feb 2022 04:16), Max: 98.3 (14 Feb 2022 04:16)  HR: 88 (14 Feb 2022 04:16) (73 - 88)  BP: 123/66 (14 Feb 2022 04:16) (98/57 - 123/66)  BP(mean): --  RR: 20 (14 Feb 2022 04:16) (17 - 20)  SpO2: 94% (14 Feb 2022 04:16) (94% - 96%)      PHYSICAL EXAM  All physical exam findings normal, except those marked:  General:	Alert, active, cooperative, NAD, well hydrated  .		[] Abnormal:  Neck		Normal: supple, no cervical adenopathy, no palpable thyroid  .		[] Abnormal:  Cardiovascular	Normal: regular rate, normal S1, S2, no murmurs  .		[] Abnormal:  Respiratory	Normal: no chest wall deformity, normal respiratory pattern, CTA B/L  .		[] Abnormal:  Abdominal	Normal: soft, ND, NT, bowel sounds present, no masses, no organomegaly  .		[] Abnormal:  		Normal normal genitalia, testes descended, circumcised/uncircumcised  .		Denice stage:			Breast denice:  .		Menstrual history:  .		[] Abnormal:  Extremities	Normal: FROM x4  .		[] Abnormal:  Skin		Normal: intact and not indurated, no rash, no acanthosis nigricans  .		[] Abnormal:  Neurologic	Normal: grossly intact  .		[] Abnormal:    LABS                        9.5    5.61  )-----------( 282      ( 14 Feb 2022 07:39 )             28.5                               138    |  109    |  x                   Calcium: 8.6   / iCa: x      (02-14 @ 07:39)    ----------------------------<  125       Magnesium: x                                3.6     |  22     |  2.10             Phosphorous: x          CAPILLARY BLOOD GLUCOSE      POCT Blood Glucose.: 116 mg/dL (14 Feb 2022 07:46)  POCT Blood Glucose.: 113 mg/dL (13 Feb 2022 21:41)  POCT Blood Glucose.: 95 mg/dL (13 Feb 2022 17:21)  POCT Blood Glucose.: 168 mg/dL (13 Feb 2022 11:28)        Assesment/plan    82 yo M with PMH of DVT (on eliquis) , HTN, HLD, DM , multiple recent visits for UTI presents with confusion. . Found to have uncont dm. Pt states he takes oral dm meds as out pt with poorly cont dm. S/p recent hospitalization for uti/ uncont dm.      Problem/Recommendation - 1:  ·  Problem: Uncontrolled diabetes mellitus.   ·  Recommendation: due to acute illness and need for insulin tx  too tightly controlled  change lantus 20 units  and admelog 4 ac tid- hold if poor po intake   fsg ac and hs  d/w prim team.     Problem/Recommendation - 2:  ·  Problem: UTI (urinary tract infection).   ·  Recommendation: cont iv abx  per prim team.

## 2022-02-14 NOTE — PROGRESS NOTE ADULT - PROBLEM SELECTOR PLAN 8
will need psych optimization prior to D/C   PT reccs NELI   COVID f/u repeat 2/13 will need psych optimization prior to D/C   PT reccs NELI   COVID - 2/13  NCM to follow

## 2022-02-15 LAB
ANION GAP SERPL CALC-SCNC: 7 MMOL/L — SIGNIFICANT CHANGE UP (ref 5–17)
BUN SERPL-MCNC: 38 MG/DL — HIGH (ref 7–18)
CALCIUM SERPL-MCNC: 9.2 MG/DL — SIGNIFICANT CHANGE UP (ref 8.4–10.5)
CHLORIDE SERPL-SCNC: 110 MMOL/L — HIGH (ref 96–108)
CO2 SERPL-SCNC: 21 MMOL/L — LOW (ref 22–31)
CREAT SERPL-MCNC: 2.23 MG/DL — HIGH (ref 0.5–1.3)
GLUCOSE BLDC GLUCOMTR-MCNC: 113 MG/DL — HIGH (ref 70–99)
GLUCOSE BLDC GLUCOMTR-MCNC: 148 MG/DL — HIGH (ref 70–99)
GLUCOSE BLDC GLUCOMTR-MCNC: 161 MG/DL — HIGH (ref 70–99)
GLUCOSE BLDC GLUCOMTR-MCNC: 348 MG/DL — HIGH (ref 70–99)
GLUCOSE SERPL-MCNC: 154 MG/DL — HIGH (ref 70–99)
HCT VFR BLD CALC: 29.9 % — LOW (ref 39–50)
HGB BLD-MCNC: 9.7 G/DL — LOW (ref 13–17)
MCHC RBC-ENTMCNC: 27.6 PG — SIGNIFICANT CHANGE UP (ref 27–34)
MCHC RBC-ENTMCNC: 32.4 GM/DL — SIGNIFICANT CHANGE UP (ref 32–36)
MCV RBC AUTO: 85.2 FL — SIGNIFICANT CHANGE UP (ref 80–100)
NRBC # BLD: 0 /100 WBCS — SIGNIFICANT CHANGE UP (ref 0–0)
PLATELET # BLD AUTO: 300 K/UL — SIGNIFICANT CHANGE UP (ref 150–400)
POTASSIUM SERPL-MCNC: 3.9 MMOL/L — SIGNIFICANT CHANGE UP (ref 3.5–5.3)
POTASSIUM SERPL-SCNC: 3.9 MMOL/L — SIGNIFICANT CHANGE UP (ref 3.5–5.3)
RBC # BLD: 3.51 M/UL — LOW (ref 4.2–5.8)
RBC # FLD: 14.4 % — SIGNIFICANT CHANGE UP (ref 10.3–14.5)
SODIUM SERPL-SCNC: 138 MMOL/L — SIGNIFICANT CHANGE UP (ref 135–145)
WBC # BLD: 6.21 K/UL — SIGNIFICANT CHANGE UP (ref 3.8–10.5)
WBC # FLD AUTO: 6.21 K/UL — SIGNIFICANT CHANGE UP (ref 3.8–10.5)

## 2022-02-15 PROCEDURE — 99232 SBSQ HOSP IP/OBS MODERATE 35: CPT

## 2022-02-15 RX ADMIN — HALOPERIDOL DECANOATE 0.5 MILLIGRAM(S): 100 INJECTION INTRAMUSCULAR at 11:44

## 2022-02-15 RX ADMIN — Medication 100 MILLIGRAM(S): at 17:39

## 2022-02-15 RX ADMIN — Medication 1 TABLET(S): at 17:39

## 2022-02-15 RX ADMIN — INSULIN GLARGINE 20 UNIT(S): 100 INJECTION, SOLUTION SUBCUTANEOUS at 21:19

## 2022-02-15 RX ADMIN — Medication 0.5 MILLIGRAM(S): at 21:19

## 2022-02-15 RX ADMIN — CALCITRIOL 0.5 MICROGRAM(S): 0.5 CAPSULE ORAL at 11:44

## 2022-02-15 RX ADMIN — TAMSULOSIN HYDROCHLORIDE 0.4 MILLIGRAM(S): 0.4 CAPSULE ORAL at 21:19

## 2022-02-15 RX ADMIN — HALOPERIDOL DECANOATE 2 MILLIGRAM(S): 100 INJECTION INTRAMUSCULAR at 21:19

## 2022-02-15 RX ADMIN — PANTOPRAZOLE SODIUM 40 MILLIGRAM(S): 20 TABLET, DELAYED RELEASE ORAL at 06:46

## 2022-02-15 RX ADMIN — MIRTAZAPINE 15 MILLIGRAM(S): 45 TABLET, ORALLY DISINTEGRATING ORAL at 21:19

## 2022-02-15 RX ADMIN — FINASTERIDE 5 MILLIGRAM(S): 5 TABLET, FILM COATED ORAL at 11:44

## 2022-02-15 RX ADMIN — Medication 1 TABLET(S): at 06:46

## 2022-02-15 RX ADMIN — Medication 100 MILLIGRAM(S): at 06:46

## 2022-02-15 RX ADMIN — Medication 0.5 MILLIGRAM(S): at 07:51

## 2022-02-15 RX ADMIN — Medication 1: at 07:46

## 2022-02-15 RX ADMIN — APIXABAN 2.5 MILLIGRAM(S): 2.5 TABLET, FILM COATED ORAL at 06:46

## 2022-02-15 RX ADMIN — ATORVASTATIN CALCIUM 40 MILLIGRAM(S): 80 TABLET, FILM COATED ORAL at 21:19

## 2022-02-15 RX ADMIN — APIXABAN 2.5 MILLIGRAM(S): 2.5 TABLET, FILM COATED ORAL at 17:39

## 2022-02-15 RX ADMIN — Medication 4 UNIT(S): at 11:43

## 2022-02-15 RX ADMIN — Medication 4 UNIT(S): at 17:17

## 2022-02-15 RX ADMIN — Medication 4 UNIT(S): at 07:46

## 2022-02-15 NOTE — PROGRESS NOTE ADULT - SUBJECTIVE AND OBJECTIVE BOX
NP Note discussed with  Primary Attending    Patient is a 81y old  Male who presents with a chief complaint of UTI (15 Feb 2022 11:29)      INTERVAL HPI/OVERNIGHT EVENTS: Patient seen and examined at bedside.    MEDICATIONS  (STANDING):  apixaban 2.5 milliGRAM(s) Oral two times a day  atorvastatin 40 milliGRAM(s) Oral at bedtime  calcitriol   Capsule 0.5 MICROGram(s) Oral daily  calcium carbonate    500 mG (Tums) Chewable 1 Tablet(s) Chew two times a day  dextrose 40% Gel 15 Gram(s) Oral once  dextrose 5%. 1000 milliLiter(s) (50 mL/Hr) IV Continuous <Continuous>  dextrose 5%. 1000 milliLiter(s) (100 mL/Hr) IV Continuous <Continuous>  dextrose 50% Injectable 25 Gram(s) IV Push once  dextrose 50% Injectable 12.5 Gram(s) IV Push once  dextrose 50% Injectable 25 Gram(s) IV Push once  finasteride 5 milliGRAM(s) Oral daily  glucagon  Injectable 1 milliGRAM(s) IntraMuscular once  haloperidol     Tablet 2 milliGRAM(s) Oral at bedtime  haloperidol     Tablet 0.5 milliGRAM(s) Oral daily  insulin glargine Injectable (LANTUS) 20 Unit(s) SubCutaneous at bedtime  insulin lispro (ADMELOG) corrective regimen sliding scale   SubCutaneous three times a day before meals  insulin lispro Injectable (ADMELOG) 4 Unit(s) SubCutaneous three times a day before meals  labetalol 100 milliGRAM(s) Oral every 12 hours  LORazepam     Tablet 0.5 milliGRAM(s) Oral at bedtime  mirtazapine 15 milliGRAM(s) Oral at bedtime  pantoprazole    Tablet 40 milliGRAM(s) Oral before breakfast  sodium chloride 0.9%. 1000 milliLiter(s) (75 mL/Hr) IV Continuous <Continuous>  tamsulosin 0.4 milliGRAM(s) Oral at bedtime    MEDICATIONS  (PRN):  acetaminophen     Tablet .. 650 milliGRAM(s) Oral every 6 hours PRN Temp greater or equal to 38C (100.4F), Mild Pain (1 - 3)  guaiFENesin Oral Liquid (Sugar-Free) 100 milliGRAM(s) Oral every 6 hours PRN Cough  haloperidol    Injectable 1 milliGRAM(s) IntraMuscular every 12 hours PRN Agitation  LORazepam   Injectable 0.5 milliGRAM(s) IV Push two times a day PRN Agitation  meclizine 12.5 milliGRAM(s) Oral every 6 hours PRN Dizziness  melatonin 3 milliGRAM(s) Oral at bedtime PRN Insomnia  ondansetron Injectable 4 milliGRAM(s) IV Push every 8 hours PRN Nausea and/or Vomiting      __________________________________________________  REVIEW OF SYSTEMS:    CONSTITUTIONAL: No fever,   EYES: no acute visual disturbances  NECK: No pain or stiffness  RESPIRATORY: No cough; No shortness of breath  CARDIOVASCULAR: No chest pain, no palpitations  GASTROINTESTINAL: No pain. No nausea or vomiting; No diarrhea   NEUROLOGICAL: No headache or numbness, no tremors  MUSCULOSKELETAL: No joint pain, no muscle pain  GENITOURINARY: no dysuria, no frequency, no hesitancy  PSYCHIATRY: no depression , no anxiety  ALL OTHER  ROS negative        Vital Signs Last 24 Hrs  T(C): 36.4 (15 Feb 2022 05:19), Max: 36.6 (14 Feb 2022 20:30)  T(F): 97.5 (15 Feb 2022 05:19), Max: 97.8 (14 Feb 2022 20:30)  HR: 82 (15 Feb 2022 05:19) (74 - 82)  BP: 132/61 (15 Feb 2022 05:19) (118/66 - 132/61)  BP(mean): --  RR: 19 (15 Feb 2022 05:19) (19 - 20)  SpO2: 94% (15 Feb 2022 05:19) (93% - 95%)    ________________________________________________  PHYSICAL EXAM:  GENERAL: confused, denies Suicidal ideation or self harm  HEENT: Normocephalic;  conjunctivae and sclerae clear; moist mucous membranes;   NECK : supple  CHEST/LUNG: Clear to auscultation bilaterally with good air entry   HEART: S1 S2  regular; no murmurs, gallops or rubs  ABDOMEN: Soft, Nontender, Nondistended; Bowel sounds present  EXTREMITIES: no cyanosis; no edema; no calf tenderness  SKIN: warm and dry; no rash  NERVOUS SYSTEM:  Awake and alert; Oriented  to person, confused    _________________________________________________  LABS:                        9.7    6.21  )-----------( 300      ( 15 Feb 2022 07:45 )             29.9     02-15    138  |  110<H>  |  38<H>  ----------------------------<  154<H>  3.9   |  21<L>  |  2.23<H>    Ca    9.2      15 Feb 2022 07:45          CAPILLARY BLOOD GLUCOSE      POCT Blood Glucose.: 148 mg/dL (15 Feb 2022 11:30)  POCT Blood Glucose.: 161 mg/dL (15 Feb 2022 07:27)  POCT Blood Glucose.: 213 mg/dL (14 Feb 2022 21:07)        RADIOLOGY & ADDITIONAL TESTS:  < from: CT Head No Cont (01.27.22 @ 12:26) >    ACC: [id number] EXAM:  CT BRAIN                          PROCEDURE DATE:  01/27/2022          INTERPRETATION:  Noncontrast CT of the brain.    CLINICAL INDICATION:  Altered mental status    TECHNIQUE : Axial CT scanning of the brain was obtained fromthe skull   base to the vertex without the administration of intravenous contrast.   Sagittal and coronal reformats were provided.    COMPARISON: CT brain 12/23/2021    FINDINGS:    No hydrocephalus, mass effect, midline shift, acute intracranial   hemorrhage, or brain edema.    Mild white matter microvascular ischemic disease.    Visualized paranasal sinuses and mastoid air cells are clear.    IMPRESSION:    No hydrocephalus, acute intracranial hemorrhage, mass effect, or brain   edema.    --- End of Report ---      < end of copied text >    Imaging  Reviewed:  YES    Consultant(s) Notes Reviewed:   YES      Plan of care was discussed with patient and /or primary care giver; all questions and concerns were addressed  NP Note discussed with  Primary Attending    Patient is a 81y old  Male who presents with a chief complaint of UTI (15 Feb 2022 11:29)      INTERVAL HPI/OVERNIGHT EVENTS: Patient seen and examined at bedside. Overnight patient urinating on floor, attempts to get OOB without assistance, Safety maintained with constant observation.    MEDICATIONS  (STANDING):  apixaban 2.5 milliGRAM(s) Oral two times a day  atorvastatin 40 milliGRAM(s) Oral at bedtime  calcitriol   Capsule 0.5 MICROGram(s) Oral daily  calcium carbonate    500 mG (Tums) Chewable 1 Tablet(s) Chew two times a day  dextrose 40% Gel 15 Gram(s) Oral once  dextrose 5%. 1000 milliLiter(s) (50 mL/Hr) IV Continuous <Continuous>  dextrose 5%. 1000 milliLiter(s) (100 mL/Hr) IV Continuous <Continuous>  dextrose 50% Injectable 25 Gram(s) IV Push once  dextrose 50% Injectable 12.5 Gram(s) IV Push once  dextrose 50% Injectable 25 Gram(s) IV Push once  finasteride 5 milliGRAM(s) Oral daily  glucagon  Injectable 1 milliGRAM(s) IntraMuscular once  haloperidol     Tablet 2 milliGRAM(s) Oral at bedtime  haloperidol     Tablet 0.5 milliGRAM(s) Oral daily  insulin glargine Injectable (LANTUS) 20 Unit(s) SubCutaneous at bedtime  insulin lispro (ADMELOG) corrective regimen sliding scale   SubCutaneous three times a day before meals  insulin lispro Injectable (ADMELOG) 4 Unit(s) SubCutaneous three times a day before meals  labetalol 100 milliGRAM(s) Oral every 12 hours  LORazepam     Tablet 0.5 milliGRAM(s) Oral at bedtime  mirtazapine 15 milliGRAM(s) Oral at bedtime  pantoprazole    Tablet 40 milliGRAM(s) Oral before breakfast  sodium chloride 0.9%. 1000 milliLiter(s) (75 mL/Hr) IV Continuous <Continuous>  tamsulosin 0.4 milliGRAM(s) Oral at bedtime    MEDICATIONS  (PRN):  acetaminophen     Tablet .. 650 milliGRAM(s) Oral every 6 hours PRN Temp greater or equal to 38C (100.4F), Mild Pain (1 - 3)  guaiFENesin Oral Liquid (Sugar-Free) 100 milliGRAM(s) Oral every 6 hours PRN Cough  haloperidol    Injectable 1 milliGRAM(s) IntraMuscular every 12 hours PRN Agitation  LORazepam   Injectable 0.5 milliGRAM(s) IV Push two times a day PRN Agitation  meclizine 12.5 milliGRAM(s) Oral every 6 hours PRN Dizziness  melatonin 3 milliGRAM(s) Oral at bedtime PRN Insomnia  ondansetron Injectable 4 milliGRAM(s) IV Push every 8 hours PRN Nausea and/or Vomiting      __________________________________________________  REVIEW OF SYSTEMS:    CONSTITUTIONAL: No fever,   EYES: no acute visual disturbances  NECK: No pain or stiffness  RESPIRATORY: No cough; No shortness of breath  CARDIOVASCULAR: No chest pain, no palpitations  GASTROINTESTINAL: No pain. No nausea or vomiting; No diarrhea   NEUROLOGICAL: No headache or numbness, no tremors  MUSCULOSKELETAL: No joint pain, no muscle pain  GENITOURINARY: no dysuria, no frequency, no hesitancy  PSYCHIATRY: no depression , no anxiety  ALL OTHER  ROS negative        Vital Signs Last 24 Hrs  T(C): 36.4 (15 Feb 2022 05:19), Max: 36.6 (14 Feb 2022 20:30)  T(F): 97.5 (15 Feb 2022 05:19), Max: 97.8 (14 Feb 2022 20:30)  HR: 82 (15 Feb 2022 05:19) (74 - 82)  BP: 132/61 (15 Feb 2022 05:19) (118/66 - 132/61)  BP(mean): --  RR: 19 (15 Feb 2022 05:19) (19 - 20)  SpO2: 94% (15 Feb 2022 05:19) (93% - 95%)    ________________________________________________  PHYSICAL EXAM:  GENERAL: confused, denies Suicidal ideation or self harm  HEENT: Normocephalic;  conjunctivae and sclerae clear; moist mucous membranes;   NECK : supple  CHEST/LUNG: Clear to auscultation bilaterally with good air entry   HEART: S1 S2  regular; no murmurs, gallops or rubs  ABDOMEN: Soft, Nontender, Nondistended; Bowel sounds present  EXTREMITIES: no cyanosis; no edema; no calf tenderness  SKIN: warm and dry; no rash  NERVOUS SYSTEM:  Awake and alert; Oriented  to person, confused    _________________________________________________  LABS:                        9.7    6.21  )-----------( 300      ( 15 Feb 2022 07:45 )             29.9     02-15    138  |  110<H>  |  38<H>  ----------------------------<  154<H>  3.9   |  21<L>  |  2.23<H>    Ca    9.2      15 Feb 2022 07:45          CAPILLARY BLOOD GLUCOSE      POCT Blood Glucose.: 148 mg/dL (15 Feb 2022 11:30)  POCT Blood Glucose.: 161 mg/dL (15 Feb 2022 07:27)  POCT Blood Glucose.: 213 mg/dL (14 Feb 2022 21:07)        RADIOLOGY & ADDITIONAL TESTS:  < from: CT Head No Cont (01.27.22 @ 12:26) >    ACC: [id number] EXAM:  CT BRAIN                          PROCEDURE DATE:  01/27/2022          INTERPRETATION:  Noncontrast CT of the brain.    CLINICAL INDICATION:  Altered mental status    TECHNIQUE : Axial CT scanning of the brain was obtained fromthe skull   base to the vertex without the administration of intravenous contrast.   Sagittal and coronal reformats were provided.    COMPARISON: CT brain 12/23/2021    FINDINGS:    No hydrocephalus, mass effect, midline shift, acute intracranial   hemorrhage, or brain edema.    Mild white matter microvascular ischemic disease.    Visualized paranasal sinuses and mastoid air cells are clear.    IMPRESSION:    No hydrocephalus, acute intracranial hemorrhage, mass effect, or brain   edema.    --- End of Report ---      < end of copied text >    Imaging  Reviewed:  YES    Consultant(s) Notes Reviewed:   YES      Plan of care was discussed with patient and /or primary care giver; all questions and concerns were addressed

## 2022-02-15 NOTE — PROGRESS NOTE ADULT - PROBLEM SELECTOR PLAN 1
1. Change standing regimen to Haldol 0.5 mg PO qam/Haldol 2 mg PO plus Ativan 0.5 mg PO qhs for delirium/behavioral disturbance  --AM dose of Ativan has been DC'd to improve daytime alertness  2. For acute agitation, recommend Haldol 1 mg plus Ativan 0.5 mg IV or IM q12h PRN agitation  continue observation   pending Psych clearance   psych Dr. Mead

## 2022-02-15 NOTE — PROGRESS NOTE ADULT - PROBLEM SELECTOR PLAN 4
uncontrolled  last a1c 7.5  lantus 20U QHS   Ademlog 4 Units TID   ACCU check ACHS  Endo Kamara follows

## 2022-02-15 NOTE — PROGRESS NOTE ADULT - ASSESSMENT
Patient is a 81 year old Male from home with PMH of DVT (on eliquis) , HTN, HLD, DM, recently discharged from [facility] ED seen and discharged home with PO ABX. Patient presents to the ED with AMS, and agitation. UCX confirms Ampicillin sensitive enterococcus faecalis started on Unasyn. Patient admitted to medicine for enterococcus faecalis  UTI, hospital course complicated by worsening dementia with behavioral disturbances. Also expressing thoughts of self harm and suicide. Placed on 1:1 for safety, psych consulted. Also complicated by anemia requiring 1u PRBCs, improved. Concern for patient's living situation giving his cognitive decline.

## 2022-02-15 NOTE — PROGRESS NOTE ADULT - ASSESSMENT
82 yo M with PMH of DVT (on eliquis) , HTN, HLD, DM ,multiple recent visits for UTI presents with confusion,depression and suicidal ideation.  1.UTI-ABX.  2.DM-Insulin,Endo f/u.  3.Psych f/u,cont 1 to1 obs,cont  haldol, ativan,remeron.  4.DVT-eliquis.  5.CRI-f/u lytes.Renal f/u.  6.HTN-cont bp medication.  7.Lipid d/o-statin.  8.Anemia-s/p prbc.  9.PPI.

## 2022-02-15 NOTE — PROGRESS NOTE ADULT - SUBJECTIVE AND OBJECTIVE BOX
Interval Events:  pt in nad    Allergies    Allergy Status Unknown    Intolerances      Endocrine/Metabolic Medications:  atorvastatin 40 milliGRAM(s) Oral at bedtime  dextrose 40% Gel 15 Gram(s) Oral once  dextrose 50% Injectable 25 Gram(s) IV Push once  dextrose 50% Injectable 12.5 Gram(s) IV Push once  dextrose 50% Injectable 25 Gram(s) IV Push once  finasteride 5 milliGRAM(s) Oral daily  glucagon  Injectable 1 milliGRAM(s) IntraMuscular once  insulin glargine Injectable (LANTUS) 20 Unit(s) SubCutaneous at bedtime  insulin lispro (ADMELOG) corrective regimen sliding scale   SubCutaneous three times a day before meals  insulin lispro Injectable (ADMELOG) 4 Unit(s) SubCutaneous three times a day before meals      Vital Signs Last 24 Hrs  T(C): 36.4 (15 Feb 2022 05:19), Max: 36.6 (14 Feb 2022 20:30)  T(F): 97.5 (15 Feb 2022 05:19), Max: 97.8 (14 Feb 2022 20:30)  HR: 82 (15 Feb 2022 05:19) (74 - 82)  BP: 132/61 (15 Feb 2022 05:19) (118/66 - 132/61)  BP(mean): --  RR: 19 (15 Feb 2022 05:19) (19 - 20)  SpO2: 94% (15 Feb 2022 05:19) (93% - 95%)      PHYSICAL EXAM  All physical exam findings normal, except those marked:  General:	Alert, active, cooperative, NAD, well hydrated  .		[] Abnormal:  Neck		Normal: supple, no cervical adenopathy, no palpable thyroid  .		[] Abnormal:  Cardiovascular	Normal: regular rate, normal S1, S2, no murmurs  .		[] Abnormal:  Respiratory	Normal: no chest wall deformity, normal respiratory pattern, CTA B/L  .		[] Abnormal:  Abdominal	Normal: soft, ND, NT, bowel sounds present, no masses, no organomegaly  .		[] Abnormal:  		Normal normal genitalia, testes descended, circumcised/uncircumcised  .		Denice stage:			Breast denice:  .		Menstrual history:  .		[] Abnormal:  Extremities	Normal: FROM x4  .		[] Abnormal:  Skin		Normal: intact and not indurated, no rash, no acanthosis nigricans  .		[] Abnormal:  Neurologic	Normal: grossly intact  .		[] Abnormal:    LABS                        9.7    6.21  )-----------( 300      ( 15 Feb 2022 07:45 )             29.9                               138    |  110    |  38                  Calcium: 9.2   / iCa: x      (02-15 @ 07:45)    ----------------------------<  154       Magnesium: x                                3.9     |  21     |  2.23             Phosphorous: x          CAPILLARY BLOOD GLUCOSE      POCT Blood Glucose.: 161 mg/dL (15 Feb 2022 07:27)  POCT Blood Glucose.: 213 mg/dL (14 Feb 2022 21:07)  POCT Blood Glucose.: 140 mg/dL (14 Feb 2022 11:11)        Assesment/plan      80 yo M with PMH of DVT (on eliquis) , HTN, HLD, DM , multiple recent visits for UTI presents with confusion. . Found to have uncont dm. Pt states he takes oral dm meds as out pt with poorly cont dm. S/p recent hospitalization for uti/ uncont dm.      Problem/Recommendation - 1:  ·  Problem: Uncontrolled diabetes mellitus.   ·  Recommendation: due to acute illness and need for insulin tx  cont lantus 20 units  and admelog 4 ac tid- hold if poor po intake   fsg ac and hs  d/w prim team.     Problem/Recommendation - 2:  ·  Problem: UTI (urinary tract infection).   ·  Recommendation: cont iv abx  per prim team.

## 2022-02-15 NOTE — BH CONSULTATION LIAISON PROGRESS NOTE - NSBHCONSULTRECOMMENDOTHER_PSY_A_CORE FT
1. Restore AM dose of Ativan, for standing regimen of Haldol 0.5 mg PO plus Ativan 0.25 mg qam/Haldol 2 mg PO plus Ativan 0.5 mg PO qhs for delirium/behavioral disturbance  --AM dose of Ativan has been restored to address daytime agitation  2. For acute agitation, recommend Haldol 1 mg plus Ativan 0.5 mg IV or IM q12h PRN agitation  3. No indication for other standing or PRN psychotropic medications at this time  4. Medical management as directed by primary team  5. Psychiatry will continue to follow  6. Pt does NOT appear to have capacity to care for self safely at home or to make dispo decisions, due to progressive dementia  --HCP/POA Mishel Avila should be approached for placement preferences and consents  7. Case d/w NP Michelle of primary team    Tammy Mead MD  Director, Consultation-Liaison Psychiatry Service  [id number]

## 2022-02-15 NOTE — PROGRESS NOTE ADULT - SUBJECTIVE AND OBJECTIVE BOX
CHIEF COMPLAINT:Patient is a 81y old  Male who presents with a chief complaint of UTI.Pt appears comfortable.    	  REVIEW OF SYSTEMS:  CONSTITUTIONAL: No fever, weight loss, or fatigue  EYES: No eye pain, visual disturbances, or discharge  ENT:  No difficulty hearing, tinnitus, vertigo; No sinus or throat pain  NECK: No pain or stiffness  RESPIRATORY: No cough, wheezing, chills or hemoptysis; No Shortness of Breath  CARDIOVASCULAR: No chest pain, palpitations, passing out, dizziness, or leg swelling  GASTROINTESTINAL: No abdominal or epigastric pain. No nausea, vomiting, or hematemesis; No diarrhea or constipation. No melena or hematochezia.  GENITOURINARY: No dysuria, frequency, hematuria, or incontinence  NEUROLOGICAL: No headaches, memory loss, loss of strength, numbness, or tremors  SKIN: No itching, burning, rashes, or lesions   LYMPH Nodes: No enlarged glands  ENDOCRINE: No heat or cold intolerance; No hair loss  MUSCULOSKELETAL: No joint pain or swelling; No muscle, back, or extremity pain  PSYCHIATRIC: No depression, anxiety, mood swings, or difficulty sleeping  HEME/LYMPH: No easy bruising, or bleeding gums  ALLERGY AND IMMUNOLOGIC: No hives or eczema	        PHYSICAL EXAM:  T(C): 36.4 (02-15-22 @ 05:19), Max: 36.6 (02-14-22 @ 20:30)  HR: 82 (02-15-22 @ 05:19) (74 - 82)  BP: 132/61 (02-15-22 @ 05:19) (118/66 - 132/61)  RR: 19 (02-15-22 @ 05:19) (19 - 20)  SpO2: 94% (02-15-22 @ 05:19) (93% - 95%)  Wt(kg): --  I&O's Summary    14 Feb 2022 07:01  -  15 Feb 2022 07:00  --------------------------------------------------------  IN: 0 mL / OUT: 200 mL / NET: -200 mL        Appearance: Normal	  HEENT:   Normal oral mucosa, PERRL, EOMI	  Lymphatic: No lymphadenopathy  Cardiovascular: Normal S1 S2, No JVD, No murmurs, No edema  Respiratory: Lungs clear to auscultation	  Psychiatry: A & O x 3, Mood & affect appropriate  Gastrointestinal:  Soft, Non-tender, + BS	  Skin: No rashes, No ecchymoses, No cyanosis	  Neurologic: Non-focal  Extremities: Normal range of motion, No clubbing, cyanosis or edema  Vascular: Peripheral pulses palpable 2+ bilaterally    MEDICATIONS  (STANDING):  apixaban 2.5 milliGRAM(s) Oral two times a day  atorvastatin 40 milliGRAM(s) Oral at bedtime  calcitriol   Capsule 0.5 MICROGram(s) Oral daily  calcium carbonate    500 mG (Tums) Chewable 1 Tablet(s) Chew two times a day  dextrose 40% Gel 15 Gram(s) Oral once  dextrose 5%. 1000 milliLiter(s) (50 mL/Hr) IV Continuous <Continuous>  dextrose 5%. 1000 milliLiter(s) (100 mL/Hr) IV Continuous <Continuous>  dextrose 50% Injectable 25 Gram(s) IV Push once  dextrose 50% Injectable 12.5 Gram(s) IV Push once  dextrose 50% Injectable 25 Gram(s) IV Push once  finasteride 5 milliGRAM(s) Oral daily  glucagon  Injectable 1 milliGRAM(s) IntraMuscular once  haloperidol     Tablet 0.5 milliGRAM(s) Oral daily  haloperidol     Tablet 2 milliGRAM(s) Oral at bedtime  insulin glargine Injectable (LANTUS) 20 Unit(s) SubCutaneous at bedtime  insulin lispro (ADMELOG) corrective regimen sliding scale   SubCutaneous three times a day before meals  insulin lispro Injectable (ADMELOG) 4 Unit(s) SubCutaneous three times a day before meals  labetalol 100 milliGRAM(s) Oral every 12 hours  LORazepam     Tablet 0.5 milliGRAM(s) Oral at bedtime  mirtazapine 15 milliGRAM(s) Oral at bedtime  pantoprazole    Tablet 40 milliGRAM(s) Oral before breakfast  sodium chloride 0.9%. 1000 milliLiter(s) (75 mL/Hr) IV Continuous <Continuous>  tamsulosin 0.4 milliGRAM(s) Oral at bedtime      LABS:	 	                       9.7    6.21  )-----------( 300      ( 15 Feb 2022 07:45 )             29.9     02-15    138  |  110<H>  |  38<H>  ----------------------------<  154<H>  3.9   |  21<L>  |  2.23<H>    Ca    9.2      15 Feb 2022 07:45

## 2022-02-16 LAB
ANION GAP SERPL CALC-SCNC: 8 MMOL/L — SIGNIFICANT CHANGE UP (ref 5–17)
BUN SERPL-MCNC: 39 MG/DL — HIGH (ref 7–18)
CALCIUM SERPL-MCNC: 8.7 MG/DL — SIGNIFICANT CHANGE UP (ref 8.4–10.5)
CHLORIDE SERPL-SCNC: 108 MMOL/L — SIGNIFICANT CHANGE UP (ref 96–108)
CO2 SERPL-SCNC: 22 MMOL/L — SIGNIFICANT CHANGE UP (ref 22–31)
CREAT SERPL-MCNC: 2.22 MG/DL — HIGH (ref 0.5–1.3)
GLUCOSE BLDC GLUCOMTR-MCNC: 162 MG/DL — HIGH (ref 70–99)
GLUCOSE BLDC GLUCOMTR-MCNC: 204 MG/DL — HIGH (ref 70–99)
GLUCOSE BLDC GLUCOMTR-MCNC: 207 MG/DL — HIGH (ref 70–99)
GLUCOSE BLDC GLUCOMTR-MCNC: 246 MG/DL — HIGH (ref 70–99)
GLUCOSE SERPL-MCNC: 229 MG/DL — HIGH (ref 70–99)
HCT VFR BLD CALC: 27.6 % — LOW (ref 39–50)
HGB BLD-MCNC: 9.3 G/DL — LOW (ref 13–17)
MCHC RBC-ENTMCNC: 28.4 PG — SIGNIFICANT CHANGE UP (ref 27–34)
MCHC RBC-ENTMCNC: 33.7 GM/DL — SIGNIFICANT CHANGE UP (ref 32–36)
MCV RBC AUTO: 84.4 FL — SIGNIFICANT CHANGE UP (ref 80–100)
NRBC # BLD: 0 /100 WBCS — SIGNIFICANT CHANGE UP (ref 0–0)
PLATELET # BLD AUTO: 292 K/UL — SIGNIFICANT CHANGE UP (ref 150–400)
POTASSIUM SERPL-MCNC: 3.8 MMOL/L — SIGNIFICANT CHANGE UP (ref 3.5–5.3)
POTASSIUM SERPL-SCNC: 3.8 MMOL/L — SIGNIFICANT CHANGE UP (ref 3.5–5.3)
RBC # BLD: 3.27 M/UL — LOW (ref 4.2–5.8)
RBC # FLD: 14.6 % — HIGH (ref 10.3–14.5)
SODIUM SERPL-SCNC: 138 MMOL/L — SIGNIFICANT CHANGE UP (ref 135–145)
WBC # BLD: 6.37 K/UL — SIGNIFICANT CHANGE UP (ref 3.8–10.5)
WBC # FLD AUTO: 6.37 K/UL — SIGNIFICANT CHANGE UP (ref 3.8–10.5)

## 2022-02-16 PROCEDURE — 99232 SBSQ HOSP IP/OBS MODERATE 35: CPT

## 2022-02-16 RX ORDER — QUETIAPINE FUMARATE 200 MG/1
50 TABLET, FILM COATED ORAL AT BEDTIME
Refills: 0 | Status: DISCONTINUED | OUTPATIENT
Start: 2022-02-16 | End: 2022-02-23

## 2022-02-16 RX ORDER — QUETIAPINE FUMARATE 200 MG/1
25 TABLET, FILM COATED ORAL EVERY 8 HOURS
Refills: 0 | Status: DISCONTINUED | OUTPATIENT
Start: 2022-02-16 | End: 2022-02-18

## 2022-02-16 RX ORDER — QUETIAPINE FUMARATE 200 MG/1
25 TABLET, FILM COATED ORAL DAILY
Refills: 0 | Status: ACTIVE | OUTPATIENT
Start: 2022-02-17 | End: 2023-01-16

## 2022-02-16 RX ADMIN — Medication 2: at 07:52

## 2022-02-16 RX ADMIN — Medication 2: at 12:02

## 2022-02-16 RX ADMIN — Medication 100 MILLIGRAM(S): at 17:41

## 2022-02-16 RX ADMIN — HALOPERIDOL DECANOATE 1 MILLIGRAM(S): 100 INJECTION INTRAMUSCULAR at 09:28

## 2022-02-16 RX ADMIN — Medication 1 TABLET(S): at 05:28

## 2022-02-16 RX ADMIN — Medication 0.25 MILLIGRAM(S): at 12:09

## 2022-02-16 RX ADMIN — INSULIN GLARGINE 20 UNIT(S): 100 INJECTION, SOLUTION SUBCUTANEOUS at 21:12

## 2022-02-16 RX ADMIN — Medication 4 UNIT(S): at 12:06

## 2022-02-16 RX ADMIN — Medication 4 UNIT(S): at 16:47

## 2022-02-16 RX ADMIN — Medication 4 UNIT(S): at 07:54

## 2022-02-16 RX ADMIN — PANTOPRAZOLE SODIUM 40 MILLIGRAM(S): 20 TABLET, DELAYED RELEASE ORAL at 05:28

## 2022-02-16 RX ADMIN — CALCITRIOL 0.5 MICROGRAM(S): 0.5 CAPSULE ORAL at 12:06

## 2022-02-16 RX ADMIN — Medication 1 TABLET(S): at 17:40

## 2022-02-16 RX ADMIN — APIXABAN 2.5 MILLIGRAM(S): 2.5 TABLET, FILM COATED ORAL at 05:28

## 2022-02-16 RX ADMIN — Medication 100 MILLIGRAM(S): at 05:28

## 2022-02-16 RX ADMIN — ATORVASTATIN CALCIUM 40 MILLIGRAM(S): 80 TABLET, FILM COATED ORAL at 21:12

## 2022-02-16 RX ADMIN — Medication 0.5 MILLIGRAM(S): at 09:26

## 2022-02-16 RX ADMIN — FINASTERIDE 5 MILLIGRAM(S): 5 TABLET, FILM COATED ORAL at 12:06

## 2022-02-16 RX ADMIN — APIXABAN 2.5 MILLIGRAM(S): 2.5 TABLET, FILM COATED ORAL at 17:40

## 2022-02-16 RX ADMIN — TAMSULOSIN HYDROCHLORIDE 0.4 MILLIGRAM(S): 0.4 CAPSULE ORAL at 21:12

## 2022-02-16 RX ADMIN — MIRTAZAPINE 15 MILLIGRAM(S): 45 TABLET, ORALLY DISINTEGRATING ORAL at 21:12

## 2022-02-16 RX ADMIN — QUETIAPINE FUMARATE 50 MILLIGRAM(S): 200 TABLET, FILM COATED ORAL at 21:13

## 2022-02-16 RX ADMIN — Medication 1: at 16:47

## 2022-02-16 RX ADMIN — HALOPERIDOL DECANOATE 0.5 MILLIGRAM(S): 100 INJECTION INTRAMUSCULAR at 12:07

## 2022-02-16 NOTE — BH CONSULTATION LIAISON PROGRESS NOTE - NSBHCONSULTRECOMMENDOTHER_PSY_A_CORE FT
1. DC ALL standing and PRN orders for Haldol and Ativan. Start new standing regimen of Seroquel PO 25 mg qam/50 mg qhs  2. For acute agitation, recommend Seroquel 25 mg PO q8h PRN agitation  3. No indication for other standing or PRN psychotropic medications at this time  4. Medical management as directed by primary team  5. Psychiatry will continue to follow  6. Pt does NOT appear to have capacity to care for self safely at home or to make dispo decisions, due to progressive dementia  --HCP/POCHANDAN Avila should be approached for placement preferences and consents  7. Case d/w NP Michelle of primary team    Tammy Mead MD  Director, Consultation-Liaison Psychiatry Service  [id number]

## 2022-02-16 NOTE — PROGRESS NOTE ADULT - SUBJECTIVE AND OBJECTIVE BOX
NP Note discussed with  Primary Attending    Patient is a 81y old  Male who presents with a chief complaint of UTI (16 Feb 2022 12:22)      INTERVAL HPI/OVERNIGHT EVENTS: Patient seen and examined at bedside.     MEDICATIONS  (STANDING):  apixaban 2.5 milliGRAM(s) Oral two times a day  atorvastatin 40 milliGRAM(s) Oral at bedtime  calcitriol   Capsule 0.5 MICROGram(s) Oral daily  calcium carbonate    500 mG (Tums) Chewable 1 Tablet(s) Chew two times a day  dextrose 40% Gel 15 Gram(s) Oral once  dextrose 5%. 1000 milliLiter(s) (50 mL/Hr) IV Continuous <Continuous>  dextrose 5%. 1000 milliLiter(s) (100 mL/Hr) IV Continuous <Continuous>  dextrose 50% Injectable 25 Gram(s) IV Push once  dextrose 50% Injectable 25 Gram(s) IV Push once  dextrose 50% Injectable 12.5 Gram(s) IV Push once  finasteride 5 milliGRAM(s) Oral daily  glucagon  Injectable 1 milliGRAM(s) IntraMuscular once  insulin glargine Injectable (LANTUS) 20 Unit(s) SubCutaneous at bedtime  insulin lispro (ADMELOG) corrective regimen sliding scale   SubCutaneous three times a day before meals  insulin lispro Injectable (ADMELOG) 4 Unit(s) SubCutaneous three times a day before meals  labetalol 100 milliGRAM(s) Oral every 12 hours  mirtazapine 15 milliGRAM(s) Oral at bedtime  pantoprazole    Tablet 40 milliGRAM(s) Oral before breakfast  QUEtiapine 50 milliGRAM(s) Oral at bedtime  sodium chloride 0.9%. 1000 milliLiter(s) (75 mL/Hr) IV Continuous <Continuous>  tamsulosin 0.4 milliGRAM(s) Oral at bedtime    MEDICATIONS  (PRN):  acetaminophen     Tablet .. 650 milliGRAM(s) Oral every 6 hours PRN Temp greater or equal to 38C (100.4F), Mild Pain (1 - 3)  guaiFENesin Oral Liquid (Sugar-Free) 100 milliGRAM(s) Oral every 6 hours PRN Cough  meclizine 12.5 milliGRAM(s) Oral every 6 hours PRN Dizziness  melatonin 3 milliGRAM(s) Oral at bedtime PRN Insomnia  ondansetron Injectable 4 milliGRAM(s) IV Push every 8 hours PRN Nausea and/or Vomiting  QUEtiapine 25 milliGRAM(s) Oral every 8 hours PRN Agitation      __________________________________________________  REVIEW OF SYSTEMS:    unable to assess poor historian    Vital Signs Last 24 Hrs  T(C): 36.2 (16 Feb 2022 11:21), Max: 37 (16 Feb 2022 04:45)  T(F): 97.2 (16 Feb 2022 11:21), Max: 98.6 (16 Feb 2022 04:45)  HR: 93 (16 Feb 2022 04:45) (92 - 93)  BP: 143/82 (16 Feb 2022 11:21) (129/79 - 148/78)  BP(mean): --  RR: 19 (16 Feb 2022 11:21) (19 - 20)  SpO2: 99% (16 Feb 2022 11:21) (95% - 99%)    ________________________________________________  PHYSICAL EXAM:  GENERAL: confused  HEENT: Normocephalic;  conjunctivae and sclerae clear; moist mucous membranes;   NECK : supple  CHEST/LUNG: Clear to auscultation bilaterally with good air entry   HEART: S1 S2  regular; no murmurs, gallops or rubs  ABDOMEN: Soft, Nontender, Nondistended; Bowel sounds present  EXTREMITIES: no cyanosis; no edema; no calf tenderness  SKIN: warm and dry; no rash  NERVOUS SYSTEM:  Awake and alert; Oriented  to confused illogical  _________________________________________________  LABS:                        9.3    6.37  )-----------( 292      ( 16 Feb 2022 08:24 )             27.6     02-16    138  |  108  |  39<H>  ----------------------------<  229<H>  3.8   |  22  |  2.22<H>    Ca    8.7      16 Feb 2022 08:24          CAPILLARY BLOOD GLUCOSE      POCT Blood Glucose.: 207 mg/dL (16 Feb 2022 11:37)  POCT Blood Glucose.: 204 mg/dL (16 Feb 2022 07:26)  POCT Blood Glucose.: 348 mg/dL (15 Feb 2022 21:14)  POCT Blood Glucose.: 113 mg/dL (15 Feb 2022 16:30)        RADIOLOGY & ADDITIONAL TESTS:  < from: CT Head No Cont (01.27.22 @ 12:26) >    ACC: [id number] EXAM:  CT BRAIN                          PROCEDURE DATE:  01/27/2022          INTERPRETATION:  Noncontrast CT of the brain.    CLINICAL INDICATION:  Altered mental status    TECHNIQUE : Axial CT scanning of the brain was obtained fromthe skull   base to the vertex without the administration of intravenous contrast.   Sagittal and coronal reformats were provided.    COMPARISON: CT brain 12/23/2021    FINDINGS:    No hydrocephalus, mass effect, midline shift, acute intracranial   hemorrhage, or brain edema.    Mild white matter microvascular ischemic disease.    Visualized paranasal sinuses and mastoid air cells are clear.    IMPRESSION:    No hydrocephalus, acute intracranial hemorrhage, mass effect, or brain   edema.    --- End of Report ---      < end of copied text >    Imaging  Reviewed:  YES    Consultant(s) Notes Reviewed:   YES      Plan of care was discussed with patient and /or primary care giver; all questions and concerns were addressed

## 2022-02-16 NOTE — PROGRESS NOTE ADULT - PROBLEM SELECTOR PLAN 1
1. DC ALL standing and PRN orders for Haldol and Ativan. Start new standing regimen of Seroquel PO 25 mg qam/50 mg qhs  2. For acute agitation, recommend Seroquel 25 mg PO q8h PRN agitation  3. No indication for other standing or PRN psychotropic medications at this time  pending Psych clearance   psych Dr. Mead

## 2022-02-16 NOTE — PROGRESS NOTE ADULT - ASSESSMENT
Patient is a 81 year old Male from home with PMH of DVT (on eliquis) , HTN, HLD, DM, recently discharged from [facility] ED seen and discharged home with PO ABX. Patient presents to the ED with AMS, and agitation. UCX confirms Ampicillin sensitive enterococcus faecalis started on Unasyn. Patient admitted to medicine for enterococcus faecalis  UTI, hospital course complicated by worsening dementia with behavioral disturbances. Also expressing thoughts of self harm and suicide. Placed on 1:1 for safety, psych consulted. Also complicated by anemia requiring 1u PRBCs, improved. Concern for patient's living situation giving his cognitive decline.

## 2022-02-16 NOTE — PROGRESS NOTE ADULT - SUBJECTIVE AND OBJECTIVE BOX
CHIEF COMPLAINT:Patient is a 81y old  Male who presents with a chief complaint of UTI.Pt appears comfortable.    	  REVIEW OF SYSTEMS:  CONSTITUTIONAL: No fever, weight loss, or fatigue  EYES: No eye pain, visual disturbances, or discharge  ENT:  No difficulty hearing, tinnitus, vertigo; No sinus or throat pain  NECK: No pain or stiffness  RESPIRATORY: No cough, wheezing, chills or hemoptysis; No Shortness of Breath  CARDIOVASCULAR: No chest pain, palpitations, passing out, dizziness, or leg swelling  GASTROINTESTINAL: No abdominal or epigastric pain. No nausea, vomiting, or hematemesis; No diarrhea or constipation. No melena or hematochezia.  GENITOURINARY: No dysuria, frequency, hematuria, or incontinence  NEUROLOGICAL: No headaches, memory loss, loss of strength, numbness, or tremors  SKIN: No itching, burning, rashes, or lesions   LYMPH Nodes: No enlarged glands  ENDOCRINE: No heat or cold intolerance; No hair loss  MUSCULOSKELETAL: No joint pain or swelling; No muscle, back, or extremity pain  PSYCHIATRIC: No depression, anxiety, mood swings, or difficulty sleeping  HEME/LYMPH: No easy bruising, or bleeding gums  ALLERGY AND IMMUNOLOGIC: No hives or eczema	      PHYSICAL EXAM:  T(C): 36.2 (02-16-22 @ 11:21), Max: 37 (02-16-22 @ 04:45)  HR: 93 (02-16-22 @ 04:45) (92 - 93)  BP: 143/82 (02-16-22 @ 11:21) (129/79 - 148/78)  RR: 19 (02-16-22 @ 11:21) (19 - 20)  SpO2: 99% (02-16-22 @ 11:21) (95% - 99%)  Wt(kg): --  I&O's Summary      Appearance: Normal	  HEENT:   Normal oral mucosa, PERRL, EOMI	  Lymphatic: No lymphadenopathy  Cardiovascular: Normal S1 S2, No JVD, No murmurs, No edema  Respiratory: Lungs clear to auscultation	  Gastrointestinal:  Soft, Non-tender, + BS	  Skin: No rashes, No ecchymoses, No cyanosis	  Extremities: Normal range of motion, No clubbing, cyanosis or edema  Vascular: Peripheral pulses palpable 2+ bilaterally    MEDICATIONS  (STANDING):  apixaban 2.5 milliGRAM(s) Oral two times a day  atorvastatin 40 milliGRAM(s) Oral at bedtime  calcitriol   Capsule 0.5 MICROGram(s) Oral daily  calcium carbonate    500 mG (Tums) Chewable 1 Tablet(s) Chew two times a day  dextrose 40% Gel 15 Gram(s) Oral once  dextrose 5%. 1000 milliLiter(s) (50 mL/Hr) IV Continuous <Continuous>  dextrose 5%. 1000 milliLiter(s) (100 mL/Hr) IV Continuous <Continuous>  dextrose 50% Injectable 25 Gram(s) IV Push once  dextrose 50% Injectable 25 Gram(s) IV Push once  dextrose 50% Injectable 12.5 Gram(s) IV Push once  finasteride 5 milliGRAM(s) Oral daily  glucagon  Injectable 1 milliGRAM(s) IntraMuscular once  haloperidol     Tablet 0.5 milliGRAM(s) Oral daily  haloperidol     Tablet 2 milliGRAM(s) Oral at bedtime  insulin glargine Injectable (LANTUS) 20 Unit(s) SubCutaneous at bedtime  insulin lispro (ADMELOG) corrective regimen sliding scale   SubCutaneous three times a day before meals  insulin lispro Injectable (ADMELOG) 4 Unit(s) SubCutaneous three times a day before meals  labetalol 100 milliGRAM(s) Oral every 12 hours  LORazepam     Tablet 0.25 milliGRAM(s) Oral daily  LORazepam     Tablet 0.5 milliGRAM(s) Oral at bedtime  mirtazapine 15 milliGRAM(s) Oral at bedtime  pantoprazole    Tablet 40 milliGRAM(s) Oral before breakfast  sodium chloride 0.9%. 1000 milliLiter(s) (75 mL/Hr) IV Continuous <Continuous>  tamsulosin 0.4 milliGRAM(s) Oral at bedtime       	  	  LABS:	 	                       9.3    6.37  )-----------( 292      ( 16 Feb 2022 08:24 )             27.6     02-16    138  |  108  |  39<H>  ----------------------------<  229<H>  3.8   |  22  |  2.22<H>    Ca    8.7      16 Feb 2022 08:24

## 2022-02-16 NOTE — PROGRESS NOTE ADULT - SUBJECTIVE AND OBJECTIVE BOX
Interval Events:  pt in nad    Allergies    Allergy Status Unknown    Intolerances      Endocrine/Metabolic Medications:  atorvastatin 40 milliGRAM(s) Oral at bedtime  dextrose 40% Gel 15 Gram(s) Oral once  dextrose 50% Injectable 25 Gram(s) IV Push once  dextrose 50% Injectable 25 Gram(s) IV Push once  dextrose 50% Injectable 12.5 Gram(s) IV Push once  finasteride 5 milliGRAM(s) Oral daily  glucagon  Injectable 1 milliGRAM(s) IntraMuscular once  insulin glargine Injectable (LANTUS) 20 Unit(s) SubCutaneous at bedtime  insulin lispro (ADMELOG) corrective regimen sliding scale   SubCutaneous three times a day before meals  insulin lispro Injectable (ADMELOG) 4 Unit(s) SubCutaneous three times a day before meals      Vital Signs Last 24 Hrs  T(C): 37 (16 Feb 2022 04:45), Max: 37 (16 Feb 2022 04:45)  T(F): 98.6 (16 Feb 2022 04:45), Max: 98.6 (16 Feb 2022 04:45)  HR: 93 (16 Feb 2022 04:45) (92 - 93)  BP: 148/78 (16 Feb 2022 04:45) (129/79 - 148/78)  BP(mean): --  RR: 20 (16 Feb 2022 04:45) (20 - 20)  SpO2: 95% (16 Feb 2022 04:45) (95% - 96%)      PHYSICAL EXAM  All physical exam findings normal, except those marked:  General:	Alert, active, cooperative, NAD, well hydrated  .		[] Abnormal:  Neck		Normal: supple, no cervical adenopathy, no palpable thyroid  .		[] Abnormal:  Cardiovascular	Normal: regular rate, normal S1, S2, no murmurs  .		[] Abnormal:  Respiratory	Normal: no chest wall deformity, normal respiratory pattern, CTA B/L  .		[] Abnormal:  Abdominal	Normal: soft, ND, NT, bowel sounds present, no masses, no organomegaly  .		[] Abnormal:  		Normal normal genitalia, testes descended, circumcised/uncircumcised  .		Denice stage:			Breast denice:  .		Menstrual history:  .		[] Abnormal:  Extremities	Normal: FROM x4  .		[] Abnormal:  Skin		Normal: intact and not indurated, no rash, no acanthosis nigricans  .		[] Abnormal:  Neurologic	Normal: grossly intact  .		[] Abnormal:    LABS                        9.3    6.37  )-----------( 292      ( 16 Feb 2022 08:24 )             27.6                               138    |  108    |  39                  Calcium: 8.7   / iCa: x      (02-16 @ 08:24)    ----------------------------<  229       Magnesium: x                                3.8     |  22     |  2.22             Phosphorous: x          CAPILLARY BLOOD GLUCOSE      POCT Blood Glucose.: 204 mg/dL (16 Feb 2022 07:26)  POCT Blood Glucose.: 348 mg/dL (15 Feb 2022 21:14)  POCT Blood Glucose.: 113 mg/dL (15 Feb 2022 16:30)  POCT Blood Glucose.: 148 mg/dL (15 Feb 2022 11:30)        Assesment/plan    80 yo M with PMH of DVT (on eliquis) , HTN, HLD, DM , multiple recent visits for UTI presents with confusion. . Found to have uncont dm. Pt states he takes oral dm meds as out pt with poorly cont dm. S/p recent hospitalization for uti/ uncont dm.      Problem/Recommendation - 1:  ·  Problem: Uncontrolled diabetes mellitus.   ·  Recommendation: due to acute illness and need for insulin tx  hyperglycemic since last night?  cont lantus 20 units  and admelog 4 ac tid- hold if poor po intake   fsg ac and hs  d/w prim team.     Problem/Recommendation - 2:  ·  Problem: UTI (urinary tract infection).   ·  Recommendation: cont iv abx  per prim team.

## 2022-02-16 NOTE — PROGRESS NOTE ADULT - ASSESSMENT
80 yo M with PMH of DVT (on eliquis) , HTN, HLD, DM ,multiple recent visits for UTI presents with confusion,depression and suicidal ideation.  1.UTI-ABX.  2.DM-Insulin,Endo f/u.  3.Psych f/u,cont 1 to1 obs,cont  haldol,remeron.  4.DVT-eliquis.  5.CRI-f/u lytes.Renal f/u.  6.HTN-cont bp medication.  7.Lipid d/o-statin.  8.Anemia-s/p prbc.  9.PPI.

## 2022-02-17 LAB
ANION GAP SERPL CALC-SCNC: 8 MMOL/L — SIGNIFICANT CHANGE UP (ref 5–17)
BUN SERPL-MCNC: 41 MG/DL — HIGH (ref 7–18)
CALCIUM SERPL-MCNC: 8.5 MG/DL — SIGNIFICANT CHANGE UP (ref 8.4–10.5)
CHLORIDE SERPL-SCNC: 110 MMOL/L — HIGH (ref 96–108)
CO2 SERPL-SCNC: 23 MMOL/L — SIGNIFICANT CHANGE UP (ref 22–31)
CREAT SERPL-MCNC: 2.21 MG/DL — HIGH (ref 0.5–1.3)
GLUCOSE BLDC GLUCOMTR-MCNC: 110 MG/DL — HIGH (ref 70–99)
GLUCOSE BLDC GLUCOMTR-MCNC: 154 MG/DL — HIGH (ref 70–99)
GLUCOSE BLDC GLUCOMTR-MCNC: 156 MG/DL — HIGH (ref 70–99)
GLUCOSE BLDC GLUCOMTR-MCNC: 168 MG/DL — HIGH (ref 70–99)
GLUCOSE SERPL-MCNC: 198 MG/DL — HIGH (ref 70–99)
HCT VFR BLD CALC: 33.1 % — LOW (ref 39–50)
HGB BLD-MCNC: 10.8 G/DL — LOW (ref 13–17)
MCHC RBC-ENTMCNC: 28.3 PG — SIGNIFICANT CHANGE UP (ref 27–34)
MCHC RBC-ENTMCNC: 32.6 GM/DL — SIGNIFICANT CHANGE UP (ref 32–36)
MCV RBC AUTO: 86.9 FL — SIGNIFICANT CHANGE UP (ref 80–100)
NRBC # BLD: 0 /100 WBCS — SIGNIFICANT CHANGE UP (ref 0–0)
PLATELET # BLD AUTO: 317 K/UL — SIGNIFICANT CHANGE UP (ref 150–400)
POTASSIUM SERPL-MCNC: 4.2 MMOL/L — SIGNIFICANT CHANGE UP (ref 3.5–5.3)
POTASSIUM SERPL-SCNC: 4.2 MMOL/L — SIGNIFICANT CHANGE UP (ref 3.5–5.3)
RBC # BLD: 3.81 M/UL — LOW (ref 4.2–5.8)
RBC # FLD: 14.6 % — HIGH (ref 10.3–14.5)
SARS-COV-2 RNA SPEC QL NAA+PROBE: SIGNIFICANT CHANGE UP
SODIUM SERPL-SCNC: 141 MMOL/L — SIGNIFICANT CHANGE UP (ref 135–145)
WBC # BLD: 6.24 K/UL — SIGNIFICANT CHANGE UP (ref 3.8–10.5)
WBC # FLD AUTO: 6.24 K/UL — SIGNIFICANT CHANGE UP (ref 3.8–10.5)

## 2022-02-17 PROCEDURE — 99232 SBSQ HOSP IP/OBS MODERATE 35: CPT

## 2022-02-17 RX ORDER — QUETIAPINE FUMARATE 200 MG/1
50 TABLET, FILM COATED ORAL DAILY
Refills: 0 | Status: DISCONTINUED | OUTPATIENT
Start: 2022-02-17 | End: 2022-02-23

## 2022-02-17 RX ADMIN — Medication 1 TABLET(S): at 05:57

## 2022-02-17 RX ADMIN — Medication 1: at 07:49

## 2022-02-17 RX ADMIN — FINASTERIDE 5 MILLIGRAM(S): 5 TABLET, FILM COATED ORAL at 11:26

## 2022-02-17 RX ADMIN — Medication 100 MILLIGRAM(S): at 17:06

## 2022-02-17 RX ADMIN — Medication 1 TABLET(S): at 17:06

## 2022-02-17 RX ADMIN — MIRTAZAPINE 15 MILLIGRAM(S): 45 TABLET, ORALLY DISINTEGRATING ORAL at 21:47

## 2022-02-17 RX ADMIN — TAMSULOSIN HYDROCHLORIDE 0.4 MILLIGRAM(S): 0.4 CAPSULE ORAL at 21:47

## 2022-02-17 RX ADMIN — Medication 100 MILLIGRAM(S): at 05:57

## 2022-02-17 RX ADMIN — QUETIAPINE FUMARATE 25 MILLIGRAM(S): 200 TABLET, FILM COATED ORAL at 18:09

## 2022-02-17 RX ADMIN — PANTOPRAZOLE SODIUM 40 MILLIGRAM(S): 20 TABLET, DELAYED RELEASE ORAL at 05:57

## 2022-02-17 RX ADMIN — INSULIN GLARGINE 20 UNIT(S): 100 INJECTION, SOLUTION SUBCUTANEOUS at 21:47

## 2022-02-17 RX ADMIN — ATORVASTATIN CALCIUM 40 MILLIGRAM(S): 80 TABLET, FILM COATED ORAL at 21:47

## 2022-02-17 RX ADMIN — Medication 4 UNIT(S): at 17:06

## 2022-02-17 RX ADMIN — Medication 4 UNIT(S): at 07:50

## 2022-02-17 RX ADMIN — QUETIAPINE FUMARATE 25 MILLIGRAM(S): 200 TABLET, FILM COATED ORAL at 11:26

## 2022-02-17 RX ADMIN — Medication 3 MILLIGRAM(S): at 21:46

## 2022-02-17 RX ADMIN — Medication 1: at 11:25

## 2022-02-17 RX ADMIN — Medication 4 UNIT(S): at 11:26

## 2022-02-17 RX ADMIN — CALCITRIOL 0.5 MICROGRAM(S): 0.5 CAPSULE ORAL at 11:26

## 2022-02-17 RX ADMIN — QUETIAPINE FUMARATE 25 MILLIGRAM(S): 200 TABLET, FILM COATED ORAL at 07:57

## 2022-02-17 RX ADMIN — APIXABAN 2.5 MILLIGRAM(S): 2.5 TABLET, FILM COATED ORAL at 17:06

## 2022-02-17 RX ADMIN — APIXABAN 2.5 MILLIGRAM(S): 2.5 TABLET, FILM COATED ORAL at 05:57

## 2022-02-17 NOTE — PROGRESS NOTE ADULT - ASSESSMENT
Patient is a 81 year old Male from home with PMH of DVT (on eliquis) , HTN, HLD, DM, recently discharged from [facility] ED seen and discharged home with PO ABX. Patient presents to the ED with AMS, and agitation. UCX confirms Ampicillin sensitive enterococcus faecalis started on Unasyn. Patient admitted to medicine for enterococcus faecalis  UTI, hospital course complicated by worsening dementia with behavioral disturbances. Also expressing thoughts of self harm and suicide. Placed on 1:1 for safety, psych consulted. Also complicated by anemia requiring 1u PRBCs, improved. Concern for patient's living situation giving his cognitive decline.

## 2022-02-17 NOTE — PROGRESS NOTE ADULT - SUBJECTIVE AND OBJECTIVE BOX
Interval Events:  pt in nad    Allergies    Allergy Status Unknown    Intolerances      Endocrine/Metabolic Medications:  atorvastatin 40 milliGRAM(s) Oral at bedtime  dextrose 40% Gel 15 Gram(s) Oral once  dextrose 50% Injectable 25 Gram(s) IV Push once  dextrose 50% Injectable 12.5 Gram(s) IV Push once  dextrose 50% Injectable 25 Gram(s) IV Push once  finasteride 5 milliGRAM(s) Oral daily  glucagon  Injectable 1 milliGRAM(s) IntraMuscular once  insulin glargine Injectable (LANTUS) 20 Unit(s) SubCutaneous at bedtime  insulin lispro (ADMELOG) corrective regimen sliding scale   SubCutaneous three times a day before meals  insulin lispro Injectable (ADMELOG) 4 Unit(s) SubCutaneous three times a day before meals      Vital Signs Last 24 Hrs  T(C): 36.6 (17 Feb 2022 05:51), Max: 36.6 (17 Feb 2022 05:51)  T(F): 97.8 (17 Feb 2022 05:51), Max: 97.8 (17 Feb 2022 05:51)  HR: 95 (17 Feb 2022 05:51) (95 - 98)  BP: 165/90 (17 Feb 2022 05:51) (136/81 - 165/90)  BP(mean): --  RR: 16 (17 Feb 2022 05:51) (16 - 19)  SpO2: 100% (17 Feb 2022 05:51) (92% - 100%)      PHYSICAL EXAM  All physical exam findings normal, except those marked:  General:	Alert, active, cooperative, NAD, well hydrated  .		[] Abnormal:  Neck		Normal: supple, no cervical adenopathy, no palpable thyroid  .		[] Abnormal:  Cardiovascular	Normal: regular rate, normal S1, S2, no murmurs  .		[] Abnormal:  Respiratory	Normal: no chest wall deformity, normal respiratory pattern, CTA B/L  .		[] Abnormal:  Abdominal	Normal: soft, ND, NT, bowel sounds present, no masses, no organomegaly  .		[] Abnormal:  		Normal normal genitalia, testes descended, circumcised/uncircumcised  .		Denice stage:			Breast denice:  .		Menstrual history:  .		[] Abnormal:  Extremities	Normal: FROM x4  .		[] Abnormal:  Skin		Normal: intact and not indurated, no rash, no acanthosis nigricans  .		[] Abnormal:  Neurologic	Normal: grossly intact  .		[] Abnormal:    LABS                        10.8   6.24  )-----------( 317      ( 17 Feb 2022 06:39 )             33.1                               141    |  110    |  41                  Calcium: 8.5   / iCa: x      (02-17 @ 06:39)    ----------------------------<  198       Magnesium: x                                4.2     |  23     |  2.21             Phosphorous: x          CAPILLARY BLOOD GLUCOSE      POCT Blood Glucose.: 168 mg/dL (17 Feb 2022 07:43)  POCT Blood Glucose.: 246 mg/dL (16 Feb 2022 20:48)  POCT Blood Glucose.: 162 mg/dL (16 Feb 2022 16:28)  POCT Blood Glucose.: 207 mg/dL (16 Feb 2022 11:37)        Assesment/plan        Assesment/plan    80 yo M with PMH of DVT (on eliquis) , HTN, HLD, DM , multiple recent visits for UTI presents with confusion. . Found to have uncont dm. Pt states he takes oral dm meds as out pt with poorly cont dm. S/p recent hospitalization for uti/ uncont dm.      Problem/Recommendation - 1:  ·  Problem: Uncontrolled diabetes mellitus.   ·  Recommendation: due to acute illness and need for insulin tx  better controlled  cont lantus 20 units  and admelog 4 ac tid- hold if poor po intake   fsg ac and hs  d/w prim team.     Problem/Recommendation - 2:  ·  Problem: UTI (urinary tract infection).   ·  Recommendation: s/p abx

## 2022-02-17 NOTE — PROGRESS NOTE ADULT - ASSESSMENT
82 yo M with PMH of DVT (on eliquis) , HTN, HLD, DM ,multiple recent visits for UTI presents with confusion,depression and suicidal ideation.  1.UTI-ABX completed..  2.DM-Insulin,Endo f/u.  3.Psych f/u-on seroquel and,remeron.  4.DVT-eliquis.  5.CRI-f/u lytes.Renal f/u.  6.HTN-cont bp medication.  7.Lipid d/o-statin.  8.Anemia-s/p prbc.  9.PPI.

## 2022-02-17 NOTE — BH CONSULTATION LIAISON PROGRESS NOTE - NSBHCONSULTRECOMMENDOTHER_PSY_A_CORE FT
1. Increase standing Seroquel PO to 50 mg qam/50 mg qhs  2. For acute agitation, recommend Seroquel 25 mg PO q8h PRN agitation  3. No indication for other standing or PRN psychotropic medications at this time  4. Medical management as directed by primary team  5. Psychiatry will continue to follow  6. Pt does NOT appear to have capacity to care for self safely at home or to make dispo decisions, due to progressive dementia  --HCP/POA Mishel Avila should be approached for placement preferences and consents  7. Case d/w NP Michelle of primary team    Tammy Mead MD  Director, Consultation-Liaison Psychiatry Service  [id number]

## 2022-02-17 NOTE — PROGRESS NOTE ADULT - SUBJECTIVE AND OBJECTIVE BOX
CHIEF COMPLAINT:Patient is a 81y old  Male who presents with a chief complaint of UTI.Pt off 1 to 1 obs.    	  REVIEW OF SYSTEMS:  CONSTITUTIONAL: No fever, weight loss, or fatigue  EYES: No eye pain, visual disturbances, or discharge  ENT:  No difficulty hearing, tinnitus, vertigo; No sinus or throat pain  NECK: No pain or stiffness  RESPIRATORY: No cough, wheezing, chills or hemoptysis; No Shortness of Breath  CARDIOVASCULAR: No chest pain, palpitations, passing out, dizziness, or leg swelling  GASTROINTESTINAL: No abdominal or epigastric pain. No nausea, vomiting, or hematemesis; No diarrhea or constipation. No melena or hematochezia.  GENITOURINARY: No dysuria, frequency, hematuria, or incontinence  NEUROLOGICAL: No headaches, memory loss, loss of strength, numbness, or tremors  SKIN: No itching, burning, rashes, or lesions   LYMPH Nodes: No enlarged glands  ENDOCRINE: No heat or cold intolerance; No hair loss  MUSCULOSKELETAL: No joint pain or swelling; No muscle, back, or extremity pain  PSYCHIATRIC: No depression, anxiety, mood swings, or difficulty sleeping  HEME/LYMPH: No easy bruising, or bleeding gums  ALLERGY AND IMMUNOLOGIC: No hives or eczema	        PHYSICAL EXAM:  T(C): 36.6 (02-17-22 @ 05:51), Max: 36.6 (02-17-22 @ 05:51)  HR: 95 (02-17-22 @ 05:51) (95 - 98)  BP: 165/90 (02-17-22 @ 05:51) (136/81 - 165/90)  RR: 16 (02-17-22 @ 05:51) (16 - 18)  SpO2: 100% (02-17-22 @ 05:51) (92% - 100%)  Wt(kg): --  I&O's Summary      Appearance: Normal	  HEENT:   Normal oral mucosa, PERRL, EOMI	  Lymphatic: No lymphadenopathy  Cardiovascular: Normal S1 S2, No JVD, No murmurs, No edema  Respiratory: Lungs clear to auscultation	  Gastrointestinal:  Soft, Non-tender, + BS	  Skin: No rashes, No ecchymoses, No cyanosis	  Neurologic: Non-focal  Extremities: Normal range of motion, No clubbing, cyanosis or edema  Vascular: Peripheral pulses palpable 2+ bilaterally    MEDICATIONS  (STANDING):  apixaban 2.5 milliGRAM(s) Oral two times a day  atorvastatin 40 milliGRAM(s) Oral at bedtime  calcitriol   Capsule 0.5 MICROGram(s) Oral daily  calcium carbonate    500 mG (Tums) Chewable 1 Tablet(s) Chew two times a day  dextrose 40% Gel 15 Gram(s) Oral once  dextrose 5%. 1000 milliLiter(s) (50 mL/Hr) IV Continuous <Continuous>  dextrose 5%. 1000 milliLiter(s) (100 mL/Hr) IV Continuous <Continuous>  dextrose 50% Injectable 25 Gram(s) IV Push once  dextrose 50% Injectable 12.5 Gram(s) IV Push once  dextrose 50% Injectable 25 Gram(s) IV Push once  finasteride 5 milliGRAM(s) Oral daily  glucagon  Injectable 1 milliGRAM(s) IntraMuscular once  insulin glargine Injectable (LANTUS) 20 Unit(s) SubCutaneous at bedtime  insulin lispro (ADMELOG) corrective regimen sliding scale   SubCutaneous three times a day before meals  insulin lispro Injectable (ADMELOG) 4 Unit(s) SubCutaneous three times a day before meals  labetalol 100 milliGRAM(s) Oral every 12 hours  mirtazapine 15 milliGRAM(s) Oral at bedtime  pantoprazole    Tablet 40 milliGRAM(s) Oral before breakfast  QUEtiapine 50 milliGRAM(s) Oral at bedtime  QUEtiapine 25 milliGRAM(s) Oral daily  sodium chloride 0.9%. 1000 milliLiter(s) (75 mL/Hr) IV Continuous <Continuous>  tamsulosin 0.4 milliGRAM(s) Oral at bedtime      	  	  LABS:	 	                      10.8   6.24  )-----------( 317      ( 17 Feb 2022 06:39 )             33.1     02-17    141  |  110<H>  |  41<H>  ----------------------------<  198<H>  4.2   |  23  |  2.21<H>    Ca    8.5      17 Feb 2022 06:39

## 2022-02-17 NOTE — PROGRESS NOTE ADULT - SUBJECTIVE AND OBJECTIVE BOX
NP Note discussed with  Primary Attending    Patient is a 81y old  Male who presents with a chief complaint of UTI (17 Feb 2022 13:04)      INTERVAL HPI/OVERNIGHT EVENTS: Patient seen and examined at bedside.     MEDICATIONS  (STANDING):  apixaban 2.5 milliGRAM(s) Oral two times a day  atorvastatin 40 milliGRAM(s) Oral at bedtime  calcitriol   Capsule 0.5 MICROGram(s) Oral daily  calcium carbonate    500 mG (Tums) Chewable 1 Tablet(s) Chew two times a day  dextrose 40% Gel 15 Gram(s) Oral once  dextrose 5%. 1000 milliLiter(s) (50 mL/Hr) IV Continuous <Continuous>  dextrose 5%. 1000 milliLiter(s) (100 mL/Hr) IV Continuous <Continuous>  dextrose 50% Injectable 25 Gram(s) IV Push once  dextrose 50% Injectable 12.5 Gram(s) IV Push once  dextrose 50% Injectable 25 Gram(s) IV Push once  finasteride 5 milliGRAM(s) Oral daily  glucagon  Injectable 1 milliGRAM(s) IntraMuscular once  insulin glargine Injectable (LANTUS) 20 Unit(s) SubCutaneous at bedtime  insulin lispro (ADMELOG) corrective regimen sliding scale   SubCutaneous three times a day before meals  insulin lispro Injectable (ADMELOG) 4 Unit(s) SubCutaneous three times a day before meals  labetalol 100 milliGRAM(s) Oral every 12 hours  mirtazapine 15 milliGRAM(s) Oral at bedtime  pantoprazole    Tablet 40 milliGRAM(s) Oral before breakfast  QUEtiapine 50 milliGRAM(s) Oral at bedtime  QUEtiapine 25 milliGRAM(s) Oral daily  sodium chloride 0.9%. 1000 milliLiter(s) (75 mL/Hr) IV Continuous <Continuous>  tamsulosin 0.4 milliGRAM(s) Oral at bedtime    MEDICATIONS  (PRN):  acetaminophen     Tablet .. 650 milliGRAM(s) Oral every 6 hours PRN Temp greater or equal to 38C (100.4F), Mild Pain (1 - 3)  guaiFENesin Oral Liquid (Sugar-Free) 100 milliGRAM(s) Oral every 6 hours PRN Cough  meclizine 12.5 milliGRAM(s) Oral every 6 hours PRN Dizziness  melatonin 3 milliGRAM(s) Oral at bedtime PRN Insomnia  ondansetron Injectable 4 milliGRAM(s) IV Push every 8 hours PRN Nausea and/or Vomiting  QUEtiapine 25 milliGRAM(s) Oral every 8 hours PRN Agitation      __________________________________________________  REVIEW OF SYSTEMS:    unable to assess, poor historian       Vital Signs Last 24 Hrs  T(C): 36.4 (17 Feb 2022 13:12), Max: 36.6 (17 Feb 2022 05:51)  T(F): 97.5 (17 Feb 2022 13:12), Max: 97.8 (17 Feb 2022 05:51)  HR: 91 (17 Feb 2022 17:18) (90 - 98)  BP: 130/83 (17 Feb 2022 17:18) (130/83 - 165/90)  BP(mean): --  RR: 18 (17 Feb 2022 13:12) (16 - 18)  SpO2: 97% (17 Feb 2022 13:12) (92% - 100%)    ________________________________________________  PHYSICAL EXAM:  GENERAL: confused  HEENT: Normocephalic;  conjunctivae and sclerae clear; moist mucous membranes;   NECK : supple  CHEST/LUNG: Clear to auscultation bilaterally with good air entry   HEART: S1 S2  regular; no murmurs, gallops or rubs  ABDOMEN: Soft, Nontender, Nondistended; Bowel sounds present  EXTREMITIES: no cyanosis; no edema; no calf tenderness  SKIN: warm and dry; no rash  NERVOUS SYSTEM:  Awake and alert; Oriented  to confused illogical      _________________________________________________  LABS:                        10.8   6.24  )-----------( 317      ( 17 Feb 2022 06:39 )             33.1     02-17    141  |  110<H>  |  41<H>  ----------------------------<  198<H>  4.2   |  23  |  2.21<H>    Ca    8.5      17 Feb 2022 06:39      CAPILLARY BLOOD GLUCOSE      POCT Blood Glucose.: 110 mg/dL (17 Feb 2022 16:50)  POCT Blood Glucose.: 154 mg/dL (17 Feb 2022 11:21)  POCT Blood Glucose.: 168 mg/dL (17 Feb 2022 07:43)  POCT Blood Glucose.: 246 mg/dL (16 Feb 2022 20:48)    RADIOLOGY & ADDITIONAL TESTS:  < from: CT Head No Cont (01.27.22 @ 12:26) >    ACC: [id number] EXAM:  CT BRAIN                          PROCEDURE DATE:  01/27/2022          INTERPRETATION:  Noncontrast CT of the brain.    CLINICAL INDICATION:  Altered mental status    TECHNIQUE : Axial CT scanning of the brain was obtained fromthe skull   base to the vertex without the administration of intravenous contrast.   Sagittal and coronal reformats were provided.    COMPARISON: CT brain 12/23/2021    FINDINGS:    No hydrocephalus, mass effect, midline shift, acute intracranial   hemorrhage, or brain edema.    Mild white matter microvascular ischemic disease.    Visualized paranasal sinuses and mastoid air cells are clear.    IMPRESSION:    No hydrocephalus, acute intracranial hemorrhage, mass effect, or brain   edema.    --- End of Report ---    < end of copied text >    Imaging  Reviewed:  YES    Consultant(s) Notes Reviewed:   YES      Plan of care was discussed with patient and /or primary care giver; all questions and concerns were addressed

## 2022-02-18 LAB
ANION GAP SERPL CALC-SCNC: 8 MMOL/L — SIGNIFICANT CHANGE UP (ref 5–17)
BUN SERPL-MCNC: 42 MG/DL — HIGH (ref 7–18)
CALCIUM SERPL-MCNC: 8.8 MG/DL — SIGNIFICANT CHANGE UP (ref 8.4–10.5)
CHLORIDE SERPL-SCNC: 112 MMOL/L — HIGH (ref 96–108)
CO2 SERPL-SCNC: 23 MMOL/L — SIGNIFICANT CHANGE UP (ref 22–31)
CREAT SERPL-MCNC: 2.18 MG/DL — HIGH (ref 0.5–1.3)
GLUCOSE BLDC GLUCOMTR-MCNC: 137 MG/DL — HIGH (ref 70–99)
GLUCOSE BLDC GLUCOMTR-MCNC: 340 MG/DL — HIGH (ref 70–99)
GLUCOSE SERPL-MCNC: 162 MG/DL — HIGH (ref 70–99)
HCT VFR BLD CALC: 30.8 % — LOW (ref 39–50)
HGB BLD-MCNC: 10 G/DL — LOW (ref 13–17)
MCHC RBC-ENTMCNC: 27.9 PG — SIGNIFICANT CHANGE UP (ref 27–34)
MCHC RBC-ENTMCNC: 32.5 GM/DL — SIGNIFICANT CHANGE UP (ref 32–36)
MCV RBC AUTO: 86 FL — SIGNIFICANT CHANGE UP (ref 80–100)
NRBC # BLD: 0 /100 WBCS — SIGNIFICANT CHANGE UP (ref 0–0)
PLATELET # BLD AUTO: 280 K/UL — SIGNIFICANT CHANGE UP (ref 150–400)
POTASSIUM SERPL-MCNC: 3.8 MMOL/L — SIGNIFICANT CHANGE UP (ref 3.5–5.3)
POTASSIUM SERPL-SCNC: 3.8 MMOL/L — SIGNIFICANT CHANGE UP (ref 3.5–5.3)
RBC # BLD: 3.58 M/UL — LOW (ref 4.2–5.8)
RBC # FLD: 14.5 % — SIGNIFICANT CHANGE UP (ref 10.3–14.5)
SODIUM SERPL-SCNC: 143 MMOL/L — SIGNIFICANT CHANGE UP (ref 135–145)
WBC # BLD: 6.31 K/UL — SIGNIFICANT CHANGE UP (ref 3.8–10.5)
WBC # FLD AUTO: 6.31 K/UL — SIGNIFICANT CHANGE UP (ref 3.8–10.5)

## 2022-02-18 PROCEDURE — 99232 SBSQ HOSP IP/OBS MODERATE 35: CPT

## 2022-02-18 RX ORDER — QUETIAPINE FUMARATE 200 MG/1
25 TABLET, FILM COATED ORAL EVERY 12 HOURS
Refills: 0 | Status: DISCONTINUED | OUTPATIENT
Start: 2022-02-18 | End: 2022-02-23

## 2022-02-18 RX ADMIN — Medication 100 MILLIGRAM(S): at 17:56

## 2022-02-18 RX ADMIN — QUETIAPINE FUMARATE 50 MILLIGRAM(S): 200 TABLET, FILM COATED ORAL at 21:42

## 2022-02-18 RX ADMIN — ATORVASTATIN CALCIUM 40 MILLIGRAM(S): 80 TABLET, FILM COATED ORAL at 21:42

## 2022-02-18 RX ADMIN — INSULIN GLARGINE 20 UNIT(S): 100 INJECTION, SOLUTION SUBCUTANEOUS at 22:28

## 2022-02-18 RX ADMIN — Medication 3 MILLIGRAM(S): at 21:42

## 2022-02-18 RX ADMIN — MIRTAZAPINE 15 MILLIGRAM(S): 45 TABLET, ORALLY DISINTEGRATING ORAL at 21:42

## 2022-02-18 RX ADMIN — QUETIAPINE FUMARATE 50 MILLIGRAM(S): 200 TABLET, FILM COATED ORAL at 11:25

## 2022-02-18 RX ADMIN — APIXABAN 2.5 MILLIGRAM(S): 2.5 TABLET, FILM COATED ORAL at 05:53

## 2022-02-18 RX ADMIN — Medication 1 TABLET(S): at 05:53

## 2022-02-18 RX ADMIN — APIXABAN 2.5 MILLIGRAM(S): 2.5 TABLET, FILM COATED ORAL at 17:56

## 2022-02-18 RX ADMIN — FINASTERIDE 5 MILLIGRAM(S): 5 TABLET, FILM COATED ORAL at 11:26

## 2022-02-18 RX ADMIN — QUETIAPINE FUMARATE 25 MILLIGRAM(S): 200 TABLET, FILM COATED ORAL at 21:43

## 2022-02-18 RX ADMIN — QUETIAPINE FUMARATE 25 MILLIGRAM(S): 200 TABLET, FILM COATED ORAL at 11:25

## 2022-02-18 RX ADMIN — TAMSULOSIN HYDROCHLORIDE 0.4 MILLIGRAM(S): 0.4 CAPSULE ORAL at 21:42

## 2022-02-18 RX ADMIN — Medication 100 MILLIGRAM(S): at 05:53

## 2022-02-18 RX ADMIN — CALCITRIOL 0.5 MICROGRAM(S): 0.5 CAPSULE ORAL at 11:26

## 2022-02-18 RX ADMIN — PANTOPRAZOLE SODIUM 40 MILLIGRAM(S): 20 TABLET, DELAYED RELEASE ORAL at 05:53

## 2022-02-18 NOTE — PROGRESS NOTE ADULT - SUBJECTIVE AND OBJECTIVE BOX
CHIEF COMPLAINT:Patient is a 81y old  Male who presents with a chief complaint of UTI.Pt appears comfortable.    	  REVIEW OF SYSTEMS:  CONSTITUTIONAL: No fever, weight loss, or fatigue  EYES: No eye pain, visual disturbances, or discharge  ENT:  No difficulty hearing, tinnitus, vertigo; No sinus or throat pain  NECK: No pain or stiffness  RESPIRATORY: No cough, wheezing, chills or hemoptysis; No Shortness of Breath  CARDIOVASCULAR: No chest pain, palpitations, passing out, dizziness, or leg swelling  GASTROINTESTINAL: No abdominal or epigastric pain. No nausea, vomiting, or hematemesis; No diarrhea or constipation. No melena or hematochezia.  GENITOURINARY: No dysuria, frequency, hematuria, or incontinence  NEUROLOGICAL: No headaches, memory loss, loss of strength, numbness, or tremors  SKIN: No itching, burning, rashes, or lesions   LYMPH Nodes: No enlarged glands  ENDOCRINE: No heat or cold intolerance; No hair loss  MUSCULOSKELETAL: No joint pain or swelling; No muscle, back, or extremity pain  PSYCHIATRIC: No depression, anxiety, mood swings, or difficulty sleeping  HEME/LYMPH: No easy bruising, or bleeding gums  ALLERGY AND IMMUNOLOGIC: No hives or eczema	      PHYSICAL EXAM:  T(C): 36.3 (02-18-22 @ 04:59), Max: 36.7 (02-17-22 @ 19:32)  HR: 95 (02-18-22 @ 04:59) (83 - 95)  BP: 136/91 (02-18-22 @ 04:59) (130/83 - 150/89)  RR: 18 (02-18-22 @ 04:59) (18 - 18)  SpO2: 95% (02-18-22 @ 04:59) (95% - 97%)  Wt(kg): --  I&O's Summary      Appearance: Normal	  HEENT:   Normal oral mucosa, PERRL, EOMI	  Lymphatic: No lymphadenopathy  Cardiovascular: Normal S1 S2, No JVD, No murmurs, No edema  Respiratory: Lungs clear to auscultation	  Gastrointestinal:  Soft, Non-tender, + BS	  Skin: No rashes, No ecchymoses, No cyanosis	  Neurologic: Non-focal  Extremities: Normal range of motion, No clubbing, cyanosis or edema  Vascular: Peripheral pulses palpable 2+ bilaterally    MEDICATIONS  (STANDING):  apixaban 2.5 milliGRAM(s) Oral two times a day  atorvastatin 40 milliGRAM(s) Oral at bedtime  calcitriol   Capsule 0.5 MICROGram(s) Oral daily  calcium carbonate    500 mG (Tums) Chewable 1 Tablet(s) Chew two times a day  dextrose 40% Gel 15 Gram(s) Oral once  dextrose 5%. 1000 milliLiter(s) (50 mL/Hr) IV Continuous <Continuous>  dextrose 5%. 1000 milliLiter(s) (100 mL/Hr) IV Continuous <Continuous>  dextrose 50% Injectable 25 Gram(s) IV Push once  dextrose 50% Injectable 12.5 Gram(s) IV Push once  dextrose 50% Injectable 25 Gram(s) IV Push once  finasteride 5 milliGRAM(s) Oral daily  glucagon  Injectable 1 milliGRAM(s) IntraMuscular once  insulin glargine Injectable (LANTUS) 20 Unit(s) SubCutaneous at bedtime  insulin lispro (ADMELOG) corrective regimen sliding scale   SubCutaneous three times a day before meals  insulin lispro Injectable (ADMELOG) 4 Unit(s) SubCutaneous three times a day before meals  labetalol 100 milliGRAM(s) Oral every 12 hours  mirtazapine 15 milliGRAM(s) Oral at bedtime  pantoprazole    Tablet 40 milliGRAM(s) Oral before breakfast  QUEtiapine 50 milliGRAM(s) Oral at bedtime  QUEtiapine 50 milliGRAM(s) Oral daily  sodium chloride 0.9%. 1000 milliLiter(s) (75 mL/Hr) IV Continuous <Continuous>  tamsulosin 0.4 milliGRAM(s) Oral at bedtime      LABS:	 	                         10.0   6.31  )-----------( 280      ( 18 Feb 2022 06:07 )             30.8     02-18    143  |  112<H>  |  42<H>  ----------------------------<  162<H>  3.8   |  23  |  2.18<H>    Ca    8.8      18 Feb 2022 06:07

## 2022-02-18 NOTE — CHART NOTE - NSCHARTNOTEFT_GEN_A_CORE
Reassessment:     81yMalePatient is a 81y old  Male who presents with a chief complaint of UTI (18 Feb 2022 12:38)      Factors impacting intake: [ ] none [ ] nausea  [ ] vomiting [ ] diarrhea [ ] constipation  [ ]chewing problems [ ] swallowing issues  [ ] other:     Diet Presciption: Diet, Consistent Carbohydrate w/Evening Snack:   No Concentrated Potassium  Low Sodium (02-06-22 @ 13:07)    Intake:     Daily   % Weight Change    Pertinent Medications: MEDICATIONS  (STANDING):  apixaban 2.5 milliGRAM(s) Oral two times a day  atorvastatin 40 milliGRAM(s) Oral at bedtime  calcitriol   Capsule 0.5 MICROGram(s) Oral daily  calcium carbonate    500 mG (Tums) Chewable 1 Tablet(s) Chew two times a day  dextrose 40% Gel 15 Gram(s) Oral once  dextrose 5%. 1000 milliLiter(s) (50 mL/Hr) IV Continuous <Continuous>  dextrose 5%. 1000 milliLiter(s) (100 mL/Hr) IV Continuous <Continuous>  dextrose 50% Injectable 25 Gram(s) IV Push once  dextrose 50% Injectable 12.5 Gram(s) IV Push once  dextrose 50% Injectable 25 Gram(s) IV Push once  finasteride 5 milliGRAM(s) Oral daily  glucagon  Injectable 1 milliGRAM(s) IntraMuscular once  insulin glargine Injectable (LANTUS) 20 Unit(s) SubCutaneous at bedtime  insulin lispro (ADMELOG) corrective regimen sliding scale   SubCutaneous three times a day before meals  insulin lispro Injectable (ADMELOG) 4 Unit(s) SubCutaneous three times a day before meals  labetalol 100 milliGRAM(s) Oral every 12 hours  mirtazapine 15 milliGRAM(s) Oral at bedtime  pantoprazole    Tablet 40 milliGRAM(s) Oral before breakfast  QUEtiapine 50 milliGRAM(s) Oral at bedtime  QUEtiapine 50 milliGRAM(s) Oral daily  sodium chloride 0.9%. 1000 milliLiter(s) (75 mL/Hr) IV Continuous <Continuous>  tamsulosin 0.4 milliGRAM(s) Oral at bedtime    MEDICATIONS  (PRN):  acetaminophen     Tablet .. 650 milliGRAM(s) Oral every 6 hours PRN Temp greater or equal to 38C (100.4F), Mild Pain (1 - 3)  guaiFENesin Oral Liquid (Sugar-Free) 100 milliGRAM(s) Oral every 6 hours PRN Cough  meclizine 12.5 milliGRAM(s) Oral every 6 hours PRN Dizziness  melatonin 3 milliGRAM(s) Oral at bedtime PRN Insomnia  ondansetron Injectable 4 milliGRAM(s) IV Push every 8 hours PRN Nausea and/or Vomiting  QUEtiapine 25 milliGRAM(s) Oral every 8 hours PRN Agitation    Pertinent Labs: 02-18 Na143 mmol/L Glu 162 mg/dL<H> K+ 3.8 mmol/L Cr  2.18 mg/dL<H> BUN 42 mg/dL<H>     CAPILLARY BLOOD GLUCOSE      POCT Blood Glucose.: 137 mg/dL (18 Feb 2022 08:28)  POCT Blood Glucose.: 156 mg/dL (17 Feb 2022 21:02)  POCT Blood Glucose.: 110 mg/dL (17 Feb 2022 16:50)    Skin:     Estimated Needs:   [ ] no change since previous assessment  [ ] recalculated:     Previous Nutrition Diagnosis:   [ ] Inadequate Energy Intake [ ]Inadequate Oral Intake [ ] Excessive Energy Intake   [ ] Underweight [ ] Increased Nutrient Needs [ ] Overweight/Obesity   [ ] Altered GI Function [ ] Unintended Weight Loss [ ] Food & Nutrition Related Knowledge Deficit [ ] Malnutrition     Nutrition Diagnosis is [ ] ongoing  [ ] resolved [ ] not applicable     New Nutrition Diagnosis: [ ] not applicable       Interventions:   Recommend  [ ] Change Diet To:  [ ] Nutrition Supplement  [ ] Nutrition Support  [ ] Other:     Monitoring and Evaluation:   [ ] PO intake [ x ] Tolerance to diet prescription [ x ] weights [ x ] labs[ x ] follow up per protocol  [ ] other: Reassessment:     Patient is a 81y old  Male who presents with a chief complaint of UTI (18 Feb 2022 12:38)      Factors impacting intake: [ ] none [ ] nausea  [ ] vomiting [ ] diarrhea [ ] constipation  [ ]chewing problems [ ] swallowing issues  [X ] other: AMS, dementia, Alzheimer's, uncontrolled diabetes, HTN, GERD, DVT    Diet Prescription: Diet, Consistent Carbohydrate w/Evening Snack:   No Concentrated Potassium  Low Sodium (02-06-22 @ 13:07)    Intake: Visited pt. alert but confused & "mildly agitated", d/w PCA, pt. "very resistant" & refusing treatment, consumed  >50% of lunch tray today & tolerating, needs assistance with meal set up, no reports of GI distress, per flow sheet intake % noted, suggest adding renal to consistent carbohydrate, low sodium diet, as medically feasible, Psych consulted, followed by Endo/team, awaiting placement. RD available.      Daily weights: nursing to monitor daily weights   % Weight Change: ?    Pertinent Medications: MEDICATIONS  (STANDING):  apixaban 2.5 milliGRAM(s) Oral two times a day  atorvastatin 40 milliGRAM(s) Oral at bedtime  calcitriol   Capsule 0.5 MICROGram(s) Oral daily  calcium carbonate    500 mG (Tums) Chewable 1 Tablet(s) Chew two times a day  dextrose 40% Gel 15 Gram(s) Oral once  dextrose 5%. 1000 milliLiter(s) (50 mL/Hr) IV Continuous <Continuous>  dextrose 5%. 1000 milliLiter(s) (100 mL/Hr) IV Continuous <Continuous>  dextrose 50% Injectable 25 Gram(s) IV Push once  dextrose 50% Injectable 12.5 Gram(s) IV Push once  dextrose 50% Injectable 25 Gram(s) IV Push once  finasteride 5 milliGRAM(s) Oral daily  glucagon  Injectable 1 milliGRAM(s) IntraMuscular once  insulin glargine Injectable (LANTUS) 20 Unit(s) SubCutaneous at bedtime  insulin lispro (ADMELOG) corrective regimen sliding scale   SubCutaneous three times a day before meals  insulin lispro Injectable (ADMELOG) 4 Unit(s) SubCutaneous three times a day before meals  labetalol 100 milliGRAM(s) Oral every 12 hours  mirtazapine 15 milliGRAM(s) Oral at bedtime  pantoprazole    Tablet 40 milliGRAM(s) Oral before breakfast  QUEtiapine 50 milliGRAM(s) Oral at bedtime  QUEtiapine 50 milliGRAM(s) Oral daily  sodium chloride 0.9%. 1000 milliLiter(s) (75 mL/Hr) IV Continuous <Continuous>  tamsulosin 0.4 milliGRAM(s) Oral at bedtime    MEDICATIONS  (PRN):  acetaminophen     Tablet .. 650 milliGRAM(s) Oral every 6 hours PRN Temp greater or equal to 38C (100.4F), Mild Pain (1 - 3)  guaiFENesin Oral Liquid (Sugar-Free) 100 milliGRAM(s) Oral every 6 hours PRN Cough  meclizine 12.5 milliGRAM(s) Oral every 6 hours PRN Dizziness  melatonin 3 milliGRAM(s) Oral at bedtime PRN Insomnia  ondansetron Injectable 4 milliGRAM(s) IV Push every 8 hours PRN Nausea and/or Vomiting  QUEtiapine 25 milliGRAM(s) Oral every 8 hours PRN Agitation    Pertinent Labs: 02-18 Na143 mmol/L Glu 162 mg/dL<H> K+ 3.8 mmol/L Cr  2.18 mg/dL<H> BUN 42 mg/dL<H>     CAPILLARY BLOOD GLUCOSE      POCT Blood Glucose.: 137 mg/dL (18 Feb 2022 08:28)  POCT Blood Glucose.: 156 mg/dL (17 Feb 2022 21:02)  POCT Blood Glucose.: 110 mg/dL (17 Feb 2022 16:50)    Skin: intact    Estimated Needs:   [X ] no change since previous assessment  [ ] recalculated:     Previous Nutrition Diagnosis:   [ ] Inadequate Energy Intake [ ]Inadequate Oral Intake [ ] Excessive Energy Intake   [ ] Underweight [ ] Increased Nutrient Needs [ ] Overweight/Obesity   [X ] Altered nutrition lab values [ ] Unintended Weight Loss [ ] Food & Nutrition Related Knowledge Deficit [ ] Malnutrition     Nutrition Diagnosis is [X ] ongoing  [ ] resolved [ ] not applicable     New Nutrition Diagnosis: [ ] not applicable     Interventions: To meet nutrition needs     Recommend  [ ] Change Diet To:  [ X] Nutrition Supplement: Add MVI/minerals daily as medically feasible   [ ] Nutrition Support  [X ] Other: Nursing to continue feeding assistance and encouragement, aspiration precaution     Monitoring and Evaluation:   [X ] PO intake [ x ] Tolerance to diet prescription [ x ] weights [ x ] labs[ x ] follow up per protocol  [ ] other:

## 2022-02-18 NOTE — PROGRESS NOTE ADULT - ASSESSMENT
Patient is a 81 year old Male from home with PMH of DVT (on eliquis) , HTN, HLD, DM, recently discharged from [facility] ED seen and discharged home with PO ABX. Patient presents to the ED with AMS, and agitation. UCX confirms Ampicillin sensitive enterococcus faecalis started on Unasyn. Patient admitted to medicine for enterococcus faecalis  UTI, hospital course complicated by worsening dementia with behavioral disturbances. Also expressing thoughts of self harm and suicide. Placed on 1:1 for safety, psych consulted. Also complicated by anemia requiring 1u PRBCs, improved. Concern for patient's living situation giving his cognitive decline.

## 2022-02-18 NOTE — PROGRESS NOTE ADULT - SUBJECTIVE AND OBJECTIVE BOX
Interval Events:  pt in nad    Allergies    Allergy Status Unknown    Intolerances      Endocrine/Metabolic Medications:  atorvastatin 40 milliGRAM(s) Oral at bedtime  dextrose 40% Gel 15 Gram(s) Oral once  dextrose 50% Injectable 25 Gram(s) IV Push once  dextrose 50% Injectable 12.5 Gram(s) IV Push once  dextrose 50% Injectable 25 Gram(s) IV Push once  finasteride 5 milliGRAM(s) Oral daily  glucagon  Injectable 1 milliGRAM(s) IntraMuscular once  insulin glargine Injectable (LANTUS) 20 Unit(s) SubCutaneous at bedtime  insulin lispro (ADMELOG) corrective regimen sliding scale   SubCutaneous three times a day before meals  insulin lispro Injectable (ADMELOG) 4 Unit(s) SubCutaneous three times a day before meals      Vital Signs Last 24 Hrs  T(C): 36.3 (18 Feb 2022 04:59), Max: 36.7 (17 Feb 2022 19:32)  T(F): 97.4 (18 Feb 2022 04:59), Max: 98.1 (17 Feb 2022 19:32)  HR: 95 (18 Feb 2022 04:59) (83 - 95)  BP: 136/91 (18 Feb 2022 04:59) (130/83 - 150/89)  BP(mean): --  RR: 18 (18 Feb 2022 04:59) (18 - 18)  SpO2: 95% (18 Feb 2022 04:59) (95% - 100%)      PHYSICAL EXAM  All physical exam findings normal, except those marked:  General:	Alert, active, cooperative, NAD, well hydrated  .		[] Abnormal:  Neck		Normal: supple, no cervical adenopathy, no palpable thyroid  .		[] Abnormal:  Cardiovascular	Normal: regular rate, normal S1, S2, no murmurs  .		[] Abnormal:  Respiratory	Normal: no chest wall deformity, normal respiratory pattern, CTA B/L  .		[] Abnormal:  Abdominal	Normal: soft, ND, NT, bowel sounds present, no masses, no organomegaly  .		[] Abnormal:  		Normal normal genitalia, testes descended, circumcised/uncircumcised  .		Denice stage:			Breast denice:  .		Menstrual history:  .		[] Abnormal:  Extremities	Normal: FROM x4  .		[] Abnormal:  Skin		Normal: intact and not indurated, no rash, no acanthosis nigricans  .		[] Abnormal:  Neurologic	Normal: grossly intact  .		[] Abnormal:    LABS                        10.0   6.31  )-----------( 280      ( 18 Feb 2022 06:07 )             30.8                               143    |  112    |  42                  Calcium: 8.8   / iCa: x      (02-18 @ 06:07)    ----------------------------<  162       Magnesium: x                                3.8     |  23     |  2.18             Phosphorous: x          CAPILLARY BLOOD GLUCOSE      POCT Blood Glucose.: 137 mg/dL (18 Feb 2022 08:28)  POCT Blood Glucose.: 156 mg/dL (17 Feb 2022 21:02)  POCT Blood Glucose.: 110 mg/dL (17 Feb 2022 16:50)  POCT Blood Glucose.: 154 mg/dL (17 Feb 2022 11:21)        Assesment/plan    82 yo M with PMH of DVT (on eliquis) , HTN, HLD, DM , multiple recent visits for UTI presents with confusion. . Found to have uncont dm. Pt states he takes oral dm meds as out pt with poorly cont dm. S/p recent hospitalization for uti/ uncont dm.      Problem/Recommendation - 1:  ·  Problem: Uncontrolled diabetes mellitus.   ·  Recommendation: due to acute illness and need for insulin tx  better controlled  cont lantus 20 units  and admelog 4 ac tid- hold if poor po intake   fsg ac and hs  d/w prim team.     Problem/Recommendation - 2:  ·  Problem: UTI (urinary tract infection).   ·  Recommendation: s/p abx

## 2022-02-18 NOTE — BH CONSULTATION LIAISON PROGRESS NOTE - NSBHCONSULTRECOMMENDOTHER_PSY_A_CORE FT
1. C/w standing Seroquel PO 50 mg qam/50 mg qhs  2. For acute agitation, recommend Seroquel 25 mg PO q8h PRN agitation  3. No indication for other standing or PRN psychotropic medications at this time  4. Medical management as directed by primary team  5. Psychiatry will continue to follow  6. Pt does NOT appear to have capacity to care for self safely at home or to make dispo decisions, due to progressive dementia  --HCP/POA Mishel Avila should be approached for placement preferences and consents  7. Case d/w NP Michelle of primary team    Tammy Mead MD  Director, Consultation-Liaison Psychiatry Service  [id number]

## 2022-02-18 NOTE — PROGRESS NOTE ADULT - ASSESSMENT
80 yo M with PMH of DVT (on eliquis) , HTN, HLD, DM ,multiple recent visits for UTI presents with confusion,depression and suicidal ideation.  1.UTI-ABX completed..  2.DM-Insulin,Endo f/u.  3.Psych f/u-on seroquel,remeron.  4.DVT-eliquis.  5.CRI-f/u lytes  6.HTN-cont bp medication.  7.Lipid d/o-statin.  8.Anemia-s/p prbc.  9.PPI.

## 2022-02-18 NOTE — PROGRESS NOTE ADULT - SUBJECTIVE AND OBJECTIVE BOX
HPI:  82 yo M with PMH of DVT (on eliquis) , HTN, HLD, DM , multiple recent visits for UTI presents with confusion. Pt arrived by EMS, but he is unable to tell me why he called ambulance. Patient is AAO x 2 but he is so aggressive and poor historian. Hence most of history is taken from ED and medical records.  Patient was recently admitted for UTI and was evaluated by ID , urine culture grew enterococcus fecalis sensitive to ampicillin and patient was discharged on augmentin PO but he didnot  meds, a Note from recent ED visit states that pt came back to ED and seen by SW ( refer to SW note ) ,  pt was given bottle of pills for PO abx treatment of UTI, but pt states he does not remember this. ED attending Spoke with pt's DIAMANTE Florentino who states she is working with SW to try and arrange for [facility] vs assisted living.   patient denies any headache, dizziness, chest pain, palpitations, shortness of breath, abdominal pain, nausea/vomiting/diarrhea, urinary symptoms or any other acute complaint.     (04 Feb 2022 23:06)      Patient is a 81y old  Male who presents with a chief complaint of UTI (18 Feb 2022 12:38)    INTERVAL HPI/OVERNIGHT EVENTS:  T(C): 36.3 (02-18-22 @ 12:35), Max: 36.7 (02-17-22 @ 19:32)  HR: 100 (02-18-22 @ 12:35) (83 - 100)  BP: 112/74 (02-18-22 @ 12:35) (112/74 - 150/89)  RR: 19 (02-18-22 @ 12:35) (18 - 19)  SpO2: 96% (02-18-22 @ 12:35) (95% - 96%)  Wt(kg): --  I&O's Summary      REVIEW OF SYSTEMS: denies fever, chills, SOB, palpitations, chest pain, abdominal pain, nausea, vomitting, diarrhea, constipation, dizziness    MEDICATIONS  (STANDING):  apixaban 2.5 milliGRAM(s) Oral two times a day  atorvastatin 40 milliGRAM(s) Oral at bedtime  calcitriol   Capsule 0.5 MICROGram(s) Oral daily  calcium carbonate    500 mG (Tums) Chewable 1 Tablet(s) Chew two times a day  dextrose 40% Gel 15 Gram(s) Oral once  dextrose 5%. 1000 milliLiter(s) (50 mL/Hr) IV Continuous <Continuous>  dextrose 5%. 1000 milliLiter(s) (100 mL/Hr) IV Continuous <Continuous>  dextrose 50% Injectable 25 Gram(s) IV Push once  dextrose 50% Injectable 12.5 Gram(s) IV Push once  dextrose 50% Injectable 25 Gram(s) IV Push once  finasteride 5 milliGRAM(s) Oral daily  glucagon  Injectable 1 milliGRAM(s) IntraMuscular once  insulin glargine Injectable (LANTUS) 20 Unit(s) SubCutaneous at bedtime  insulin lispro (ADMELOG) corrective regimen sliding scale   SubCutaneous three times a day before meals  insulin lispro Injectable (ADMELOG) 4 Unit(s) SubCutaneous three times a day before meals  labetalol 100 milliGRAM(s) Oral every 12 hours  mirtazapine 15 milliGRAM(s) Oral at bedtime  pantoprazole    Tablet 40 milliGRAM(s) Oral before breakfast  QUEtiapine 50 milliGRAM(s) Oral at bedtime  QUEtiapine 50 milliGRAM(s) Oral daily  sodium chloride 0.9%. 1000 milliLiter(s) (75 mL/Hr) IV Continuous <Continuous>  tamsulosin 0.4 milliGRAM(s) Oral at bedtime    MEDICATIONS  (PRN):  acetaminophen     Tablet .. 650 milliGRAM(s) Oral every 6 hours PRN Temp greater or equal to 38C (100.4F), Mild Pain (1 - 3)  guaiFENesin Oral Liquid (Sugar-Free) 100 milliGRAM(s) Oral every 6 hours PRN Cough  meclizine 12.5 milliGRAM(s) Oral every 6 hours PRN Dizziness  melatonin 3 milliGRAM(s) Oral at bedtime PRN Insomnia  ondansetron Injectable 4 milliGRAM(s) IV Push every 8 hours PRN Nausea and/or Vomiting  QUEtiapine 25 milliGRAM(s) Oral every 8 hours PRN Agitation      PHYSICAL EXAM:  GENERAL: NAD, well-groomed, well-developed  HEAD:  Atraumatic, Normocephalic  EYES: EOMI, PERRLA, conjunctiva and sclera clear  ENMT: No tonsillar erythema, exudates, or enlargement; Moist mucous membranes, Good dentition, No lesions  NECK: Supple, No JVD, Normal thyroid  NERVOUS SYSTEM:  Alert & Oriented X3, Good concentration; Motor Strength 5/5 B/L upper and lower extremities; DTRs 2+ intact and symmetric  CHEST/LUNG: Clear to percussion bilaterally; No rales, rhonchi, wheezing, or rubs  HEART: Regular rate and rhythm; No murmurs, rubs, or gallops  ABDOMEN: Soft, Nontender, Nondistended; Bowel sounds present  EXTREMITIES:  2+ Peripheral Pulses, No clubbing, cyanosis, or edema  LYMPH: No lymphadenopathy noted  SKIN: No rashes or lesions  LABS:                        10.0   6.31  )-----------( 280      ( 18 Feb 2022 06:07 )             30.8     02-18    143  |  112<H>  |  42<H>  ----------------------------<  162<H>  3.8   |  23  |  2.18<H>    Ca    8.8      18 Feb 2022 06:07          CAPILLARY BLOOD GLUCOSE      POCT Blood Glucose.: 137 mg/dL (18 Feb 2022 08:28)  POCT Blood Glucose.: 156 mg/dL (17 Feb 2022 21:02)  POCT Blood Glucose.: 110 mg/dL (17 Feb 2022 16:50)         HPI:  80 yo M with PMH of DVT (on eliquis) , HTN, HLD, DM , multiple recent visits for UTI presents with confusion. Pt arrived by EMS, but he is unable to tell me why he called ambulance. Patient is AAO x 2 but he is so aggressive and poor historian. Hence most of history is taken from ED and medical records.  Patient was recently admitted for UTI and was evaluated by ID , urine culture grew enterococcus fecalis sensitive to ampicillin and patient was discharged on augmentin PO but he didnot  meds, a Note from recent ED visit states that pt came back to ED and seen by SW ( refer to SW note ) ,  pt was given bottle of pills for PO abx treatment of UTI, but pt states he does not remember this. ED attending Spoke with pt's DIAMANTE Florentino who states she is working with SW to try and arrange for [facility] vs assisted living.   patient denies any headache, dizziness, chest pain, palpitations, shortness of breath, abdominal pain, nausea/vomiting/diarrhea, urinary symptoms or any other acute complaint.     (04 Feb 2022 23:06)      Patient is a 81y old  Male who presents with a chief complaint of UTI (18 Feb 2022 12:38). Patient seen this am, agitated but NAD.    INTERVAL HPI/OVERNIGHT EVENTS:  T(C): 36.3 (02-18-22 @ 12:35), Max: 36.7 (02-17-22 @ 19:32)  HR: 100 (02-18-22 @ 12:35) (83 - 100)  BP: 112/74 (02-18-22 @ 12:35) (112/74 - 150/89)  RR: 19 (02-18-22 @ 12:35) (18 - 19)  SpO2: 96% (02-18-22 @ 12:35) (95% - 96%)  Wt(kg): --  I&O's Summary      REVIEW OF SYSTEMS: denies fever, chills, SOB, palpitations, chest pain, abdominal pain, nausea, vomitting, diarrhea, constipation, dizziness    MEDICATIONS  (STANDING):  apixaban 2.5 milliGRAM(s) Oral two times a day  atorvastatin 40 milliGRAM(s) Oral at bedtime  calcitriol   Capsule 0.5 MICROGram(s) Oral daily  calcium carbonate    500 mG (Tums) Chewable 1 Tablet(s) Chew two times a day  dextrose 40% Gel 15 Gram(s) Oral once  dextrose 5%. 1000 milliLiter(s) (50 mL/Hr) IV Continuous <Continuous>  dextrose 5%. 1000 milliLiter(s) (100 mL/Hr) IV Continuous <Continuous>  dextrose 50% Injectable 25 Gram(s) IV Push once  dextrose 50% Injectable 12.5 Gram(s) IV Push once  dextrose 50% Injectable 25 Gram(s) IV Push once  finasteride 5 milliGRAM(s) Oral daily  glucagon  Injectable 1 milliGRAM(s) IntraMuscular once  insulin glargine Injectable (LANTUS) 20 Unit(s) SubCutaneous at bedtime  insulin lispro (ADMELOG) corrective regimen sliding scale   SubCutaneous three times a day before meals  insulin lispro Injectable (ADMELOG) 4 Unit(s) SubCutaneous three times a day before meals  labetalol 100 milliGRAM(s) Oral every 12 hours  mirtazapine 15 milliGRAM(s) Oral at bedtime  pantoprazole    Tablet 40 milliGRAM(s) Oral before breakfast  QUEtiapine 50 milliGRAM(s) Oral at bedtime  QUEtiapine 50 milliGRAM(s) Oral daily  sodium chloride 0.9%. 1000 milliLiter(s) (75 mL/Hr) IV Continuous <Continuous>  tamsulosin 0.4 milliGRAM(s) Oral at bedtime    MEDICATIONS  (PRN):  acetaminophen     Tablet .. 650 milliGRAM(s) Oral every 6 hours PRN Temp greater or equal to 38C (100.4F), Mild Pain (1 - 3)  guaiFENesin Oral Liquid (Sugar-Free) 100 milliGRAM(s) Oral every 6 hours PRN Cough  meclizine 12.5 milliGRAM(s) Oral every 6 hours PRN Dizziness  melatonin 3 milliGRAM(s) Oral at bedtime PRN Insomnia  ondansetron Injectable 4 milliGRAM(s) IV Push every 8 hours PRN Nausea and/or Vomiting  QUEtiapine 25 milliGRAM(s) Oral every 8 hours PRN Agitation      PHYSICAL EXAM:  GENERAL: NAD  HEAD:  Atraumatic, Normocephalic  EYES: EOMI  ENMT: Nares patent  NECK: Supple, No JVD, Normal thyroid  NERVOUS SYSTEM:  Alert & Oriented X3  CHEST/LUNG: Clear to auscultation bilaterally  HEART: S1, S2. Regular rate and rhythm  ABDOMEN: Soft, Nontender  EXTREMITIES: No edema  LYMPH: No lymphadenopathy noted  SKIN: No rashes or lesions  LABS:                        10.0   6.31  )-----------( 280      ( 18 Feb 2022 06:07 )             30.8     02-18    143  |  112<H>  |  42<H>  ----------------------------<  162<H>  3.8   |  23  |  2.18<H>    Ca    8.8      18 Feb 2022 06:07      CAPILLARY BLOOD GLUCOSE      POCT Blood Glucose.: 137 mg/dL (18 Feb 2022 08:28)  POCT Blood Glucose.: 156 mg/dL (17 Feb 2022 21:02)  POCT Blood Glucose.: 110 mg/dL (17 Feb 2022 16:50)

## 2022-02-18 NOTE — BH CONSULTATION LIAISON PROGRESS NOTE - NSBHINDICATION_PSY_ALL_CORE
Agitation, suicidal statement on 2/8
Pt is consistently denying SI
Agitation
Agitation, suicidal statement on 2/8
Pt is consistently denying SI
Agitation, suicidal statement on 2/8
Agitation, suicidal statement on 2/8

## 2022-02-18 NOTE — PROGRESS NOTE ADULT - PROBLEM SELECTOR PLAN 8
needs to be off enhanced supervision  Discussed with Dr. Mead 2/18/22, she feels that he's slightly improved today (increased meds yesterday), but feels enhanced supervision is still indicated at this time.   PT recommending NELI when cleared behaviorally.   COVID - 2/17 negative

## 2022-02-19 LAB
GLUCOSE BLDC GLUCOMTR-MCNC: 162 MG/DL — HIGH (ref 70–99)
GLUCOSE BLDC GLUCOMTR-MCNC: 247 MG/DL — HIGH (ref 70–99)
GLUCOSE BLDC GLUCOMTR-MCNC: 267 MG/DL — HIGH (ref 70–99)
GLUCOSE BLDC GLUCOMTR-MCNC: 269 MG/DL — HIGH (ref 70–99)

## 2022-02-19 RX ORDER — HALOPERIDOL DECANOATE 100 MG/ML
0.5 INJECTION INTRAMUSCULAR ONCE
Refills: 0 | Status: COMPLETED | OUTPATIENT
Start: 2022-02-19 | End: 2022-02-19

## 2022-02-19 RX ADMIN — ATORVASTATIN CALCIUM 40 MILLIGRAM(S): 80 TABLET, FILM COATED ORAL at 21:43

## 2022-02-19 RX ADMIN — INSULIN GLARGINE 20 UNIT(S): 100 INJECTION, SOLUTION SUBCUTANEOUS at 21:44

## 2022-02-19 RX ADMIN — TAMSULOSIN HYDROCHLORIDE 0.4 MILLIGRAM(S): 0.4 CAPSULE ORAL at 21:43

## 2022-02-19 RX ADMIN — Medication 1: at 09:01

## 2022-02-19 RX ADMIN — QUETIAPINE FUMARATE 50 MILLIGRAM(S): 200 TABLET, FILM COATED ORAL at 21:43

## 2022-02-19 RX ADMIN — APIXABAN 2.5 MILLIGRAM(S): 2.5 TABLET, FILM COATED ORAL at 17:34

## 2022-02-19 RX ADMIN — MIRTAZAPINE 15 MILLIGRAM(S): 45 TABLET, ORALLY DISINTEGRATING ORAL at 21:43

## 2022-02-19 RX ADMIN — Medication 4 UNIT(S): at 09:01

## 2022-02-19 RX ADMIN — HALOPERIDOL DECANOATE 0.5 MILLIGRAM(S): 100 INJECTION INTRAMUSCULAR at 01:45

## 2022-02-19 NOTE — PROGRESS NOTE ADULT - SUBJECTIVE AND OBJECTIVE BOX
Interval Events:  pt in nad    Allergies    Allergy Status Unknown    Intolerances      Endocrine/Metabolic Medications:  atorvastatin 40 milliGRAM(s) Oral at bedtime  dextrose 40% Gel 15 Gram(s) Oral once  dextrose 50% Injectable 25 Gram(s) IV Push once  dextrose 50% Injectable 12.5 Gram(s) IV Push once  dextrose 50% Injectable 25 Gram(s) IV Push once  finasteride 5 milliGRAM(s) Oral daily  glucagon  Injectable 1 milliGRAM(s) IntraMuscular once  insulin glargine Injectable (LANTUS) 20 Unit(s) SubCutaneous at bedtime  insulin lispro (ADMELOG) corrective regimen sliding scale   SubCutaneous three times a day before meals  insulin lispro Injectable (ADMELOG) 4 Unit(s) SubCutaneous three times a day before meals      Vital Signs Last 24 Hrs  T(C): 36.8 (19 Feb 2022 05:21), Max: 36.8 (19 Feb 2022 05:21)  T(F): 98.2 (19 Feb 2022 05:21), Max: 98.2 (19 Feb 2022 05:21)  HR: 98 (19 Feb 2022 05:21) (98 - 100)  BP: 115/76 (19 Feb 2022 05:21) (112/74 - 115/76)  BP(mean): --  RR: 18 (19 Feb 2022 05:21) (17 - 19)  SpO2: 98% (19 Feb 2022 05:21) (96% - 98%)      PHYSICAL EXAM  All physical exam findings normal, except those marked:  General:	Alert, active, cooperative, NAD, well hydrated  .		[] Abnormal:  Neck		Normal: supple, no cervical adenopathy, no palpable thyroid  .		[] Abnormal:  Cardiovascular	Normal: regular rate, normal S1, S2, no murmurs  .		[] Abnormal:  Respiratory	Normal: no chest wall deformity, normal respiratory pattern, CTA B/L  .		[] Abnormal:  Abdominal	Normal: soft, ND, NT, bowel sounds present, no masses, no organomegaly  .		[] Abnormal:  		Normal normal genitalia, testes descended, circumcised/uncircumcised  .		Denice stage:			Breast denice:  .		Menstrual history:  .		[] Abnormal:  Extremities	Normal: FROM x4  .		[] Abnormal:  Skin		Normal: intact and not indurated, no rash, no acanthosis nigricans  .		[] Abnormal:  Neurologic	Normal: grossly intact  .		[] Abnormal:    LABS        CAPILLARY BLOOD GLUCOSE      POCT Blood Glucose.: 162 mg/dL (19 Feb 2022 08:26)  POCT Blood Glucose.: 340 mg/dL (18 Feb 2022 22:24)        Assesment/plan  82 yo M with PMH of DVT (on eliquis) , HTN, HLD, DM , multiple recent visits for UTI presents with confusion. . Found to have uncont dm. Pt states he takes oral dm meds as out pt with poorly cont dm. S/p recent hospitalization for uti/ uncont dm.      Problem/Recommendation - 1:  ·  Problem: Uncontrolled diabetes mellitus.   ·  Recommendation: due to acute illness and need for insulin tx  better controlled except for occ hyperglycemia  cont lantus 20 units  and admelog 4 ac tid- hold if poor po intake   fsg ac and hs  d/w prim team.     Problem/Recommendation - 2:  ·  Problem: UTI (urinary tract infection).   ·  Recommendation: s/p abx

## 2022-02-19 NOTE — PROGRESS NOTE ADULT - SUBJECTIVE AND OBJECTIVE BOX
CARDIOLOGY/MEDICAL ATTENDING    CHIEF COMPLAINT:Patient is a 81y old  Male who presents with a chief complaint of UTI .Pt appears comfortable.    	  REVIEW OF SYSTEMS:  CONSTITUTIONAL: No fever, weight loss, or fatigue  EYES: No eye pain, visual disturbances, or discharge  ENT:  No difficulty hearing, tinnitus, vertigo; No sinus or throat pain  NECK: No pain or stiffness  RESPIRATORY: No cough, wheezing, chills or hemoptysis; No Shortness of Breath  CARDIOVASCULAR: No chest pain, palpitations, passing out, dizziness, or leg swelling  GASTROINTESTINAL: No abdominal or epigastric pain. No nausea, vomiting, or hematemesis; No diarrhea or constipation. No melena or hematochezia.  GENITOURINARY: No dysuria, frequency, hematuria, or incontinence  NEUROLOGICAL: No headaches, memory loss, loss of strength, numbness, or tremors  SKIN: No itching, burning, rashes, or lesions   LYMPH Nodes: No enlarged glands  ENDOCRINE: No heat or cold intolerance; No hair loss  MUSCULOSKELETAL: No joint pain or swelling; No muscle, back, or extremity pain  PSYCHIATRIC: No depression, anxiety, mood swings, or difficulty sleeping  HEME/LYMPH: No easy bruising, or bleeding gums  ALLERGY AND IMMUNOLOGIC: No hives or eczema	      PHYSICAL EXAM:  T(C): 36.8 (02-19-22 @ 05:21), Max: 36.8 (02-19-22 @ 05:21)  HR: 98 (02-19-22 @ 05:21) (98 - 100)  BP: 115/76 (02-19-22 @ 05:21) (112/74 - 115/76)  RR: 18 (02-19-22 @ 05:21) (17 - 19)  SpO2: 98% (02-19-22 @ 05:21) (96% - 98%)  Wt(kg): --  I&O's Summary      Appearance: Normal	  HEENT:   Normal oral mucosa, PERRL, EOMI	  Lymphatic: No lymphadenopathy  Cardiovascular: Normal S1 S2, No JVD, No murmurs, No edema  Respiratory: Lungs clear to auscultation	  Gastrointestinal:  Soft, Non-tender, + BS	  Skin: No rashes, No ecchymoses, No cyanosis	  Neurologic: Non-focal  Extremities: Normal range of motion, No clubbing, cyanosis or edema  Vascular: Peripheral pulses palpable 2+ bilaterally    MEDICATIONS  (STANDING):  apixaban 2.5 milliGRAM(s) Oral two times a day  atorvastatin 40 milliGRAM(s) Oral at bedtime  calcitriol   Capsule 0.5 MICROGram(s) Oral daily  calcium carbonate    500 mG (Tums) Chewable 1 Tablet(s) Chew two times a day  dextrose 40% Gel 15 Gram(s) Oral once  dextrose 5%. 1000 milliLiter(s) (100 mL/Hr) IV Continuous <Continuous>  dextrose 5%. 1000 milliLiter(s) (50 mL/Hr) IV Continuous <Continuous>  dextrose 50% Injectable 25 Gram(s) IV Push once  dextrose 50% Injectable 12.5 Gram(s) IV Push once  dextrose 50% Injectable 25 Gram(s) IV Push once  finasteride 5 milliGRAM(s) Oral daily  glucagon  Injectable 1 milliGRAM(s) IntraMuscular once  insulin glargine Injectable (LANTUS) 20 Unit(s) SubCutaneous at bedtime  insulin lispro (ADMELOG) corrective regimen sliding scale   SubCutaneous three times a day before meals  insulin lispro Injectable (ADMELOG) 4 Unit(s) SubCutaneous three times a day before meals  labetalol 100 milliGRAM(s) Oral every 12 hours  mirtazapine 15 milliGRAM(s) Oral at bedtime  pantoprazole    Tablet 40 milliGRAM(s) Oral before breakfast  QUEtiapine 50 milliGRAM(s) Oral at bedtime  QUEtiapine 50 milliGRAM(s) Oral daily  sodium chloride 0.9%. 1000 milliLiter(s) (75 mL/Hr) IV Continuous <Continuous>  tamsulosin 0.4 milliGRAM(s) Oral at bedtime    	  	  LABS:	 	                        10.0   6.31  )-----------( 280      ( 18 Feb 2022 06:07 )             30.8     02-18    143  |  112<H>  |  42<H>  ----------------------------<  162<H>  3.8   |  23  |  2.18<H>    Ca    8.8      18 Feb 2022 06:07

## 2022-02-19 NOTE — CHART NOTE - NSCHARTNOTEFT_GEN_A_CORE
EVENT: Received telephone call from RN that pt is extremely agitated & is refusing to take his seroquel, PRN, agitation    HPI: Patient is a 81 year old Male from home with PMH of DVT (on eliquis) , HTN, HLD, DM, recently discharged from [facility] ED seen and discharged home with PO ABX. Patient presents to the ED with AMS, and agitation. UCX confirms Ampicillin sensitive enterococcus faecalis started on Unasyn. Patient admitted to medicine for enterococcus faecalis  UTI, hospital course complicated by worsening dementia with behavioral disturbances. Also expressing thoughts of self harm and suicide. Placed on 1:1 for safety, psych consulted. Also complicated by anemia requiring 1u PRBCs, improved. Concern for patient's living situation giving his cognitive decline.    Subjective: n/a    OBJECTIVE:  Vital Signs Last 24 Hrs  T(C): 36.3 (18 Feb 2022 12:35), Max: 36.3 (18 Feb 2022 04:59)  T(F): 97.4 (18 Feb 2022 12:35), Max: 97.4 (18 Feb 2022 04:59)  HR: 100 (18 Feb 2022 12:35) (95 - 100)  BP: 112/74 (18 Feb 2022 12:35) (112/74 - 136/91)  BP(mean): --  RR: 17 (18 Feb 2022 21:42) (17 - 19)  SpO2: 96% (18 Feb 2022 12:35) (95% - 96%)    FOCUSED PHYSICAL EXAM:  Neuro: awake, alert, confused & agitated  Cardiovascular: Pulses +2 B/L in lower and upper extremities, HR regular, BP stable, No edema.  Respiratory: Respirations regular, unlabored, breath sounds clear B/L.   GI: Abdomen soft, non-tender, positive bowel sounds.  : no bladder distention noted. No complaints at this time.  Skin: Dry, intact, no bruising, no diaphoresis.    LABS:                        10.0   6.31  )-----------( 280      ( 18 Feb 2022 06:07 )             30.8     02-18    143  |  112<H>  |  42<H>  ----------------------------<  162<H>  3.8   |  23  |  2.18<H>    Ca    8.8      18 Feb 2022 06:07        EKG:   IMAGING:    ASSESSMENT/PROBLEM: Agitation most likely 2/2 to dementia      PLAN:   1. Haldol 0.5mg, PO x 1 dose ordered  2. Monitor response to treatment  3. Cont present care/treatment  4. Supportive acre

## 2022-02-20 LAB
ALBUMIN SERPL ELPH-MCNC: 2.2 G/DL — LOW (ref 3.5–5)
ANION GAP SERPL CALC-SCNC: 7 MMOL/L — SIGNIFICANT CHANGE UP (ref 5–17)
BUN SERPL-MCNC: 42 MG/DL — HIGH (ref 7–18)
CALCIUM SERPL-MCNC: 7.7 MG/DL — LOW (ref 8.4–10.5)
CHLORIDE SERPL-SCNC: 109 MMOL/L — HIGH (ref 96–108)
CO2 SERPL-SCNC: 24 MMOL/L — SIGNIFICANT CHANGE UP (ref 22–31)
CREAT SERPL-MCNC: 2.16 MG/DL — HIGH (ref 0.5–1.3)
GLUCOSE BLDC GLUCOMTR-MCNC: 124 MG/DL — HIGH (ref 70–99)
GLUCOSE BLDC GLUCOMTR-MCNC: 180 MG/DL — HIGH (ref 70–99)
GLUCOSE BLDC GLUCOMTR-MCNC: 186 MG/DL — HIGH (ref 70–99)
GLUCOSE BLDC GLUCOMTR-MCNC: 293 MG/DL — HIGH (ref 70–99)
GLUCOSE SERPL-MCNC: 96 MG/DL — SIGNIFICANT CHANGE UP (ref 70–99)
HCT VFR BLD CALC: 29 % — LOW (ref 39–50)
HGB BLD-MCNC: 9.8 G/DL — LOW (ref 13–17)
MCHC RBC-ENTMCNC: 28.6 PG — SIGNIFICANT CHANGE UP (ref 27–34)
MCHC RBC-ENTMCNC: 33.8 GM/DL — SIGNIFICANT CHANGE UP (ref 32–36)
MCV RBC AUTO: 84.5 FL — SIGNIFICANT CHANGE UP (ref 80–100)
NRBC # BLD: 0 /100 WBCS — SIGNIFICANT CHANGE UP (ref 0–0)
PLATELET # BLD AUTO: 254 K/UL — SIGNIFICANT CHANGE UP (ref 150–400)
POTASSIUM SERPL-MCNC: 3.7 MMOL/L — SIGNIFICANT CHANGE UP (ref 3.5–5.3)
POTASSIUM SERPL-SCNC: 3.7 MMOL/L — SIGNIFICANT CHANGE UP (ref 3.5–5.3)
RBC # BLD: 3.43 M/UL — LOW (ref 4.2–5.8)
RBC # FLD: 14.8 % — HIGH (ref 10.3–14.5)
SODIUM SERPL-SCNC: 140 MMOL/L — SIGNIFICANT CHANGE UP (ref 135–145)
WBC # BLD: 7.04 K/UL — SIGNIFICANT CHANGE UP (ref 3.8–10.5)
WBC # FLD AUTO: 7.04 K/UL — SIGNIFICANT CHANGE UP (ref 3.8–10.5)

## 2022-02-20 RX ADMIN — Medication 4 UNIT(S): at 11:47

## 2022-02-20 RX ADMIN — CALCITRIOL 0.5 MICROGRAM(S): 0.5 CAPSULE ORAL at 12:09

## 2022-02-20 RX ADMIN — QUETIAPINE FUMARATE 50 MILLIGRAM(S): 200 TABLET, FILM COATED ORAL at 12:09

## 2022-02-20 RX ADMIN — MIRTAZAPINE 15 MILLIGRAM(S): 45 TABLET, ORALLY DISINTEGRATING ORAL at 22:05

## 2022-02-20 RX ADMIN — QUETIAPINE FUMARATE 50 MILLIGRAM(S): 200 TABLET, FILM COATED ORAL at 22:05

## 2022-02-20 RX ADMIN — APIXABAN 2.5 MILLIGRAM(S): 2.5 TABLET, FILM COATED ORAL at 06:07

## 2022-02-20 RX ADMIN — TAMSULOSIN HYDROCHLORIDE 0.4 MILLIGRAM(S): 0.4 CAPSULE ORAL at 22:05

## 2022-02-20 RX ADMIN — Medication 100 MILLIGRAM(S): at 06:07

## 2022-02-20 RX ADMIN — FINASTERIDE 5 MILLIGRAM(S): 5 TABLET, FILM COATED ORAL at 12:09

## 2022-02-20 RX ADMIN — PANTOPRAZOLE SODIUM 40 MILLIGRAM(S): 20 TABLET, DELAYED RELEASE ORAL at 06:07

## 2022-02-20 RX ADMIN — ATORVASTATIN CALCIUM 40 MILLIGRAM(S): 80 TABLET, FILM COATED ORAL at 22:05

## 2022-02-20 RX ADMIN — Medication 3: at 11:46

## 2022-02-20 RX ADMIN — Medication 1 TABLET(S): at 06:09

## 2022-02-20 RX ADMIN — INSULIN GLARGINE 20 UNIT(S): 100 INJECTION, SOLUTION SUBCUTANEOUS at 21:56

## 2022-02-20 NOTE — PROGRESS NOTE ADULT - ASSESSMENT
82 yo M with PMH of DVT (on eliquis) , HTN, HLD, DM ,multiple recent visits for UTI presents with confusion,depression and suicidal ideation.  1.UTI-ABX completed..  2.DM-Insulin,Endo f/u.  3.Psych f/u-on seroquel,remeron.  4.DVT-eliquis.  5.CRI-f/u lytes  6.HTN-cont bp medication.  7.Lipid d/o-statin.  8.Anemia-s/p prbc.  9.PPI.

## 2022-02-20 NOTE — PROGRESS NOTE ADULT - SUBJECTIVE AND OBJECTIVE BOX
CARDIOLOGY/MEDICAL ATTENDING    CHIEF COMPLAINT:Patient is a 81y old  Male who presents with a chief complaint of UTI.Pt appears comfortable.    	  REVIEW OF SYSTEMS:  CONSTITUTIONAL: No fever, weight loss, or fatigue  EYES: No eye pain, visual disturbances, or discharge  ENT:  No difficulty hearing, tinnitus, vertigo; No sinus or throat pain  NECK: No pain or stiffness  RESPIRATORY: No cough, wheezing, chills or hemoptysis; No Shortness of Breath  CARDIOVASCULAR: No chest pain, palpitations, passing out, dizziness, or leg swelling  GASTROINTESTINAL: No abdominal or epigastric pain. No nausea, vomiting, or hematemesis; No diarrhea or constipation. No melena or hematochezia.  GENITOURINARY: No dysuria, frequency, hematuria, or incontinence  NEUROLOGICAL: No headaches, memory loss, loss of strength, numbness, or tremors  SKIN: No itching, burning, rashes, or lesions   LYMPH Nodes: No enlarged glands  ENDOCRINE: No heat or cold intolerance; No hair loss  MUSCULOSKELETAL: No joint pain or swelling; No muscle, back, or extremity pain  PSYCHIATRIC: No depression, anxiety, mood swings, or difficulty sleeping  HEME/LYMPH: No easy bruising, or bleeding gums  ALLERGY AND IMMUNOLOGIC: No hives or eczema	      PHYSICAL EXAM:  T(C): 36.5 (02-20-22 @ 04:48), Max: 37.1 (02-19-22 @ 21:46)  HR: 98 (02-20-22 @ 04:48) (91 - 107)  BP: 151/88 (02-20-22 @ 04:48) (134/85 - 151/88)  RR: 18 (02-20-22 @ 04:48) (18 - 18)  SpO2: 95% (02-20-22 @ 04:48) (95% - 97%)  Wt(kg): --  I&O's Summary      Appearance: Normal	  HEENT:   Normal oral mucosa, PERRL, EOMI	  Lymphatic: No lymphadenopathy  Cardiovascular: Normal S1 S2, No JVD, No murmurs, No edema  Respiratory: Lungs clear to auscultation	  Gastrointestinal:  Soft, Non-tender, + BS	  Skin: No rashes, No ecchymoses, No cyanosis	  Neurologic: Non-focal  Extremities: Normal range of motion, No clubbing, cyanosis or edema  Vascular: Peripheral pulses palpable 2+ bilaterally    MEDICATIONS  (STANDING):  apixaban 2.5 milliGRAM(s) Oral two times a day  atorvastatin 40 milliGRAM(s) Oral at bedtime  calcitriol   Capsule 0.5 MICROGram(s) Oral daily  calcium carbonate    500 mG (Tums) Chewable 1 Tablet(s) Chew two times a day  dextrose 40% Gel 15 Gram(s) Oral once  dextrose 5%. 1000 milliLiter(s) (50 mL/Hr) IV Continuous <Continuous>  dextrose 5%. 1000 milliLiter(s) (100 mL/Hr) IV Continuous <Continuous>  dextrose 50% Injectable 25 Gram(s) IV Push once  dextrose 50% Injectable 12.5 Gram(s) IV Push once  dextrose 50% Injectable 25 Gram(s) IV Push once  finasteride 5 milliGRAM(s) Oral daily  glucagon  Injectable 1 milliGRAM(s) IntraMuscular once  insulin glargine Injectable (LANTUS) 20 Unit(s) SubCutaneous at bedtime  insulin lispro (ADMELOG) corrective regimen sliding scale   SubCutaneous three times a day before meals  insulin lispro Injectable (ADMELOG) 4 Unit(s) SubCutaneous three times a day before meals  labetalol 100 milliGRAM(s) Oral every 12 hours  mirtazapine 15 milliGRAM(s) Oral at bedtime  pantoprazole    Tablet 40 milliGRAM(s) Oral before breakfast  QUEtiapine 50 milliGRAM(s) Oral at bedtime  QUEtiapine 50 milliGRAM(s) Oral daily  sodium chloride 0.9%. 1000 milliLiter(s) (75 mL/Hr) IV Continuous <Continuous>  tamsulosin 0.4 milliGRAM(s) Oral at bedtime      TELEMETRY: 	    ECG:  	  RADIOLOGY:  OTHER: 	  	  LABS:	 	    CARDIAC MARKERS:                                9.8    7.04  )-----------( 254      ( 20 Feb 2022 06:20 )             29.0     02-20    140  |  109<H>  |  42<H>  ----------------------------<  96  3.7   |  24  |  2.16<H>    Ca    7.7<L>      20 Feb 2022 06:20    TPro  x   /  Alb  2.2<L>  /  TBili  x   /  DBili  x   /  AST  x   /  ALT  x   /  AlkPhos  x   02-20    proBNP:   Lipid Profile:   HgA1c:   TSH:

## 2022-02-20 NOTE — PROGRESS NOTE ADULT - SUBJECTIVE AND OBJECTIVE BOX
Interval Events:  pt in nad    Allergies    Allergy Status Unknown    Intolerances      Endocrine/Metabolic Medications:  atorvastatin 40 milliGRAM(s) Oral at bedtime  dextrose 40% Gel 15 Gram(s) Oral once  dextrose 50% Injectable 25 Gram(s) IV Push once  dextrose 50% Injectable 12.5 Gram(s) IV Push once  dextrose 50% Injectable 25 Gram(s) IV Push once  finasteride 5 milliGRAM(s) Oral daily  glucagon  Injectable 1 milliGRAM(s) IntraMuscular once  insulin glargine Injectable (LANTUS) 20 Unit(s) SubCutaneous at bedtime  insulin lispro (ADMELOG) corrective regimen sliding scale   SubCutaneous three times a day before meals  insulin lispro Injectable (ADMELOG) 4 Unit(s) SubCutaneous three times a day before meals      Vital Signs Last 24 Hrs  T(C): 36.5 (20 Feb 2022 04:48), Max: 37.1 (19 Feb 2022 21:46)  T(F): 97.7 (20 Feb 2022 04:48), Max: 98.8 (19 Feb 2022 21:46)  HR: 98 (20 Feb 2022 04:48) (91 - 107)  BP: 151/88 (20 Feb 2022 04:48) (128/68 - 151/88)  BP(mean): --  RR: 18 (20 Feb 2022 04:48) (18 - 20)  SpO2: 95% (20 Feb 2022 04:48) (95% - 97%)      PHYSICAL EXAM  All physical exam findings normal, except those marked:  General:	Alert, active, cooperative, NAD, well hydrated  .		[] Abnormal:  Neck		Normal: supple, no cervical adenopathy, no palpable thyroid  .		[] Abnormal:  Cardiovascular	Normal: regular rate, normal S1, S2, no murmurs  .		[] Abnormal:  Respiratory	Normal: no chest wall deformity, normal respiratory pattern, CTA B/L  .		[] Abnormal:  Abdominal	Normal: soft, ND, NT, bowel sounds present, no masses, no organomegaly  .		[] Abnormal:  		Normal normal genitalia, testes descended, circumcised/uncircumcised  .		Denice stage:			Breast denice:  .		Menstrual history:  .		[] Abnormal:  Extremities	Normal: FROM x4  .		[] Abnormal:  Skin		Normal: intact and not indurated, no rash, no acanthosis nigricans  .		[] Abnormal:  Neurologic	Normal: grossly intact  .		[] Abnormal:    LABS                        9.8    7.04  )-----------( 254      ( 20 Feb 2022 06:20 )             29.0                               140    |  109    |  42                  Calcium: 7.7   / iCa: x      (02-20 @ 06:20)    ----------------------------<  96        Magnesium: x                                3.7     |  24     |  2.16             Phosphorous: x        TPro  x      /  Alb  2.2    /  TBili  x      /  DBili  x      /  AST  x      /  ALT  x      /  AlkPhos  x      20 Feb 2022 06:20    CAPILLARY BLOOD GLUCOSE      POCT Blood Glucose.: 293 mg/dL (20 Feb 2022 11:23)  POCT Blood Glucose.: 124 mg/dL (20 Feb 2022 07:42)  POCT Blood Glucose.: 247 mg/dL (19 Feb 2022 21:37)  POCT Blood Glucose.: 269 mg/dL (19 Feb 2022 16:50)        Assesment/plan    80 yo M with PMH of DVT (on eliquis) , HTN, HLD, DM , multiple recent visits for UTI presents with confusion. . Found to have uncont dm. Pt states he takes oral dm meds as out pt with poorly cont dm. S/p recent hospitalization for uti/ uncont dm.      Problem/Recommendation - 1:  ·  Problem: Uncontrolled diabetes mellitus.   ·  Recommendation: due to acute illness and need for insulin tx  better controlled except for occ hyperglycemia  cont lantus 20 units  and admelog 4 ac tid- hold if poor po intake   fsg ac and hs  d/w prim team.     Problem/Recommendation - 2:  ·  Problem: UTI (urinary tract infection).   ·  Recommendation: s/p abx

## 2022-02-21 LAB
ALBUMIN SERPL ELPH-MCNC: 2.3 G/DL — LOW (ref 3.5–5)
ALP SERPL-CCNC: 86 U/L — SIGNIFICANT CHANGE UP (ref 40–120)
ALT FLD-CCNC: 17 U/L DA — SIGNIFICANT CHANGE UP (ref 10–60)
ANION GAP SERPL CALC-SCNC: 7 MMOL/L — SIGNIFICANT CHANGE UP (ref 5–17)
AST SERPL-CCNC: 12 U/L — SIGNIFICANT CHANGE UP (ref 10–40)
BASOPHILS # BLD AUTO: 0.03 K/UL — SIGNIFICANT CHANGE UP (ref 0–0.2)
BASOPHILS NFR BLD AUTO: 0.3 % — SIGNIFICANT CHANGE UP (ref 0–2)
BILIRUB SERPL-MCNC: 0.2 MG/DL — SIGNIFICANT CHANGE UP (ref 0.2–1.2)
BUN SERPL-MCNC: 43 MG/DL — HIGH (ref 7–18)
CALCIUM SERPL-MCNC: 7.6 MG/DL — LOW (ref 8.4–10.5)
CHLORIDE SERPL-SCNC: 106 MMOL/L — SIGNIFICANT CHANGE UP (ref 96–108)
CO2 SERPL-SCNC: 24 MMOL/L — SIGNIFICANT CHANGE UP (ref 22–31)
CREAT SERPL-MCNC: 2.48 MG/DL — HIGH (ref 0.5–1.3)
EOSINOPHIL # BLD AUTO: 0.28 K/UL — SIGNIFICANT CHANGE UP (ref 0–0.5)
EOSINOPHIL NFR BLD AUTO: 3.2 % — SIGNIFICANT CHANGE UP (ref 0–6)
GLUCOSE BLDC GLUCOMTR-MCNC: 151 MG/DL — HIGH (ref 70–99)
GLUCOSE BLDC GLUCOMTR-MCNC: 214 MG/DL — HIGH (ref 70–99)
GLUCOSE BLDC GLUCOMTR-MCNC: 292 MG/DL — HIGH (ref 70–99)
GLUCOSE SERPL-MCNC: 248 MG/DL — HIGH (ref 70–99)
HCT VFR BLD CALC: 32.2 % — LOW (ref 39–50)
HGB BLD-MCNC: 10.5 G/DL — LOW (ref 13–17)
IMM GRANULOCYTES NFR BLD AUTO: 0.9 % — SIGNIFICANT CHANGE UP (ref 0–1.5)
LYMPHOCYTES # BLD AUTO: 1.57 K/UL — SIGNIFICANT CHANGE UP (ref 1–3.3)
LYMPHOCYTES # BLD AUTO: 17.8 % — SIGNIFICANT CHANGE UP (ref 13–44)
MAGNESIUM SERPL-MCNC: 0.6 MG/DL — CRITICAL LOW (ref 1.6–2.6)
MCHC RBC-ENTMCNC: 28.2 PG — SIGNIFICANT CHANGE UP (ref 27–34)
MCHC RBC-ENTMCNC: 32.6 GM/DL — SIGNIFICANT CHANGE UP (ref 32–36)
MCV RBC AUTO: 86.6 FL — SIGNIFICANT CHANGE UP (ref 80–100)
MONOCYTES # BLD AUTO: 0.61 K/UL — SIGNIFICANT CHANGE UP (ref 0–0.9)
MONOCYTES NFR BLD AUTO: 6.9 % — SIGNIFICANT CHANGE UP (ref 2–14)
NEUTROPHILS # BLD AUTO: 6.26 K/UL — SIGNIFICANT CHANGE UP (ref 1.8–7.4)
NEUTROPHILS NFR BLD AUTO: 70.9 % — SIGNIFICANT CHANGE UP (ref 43–77)
NRBC # BLD: 0 /100 WBCS — SIGNIFICANT CHANGE UP (ref 0–0)
PHOSPHATE SERPL-MCNC: 3.4 MG/DL — SIGNIFICANT CHANGE UP (ref 2.5–4.5)
PLATELET # BLD AUTO: 265 K/UL — SIGNIFICANT CHANGE UP (ref 150–400)
POTASSIUM SERPL-MCNC: 3.8 MMOL/L — SIGNIFICANT CHANGE UP (ref 3.5–5.3)
POTASSIUM SERPL-SCNC: 3.8 MMOL/L — SIGNIFICANT CHANGE UP (ref 3.5–5.3)
PROT SERPL-MCNC: 7.1 G/DL — SIGNIFICANT CHANGE UP (ref 6–8.3)
RBC # BLD: 3.72 M/UL — LOW (ref 4.2–5.8)
RBC # FLD: 14.6 % — HIGH (ref 10.3–14.5)
SARS-COV-2 RNA SPEC QL NAA+PROBE: SIGNIFICANT CHANGE UP
SODIUM SERPL-SCNC: 137 MMOL/L — SIGNIFICANT CHANGE UP (ref 135–145)
WBC # BLD: 8.83 K/UL — SIGNIFICANT CHANGE UP (ref 3.8–10.5)
WBC # FLD AUTO: 8.83 K/UL — SIGNIFICANT CHANGE UP (ref 3.8–10.5)

## 2022-02-21 RX ORDER — LABETALOL HCL 100 MG
100 TABLET ORAL EVERY 8 HOURS
Refills: 0 | Status: DISCONTINUED | OUTPATIENT
Start: 2022-02-21 | End: 2022-02-22

## 2022-02-21 RX ORDER — MAGNESIUM SULFATE 500 MG/ML
2 VIAL (ML) INJECTION ONCE
Refills: 0 | Status: COMPLETED | OUTPATIENT
Start: 2022-02-21 | End: 2022-02-21

## 2022-02-21 RX ADMIN — QUETIAPINE FUMARATE 50 MILLIGRAM(S): 200 TABLET, FILM COATED ORAL at 22:23

## 2022-02-21 RX ADMIN — Medication 2: at 08:18

## 2022-02-21 RX ADMIN — Medication 100 MILLIGRAM(S): at 05:19

## 2022-02-21 RX ADMIN — Medication 25 GRAM(S): at 10:39

## 2022-02-21 RX ADMIN — PANTOPRAZOLE SODIUM 40 MILLIGRAM(S): 20 TABLET, DELAYED RELEASE ORAL at 05:19

## 2022-02-21 RX ADMIN — Medication 4 UNIT(S): at 08:17

## 2022-02-21 RX ADMIN — Medication 4 UNIT(S): at 11:47

## 2022-02-21 RX ADMIN — QUETIAPINE FUMARATE 25 MILLIGRAM(S): 200 TABLET, FILM COATED ORAL at 10:40

## 2022-02-21 RX ADMIN — TAMSULOSIN HYDROCHLORIDE 0.4 MILLIGRAM(S): 0.4 CAPSULE ORAL at 22:22

## 2022-02-21 RX ADMIN — ATORVASTATIN CALCIUM 40 MILLIGRAM(S): 80 TABLET, FILM COATED ORAL at 22:22

## 2022-02-21 RX ADMIN — INSULIN GLARGINE 20 UNIT(S): 100 INJECTION, SOLUTION SUBCUTANEOUS at 22:35

## 2022-02-21 RX ADMIN — Medication 1 TABLET(S): at 05:19

## 2022-02-21 RX ADMIN — APIXABAN 2.5 MILLIGRAM(S): 2.5 TABLET, FILM COATED ORAL at 05:18

## 2022-02-21 RX ADMIN — Medication 100 MILLIGRAM(S): at 22:22

## 2022-02-21 RX ADMIN — FINASTERIDE 5 MILLIGRAM(S): 5 TABLET, FILM COATED ORAL at 11:41

## 2022-02-21 RX ADMIN — Medication 1: at 11:48

## 2022-02-21 RX ADMIN — CALCITRIOL 0.5 MICROGRAM(S): 0.5 CAPSULE ORAL at 11:41

## 2022-02-21 RX ADMIN — QUETIAPINE FUMARATE 50 MILLIGRAM(S): 200 TABLET, FILM COATED ORAL at 11:42

## 2022-02-21 RX ADMIN — MIRTAZAPINE 15 MILLIGRAM(S): 45 TABLET, ORALLY DISINTEGRATING ORAL at 22:23

## 2022-02-21 NOTE — PROGRESS NOTE ADULT - SUBJECTIVE AND OBJECTIVE BOX
Interval Events:  pt in nad    Allergies    Allergy Status Unknown    Intolerances      Endocrine/Metabolic Medications:  atorvastatin 40 milliGRAM(s) Oral at bedtime  dextrose 40% Gel 15 Gram(s) Oral once  dextrose 50% Injectable 25 Gram(s) IV Push once  dextrose 50% Injectable 12.5 Gram(s) IV Push once  dextrose 50% Injectable 25 Gram(s) IV Push once  finasteride 5 milliGRAM(s) Oral daily  glucagon  Injectable 1 milliGRAM(s) IntraMuscular once  insulin glargine Injectable (LANTUS) 20 Unit(s) SubCutaneous at bedtime  insulin lispro (ADMELOG) corrective regimen sliding scale   SubCutaneous three times a day before meals  insulin lispro Injectable (ADMELOG) 4 Unit(s) SubCutaneous three times a day before meals      Vital Signs Last 24 Hrs  T(C): 36.6 (21 Feb 2022 04:57), Max: 37 (20 Feb 2022 19:46)  T(F): 97.9 (21 Feb 2022 04:57), Max: 98.6 (20 Feb 2022 19:46)  HR: 112 (21 Feb 2022 04:57) (85 - 112)  BP: 169/81 (21 Feb 2022 04:57) (128/87 - 169/81)  BP(mean): --  RR: 17 (21 Feb 2022 04:57) (17 - 20)  SpO2: 93% (21 Feb 2022 04:57) (93% - 98%)      PHYSICAL EXAM  All physical exam findings normal, except those marked:  General:	Alert, active, cooperative, NAD, well hydrated  .		[] Abnormal:  Neck		Normal: supple, no cervical adenopathy, no palpable thyroid  .		[] Abnormal:  Cardiovascular	Normal: regular rate, normal S1, S2, no murmurs  .		[] Abnormal:  Respiratory	Normal: no chest wall deformity, normal respiratory pattern, CTA B/L  .		[] Abnormal:  Abdominal	Normal: soft, ND, NT, bowel sounds present, no masses, no organomegaly  .		[] Abnormal:  		Normal normal genitalia, testes descended, circumcised/uncircumcised  .		Denice stage:			Breast denice:  .		Menstrual history:  .		[] Abnormal:  Extremities	Normal: FROM x4  .		[] Abnormal:  Skin		Normal: intact and not indurated, no rash, no acanthosis nigricans  .		[] Abnormal:  Neurologic	Normal: grossly intact  .		[] Abnormal:    LABS                        10.5   8.83  )-----------( 265      ( 21 Feb 2022 07:05 )             32.2                               137    |  106    |  43                  Calcium: 7.6   / iCa: x      (02-21 @ 07:05)    ----------------------------<  248       Magnesium: 0.6                              3.8     |  24     |  2.48             Phosphorous: 3.4      TPro  7.1    /  Alb  2.3    /  TBili  0.2    /  DBili  x      /  AST  12     /  ALT  17     /  AlkPhos  86     21 Feb 2022 07:05    CAPILLARY BLOOD GLUCOSE      POCT Blood Glucose.: 214 mg/dL (21 Feb 2022 08:01)  POCT Blood Glucose.: 186 mg/dL (20 Feb 2022 21:32)  POCT Blood Glucose.: 180 mg/dL (20 Feb 2022 17:13)  POCT Blood Glucose.: 293 mg/dL (20 Feb 2022 11:23)        Assesment/plan    82 yo M with PMH of DVT (on eliquis) , HTN, HLD, DM , multiple recent visits for UTI presents with confusion. . Found to have uncont dm. Pt states he takes oral dm meds as out pt with poorly cont dm. S/p recent hospitalization for uti/ uncont dm.      Problem/Recommendation - 1:  ·  Problem: Uncontrolled diabetes mellitus.   ·  Recommendation: due to acute illness and need for insulin tx  better controlled except for occ hyperglycemia  cont lantus 20 units  and admelog 4 ac tid- hold if poor po intake   fsg ac and hs  d/w prim team.     Problem/Recommendation - 2:  ·  Problem: UTI (urinary tract infection).   ·  Recommendation: s/p abx

## 2022-02-21 NOTE — PROGRESS NOTE ADULT - SUBJECTIVE AND OBJECTIVE BOX
CARDIOLOGY/MEDICAL ATTENDING    CHIEF COMPLAINT:Patient is a 81y old  Male who presents with a chief complaint of UTI.pt appears comfortable.    	  REVIEW OF SYSTEMS:  CONSTITUTIONAL: No fever, weight loss, or fatigue  EYES: No eye pain, visual disturbances, or discharge  ENT:  No difficulty hearing, tinnitus, vertigo; No sinus or throat pain  NECK: No pain or stiffness  RESPIRATORY: No cough, wheezing, chills or hemoptysis; No Shortness of Breath  CARDIOVASCULAR: No chest pain, palpitations, passing out, dizziness, or leg swelling  GASTROINTESTINAL: No abdominal or epigastric pain. No nausea, vomiting, or hematemesis; No diarrhea or constipation. No melena or hematochezia.  GENITOURINARY: No dysuria, frequency, hematuria, or incontinence  NEUROLOGICAL: No headaches, memory loss, loss of strength, numbness, or tremors  SKIN: No itching, burning, rashes, or lesions   LYMPH Nodes: No enlarged glands  ENDOCRINE: No heat or cold intolerance; No hair loss  MUSCULOSKELETAL: No joint pain or swelling; No muscle, back, or extremity pain  PSYCHIATRIC: No depression, anxiety, mood swings, or difficulty sleeping  HEME/LYMPH: No easy bruising, or bleeding gums  ALLERGY AND IMMUNOLOGIC: No hives or eczema	    PHYSICAL EXAM:  T(C): 36.6 (02-21-22 @ 04:57), Max: 37 (02-20-22 @ 19:46)  HR: 112 (02-21-22 @ 04:57) (85 - 112)  BP: 169/81 (02-21-22 @ 04:57) (128/87 - 169/81)  RR: 17 (02-21-22 @ 04:57) (17 - 20)  SpO2: 93% (02-21-22 @ 04:57) (93% - 98%)  Wt(kg): --  I&O's Summary      Appearance: Normal	  HEENT:   Normal oral mucosa, PERRL, EOMI	  Lymphatic: No lymphadenopathy  Cardiovascular: Normal S1 S2, No JVD, No murmurs, No edema  Respiratory: Lungs clear to auscultation	  Gastrointestinal:  Soft, Non-tender, + BS	  Skin: No rashes, No ecchymoses, No cyanosis	  Neurologic: Non-focal  Extremities: Normal range of motion, No clubbing, cyanosis or edema  Vascular: Peripheral pulses palpable 2+ bilaterally    MEDICATIONS  (STANDING):  apixaban 2.5 milliGRAM(s) Oral two times a day  atorvastatin 40 milliGRAM(s) Oral at bedtime  calcitriol   Capsule 0.5 MICROGram(s) Oral daily  calcium carbonate    500 mG (Tums) Chewable 1 Tablet(s) Chew two times a day  dextrose 40% Gel 15 Gram(s) Oral once  dextrose 5%. 1000 milliLiter(s) (50 mL/Hr) IV Continuous <Continuous>  dextrose 5%. 1000 milliLiter(s) (100 mL/Hr) IV Continuous <Continuous>  dextrose 50% Injectable 25 Gram(s) IV Push once  dextrose 50% Injectable 12.5 Gram(s) IV Push once  dextrose 50% Injectable 25 Gram(s) IV Push once  finasteride 5 milliGRAM(s) Oral daily  glucagon  Injectable 1 milliGRAM(s) IntraMuscular once  insulin glargine Injectable (LANTUS) 20 Unit(s) SubCutaneous at bedtime  insulin lispro (ADMELOG) corrective regimen sliding scale   SubCutaneous three times a day before meals  insulin lispro Injectable (ADMELOG) 4 Unit(s) SubCutaneous three times a day before meals  labetalol 100 milliGRAM(s) Oral every 12 hours  mirtazapine 15 milliGRAM(s) Oral at bedtime  pantoprazole    Tablet 40 milliGRAM(s) Oral before breakfast  QUEtiapine 50 milliGRAM(s) Oral at bedtime  QUEtiapine 50 milliGRAM(s) Oral daily  sodium chloride 0.9%. 1000 milliLiter(s) (75 mL/Hr) IV Continuous <Continuous>  tamsulosin 0.4 milliGRAM(s) Oral at bedtime    	  	  LABS:	 	                        10.5   8.83  )-----------( 265      ( 21 Feb 2022 07:05 )             32.2     02-21    137  |  106  |  43<H>  ----------------------------<  248<H>  3.8   |  24  |  2.48<H>    Ca    7.6<L>      21 Feb 2022 07:05  Phos  3.4     02-21  Mg     0.6     02-21    TPro  7.1  /  Alb  2.3<L>  /  TBili  0.2  /  DBili  x   /  AST  12  /  ALT  17  /  AlkPhos  86  02-21

## 2022-02-21 NOTE — PROGRESS NOTE ADULT - ASSESSMENT
82 yo M with PMH of DVT (on eliquis) , HTN, HLD, DM ,multiple recent visits for UTI presents with confusion,depression and suicidal ideation.  1.Replace Mg.  2.DM-Insulin,Endo f/u.  3.Psych f/u-on seroquel,remeron.  4.DVT-eliquis.  5.CRI-f/u lytes  6.HTN-inc labetalol 100mg q8h.  7.Lipid d/o-statin.  8.Anemia-s/p prbc.  9.PPI.  10.Await NELI.

## 2022-02-22 LAB
GLUCOSE BLDC GLUCOMTR-MCNC: 147 MG/DL — HIGH (ref 70–99)
GLUCOSE BLDC GLUCOMTR-MCNC: 163 MG/DL — HIGH (ref 70–99)
GLUCOSE BLDC GLUCOMTR-MCNC: 171 MG/DL — HIGH (ref 70–99)
GLUCOSE BLDC GLUCOMTR-MCNC: 317 MG/DL — HIGH (ref 70–99)

## 2022-02-22 PROCEDURE — 99232 SBSQ HOSP IP/OBS MODERATE 35: CPT

## 2022-02-22 RX ORDER — LABETALOL HCL 100 MG
100 TABLET ORAL EVERY 12 HOURS
Refills: 0 | Status: DISCONTINUED | OUTPATIENT
Start: 2022-02-22 | End: 2022-02-23

## 2022-02-22 RX ADMIN — APIXABAN 2.5 MILLIGRAM(S): 2.5 TABLET, FILM COATED ORAL at 08:29

## 2022-02-22 RX ADMIN — Medication 4 UNIT(S): at 11:58

## 2022-02-22 RX ADMIN — ATORVASTATIN CALCIUM 40 MILLIGRAM(S): 80 TABLET, FILM COATED ORAL at 21:31

## 2022-02-22 RX ADMIN — Medication 1 TABLET(S): at 17:01

## 2022-02-22 RX ADMIN — FINASTERIDE 5 MILLIGRAM(S): 5 TABLET, FILM COATED ORAL at 11:59

## 2022-02-22 RX ADMIN — PANTOPRAZOLE SODIUM 40 MILLIGRAM(S): 20 TABLET, DELAYED RELEASE ORAL at 08:30

## 2022-02-22 RX ADMIN — TAMSULOSIN HYDROCHLORIDE 0.4 MILLIGRAM(S): 0.4 CAPSULE ORAL at 21:31

## 2022-02-22 RX ADMIN — Medication 100 MILLIGRAM(S): at 17:01

## 2022-02-22 RX ADMIN — Medication 1: at 11:58

## 2022-02-22 RX ADMIN — Medication 3 MILLIGRAM(S): at 20:09

## 2022-02-22 RX ADMIN — QUETIAPINE FUMARATE 50 MILLIGRAM(S): 200 TABLET, FILM COATED ORAL at 11:59

## 2022-02-22 RX ADMIN — MIRTAZAPINE 15 MILLIGRAM(S): 45 TABLET, ORALLY DISINTEGRATING ORAL at 21:31

## 2022-02-22 RX ADMIN — QUETIAPINE FUMARATE 25 MILLIGRAM(S): 200 TABLET, FILM COATED ORAL at 20:00

## 2022-02-22 RX ADMIN — QUETIAPINE FUMARATE 50 MILLIGRAM(S): 200 TABLET, FILM COATED ORAL at 21:31

## 2022-02-22 RX ADMIN — APIXABAN 2.5 MILLIGRAM(S): 2.5 TABLET, FILM COATED ORAL at 17:01

## 2022-02-22 RX ADMIN — CALCITRIOL 0.5 MICROGRAM(S): 0.5 CAPSULE ORAL at 11:58

## 2022-02-22 RX ADMIN — Medication 1: at 08:17

## 2022-02-22 RX ADMIN — Medication 4 UNIT(S): at 08:17

## 2022-02-22 NOTE — BH CONSULTATION LIAISON PROGRESS NOTE - NSBHROSSYSTEMS_PSY_ALL_CORE
Psychiatric
Constitutional Symptoms.../Psychiatric
Neurological.../Psychiatric
Psychiatric
Constitutional Symptoms.../Neurological.../Psychiatric
Psychiatric
Psychiatric

## 2022-02-22 NOTE — BH CONSULTATION LIAISON PROGRESS NOTE - NSBHASSESSMENTFT_PSY_ALL_CORE
82 yo M, born in [place] and survived the Holocaust as a child, single, retired and living alone, with PHx of dysthymia f/w psychiatrist Maurisio Griffith MD for many years (no meds), known to MD from capacity consult during prior admission in 3/2021, and MHx of chronic DVT, Anemia, GERD, HTN, HLD, DM, Hypoparathyroidism, Glaucoma and BPH, multiple recent presentations for UTI, BIBEMS on 2/4 unable to explain why he called EMS, found to have UTI. Psych consult was requested for management of agitation (pt is repeatedly trying to get OOB without assistance and has been trying to hit and pinch staff). On 2/5, pt had RRT for lightheadness s/p getting up unassisted to walk to bathroom. On exam today, pt presents awake, alert and irascible but poorly oriented (believes the year is 1940 and he is in an [employer] office). Pt was already showing some s/s of early-stage dementia at prior encounter in 3/2021, but since then he seems to have a significant decline in cognitive performance. While acute delirium 2/2 UTI may be playing a role, we strongly suspect progressive dementia as a more significant factor. To address agitation and delirium in the hospital, pt was started on Haldol 1 mg q12h standing, with PRN of Haldol 2 mg plus Ativan 1 mg IV or IM q12h PRN agitation. As of 2/16, medication regimen has been titrated to Haldol 0.5 mg PO plus Ativan 0.25 mg PO qam/Haldol 2 mg PO plus Ativan 0.5 mg PO qhs to further improve daytime alertness. However, given ongoing challenging of balancing need for behavioral stabilization with recurrent daytime somnolence, we now believe pt might be better served by an alternate medication regimen. As of 2/16, we recommended changing from Haldol plus Ativan to Seroquel (25 mg qam/50 mg qhs), with Seroquel PRNs for agitation. To better address irritability, on 2/17 we recommend increasing AM dose of Seroquel to 50 mg. On f/u today, pt presents calm and much less irritable, suggesting that he is responding well to the increased dose. Based on serial exams and collateral obtained from pt's cousin, it appears that he does NOT have capacity to care for self safely at home or make disposition decisions due to impaired I/J from progressive dementia. Pt will require placement in more supervised environment (likely [facility] to SNF), pending stabilization of behavior during this admission. Pt does not appear to present an acute risk of harm to self or others at the time of assessment, and does not appear to be in need of admission to  psych at the time of assessment.
82 yo M, born in [place] and survived the Holocaust as a child, single, retired and living alone, with PHx of dysthymia f/w psychiatrist Maurisio Griffith MD for many years (no meds), known to MD from capacity consult during prior admission in 3/2021, and MHx of chronic DVT, Anemia, GERD, HTN, HLD, DM, Hypoparathyroidism, Glaucoma and BPH, multiple recent presentations for UTI, BIBEMS on 2/4 unable to explain why he called EMS, found to have UTI. Psych consult was requested for management of agitation (pt is repeatedly trying to get OOB without assistance and has been trying to hit and pinch staff). On 2/5, pt had RRT for lightheadness s/p getting up unassisted to walk to bathroom. On exam today, pt presents awake, alert and irascible but poorly oriented (believes the year is 1940 and he is in an [employer] office). Pt was already showing some s/s of early-stage dementia at prior encounter in 3/2021, but since then he seems to have a significant decline in cognitive performance. While acute delirium 2/2 UTI may be playing a role, we strongly suspect progressive dementia as a more significant factor. To address agitation and delirium in the hospital, we recommended starting Haldol 1 mg q12h standing, with PRN of Haldol 2 mg plus Ativan 1 mg IV or IM q12h PRN agitation. As of 2/10, medication regimen was titrated to Haldol 1 mg PO plus Ativan 2 mg PO qam/Haldol 2 mg PO plus Ativan 0.5 mg PO qhs, and further reduced on 2/11 to Haldol 0.5 mg PO plus Ativan 0.25 mg PO qam/Haldol 2 mg PO plus Ativan 0.5 mg PO qhs to further improve daytime alertness. Today, we recommend restoring AM dose of Ativan, which was DC'd yesterday, due to recurrence of daytime agitation after it was DC'd. Based on serial exams and collateral obtained from pt's cousin, it appears that he does NOT have capacity to care for self safely at home or make disposition decisions due to impaired I/J from progressive dementia. Pt will require placement in more supervised environment (likely [facility] to SNF), pending stabilization of behavior during this admission. Pt does not appear to present an acute risk of harm to self or others at the time of assessment, and does not appear to be in need of admission to IP psych at the time of assessment.
80 yo M, born in [place] and survived the Holocaust as a child, single, retired and living alone, with PHx of dysthymia f/w psychiatrist Maurisio Griffith MD for many years (no meds), known to MD from capacity consult during prior admission in 3/2021, and MHx of chronic DVT, Anemia, GERD, HTN, HLD, DM, Hypoparathyroidism, Glaucoma and BPH, multiple recent presentations for UTI, BIBEMS on 2/4 unable to explain why he called EMS, found to have UTI. Psych consult was requested for management of agitation (pt is repeatedly trying to get OOB without assistance and has been trying to hit and pinch staff). On 2/5, pt had RRT for lightheadness s/p getting up unassisted to walk to bathroom. On exam today, pt presents awake, alert and irascible but poorly oriented (believes the year is 1940 and he is in an [employer] office). Pt was already showing some s/s of early-stage dementia at prior encounter in 3/2021, but since then he seems to have a significant decline in cognitive performance. While acute delirium 2/2 UTI may be playing a role, we strongly suspect progressive dementia as a more significant factor. To address agitation and delirium in the hospital, pt was started on Haldol 1 mg q12h standing, with PRN of Haldol 2 mg plus Ativan 1 mg IV or IM q12h PRN agitation. As of 2/16, medication regimen has been titrated to Haldol 0.5 mg PO plus Ativan 0.25 mg PO qam/Haldol 2 mg PO plus Ativan 0.5 mg PO qhs to further improve daytime alertness. However, given ongoing challenging of balancing need for behavioral stabilization with recurrent daytime somnolence, we now believe pt might be better served by an alternate medication regimen. As of 2/16, we recommended changing from Haldol plus Ativan to Seroquel (25 mg qam/50 mg qhs), with Seroquel PRNs for agitation. On f/u today, pt presents irritable, offering complaints about his treatment by an employee without being able to identify the employee. To better address irritability, we recommend increasing AM dose of Seroquel to 50 mg. Based on serial exams and collateral obtained from pt's cousin, it appears that he does NOT have capacity to care for self safely at home or make disposition decisions due to impaired I/J from progressive dementia. Pt will require placement in more supervised environment (likely [facility] to SNF), pending stabilization of behavior during this admission. Pt does not appear to present an acute risk of harm to self or others at the time of assessment, and does not appear to be in need of admission to [facility] at the time of assessment.
82 yo M, born in [place] and survived the Holocaust as a child, single, retired and living alone, with PHx of dysthymia f/w psychiatrist Maurisio Griffith MD for many years (no meds), known to MD from capacity consult during prior admission in 3/2021, and MHx of chronic DVT, Anemia, GERD, HTN, HLD, DM, Hypoparathyroidism, Glaucoma and BPH, multiple recent presentations for UTI, BIBEMS on 2/4 unable to explain why he called EMS, found to have UTI. Psych consult was requested for management of agitation (pt is repeatedly trying to get OOB without assistance and has been trying to hit and pinch staff). On 2/5, pt had RRT for lightheadness s/p getting up unassisted to walk to bathroom. On exam today, pt presents awake, alert and irascible but poorly oriented (believes the year is 1940 and he is in an [employer] office). Pt was already showing some s/s of early-stage dementia at prior encounter in 3/2021, but since then he seems to have a significant decline in cognitive performance. While acute delirium 2/2 UTI may be playing a role, we strongly suspect progressive dementia as a more significant factor. To address agitation and delirium in the hospital, pt was started on Haldol 1 mg q12h standing, with PRN of Haldol 2 mg plus Ativan 1 mg IV or IM q12h PRN agitation. As of 2/16, medication regimen has been titrated to Haldol 0.5 mg PO plus Ativan 0.25 mg PO qam/Haldol 2 mg PO plus Ativan 0.5 mg PO qhs to further improve daytime alertness. However, given ongoing challenging of balancing need for behavioral stabilization with recurrent daytime somnolence, we now believe pt might be better served by an alternate medication regimen. As of 2/16, we recommend changing from Haldol plus Ativan to Seroquel (25 mg qam/50 mg qhs), with Seroquel PRNs for agitation. Based on serial exams and collateral obtained from pt's cousin, it appears that he does NOT have capacity to care for self safely at home or make disposition decisions due to impaired I/J from progressive dementia. Pt will require placement in more supervised environment (likely [facility] to SNF), pending stabilization of behavior during this admission. Pt does not appear to present an acute risk of harm to self or others at the time of assessment, and does not appear to be in need of admission to IP psych at the time of assessment.
82 yo M, born in [place] and survived the Holocaust as a child, single, retired and living alone, with PHx of dysthymia f/w psychiatrist Maurisio Griffith MD for many years (no meds), known to MD from capacity consult during prior admission in 3/2021, and MHx of chronic DVT, Anemia, GERD, HTN, HLD, DM, Hypoparathyroidism, Glaucoma and BPH, multiple recent presentations for UTI, BIBEMS on 2/4 unable to explain why he called EMS, found to have UTI. Psych consult was requested for management of agitation (pt is repeatedly trying to get OOB without assistance and has been trying to hit and pinch staff). On 2/5, pt had RRT for lightheadness s/p getting up unassisted to walk to bathroom. On exam today, pt presents awake, alert and irascible but poorly oriented (believes the year is 1940 and he is in an [employer] office). Pt was already showing some s/s of early-stage dementia at prior encounter in 3/2021, but since then he seems to have a significant decline in cognitive performance. While acute delirium 2/2 UTI may be playing a role, we strongly suspect progressive dementia as a more significant factor. To address agitation and delirium in the hospital, we recommended starting Haldol 1 mg q12h standing, with PRN of Haldol 2 mg plus Ativan 1 mg IV or IM q12h PRN agitation. On 2/8, Ativan 0.25 mg q12h was added to standing regiment to counter possible SE of Haldol (tremors). On f/u today, pt reportedly made another suicidal statement this morning, but was encountered somnolent and unable to participate in interview. In order to reduce daytime somnolence and improve ability to participate in own care, we now recommend decreasing AM standing medication to Haldol 1 mg PO plus Ativan 0.25 mg PO. Based on serial exams and collateral obtained from pt's cousin, it appears that he does NOT have capacity to care for self safely at home or make disposition decisions due to impaired I/J from progressive dementia. Pt will require placement in more supervised environment (likely [facility] to SNF), pending stabilization of behavior during this admission. Pt does not appear to present an acute risk of harm to self or others at the time of assessment, and does not appear to be in need of admission to IP psych at the time of assessment.
80 yo M, born in [place] and survived the Holocaust as a child, single, retired and living alone, with PHx of dysthymia f/w psychiatrist Maurisio Griffith MD for many years (no meds), known to MD from capacity consult during prior admission in 3/2021, and MHx of chronic DVT, Anemia, GERD, HTN, HLD, DM, Hypoparathyroidism, Glaucoma and BPH, multiple recent presentations for UTI, BIBEMS on 2/4 unable to explain why he called EMS, found to have UTI. Psych consult was requested for management of agitation (pt is repeatedly trying to get OOB without assistance and has been trying to hit and pinch staff). On 2/5, pt had RRT for lightheadness s/p getting up unassisted to walk to bathroom. On exam today, pt presents awake, alert and irascible but poorly oriented (believes the year is 1940 and he is in an [employer] office). Pt was already showing some s/s of early-stage dementia at prior encounter in 3/2021, but since then he seems to have a significant decline in cognitive performance. While acute delirium 2/2 UTI may be playing a role, we strongly suspect progressive dementia as a more significant factor. To address agitation and delirium in the hospital, we recommended starting Haldol 1 mg q12h standing (pt has a right to refuse), with PRN of Haldol 2 mg plus Ativan 1 mg IV or IM q12h PRN agitation. On f/u today, pt presents physically uncomfortable and tremulous, which may be attributable to illness or to SE of Haldol. Ativan 0.25 mg q12h has been added to counter possible SE of Haldol. Based on serial exams and collateral obtained today from pt's cousin, it appears that he does NOT have capacity to care for self safely at home or make disposition decisions due to impaired I/J from progressive dementia. Pt will require placement in more supervised environment (likely [facility] to SNF), pending stabilization of behavior during this admission. Pt does not appear to present an acute risk of harm to self or others at the time of assessment, and does not appear to be in need of admission to  psych at the time of assessment.
80 yo M, born in [place] and survived the Holocaust as a child, single, retired and living alone, with PHx of dysthymia f/w psychiatrist Maurisio Griffith MD for many years (no meds), known to MD from capacity consult during prior admission in 3/2021, and MHx of chronic DVT, Anemia, GERD, HTN, HLD, DM, Hypoparathyroidism, Glaucoma and BPH, multiple recent presentations for UTI, BIBEMS on 2/4 unable to explain why he called EMS, found to have UTI. Psych consult was requested for management of agitation (pt is repeatedly trying to get OOB without assistance and has been trying to hit and pinch staff). On 2/5, pt had RRT for lightheadness s/p getting up unassisted to walk to bathroom. On exam today, pt presents awake, alert and irascible but poorly oriented (believes the year is 1940 and he is in an [employer] office). Pt was already showing some s/s of early-stage dementia at prior encounter in 3/2021, but since then he seems to have a significant decline in cognitive performance. While acute delirium 2/2 UTI may be playing a role, we strongly suspect progressive dementia as a more significant factor. To address agitation and delirium in the hospital, we recommended starting Haldol 1 mg q12h standing, with PRN of Haldol 2 mg plus Ativan 1 mg IV or IM q12h PRN agitation. As of 2/10, medication regimen was titrated to Haldol 1 mg PO plus Ativan 2 mg PO qam/Haldol 2 mg PO plus Ativan 0.5 mg PO qhs, and further reduced on 2/11 to Haldol 0.5 mg PO plus Ativan 0.25 mg PO qam/Haldol 2 mg PO plus Ativan 0.5 mg PO qhs to further improve daytime alertness. Today, we recommend eliminating AM dose of Ativan to further improve daytime alertness. Based on serial exams and collateral obtained from pt's cousin, it appears that he does NOT have capacity to care for self safely at home or make disposition decisions due to impaired I/J from progressive dementia. Pt will require placement in more supervised environment (likely [facility] to SNF), pending stabilization of behavior during this admission. Pt does not appear to present an acute risk of harm to self or others at the time of assessment, and does not appear to be in need of admission to  psych at the time of assessment.
82 yo M, born in [place] and survived the Holocaust as a child, single, retired and living alone, with PHx of dysthymia f/w psychiatrist Maurisio Griffith MD for many years (no meds), known to MD from capacity consult during prior admission in 3/2021, and MHx of chronic DVT, Anemia, GERD, HTN, HLD, DM, Hypoparathyroidism, Glaucoma and BPH, multiple recent presentations for UTI, BIBEMS on 2/4 unable to explain why he called EMS, found to have UTI. Psych consult was requested for management of agitation (pt is repeatedly trying to get OOB without assistance and has been trying to hit and pinch staff). On 2/5, pt had RRT for lightheadness s/p getting up unassisted to walk to bathroom. On exam today, pt presents awake, alert and irascible but poorly oriented (believes the year is 1940 and he is in an [employer] office). Pt was already showing some s/s of early-stage dementia at prior encounter in 3/2021, but since then he seems to have a significant decline in cognitive performance. While acute delirium 2/2 UTI may be playing a role, we strongly suspect progressive dementia as a more significant factor. To address agitation and delirium in the hospital, pt was started on Haldol 1 mg q12h standing, with PRN of Haldol 2 mg plus Ativan 1 mg IV or IM q12h PRN agitation. As of 2/16, medication regimen has been titrated to Haldol 0.5 mg PO plus Ativan 0.25 mg PO qam/Haldol 2 mg PO plus Ativan 0.5 mg PO qhs to further improve daytime alertness. However, given ongoing challenging of balancing need for behavioral stabilization with recurrent daytime somnolence, we now believe pt might be better served by an alternate medication regimen. As of 2/16, we recommended changing from Haldol plus Ativan to Seroquel (25 mg qam/50 mg qhs), with Seroquel PRNs for agitation. To better address irritability, on 2/17 we recommended increasing AM dose of Seroquel to 50 mg. On f/u today, pt again presents calm, cooperative and not irritable, suggesting that he is responding very well to the increased dose. Behavior now appears stabilized, and pt is psych cleared for disposition as of today's assessment. Based on serial exams and collateral obtained from pt's cousin, it appears that he does NOT have capacity to care for self safely at home or make disposition decisions due to impaired I/J from progressive dementia. Pt will require placement in more supervised environment (likely [facility] to SNF). Pt does not appear to present an acute risk of harm to self or others at the time of assessment, and does not appear to be in need of admission to  psych at the time of assessment.
82 yo M, born in [place] and survived the Holocaust as a child, single, retired and living alone, with PHx of dysthymia f/w psychiatrist Maurisio Griffith MD for many years (no meds), known to MD from capacity consult during prior admission in 3/2021, and MHx of chronic DVT, Anemia, GERD, HTN, HLD, DM, Hypoparathyroidism, Glaucoma and BPH, multiple recent presentations for UTI, BIBEMS on 2/4 unable to explain why he called EMS, found to have UTI. Psych consult was requested for management of agitation (pt is repeatedly trying to get OOB without assistance and has been trying to hit and pinch staff). On 2/5, pt had RRT for lightheadness s/p getting up unassisted to walk to bathroom. On exam today, pt presents awake, alert and irascible but poorly oriented (believes the year is 1940 and he is in an [employer] office). Pt was already showing some s/s of early-stage dementia at prior encounter in 3/2021, but since then he seems to have a significant decline in cognitive performance. While acute delirium 2/2 UTI may be playing a role, we strongly suspect progressive dementia as a more significant factor. To address agitation and delirium in the hospital, we recommended starting Haldol 1 mg q12h standing, with PRN of Haldol 2 mg plus Ativan 1 mg IV or IM q12h PRN agitation. As of 2/10, medication regimen was titrated to Haldol 1 mg PO plus Ativan 2 mg PO qam/Haldol 2 mg PO plus Ativan 0.5 mg PO qhs. Today, we recommend reducing daytime dose of Haldol to 0.5 mg PO qam to further improve daytime alertness. Based on serial exams and collateral obtained from pt's cousin, it appears that he does NOT have capacity to care for self safely at home or make disposition decisions due to impaired I/J from progressive dementia. Pt will require placement in more supervised environment (likely [facility] to SNF), pending stabilization of behavior during this admission. Pt does not appear to present an acute risk of harm to self or others at the time of assessment, and does not appear to be in need of admission to  psych at the time of assessment.
80 yo M, born in [place] and survived the Holocaust as a child, single, retired and living alone, with PHx of dysthymia f/w psychiatrist Maurisio Griffith MD for many years (no meds), known to MD from capacity consult during prior admission in 3/2021, and MHx of chronic DVT, Anemia, GERD, HTN, HLD, DM, Hypoparathyroidism, Glaucoma and BPH, multiple recent presentations for UTI, BIBEMS on 2/4 unable to explain why he called EMS, found to have UTI. Psych consult was requested for management of agitation (pt is repeatedly trying to get OOB without assistance and has been trying to hit and pinch staff). On 2/5, pt had RRT for lightheadness s/p getting up unassisted to walk to bathroom. On exam today, pt presents awake, alert and irascible but poorly oriented (believes the year is 1940 and he is in an [employer] office). Pt was already showing some s/s of early-stage dementia at prior encounter in 3/2021, but since then he seems to have a significant decline in cognitive performance. While acute delirium 2/2 UTI may be playing a role, we strongly suspect progressive dementia as a more significant factor. To address agitation and delirium in the hospital, we recommended starting Haldol 1 mg q12h standing, with PRN of Haldol 2 mg plus Ativan 1 mg IV or IM q12h PRN agitation. On 2/8, Ativan 0.25 mg q12h was added to standing regiment to counter possible SE of Haldol (tremors). On f/u today, pt could not be interviewed due to somnolence, so it could not be determined if pt is still experiencing SI (he reportedly made a suicidal statement on the night of 2/8). In order to better address nighttime agitation while improving daytime alertness, we now recommend increasing hs standing medication to Haldol 2 mg PO plus Ativan 0.5 mg PO, while lowering PRN dose to Haldol 1 mg plus Ativan 0.5 mg. Based on serial exams and collateral obtained today from pt's cousin, it appears that he does NOT have capacity to care for self safely at home or make disposition decisions due to impaired I/J from progressive dementia. Pt will require placement in more supervised environment (likely [facility] to SNF), pending stabilization of behavior during this admission. Pt does not appear to present an acute risk of harm to self or others at the time of assessment, and does not appear to be in need of admission to IP psych at the time of assessment.

## 2022-02-22 NOTE — BH CONSULTATION LIAISON PROGRESS NOTE - NSBHMSEAFFQUAL_PSY_A_CORE
Unable to assess
Euthymic
Euthymic/Irritable
Euthymic/Irritable
Euthymic
Euthymic/Irritable
Irritable/Anxious
Euthymic
Euthymic
Unable to assess

## 2022-02-22 NOTE — BH CONSULTATION LIAISON PROGRESS NOTE - NSBHMSEATTEN_PSY_A_CORE
Normal
Unable to assess
Unable to assess
Normal
Unable to assess
Impaired
Unable to assess
Normal

## 2022-02-22 NOTE — BH CONSULTATION LIAISON PROGRESS NOTE - NSBHFUPREASONCONS_PSY_A_CORE
delirium/dementia/med management

## 2022-02-22 NOTE — BH CONSULTATION LIAISON PROGRESS NOTE - NSBHMSEBEHAV_PSY_A_CORE
Cooperative
Unable to assess
Cooperative

## 2022-02-22 NOTE — BH CONSULTATION LIAISON PROGRESS NOTE - NSBHMSETHTPROC_PSY_A_CORE
Linear
Circumstantial/Tangential
Linear
Circumstantial/Tangential/Perseverative
Circumstantial/Tangential/Perseverative
Unable to assess
Unable to assess
Linear/Tangential
Unable to assess
Circumstantial/Tangential/Perseverative

## 2022-02-22 NOTE — BH CONSULTATION LIAISON PROGRESS NOTE - NSBHMSEAFFRANGE_PSY_A_CORE
Full
Constricted
Full
Unable to assess
Unable to assess
Constricted

## 2022-02-22 NOTE — BH CONSULTATION LIAISON PROGRESS NOTE - NSBHMSEMOVE_PSY_A_CORE
No abnormal movements/Unable to assess
No abnormal movements/Unable to assess
Tremors
Unable to assess
No abnormal movements/Unable to assess

## 2022-02-22 NOTE — BH CONSULTATION LIAISON PROGRESS NOTE - NSBHMSERECMEM_PSY_A_CORE
Impaired
Unable to assess
Impaired
Unable to assess
Unable to assess
Impaired
Impaired

## 2022-02-22 NOTE — BH CONSULTATION LIAISON PROGRESS NOTE - NSBHMSETHTASSOC_PSY_A_CORE
Unable to assess
Normal
Unable to assess
Normal
Unable to assess
Normal

## 2022-02-22 NOTE — BH CONSULTATION LIAISON PROGRESS NOTE - NSBHCHARTREVIEWLAB_PSY_A_CORE FT
9.7    6.21  )-----------( 300      ( 15 Feb 2022 07:45 )             29.9   02-15    138  |  110<H>  |  38<H>  ----------------------------<  154<H>  3.9   |  21<L>  |  2.23<H>    Ca    9.2      15 Feb 2022 07:45    
                      9.3    6.37  )-----------( 292      ( 16 Feb 2022 08:24 )             27.6   02-16    138  |  108  |  39<H>  ----------------------------<  229<H>  3.8   |  22  |  2.22<H>    Ca    8.7      16 Feb 2022 08:24    
                      10.0   6.31  )-----------( 280      ( 18 Feb 2022 06:07 )             30.8   02-18    143  |  112<H>  |  42<H>  ----------------------------<  162<H>  3.8   |  23  |  2.18<H>    Ca    8.8      18 Feb 2022 06:07    
                      7.1    5.33  )-----------( 227      ( 09 Feb 2022 10:27 )             21.5   02-09    139  |  109<H>  |  28<H>  ----------------------------<  214<H>  3.7   |  20<L>  |  2.61<H>    Ca    8.3<L>      09 Feb 2022 10:27    
Complete Blood Count in AM (02.11.22 @ 07:27)    Nucleated RBC: 0 /100 WBCs    WBC Count: 5.14 K/uL    RBC Count: 2.68 M/uL    Hemoglobin: 7.6 g/dL    Hematocrit: 22.9 %    Mean Cell Volume: 85.4 fl    Mean Cell Hemoglobin: 28.4 pg    Mean Cell Hemoglobin Conc: 33.2 gm/dL    Red Cell Distrib Width: 14.7 %    Platelet Count - Automated: 261 K/uL  Basic Metabolic Panel in AM (02.11.22 @ 07:27)    Sodium, Serum: 138 mmol/L    Potassium, Serum: 3.8 mmol/L    Chloride, Serum: 109 mmol/L    Carbon Dioxide, Serum: 21 mmol/L    Anion Gap, Serum: 8 mmol/L    Blood Urea Nitrogen, Serum: 30 mg/dL    Creatinine, Serum: 2.35 mg/dL    Glucose, Serum: 178 mg/dL    Calcium, Total Serum: 8.7 mg/dL    eGFR if Non : 25: Interpretative comment  The units for eGFR are mL/min/1.73M2 (normalized body surface area). The  eGFR is calculated from a serum creatinine using the CKD-EPI equation.  Other variables required for calculation are race, age and sex. Among  patients with chronic kidney disease (CKD), the eGFR is useful in  determining the stage of disease according to KDOQI CKD classification.  All eGFR results are reported numerically with the following  interpretation.          GFR                    With                 Without     (ml/min/1.73 m2)    Kidney Damage       Kidney Damage        >= 90                    Stage 1                     Normal        60-89                    Stage 2                     Decreased GFR        30-59     Stage 3                     Stage 3        15-29                    Stage 4                     Stage 4        < 15                      Stage 5                     Stage 5  Each stage of CKD assumes that the associated GFR level has been in  effect for at least 3 months. Determination of stages one and two (with  eGFR > 59 ml/min/m2) requires estimation of kidney damage for at least 3  months as defined by structural or functional abnormalities.  Limitations: All estimates of GFR will be less accurate for patients at  extremes of muscle mass (including but not limited to frail elderly,  critically ill, or cancer patients), those with unusual diets, and those  with conditions associated with reduced secretion or extrarenal  elimination of creatinine. The eGFR equation is not recommended for use  in patients with unstable creatinine levels. mL/min/1.73M2    eGFR if African American: 29 mL/min/1.73M2    
                      10.5   8.83  )-----------( 265      ( 21 Feb 2022 07:05 )             32.2   02-21    137  |  106  |  43<H>  ----------------------------<  248<H>  3.8   |  24  |  2.48<H>    Ca    7.6<L>      21 Feb 2022 07:05  Phos  3.4     02-21  Mg     0.6     02-21    TPro  7.1  /  Alb  2.3<L>  /  TBili  0.2  /  DBili  x   /  AST  12  /  ALT  17  /  AlkPhos  86  02-21  
                      9.5    5.61  )-----------( 282      ( 14 Feb 2022 07:39 )             28.5   02-14    138  |  109<H>  |  32<H>  ----------------------------<  125<H>  3.6   |  22  |  2.10<H>    Ca    8.6      14 Feb 2022 07:39    
                      7.1    5.33  )-----------( 227      ( 09 Feb 2022 10:27 )             21.5   02-09    139  |  109<H>  |  28<H>  ----------------------------<  214<H>  3.7   |  20<L>  |  2.61<H>    Ca    8.3<L>      09 Feb 2022 10:27    
                      7.9    5.10  )-----------( 226      ( 08 Feb 2022 07:48 )             24.3   02-08    140  |  115<H>  |  34<H>  ----------------------------<  127<H>  5.2   |  18<L>  |  2.59<H>    Ca    8.4      08 Feb 2022 07:48  Phos  3.1     02-07  Mg     1.4     02-07    TPro  6.8  /  Alb  2.1<L>  /  TBili  0.4  /  DBili  x   /  AST  11  /  ALT  11  /  AlkPhos  64  02-07  
                      10.8   6.24  )-----------( 317      ( 17 Feb 2022 06:39 )             33.1   02-17    141  |  110<H>  |  41<H>  ----------------------------<  198<H>  4.2   |  23  |  2.21<H>    Ca    8.5      17 Feb 2022 06:39

## 2022-02-22 NOTE — PROGRESS NOTE ADULT - ASSESSMENT
Patient is a 81 year old Male from home with PMH of DVT (on eliquis) , HTN, HLD, DM, recently discharged from [facility] ED seen and discharged home with PO ABX. Patient presents to the ED with AMS, and agitation. UCX confirms Ampicillin sensitive enterococcus faecalis started on Unasyn. Patient admitted to medicine for enterococcus faecalis  UTI, hospital course complicated by worsening dementia with behavioral disturbances. Also expressing thoughts of self harm and suicide. Placed on 1:1 for safety, psych consulted. Also complicated by anemia requiring 1u PRBCs, improved. Concern for patient's living situation giving his cognitive decline.

## 2022-02-22 NOTE — BH CONSULTATION LIAISON PROGRESS NOTE - NSCDBILL_PSY_A_CORE
[id number] - Inpatient, moderate complexity
[id number] - Inpatient, moderate complexity
[id number] - Inpatient, moderate complexity
[id number] - Inpatient, moderate complexity
[id number] - Inpatient, moderate complexity
75482 - Inpatient, moderate complexity
[id number] - Inpatient, moderate complexity
15598 - Inpatient, moderate complexity
22284 - Inpatient, moderate complexity
[id number] - Inpatient, moderate complexity

## 2022-02-22 NOTE — BH CONSULTATION LIAISON PROGRESS NOTE - NSBHMSEAFFCONG_PSY_A_CORE
Congruent
Congruent
Unable to assess
Congruent
Unable to assess
Congruent

## 2022-02-22 NOTE — BH CONSULTATION LIAISON PROGRESS NOTE - NSBHMSEMOOD_PSY_A_CORE
Unable to assess
Anxious/Irritable
Unable to assess
Normal/Irritable
Normal
Normal/Irritable
Normal
Normal
Normal/Irritable
Normal

## 2022-02-22 NOTE — BH CONSULTATION LIAISON PROGRESS NOTE - LEVEL OF CONSCIOUSNESS
Obtunded
Lethargic, arousable to verbal stimulus
Lethargic, arousable to verbal stimulus
Alert
Lethargic, arousable to verbal stimulus
Alert

## 2022-02-22 NOTE — BH CONSULTATION LIAISON PROGRESS NOTE - NSBHMSEMUSCLE_PSY_A_CORE
Normal muscle tone/strength
Unable to assess
Normal muscle tone/strength

## 2022-02-22 NOTE — BH CONSULTATION LIAISON PROGRESS NOTE - NSBHCONSULTRECOMMENDOTHER_PSY_A_CORE FT
1. C/w standing Seroquel PO 50 mg qam/50 mg qhs  2. For acute agitation, recommend Seroquel 25 mg PO q8h PRN agitation  3. No indication for other standing or PRN psychotropic medications at this time  4. Medical management as directed by primary team  5. Psychiatry will continue to follow  6. Pt does NOT appear to have capacity to care for self safely at home or to make dispo decisions, due to progressive dementia  --HCP/POA Mishel Avila should be approached for placement preferences and consents  7. Case d/w KP Mcgraw of primary team    Tammy Mead MD  Director, Consultation-Liaison Psychiatry Service  [id number] 1. C/w standing Seroquel PO 50 mg qam/50 mg qhs  2. For acute agitation, recommend Seroquel 25 mg PO q8h PRN agitation  3. No indication for other standing or PRN psychotropic medications at this time  4. Medical management as directed by primary team  5. Psychiatry is signing off. Reconsult if additional issues arise as inpatient  6. Pt does NOT appear to have capacity to care for self safely at home or to make dispo decisions, due to progressive dementia  --HCP/POA Mishel Avila should be approached for placement preferences and consents  7. Case d/w KP Mcgraw of primary team    Tammy Mead MD  Director, Consultation-Liaison Psychiatry Service  [id number]

## 2022-02-22 NOTE — BH CONSULTATION LIAISON PROGRESS NOTE - NSICDXBHSECONDARYDX_PSY_ALL_CORE
Toxic encephalopathy   G92.9  Dysthymia   F34.1  

## 2022-02-22 NOTE — BH CONSULTATION LIAISON PROGRESS NOTE - NSBHMSEREMMEM_PSY_A_CORE
Impaired
Unable to assess
Impaired
Impaired
Unable to assess
Impaired
Unable to assess
Impaired
Normal
Impaired

## 2022-02-22 NOTE — BH CONSULTATION LIAISON PROGRESS NOTE - NSBHMSELANG_PSY_A_CORE
Unable to assess
No abnormalities noted
Unable to assess
No abnormalities noted
Unable to assess
No abnormalities noted

## 2022-02-22 NOTE — BH CONSULTATION LIAISON PROGRESS NOTE - NSBHMSESPEECH_PSY_A_CORE
Normal volume, rate, productivity, spontaneity and articulation
Abnormal as indicated, otherwise normal...
Abnormal as indicated, otherwise normal...
Normal volume, rate, productivity, spontaneity and articulation
Normal volume, rate, productivity, spontaneity and articulation
Non-verbal: unable to assess speech further
Normal volume, rate, productivity, spontaneity and articulation
Normal volume, rate, productivity, spontaneity and articulation
Abnormal as indicated, otherwise normal...
Abnormal as indicated, otherwise normal...

## 2022-02-22 NOTE — PROGRESS NOTE ADULT - PROBLEM SELECTOR PROBLEM 6
History of BPH

## 2022-02-22 NOTE — BH CONSULTATION LIAISON PROGRESS NOTE - NSBHFUPINTERVALHXFT_PSY_A_CORE
Prior to encounter today, MD called pt's cousin/HCP Mishel Avila for collateral. Ms. Avila reports that pt is her 's first cousin, and she is pt's HCP and financial POA. Cousin reported that she is pt's only relative in the [place]. She states that she sends him food weekly and visits periodically to see how he is doing. Cousin reports that several times in the past year, she has been in pt's apartment (one floor in a 3-family house) and found it to be very unkempt and unsanitary. Cousin also states that pt has behaved in a very irritable, paranoid fashion toward both herself and everyone else he has come into contact with. She strongly believes that pt's dementia has progressed significantly in the last year, and that he now needs placement in a more supervised environment (assisted living vs SNF). She believes he would be happiest at a place such as the [facility] in [place], where he would have easy access to [facility] services.     Per chart, pt has been accepting Haldol PO, which was started last night. Pt seen later in day for eval in his room, found lying in bed awake but very tremulous. Skin temperature is cool. Per RN, pt had been given Haldol about 1 hour prior to MD arrival. MD adds Ativan 0.25 mg PO q12h, first dose now, due to possibility that Haldol is causing/contributing to pt's tremors. Pt accepts the medication, and is later seen sleeping with a sheet pulled over his head.
MD was informed by staff last night that pt had stated he wanted to kill himself. As pt is already on CO 1:1 for behavior, it was decided to continue CO 1:1 for SI as well. No incidents were reported overnight. Today, MD made 2 attempts to interview pt, one at midday and one at 3:15 pm, but both times, pt was found sleeping deeply and unarousable (likely 2/2 PRN of Haldol 2 mg plus Ativan 0.5 mg which had been given around 11 am). Per staff, pt had awakened in between MD's visits, but had not made any further suicidal statements. However, pt has reportedly been highly irritable and verbally abusive with staff.
Pt remains on CO 1:1 for SI and agitation. Per staff, pt's behavior has been much improved today, and he has not made any suicidal statements. Pt was found sleeping in bed, again difficult to arouse, appearing calm and in NAD. Speaking in a very soft voice, pt described mood as "I'm fine." MD asked if pt is still having thoughts of suicide, and he said, "No, I won't do that anymore."
Today, AM dose of Ativan 0.25 mg was DC'd per psychiatry recommendation. Per floor RN, pt was very agitated this morning and had to be given PRN of Ativan 0.5 mg IV. Pt remains on CO 1:1 for SI and agitation. Pt was seen in his room, lying in bed awake and alert, but irritable. MD asked about incident this morning, and pt said, "[The RN] was trying to wake me up out of nowhere. It's a form of torture. You people portray yourselves as a hospital, but you are really a criminal organization." Pt was specifically asked about SI, which he denied.
Per floor RN, pt was very restless this morning and had to be given PRN of Haldol 1 mg IV plus Ativan 0.5 mg IV around 9:30 am. Pt has been downgraded to enhanced supervision. Pt was seen in his room, lying in bed awake but stating repeatedly that he was tired ("I need to sleep, I'm exhausted"). Pt spontaneously recognized MD ("You're the psychiatrist"), but appeared to believe he was in OP office: "You missed our session last week." Pt again denied SI.
Pt remains on CO 1:1 for SI and agitation. Per staff, pt was verbally abusive toward staff this morning, and also stated again that he wants to die. Pt was redirected without the use of PRNs. This afternoon in his room, pt was found sleeping in bed, difficult to arouse, appearing calm and in NAD. MD made multiple attempts to awaken pt to discuss his reported suicidal statements, but pt said, "Can we talk about this later?", then went back to sleep.
Pt encountered seated in armchair by his bed, eating lunch. Pt has dozed off at time of MD arrival, but wakes easily to voice. MD remarks that pt seems to be doing fairly well eating today, and he says, "It's pretty good." Pt describes mood as "all right" and denies SI/HI/AVH. 
Per chart, on 2/13 pt was found to have Hb 7.0 and was given transfusion of 1 unit PRBC. Pt remains on CO 1:1 for SI and agitation. Per staff, pt's behavior has been much improved for the past several days, and he has not made any suicidal statements. Pt was found sleeping in bed, again difficult to arouse, appearing calm and in NAD. Speaking in a very soft voice, pt described mood as "I'm fine." Pt confirmed that he had a transfusion: "I could feel it yesterday. I feel better." MD asked if pt is still having thoughts of suicide, and he said, "No, that was just a joke."
Per floor RN, pt was highly irritable with staff this morning and had to be given PRN of Seroquel 25 mg PO around 7:30 am. Pt was seen in his room, sitting up in bed eating lunch. MD asked pt why he was irritable this morning, and he said, "I don't want that employee near me," adding that he felt this employee had handled him roughly by putting his legs back on the bed when he had them hanging over the rail. MD asked which employee pt was referring to and what their job was (RN or PCA), but he said he did not know: "You must be able to find out." MD told pt that unless he can name a specific person, the hospital will not be able to address his complaint. 
Pt encountered lying in bed awake, alert and pleasant. Pt describes mood as "all right" and denies SI/HI/AVH, adding, "I'm just waiting to go home," apparently believing he is going to be DC'd soon. Pt continues, "This place can be very strict--it's coercive." Pt reports that his sleep and appetite have been good.

## 2022-02-22 NOTE — BH CONSULTATION LIAISON PROGRESS NOTE - NSBHMSEKNOWHOW_PSY_ALL_CORE
Educational attainment/Vocabulary
Educational attainment/Vocabulary
Current Events/Educational attainment/Vocabulary
Educational attainment/Vocabulary

## 2022-02-22 NOTE — BH CONSULTATION LIAISON PROGRESS NOTE - NSBHMSEPERCEPT_PSY_A_CORE
No abnormalities
No abnormalities
Unable to assess
Unable to assess
No abnormalities
Unable to assess
No abnormalities
No abnormalities

## 2022-02-22 NOTE — BH CONSULTATION LIAISON PROGRESS NOTE - NSBHPSYCHOLCOGORIENT_PSY_A_CORE
Not fully oriented...
Unable to assess
Unable to assess
Not fully oriented...
Unable to assess
Not fully oriented...
Not fully oriented...
Unable to assess
Not fully oriented...
Not fully oriented...

## 2022-02-22 NOTE — BH CONSULTATION LIAISON PROGRESS NOTE - NSBHPSYCHOLCOGABN_PSY_A_CORE
disoriented to time/disoriented to place
disoriented to time/disoriented to place/disoriented to situation
disoriented to time/disoriented to place
disoriented to time/disoriented to place

## 2022-02-22 NOTE — BH CONSULTATION LIAISON PROGRESS NOTE - NSBHPTASSESSDT_PSY_A_CORE
16-Feb-2022 12:30
09-Feb-2022 15:15
15-Feb-2022 15:45
10-Feb-2022 14:30
17-Feb-2022 22:57
11-Feb-2022 13:00
18-Feb-2022 15:25
08-Feb-2022 18:06
22-Feb-2022 17:53
14-Feb-2022 11:45

## 2022-02-22 NOTE — PROGRESS NOTE ADULT - PROBLEM SELECTOR PROBLEM 1
Alzheimer's dementia with behavioral disturbance
UTI (urinary tract infection)
Alzheimer's dementia with behavioral disturbance
UTI (urinary tract infection)
Alzheimer's dementia with behavioral disturbance
UTI (urinary tract infection)

## 2022-02-22 NOTE — BH CONSULTATION LIAISON PROGRESS NOTE - NSBHCHARTREVIEWVS_PSY_A_CORE FT
Vital Signs Last 24 Hrs  T(C): 36.8 (14 Feb 2022 04:16), Max: 36.8 (14 Feb 2022 04:16)  T(F): 98.3 (14 Feb 2022 04:16), Max: 98.3 (14 Feb 2022 04:16)  HR: 88 (14 Feb 2022 04:16) (86 - 88)  BP: 123/66 (14 Feb 2022 04:16) (123/66 - 123/66)  BP(mean): --  RR: 20 (14 Feb 2022 04:16) (17 - 20)  SpO2: 94% (14 Feb 2022 04:16) (94% - 96%)
Vital Signs Last 24 Hrs  T(C): 36.7 (13 Feb 2022 19:45), Max: 36.7 (13 Feb 2022 10:45)  T(F): 98.1 (13 Feb 2022 19:45), Max: 98.1 (13 Feb 2022 19:45)  HR: 86 (13 Feb 2022 19:45) (73 - 96)  BP: 98/57 (13 Feb 2022 10:45) (98/57 - 130/70)  BP(mean): 91 (13 Feb 2022 05:11) (91 - 91)  RR: 17 (13 Feb 2022 19:45) (17 - 18)  SpO2: 96% (13 Feb 2022 19:45) (93% - 96%)
Vital Signs Last 24 Hrs  T(C): 36.7 (09 Feb 2022 18:50), Max: 36.7 (09 Feb 2022 05:35)  T(F): 98 (09 Feb 2022 18:50), Max: 98 (09 Feb 2022 05:35)  HR: 91 (09 Feb 2022 18:50) (91 - 107)  BP: 110/63 (09 Feb 2022 18:50) (110/63 - 128/71)  BP(mean): --  RR: 20 (09 Feb 2022 18:50) (16 - 20)  SpO2: 95% (09 Feb 2022 18:50) (95% - 96%)
Vital Signs Last 24 Hrs  T(C): 36.4 (15 Feb 2022 05:19), Max: 36.6 (14 Feb 2022 20:30)  T(F): 97.5 (15 Feb 2022 05:19), Max: 97.8 (14 Feb 2022 20:30)  HR: 82 (15 Feb 2022 05:19) (74 - 82)  BP: 132/61 (15 Feb 2022 05:19) (118/66 - 132/61)  BP(mean): --  RR: 19 (15 Feb 2022 05:19) (19 - 19)  SpO2: 94% (15 Feb 2022 05:19) (93% - 94%)
Vital Signs Last 24 Hrs  T(C): 37.1 (22 Feb 2022 13:09), Max: 37.1 (22 Feb 2022 13:09)  T(F): 98.7 (22 Feb 2022 13:09), Max: 98.7 (22 Feb 2022 13:09)  HR: 97 (22 Feb 2022 13:09) (94 - 100)  BP: 105/77 (22 Feb 2022 13:09) (99/68 - 125/80)  BP(mean): --  RR: 18 (22 Feb 2022 13:09) (18 - 18)  SpO2: 95% (22 Feb 2022 13:09) (94% - 95%)
Vital Signs Last 24 Hrs  T(C): 36.7 (08 Feb 2022 11:24), Max: 37.4 (07 Feb 2022 21:14)  T(F): 98 (08 Feb 2022 11:24), Max: 99.3 (07 Feb 2022 21:14)  HR: 74 (08 Feb 2022 17:07) (74 - 114)  BP: 135/43 (08 Feb 2022 17:07) (123/75 - 152/81)  BP(mean): --  RR: 17 (08 Feb 2022 11:24) (17 - 20)  SpO2: 99% (08 Feb 2022 11:24) (90% - 99%)
Vital Signs Last 24 Hrs  T(C): 36.7 (13 Feb 2022 19:45), Max: 36.7 (13 Feb 2022 10:45)  T(F): 98.1 (13 Feb 2022 19:45), Max: 98.1 (13 Feb 2022 19:45)  HR: 86 (13 Feb 2022 19:45) (73 - 96)  BP: 98/57 (13 Feb 2022 10:45) (98/57 - 130/70)  BP(mean): 91 (13 Feb 2022 05:11) (91 - 91)  RR: 17 (13 Feb 2022 19:45) (17 - 18)  SpO2: 96% (13 Feb 2022 19:45) (93% - 96%)
Vital Signs Last 24 Hrs  T(C): 36.3 (18 Feb 2022 12:35), Max: 36.7 (17 Feb 2022 19:32)  T(F): 97.4 (18 Feb 2022 12:35), Max: 98.1 (17 Feb 2022 19:32)  HR: 100 (18 Feb 2022 12:35) (83 - 100)  BP: 112/74 (18 Feb 2022 12:35) (112/74 - 150/89)  BP(mean): --  RR: 19 (18 Feb 2022 12:35) (18 - 19)  SpO2: 96% (18 Feb 2022 12:35) (95% - 96%)
Vital Signs Last 24 Hrs  T(C): 36.7 (17 Feb 2022 19:32), Max: 36.7 (17 Feb 2022 19:32)  T(F): 98.1 (17 Feb 2022 19:32), Max: 98.1 (17 Feb 2022 19:32)  HR: 83 (17 Feb 2022 19:32) (83 - 95)  BP: 150/89 (17 Feb 2022 19:32) (130/83 - 165/90)  BP(mean): --  RR: 18 (17 Feb 2022 19:32) (16 - 18)  SpO2: 95% (17 Feb 2022 19:32) (95% - 100%)
Vital Signs Last 24 Hrs  T(C): 36.2 (16 Feb 2022 11:21), Max: 37 (16 Feb 2022 04:45)  T(F): 97.2 (16 Feb 2022 11:21), Max: 98.6 (16 Feb 2022 04:45)  HR: 93 (16 Feb 2022 04:45) (92 - 93)  BP: 143/82 (16 Feb 2022 11:21) (129/79 - 148/78)  BP(mean): --  RR: 19 (16 Feb 2022 11:21) (19 - 20)  SpO2: 99% (16 Feb 2022 11:21) (95% - 99%)

## 2022-02-22 NOTE — PROGRESS NOTE ADULT - SUBJECTIVE AND OBJECTIVE BOX
Patient is a 81y old  Male who presents with a chief complaint of UTI (22 Feb 2022 12:11)    INTERVAL HPI/OVERNIGHT EVENTS: NO acute events overngiht    REVIEW OF SYSTEMS:  CONSTITUTIONAL: No fever, chills  ENMT:  No difficulty hearing, no change in vision  NECK: No pain or stiffness  RESPIRATORY: No cough, SOB  CARDIOVASCULAR: No chest pain, palpitations  GASTROINTESTINAL: No abdominal pain. No nausea, vomiting, or diarrhea  GENITOURINARY: No dysuria  NEUROLOGICAL: No HA  SKIN: No itching, burning, rashes, or lesions   LYMPH NODES: No enlarged glands  ENDOCRINE: No heat or cold intolerance; No hair loss  MUSCULOSKELETAL: No joint pain or swelling; No muscle, back, or extremity pain  PSYCHIATRIC: No depression, anxiety  HEME/LYMPH: No easy bruising, or bleeding gums    T(C): 37.1 (02-22-22 @ 13:09), Max: 37.1 (02-22-22 @ 13:09)  HR: 97 (02-22-22 @ 13:09) (94 - 100)  BP: 105/77 (02-22-22 @ 13:09) (99/68 - 125/80)  RR: 18 (02-22-22 @ 13:09) (18 - 18)  SpO2: 95% (02-22-22 @ 13:09) (94% - 95%)  Wt(kg): --Vital Signs Last 24 Hrs  T(C): 37.1 (22 Feb 2022 13:09), Max: 37.1 (22 Feb 2022 13:09)  T(F): 98.7 (22 Feb 2022 13:09), Max: 98.7 (22 Feb 2022 13:09)  HR: 97 (22 Feb 2022 13:09) (94 - 100)  BP: 105/77 (22 Feb 2022 13:09) (99/68 - 125/80)  BP(mean): --  RR: 18 (22 Feb 2022 13:09) (18 - 18)  SpO2: 95% (22 Feb 2022 13:09) (94% - 95%)    MEDICATIONS  (STANDING):  apixaban 2.5 milliGRAM(s) Oral two times a day  atorvastatin 40 milliGRAM(s) Oral at bedtime  calcitriol   Capsule 0.5 MICROGram(s) Oral daily  calcium carbonate    500 mG (Tums) Chewable 1 Tablet(s) Chew two times a day  dextrose 40% Gel 15 Gram(s) Oral once  dextrose 5%. 1000 milliLiter(s) (100 mL/Hr) IV Continuous <Continuous>  dextrose 5%. 1000 milliLiter(s) (50 mL/Hr) IV Continuous <Continuous>  dextrose 50% Injectable 25 Gram(s) IV Push once  dextrose 50% Injectable 12.5 Gram(s) IV Push once  dextrose 50% Injectable 25 Gram(s) IV Push once  finasteride 5 milliGRAM(s) Oral daily  glucagon  Injectable 1 milliGRAM(s) IntraMuscular once  insulin glargine Injectable (LANTUS) 20 Unit(s) SubCutaneous at bedtime  insulin lispro (ADMELOG) corrective regimen sliding scale   SubCutaneous three times a day before meals  insulin lispro Injectable (ADMELOG) 4 Unit(s) SubCutaneous three times a day before meals  labetalol 100 milliGRAM(s) Oral every 12 hours  mirtazapine 15 milliGRAM(s) Oral at bedtime  pantoprazole    Tablet 40 milliGRAM(s) Oral before breakfast  QUEtiapine 50 milliGRAM(s) Oral at bedtime  QUEtiapine 50 milliGRAM(s) Oral daily  sodium chloride 0.9%. 1000 milliLiter(s) (75 mL/Hr) IV Continuous <Continuous>  tamsulosin 0.4 milliGRAM(s) Oral at bedtime    MEDICATIONS  (PRN):  acetaminophen     Tablet .. 650 milliGRAM(s) Oral every 6 hours PRN Temp greater or equal to 38C (100.4F), Mild Pain (1 - 3)  guaiFENesin Oral Liquid (Sugar-Free) 100 milliGRAM(s) Oral every 6 hours PRN Cough  meclizine 12.5 milliGRAM(s) Oral every 6 hours PRN Dizziness  melatonin 3 milliGRAM(s) Oral at bedtime PRN Insomnia  ondansetron Injectable 4 milliGRAM(s) IV Push every 8 hours PRN Nausea and/or Vomiting  QUEtiapine 25 milliGRAM(s) Oral every 12 hours PRN Agitation      PHYSICAL EXAM:  GENERAL: NAD  EYES: clear conjunctiva;  ENMT: Moist mucous membranes  NECK: Supple, No JVD, Normal thyroid  CHEST/LUNG: Clear to auscultation bilaterally  HEART: S1, S2, Regular rate and rhythm  ABDOMEN: Soft, Nontender, Nondistended  NEURO: Alert & awke  EXTREMITIES: No LE edema, no calf tenderness  SKIN: No rashes or lesions    Consultant(s) Notes Reviewed:  [x ] YES  [ ] NO  Care Discussed with Consultants/Other Providers [ x] YES  [ ] NO    LABS:                        10.5   8.83  )-----------( 265      ( 21 Feb 2022 07:05 )             32.2     02-21    137  |  106  |  43<H>  ----------------------------<  248<H>  3.8   |  24  |  2.48<H>    Ca    7.6<L>      21 Feb 2022 07:05  Phos  3.4     02-21  Mg     0.6     02-21    TPro  7.1  /  Alb  2.3<L>  /  TBili  0.2  /  DBili  x   /  AST  12  /  ALT  17  /  AlkPhos  86  02-21      CAPILLARY BLOOD GLUCOSE      POCT Blood Glucose.: 147 mg/dL (22 Feb 2022 16:41)  POCT Blood Glucose.: 171 mg/dL (22 Feb 2022 11:50)  POCT Blood Glucose.: 163 mg/dL (22 Feb 2022 08:07)  POCT Blood Glucose.: 292 mg/dL (21 Feb 2022 22:26)    RADIOLOGY & ADDITIONAL TESTS:    Imaging Personally Reviewed:  [x ] YES  [ ] NO  < from: CT Head No Cont (01.27.22 @ 12:26) >  ACC: [id number] EXAM:  CT BRAIN                          PROCEDURE DATE:  01/27/2022          INTERPRETATION:  Noncontrast CT of the brain.    CLINICAL INDICATION:  Altered mental status    TECHNIQUE : Axial CT scanning of the brain was obtained fromthe skull   base to the vertex without the administration of intravenous contrast.   Sagittal and coronal reformats were provided.    COMPARISON: CT brain 12/23/2021    FINDINGS:    No hydrocephalus, mass effect, midline shift, acute intracranial   hemorrhage, or brain edema.    Mild white matter microvascular ischemic disease.    Visualized paranasal sinuses and mastoid air cells are clear.    IMPRESSION:    No hydrocephalus, acute intracranial hemorrhage, mass effect, or brain   edema.    --- End of Report ---            CIELO RADFORD MD; Attending Radiologist  This document has been electronically signed. Hoang 27 2022 12:43PM    < end of copied text >  < from: US Renal (12.27.21 @ 10:33) >  ACC: [id number] EXAM:  US KIDNEY(S)                          PROCEDURE DATE:  12/27/2021          INTERPRETATION:  CLINICAL INFORMATION: Renal insufficiency    COMPARISON: CT abdomen/pelvis 12/26/2021.    TECHNIQUE: Sonography of the kidneys and bladder.    FINDINGS: Bilateral ureteral stents noted on CT evaluation performed   12/26/2021 are not sonographically demonstrated.    Right kidney: 10.2 cm. Mild to moderate right-sided hydronephrosis.    Left kidney: 12.0 cm. Moderate left-sided hydronephrosis.    Urinary bladder: Not well distended, limiting assessment. An element of   bladder wall thickening is suggested.    The IVC and abdominal aorta are not well-visualized.    IMPRESSION: Bilateral hydronephrosis. See above discussion.    --- End of Report ---            MAIA JACKSON MD; Attending Radiologist  This document has been electronically signed. Dec 27 2021  2:16PM    < end of copied text >

## 2022-02-22 NOTE — BH CONSULTATION LIAISON PROGRESS NOTE - NSBHMSEINTELL_PSY_A_CORE
Average
Average
Unable to assess
Average
Unable to assess
Unable to assess
Average
Average

## 2022-02-22 NOTE — BH CONSULTATION LIAISON PROGRESS NOTE - NSBHMSETHTCONTENT_PSY_A_CORE
Unremarkable
Unable to assess
Unremarkable

## 2022-02-22 NOTE — BH CONSULTATION LIAISON PROGRESS NOTE - NSBHFUPINTERVALCCFT_PSY_A_CORE
"I need to sleep, I'm exhausted"
"I'm just waiting to go home"
None expressed (pt somnolent)
"You portray yourselves as a hospital"
"I'm cold"
"I don't want that employee near me"
"I'm fine"
"Can we talk about this later?"
"It's pretty good"
"It was just a joke"

## 2022-02-22 NOTE — BH CONSULTATION LIAISON PROGRESS NOTE - NS ED BHA REVIEW OF ED CHART AVAILABLE INVESTIGATIONS REVIEWED
None available
Yes
None available

## 2022-02-22 NOTE — PROGRESS NOTE ADULT - SUBJECTIVE AND OBJECTIVE BOX
CHIEF COMPLAINT:Patient is a 81y old  Male who presents with a chief complaint of UTI.pt appears comfortable.    	  REVIEW OF SYSTEMS:  CONSTITUTIONAL: No fever, weight loss, or fatigue  EYES: No eye pain, visual disturbances, or discharge  ENT:  No difficulty hearing, tinnitus, vertigo; No sinus or throat pain  NECK: No pain or stiffness  RESPIRATORY: No cough, wheezing, chills or hemoptysis; No Shortness of Breath  CARDIOVASCULAR: No chest pain, palpitations, passing out, dizziness, or leg swelling  GASTROINTESTINAL: No abdominal or epigastric pain. No nausea, vomiting, or hematemesis; No diarrhea or constipation. No melena or hematochezia.  GENITOURINARY: No dysuria, frequency, hematuria, or incontinence  NEUROLOGICAL: No headaches, memory loss, loss of strength, numbness, or tremors  SKIN: No itching, burning, rashes, or lesions   LYMPH Nodes: No enlarged glands  ENDOCRINE: No heat or cold intolerance; No hair loss  MUSCULOSKELETAL: No joint pain or swelling; No muscle, back, or extremity pain  PSYCHIATRIC: No depression, anxiety, mood swings, or difficulty sleeping  HEME/LYMPH: No easy bruising, or bleeding gums  ALLERGY AND IMMUNOLOGIC: No hives or eczema	        PHYSICAL EXAM:  T(C): 36.8 (02-21-22 @ 21:00), Max: 36.8 (02-21-22 @ 21:00)  HR: 94 (02-22-22 @ 08:28) (91 - 100)  BP: 99/68 (02-22-22 @ 08:28) (92/58 - 125/80)  RR: 18 (02-21-22 @ 21:00) (18 - 18)  SpO2: 94% (02-21-22 @ 21:00) (94% - 94%)  Wt(kg): --  I&O's Summary      Appearance: Normal	  HEENT:   Normal oral mucosa, PERRL, EOMI	  Lymphatic: No lymphadenopathy  Cardiovascular: Normal S1 S2, No JVD, No murmurs, No edema  Respiratory: Lungs clear to auscultation	  Psychiatry: A & O x 3, Mood & affect appropriate  Gastrointestinal:  Soft, Non-tender, + BS	  Skin: No rashes, No ecchymoses, No cyanosis	  Neurologic: Non-focal  Extremities: Normal range of motion, No clubbing, cyanosis or edema  Vascular: Peripheral pulses palpable 2+ bilaterally    MEDICATIONS  (STANDING):  apixaban 2.5 milliGRAM(s) Oral two times a day  atorvastatin 40 milliGRAM(s) Oral at bedtime  calcitriol   Capsule 0.5 MICROGram(s) Oral daily  calcium carbonate    500 mG (Tums) Chewable 1 Tablet(s) Chew two times a day  dextrose 40% Gel 15 Gram(s) Oral once  dextrose 5%. 1000 milliLiter(s) (100 mL/Hr) IV Continuous <Continuous>  dextrose 5%. 1000 milliLiter(s) (50 mL/Hr) IV Continuous <Continuous>  dextrose 50% Injectable 25 Gram(s) IV Push once  dextrose 50% Injectable 12.5 Gram(s) IV Push once  dextrose 50% Injectable 25 Gram(s) IV Push once  finasteride 5 milliGRAM(s) Oral daily  glucagon  Injectable 1 milliGRAM(s) IntraMuscular once  insulin glargine Injectable (LANTUS) 20 Unit(s) SubCutaneous at bedtime  insulin lispro (ADMELOG) corrective regimen sliding scale   SubCutaneous three times a day before meals  insulin lispro Injectable (ADMELOG) 4 Unit(s) SubCutaneous three times a day before meals  labetalol 100 milliGRAM(s) Oral every 8 hours  mirtazapine 15 milliGRAM(s) Oral at bedtime  pantoprazole    Tablet 40 milliGRAM(s) Oral before breakfast  QUEtiapine 50 milliGRAM(s) Oral at bedtime  QUEtiapine 50 milliGRAM(s) Oral daily  sodium chloride 0.9%. 1000 milliLiter(s) (75 mL/Hr) IV Continuous <Continuous>  tamsulosin 0.4 milliGRAM(s) Oral at bedtime    	  	  LABS:	 	                       10.5   8.83  )-----------( 265      ( 21 Feb 2022 07:05 )             32.2     02-21    137  |  106  |  43<H>  ----------------------------<  248<H>  3.8   |  24  |  2.48<H>    Ca    7.6<L>      21 Feb 2022 07:05  Phos  3.4     02-21  Mg     0.6     02-21    TPro  7.1  /  Alb  2.3<L>  /  TBili  0.2  /  DBili  x   /  AST  12  /  ALT  17  /  AlkPhos  86  02-21

## 2022-02-22 NOTE — BH CONSULTATION LIAISON PROGRESS NOTE - NSICDXBHPRIMARYDX_PSY_ALL_CORE
RX PROGRESS NOTE: Vancomycin Therapeutic Drug Monitoring      Indication for therapy: Osteomyelitis    ALLERGIES:  No Known Allergies    Most recent height and weight information:  Weight: 91.9 kg (02/17/21 0502)  Height: 5' 1.81\" (157 cm) (02/17/21 0502)    The Following are the calculated  Current Weights for [place]            Adjusted Ideal    66.6 kg 49.7 kg             Labs:  Serum Creatinine and Creatinine Clearance:  Serum creatinine: 0.69 mg/dL 02/16/21 1855  Estimated creatinine clearance: 76.3 mL/min    Maximum Temperature (last 24 hours)     Value Max    Temp  99.5 °F (37.5 °C)        WBC (K/mcL)   Date/Time Value   02/16/2021 1855 8.0   02/16/2021 1831 8.2     Microbiology Results     None            Assessment/Plan:  Briefly, this is a 71 year old female started on vancomycin for Osteomyelitis, with a target serum trough concentration of 10-15 mcg/mL.  Patient is here from [facility] where they were on Vancomycin 1500 mg q24h. Vancomycin treatment started on 2/3/21 with plan to continue until 3/19/21.  Initially we will continue dosing regimen from [facility] 1500 mg every 24 hours (maintenance dose).    Pharmacy will monitor levels as appropriate.    Pharmacy will continue to monitor patient (renal function, microbiology data, risk factors for adverse events, appropriate duration of therapy), will order/monitor serum levels as appropriate, and will adjust dose if/when necessary.      Thank you,    Myles Lang [facility]  2/17/2021 8:38 AM    
Dementia with behavioral disturbance   F03.91  

## 2022-02-22 NOTE — PROGRESS NOTE ADULT - ASSESSMENT
82 yo M with PMH of DVT (on eliquis) , HTN, HLD, DM ,multiple recent visits for UTI presents with confusion,depression and suicidal ideation.  1.Replace Mg.  2.DM-Insulin,Endo f/u.  3.Psych f/u-on seroquel,remeron.  4.DVT-eliquis.  5.CRI-f/u lytes  6.HTN- labetalol 100mg q12h.  7.Lipid d/o-statin.  8.Anemia-s/p prbc.  9.PPI.  10.Await NELI.

## 2022-02-22 NOTE — PROGRESS NOTE ADULT - PROBLEM SELECTOR PROBLEM 3
Chronic kidney disease, unspecified CKD stage

## 2022-02-23 ENCOUNTER — TRANSCRIPTION ENCOUNTER (OUTPATIENT)
Age: 82
End: 2022-02-23

## 2022-02-23 VITALS
DIASTOLIC BLOOD PRESSURE: 80 MMHG | SYSTOLIC BLOOD PRESSURE: 142 MMHG | RESPIRATION RATE: 17 BRPM | HEART RATE: 90 BPM | OXYGEN SATURATION: 95 % | TEMPERATURE: 98 F

## 2022-02-23 DIAGNOSIS — R41.0 DISORIENTATION, UNSPECIFIED: ICD-10-CM

## 2022-02-23 LAB
ANION GAP SERPL CALC-SCNC: 8 MMOL/L — SIGNIFICANT CHANGE UP (ref 5–17)
BUN SERPL-MCNC: 51 MG/DL — HIGH (ref 7–18)
CALCIUM SERPL-MCNC: 8.5 MG/DL — SIGNIFICANT CHANGE UP (ref 8.4–10.5)
CHLORIDE SERPL-SCNC: 106 MMOL/L — SIGNIFICANT CHANGE UP (ref 96–108)
CO2 SERPL-SCNC: 24 MMOL/L — SIGNIFICANT CHANGE UP (ref 22–31)
CREAT SERPL-MCNC: 2.68 MG/DL — HIGH (ref 0.5–1.3)
GLUCOSE BLDC GLUCOMTR-MCNC: 127 MG/DL — HIGH (ref 70–99)
GLUCOSE BLDC GLUCOMTR-MCNC: 170 MG/DL — HIGH (ref 70–99)
GLUCOSE BLDC GLUCOMTR-MCNC: 274 MG/DL — HIGH (ref 70–99)
GLUCOSE SERPL-MCNC: 175 MG/DL — HIGH (ref 70–99)
HCT VFR BLD CALC: 34.4 % — LOW (ref 39–50)
HGB BLD-MCNC: 10.8 G/DL — LOW (ref 13–17)
MCHC RBC-ENTMCNC: 27.5 PG — SIGNIFICANT CHANGE UP (ref 27–34)
MCHC RBC-ENTMCNC: 31.4 GM/DL — LOW (ref 32–36)
MCV RBC AUTO: 87.5 FL — SIGNIFICANT CHANGE UP (ref 80–100)
NRBC # BLD: 0 /100 WBCS — SIGNIFICANT CHANGE UP (ref 0–0)
PLATELET # BLD AUTO: 235 K/UL — SIGNIFICANT CHANGE UP (ref 150–400)
POTASSIUM SERPL-MCNC: 4.4 MMOL/L — SIGNIFICANT CHANGE UP (ref 3.5–5.3)
POTASSIUM SERPL-SCNC: 4.4 MMOL/L — SIGNIFICANT CHANGE UP (ref 3.5–5.3)
RBC # BLD: 3.93 M/UL — LOW (ref 4.2–5.8)
RBC # FLD: 14.5 % — SIGNIFICANT CHANGE UP (ref 10.3–14.5)
SODIUM SERPL-SCNC: 138 MMOL/L — SIGNIFICANT CHANGE UP (ref 135–145)
WBC # BLD: 8.63 K/UL — SIGNIFICANT CHANGE UP (ref 3.8–10.5)
WBC # FLD AUTO: 8.63 K/UL — SIGNIFICANT CHANGE UP (ref 3.8–10.5)

## 2022-02-23 PROCEDURE — 97110 THERAPEUTIC EXERCISES: CPT

## 2022-02-23 PROCEDURE — 83605 ASSAY OF LACTIC ACID: CPT

## 2022-02-23 PROCEDURE — 83550 IRON BINDING TEST: CPT

## 2022-02-23 PROCEDURE — 36415 COLL VENOUS BLD VENIPUNCTURE: CPT

## 2022-02-23 PROCEDURE — 86850 RBC ANTIBODY SCREEN: CPT

## 2022-02-23 PROCEDURE — 82962 GLUCOSE BLOOD TEST: CPT

## 2022-02-23 PROCEDURE — 99285 EMERGENCY DEPT VISIT HI MDM: CPT | Mod: 25

## 2022-02-23 PROCEDURE — 87635 SARS-COV-2 COVID-19 AMP PRB: CPT

## 2022-02-23 PROCEDURE — 83935 ASSAY OF URINE OSMOLALITY: CPT

## 2022-02-23 PROCEDURE — 85025 COMPLETE CBC W/AUTO DIFF WBC: CPT

## 2022-02-23 PROCEDURE — 80053 COMPREHEN METABOLIC PANEL: CPT

## 2022-02-23 PROCEDURE — 84100 ASSAY OF PHOSPHORUS: CPT

## 2022-02-23 PROCEDURE — 85027 COMPLETE CBC AUTOMATED: CPT

## 2022-02-23 PROCEDURE — 36430 TRANSFUSION BLD/BLD COMPNT: CPT

## 2022-02-23 PROCEDURE — 84484 ASSAY OF TROPONIN QUANT: CPT

## 2022-02-23 PROCEDURE — 86901 BLOOD TYPING SEROLOGIC RH(D): CPT

## 2022-02-23 PROCEDURE — 96374 THER/PROPH/DIAG INJ IV PUSH: CPT

## 2022-02-23 PROCEDURE — 97116 GAIT TRAINING THERAPY: CPT

## 2022-02-23 PROCEDURE — 87086 URINE CULTURE/COLONY COUNT: CPT

## 2022-02-23 PROCEDURE — 82040 ASSAY OF SERUM ALBUMIN: CPT

## 2022-02-23 PROCEDURE — 81001 URINALYSIS AUTO W/SCOPE: CPT

## 2022-02-23 PROCEDURE — 80048 BASIC METABOLIC PNL TOTAL CA: CPT

## 2022-02-23 PROCEDURE — 86900 BLOOD TYPING SEROLOGIC ABO: CPT

## 2022-02-23 PROCEDURE — 97163 PT EVAL HIGH COMPLEX 45 MIN: CPT

## 2022-02-23 PROCEDURE — 97530 THERAPEUTIC ACTIVITIES: CPT

## 2022-02-23 PROCEDURE — 82728 ASSAY OF FERRITIN: CPT

## 2022-02-23 PROCEDURE — 84133 ASSAY OF URINE POTASSIUM: CPT

## 2022-02-23 PROCEDURE — P9040: CPT

## 2022-02-23 PROCEDURE — 82570 ASSAY OF URINE CREATININE: CPT

## 2022-02-23 PROCEDURE — 83540 ASSAY OF IRON: CPT

## 2022-02-23 PROCEDURE — 87186 SC STD MICRODIL/AGAR DIL: CPT

## 2022-02-23 PROCEDURE — 83735 ASSAY OF MAGNESIUM: CPT

## 2022-02-23 PROCEDURE — 93005 ELECTROCARDIOGRAM TRACING: CPT

## 2022-02-23 PROCEDURE — 86923 COMPATIBILITY TEST ELECTRIC: CPT

## 2022-02-23 PROCEDURE — 84300 ASSAY OF URINE SODIUM: CPT

## 2022-02-23 RX ORDER — INSULIN GLARGINE 100 [IU]/ML
20 INJECTION, SOLUTION SUBCUTANEOUS
Qty: 0 | Refills: 0 | DISCHARGE
Start: 2022-02-23

## 2022-02-23 RX ORDER — LANOLIN ALCOHOL/MO/W.PET/CERES
1 CREAM (GRAM) TOPICAL
Qty: 0 | Refills: 0 | DISCHARGE
Start: 2022-02-23

## 2022-02-23 RX ORDER — PANTOPRAZOLE SODIUM 20 MG/1
1 TABLET, DELAYED RELEASE ORAL
Qty: 0 | Refills: 0 | DISCHARGE
Start: 2022-02-23

## 2022-02-23 RX ORDER — APIXABAN 2.5 MG/1
1 TABLET, FILM COATED ORAL
Qty: 0 | Refills: 0 | DISCHARGE
Start: 2022-02-23

## 2022-02-23 RX ORDER — CALCIUM CARBONATE 500(1250)
1 TABLET ORAL
Qty: 0 | Refills: 0 | DISCHARGE
Start: 2022-02-23

## 2022-02-23 RX ORDER — TAMSULOSIN HYDROCHLORIDE 0.4 MG/1
1 CAPSULE ORAL
Qty: 0 | Refills: 0 | DISCHARGE
Start: 2022-02-23

## 2022-02-23 RX ORDER — FINASTERIDE 5 MG/1
1 TABLET, FILM COATED ORAL
Qty: 0 | Refills: 0 | DISCHARGE
Start: 2022-02-23

## 2022-02-23 RX ORDER — OMEPRAZOLE 10 MG/1
0 CAPSULE, DELAYED RELEASE ORAL
Qty: 0 | Refills: 0 | DISCHARGE

## 2022-02-23 RX ORDER — MIRTAZAPINE 45 MG/1
1 TABLET, ORALLY DISINTEGRATING ORAL
Qty: 0 | Refills: 0 | DISCHARGE
Start: 2022-02-23

## 2022-02-23 RX ORDER — ACETAMINOPHEN 500 MG
2 TABLET ORAL
Qty: 0 | Refills: 0 | DISCHARGE
Start: 2022-02-23

## 2022-02-23 RX ORDER — ATORVASTATIN CALCIUM 80 MG/1
1 TABLET, FILM COATED ORAL
Qty: 0 | Refills: 0 | DISCHARGE
Start: 2022-02-23

## 2022-02-23 RX ORDER — MECLIZINE HCL 12.5 MG
1 TABLET ORAL
Qty: 0 | Refills: 0 | DISCHARGE
Start: 2022-02-23

## 2022-02-23 RX ORDER — LABETALOL HCL 100 MG
1 TABLET ORAL
Qty: 0 | Refills: 0 | DISCHARGE
Start: 2022-02-23

## 2022-02-23 RX ORDER — INSULIN LISPRO 100/ML
4 VIAL (ML) SUBCUTANEOUS
Qty: 0 | Refills: 0 | DISCHARGE
Start: 2022-02-23

## 2022-02-23 RX ORDER — QUETIAPINE FUMARATE 200 MG/1
1 TABLET, FILM COATED ORAL
Qty: 0 | Refills: 0 | DISCHARGE
Start: 2022-02-23

## 2022-02-23 RX ORDER — CALCITRIOL 0.5 UG/1
1 CAPSULE ORAL
Qty: 0 | Refills: 0 | DISCHARGE
Start: 2022-02-23

## 2022-02-23 RX ADMIN — Medication 100 MILLIGRAM(S): at 07:59

## 2022-02-23 RX ADMIN — APIXABAN 2.5 MILLIGRAM(S): 2.5 TABLET, FILM COATED ORAL at 17:22

## 2022-02-23 RX ADMIN — Medication 1 TABLET(S): at 07:59

## 2022-02-23 RX ADMIN — FINASTERIDE 5 MILLIGRAM(S): 5 TABLET, FILM COATED ORAL at 12:00

## 2022-02-23 RX ADMIN — Medication 1 TABLET(S): at 17:21

## 2022-02-23 RX ADMIN — Medication 100 MILLIGRAM(S): at 17:21

## 2022-02-23 RX ADMIN — Medication 3: at 11:58

## 2022-02-23 RX ADMIN — Medication 4 UNIT(S): at 11:59

## 2022-02-23 RX ADMIN — QUETIAPINE FUMARATE 50 MILLIGRAM(S): 200 TABLET, FILM COATED ORAL at 12:00

## 2022-02-23 RX ADMIN — CALCITRIOL 0.5 MICROGRAM(S): 0.5 CAPSULE ORAL at 12:00

## 2022-02-23 RX ADMIN — PANTOPRAZOLE SODIUM 40 MILLIGRAM(S): 20 TABLET, DELAYED RELEASE ORAL at 08:00

## 2022-02-23 RX ADMIN — Medication 4 UNIT(S): at 17:21

## 2022-02-23 RX ADMIN — Medication 1: at 08:20

## 2022-02-23 RX ADMIN — APIXABAN 2.5 MILLIGRAM(S): 2.5 TABLET, FILM COATED ORAL at 08:57

## 2022-02-23 RX ADMIN — Medication 4 UNIT(S): at 08:20

## 2022-02-23 NOTE — DISCHARGE NOTE NURSING/CASE MANAGEMENT/SOCIAL WORK - NSDCPEFALRISK_GEN_ALL_CORE
For information on Fall & Injury Prevention, visit: [email or web address] OR  [email or web address] OR  https://www.cdc.gov/steadi/patient.html

## 2022-02-23 NOTE — PROGRESS NOTE ADULT - SUBJECTIVE AND OBJECTIVE BOX
CHIEF COMPLAINT:Patient is a 81y old  Male who presents with a chief complaint of UTI.Pt appears comfortable.    	  REVIEW OF SYSTEMS:  CONSTITUTIONAL: No fever, weight loss, or fatigue  EYES: No eye pain, visual disturbances, or discharge  ENT:  No difficulty hearing, tinnitus, vertigo; No sinus or throat pain  NECK: No pain or stiffness  RESPIRATORY: No cough, wheezing, chills or hemoptysis; No Shortness of Breath  CARDIOVASCULAR: No chest pain, palpitations, passing out, dizziness, or leg swelling  GASTROINTESTINAL: No abdominal or epigastric pain. No nausea, vomiting, or hematemesis; No diarrhea or constipation. No melena or hematochezia.  GENITOURINARY: No dysuria, frequency, hematuria, or incontinence  NEUROLOGICAL: No headaches, memory loss, loss of strength, numbness, or tremors  SKIN: No itching, burning, rashes, or lesions   LYMPH Nodes: No enlarged glands  ENDOCRINE: No heat or cold intolerance; No hair loss  MUSCULOSKELETAL: No joint pain or swelling; No muscle, back, or extremity pain  PSYCHIATRIC: No depression, anxiety, mood swings, or difficulty sleeping  HEME/LYMPH: No easy bruising, or bleeding gums  ALLERGY AND IMMUNOLOGIC: No hives or eczema	    PHYSICAL EXAM:  T(C): 36.6 (02-23-22 @ 05:42), Max: 37.1 (02-22-22 @ 13:09)  HR: 93 (02-23-22 @ 05:42) (93 - 97)  BP: 154/85 (02-23-22 @ 05:42) (105/77 - 154/85)  RR: 18 (02-23-22 @ 05:42) (18 - 18)  SpO2: 97% (02-23-22 @ 05:42) (95% - 97%)  Wt(kg): --  I&O's Summary      Appearance: Normal	  HEENT:   Normal oral mucosa, PERRL, EOMI	  Lymphatic: No lymphadenopathy  Cardiovascular: Normal S1 S2, No JVD, No murmurs, No edema  Respiratory: Lungs clear to auscultation	  Psychiatry: A & O x 3, Mood & affect appropriate  Gastrointestinal:  Soft, Non-tender, + BS	  Skin: No rashes, No ecchymoses, No cyanosis	  Neurologic: Non-focal  Extremities: Normal range of motion, No clubbing, cyanosis or edema  Vascular: Peripheral pulses palpable 2+ bilaterally    MEDICATIONS  (STANDING):  apixaban 2.5 milliGRAM(s) Oral two times a day  atorvastatin 40 milliGRAM(s) Oral at bedtime  calcitriol   Capsule 0.5 MICROGram(s) Oral daily  calcium carbonate    500 mG (Tums) Chewable 1 Tablet(s) Chew two times a day  dextrose 40% Gel 15 Gram(s) Oral once  dextrose 5%. 1000 milliLiter(s) (50 mL/Hr) IV Continuous <Continuous>  dextrose 5%. 1000 milliLiter(s) (100 mL/Hr) IV Continuous <Continuous>  dextrose 50% Injectable 25 Gram(s) IV Push once  dextrose 50% Injectable 12.5 Gram(s) IV Push once  dextrose 50% Injectable 25 Gram(s) IV Push once  finasteride 5 milliGRAM(s) Oral daily  glucagon  Injectable 1 milliGRAM(s) IntraMuscular once  insulin glargine Injectable (LANTUS) 20 Unit(s) SubCutaneous at bedtime  insulin lispro (ADMELOG) corrective regimen sliding scale   SubCutaneous three times a day before meals  insulin lispro Injectable (ADMELOG) 4 Unit(s) SubCutaneous three times a day before meals  labetalol 100 milliGRAM(s) Oral every 12 hours  mirtazapine 15 milliGRAM(s) Oral at bedtime  pantoprazole    Tablet 40 milliGRAM(s) Oral before breakfast  QUEtiapine 50 milliGRAM(s) Oral at bedtime  QUEtiapine 50 milliGRAM(s) Oral daily  sodium chloride 0.9%. 1000 milliLiter(s) (75 mL/Hr) IV Continuous <Continuous>  tamsulosin 0.4 milliGRAM(s) Oral at bedtime        LABS:	 	                       10.8   8.63  )-----------( 235      ( 23 Feb 2022 07:14 )             34.4     02-23    138  |  106  |  51<H>  ----------------------------<  175<H>  4.4   |  24  |  2.68<H>    Ca    8.5      23 Feb 2022 07:14

## 2022-02-23 NOTE — PROGRESS NOTE ADULT - REASON FOR ADMISSION
UTI
AMS
UTI
Confusion/AMS
UTI
AMS
AMS.
Confusion
UTI
AMS
Acute encephalopathy

## 2022-02-23 NOTE — PROGRESS NOTE ADULT - SUBJECTIVE AND OBJECTIVE BOX
Interval Events:  pt in nad    Allergies    Allergy Status Unknown    Intolerances      Endocrine/Metabolic Medications:  atorvastatin 40 milliGRAM(s) Oral at bedtime  dextrose 40% Gel 15 Gram(s) Oral once  dextrose 50% Injectable 25 Gram(s) IV Push once  dextrose 50% Injectable 25 Gram(s) IV Push once  dextrose 50% Injectable 12.5 Gram(s) IV Push once  finasteride 5 milliGRAM(s) Oral daily  glucagon  Injectable 1 milliGRAM(s) IntraMuscular once  insulin glargine Injectable (LANTUS) 20 Unit(s) SubCutaneous at bedtime  insulin lispro (ADMELOG) corrective regimen sliding scale   SubCutaneous three times a day before meals  insulin lispro Injectable (ADMELOG) 4 Unit(s) SubCutaneous three times a day before meals      Vital Signs Last 24 Hrs  T(C): 36.6 (23 Feb 2022 05:42), Max: 37.1 (22 Feb 2022 13:09)  T(F): 97.8 (23 Feb 2022 05:42), Max: 98.7 (22 Feb 2022 13:09)  HR: 93 (23 Feb 2022 05:42) (93 - 97)  BP: 154/85 (23 Feb 2022 05:42) (105/77 - 154/85)  BP(mean): --  RR: 18 (23 Feb 2022 05:42) (18 - 18)  SpO2: 97% (23 Feb 2022 05:42) (95% - 97%)      PHYSICAL EXAM  All physical exam findings normal, except those marked:  General:	Alert, active, cooperative, NAD, well hydrated  .		[] Abnormal:  Neck		Normal: supple, no cervical adenopathy, no palpable thyroid  .		[] Abnormal:  Cardiovascular	Normal: regular rate, normal S1, S2, no murmurs  .		[] Abnormal:  Respiratory	Normal: no chest wall deformity, normal respiratory pattern, CTA B/L  .		[] Abnormal:  Abdominal	Normal: soft, ND, NT, bowel sounds present, no masses, no organomegaly  .		[] Abnormal:  		Normal normal genitalia, testes descended, circumcised/uncircumcised  .		Denice stage:			Breast denice:  .		Menstrual history:  .		[] Abnormal:  Extremities	Normal: FROM x4  .		[] Abnormal:  Skin		Normal: intact and not indurated, no rash, no acanthosis nigricans  .		[] Abnormal:  Neurologic	Normal: grossly intact  .		[] Abnormal:    LABS                        10.8   8.63  )-----------( 235      ( 23 Feb 2022 07:14 )             34.4                               138    |  106    |  51                  Calcium: 8.5   / iCa: x      (02-23 @ 07:14)    ----------------------------<  175       Magnesium: x                                4.4     |  24     |  2.68             Phosphorous: x          CAPILLARY BLOOD GLUCOSE      POCT Blood Glucose.: 170 mg/dL (23 Feb 2022 08:00)  POCT Blood Glucose.: 317 mg/dL (22 Feb 2022 20:07)  POCT Blood Glucose.: 147 mg/dL (22 Feb 2022 16:41)  POCT Blood Glucose.: 171 mg/dL (22 Feb 2022 11:50)        Assesment/plan        82 yo M with PMH of DVT (on eliquis) , HTN, HLD, DM , multiple recent visits for UTI presents with confusion. . Found to have uncont dm. Pt states he takes oral dm meds as out pt with poorly cont dm. S/p recent hospitalization for uti/ uncont dm.      Problem/Recommendation - 1:  ·  Problem: Uncontrolled diabetes mellitus.   ·  Recommendation: due to acute illness and need for insulin tx  better controlled except for occ hyperglycemia  cont lantus 20 units  and admelog 4 ac tid- hold if poor po intake   fsg ac and hs  d/w prim team.     Problem/Recommendation - 2:  ·  Problem: UTI (urinary tract infection).   ·  Recommendation: s/p abx

## 2022-02-23 NOTE — PROGRESS NOTE ADULT - PROVIDER SPECIALTY LIST ADULT
Endocrinology
Internal Medicine
Nephrology
Endocrinology
Internal Medicine

## 2022-02-23 NOTE — PROGRESS NOTE ADULT - ASSESSMENT
82 yo M with PMH of DVT (on eliquis) , HTN, HLD, DM ,multiple recent visits for UTI presents with confusion,depression and suicidal ideation.  1.DM-Insulin,Endo f/u.  2.Psych f/u-on seroquel,remeron.  3.DVT-eliquis.  4.CRI-f/u lytes  5.HTN- labetalol 100mg q12h.  6.Lipid d/o-statin.  7.Anemia-s/p prbc.  8.PPI.  9.Await NELI.

## 2022-02-23 NOTE — DISCHARGE NOTE NURSING/CASE MANAGEMENT/SOCIAL WORK - PATIENT PORTAL LINK FT
You can access the FollowMyHealth Patient Portal offered by [facility] by registering at the following website: [email or web address]. By joining [employer]’s FollowMyHealth portal, you will also be able to view your health information using other applications (apps) compatible with our system.

## 2022-08-09 NOTE — RAPID RESPONSE TEAM SUMMARY - NSAIRWAYBREATHINGRRT_GEN_ALL_CORE
Patient's insurance will only cover the Lidocaine 5% Patches if patient has the following diagnosis;          This patient does not meet criteria for coverage. The patient can either purchase out of pocket or purchase an OTC product. EPA team will now exit this encounter.     No intervention

## 2022-09-27 NOTE — PROGRESS NOTE ADULT - PROBLEM SELECTOR PROBLEM 4
Medication(s) requested: gabapentin  Last office visit: 06/30/22  Next office visit: 09/30/22  Last refill given: 06/07/22  Last refill picked up from pharmacy: 06/07/22  Contract present: no  Date of contract: na  Last urine drug screen: no     Is the patient due for refill of this medication(s): Yes  PDMP review: Criteria met. Forwarded to Physician/LISSETH for signature.     Diabetes

## 2022-10-21 NOTE — ED PROVIDER NOTE - IV ALTEPLASE EXCL REL HIDDEN
Caller: Jules Zurita    Relationship: Self    Best call back number: [phone]    What was the call regarding: JULES CALLED REGARDING HER LEVAQUIN MEDICATION. IT WAS SENT TO THE WRONG PHARMACY. SHE IS NEEDING IT SENT TO [facility] INSTEAD.        
Requested script to be sent to [facility].  
show

## 2022-11-15 NOTE — PROGRESS NOTE ADULT - PROBLEM SELECTOR PLAN 2
same as above  pending PT evaluation  f/y recommendations
Left arm;
Pt with increased agitation toward staff and refusing care  1:1 secondary to fall risk  Per psych d/c Seroquel and pt is safe to d/c home

## 2022-12-02 NOTE — ED PROVIDER NOTE - ATTESTATION, MLM
no tenderness bilaterally I have reviewed and confirmed nurses' notes for patient's medications, allergies, medical history, and surgical history.

## 2022-12-22 NOTE — PROVIDER CONTACT NOTE (OTHER) - DATE AND TIME:
[9] : 9 18-Oct-2017 06:50 [8] : 8 [Shooting] : shooting [Constant] : constant [Household chores] : household chores [Leisure] : leisure [Sleep] : sleep [Nothing helps with pain getting better] : Nothing helps with pain getting better [Sitting] : sitting [Standing] : standing [Walking] : walking [Stairs] : stairs [Lying in bed] : lying in bed [de-identified] : 12/22/22: Here to f/up right knee. Consulted with Dr. Douglas and now scheduled for tka in April. Presents today with increased right knee pain \par 9/23/22: f/up R knee. MDP given at last visit gave some relief. Overdid it with exercise a few days ago. Having increased pain/ stiffness. Taking celebrex/ tylenol prn.\par 8/16/22: f/up Right knee. s/p completing series of euflexxa about 2 weeks ago.  Feels pain worsening over the medial and anterior aspect. . Having more locking/buckling sx. Worried due to trip coming up. - last csi 07.05.22.\par  Also c/o of diffuse numbness and pain throughout the RLE to the lower back.\par 8/2/22: f/up R knee and euflexxa #3\par 7/26/22: here for right knee euflexxa #2. no adverse reaction to previous inj. \par 06/02/22: Here to f/up right knee and to review MRI results. Reports continued right knee pain and stiffness. Also c/o of increasing left knee pain.\par 5/26/22: Here for follow-up on the right knee. He reports an exacerbation of pain and swelling over the last few months. He started to commute more to work. He started doing PT where hes had 3 sessions. There is locking. Motrin PRN.\par \par 1/27/22: Here for fu R knee. He reports about 100% relief after MDP.\par 01/06/22: Here to f/up right knee. Reports that 5-6 days ago he started to experience an increase in right knee pain.\par States that right knee pain is similar to pain he had at last visit. He did well after aspiration in June and he feels that\par the swelling is starting again. He also reports that about one month ago he took a fall. \par 06/16/21: here to f/up right knee and review MRI results. \par 6/9/21: here to f/up right knee. Aspiration last visit, sent for labs - negative for lyme/crystals. Has seen some return of\par swelling over the knee. \par Mr. Antwan Ramesh, a 45-year-old male, presents today for right knee pain x 2 days. tightness in thigh and calf. also\par with fluid in the knee. went to UC today and xr done. told he had fluid in the knee. also with pain down the side of the\par thigh. Tylenol with no relief. [] : no [FreeTextEntry1] : right knee  [FreeTextEntry5] : Pt is here to follow up on right knee. States he followed up with Dr. Douglas, and has RT TKA schedule for April. Notes that he's here in regards to severe pain, and not due to an injury. Swelling to knee. Inquiring about a CSI. The pain has been constant, and notes pain is most prevalent with prolonged walking, and twisting knee. Takes Celecoxib when needed.

## 2023-09-21 NOTE — DISCHARGE NOTE PROVIDER - NSDCACTIVITY_GEN_ALL_CORE
Please call Mary  This is not uncommon when starting a new medication  I would like her to take it at night with some food and give her a little bit more time please PT recommending NELI

## 2023-10-18 NOTE — ED PROVIDER NOTE - IV ALTEPLASE DOOR HIDDEN
show Hydroquinone Counseling:  Patient advised that medication may result in skin irritation, lightening (hypopigmentation), dryness, and burning.  In the event of skin irritation, the patient was advised to reduce the amount of the drug applied or use it less frequently.  Rarely, spots that are treated with hydroquinone can become darker (pseudoochronosis).  Should this occur, patient instructed to stop medication and call the office. The patient verbalized understanding of the proper use and possible adverse effects of hydroquinone.  All of the patient's questions and concerns were addressed.

## 2024-05-08 NOTE — ED ADULT NURSE NOTE - NSPATIENTFLAG_GEN_A_ER
Spoke with pt virtually. Pt was last seen 5/7/24 and continues to have left leg pain. Pt has tried home exercises and steroids with no relief. Pain is 8/10. I provided the patient with a home exercise program. It is the [facility] spine conditioning program. Exercises include head rolls, kneeling back extension, sitting rotation stretch, modified seated side straddle, knee to chest, bird dog, plank, modified seated plank, hip bridges, abdominal bracing, and abdominal crunch. Pt completes each exercise daily for one hour with worsening of pain. 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 Will order STAT left L3-4 L4-5 TFESI with pain management. Pt will fu if pain persists.      
[facility] DH (Discharge Huddle; Vulnerable Patient)

## 2024-06-20 NOTE — CONSULT NOTE ADULT - CONSULT REASON
uncont dm
B/L hydronephrosis and stent placement assessment
MANUEL  CKD stage 3 A
UTI
Wound Care: No ointment

## 2024-11-12 NOTE — ED PROVIDER NOTE - ENMT, MLM
Known.     - continue home statin     Airway patent, Nasal mucosa clear. Mouth with normal mucosa. Throat has no vesicles, no oropharyngeal exudates and uvula is midline.

## 2024-11-25 NOTE — BH CONSULTATION LIAISON PROGRESS NOTE - NSBHADMITIPOBS_PSY_A_CORE
Routine observation
Enhanced supervision
Constant observation
Enhanced supervision
no
Constant observation
Enhanced supervision
Enhanced supervision
Constant observation

## 2025-01-07 NOTE — PROGRESS NOTE ADULT - PROBLEM SELECTOR PROBLEM 2
Daria from [facility] called requesting an order for mammogram be faxed to number below. Faxed 1/7/24    Fax number- [phone]  
Metabolic acidosis, normal anion gap (NAG)
MANUEL (acute kidney injury)
MANUEL (acute kidney injury)
Metabolic acidosis, normal anion gap (NAG)

## 2025-03-07 NOTE — PROVIDER CONTACT NOTE (OTHER) - DATE AND TIME:
Knee Scope follow Up 1st Visit      Patient: Mykel Tamayo        YOB: 2006      Chief Complaints: knee pain right      History of Present Illness: Pt is here f/u knee arthroscopy on the right knee he states he doing great he has no pain at all happy with where he is he is minding his activities        Allergies: No Known Allergies    Medications:   Home Medications:  No current outpatient medications on file prior to visit.     No current facility-administered medications on file prior to visit.     Current Medications:  Scheduled Meds:  Continuous Infusions:No current facility-administered medications for this visit.    PRN Meds:.          Physical Exam: 19 y.o. male  General Appearance:    Alert, cooperative, in no acute distress                 There were no vitals filed for this visit.   Patient is alert and oriented ×3 no acute distress normal mood physical exam.  Physical exam of the knee, incisions looked good there is no erythema, calf is soft and non-tender.  No sign or sx of DVT      Assessment  S/P knee scope.  I did review intraoperative findings and arthroscopic pictures with the patient.          Plan: To remove sutures today place Steri-Strips and start into  physical therapy and I will have thrm follow up in 4 weeks.  If he is doing well he may cancel he is going to do his therapy with his                    20-Oct-2017 22:08

## 2025-05-19 NOTE — PROVIDER CONTACT NOTE (CRITICAL VALUE NOTIFICATION) - RECOMMENDATIONS
repeat BMP after 2g calcium gluc infusion
Make team aware; supplment
4 = No assist / stand by assistance

## 2025-06-24 NOTE — PATIENT PROFILE ADULT. - PRO ANTICIPATED DISCH DISP
Primary Information    Source   Yesenia Sanches (Patient)    Subject   Yesenia Sanches (Patient)    Topic   Medications - Medication Refill      Summary   Medication refill request   Communication   Type:  RX Refill Request            Who Called:  Pt      Refill or New Rx: Refill      RX Name and Strength: venlafaxine (EFFEXOR-XR) 37.5 MG 24 hr capsule      Preferred Pharmacy with phone number:      [id number] - [place] - [address] AT [facility]      [address] [address]      Phone: [phone] Fax: [phone]      Local or Mail Order: Local      Ordering Provider: Any provider who can do refill      Would the patient rather a call back or [facility] Call back      Best Call Back Number: [phone]     
home